# Patient Record
Sex: FEMALE | Race: WHITE | NOT HISPANIC OR LATINO
[De-identification: names, ages, dates, MRNs, and addresses within clinical notes are randomized per-mention and may not be internally consistent; named-entity substitution may affect disease eponyms.]

---

## 2018-08-20 ENCOUNTER — APPOINTMENT (OUTPATIENT)
Dept: OTOLARYNGOLOGY | Facility: CLINIC | Age: 78
End: 2018-08-20
Payer: MEDICARE

## 2018-08-20 VITALS
DIASTOLIC BLOOD PRESSURE: 80 MMHG | HEIGHT: 64 IN | SYSTOLIC BLOOD PRESSURE: 150 MMHG | WEIGHT: 136 LBS | HEART RATE: 68 BPM | BODY MASS INDEX: 23.22 KG/M2

## 2018-08-20 PROCEDURE — 99203 OFFICE O/P NEW LOW 30 MIN: CPT

## 2019-02-27 ENCOUNTER — APPOINTMENT (OUTPATIENT)
Dept: OTOLARYNGOLOGY | Facility: CLINIC | Age: 79
End: 2019-02-27
Payer: MEDICARE

## 2019-02-27 VITALS
HEART RATE: 72 BPM | DIASTOLIC BLOOD PRESSURE: 73 MMHG | WEIGHT: 136 LBS | SYSTOLIC BLOOD PRESSURE: 123 MMHG | BODY MASS INDEX: 23.22 KG/M2 | HEIGHT: 64 IN

## 2019-02-27 PROCEDURE — 99213 OFFICE O/P EST LOW 20 MIN: CPT

## 2019-02-27 NOTE — HISTORY OF PRESENT ILLNESS
[de-identified] : co intermittent ear congestion\par using fluticasone \par 2 d as thumping sound\par clear nasal drip\par antibiotic now for dental implant rt upper

## 2019-02-27 NOTE — REVIEW OF SYSTEMS
[Ear Itch] : ear itch [Ear Drainage] : ear drainage [Discolored Nasal Discharge] : discolored nasal discharge [Negative] : Heme/Lymph [Patient Intake Form Reviewed] : Patient intake form was reviewed [de-identified] : ear pressure

## 2019-06-10 ENCOUNTER — INPATIENT (INPATIENT)
Facility: HOSPITAL | Age: 79
LOS: 8 days | Discharge: HOME | End: 2019-06-19
Attending: FAMILY MEDICINE | Admitting: FAMILY MEDICINE
Payer: MEDICARE

## 2019-06-10 VITALS
OXYGEN SATURATION: 100 % | HEART RATE: 165 BPM | WEIGHT: 145.95 LBS | SYSTOLIC BLOOD PRESSURE: 118 MMHG | HEIGHT: 64 IN | RESPIRATION RATE: 18 BRPM | DIASTOLIC BLOOD PRESSURE: 83 MMHG | TEMPERATURE: 99 F

## 2019-06-10 DIAGNOSIS — E78.5 HYPERLIPIDEMIA, UNSPECIFIED: ICD-10-CM

## 2019-06-10 DIAGNOSIS — I48.92 UNSPECIFIED ATRIAL FLUTTER: ICD-10-CM

## 2019-06-10 DIAGNOSIS — R50.9 FEVER, UNSPECIFIED: ICD-10-CM

## 2019-06-10 DIAGNOSIS — R09.82 POSTNASAL DRIP: ICD-10-CM

## 2019-06-10 DIAGNOSIS — I25.10 ATHEROSCLEROTIC HEART DISEASE OF NATIVE CORONARY ARTERY WITHOUT ANGINA PECTORIS: ICD-10-CM

## 2019-06-10 DIAGNOSIS — Z91.040 LATEX ALLERGY STATUS: ICD-10-CM

## 2019-06-10 DIAGNOSIS — I47.1 SUPRAVENTRICULAR TACHYCARDIA: ICD-10-CM

## 2019-06-10 DIAGNOSIS — Z88.2 ALLERGY STATUS TO SULFONAMIDES: ICD-10-CM

## 2019-06-10 DIAGNOSIS — I10 ESSENTIAL (PRIMARY) HYPERTENSION: ICD-10-CM

## 2019-06-10 DIAGNOSIS — I48.91 UNSPECIFIED ATRIAL FIBRILLATION: ICD-10-CM

## 2019-06-10 DIAGNOSIS — R74.8 ABNORMAL LEVELS OF OTHER SERUM ENZYMES: ICD-10-CM

## 2019-06-10 DIAGNOSIS — K21.9 GASTRO-ESOPHAGEAL REFLUX DISEASE WITHOUT ESOPHAGITIS: ICD-10-CM

## 2019-06-10 DIAGNOSIS — F41.9 ANXIETY DISORDER, UNSPECIFIED: ICD-10-CM

## 2019-06-10 LAB
ALBUMIN SERPL ELPH-MCNC: 3.8 G/DL — SIGNIFICANT CHANGE UP (ref 3.3–5)
ALP SERPL-CCNC: 105 U/L — SIGNIFICANT CHANGE UP (ref 40–120)
ALT FLD-CCNC: 74 U/L — SIGNIFICANT CHANGE UP (ref 12–78)
ANION GAP SERPL CALC-SCNC: 11 MMOL/L — SIGNIFICANT CHANGE UP (ref 5–17)
APTT BLD: 28.4 SEC — SIGNIFICANT CHANGE UP (ref 27.5–36.3)
AST SERPL-CCNC: 65 U/L — HIGH (ref 15–37)
BASOPHILS # BLD AUTO: 0.04 K/UL — SIGNIFICANT CHANGE UP (ref 0–0.2)
BASOPHILS NFR BLD AUTO: 0.4 % — SIGNIFICANT CHANGE UP (ref 0–2)
BILIRUB SERPL-MCNC: 0.7 MG/DL — SIGNIFICANT CHANGE UP (ref 0.2–1.2)
BUN SERPL-MCNC: 27 MG/DL — HIGH (ref 7–23)
CALCIUM SERPL-MCNC: 8.9 MG/DL — SIGNIFICANT CHANGE UP (ref 8.5–10.1)
CHLORIDE SERPL-SCNC: 100 MMOL/L — SIGNIFICANT CHANGE UP (ref 96–108)
CO2 SERPL-SCNC: 25 MMOL/L — SIGNIFICANT CHANGE UP (ref 22–31)
CREAT SERPL-MCNC: 0.96 MG/DL — SIGNIFICANT CHANGE UP (ref 0.5–1.3)
EOSINOPHIL # BLD AUTO: 0.08 K/UL — SIGNIFICANT CHANGE UP (ref 0–0.5)
EOSINOPHIL NFR BLD AUTO: 0.9 % — SIGNIFICANT CHANGE UP (ref 0–6)
GLUCOSE SERPL-MCNC: 127 MG/DL — HIGH (ref 70–99)
HCT VFR BLD CALC: 40.9 % — SIGNIFICANT CHANGE UP (ref 34.5–45)
HGB BLD-MCNC: 14.2 G/DL — SIGNIFICANT CHANGE UP (ref 11.5–15.5)
IMM GRANULOCYTES NFR BLD AUTO: 0.2 % — SIGNIFICANT CHANGE UP (ref 0–1.5)
INR BLD: 1.07 RATIO — SIGNIFICANT CHANGE UP (ref 0.88–1.16)
LYMPHOCYTES # BLD AUTO: 2.28 K/UL — SIGNIFICANT CHANGE UP (ref 1–3.3)
LYMPHOCYTES # BLD AUTO: 24.5 % — SIGNIFICANT CHANGE UP (ref 13–44)
MAGNESIUM SERPL-MCNC: 2 MG/DL — SIGNIFICANT CHANGE UP (ref 1.6–2.6)
MCHC RBC-ENTMCNC: 33.3 PG — SIGNIFICANT CHANGE UP (ref 27–34)
MCHC RBC-ENTMCNC: 34.7 GM/DL — SIGNIFICANT CHANGE UP (ref 32–36)
MCV RBC AUTO: 96 FL — SIGNIFICANT CHANGE UP (ref 80–100)
MONOCYTES # BLD AUTO: 0.89 K/UL — SIGNIFICANT CHANGE UP (ref 0–0.9)
MONOCYTES NFR BLD AUTO: 9.6 % — SIGNIFICANT CHANGE UP (ref 2–14)
NEUTROPHILS # BLD AUTO: 5.98 K/UL — SIGNIFICANT CHANGE UP (ref 1.8–7.4)
NEUTROPHILS NFR BLD AUTO: 64.4 % — SIGNIFICANT CHANGE UP (ref 43–77)
PLATELET # BLD AUTO: 189 K/UL — SIGNIFICANT CHANGE UP (ref 150–400)
POTASSIUM SERPL-MCNC: 3.6 MMOL/L — SIGNIFICANT CHANGE UP (ref 3.5–5.3)
POTASSIUM SERPL-SCNC: 3.6 MMOL/L — SIGNIFICANT CHANGE UP (ref 3.5–5.3)
PROT SERPL-MCNC: 7.2 GM/DL — SIGNIFICANT CHANGE UP (ref 6–8.3)
PROTHROM AB SERPL-ACNC: 11.9 SEC — SIGNIFICANT CHANGE UP (ref 10–12.9)
RBC # BLD: 4.26 M/UL — SIGNIFICANT CHANGE UP (ref 3.8–5.2)
RBC # FLD: 12 % — SIGNIFICANT CHANGE UP (ref 10.3–14.5)
SODIUM SERPL-SCNC: 136 MMOL/L — SIGNIFICANT CHANGE UP (ref 135–145)
TROPONIN I SERPL-MCNC: 0.87 NG/ML — HIGH (ref 0.01–0.04)
TROPONIN I SERPL-MCNC: 0.88 NG/ML — HIGH (ref 0.01–0.04)
TSH SERPL-MCNC: 3.55 UU/ML — SIGNIFICANT CHANGE UP (ref 0.34–4.82)
WBC # BLD: 9.29 K/UL — SIGNIFICANT CHANGE UP (ref 3.8–10.5)
WBC # FLD AUTO: 9.29 K/UL — SIGNIFICANT CHANGE UP (ref 3.8–10.5)

## 2019-06-10 PROCEDURE — 93010 ELECTROCARDIOGRAM REPORT: CPT

## 2019-06-10 PROCEDURE — 71045 X-RAY EXAM CHEST 1 VIEW: CPT | Mod: 26

## 2019-06-10 PROCEDURE — 99285 EMERGENCY DEPT VISIT HI MDM: CPT

## 2019-06-10 RX ORDER — ASPIRIN/CALCIUM CARB/MAGNESIUM 324 MG
81 TABLET ORAL DAILY
Refills: 0 | Status: DISCONTINUED | OUTPATIENT
Start: 2019-06-10 | End: 2019-06-19

## 2019-06-10 RX ORDER — METOPROLOL TARTRATE 50 MG
25 TABLET ORAL
Refills: 0 | Status: DISCONTINUED | OUTPATIENT
Start: 2019-06-10 | End: 2019-06-11

## 2019-06-10 RX ORDER — ASPIRIN/CALCIUM CARB/MAGNESIUM 324 MG
324 TABLET ORAL ONCE
Refills: 0 | Status: COMPLETED | OUTPATIENT
Start: 2019-06-10 | End: 2019-06-10

## 2019-06-10 RX ORDER — SODIUM CHLORIDE 9 MG/ML
1000 INJECTION INTRAMUSCULAR; INTRAVENOUS; SUBCUTANEOUS ONCE
Refills: 0 | Status: COMPLETED | OUTPATIENT
Start: 2019-06-10 | End: 2019-06-10

## 2019-06-10 RX ORDER — PANTOPRAZOLE SODIUM 20 MG/1
40 TABLET, DELAYED RELEASE ORAL
Refills: 0 | Status: DISCONTINUED | OUTPATIENT
Start: 2019-06-10 | End: 2019-06-19

## 2019-06-10 RX ORDER — METOPROLOL TARTRATE 50 MG
2.6 TABLET ORAL EVERY 6 HOURS
Refills: 0 | Status: DISCONTINUED | OUTPATIENT
Start: 2019-06-10 | End: 2019-06-13

## 2019-06-10 RX ORDER — DILTIAZEM HCL 120 MG
10 CAPSULE, EXT RELEASE 24 HR ORAL ONCE
Refills: 0 | Status: COMPLETED | OUTPATIENT
Start: 2019-06-10 | End: 2019-06-10

## 2019-06-10 RX ORDER — ALPRAZOLAM 0.25 MG
0.12 TABLET ORAL THREE TIMES A DAY
Refills: 0 | Status: DISCONTINUED | OUTPATIENT
Start: 2019-06-10 | End: 2019-06-17

## 2019-06-10 RX ORDER — METOPROLOL TARTRATE 50 MG
2.5 TABLET ORAL ONCE
Refills: 0 | Status: COMPLETED | OUTPATIENT
Start: 2019-06-10 | End: 2019-06-10

## 2019-06-10 RX ORDER — DILTIAZEM HCL 120 MG
5 CAPSULE, EXT RELEASE 24 HR ORAL
Qty: 125 | Refills: 0 | Status: DISCONTINUED | OUTPATIENT
Start: 2019-06-10 | End: 2019-06-15

## 2019-06-10 RX ORDER — DILTIAZEM HCL 120 MG
2.5 CAPSULE, EXT RELEASE 24 HR ORAL
Qty: 125 | Refills: 0 | Status: DISCONTINUED | OUTPATIENT
Start: 2019-06-10 | End: 2019-06-10

## 2019-06-10 RX ORDER — ATORVASTATIN CALCIUM 80 MG/1
20 TABLET, FILM COATED ORAL AT BEDTIME
Refills: 0 | Status: DISCONTINUED | OUTPATIENT
Start: 2019-06-10 | End: 2019-06-19

## 2019-06-10 RX ADMIN — SODIUM CHLORIDE 1000 MILLILITER(S): 9 INJECTION INTRAMUSCULAR; INTRAVENOUS; SUBCUTANEOUS at 17:58

## 2019-06-10 RX ADMIN — Medication 324 MILLIGRAM(S): at 15:05

## 2019-06-10 RX ADMIN — Medication 5 MG/HR: at 20:33

## 2019-06-10 RX ADMIN — Medication 2.5 MG/HR: at 15:26

## 2019-06-10 RX ADMIN — Medication 2.5 MILLIGRAM(S): at 17:32

## 2019-06-10 RX ADMIN — Medication 10 MILLIGRAM(S): at 14:19

## 2019-06-10 RX ADMIN — Medication 25 MILLIGRAM(S): at 18:59

## 2019-06-10 RX ADMIN — Medication 0.12 MILLIGRAM(S): at 21:09

## 2019-06-10 RX ADMIN — ATORVASTATIN CALCIUM 20 MILLIGRAM(S): 80 TABLET, FILM COATED ORAL at 21:06

## 2019-06-10 RX ADMIN — Medication 2.5 MILLIGRAM(S): at 18:58

## 2019-06-10 NOTE — ED ADULT NURSE REASSESSMENT NOTE - NS ED NURSE REASSESS COMMENT FT1
Patient remains in Afib, HR trend improved s/p lopressor as prescribed. Cardizem gtt lowered from 10 mL/hr to 3 mL/hr per NP Juni Gutierrez. Patient became hypotensive, 1 L NS bolus initiated with improvement in BP. Patient remains asymptomatic, no s/s of distress present. Denies pain/discomfort, palpitations, SOB. Plan of care discussed. Hand-off report to LEIGH López, transport to CICU. Safety & comfort measures in place, frequent purposeful rounding on my time.

## 2019-06-10 NOTE — H&P ADULT - ATTENDING COMMENTS
Patient seen and examined with ELIZABETH Aranda. Agree with physical exam and assessment and plan.   - agree with cardizem drip and lopressor if needed (with BP parameters)  - cardio eval is pending  - EP consult ordered.  - Type 2 MI 2/2 likely demand 2/2 to rapid HR  - monitor and trend. continue aspirin, statin.

## 2019-06-10 NOTE — ED ADULT NURSE NOTE - NSIMPLEMENTINTERV_GEN_ALL_ED
Implemented All Fall Risk Interventions:  [place] to call system. Call bell, personal items and telephone within reach. Instruct patient to call for assistance. Room bathroom lighting operational. Non-slip footwear when patient is off stretcher. Physically safe environment: no spills, clutter or unnecessary equipment. Stretcher in lowest position, wheels locked, appropriate side rails in place. Provide visual cue, wrist band, yellow gown, etc. Monitor gait and stability. Monitor for mental status changes and reorient to person, place, and time. Review medications for side effects contributing to fall risk. Reinforce activity limits and safety measures with patient and family.

## 2019-06-10 NOTE — H&P ADULT - HISTORY OF PRESENT ILLNESS
79 year old Woman with hx anxiety, essential HTN, dyslipidemia, post nasal drip, GERD, ?Non-obstruct CAD, presented to ED with c/o 1 wk hx of "discomfort" in epigastric area, nausea and generalized malaise. She report PCP Dr. England's office for r/o SVT. She denies CP/palpitation/SOB/HA/dizziness/v/d/f/c; denies urinary changes/abonormality; no recent sick contact or travel.    In the ED ECG/tele Atach 160's, TNI 0.883, CBC/BMP normal, /83, RR 18, T 37.3, O2 sat 100% rm air. CXR no acute pathology. She received cardizem IVp 10 mg x1 f/b drip starting at 2.5mcg. asa 324mg x1,

## 2019-06-10 NOTE — H&P ADULT - NSHPPHYSICALEXAM_GEN_ALL_CORE
PHYSICAL EXAM:    GENERAL: well groomed, NAD    HEENT:  pupils equal and reactive, EOMI, no oropharyngeal lesions, erythema, exudates, oral thrush    NECK:   supple, no carotid bruits, no palpable lymph nodes, no thyromegaly    CV:  S1S2, tachycardia, regular,  no m/c/r    RESP:   lungs clear to auscultation bilaterally, no wheezing, rales, rhonchi, good air entry bilaterally    BREAST:  not examined    GI:  abdomen soft, non-tender, non-distended, normal BS, no bruits, no abdominal masses, no palpable masses    RECTAL:  not examined    :  no suprapubic tenderness    MSK:   normal muscle tone, no atrophy, no rigidity, no contractions    EXT: chronic vascular changes to BLE,  no clubbing, no cyanosis, no edema, no calf pain, swelling or erythema    VASCULAR:  pulses equal and symmetric in the upper and lower extremities    NEURO:  AAOX3, no focal neurological deficits, follows all commands, able to move extremities spontaneously    SKIN:  no ulcers, lesions or rashes    Vital Signs Last 24 Hrs  T(C): 36.7 (10 Ross 2019 15:05), Max: 37.3 (10 Ross 2019 14:09)  T(F): 98.1 (10 Ross 2019 15:05), Max: 99.2 (10 Ross 2019 14:09)  HR: 132 (10 Ross 2019 16:09) (90 - 165)  BP: 113/83 (10 Ross 2019 16:09) (112/95 - 131/70)  BP(mean): --  RR: 18 (10 Ross 2019 14:09) (18 - 18)  SpO2: 100% (10 Ross 2019 14:09) (100% - 100%)

## 2019-06-10 NOTE — ED PROVIDER NOTE - OBJECTIVE STATEMENT
78 y/o female with PMH of HTN, HLD presents to the ED sent in from PCP Dr. England's office for r/o SVT. Pt presented to office for malaise, chills beginning yesterday. On EKG, pt found to be have fast rate and irregular rhythm and pt was sent to ED for evaluation. Denies dysuria, N/V/D, palpitations, CP, any other acute sx in ED at this time. Pt had normal angiogram with Dr. Alexander few years ago. +cardiac FHx.

## 2019-06-10 NOTE — H&P ADULT - ASSESSMENT
SVT / Atrial tachycardia ?re-entrant tachy  poor response w/ 2.5mg titrated up to 10mg/hr still unresponsive ('s - 160's)  BP now borderline low 98 - 103  decrease cardizem to 3mg  trial of metoprolol 2.5mg IVP x1  hold all home HTN meds   TSH 3.55  ck TTE assess valves / ventricles  if poor response to BB/CCB she might need a K/Na+ channel blocker  consult EP  admit to tele    Tropononemia likely demand in the setting of Atrial Tach  she's CP free  trend CE's / serial ECG's  consult cardiology (she sees scot/toma outpt)  con't statin  start asa 81mg daily    HTN  monitor per routine    Dyslipidemia  con't home dose statin: dose per formulary  ck lipids with am labs    Anxiety  Mood stable  con't home dose xanax    DVT PPX  VTE score - 1 (age)  SQ heparin    Dispo  goals of care d/w pt--> full code  pt agreed with discussed plan of care

## 2019-06-10 NOTE — H&P ADULT - NSHPSOCIALHISTORY_GEN_ALL_CORE
Never smoke   denies ETOH/illicit or prescription drug abuse   Lives alone independent of all ADL's / has no children  walks 1 mile everyday  currently employed FT as a  at the [employer]

## 2019-06-10 NOTE — H&P ADULT - NSICDXFAMILYHX_GEN_ALL_CORE_FT
FAMILY HISTORY:  Father  Still living? No  Family history of myocardial infarction, Age at diagnosis: 41-50    Sibling  Still living? No  Family history of myocardial infarction, Age at diagnosis: 41-50

## 2019-06-10 NOTE — ED PROVIDER NOTE - PROGRESS NOTE DETAILS
Scribe ST for ED Attending Dr. Charles: 10mg Cardizem given. rate slowed to 90s-low 100s. Rhythm appears to be sinus with atrial tachycardic component vs. a-flutter. sending EKG to EP for further evaluation. pt stable. current asymptomatic. Durga MORGAN for ED Attending Dr. Charles: spoke with EP NP who sent EKG to Dr. Santana, who suspects atrial tachycardia over Afib or Aflutter 78 y/o F with PMH HTN, HLD BIBEMS from PCP Dr. England office for SVT. Pt states for several days she has had generalized weakness. Denies chest pain however states her upper abdomen just didn't feel right. Denies n/v/d or palpitations. States she was seen by cardio Dr. Mendez years ago and underwent angiogram. Nonsmoker. No h/o cancer.  On PE: pt sitting comfortably. no acute distress. HR irregular rate and rhythm, no peripheral edema  plan: cardizem, admit to tele for atrial tachy and for troponin.  -Rola Spencer PA-C

## 2019-06-10 NOTE — ED ADULT NURSE REASSESSMENT NOTE - NS ED NURSE REASSESS COMMENT FT1
Patient care received from LEIGH BARBOZA. Patient A&Ox3, resting comfortably in bed. Afib per tele monitor, HR trend 120s-150s; pt asymptomatic. Cardizem gtt infusing at 10 mL/hr. Hold lopressor for now per NP Rosi, re-evaluate in 20 minutes. Safety & comfort measures in place, no s/s of distress present. Will continue to monitor.

## 2019-06-10 NOTE — ED PROVIDER NOTE - CLINICAL SUMMARY MEDICAL DECISION MAKING FREE TEXT BOX
80 y/o F with PMH HTN, HLD presenting with atrial tachycardia 80 y/o F with PMH HTN, HLD presenting with atrial tachycardia    Pt with HR in 160-170's on arrival, narrow complex rhythm.  Given 10 mg cardizem with improvement to 's.  Currently asymptomatic, hemodynamically stable.  EKG sent to EP, which was interpreted as atrial tachycardia by Dr. Jara.  Pt without history of this.  Elevated troponin 0.88. Given ASA.  Pt admitted to [facility], Dr. Bailey.  Will need further cardiology evaluation in hospital.

## 2019-06-11 LAB
ANION GAP SERPL CALC-SCNC: 11 MMOL/L — SIGNIFICANT CHANGE UP (ref 5–17)
BASOPHILS # BLD AUTO: 0.04 K/UL — SIGNIFICANT CHANGE UP (ref 0–0.2)
BASOPHILS NFR BLD AUTO: 0.5 % — SIGNIFICANT CHANGE UP (ref 0–2)
BUN SERPL-MCNC: 17 MG/DL — SIGNIFICANT CHANGE UP (ref 7–23)
CALCIUM SERPL-MCNC: 8.6 MG/DL — SIGNIFICANT CHANGE UP (ref 8.5–10.1)
CHLORIDE SERPL-SCNC: 106 MMOL/L — SIGNIFICANT CHANGE UP (ref 96–108)
CO2 SERPL-SCNC: 22 MMOL/L — SIGNIFICANT CHANGE UP (ref 22–31)
CREAT SERPL-MCNC: 0.74 MG/DL — SIGNIFICANT CHANGE UP (ref 0.5–1.3)
EOSINOPHIL # BLD AUTO: 0.11 K/UL — SIGNIFICANT CHANGE UP (ref 0–0.5)
EOSINOPHIL NFR BLD AUTO: 1.3 % — SIGNIFICANT CHANGE UP (ref 0–6)
GLUCOSE SERPL-MCNC: 150 MG/DL — HIGH (ref 70–99)
HCT VFR BLD CALC: 38.6 % — SIGNIFICANT CHANGE UP (ref 34.5–45)
HGB BLD-MCNC: 13.7 G/DL — SIGNIFICANT CHANGE UP (ref 11.5–15.5)
IMM GRANULOCYTES NFR BLD AUTO: 0.6 % — SIGNIFICANT CHANGE UP (ref 0–1.5)
LYMPHOCYTES # BLD AUTO: 1.03 K/UL — SIGNIFICANT CHANGE UP (ref 1–3.3)
LYMPHOCYTES # BLD AUTO: 11.9 % — LOW (ref 13–44)
MCHC RBC-ENTMCNC: 33.7 PG — SIGNIFICANT CHANGE UP (ref 27–34)
MCHC RBC-ENTMCNC: 35.5 GM/DL — SIGNIFICANT CHANGE UP (ref 32–36)
MCV RBC AUTO: 94.8 FL — SIGNIFICANT CHANGE UP (ref 80–100)
MONOCYTES # BLD AUTO: 0.79 K/UL — SIGNIFICANT CHANGE UP (ref 0–0.9)
MONOCYTES NFR BLD AUTO: 9.1 % — SIGNIFICANT CHANGE UP (ref 2–14)
NEUTROPHILS # BLD AUTO: 6.62 K/UL — SIGNIFICANT CHANGE UP (ref 1.8–7.4)
NEUTROPHILS NFR BLD AUTO: 76.6 % — SIGNIFICANT CHANGE UP (ref 43–77)
PLATELET # BLD AUTO: 162 K/UL — SIGNIFICANT CHANGE UP (ref 150–400)
POTASSIUM SERPL-MCNC: 3.5 MMOL/L — SIGNIFICANT CHANGE UP (ref 3.5–5.3)
POTASSIUM SERPL-SCNC: 3.5 MMOL/L — SIGNIFICANT CHANGE UP (ref 3.5–5.3)
RBC # BLD: 4.07 M/UL — SIGNIFICANT CHANGE UP (ref 3.8–5.2)
RBC # FLD: 12.1 % — SIGNIFICANT CHANGE UP (ref 10.3–14.5)
SODIUM SERPL-SCNC: 139 MMOL/L — SIGNIFICANT CHANGE UP (ref 135–145)
WBC # BLD: 8.64 K/UL — SIGNIFICANT CHANGE UP (ref 3.8–10.5)
WBC # FLD AUTO: 8.64 K/UL — SIGNIFICANT CHANGE UP (ref 3.8–10.5)

## 2019-06-11 PROCEDURE — 93010 ELECTROCARDIOGRAM REPORT: CPT

## 2019-06-11 PROCEDURE — 93306 TTE W/DOPPLER COMPLETE: CPT | Mod: 26

## 2019-06-11 RX ORDER — METOPROLOL TARTRATE 50 MG
25 TABLET ORAL ONCE
Refills: 0 | Status: COMPLETED | OUTPATIENT
Start: 2019-06-11 | End: 2019-06-11

## 2019-06-11 RX ORDER — METOPROLOL TARTRATE 50 MG
50 TABLET ORAL
Refills: 0 | Status: DISCONTINUED | OUTPATIENT
Start: 2019-06-11 | End: 2019-06-12

## 2019-06-11 RX ORDER — ENOXAPARIN SODIUM 100 MG/ML
40 INJECTION SUBCUTANEOUS DAILY
Refills: 0 | Status: DISCONTINUED | OUTPATIENT
Start: 2019-06-11 | End: 2019-06-19

## 2019-06-11 RX ORDER — POTASSIUM CHLORIDE 20 MEQ
40 PACKET (EA) ORAL ONCE
Refills: 0 | Status: COMPLETED | OUTPATIENT
Start: 2019-06-11 | End: 2019-06-12

## 2019-06-11 RX ADMIN — Medication 25 MILLIGRAM(S): at 08:04

## 2019-06-11 RX ADMIN — Medication 0.12 MILLIGRAM(S): at 21:12

## 2019-06-11 RX ADMIN — Medication 81 MILLIGRAM(S): at 14:48

## 2019-06-11 RX ADMIN — ATORVASTATIN CALCIUM 20 MILLIGRAM(S): 80 TABLET, FILM COATED ORAL at 21:11

## 2019-06-11 RX ADMIN — PANTOPRAZOLE SODIUM 40 MILLIGRAM(S): 20 TABLET, DELAYED RELEASE ORAL at 06:50

## 2019-06-11 RX ADMIN — Medication 0.12 MILLIGRAM(S): at 05:17

## 2019-06-11 RX ADMIN — Medication 5 MG/HR: at 08:04

## 2019-06-11 RX ADMIN — Medication 50 MILLIGRAM(S): at 18:03

## 2019-06-11 RX ADMIN — Medication 25 MILLIGRAM(S): at 04:54

## 2019-06-11 NOTE — PROGRESS NOTE ADULT - SUBJECTIVE AND OBJECTIVE BOX
History of Present Illness:   79 year old Woman with hx anxiety, essential HTN, dyslipidemia, post nasal drip, GERD, ?Non-obstruct CAD, presented to ED with c/o 1 wk hx of "discomfort" in epigastric area, nausea and generalized malaise. She report PCP Dr. England's office for r/o SVT. She denies CP/palpitation/SOB/HA/dizziness/v/d/f/c; denies urinary changes/abonormality; no recent sick contact or travel.    In the ED ECG/tele Atach 160's, TNI 0.883, CBC/BMP normal, /83, RR 18, T 37.3, O2 sat 100% rm air. CXR no acute pathology. She received cardizem IVp 10 mg x1 f/b drip starting at 2.5mcg. asa 324mg x1,            REVIEW OF SYSTEMS:    CONSTITUTIONAL: No weakness, No fevers or chills  ENT: No ear ache, No sorethroat  NECK: No pain, No stiffness  RESPIRATORY: No cough, No wheezing, No hemoptysis; No dyspnea  CARDIOVASCULAR: No chest pain, No palpitations  GASTROINTESTINAL: No abd pain, No nausea, No vomiting, No hematemesis, No diarrhea or constipation. No melena, No hematochezia.  GENITOURINARY: No dysuria, No  hematuria  NEUROLOGICAL: No diplopia, No paresthesia, No motor dysfunction  MUSCULOSKELETAL: No arthralgia, No myalgia  SKIN: No rashes, or lesions   PSYCH: no anxiety, no suicidal ideation    All other review of systems is negative unless indicated above    Vital Signs Last 24 Hrs  T(C): 36.8 (11 Jun 2019 21:59), Max: 36.8 (11 Jun 2019 16:00)  T(F): 98.3 (11 Jun 2019 21:59), Max: 98.3 (11 Jun 2019 21:59)  HR: 83 (11 Jun 2019 20:00) (82 - 159)  BP: 116/41 (11 Jun 2019 20:00) (88/61 - 127/90)  BP(mean): 60 (11 Jun 2019 20:00) (53 - 102)  RR: 21 (11 Jun 2019 20:00) (16 - 31)  SpO2: --    PHYSICAL EXAM:    GENERAL: NAD, Well nourished  HEENT:  NC/AT, EOMI, PERRLA, No scleral icterus, Moist mucous membranes  NECK: Supple, No JVD  CNS:  Alert & Oriented X3, Motor Strength 5/5 B/L upper and lower extremities; DTRs 2+ intact   LUNG: Normal Breath sounds, Clear to auscultation bilaterally, No rales, No rhonchi, No wheezing  HEART: RRR; No murmurs, No rubs  ABDOMEN: +BS, ST/ND/NT  GENITOURINARY: Voiding, Bladder not distended  EXTREMITIES:  2+ Peripheral Pulses, No clubbing, No cyanosis, No tibial edema  MUSCULOSKELTAL: Joints normal ROM, No TTP, No effusion  VAGINAL: deferred  SKIN: no rashes  RECTAL: deferred, not indicated  BREAST: deferred                          13.7   8.64  )-----------( 162      ( 11 Jun 2019 06:37 )             38.6     06-11    139  |  106  |  17  ----------------------------<  150<H>  3.5   |  22  |  0.74    Ca    8.6      11 Jun 2019 06:37  Mg     2.0     06-10    TPro  7.2  /  Alb  3.8  /  TBili  0.7  /  DBili  x   /  AST  65<H>  /  ALT  74  /  AlkPhos  105  06-10    Vancomycin levels:   Cultures:     MEDICATIONS  (STANDING):  aspirin  chewable 81 milliGRAM(s) Oral daily  atorvastatin 20 milliGRAM(s) Oral at bedtime  diltiazem Infusion 5 mG/Hr (5 mL/Hr) IV Continuous <Continuous>  metoprolol tartrate 50 milliGRAM(s) Oral two times a day  pantoprazole    Tablet 40 milliGRAM(s) Oral before breakfast    MEDICATIONS  (PRN):  ALPRAZolam 0.125 milliGRAM(s) Oral three times a day PRN anxiety  metoprolol tartrate Injectable 2.6 milliGRAM(s) IV Push every 6 hours PRN FOR HEART RATE GREATER THAN 120      all labs reviewed  all imaging reviewed        1. Afib/SVT:  c/w Lopressor 50mg bid  Titrate down Cardizem drip  ASA  TTE  Cardiology eval noted: to consider DCCV vs Ablation tx    2. HTN: stable  monitor per routine      3. Anxiety  Mood stable  con't home dose xanax

## 2019-06-11 NOTE — CONSULT NOTE ADULT - ASSESSMENT
Palpitations- atrial tachycardia- currently rate controlled at rest with cardizem drip.  WIll recommend EP team consultation.  Keep pt NPO for now pending EP team consult.  Increase po metoprolol and downtitrated the cardizem drip.     HTN- continue current meds.    Hyperlipidemia- continue statin.    Other medical issues- Management per primary team.   Thank you for allowing me to participate in the care of this patient. Please feel free to contact me with any questions.

## 2019-06-11 NOTE — CONSULT NOTE ADULT - ASSESSMENT
1. Atrial tach/SVT  -patient currently on cardizem drip (at 5mg/hr), po metoprolol (50mg BID) and IV metoprolol prn  -recommend to uptitrate po metoprolol with down titration of cardizem drip as BP and HR allows  -2D echocardiogram to assess LV function and valvular disease  -patient may benefit from stress testing given atypical symptoms  -monitor electrolytes and adequate hydration  -ambulatory event monitor 1. Atrial tach/SVT  -patient currently on cardizem drip (at 5mg/hr), po metoprolol (50mg BID) and IV metoprolol prn  -recommend to continue po metoprolol with down titration of cardizem drip as BP and HR allows  -if patient unresponsive to po metoprolol, consider switching to po cardizem and uptitrate as tolerated  -2D echocardiogram to assess LV function and valvular disease  -monitor electrolytes and adequate hydration  -will discuss possible ablation with patient      Thank you for the consultation and allowing us to participate in the care of Ms. Kelley.

## 2019-06-11 NOTE — CONSULT NOTE ADULT - SUBJECTIVE AND OBJECTIVE BOX
Patient is a 79y old  Female who presents with a chief complaint of SVT.     HPI:  79 year old Woman with hx anxiety, essential HTN, dyslipidemia, post nasal drip, GERD, ?Non-obstruct CAD, presented to ED with c/o 1 wk hx of "discomfort" in epigastric area, nausea and generalized malaise. She report PCP Dr. Eckert's office for r/o SVT. She denies CP/palpitation/SOB/HA/dizziness/v/d/f/c; denies urinary changes/abnormality; no recent sick contact or travel.    In the ED ECG/tele Atach 160's, TNI 0.883, CBC/BMP normal, /83, RR 18, T 37.3, O2 sat 100% rm air. CXR no acute pathology. She received cardizem IVp 10 mg x1 f/b drip starting at 2.5mcg. asa 324mg x1.    Since presentation she was in atrial tachycardia. She was noted to have increased HR after cardizem drip was stopped overnight and was restarted on the drip.  this am she c/o anxiety.   Denies any other symptoms.       PAST MEDICAL & SURGICAL HISTORY:  Anxiety  HLD (hyperlipidemia)  HTN (hypertension)  No significant past surgical history      MEDICATIONS  (STANDING):  aspirin  chewable 81 milliGRAM(s) Oral daily  atorvastatin 20 milliGRAM(s) Oral at bedtime  diltiazem Infusion 5 mG/Hr (5 mL/Hr) IV Continuous <Continuous>  metoprolol tartrate 25 milliGRAM(s) Oral two times a day  pantoprazole    Tablet 40 milliGRAM(s) Oral before breakfast    MEDICATIONS  (PRN):  ALPRAZolam 0.125 milliGRAM(s) Oral three times a day PRN anxiety  metoprolol tartrate Injectable 2.6 milliGRAM(s) IV Push every 6 hours PRN FOR HEART RATE GREATER THAN 120      FAMILY HISTORY:  Family history of myocardial infarction (Father, Sibling)      SOCIAL HISTORY:  no recent smoking     REVIEW OF SYSTEMS:  CONSTITUTIONAL:    No fatigue, malaise, lethargy.  No fever or chills.  HEENT:  Eyes:  No visual changes.     ENT:  No epistaxis.  No sinus pain.    RESPIRATORY:  No cough.  No wheeze.  No hemoptysis.  No shortness of breath.  CARDIOVASCULAR:  No chest pains.  c/o palpitations. No shortness of breath, No orthopnea or PND.  GASTROINTESTINAL:  No abdominal pain.  No nausea or vomiting.    GENITOURINARY:    No hematuria.    MUSCULOSKELETAL:  No musculoskeletal pain.  No joint swelling.  No arthritis.  NEUROLOGICAL:  No tingling or numbness or weakness.        Vital Signs Last 24 Hrs  T(C): 36.7 (11 Jun 2019 06:39), Max: 37.3 (10 Ross 2019 14:09)  T(F): 98.1 (11 Jun 2019 06:39), Max: 99.2 (10 Ross 2019 14:09)  HR: 95 (11 Jun 2019 07:00) (82 - 165)  BP: 117/75 (11 Jun 2019 07:00) (80/45 - 131/70)  BP(mean): 84 (11 Jun 2019 07:00) (53 - 104)  RR: 19 (11 Jun 2019 07:00) (16 - 22)  SpO2: 96% (10 Ross 2019 18:06) (96% - 100%)    PHYSICAL EXAM-    Constitutional: The patient appears to be normal, well developed, well nourished and alert and oriented to time, place and person. The patient does not appear acutely ill.     Head: Head is normocephalic and atraumatic.      Neck: No jugular venous distention. No audible carotid bruits. There are strong carotid pulses bilaterally. No JVD.     Cardiovascular: irregular and tachycardic    Respiratory: Breathsounds are normal. No rales. No wheezing.    Abdomen: Soft, nontender, nondistended with positive bowel sounds.      Extremity: No tenderness. No  pitting edema     Neurologic: The patient is alert and oriented.      Skin: No rash, no obvious lesions noted.      Psychiatric: The patient appears to be emotionally stable.      INTERPRETATION OF TELEMETRY: multifocal atrial tachycardia    ECG: atrial tachycardia, L axis no st t changes    I&O's Detail    10 Ross 2019 07:01  -  11 Jun 2019 07:00  --------------------------------------------------------  IN:    diltiazem Infusion: 35 mL  Total IN: 35 mL    OUT:  Total OUT: 0 mL    Total NET: 35 mL          LABS:                        13.7   8.64  )-----------( 162      ( 11 Jun 2019 06:37 )             38.6     06-11    139  |  106  |  17  ----------------------------<  150<H>  3.5   |  22  |  0.74    Ca    8.6      11 Jun 2019 06:37  Mg     2.0     06-10    TPro  7.2  /  Alb  3.8  /  TBili  0.7  /  DBili  x   /  AST  65<H>  /  ALT  74  /  AlkPhos  105  06-10    CARDIAC MARKERS ( 10 Ross 2019 17:48 )  0.873 ng/mL / x     / x     / x     / x      CARDIAC MARKERS ( 10 Ross 2019 14:21 )  0.883 ng/mL / x     / x     / x     / x          PT/INR - ( 10 Ross 2019 14:21 )   PT: 11.9 sec;   INR: 1.07 ratio         PTT - ( 10 Ross 2019 14:21 )  PTT:28.4 sec    I&O's Summary    10 Ross 2019 07:01  -  11 Jun 2019 07:00  --------------------------------------------------------  IN: 35 mL / OUT: 0 mL / NET: 35 mL      BNP  RADIOLOGY & ADDITIONAL STUDIES:  < from: Xray Chest 1 View- PORTABLE-Urgent (06.10.19 @ 15:05) >    EXAM:  XR CHEST PORTABLE URGENT 1V                            PROCEDURE DATE:  06/10/2019          INTERPRETATION:  Portable chest radiograph dated 6/10/2019.    COMPARISON: None available.    CLINICAL INFORMATION: Rapid heart beat.    FINDINGS:    The airway is midline.  There are no airspace consolidations.  There is no pleural effusion or pneumothorax.   The cardiac silhouette is normal size.   The bones are normal.     IMPRESSION:    No acute cardiopulmonary findings.                JAZZY NOLASCO M.D., ATTENDING RADIOLOGIST  This document has been electronically signed. Ross 10 2019  3:19PM    < end of copied text >

## 2019-06-11 NOTE — CONSULT NOTE ADULT - SUBJECTIVE AND OBJECTIVE BOX
[place] [facility]                                                        Office: [address] [address]                                                       Telephone: [phone]. Fax:[phone]                                                              CARDIOLOGY CONSULTATION NOTE                                                                                             Consult requested by:  Hospitalist    Reason for Consultation: SVT/atrial tach    History obtained by: Patient and medical record     obtained: No    Chief complaint:    Patient is a 79y old  Female who presents with a chief complaint of SVT (11 Jun 2019 07:41)      HPI:  79 year old Woman with hx anxiety, essential HTN, dyslipidemia, post nasal drip, GERD, ?Non-obstruct CAD, presented to ED with c/o 1 wk hx of "discomfort" in epigastric/upper chest area, nausea and generalized malaise. She reports that for the last week, she has had this uncomfortable intermittent burning feeling in the upper chest area, no radiation. Not improved with PPI. No dyspnea on exertion or CP on exertion. She reports a throbbing feeling in the throat with a feeling of palpitations at night for the last week. Denies any current HA, dizziness, N, V, abd pain, fever, chills, diarrhea. No urinary complaints. She went to her PCP's office and had an initial EKG which she reports was normal, but on followup visit, she reports that EKG showed a fast heart rate.     In the ED, patient's ECG revealed Atach 160's.She received cardizem IVP 10 mg x1 followed by cardizem drip and was given ASA 324mg x1. Currently on cardizem and metoprolol.        REVIEW OF SYMPTOMS: Cardiovascular:  See HPI. No chest pain,  No dyspnea,  No syncope, No dizziness, No Orthopnea,  No Paroxsymal nocturnal dyspnea, + palpitations at night;  Respiratory:  No Dyspnea, No cough, Genitourinary:  No dysuria, no hematuria; Gastrointestinal:  No nausea, no vomiting. No diarrhea.  No abdominal pain. No dark color stool, no melena ; Neurological: No headache, no dizziness, no slurred speech;  Psychiatric: No agitation, no anxiety.  ALL OTHER REVIEW OF SYSTEMS ARE NEGATIVE.    ALLERGIES:  latex (Unknown)  sulfa drugs (Unknown)    CURRENT MEDICATIONS:  diltiazem Infusion 5 mG/Hr IV Continuous <Continuous>  metoprolol tartrate 50 milliGRAM(s) Oral two times a day  metoprolol tartrate Injectable 2.6 milliGRAM(s) IV Push every 6 hours PRN  pantoprazole    Tablet  aspirin  chewable  atorvastatin    PAST MEDICAL HISTORY  Anxiety  HLD (hyperlipidemia)  HTN (hypertension)      PAST SURGICAL HISTORY  Cataracts    FAMILY HISTORY:  Family history of myocardial infarction in father and sibling (40s-50s)    SOCIAL HISTORY:  Denies smoking/alcohol/drugs      Vital Signs Last 24 Hrs  T(C): 36.7 (11 Jun 2019 06:39), Max: 37.3 (10 Ross 2019 14:09)  T(F): 98.1 (11 Jun 2019 06:39), Max: 99.2 (10 Ross 2019 14:09)  HR: 102 (11 Jun 2019 10:00) (82 - 165)  BP: 121/80 (11 Jun 2019 10:00) (80/45 - 131/70)  BP(mean): 89 (11 Jun 2019 10:00) (53 - 104)  RR: 26 (11 Jun 2019 10:00) (16 - 26)  SpO2: 96% (10 Ross 2019 18:06) (96% - 100%)      PHYSICAL EXAM:  Constitutional: Comfortable . No acute distress.   HEENT: Atraumatic and normcephalic , neck is supple . no JVD. No carotid bruit. PEERL   CNS: A&Ox3. No focal deficits. EOMI. Cranial nerves II-IX are intact.   Lymph Nodes: Cervical : Not palpable.  Respiratory: Clear to auscultation bilaterally. Breathing nonlabored.  Cardiovascular: S1S2 RRR. No rubs or gallop. +1/6 HUMA heard best at apex  Gastrointestinal: Soft non-tender and non distended . +Bowel sounds.   Extremities: No cyanosis or edema.   Psychiatric: Calm. No agitation.  Skin: No skin rash/ulcers visualized to face, hands or feet.    Intake and output:   06-10 @ 07:01  -  06-11 @ 07:00  --------------------------------------------------------  IN: 35 mL / OUT: 0 mL / NET: 35 mL        LABS:                        13.7   8.64  )-----------( 162      ( 11 Jun 2019 06:37 )             38.6     06-11    139  |  106  |  17  ----------------------------<  150<H>  3.5   |  22  |  0.74    Ca    8.6      11 Jun 2019 06:37  Mg     2.0     06-10    TPro  7.2  /  Alb  3.8  /  TBili  0.7  /  DBili  x   /  AST  65<H>  /  ALT  74  /  AlkPhos  105  06-10    CARDIAC MARKERS ( 10 Ross 2019 17:48 )  0.873 ng/mL / x     / x     / x     / x      CARDIAC MARKERS ( 10 Ross 2019 14:21 )  0.883 ng/mL / x     / x     / x     / x        ;p-BNP=  PT/INR - ( 10 Ross 2019 14:21 )   PT: 11.9 sec;   INR: 1.07 ratio      PTT - ( 10 Ross 2019 14:21 )  PTT:28.4 sec      INTERPRETATION OF TELEMETRY: Atrial tach, with HR up to 150  ECG: Reviewed by me.     RADIOLOGY & ADDITIONAL STUDIES:   Chest X-ray:  No acute pulmonary pathology     ECHO FINDINGS: Pending [place] [facility]                                                        Office: [address] [address]                                                       Telephone: [phone]. Fax:[phone]                                                              CARDIOLOGY CONSULTATION NOTE                                                                                             Consult requested by:  Hospitalist    Reason for Consultation: SVT/atrial tach    History obtained by: Patient and medical record     obtained: No    Chief complaint:    Patient is a 79y old  Female who presents with a chief complaint of SVT (11 Jun 2019 07:41)      HPI:  79 year old Woman with hx anxiety, essential HTN, dyslipidemia, post nasal drip, GERD, ?Non-obstruct CAD, presented to ED with c/o 1 wk hx of "discomfort" in epigastric/upper chest area, nausea and generalized malaise. She reports that for the last week, she has had this uncomfortable intermittent burning feeling in the upper chest area, no radiation. Not improved with PPI. No dyspnea on exertion or CP on exertion. She reports a throbbing feeling in the throat with a feeling of palpitations at night for the last week. Denies any current HA, dizziness, N, V, abd pain, fever, chills, diarrhea. No urinary complaints. She went to her PCP's office and had an initial EKG which she reports was normal, but on followup visit, she reports that EKG showed a fast heart rate.     In the ED, patient's ECG revealed Atach 160's.She received cardizem IVP 10 mg x1 followed by cardizem drip and was given ASA 324mg x1. Currently on cardizem and metoprolol.        REVIEW OF SYMPTOMS: Cardiovascular:  See HPI. No chest pain,  No dyspnea,  No syncope, No dizziness, No Orthopnea,  No Paroxsymal nocturnal dyspnea, + palpitations at night;  Respiratory:  No Dyspnea, No cough, Genitourinary:  No dysuria, no hematuria; Gastrointestinal:  No nausea, no vomiting. No diarrhea.  No abdominal pain. No dark color stool, no melena ; Neurological: No headache, no dizziness, no slurred speech;  Psychiatric: No agitation, no anxiety.  ALL OTHER REVIEW OF SYSTEMS ARE NEGATIVE.    ALLERGIES:  latex (Unknown)  sulfa drugs (Unknown)    CURRENT MEDICATIONS:  diltiazem Infusion 5 mG/Hr IV Continuous <Continuous>  metoprolol tartrate 50 milliGRAM(s) Oral two times a day  metoprolol tartrate Injectable 2.6 milliGRAM(s) IV Push every 6 hours PRN  pantoprazole    Tablet  aspirin  chewable  atorvastatin    PAST MEDICAL HISTORY  Anxiety  HLD (hyperlipidemia)  HTN (hypertension)      PAST SURGICAL HISTORY  Cataracts    FAMILY HISTORY:  Family history of myocardial infarction in father and sibling (40s-50s)    SOCIAL HISTORY:  Denies smoking/alcohol/drugs      Vital Signs Last 24 Hrs  T(C): 36.7 (11 Jun 2019 06:39), Max: 37.3 (10 Ross 2019 14:09)  T(F): 98.1 (11 Jun 2019 06:39), Max: 99.2 (10 Ross 2019 14:09)  HR: 102 (11 Jun 2019 10:00) (82 - 165)  BP: 121/80 (11 Jun 2019 10:00) (80/45 - 131/70)  BP(mean): 89 (11 Jun 2019 10:00) (53 - 104)  RR: 26 (11 Jun 2019 10:00) (16 - 26)  SpO2: 96% (10 Ross 2019 18:06) (96% - 100%)      PHYSICAL EXAM:  Constitutional: Comfortable . No acute distress.   HEENT: Atraumatic and normcephalic , neck is supple . no JVD. No carotid bruit. PEERL   CNS: A&Ox3. No focal deficits. EOMI. Cranial nerves II-IX are intact.   Lymph Nodes: Cervical : Not palpable.  Respiratory: Clear to auscultation bilaterally. Breathing nonlabored.  Cardiovascular: Irregular, tachycardic. No rubs or gallop. +1/6 HUMA heard best at apex  Gastrointestinal: Soft non-tender and non distended . +Bowel sounds.   Extremities: No cyanosis or edema.   Psychiatric: Calm. No agitation.  Skin: No skin rash/ulcers visualized to face, hands or feet.    Intake and output:   06-10 @ 07:01  -  06-11 @ 07:00  --------------------------------------------------------  IN: 35 mL / OUT: 0 mL / NET: 35 mL        LABS:                        13.7   8.64  )-----------( 162      ( 11 Jun 2019 06:37 )             38.6     06-11    139  |  106  |  17  ----------------------------<  150<H>  3.5   |  22  |  0.74    Ca    8.6      11 Jun 2019 06:37  Mg     2.0     06-10    TPro  7.2  /  Alb  3.8  /  TBili  0.7  /  DBili  x   /  AST  65<H>  /  ALT  74  /  AlkPhos  105  06-10    CARDIAC MARKERS ( 10 Ross 2019 17:48 )  0.873 ng/mL / x     / x     / x     / x      CARDIAC MARKERS ( 10 Ross 2019 14:21 )  0.883 ng/mL / x     / x     / x     / x        ;p-BNP=  PT/INR - ( 10 Ross 2019 14:21 )   PT: 11.9 sec;   INR: 1.07 ratio      PTT - ( 10 Ross 2019 14:21 )  PTT:28.4 sec      INTERPRETATION OF TELEMETRY: Atrial tach, with HR up to 150  ECG: Reviewed by me.     RADIOLOGY & ADDITIONAL STUDIES:   Chest X-ray:  No acute pulmonary pathology     ECHO FINDINGS: Pending

## 2019-06-11 NOTE — PROVIDER CONTACT NOTE (OTHER) - SITUATION
spoke with EP
Spoke to service and notified of consult.
notified service; spoke to Nemo
office aware of consult

## 2019-06-12 DIAGNOSIS — I47.1 SUPRAVENTRICULAR TACHYCARDIA: ICD-10-CM

## 2019-06-12 DIAGNOSIS — R74.8 ABNORMAL LEVELS OF OTHER SERUM ENZYMES: ICD-10-CM

## 2019-06-12 DIAGNOSIS — I10 ESSENTIAL (PRIMARY) HYPERTENSION: ICD-10-CM

## 2019-06-12 LAB
APPEARANCE UR: CLEAR — SIGNIFICANT CHANGE UP
BACTERIA # UR AUTO: ABNORMAL
BILIRUB UR-MCNC: NEGATIVE — SIGNIFICANT CHANGE UP
COLOR SPEC: YELLOW — SIGNIFICANT CHANGE UP
DIFF PNL FLD: ABNORMAL
EPI CELLS # UR: SIGNIFICANT CHANGE UP
GLUCOSE UR QL: NEGATIVE MG/DL — SIGNIFICANT CHANGE UP
KETONES UR-MCNC: ABNORMAL
LEUKOCYTE ESTERASE UR-ACNC: ABNORMAL
NITRITE UR-MCNC: NEGATIVE — SIGNIFICANT CHANGE UP
PH UR: 6 — SIGNIFICANT CHANGE UP (ref 5–8)
PROT UR-MCNC: NEGATIVE MG/DL — SIGNIFICANT CHANGE UP
RBC CASTS # UR COMP ASSIST: ABNORMAL /HPF (ref 0–4)
SP GR SPEC: 1.01 — SIGNIFICANT CHANGE UP (ref 1.01–1.02)
UROBILINOGEN FLD QL: NEGATIVE MG/DL — SIGNIFICANT CHANGE UP
WBC UR QL: ABNORMAL

## 2019-06-12 PROCEDURE — 99223 1ST HOSP IP/OBS HIGH 75: CPT

## 2019-06-12 RX ORDER — DILTIAZEM HCL 120 MG
30 CAPSULE, EXT RELEASE 24 HR ORAL EVERY 6 HOURS
Refills: 0 | Status: DISCONTINUED | OUTPATIENT
Start: 2019-06-12 | End: 2019-06-13

## 2019-06-12 RX ORDER — METOPROLOL TARTRATE 50 MG
100 TABLET ORAL
Refills: 0 | Status: DISCONTINUED | OUTPATIENT
Start: 2019-06-12 | End: 2019-06-15

## 2019-06-12 RX ORDER — ACETAMINOPHEN 500 MG
650 TABLET ORAL EVERY 6 HOURS
Refills: 0 | Status: DISCONTINUED | OUTPATIENT
Start: 2019-06-12 | End: 2019-06-19

## 2019-06-12 RX ADMIN — ENOXAPARIN SODIUM 40 MILLIGRAM(S): 100 INJECTION SUBCUTANEOUS at 12:12

## 2019-06-12 RX ADMIN — Medication 50 MILLIGRAM(S): at 06:35

## 2019-06-12 RX ADMIN — Medication 0.12 MILLIGRAM(S): at 12:38

## 2019-06-12 RX ADMIN — Medication 650 MILLIGRAM(S): at 12:39

## 2019-06-12 RX ADMIN — Medication 100 MILLIGRAM(S): at 16:10

## 2019-06-12 RX ADMIN — ATORVASTATIN CALCIUM 20 MILLIGRAM(S): 80 TABLET, FILM COATED ORAL at 21:17

## 2019-06-12 RX ADMIN — Medication 30 MILLIGRAM(S): at 17:39

## 2019-06-12 RX ADMIN — Medication 5 MG/HR: at 12:12

## 2019-06-12 RX ADMIN — Medication 40 MILLIEQUIVALENT(S): at 05:47

## 2019-06-12 RX ADMIN — Medication 81 MILLIGRAM(S): at 12:12

## 2019-06-12 RX ADMIN — PANTOPRAZOLE SODIUM 40 MILLIGRAM(S): 20 TABLET, DELAYED RELEASE ORAL at 05:47

## 2019-06-12 RX ADMIN — Medication 650 MILLIGRAM(S): at 14:00

## 2019-06-12 RX ADMIN — Medication 0.12 MILLIGRAM(S): at 05:47

## 2019-06-12 RX ADMIN — Medication 30 MILLIGRAM(S): at 23:08

## 2019-06-12 NOTE — CONSULT NOTE ADULT - SUBJECTIVE AND OBJECTIVE BOX
PCP:    REQUESTING PHYSICIAN:    REASON FOR CONSULT:    CHIEF COMPLAINT:    HPI:  79 year old Woman with hx anxiety, essential HTN, dyslipidemia, post nasal drip, GERD, ?Non-obstruct CAD, Cath in 2015  presented to ED with c/o 1 wk hx of "discomfort" in epigastric area, nausea and generalized malaise. She report PCP Dr. Eckert's office for r/o SVT. She denies CP/palpitation/SOB/HA/dizziness/v/d/f/c; denies urinary changes/abonormality; no recent sick contact or travel.    In the ED ECG/tele Atach 160's, TNI 0.883, CBC/BMP normal, /83, RR 18, T 37.3, O2 sat 100% rm air. CXR no acute pathology. She received cardizem IVp 10 mg x1 f/b drip starting at 2.5mcg. asa 324mg x1, (10 Henok 2019 16:52)  Cardiology requested to evaluate rhythm which has been refractory to IV Cardizem. Pt denies chest pain but reports neck discomfort lately. Last stress test was appx 4 years ago prior to catheterization. Met with Dr. Ramirez who reviewed meds. In light of lack of response to rate reducing meds, she is leaning toward early ablation.       PAST MEDICAL & SURGICAL HISTORY:  Anxiety  HLD (hyperlipidemia)  HTN (hypertension)  No significant past surgical history      Allergies    latex (Unknown)  sulfa drugs (Unknown)    Intolerances        SOCIAL HISTORY:    FAMILY HISTORY:  Family history of myocardial infarction (Father, Sibling)      MEDICATIONS:  MEDICATIONS  (STANDING):  aspirin  chewable 81 milliGRAM(s) Oral daily  atorvastatin 20 milliGRAM(s) Oral at bedtime  diltiazem Infusion 5 mG/Hr (5 mL/Hr) IV Continuous <Continuous>  enoxaparin Injectable 40 milliGRAM(s) SubCutaneous daily  metoprolol tartrate 50 milliGRAM(s) Oral two times a day  pantoprazole    Tablet 40 milliGRAM(s) Oral before breakfast    MEDICATIONS  (PRN):  ALPRAZolam 0.125 milliGRAM(s) Oral three times a day PRN anxiety  metoprolol tartrate Injectable 2.6 milliGRAM(s) IV Push every 6 hours PRN FOR HEART RATE GREATER THAN 120      REVIEW OF SYSTEMS:    CONSTITUTIONAL: No weakness, fevers or chills  EYES/ENT: No visual changes;  No vertigo or throat pain   NECK: Neck pain  RESPIRATORY: No cough, wheezing, hemoptysis; No shortness of breath  CARDIOVASCULAR: No chest pain or palpitations  GASTROINTESTINAL: No abdominal or epigastric pain. No nausea, vomiting, or hematemesis; No diarrhea or constipation. No melena or hematochezia.  GENITOURINARY: No dysuria, frequency or hematuria  NEUROLOGICAL: No numbness or weakness  SKIN: No itching, burning, rashes, or lesions   All other review of systems is negative unless indicated above    Vital Signs Last 24 Hrs  T(C): 36.4 (12 Jun 2019 04:56), Max: 36.8 (11 Jun 2019 16:00)  T(F): 97.6 (12 Jun 2019 04:56), Max: 98.3 (11 Jun 2019 21:59)  HR: 81 (12 Jun 2019 10:00) (77 - 167)  BP: 107/62 (12 Jun 2019 09:00) (94/72 - 127/90)  BP(mean): 73 (12 Jun 2019 09:00) (59 - 102)  RR: 24 (12 Jun 2019 10:00) (16 - 31)  SpO2: --    I&O's Summary    11 Jun 2019 07:01  -  12 Jun 2019 07:00  --------------------------------------------------------  IN: 192.5 mL / OUT: 400 mL / NET: -207.5 mL        PHYSICAL EXAM:    Constitutional: NAD, awake and alert, well-developed  HEENT: PERR, EOMI,  No oral cyananosis.  Neck:  supple,  No JVD  Respiratory: Breath sounds are clear bilaterally, No wheezing, rales or rhonchi  Cardiovascular: S1 and S2, regular rate and rhythm, no Murmurs, gallops or rubs  Gastrointestinal: Bowel Sounds present, soft, nontender.   Extremities: No peripheral edema. No clubbing or cyanosis.  Vascular: 2+ peripheral pulses  Neurological: A/O x 3, no focal deficits  Musculoskeletal: no calf tenderness.  Skin: No rashes.      LABS: All Labs Reviewed:                        13.7   8.64  )-----------( 162      ( 11 Jun 2019 06:37 )             38.6                         14.2   9.29  )-----------( 189      ( 10 Henok 2019 14:21 )             40.9     11 Jun 2019 06:37    139    |  106    |  17     ----------------------------<  150    3.5     |  22     |  0.74   10 Henok 2019 14:21    136    |  100    |  27     ----------------------------<  127    3.6     |  25     |  0.96     Ca    8.6        11 Jun 2019 06:37  Ca    8.9        10 Henok 2019 14:21  Mg     2.0       10 Henok 2019 14:21    TPro  7.2    /  Alb  3.8    /  TBili  0.7    /  DBili  x      /  AST  65     /  ALT  74     /  AlkPhos  105    10 Henok 2019 14:21    PT/INR - ( 10 Henok 2019 14:21 )   PT: 11.9 sec;   INR: 1.07 ratio         PTT - ( 10 Henok 2019 14:21 )  PTT:28.4 sec  CARDIAC MARKERS ( 10 Henok 2019 17:48 )  0.873 ng/mL / x     / x     / x     / x      CARDIAC MARKERS ( 10 Henok 2019 14:21 )  0.883 ng/mL / x     / x     / x     / x          Blood Culture:     06-10 @ 14:21  TSH: 3.55      RADIOLOGY/EKG:    < from: 12 Lead ECG (06.11.19 @ 06:24) >    Diagnosis Line Sinus rhythm with marked sinus arrhythmia  Inferior infarct , age undetermined  Abnormal ECG  When compared with ECG of 10-HENOK-2019 14:32,  Premature atrial complexes are no longer Present  Inferior infarct is now Present  QT has shortened  Confirmed by NETTE SHAH, CALIN LI ([id number]) on 6/11/2019 2:09:14 PM    < end of copied text >  < from: Transthoracic Echocardiogram (06.11.19 @ 14:15) >     Impression     Summary     Significant fibrocalcific changes noted to the aortic valve leaflets with   restriction in leaflet excursion. Peak transaortic gradient is 37 mmHg   suggestive of mild aortic stenosis.   Moderate (2+) tricuspid valve regurgitation is present.   Mild pulmonary hypertension.   The left atrium is mildly dilated.   The left ventricle is normal in size, with paradoxical septal motion   Mild concentric left ventricular hypertrophy is present.   Estimated left ventricular ejection fraction is 55-60 %.   Normal appearing right ventricle structure and function.     Signature     ----------------------------------------------------------------   Electronically signed by Venugopal Palla, MD(Interpreting   physician) on 06/11/2019 07:13 PM    < end of copied text >

## 2019-06-12 NOTE — PROGRESS NOTE ADULT - SUBJECTIVE AND OBJECTIVE BOX
History of Present Illness:   79 year old Woman with hx anxiety, essential HTN, dyslipidemia, post nasal drip, GERD, ?Non-obstruct CAD, presented to ED with c/o 1 wk hx of "discomfort" in epigastric area, nausea and generalized malaise. She report PCP Dr. England's office for r/o SVT. She denies CP/palpitation/SOB/HA/dizziness/v/d/f/c; denies urinary changes/abonormality; no recent sick contact or travel.    In the ED ECG/tele Atach 160's, TNI 0.883, CBC/BMP normal, /83, RR 18, T 37.3, O2 sat 100% rm air. CXR no acute pathology. She received cardizem IVp 10 mg x1 f/b drip starting at 2.5mcg. asa 324mg x1,      6.12: no cp, no sob, no n/v/d      REVIEW OF SYSTEMS:    CONSTITUTIONAL: No weakness, No fevers or chills  ENT: No ear ache, No sorethroat  NECK: No pain, No stiffness  RESPIRATORY: No cough, No wheezing, No hemoptysis; No dyspnea  CARDIOVASCULAR: No chest pain, No palpitations  GASTROINTESTINAL: No abd pain, No nausea, No vomiting, No hematemesis, No diarrhea or constipation. No melena, No hematochezia.  GENITOURINARY: No dysuria, No  hematuria  NEUROLOGICAL: No diplopia, No paresthesia, No motor dysfunction  MUSCULOSKELETAL: No arthralgia, No myalgia  SKIN: No rashes, or lesions   PSYCH: no anxiety, no suicidal ideation    All other review of systems is negative unless indicated above    Vital Signs Last 24 Hrs  T(C): 38 (12 Jun 2019 12:15), Max: 38 (12 Jun 2019 12:15)  T(F): 100.4 (12 Jun 2019 12:15), Max: 100.4 (12 Jun 2019 12:15)  HR: 97 (12 Jun 2019 14:00) (77 - 167)  BP: 100/59 (12 Jun 2019 14:00) (94/72 - 129/59)  BP(mean): 69 (12 Jun 2019 14:00) (59 - 102)  RR: 21 (12 Jun 2019 14:00) (17 - 31)      PHYSICAL EXAM:    GENERAL: NAD, Well nourished  HEENT:  NC/AT, EOMI, PERRLA, No scleral icterus, Moist mucous membranes  NECK: Supple, No JVD  CNS:  Alert & Oriented X3, Motor Strength 5/5 B/L upper and lower extremities; DTRs 2+ intact   LUNG: Normal Breath sounds, Clear to auscultation bilaterally, No rales, No rhonchi, No wheezing  HEART: RRR; No murmurs, No rubs  ABDOMEN: +BS, ST/ND/NT  GENITOURINARY: Voiding, Bladder not distended  EXTREMITIES:  2+ Peripheral Pulses, No clubbing, No cyanosis, No tibial edema  MUSCULOSKELTAL: Joints normal ROM, No TTP, No effusion  VAGINAL: deferred  SKIN: no rashes  RECTAL: deferred, not indicated  BREAST: deferred                          13.7   8.64  )-----------( 162      ( 11 Jun 2019 06:37 )             38.6     06-11    139  |  106  |  17  ----------------------------<  150<H>  3.5   |  22  |  0.74    Ca    8.6      11 Jun 2019 06:37  Mg     2.0     06-10    TPro  7.2  /  Alb  3.8  /  TBili  0.7  /  DBili  x   /  AST  65<H>  /  ALT  74  /  AlkPhos  105  06-10    Vancomycin levels:   Cultures:     MEDICATIONS  (STANDING):  aspirin  chewable 81 milliGRAM(s) Oral daily  atorvastatin 20 milliGRAM(s) Oral at bedtime  diltiazem Infusion 5 mG/Hr (5 mL/Hr) IV Continuous <Continuous>  metoprolol tartrate 50 milliGRAM(s) Oral two times a day  pantoprazole    Tablet 40 milliGRAM(s) Oral before breakfast    MEDICATIONS  (PRN):  ALPRAZolam 0.125 milliGRAM(s) Oral three times a day PRN anxiety  metoprolol tartrate Injectable 2.6 milliGRAM(s) IV Push every 6 hours PRN FOR HEART RATE GREATER THAN 120      all labs reviewed  all imaging reviewed        1. Afib/SVT:  Increase Lopressor to 100mg po Bid  Titrate down Cardizem drip as tolerated  ASA  TTE  Cardiology eval noted: to consider DCCV vs Ablation tx    2. HTN: stable  monitor per routine      3. Anxiety  Mood stable  con't home dose xanax

## 2019-06-12 NOTE — PROGRESS NOTE ADULT - ASSESSMENT
1. Atrial tachycardia  -patient currently on cardizem drip, po metoprolol (50mg BID) and IV metoprolol prn, HR poorly controlled  -consider switching to po cardizem (30mg q6h) and uptitrate as BP tolerates while downtitrating IV drip  -2D echocardiogram shows normal LV and RV function, mild to moderate valvular disease  -patient will likely benefit from ablation, discuss further with patient and will plan accordingly  -monitor electrolytes and adequate hydration 1. Atrial tachycardia  -patient currently on cardizem drip, po metoprolol (50mg BID) and IV metoprolol prn, HR poorly controlled  -recommend switching to po cardizem (30mg q6h) and uptitrate as BP tolerates while downtitrating IV drip  -if HR still uncontrolled on po cardizem, amiodarone or sotalol are reasonable options, ablation is also reasonable option if medical management fails, will re-evaluate HR tomorrow with change in medication  -2D echocardiogram shows normal LV and RV function, mild to moderate valvular disease  -monitor electrolytes and adequate hydration

## 2019-06-12 NOTE — PROGRESS NOTE ADULT - SUBJECTIVE AND OBJECTIVE BOX
CARDIOLOGY PROGRESS NOTE   ([facility])                             Reason for follow up:                             Overnight: No new events.     Subjective:   Patient seen and examined at bedside. No complaints and no acute issues overnight. HR increases with minial activity. Patient asymptomatic. Denies CP, SOB, dizziness, palpitations, fever, chills, N/V, abd pain, urinary complaints.     PMH, PSH, Social, Fam hx: No updates.     HPI: 79 year old Woman with hx anxiety, essential HTN, dyslipidemia, post nasal drip, GERD, ?Non-obstruct CAD, presented to ED with c/o 1 wk hx of "discomfort" in epigastric/upper chest area, nausea and generalized malaise. She reports that for the last week, she has had this uncomfortable intermittent burning feeling in the upper chest area, no radiation. Not improved with PPI. No dyspnea on exertion or CP on exertion. She reports a throbbing feeling in the throat with a feeling of palpitations at night for the last week. Denies any current HA, dizziness, N, V, abd pain, fever, chills, diarrhea. No urinary complaints. She went to her PCP's office and had an initial EKG which she reports was normal, but on followup visit, she reports that EKG showed a fast heart rate.     	  Vitals:  T(C): 36.4 (06-12-19 @ 04:56), Max: 36.8 (06-11-19 @ 16:00)  HR: 81 (06-12-19 @ 10:00) (77 - 167)  BP: 107/62 (06-12-19 @ 09:00) (88/61 - 127/90)  RR: 24 (06-12-19 @ 10:00) (16 - 31)  SpO2: --  Wt(kg): --  I&O's Summary    11 Jun 2019 07:01  -  12 Jun 2019 07:00  --------------------------------------------------------  IN: 192.5 mL / OUT: 400 mL / NET: -207.5 mL      Weight (kg): 66.2 (06-10 @ 14:09)    PHYSICAL EXAM:  Appearance: Comfortable. No acute distress  HEENT:  Head and neck: Atraumatic. Normocephalic.  Normal oral mucosa  Neck: Supple, No JVD, no carotid bruit  Neurologic: A & O x 3, no focal deficits  Lymphatic: No cervical lymphadenopathy  Cardiovascular: Normal S1 S2, No rubs/gallops, 2/6 HUMA appreciated  Respiratory: Lungs clear to auscultation  Gastrointestinal:  Soft, Non-tender, + BS, no HSM  Lower Extremities: No edema, DP and PT +2  Psychiatry: Patient is calm. No agitation. Mood & affect appropriate  Skin: No rashes, No ecchymoses, No cyanosis    CURRENT MEDICATIONS:  diltiazem Infusion 5 mG/Hr IV Continuous <Continuous>  metoprolol tartrate 50 milliGRAM(s) Oral two times a day  metoprolol tartrate Injectable 2.6 milliGRAM(s) IV Push every 6 hours PRN    pantoprazole    Tablet  atorvastatin  aspirin  chewable  enoxaparin Injectable      LABS:	 	  CARDIAC MARKERS:  Troponin I: Troponin I, Serum: 0.873 ng/mL (06-10 @ 17:48)  Troponin I, Serum: 0.883 ng/mL (06-10 @ 14:21)                 13.7   8.64  )-----------( 162      ( 11 Jun 2019 06:37 )             38.6     06-11    139  |  106  |  17  ----------------------------<  150<H>  3.5   |  22  |  0.74    Ca    8.6      11 Jun 2019 06:37  Mg     2.0     06-10    TPro  7.2  /  Alb  3.8  /  TBili  0.7  /  DBili  x   /  AST  65<H>  /  ALT  74  /  AlkPhos  105  06-10    HgA1c: TSH: Thyroid Stimulating Hormone, Serum: 3.55 uU/mL      TELEMETRY: atrial tach up to 160s    ECG: Reviewed.    DIAGNOSTIC TESTING:  Echocardiogram 6/11/19:  Significant fibrocalcific changes noted to the aortic valve leaflets with restriction in leaflet excursion. Peak transaortic gradient is 37 mmHg suggestive of mild aortic stenosis. Moderate (2+) tricuspid valve regurgitation is present. Mild pulmonary hypertension. The left atrium is mildly dilated. The left ventricle is normal in size, with paradoxical septal motion Mild concentric left ventricular hypertrophy is present. Estimated left ventricular ejection fraction is 55-60 %. Normal appearing right ventricle structure and function.

## 2019-06-12 NOTE — CONSULT NOTE ADULT - PROBLEM SELECTOR RECOMMENDATION 2
Could be secondary to demand however cannot exclude CAD. Will review cath. May need ischemia evaluation

## 2019-06-13 PROCEDURE — 99233 SBSQ HOSP IP/OBS HIGH 50: CPT

## 2019-06-13 RX ORDER — DILTIAZEM HCL 120 MG
60 CAPSULE, EXT RELEASE 24 HR ORAL EVERY 8 HOURS
Refills: 0 | Status: DISCONTINUED | OUTPATIENT
Start: 2019-06-13 | End: 2019-06-15

## 2019-06-13 RX ADMIN — ENOXAPARIN SODIUM 40 MILLIGRAM(S): 100 INJECTION SUBCUTANEOUS at 13:24

## 2019-06-13 RX ADMIN — PANTOPRAZOLE SODIUM 40 MILLIGRAM(S): 20 TABLET, DELAYED RELEASE ORAL at 05:20

## 2019-06-13 RX ADMIN — Medication 650 MILLIGRAM(S): at 13:23

## 2019-06-13 RX ADMIN — Medication 650 MILLIGRAM(S): at 14:00

## 2019-06-13 RX ADMIN — Medication 60 MILLIGRAM(S): at 21:46

## 2019-06-13 RX ADMIN — Medication 100 MILLIGRAM(S): at 18:14

## 2019-06-13 RX ADMIN — Medication 60 MILLIGRAM(S): at 13:24

## 2019-06-13 RX ADMIN — Medication 100 MILLIGRAM(S): at 05:20

## 2019-06-13 RX ADMIN — Medication 30 MILLIGRAM(S): at 05:20

## 2019-06-13 RX ADMIN — Medication 5 MG/HR: at 19:58

## 2019-06-13 RX ADMIN — ATORVASTATIN CALCIUM 20 MILLIGRAM(S): 80 TABLET, FILM COATED ORAL at 21:46

## 2019-06-13 RX ADMIN — Medication 5 MG/HR: at 09:18

## 2019-06-13 RX ADMIN — Medication 81 MILLIGRAM(S): at 13:25

## 2019-06-13 NOTE — CHART NOTE - NSCHARTNOTEFT_GEN_A_CORE
Called by RN to report HR in the 150', bp . Pt is asymptomatic. Denies chest pain, SOB, wheezing or palpitations.     Vital Signs Last 24 Hrs  T(C): 36.9 (12 Jun 2019 20:34), Max: 38 (12 Jun 2019 12:15)  T(F): 98.5 (12 Jun 2019 20:34), Max: 100.4 (12 Jun 2019 12:15)  HR: 126 (13 Jun 2019 02:10) (81 - 167)  BP: 92/69 (13 Jun 2019 02:10) (92/69 - 129/94)  BP(mean): 74 (13 Jun 2019 02:10) (60 - 103)  RR: 22 (13 Jun 2019 02:10) (18 - 27)  SpO2: 99% (12 Jun 2019 18:00) (98% - 99%)    Gen: alert and oriented x 3  Cardiac: S1 S2, tachycardic  Lungs: CTA b/l    Atrial Tachycardia  Will restart on Cardizem drip and continue with tele monitors  EP and cardio f/u for possible ablation  Continue with tele monitoring Called by RN to report HR in the 150', bp . Pt is asymptomatic. Denies chest pain, SOB, wheezing or palpitations.     Vital Signs Last 24 Hrs  T(C): 36.9 (12 Jun 2019 20:34), Max: 38 (12 Jun 2019 12:15)  T(F): 98.5 (12 Jun 2019 20:34), Max: 100.4 (12 Jun 2019 12:15)  HR: 126 (13 Jun 2019 02:10) (81 - 167)  BP: 92/69 (13 Jun 2019 02:10) (92/69 - 129/94)  BP(mean): 74 (13 Jun 2019 02:10) (60 - 103)  RR: 22 (13 Jun 2019 02:10) (18 - 27)  SpO2: 99% (12 Jun 2019 18:00) (98% - 99%)    Gen: alert and oriented x 3  Cardiac: S1 S2, tachycardic  Lungs: CTA b/l    Atrial Tachycardia  Will restart on Cardizem drip   EP and cardio f/u for possible ablation  Continue with tele monitoring

## 2019-06-13 NOTE — PROGRESS NOTE ADULT - ASSESSMENT
1. Atrial tachycardia  -HR not adequately controlled  -continue with po metoprolol (100mg BID)  -increase cardizem to 60mg q8h and titrate off Cardizem drip   -patient agreeable to try medical management vs invasive procedure  -if HR still uncontrolled with increases in po metoprolol and cardizem, consider starting amiodarone (200mg TID)   -if medical therapy fails, will consider option for ablation   -2D echocardiogram shows normal LV and RV function, mild to moderate valvular disease 1. Atrial tachycardia  -HR not adequately controlled  -continue with po metoprolol (100mg BID)  -increase cardizem to 60mg q8h and titrate off Cardizem drip   -patient agreeable to try medical management vs invasive procedure  -if HR still uncontrolled with increases in po metoprolol and cardizem, will consider starting amiodarone or sotalol  -if medical therapy fails, will consider option for ablation   -2D echocardiogram shows normal LV and RV function, mild to moderate valvular disease

## 2019-06-13 NOTE — PROGRESS NOTE ADULT - PROBLEM SELECTOR PLAN 3
Elevated troponin may be related to tachyarrhythmia; ischemia evaluation when atrial flutter is better controlled.

## 2019-06-13 NOTE — PROGRESS NOTE ADULT - SUBJECTIVE AND OBJECTIVE BOX
REASON FOR VISIT: Atrial tachycardia    HPI:  79 year old woman with a history of anxiety, essential HTN, dyslipidemia, GERD, non-obstructive CAD admitted on 6/10/19 with atrial tachycardia.    6/13/19:  Comfortable; no cardiac complaints.    MEDICATIONS  (STANDING):  aspirin  chewable 81 milliGRAM(s) Oral daily  atorvastatin 20 milliGRAM(s) Oral at bedtime  diltiazem    Tablet 30 milliGRAM(s) Oral every 6 hours  diltiazem Infusion 5 mG/Hr (5 mL/Hr) IV Continuous <Continuous>  enoxaparin Injectable 40 milliGRAM(s) SubCutaneous daily  metoprolol tartrate 100 milliGRAM(s) Oral two times a day  pantoprazole    Tablet 40 milliGRAM(s) Oral before breakfast    Vital Signs Last 24 Hrs  T(C): 36.9 (13 Jun 2019 06:20), Max: 38 (12 Jun 2019 12:15)  T(F): 98.4 (13 Jun 2019 06:20), Max: 100.4 (12 Jun 2019 12:15)  HR: 89 (13 Jun 2019 09:00) (79 - 160)  BP: 112/66 (13 Jun 2019 09:00) (92/69 - 129/94)  BP(mean): 77 (13 Jun 2019 09:00) (65 - 103)  RR: 26 (13 Jun 2019 09:00) (18 - 29)  SpO2: 99% (12 Jun 2019 18:00) (99% - 99%)    PHYSICAL EXAM:  Constitutional: NAD, awake and alert  Neck:  supple,  No JVD  Respiratory: Breath sounds are clear bilaterally, nonlabored  Cardiovascular: Irregularly irregular  GI: Abdomen is soft, nontender.   Extremities: No pedal edema.     Transthoracic Echocardiogram (06.11.19 @ 14:15):   Mild aortic stenosis.   Moderate tricuspid valve regurgitation.   Mild pulmonary hypertension.   The left atrium is mildly dilated.   The left ventricle is normal in size, with paradoxical septal motion. Mild concentric left ventricular hypertrophy is present. Estimated left ventricular ejection fraction is 55-60 %.   Normal appearing right ventricle structure and function.    Xray Chest 1 View- PORTABLE-Urgent (06.10.19 @ 15:05):  No acute cardiopulmonary findings.

## 2019-06-13 NOTE — PROGRESS NOTE ADULT - SUBJECTIVE AND OBJECTIVE BOX
CARDIOLOGY PROGRESS NOTE   ([facility])                             Reason for follow up:                             Overnight: No new events.     Subjective: Patient seen and examined at bedside. Patient HR increased to 160s and was restarted on cardizem drip. Patient denies any symptoms during rapid HR. Currently feeling well, denies CP, SOB, dizziness, palpitations at rest or with exertion.    PMH, PSH, Social, Fam hx: No updates.     HPI: 79 year old Woman with hx anxiety, essential HTN, dyslipidemia, post nasal drip, GERD, ?Non-obstruct CAD, presented to ED with c/o 1 wk hx of "discomfort" in epigastric/upper chest area, nausea and generalized malaise. She reports that for the last week, she has had this uncomfortable intermittent burning feeling in the upper chest area, no radiation. Not improved with PPI. No dyspnea on exertion or CP on exertion. She reports a throbbing feeling in the throat with a feeling of palpitations at night for the last week. Denies any current HA, dizziness, N, V, abd pain, fever, chills, diarrhea. No urinary complaints. She went to her PCP's office and had an initial EKG which she reports was normal, but on followup visit, she reports that EKG showed a fast heart rate.     	  Vitals:  T(C): 36.9 (06-13-19 @ 06:20), Max: 38 (06-12-19 @ 12:15)  HR: 89 (06-13-19 @ 09:00) (79 - 160)  BP: 112/66 (06-13-19 @ 09:00) (92/69 - 129/94)  RR: 26 (06-13-19 @ 09:00) (18 - 29)  SpO2: 99% (06-12-19 @ 18:00) (99% - 99%)  Wt(kg): --  I&O's Summary    12 Jun 2019 07:01  -  13 Jun 2019 07:00  --------------------------------------------------------  IN: 105 mL / OUT: 400 mL / NET: -295 mL      Weight (kg): 66.2 (06-10 @ 14:09)    PHYSICAL EXAM:  Appearance: Comfortable. No acute distress  HEENT:  Head and neck: Atraumatic. Normocephalic.  Normal oral mucosa  Neck: Supple, No JVD, no carotid bruit  Neurologic: A & O x 3, no focal deficits  Lymphatic: No cervical lymphadenopathy  Cardiovascular: Irregular, 2/6 HUMA appreciated  Respiratory: Lungs clear to auscultation  Gastrointestinal:  Soft, Non-tender, + BS, no HSM  Lower Extremities: No edema, DP and PT +2  Psychiatry: Patient is calm. No agitation. Mood & affect appropriate  Skin: No rashes, No ecchymoses, No cyanosis    CURRENT MEDICATIONS:  diltiazem    Tablet 60 milliGRAM(s) Oral every 8 hours  diltiazem Infusion 5 mG/Hr IV Continuous <Continuous>  metoprolol tartrate 100 milliGRAM(s) Oral two times a day    pantoprazole    Tablet  atorvastatin  aspirin  chewable  enoxaparin Injectable      LABS:	 	  CARDIAC MARKERS:  Troponin I: Troponin I, Serum: 0.873 ng/mL (06-10 @ 17:48)  Troponin I, Serum: 0.883 ng/mL (06-10 @ 14:21)    HgA1c: TSH: Thyroid Stimulating Hormone, Serum: 3.55 uU/mL      TELEMETRY: atrial tach up to 160s, currently HR 90s	    ECG: Reviewed.  	    DIAGNOSTIC TESTING:  Echocardiogram 6/11/19:  Significant fibrocalcific changes noted to the aortic valve leaflets with restriction in leaflet excursion. Peak transaortic gradient is 37 mmHg suggestive of mild aortic stenosis. Moderate (2+) tricuspid valve regurgitation is present. Mild pulmonary hypertension. The left atrium is mildly dilated. The left ventricle is normal in size, with paradoxical septal motion Mild concentric left ventricular hypertrophy is present. Estimated left ventricular ejection fraction is 55-60 %. Normal appearing right ventricle structure and function.

## 2019-06-13 NOTE — PROGRESS NOTE ADULT - SUBJECTIVE AND OBJECTIVE BOX
History of Present Illness:   79 year old Woman with hx anxiety, essential HTN, dyslipidemia, post nasal drip, GERD, ?Non-obstruct CAD, presented to ED with c/o 1 wk hx of "discomfort" in epigastric area, nausea and generalized malaise. She report PCP Dr. England's office for r/o SVT. She denies CP/palpitation/SOB/HA/dizziness/v/d/f/c; denies urinary changes/abonormality; no recent sick contact or travel.    In the ED ECG/tele Atach 160's, TNI 0.883, CBC/BMP normal, /83, RR 18, T 37.3, O2 sat 100% rm air. CXR no acute pathology. She received cardizem IVp 10 mg x1 f/b drip starting at 2.5mcg. asa 324mg x1,      6.12: no cp, no sob, no n/v/d  6.13: no cp, no sob, asymptomatic, HR       REVIEW OF SYSTEMS:    CONSTITUTIONAL: No weakness, No fevers or chills  ENT: No ear ache, No sorethroat  NECK: No pain, No stiffness  RESPIRATORY: No cough, No wheezing, No hemoptysis; No dyspnea  CARDIOVASCULAR: No chest pain, No palpitations  GASTROINTESTINAL: No abd pain, No nausea, No vomiting, No hematemesis, No diarrhea or constipation. No melena, No hematochezia.  GENITOURINARY: No dysuria, No  hematuria  NEUROLOGICAL: No diplopia, No paresthesia, No motor dysfunction  MUSCULOSKELETAL: No arthralgia, No myalgia  SKIN: No rashes, or lesions   PSYCH: no anxiety, no suicidal ideation    All other review of systems is negative unless indicated above    Vital Signs Last 24 Hrs  T(C): 38 (12 Jun 2019 12:15), Max: 38 (12 Jun 2019 12:15)  T(F): 100.4 (12 Jun 2019 12:15), Max: 100.4 (12 Jun 2019 12:15)  HR: 97 (12 Jun 2019 14:00) (77 - 167)  BP: 100/59 (12 Jun 2019 14:00) (94/72 - 129/59)  BP(mean): 69 (12 Jun 2019 14:00) (59 - 102)  RR: 21 (12 Jun 2019 14:00) (17 - 31)      PHYSICAL EXAM:    GENERAL: NAD, Well nourished  HEENT:  NC/AT, EOMI, PERRLA, No scleral icterus, Moist mucous membranes  NECK: Supple, No JVD  CNS:  Alert & Oriented X3, Motor Strength 5/5 B/L upper and lower extremities; DTRs 2+ intact   LUNG: Normal Breath sounds, Clear to auscultation bilaterally, No rales, No rhonchi, No wheezing  HEART: irregular ; No murmurs, No rubs  ABDOMEN: +BS, ST/ND/NT  GENITOURINARY: Voiding, Bladder not distended  EXTREMITIES:  2+ Peripheral Pulses, No clubbing, No cyanosis, No tibial edema  MUSCULOSKELTAL: Joints normal ROM, No TTP, No effusion  VAGINAL: deferred  SKIN: no rashes  RECTAL: deferred, not indicated  BREAST: deferred                          13.7   8.64  )-----------( 162      ( 11 Jun 2019 06:37 )             38.6     06-11    139  |  106  |  17  ----------------------------<  150<H>  3.5   |  22  |  0.74    Ca    8.6      11 Jun 2019 06:37  Mg     2.0     06-10    TPro  7.2  /  Alb  3.8  /  TBili  0.7  /  DBili  x   /  AST  65<H>  /  ALT  74  /  AlkPhos  105  06-10    Vancomycin levels:   Cultures:     MEDICATIONS  (STANDING):  aspirin  chewable 81 milliGRAM(s) Oral daily  atorvastatin 20 milliGRAM(s) Oral at bedtime  diltiazem Infusion 5 mG/Hr (5 mL/Hr) IV Continuous <Continuous>  metoprolol tartrate 50 milliGRAM(s) Oral two times a day  pantoprazole    Tablet 40 milliGRAM(s) Oral before breakfast    MEDICATIONS  (PRN):  ALPRAZolam 0.125 milliGRAM(s) Oral three times a day PRN anxiety  metoprolol tartrate Injectable 2.6 milliGRAM(s) IV Push every 6 hours PRN FOR HEART RATE GREATER THAN 120      all labs reviewed  all imaging reviewed        1. Inappropriate Atrial tachycardia:  Increase Lopressor to 100mg po Bid  Increase Cardizem to 60mg po Tid  Titrate down Cardizem drip as tolerated  ASA  TTE: normal EF  Cardiology eval noted: to consider ablation if med tx fails    2. HTN: stable      3. Anxiety  Mood stable  con't home dose xanax

## 2019-06-13 NOTE — PROGRESS NOTE ADULT - PROBLEM SELECTOR PLAN 1
Ventricular rate has improved but still rapid with exertion; increase oral cardizem dose and taper infusion; continue metoprolol; EP follow-up.

## 2019-06-14 LAB
ALBUMIN SERPL ELPH-MCNC: 2.9 G/DL — LOW (ref 3.3–5)
ALP SERPL-CCNC: 94 U/L — SIGNIFICANT CHANGE UP (ref 40–120)
ALT FLD-CCNC: 43 U/L — SIGNIFICANT CHANGE UP (ref 12–78)
ANION GAP SERPL CALC-SCNC: 10 MMOL/L — SIGNIFICANT CHANGE UP (ref 5–17)
AST SERPL-CCNC: 24 U/L — SIGNIFICANT CHANGE UP (ref 15–37)
BASOPHILS # BLD AUTO: 0.03 K/UL — SIGNIFICANT CHANGE UP (ref 0–0.2)
BASOPHILS NFR BLD AUTO: 0.2 % — SIGNIFICANT CHANGE UP (ref 0–2)
BILIRUB SERPL-MCNC: 1.2 MG/DL — SIGNIFICANT CHANGE UP (ref 0.2–1.2)
BUN SERPL-MCNC: 14 MG/DL — SIGNIFICANT CHANGE UP (ref 7–23)
CALCIUM SERPL-MCNC: 8.6 MG/DL — SIGNIFICANT CHANGE UP (ref 8.5–10.1)
CHLORIDE SERPL-SCNC: 102 MMOL/L — SIGNIFICANT CHANGE UP (ref 96–108)
CO2 SERPL-SCNC: 25 MMOL/L — SIGNIFICANT CHANGE UP (ref 22–31)
CREAT SERPL-MCNC: 0.67 MG/DL — SIGNIFICANT CHANGE UP (ref 0.5–1.3)
EOSINOPHIL # BLD AUTO: 0.02 K/UL — SIGNIFICANT CHANGE UP (ref 0–0.5)
EOSINOPHIL NFR BLD AUTO: 0.2 % — SIGNIFICANT CHANGE UP (ref 0–6)
GLUCOSE SERPL-MCNC: 148 MG/DL — HIGH (ref 70–99)
HCT VFR BLD CALC: 40.7 % — SIGNIFICANT CHANGE UP (ref 34.5–45)
HGB BLD-MCNC: 14.2 G/DL — SIGNIFICANT CHANGE UP (ref 11.5–15.5)
IMM GRANULOCYTES NFR BLD AUTO: 0.4 % — SIGNIFICANT CHANGE UP (ref 0–1.5)
LACTATE SERPL-SCNC: 1 MMOL/L — SIGNIFICANT CHANGE UP (ref 0.7–2)
LYMPHOCYTES # BLD AUTO: 0.84 K/UL — LOW (ref 1–3.3)
LYMPHOCYTES # BLD AUTO: 6.9 % — LOW (ref 13–44)
MAGNESIUM SERPL-MCNC: 1.9 MG/DL — SIGNIFICANT CHANGE UP (ref 1.6–2.6)
MCHC RBC-ENTMCNC: 33.3 PG — SIGNIFICANT CHANGE UP (ref 27–34)
MCHC RBC-ENTMCNC: 34.9 GM/DL — SIGNIFICANT CHANGE UP (ref 32–36)
MCV RBC AUTO: 95.5 FL — SIGNIFICANT CHANGE UP (ref 80–100)
MONOCYTES # BLD AUTO: 1.15 K/UL — HIGH (ref 0–0.9)
MONOCYTES NFR BLD AUTO: 9.5 % — SIGNIFICANT CHANGE UP (ref 2–14)
NEUTROPHILS # BLD AUTO: 10.03 K/UL — HIGH (ref 1.8–7.4)
NEUTROPHILS NFR BLD AUTO: 82.8 % — HIGH (ref 43–77)
PLATELET # BLD AUTO: 142 K/UL — LOW (ref 150–400)
POTASSIUM SERPL-MCNC: 3.5 MMOL/L — SIGNIFICANT CHANGE UP (ref 3.5–5.3)
POTASSIUM SERPL-SCNC: 3.5 MMOL/L — SIGNIFICANT CHANGE UP (ref 3.5–5.3)
PROT SERPL-MCNC: 6.6 GM/DL — SIGNIFICANT CHANGE UP (ref 6–8.3)
RBC # BLD: 4.26 M/UL — SIGNIFICANT CHANGE UP (ref 3.8–5.2)
RBC # FLD: 12 % — SIGNIFICANT CHANGE UP (ref 10.3–14.5)
SODIUM SERPL-SCNC: 137 MMOL/L — SIGNIFICANT CHANGE UP (ref 135–145)
WBC # BLD: 12.12 K/UL — HIGH (ref 3.8–10.5)
WBC # FLD AUTO: 12.12 K/UL — HIGH (ref 3.8–10.5)

## 2019-06-14 PROCEDURE — 71045 X-RAY EXAM CHEST 1 VIEW: CPT | Mod: 26

## 2019-06-14 PROCEDURE — 99233 SBSQ HOSP IP/OBS HIGH 50: CPT

## 2019-06-14 RX ORDER — POTASSIUM CHLORIDE 20 MEQ
40 PACKET (EA) ORAL ONCE
Refills: 0 | Status: COMPLETED | OUTPATIENT
Start: 2019-06-14 | End: 2019-06-14

## 2019-06-14 RX ADMIN — Medication 100 MILLIGRAM(S): at 06:19

## 2019-06-14 RX ADMIN — ENOXAPARIN SODIUM 40 MILLIGRAM(S): 100 INJECTION SUBCUTANEOUS at 13:22

## 2019-06-14 RX ADMIN — ATORVASTATIN CALCIUM 20 MILLIGRAM(S): 80 TABLET, FILM COATED ORAL at 21:40

## 2019-06-14 RX ADMIN — Medication 650 MILLIGRAM(S): at 06:19

## 2019-06-14 RX ADMIN — Medication 60 MILLIGRAM(S): at 06:19

## 2019-06-14 RX ADMIN — Medication 100 MILLIGRAM(S): at 18:40

## 2019-06-14 RX ADMIN — Medication 5 MG/HR: at 13:26

## 2019-06-14 RX ADMIN — Medication 0.12 MILLIGRAM(S): at 21:44

## 2019-06-14 RX ADMIN — Medication 0.12 MILLIGRAM(S): at 13:26

## 2019-06-14 RX ADMIN — Medication 60 MILLIGRAM(S): at 13:22

## 2019-06-14 RX ADMIN — PANTOPRAZOLE SODIUM 40 MILLIGRAM(S): 20 TABLET, DELAYED RELEASE ORAL at 13:21

## 2019-06-14 RX ADMIN — Medication 40 MILLIEQUIVALENT(S): at 18:40

## 2019-06-14 RX ADMIN — Medication 81 MILLIGRAM(S): at 13:21

## 2019-06-14 RX ADMIN — Medication 60 MILLIGRAM(S): at 21:40

## 2019-06-14 NOTE — CHART NOTE - NSCHARTNOTEFT_GEN_A_CORE
Called by RN to evaluate pt with temp of 101. Patient seen and examined at bedside, without complaints. Denies fever, chills, cough, SOB, wheezing, chest pain, abd pain, N/V/D, dysuria.     Vital Signs Last 24 Hrs  T(C): 38.6 (14 Jun 2019 05:00), Max: 38.6 (14 Jun 2019 05:00)  T(F): 101.5 (14 Jun 2019 05:00), Max: 101.5 (14 Jun 2019 05:00)  HR: 101 (14 Jun 2019 05:00) (71 - 155)  BP: 98/46 (14 Jun 2019 04:00) (98/46 - 132/57) on Cardizem drip   BP(mean): 59 (14 Jun 2019 04:00) (59 - 90)  RR: 20 (14 Jun 2019 05:00) (20 - 29)  SpO2: 97% (13 Jun 2019 20:00) (97% - 97%)    Gen: Alert and oriented x 3  Cardiac: S1 S2 irregular   Lungs: CTA b/l, no wheezing, crackles or rales  Abd: +BS, soft NT, ND  Ext: no edema    Fever, source unknown  -STAT CBC, CMP, lactate, UA, blood and urine cx  -CXR  -Tylenol PRN  -Monitor temps

## 2019-06-14 NOTE — PROGRESS NOTE ADULT - PROBLEM SELECTOR PLAN 3
Elevated troponin may be related to tachyarrhythmia; ischemia evaluation when atrial tach is better controlled.

## 2019-06-14 NOTE — PROGRESS NOTE ADULT - PROBLEM SELECTOR PLAN 1
Ventricular rate has improved but still rapid with exertion; Improved today. Ambulate. EP to evaluate medication

## 2019-06-14 NOTE — PROGRESS NOTE ADULT - SUBJECTIVE AND OBJECTIVE BOX
PCP:    REQUESTING PHYSICIAN:    REASON FOR CONSULT:    CHIEF COMPLAINT:    HPI:  79 year old woman with a history of anxiety, essential HTN, dyslipidemia, GERD, non-obstructive CAD admitted on 6/10/19 with atrial tachycardia.    19:  Comfortable; no cardiac complaints.  19: Comfortable. No chest pain. Episodes of AT. D/W EP    PAST MEDICAL & SURGICAL HISTORY:  Anxiety  HLD (hyperlipidemia)  HTN (hypertension)  No significant past surgical history      SOCIAL HISTORY:    FAMILY HISTORY:  Family history of myocardial infarction (Father, Sibling)      ALLERGIES:  Allergies    latex (Unknown)  sulfa drugs (Unknown)    Intolerances        MEDICATIONS:    MEDICATIONS  (STANDING):  aspirin  chewable 81 milliGRAM(s) Oral daily  atorvastatin 20 milliGRAM(s) Oral at bedtime  diltiazem    Tablet 60 milliGRAM(s) Oral every 8 hours  diltiazem Infusion 5 mG/Hr (5 mL/Hr) IV Continuous <Continuous>  enoxaparin Injectable 40 milliGRAM(s) SubCutaneous daily  metoprolol tartrate 100 milliGRAM(s) Oral two times a day  pantoprazole    Tablet 40 milliGRAM(s) Oral before breakfast    MEDICATIONS  (PRN):  acetaminophen   Tablet .. 650 milliGRAM(s) Oral every 6 hours PRN Temp greater or equal to 38C (100.4F), Mild Pain (1 - 3)  ALPRAZolam 0.125 milliGRAM(s) Oral three times a day PRN anxiety        Vital Signs Last 24 Hrs  T(C): 38.6 (2019 05:00), Max: 38.6 (2019 05:00)  T(F): 101.5 (2019 05:00), Max: 101.5 (2019 05:00)  HR: 86 (2019 09:00) (68 - 114)  BP: 114/52 (2019 09:00) (97/62 - 122/50)  BP(mean): 63 (2019 09:00) (59 - 90)  RR: 25 (2019 09:00) (20 - 29)  SpO2: 97% (2019 20:00) (97% - 97%)Daily     Daily Weight in k.2 (2019 09:45)I&O's Summary    2019 07:01  -  2019 07:00  --------------------------------------------------------  IN: 135 mL / OUT: 0 mL / NET: 135 mL        PHYSICAL EXAM:    Constitutional: NAD, awake and alert, well-developed  HEENT: PERR, EOMI,  No oral cyananosis.  Neck:  supple,  No JVD  Respiratory: Breath sounds are clear bilaterally, No wheezing, rales or rhonchi  Cardiovascular: S1 and S2, regular rate and rhythm, no Murmurs, gallops or rubs  Gastrointestinal: Bowel Sounds present, soft, nontender.   Extremities: No peripheral edema. No clubbing or cyanosis.  Vascular: 2+ peripheral pulses  Neurological: A/O x 3, no focal deficits  Musculoskeletal: no calf tenderness.  Skin: No rashes.      LABS: All Labs Reviewed:                        14.2   12.12 )-----------( 142      ( 2019 06:24 )             40.7     2019 06:24    137    |  102    |  14     ----------------------------<  148    3.5     |  25     |  0.67     Ca    8.6        2019 06:24  Mg     1.9       2019 06:24    TPro  6.6    /  Alb  2.9    /  TBili  1.2    /  DBili  x      /  AST  24     /  ALT  43     /  AlkPhos  94     2019 06:24          Blood Culture:         RADIOLOGY/EKG:< from: 12 Lead ECG (19 @ 06:24) >  Diagnosis Line Sinus rhythm with marked sinus arrhythmia  Inferior infarct , age undetermined  Abnormal ECG  When compared with ECG of 10-HENOK-2019 14:32,  Premature atrial complexes are no longer Present  Inferior infarct is now Present  QT has shortened  Confirmed by NETTE SHAH, CALIN LI ([id number]) on 2019 2:09:14 PM    < end of copied text >        ECHO/CARDIAC CATHTERIZATION/STRESS TEST:  < from: Transthoracic Echocardiogram (19 @ 14:15) >   Impression     Summary     Significant fibrocalcific changes noted to the aortic valve leaflets with   restriction in leaflet excursion. Peak transaortic gradient is 37 mmHg   suggestive of mild aortic stenosis.   Moderate (2+) tricuspid valve regurgitation is present.   Mild pulmonary hypertension.   The left atrium is mildly dilated.   The left ventricle is normal in size, with paradoxical septal motion   Mild concentric left ventricular hypertrophy is present.   Estimated left ventricular ejection fraction is 55-60 %.   Normal appearing right ventricle structure and function.     Signature     ----------------------------------------------------------------   Electronically signed by Venugopal Palla, MD(Interpreting   physician) on 2019 07:13 PM    < end of copied text >

## 2019-06-14 NOTE — PROGRESS NOTE ADULT - SUBJECTIVE AND OBJECTIVE BOX
CARDIOLOGY PROGRESS NOTE   ([facility])                             Reason for follow up:                             Overnight: No new events.     Subjective 6/14/19:   Patient seen and examined at bedside. Patient HR increases with exertion, at rest fairly controlled. Patient denies any symptoms during rapid HR. Patient denies CP, SOB, dizziness, palpitations.    PMH, PSH, Social, Fam hx: No updates.     HPI: 79 year old Woman with hx anxiety, essential HTN, dyslipidemia, post nasal drip, GERD, ?Non-obstruct CAD, presented to ED with c/o 1 wk hx of "discomfort" in epigastric/upper chest area, nausea and generalized malaise. She reports that for the last week, she has had this uncomfortable intermittent burning feeling in the upper chest area, no radiation. Not improved with PPI. No dyspnea on exertion or CP on exertion. She reports a throbbing feeling in the throat with a feeling of palpitations at night for the last week. Denies any current HA, dizziness, N, V, abd pain, fever, chills, diarrhea. No urinary complaints. She went to her PCP's office and had an initial EKG which she reports was normal, but on followup visit, she reports that EKG showed a fast heart rate.       	  Vitals:  T(C): 38.6 (06-14-19 @ 05:00), Max: 38.6 (06-14-19 @ 05:00)  HR: 86 (06-14-19 @ 09:00) (68 - 114)  BP: 114/52 (06-14-19 @ 09:00) (97/62 - 122/50)  RR: 25 (06-14-19 @ 09:00) (20 - 29)  SpO2: 97% (06-13-19 @ 20:00) (97% - 97%)  Wt(kg): --  I&O's Summary    13 Jun 2019 07:01  -  14 Jun 2019 07:00  --------------------------------------------------------  IN: 135 mL / OUT: 0 mL / NET: 135 mL      Weight (kg): 66.2 (06-10 @ 14:09)    PHYSICAL EXAM:  Appearance: Comfortable. No acute distress  HEENT:  Head and neck: Atraumatic. Normocephalic.  Normal oral mucosa  Neck: Supple, No JVD, no carotid bruit  Neurologic: A & O x 3, no focal deficits  Lymphatic: No cervical lymphadenopathy  Cardiovascular: Irregular, 2/6 HUMA appreciated  Respiratory: Lungs clear to auscultation  Gastrointestinal:  Soft, Non-tender, + BS, no HSM  Lower Extremities: No edema, DP and PT +2  Psychiatry: Patient is calm. No agitation. Mood & affect appropriate  Skin: No rashes, No ecchymoses, No cyanosis    CURRENT MEDICATIONS:  diltiazem    Tablet 60 milliGRAM(s) Oral every 8 hours  diltiazem Infusion 5 mG/Hr IV Continuous <Continuous>  metoprolol tartrate 100 milliGRAM(s) Oral two times a day    pantoprazole    Tablet  atorvastatin  aspirin  chewable  enoxaparin Injectable      LABS:	 	               14.2   12.12 )-----------( 142      ( 14 Jun 2019 06:24 )             40.7     06-14    137  |  102  |  14  ----------------------------<  148<H>  3.5   |  25  |  0.67    Ca    8.6      14 Jun 2019 06:24  Mg     1.9     06-14    TPro  6.6  /  Alb  2.9<L>  /  TBili  1.2  /  DBili  x   /  AST  24  /  ALT  43  /  AlkPhos  94  06-14    HgA1c: TSH: Thyroid Stimulating Hormone, Serum: 3.55 uU/mL      TELEMETRY: atrial tach up to 140s	    ECG: Reviewed. 	    DIAGNOSTIC TESTING:  Echocardiogram 6/11/19:  Significant fibrocalcific changes noted to the aortic valve leaflets with restriction in leaflet excursion. Peak transaortic gradient is 37 mmHg suggestive of mild aortic stenosis. Moderate (2+) tricuspid valve regurgitation is present. Mild pulmonary hypertension. The left atrium is mildly dilated. The left ventricle is normal in size, with paradoxical septal motion Mild concentric left ventricular hypertrophy is present. Estimated left ventricular ejection fraction is 55-60 %. Normal appearing right ventricle structure and function. CARDIOLOGY PROGRESS NOTE   ([facility])                             Reason for follow up:                             Overnight: No new events.     Subjective 6/14/19:   Patient seen and examined at bedside. Patient HR increases with exertion, but fairly controlled at rest. Patient denies any symptoms during rapid HR. Patient denies CP, SOB, dizziness, palpitations.    PMH, PSH, Social, Fam hx: No updates.     HPI: 79 year old Woman with hx anxiety, essential HTN, dyslipidemia, post nasal drip, GERD, ?Non-obstruct CAD, presented to ED with c/o 1 wk hx of "discomfort" in epigastric/upper chest area, nausea and generalized malaise. She reports that for the last week, she has had this uncomfortable intermittent burning feeling in the upper chest area, no radiation. Not improved with PPI. No dyspnea on exertion or CP on exertion. She reports a throbbing feeling in the throat with a feeling of palpitations at night for the last week. Denies any current HA, dizziness, N, V, abd pain, fever, chills, diarrhea. No urinary complaints. She went to her PCP's office and had an initial EKG which she reports was normal, but on followup visit, she reports that EKG showed a fast heart rate.       	  Vitals:  T(C): 38.6 (06-14-19 @ 05:00), Max: 38.6 (06-14-19 @ 05:00)  HR: 86 (06-14-19 @ 09:00) (68 - 114)  BP: 114/52 (06-14-19 @ 09:00) (97/62 - 122/50)  RR: 25 (06-14-19 @ 09:00) (20 - 29)  SpO2: 97% (06-13-19 @ 20:00) (97% - 97%)  Wt(kg): --  I&O's Summary    13 Jun 2019 07:01  -  14 Jun 2019 07:00  --------------------------------------------------------  IN: 135 mL / OUT: 0 mL / NET: 135 mL      Weight (kg): 66.2 (06-10 @ 14:09)    PHYSICAL EXAM:  Appearance: Comfortable. No acute distress  HEENT:  Head and neck: Atraumatic. Normocephalic.  Normal oral mucosa  Neck: Supple, No JVD, no carotid bruit  Neurologic: A & O x 3, no focal deficits  Lymphatic: No cervical lymphadenopathy  Cardiovascular: Irregular, 2/6 HUMA appreciated  Respiratory: Lungs clear to auscultation  Gastrointestinal:  Soft, Non-tender, + BS, no HSM  Lower Extremities: No edema, DP and PT +2  Psychiatry: Patient is calm. No agitation. Mood & affect appropriate  Skin: No rashes, No ecchymoses, No cyanosis    CURRENT MEDICATIONS:  diltiazem    Tablet 60 milliGRAM(s) Oral every 8 hours  diltiazem Infusion 5 mG/Hr IV Continuous <Continuous>  metoprolol tartrate 100 milliGRAM(s) Oral two times a day    pantoprazole    Tablet  atorvastatin  aspirin  chewable  enoxaparin Injectable      LABS:	 	               14.2   12.12 )-----------( 142      ( 14 Jun 2019 06:24 )             40.7     06-14    137  |  102  |  14  ----------------------------<  148<H>  3.5   |  25  |  0.67    Ca    8.6      14 Jun 2019 06:24  Mg     1.9     06-14    TPro  6.6  /  Alb  2.9<L>  /  TBili  1.2  /  DBili  x   /  AST  24  /  ALT  43  /  AlkPhos  94  06-14    HgA1c: TSH: Thyroid Stimulating Hormone, Serum: 3.55 uU/mL      TELEMETRY: atrial tach up to 140s, with periods of slower HR	    ECG: Reviewed. 	    DIAGNOSTIC TESTING:  Echocardiogram 6/11/19:  Significant fibrocalcific changes noted to the aortic valve leaflets with restriction in leaflet excursion. Peak transaortic gradient is 37 mmHg suggestive of mild aortic stenosis. Moderate (2+) tricuspid valve regurgitation is present. Mild pulmonary hypertension. The left atrium is mildly dilated. The left ventricle is normal in size, with paradoxical septal motion Mild concentric left ventricular hypertrophy is present. Estimated left ventricular ejection fraction is 55-60 %. Normal appearing right ventricle structure and function.

## 2019-06-14 NOTE — PROGRESS NOTE ADULT - SUBJECTIVE AND OBJECTIVE BOX
History of Present Illness:   79 year old Woman with hx anxiety, essential HTN, dyslipidemia, post nasal drip, GERD, ?Non-obstruct CAD, presented to ED with c/o 1 wk hx of "discomfort" in epigastric area, nausea and generalized malaise. She report PCP Dr. England's office for r/o SVT. She denies CP/palpitation/SOB/HA/dizziness/v/d/f/c; denies urinary changes/abonormality; no recent sick contact or travel.    In the ED ECG/tele Atach 160's, TNI 0.883, CBC/BMP normal, /83, RR 18, T 37.3, O2 sat 100% rm air. CXR no acute pathology. She received cardizem IVp 10 mg x1 f/b drip starting at 2.5mcg. asa 324mg x1,      6.12: no cp, no sob, no n/v/d  6.13: no cp, no sob, asymptomatic, HR   6.14: no distress, HR       REVIEW OF SYSTEMS:    CONSTITUTIONAL: No weakness, No fevers or chills  ENT: No ear ache, No sorethroat  NECK: No pain, No stiffness  RESPIRATORY: No cough, No wheezing, No hemoptysis; No dyspnea  CARDIOVASCULAR: No chest pain, No palpitations  GASTROINTESTINAL: No abd pain, No nausea, No vomiting, No hematemesis, No diarrhea or constipation. No melena, No hematochezia.  GENITOURINARY: No dysuria, No  hematuria  NEUROLOGICAL: No diplopia, No paresthesia, No motor dysfunction  MUSCULOSKELETAL: No arthralgia, No myalgia  SKIN: No rashes, or lesions   PSYCH: no anxiety, no suicidal ideation    All other review of systems is negative unless indicated above    Vital Signs Last 24 Hrs  T(C): 36.6 (14 Jun 2019 11:55), Max: 38.6 (14 Jun 2019 05:00)  T(F): 97.8 (14 Jun 2019 11:55), Max: 101.5 (14 Jun 2019 05:00)  HR: 81 (14 Jun 2019 13:00) (68 - 104)  BP: 114/46 (14 Jun 2019 13:00) (97/62 - 127/59)  BP(mean): 54 (14 Jun 2019 13:00) (54 - 81)  RR: 21 (14 Jun 2019 13:00) (20 - 29)  SpO2: 97% (13 Jun 2019 20:00) (97% - 97%)      PHYSICAL EXAM:    GENERAL: NAD, Well nourished  HEENT:  NC/AT, EOMI, PERRLA, No scleral icterus, Moist mucous membranes  NECK: Supple, No JVD  CNS:  Alert & Oriented X3, Motor Strength 5/5 B/L upper and lower extremities; DTRs 2+ intact   LUNG: Normal Breath sounds, Clear to auscultation bilaterally, No rales, No rhonchi, No wheezing  HEART: irregular ; No murmurs, No rubs  ABDOMEN: +BS, ST/ND/NT  GENITOURINARY: Voiding, Bladder not distended  EXTREMITIES:  2+ Peripheral Pulses, No clubbing, No cyanosis, No tibial edema  MUSCULOSKELTAL: Joints normal ROM, No TTP, No effusion  VAGINAL: deferred  SKIN: no rashes  RECTAL: deferred, not indicated  BREAST: deferred               Labs:                        14.2   12.12 )-----------( 142      ( 14 Jun 2019 06:24 )             40.7     06-14    137  |  102  |  14  ----------------------------<  148<H>  3.5   |  25  |  0.67    Ca    8.6      14 Jun 2019 06:24  Mg     1.9     06-14    TPro  6.6  /  Alb  2.9<L>  /  TBili  1.2  /  DBili  x   /  AST  24  /  ALT  43  /  AlkPhos  94  06-14         MEDICATIONS  (STANDING):  aspirin  chewable 81 milliGRAM(s) Oral daily  atorvastatin 20 milliGRAM(s) Oral at bedtime  diltiazem    Tablet 60 milliGRAM(s) Oral every 8 hours  diltiazem Infusion 5 mG/Hr (5 mL/Hr) IV Continuous <Continuous>  enoxaparin Injectable 40 milliGRAM(s) SubCutaneous daily  metoprolol tartrate 100 milliGRAM(s) Oral two times a day  pantoprazole    Tablet 40 milliGRAM(s) Oral before breakfast    MEDICATIONS  (PRN):  acetaminophen   Tablet .. 650 milliGRAM(s) Oral every 6 hours PRN Temp greater or equal to 38C (100.4F), Mild Pain (1 - 3)  ALPRAZolam 0.125 milliGRAM(s) Oral three times a day PRN anxiety      all labs reviewed  all imaging reviewed        1. Inappropriate Atrial tachycardia:  cw  Lopressor to 100mg po Bid  cw  Cardizem to 60mg po Tid  Titrate down Cardizem drip as tolerated  ASA  TTE: normal EF  Cardiology eval noted: to consider ablation if med tx fails    2. HTN: stable      3. Anxiety  Mood stable  con't home dose xanax

## 2019-06-14 NOTE — PROGRESS NOTE ADULT - ASSESSMENT
1. Atrial tachycardia  -HR not adequately controlled on po metoprolol and cardizem    -patient agreeable to try medical management vs invasive procedure  -will change to amiodarone or sotalol after D/W attending  -patient noted to have normal liver enzymes, normal TSH, normal CXR with no known lung, thyroid or liver disease  -if medical therapy fails, will consider option for ablation 1. Atrial tachycardia  -patient reports being asymptomatic  -has paroxysms of atrial tach, but then has slower HR   -continue with po metoprolol and cardizem, titrate as needed    -patient agreeable to continue with medical management vs invasive procedure  -if medical therapy fails, will consider option for ablation possibly as outpatient  -case discussed with attending

## 2019-06-15 LAB
CULTURE RESULTS: SIGNIFICANT CHANGE UP
SPECIMEN SOURCE: SIGNIFICANT CHANGE UP

## 2019-06-15 PROCEDURE — 99231 SBSQ HOSP IP/OBS SF/LOW 25: CPT

## 2019-06-15 PROCEDURE — 99233 SBSQ HOSP IP/OBS HIGH 50: CPT

## 2019-06-15 RX ORDER — METOPROLOL TARTRATE 50 MG
100 TABLET ORAL
Refills: 0 | Status: DISCONTINUED | OUTPATIENT
Start: 2019-06-15 | End: 2019-06-15

## 2019-06-15 RX ORDER — AMIODARONE HYDROCHLORIDE 400 MG/1
200 TABLET ORAL
Refills: 0 | Status: DISCONTINUED | OUTPATIENT
Start: 2019-06-15 | End: 2019-06-16

## 2019-06-15 RX ORDER — METOPROLOL TARTRATE 50 MG
50 TABLET ORAL EVERY 12 HOURS
Refills: 0 | Status: DISCONTINUED | OUTPATIENT
Start: 2019-06-15 | End: 2019-06-19

## 2019-06-15 RX ORDER — DILTIAZEM HCL 120 MG
90 CAPSULE, EXT RELEASE 24 HR ORAL EVERY 6 HOURS
Refills: 0 | Status: DISCONTINUED | OUTPATIENT
Start: 2019-06-15 | End: 2019-06-19

## 2019-06-15 RX ORDER — METOPROLOL TARTRATE 50 MG
2.5 TABLET ORAL ONCE
Refills: 0 | Status: COMPLETED | OUTPATIENT
Start: 2019-06-15 | End: 2019-06-15

## 2019-06-15 RX ORDER — DILTIAZEM HCL 120 MG
60 CAPSULE, EXT RELEASE 24 HR ORAL EVERY 6 HOURS
Refills: 0 | Status: DISCONTINUED | OUTPATIENT
Start: 2019-06-15 | End: 2019-06-15

## 2019-06-15 RX ADMIN — Medication 81 MILLIGRAM(S): at 12:00

## 2019-06-15 RX ADMIN — ATORVASTATIN CALCIUM 20 MILLIGRAM(S): 80 TABLET, FILM COATED ORAL at 21:30

## 2019-06-15 RX ADMIN — Medication 2.5 MILLIGRAM(S): at 22:43

## 2019-06-15 RX ADMIN — Medication 100 MILLIGRAM(S): at 06:01

## 2019-06-15 RX ADMIN — Medication 90 MILLIGRAM(S): at 21:30

## 2019-06-15 RX ADMIN — ENOXAPARIN SODIUM 40 MILLIGRAM(S): 100 INJECTION SUBCUTANEOUS at 11:59

## 2019-06-15 RX ADMIN — Medication 0.12 MILLIGRAM(S): at 21:30

## 2019-06-15 RX ADMIN — AMIODARONE HYDROCHLORIDE 200 MILLIGRAM(S): 400 TABLET ORAL at 15:36

## 2019-06-15 RX ADMIN — PANTOPRAZOLE SODIUM 40 MILLIGRAM(S): 20 TABLET, DELAYED RELEASE ORAL at 06:01

## 2019-06-15 RX ADMIN — Medication 60 MILLIGRAM(S): at 06:01

## 2019-06-15 RX ADMIN — Medication 0.12 MILLIGRAM(S): at 12:00

## 2019-06-15 RX ADMIN — Medication 50 MILLIGRAM(S): at 17:54

## 2019-06-15 RX ADMIN — Medication 60 MILLIGRAM(S): at 13:43

## 2019-06-15 NOTE — PROGRESS NOTE ADULT - ASSESSMENT
She has an Atrial tachycardia and not responded well to BB and CCB  Options discussed: amiodarone vs. Catheter ablation  She opted for meds and will reevaluate after amiodarone loading.

## 2019-06-15 NOTE — PROGRESS NOTE ADULT - SUBJECTIVE AND OBJECTIVE BOX
HPI:  79 year old woman with a history of anxiety, essential HTN, dyslipidemia, GERD, non-obstructive CAD admitted on 6/10/19 with atrial tachycardia.    6/13/19:  Comfortable; no cardiac complaints.  6/14/19: Comfortable. No chest pain. Episodes of AT. D/W EP  6/15/19: Still with fast rates in atach.  No chest pain.      PAST MEDICAL & SURGICAL HISTORY:  Anxiety  HLD (hyperlipidemia)  HTN (hypertension)  No significant past surgical history    FAMILY HISTORY:  Family history of myocardial infarction (Father, Sibling)      ALLERGIES:  latex (Unknown)  sulfa drugs (Unknown)      MEDICATIONS  (STANDING):  aspirin  chewable 81 milliGRAM(s) Oral daily  atorvastatin 20 milliGRAM(s) Oral at bedtime  diltiazem    Tablet 60 milliGRAM(s) Oral every 6 hours  diltiazem Infusion 5 mG/Hr (5 mL/Hr) IV Continuous <Continuous>  enoxaparin Injectable 40 milliGRAM(s) SubCutaneous daily  metoprolol succinate  milliGRAM(s) Oral two times a day  pantoprazole    Tablet 40 milliGRAM(s) Oral before breakfast    MEDICATIONS  (PRN):  acetaminophen   Tablet .. 650 milliGRAM(s) Oral every 6 hours PRN Temp greater or equal to 38C (100.4F), Mild Pain (1 - 3)  ALPRAZolam 0.125 milliGRAM(s) Oral three times a day PRN anxiety      Vital Signs Last 24 Hrs  T(C): 36.7 (14 Jun 2019 17:09), Max: 36.7 (14 Jun 2019 17:09)  T(F): 98.1 (14 Jun 2019 17:09), Max: 98.1 (14 Jun 2019 17:09)  HR: 86 (15 Henok 2019 12:00) (63 - 151)  BP: 119/89 (15 Henok 2019 12:00) (104/56 - 126/67)  BP(mean): 97 (15 Henok 2019 12:00) (57 - 97)  RR: 26 (15 Henok 2019 12:00) (17 - 26)  SpO2: --    I&O's Detail    PHYSICAL EXAM:  Constitutional: NAD, awake and alert, well-developed  HEENT: PERR, EOMI,  No oral cyananosis.  Neck:  supple,  No JVD  Respiratory: Breath sounds are clear bilaterally, No wheezing, rales or rhonchi  Cardiovascular: S1 and S2, regular rate and rhythm, no Murmurs, gallops or rubs  Gastrointestinal: Bowel Sounds present, soft, nontender.   Extremities: No peripheral edema. No clubbing or cyanosis.  Vascular: 2+ peripheral pulses  Neurological: A/O x 3, no focal deficits  Musculoskeletal: no calf tenderness.  Skin: No rashes.      LABS: All Labs Reviewed:                                   14.2   12.12 )-----------( 142      ( 14 Jun 2019 06:24 )             40.7     06-14    137  |  102  |  14  ----------------------------<  148<H>  3.5   |  25  |  0.67    Ca    8.6      14 Jun 2019 06:24  Mg     1.9     06-14    TPro  6.6  /  Alb  2.9<L>  /  TBili  1.2  /  DBili  x   /  AST  24  /  ALT  43  /  AlkPhos  94  06-14        LIVER FUNCTIONS - ( 14 Jun 2019 06:24 )  Alb: 2.9 g/dL / Pro: 6.6 gm/dL / ALK PHOS: 94 U/L / ALT: 43 U/L / AST: 24 U/L / GGT: x           Culture - Blood (06.14.19 @ 06:24)    Specimen Source: .Blood None    Culture Results:   No growth to date.    RADIOLOGY/EKG:< from: 12 Lead ECG (06.11.19 @ 06:24) >  Diagnosis Line Sinus rhythm with marked sinus arrhythmia  Inferior infarct , age undetermined  Abnormal ECG  When compared with ECG of 10-HENOK-2019 14:32,  Premature atrial complexes are no longer Present  Inferior infarct is now Present  QT has shortened  Confirmed by NETTE SHAH, CALIN LI ([id number]) on 6/11/2019 2:09:14 PM    < end of copied text >    ECHO/CARDIAC CATHTERIZATION/STRESS TEST:  < from: Transthoracic Echocardiogram (06.11.19 @ 14:15) >   Impression     Summary     Significant fibrocalcific changes noted to the aortic valve leaflets with   restriction in leaflet excursion. Peak transaortic gradient is 37 mmHg   suggestive of mild aortic stenosis.   Moderate (2+) tricuspid valve regurgitation is present.   Mild pulmonary hypertension.   The left atrium is mildly dilated.   The left ventricle is normal in size, with paradoxical septal motion   Mild concentric left ventricular hypertrophy is present.   Estimated left ventricular ejection fraction is 55-60 %.   Normal appearing right ventricle structure and function.    < end of copied text >

## 2019-06-15 NOTE — PROGRESS NOTE ADULT - PROBLEM SELECTOR PLAN 1
Still with fast HR at rest (). On IV cardizem still along with oral and metoprolol. Will adjust dosing. Still with fast HR at rest (). On IV cardizem still along with oral and metoprolol. Will adjust dosing and start amiodarone. (discussed with Dr. Jiang and Dr. Cisse).

## 2019-06-15 NOTE — PROGRESS NOTE ADULT - SUBJECTIVE AND OBJECTIVE BOX
History of Present Illness:   79 year old Woman with hx anxiety, essential HTN, dyslipidemia, post nasal drip, GERD, ?Non-obstruct CAD, presented to ED with c/o 1 wk hx of "discomfort" in epigastric area, nausea and generalized malaise. She report PCP Dr. England's office for r/o SVT. She denies CP/palpitation/SOB/HA/dizziness/v/d/f/c; denies urinary changes/abonormality; no recent sick contact or travel.    In the ED ECG/tele Atach 160's, TNI 0.883, CBC/BMP normal, /83, RR 18, T 37.3, O2 sat 100% rm air. CXR no acute pathology. She received cardizem IVp 10 mg x1 f/b drip starting at 2.5mcg. asa 324mg x1,      6.12: no cp, no sob, no n/v/d  6.13: no cp, no sob, asymptomatic, HR   6.14: no distress, HR   6.15: , no distress      REVIEW OF SYSTEMS:    CONSTITUTIONAL: No weakness, No fevers or chills  ENT: No ear ache, No sorethroat  NECK: No pain, No stiffness  RESPIRATORY: No cough, No wheezing, No hemoptysis; No dyspnea  CARDIOVASCULAR: No chest pain, No palpitations  GASTROINTESTINAL: No abd pain, No nausea, No vomiting, No hematemesis, No diarrhea or constipation. No melena, No hematochezia.  GENITOURINARY: No dysuria, No  hematuria  NEUROLOGICAL: No diplopia, No paresthesia, No motor dysfunction  MUSCULOSKELETAL: No arthralgia, No myalgia  SKIN: No rashes, or lesions   PSYCH: no anxiety, no suicidal ideation    All other review of systems is negative unless indicated above    Vital Signs Last 24 Hrs  T(C): 36.6 (14 Jun 2019 11:55), Max: 38.6 (14 Jun 2019 05:00)  T(F): 97.8 (14 Jun 2019 11:55), Max: 101.5 (14 Jun 2019 05:00)  HR: 81 (14 Jun 2019 13:00) (68 - 104)  BP: 114/46 (14 Jun 2019 13:00) (97/62 - 127/59)  BP(mean): 54 (14 Jun 2019 13:00) (54 - 81)  RR: 21 (14 Jun 2019 13:00) (20 - 29)  SpO2: 97% (13 Jun 2019 20:00) (97% - 97%)      PHYSICAL EXAM:    GENERAL: NAD, Well nourished  HEENT:  NC/AT, EOMI, PERRLA, No scleral icterus, Moist mucous membranes  NECK: Supple, No JVD  CNS:  Alert & Oriented X3, Motor Strength 5/5 B/L upper and lower extremities; DTRs 2+ intact   LUNG: Normal Breath sounds, Clear to auscultation bilaterally, No rales, No rhonchi, No wheezing  HEART: irregular ; No murmurs, No rubs  ABDOMEN: +BS, ST/ND/NT  GENITOURINARY: Voiding, Bladder not distended  EXTREMITIES:  2+ Peripheral Pulses, No clubbing, No cyanosis, No tibial edema  MUSCULOSKELTAL: Joints normal ROM, No TTP, No effusion  VAGINAL: deferred  SKIN: no rashes  RECTAL: deferred, not indicated  BREAST: deferred               Labs:                        14.2   12.12 )-----------( 142      ( 14 Jun 2019 06:24 )             40.7     06-14    137  |  102  |  14  ----------------------------<  148<H>  3.5   |  25  |  0.67    Ca    8.6      14 Jun 2019 06:24  Mg     1.9     06-14    TPro  6.6  /  Alb  2.9<L>  /  TBili  1.2  /  DBili  x   /  AST  24  /  ALT  43  /  AlkPhos  94  06-14         MEDICATIONS  (STANDING):  aspirin  chewable 81 milliGRAM(s) Oral daily  atorvastatin 20 milliGRAM(s) Oral at bedtime  diltiazem    Tablet 60 milliGRAM(s) Oral every 8 hours  diltiazem Infusion 5 mG/Hr (5 mL/Hr) IV Continuous <Continuous>  enoxaparin Injectable 40 milliGRAM(s) SubCutaneous daily  metoprolol tartrate 100 milliGRAM(s) Oral two times a day  pantoprazole    Tablet 40 milliGRAM(s) Oral before breakfast    MEDICATIONS  (PRN):  acetaminophen   Tablet .. 650 milliGRAM(s) Oral every 6 hours PRN Temp greater or equal to 38C (100.4F), Mild Pain (1 - 3)  ALPRAZolam 0.125 milliGRAM(s) Oral three times a day PRN anxiety      all labs reviewed  all imaging reviewed        1. Inappropriate Atrial tachycardia: unable to wean off Cardizem drip  change bblocker to Toprol XL 100mg Bid  increase  Cardizem to 60mg po qid  Titrate down Cardizem drip as tolerated  ASA  TTE: normal EF  Cardiology eval noted: to consider ablation if med tx fails    2. HTN: stable      3. Anxiety  Mood stable  con't home dose xanax

## 2019-06-15 NOTE — PROGRESS NOTE ADULT - SUBJECTIVE AND OBJECTIVE BOX
She is feeling well  Telemetry shows persistent AF with rapid rated       EKG:  TELE:    MEDICATIONS  (STANDING):  amiodarone    Tablet 200 milliGRAM(s) Oral two times a day  aspirin  chewable 81 milliGRAM(s) Oral daily  atorvastatin 20 milliGRAM(s) Oral at bedtime  diltiazem    Tablet 90 milliGRAM(s) Oral every 6 hours  enoxaparin Injectable 40 milliGRAM(s) SubCutaneous daily  metoprolol succinate ER 50 milliGRAM(s) Oral every 12 hours  pantoprazole    Tablet 40 milliGRAM(s) Oral before breakfast    MEDICATIONS  (PRN):  acetaminophen   Tablet .. 650 milliGRAM(s) Oral every 6 hours PRN Temp greater or equal to 38C (100.4F), Mild Pain (1 - 3)  ALPRAZolam 0.125 milliGRAM(s) Oral three times a day PRN anxiety      Allergies    latex (Unknown)  sulfa drugs (Unknown)    Intolerances      PAST MEDICAL & SURGICAL HISTORY:  Anxiety  HLD (hyperlipidemia)  HTN (hypertension)  No significant past surgical history      Vital Signs Last 24 Hrs  T(C): 36.7 (15 Ross 2019 23:19), Max: 36.7 (15 Ross 2019 16:40)  T(F): 98 (15 Ross 2019 23:19), Max: 98 (15 Ross 2019 16:40)  HR: 156 (15 Orss 2019 22:00) (63 - 156)  BP: 113/64 (15 Ross 2019 22:00) (89/75 - 129/83)  BP(mean): 73 (15 Ross 2019 22:00) (57 - 99)  RR: 24 (15 Ross 2019 22:00) (17 - 30)  SpO2: --    Physical Exam:  Constitutional: NAD, AAOx3  Cardiovascular: +S1S2 RRR  Pulmonary: CTA b/l, unlabored  Abd: soft NTND +BS  Groins: C/D/I bilaterally; no bleeding, hematoma, edema  Extremities: no pedal edema, +distal pulses b/l  Neuro: non focal, KHAN x4    LABS:                        14.2   12.12 )-----------( 142      ( 14 Jun 2019 06:24 )             40.7     06-14    137  |  102  |  14  ----------------------------<  148<H>  3.5   |  25  |  0.67    Ca    8.6      14 Jun 2019 06:24  Mg     1.9     06-14    TPro  6.6  /  Alb  2.9<L>  /  TBili  1.2  /  DBili  x   /  AST  24  /  ALT  43  /  AlkPhos  94  06-14              Plan:     Access site care and activity limitations reviewed w/ pt.   Outpt f/up in 4-6 weeks.

## 2019-06-16 PROCEDURE — 99233 SBSQ HOSP IP/OBS HIGH 50: CPT

## 2019-06-16 RX ORDER — AMIODARONE HYDROCHLORIDE 400 MG/1
200 TABLET ORAL ONCE
Refills: 0 | Status: COMPLETED | OUTPATIENT
Start: 2019-06-16 | End: 2019-06-16

## 2019-06-16 RX ORDER — AMIODARONE HYDROCHLORIDE 400 MG/1
200 TABLET ORAL THREE TIMES A DAY
Refills: 0 | Status: DISCONTINUED | OUTPATIENT
Start: 2019-06-16 | End: 2019-06-18

## 2019-06-16 RX ADMIN — Medication 0.12 MILLIGRAM(S): at 09:49

## 2019-06-16 RX ADMIN — Medication 50 MILLIGRAM(S): at 18:13

## 2019-06-16 RX ADMIN — Medication 90 MILLIGRAM(S): at 11:30

## 2019-06-16 RX ADMIN — ENOXAPARIN SODIUM 40 MILLIGRAM(S): 100 INJECTION SUBCUTANEOUS at 11:31

## 2019-06-16 RX ADMIN — ATORVASTATIN CALCIUM 20 MILLIGRAM(S): 80 TABLET, FILM COATED ORAL at 21:40

## 2019-06-16 RX ADMIN — Medication 0.12 MILLIGRAM(S): at 21:45

## 2019-06-16 RX ADMIN — Medication 50 MILLIGRAM(S): at 05:19

## 2019-06-16 RX ADMIN — AMIODARONE HYDROCHLORIDE 200 MILLIGRAM(S): 400 TABLET ORAL at 05:19

## 2019-06-16 RX ADMIN — PANTOPRAZOLE SODIUM 40 MILLIGRAM(S): 20 TABLET, DELAYED RELEASE ORAL at 05:29

## 2019-06-16 RX ADMIN — AMIODARONE HYDROCHLORIDE 200 MILLIGRAM(S): 400 TABLET ORAL at 21:40

## 2019-06-16 RX ADMIN — Medication 90 MILLIGRAM(S): at 05:19

## 2019-06-16 RX ADMIN — Medication 81 MILLIGRAM(S): at 11:30

## 2019-06-16 RX ADMIN — AMIODARONE HYDROCHLORIDE 200 MILLIGRAM(S): 400 TABLET ORAL at 16:04

## 2019-06-16 RX ADMIN — Medication 90 MILLIGRAM(S): at 18:14

## 2019-06-16 NOTE — PROGRESS NOTE ADULT - SUBJECTIVE AND OBJECTIVE BOX
HPI:  79 year old woman with a history of anxiety, essential HTN, dyslipidemia, GERD, non-obstructive CAD admitted on 6/10/19 with atrial tachycardia.    6/13/19:  Comfortable; no cardiac complaints.  6/14/19: Comfortable. No chest pain. Episodes of AT. D/W EP  6/15/19: Still with fast rates in atach.  No chest pain.    6/16/19: Feels ok.  No cp, palp, or SOB.    PAST MEDICAL & SURGICAL HISTORY:  Anxiety  HLD (hyperlipidemia)  HTN (hypertension)  No significant past surgical history    FAMILY HISTORY:  Family history of myocardial infarction (Father, Sibling)      ALLERGIES:  latex (Unknown)  sulfa drugs (Unknown)    MEDICATIONS  (STANDING):  amiodarone    Tablet 200 milliGRAM(s) Oral two times a day  aspirin  chewable 81 milliGRAM(s) Oral daily  atorvastatin 20 milliGRAM(s) Oral at bedtime  diltiazem    Tablet 90 milliGRAM(s) Oral every 6 hours  enoxaparin Injectable 40 milliGRAM(s) SubCutaneous daily  metoprolol succinate ER 50 milliGRAM(s) Oral every 12 hours  pantoprazole    Tablet 40 milliGRAM(s) Oral before breakfast    MEDICATIONS  (PRN):  acetaminophen   Tablet .. 650 milliGRAM(s) Oral every 6 hours PRN Temp greater or equal to 38C (100.4F), Mild Pain (1 - 3)  ALPRAZolam 0.125 milliGRAM(s) Oral three times a day PRN anxiety      Vital Signs Last 24 Hrs  T(C): 36.2 (16 Jun 2019 06:26), Max: 36.7 (15 Henok 2019 16:40)  T(F): 97.2 (16 Jun 2019 06:26), Max: 98 (15 Henok 2019 16:40)  HR: 100 (16 Jun 2019 09:45) (94 - 162)  BP: 116/91 (16 Jun 2019 09:45) (89/75 - 129/83)  BP(mean): 98 (16 Jun 2019 09:45) (62 - 98)  RR: 17 (16 Jun 2019 09:45) (17 - 29)  SpO2: 97% (16 Jun 2019 09:45) (97% - 97%)    I&O's Detail      Daily     Daily     PHYSICAL EXAM:  Constitutional: NAD, awake and alert, well-developed  HEENT: PERR, EOMI,  No oral cyananosis.  Neck:  supple,  No JVD  Respiratory: Breath sounds are clear bilaterally, No wheezing, rales or rhonchi  Cardiovascular: S1 and S2, regular rate and rhythm, no Murmurs, gallops or rubs  Gastrointestinal: Bowel Sounds present, soft, nontender.   Extremities: No peripheral edema. No clubbing or cyanosis.  Vascular: 2+ peripheral pulses  Neurological: A/O x 3, no focal deficits  Musculoskeletal: no calf tenderness.  Skin: No rashes.      LABS: All Labs Reviewed:    Culture - Blood (06.14.19 @ 06:24)    Specimen Source: .Blood None    Culture Results:   No growth to date.    RADIOLOGY/EKG:< from: 12 Lead ECG (06.11.19 @ 06:24) >  Diagnosis Line Sinus rhythm with marked sinus arrhythmia  Inferior infarct , age undetermined  Abnormal ECG  When compared with ECG of 10-HENOK-2019 14:32,  Premature atrial complexes are no longer Present  Inferior infarct is now Present  QT has shortened  Confirmed by NETTE SHAH, CALIN LI ([id number]) on 6/11/2019 2:09:14 PM    < end of copied text >    ECHO/CARDIAC CATHTERIZATION/STRESS TEST:  < from: Transthoracic Echocardiogram (06.11.19 @ 14:15) >   Impression     Summary     Significant fibrocalcific changes noted to the aortic valve leaflets with   restriction in leaflet excursion. Peak transaortic gradient is 37 mmHg   suggestive of mild aortic stenosis.   Moderate (2+) tricuspid valve regurgitation is present.   Mild pulmonary hypertension.   The left atrium is mildly dilated.   The left ventricle is normal in size, with paradoxical septal motion   Mild concentric left ventricular hypertrophy is present.   Estimated left ventricular ejection fraction is 55-60 %.   Normal appearing right ventricle structure and function.    < end of copied text >

## 2019-06-16 NOTE — PROGRESS NOTE ADULT - SUBJECTIVE AND OBJECTIVE BOX
History of Present Illness:   79 year old Woman with hx anxiety, essential HTN, dyslipidemia, post nasal drip, GERD, ?Non-obstruct CAD, presented to ED with c/o 1 wk hx of "discomfort" in epigastric area, nausea and generalized malaise. She report PCP Dr. England's office for r/o SVT. She denies CP/palpitation/SOB/HA/dizziness/v/d/f/c; denies urinary changes/abonormality; no recent sick contact or travel.    In the ED ECG/tele Atach 160's, TNI 0.883, CBC/BMP normal, /83, RR 18, T 37.3, O2 sat 100% rm air. CXR no acute pathology. She received cardizem IVp 10 mg x1 f/b drip starting at 2.5mcg. asa 324mg x1,      6.12: no cp, no sob, no n/v/d  6.13: no cp, no sob, asymptomatic, HR   6.14: no distress, HR   6.15: , no distress  6.16: HR 100s at rest, goes up to 150 with minimal activity       REVIEW OF SYSTEMS:    CONSTITUTIONAL: No weakness, No fevers or chills  ENT: No ear ache, No sorethroat  NECK: No pain, No stiffness  RESPIRATORY: No cough, No wheezing, No hemoptysis; No dyspnea  CARDIOVASCULAR: No chest pain, No palpitations  GASTROINTESTINAL: No abd pain, No nausea, No vomiting, No hematemesis, No diarrhea or constipation. No melena, No hematochezia.  GENITOURINARY: No dysuria, No  hematuria  NEUROLOGICAL: No diplopia, No paresthesia, No motor dysfunction  MUSCULOSKELETAL: No arthralgia, No myalgia  SKIN: No rashes, or lesions   PSYCH: no anxiety, no suicidal ideation    All other review of systems is negative unless indicated above    Vital Signs Last 24 Hrs  T(C): 36.2 (16 Jun 2019 06:26), Max: 36.7 (15 Ross 2019 16:40)  T(F): 97.2 (16 Jun 2019 06:26), Max: 98 (15 Ross 2019 16:40)  HR: 100 (16 Jun 2019 09:45) (94 - 162)  BP: 116/91 (16 Jun 2019 09:45) (89/75 - 129/83)  BP(mean): 98 (16 Jun 2019 09:45) (62 - 98)  RR: 17 (16 Jun 2019 09:45) (17 - 30)  SpO2: 97% (16 Jun 2019 09:45) (97% - 97%)      PHYSICAL EXAM:    GENERAL: NAD, Well nourished  HEENT:  NC/AT, EOMI, PERRLA, No scleral icterus, Moist mucous membranes  NECK: Supple, No JVD  CNS:  Alert & Oriented X3, Motor Strength 5/5 B/L upper and lower extremities; DTRs 2+ intact   LUNG: Normal Breath sounds, Clear to auscultation bilaterally, No rales, No rhonchi, No wheezing  HEART: irregular ; No murmurs, No rubs  ABDOMEN: +BS, ST/ND/NT  GENITOURINARY: Voiding, Bladder not distended  EXTREMITIES:  2+ Peripheral Pulses, No clubbing, No cyanosis, No tibial edema  MUSCULOSKELTAL: Joints normal ROM, No TTP, No effusion  VAGINAL: deferred  SKIN: no rashes  RECTAL: deferred, not indicated  BREAST: deferred               Labs:                        14.2   12.12 )-----------( 142      ( 14 Jun 2019 06:24 )             40.7     06-14    137  |  102  |  14  ----------------------------<  148<H>  3.5   |  25  |  0.67    Ca    8.6      14 Jun 2019 06:24  Mg     1.9     06-14    TPro  6.6  /  Alb  2.9<L>  /  TBili  1.2  /  DBili  x   /  AST  24  /  ALT  43  /  AlkPhos  94  06-14         MEDICATIONS  (STANDING):  amiodarone    Tablet 200 milliGRAM(s) Oral two times a day  aspirin  chewable 81 milliGRAM(s) Oral daily  atorvastatin 20 milliGRAM(s) Oral at bedtime  diltiazem    Tablet 90 milliGRAM(s) Oral every 6 hours  enoxaparin Injectable 40 milliGRAM(s) SubCutaneous daily  metoprolol succinate ER 50 milliGRAM(s) Oral every 12 hours  pantoprazole    Tablet 40 milliGRAM(s) Oral before breakfast      all labs reviewed  all imaging reviewed        1. Inappropriate Atrial tachycardia:   off Cardizem drip   Amiodarone started  change bblocker to Toprol XL 50mg Bid  increase  Cardizem to 90mg po qid  ASA  TTE: normal EF  Cardiology eval noted: to consider ablation if med tx fails    2. HTN: stable      3. Anxiety  Mood stable  con't home dose xanax

## 2019-06-17 PROBLEM — I10 ESSENTIAL (PRIMARY) HYPERTENSION: Chronic | Status: ACTIVE | Noted: 2019-06-10

## 2019-06-17 PROBLEM — E78.5 HYPERLIPIDEMIA, UNSPECIFIED: Chronic | Status: ACTIVE | Noted: 2019-06-10

## 2019-06-17 PROBLEM — F41.9 ANXIETY DISORDER, UNSPECIFIED: Chronic | Status: ACTIVE | Noted: 2019-06-10

## 2019-06-17 LAB
ANION GAP SERPL CALC-SCNC: 5 MMOL/L — SIGNIFICANT CHANGE UP (ref 5–17)
BUN SERPL-MCNC: 17 MG/DL — SIGNIFICANT CHANGE UP (ref 7–23)
CALCIUM SERPL-MCNC: 8.7 MG/DL — SIGNIFICANT CHANGE UP (ref 8.5–10.1)
CHLORIDE SERPL-SCNC: 102 MMOL/L — SIGNIFICANT CHANGE UP (ref 96–108)
CO2 SERPL-SCNC: 29 MMOL/L — SIGNIFICANT CHANGE UP (ref 22–31)
CREAT SERPL-MCNC: 0.85 MG/DL — SIGNIFICANT CHANGE UP (ref 0.5–1.3)
GLUCOSE SERPL-MCNC: 153 MG/DL — HIGH (ref 70–99)
MAGNESIUM SERPL-MCNC: 2 MG/DL — SIGNIFICANT CHANGE UP (ref 1.6–2.6)
POTASSIUM SERPL-MCNC: 3.8 MMOL/L — SIGNIFICANT CHANGE UP (ref 3.5–5.3)
POTASSIUM SERPL-SCNC: 3.8 MMOL/L — SIGNIFICANT CHANGE UP (ref 3.5–5.3)
SODIUM SERPL-SCNC: 136 MMOL/L — SIGNIFICANT CHANGE UP (ref 135–145)

## 2019-06-17 PROCEDURE — 99233 SBSQ HOSP IP/OBS HIGH 50: CPT

## 2019-06-17 RX ADMIN — AMIODARONE HYDROCHLORIDE 200 MILLIGRAM(S): 400 TABLET ORAL at 20:47

## 2019-06-17 RX ADMIN — Medication 90 MILLIGRAM(S): at 23:05

## 2019-06-17 RX ADMIN — Medication 90 MILLIGRAM(S): at 04:32

## 2019-06-17 RX ADMIN — ENOXAPARIN SODIUM 40 MILLIGRAM(S): 100 INJECTION SUBCUTANEOUS at 11:37

## 2019-06-17 RX ADMIN — Medication 0.12 MILLIGRAM(S): at 20:47

## 2019-06-17 RX ADMIN — AMIODARONE HYDROCHLORIDE 200 MILLIGRAM(S): 400 TABLET ORAL at 13:23

## 2019-06-17 RX ADMIN — Medication 90 MILLIGRAM(S): at 11:37

## 2019-06-17 RX ADMIN — Medication 90 MILLIGRAM(S): at 17:44

## 2019-06-17 RX ADMIN — Medication 50 MILLIGRAM(S): at 06:11

## 2019-06-17 RX ADMIN — Medication 81 MILLIGRAM(S): at 11:36

## 2019-06-17 RX ADMIN — ATORVASTATIN CALCIUM 20 MILLIGRAM(S): 80 TABLET, FILM COATED ORAL at 20:46

## 2019-06-17 RX ADMIN — Medication 50 MILLIGRAM(S): at 17:43

## 2019-06-17 RX ADMIN — PANTOPRAZOLE SODIUM 40 MILLIGRAM(S): 20 TABLET, DELAYED RELEASE ORAL at 06:11

## 2019-06-17 RX ADMIN — AMIODARONE HYDROCHLORIDE 200 MILLIGRAM(S): 400 TABLET ORAL at 06:11

## 2019-06-17 NOTE — PROGRESS NOTE ADULT - SUBJECTIVE AND OBJECTIVE BOX
HPI:  79 year old Woman with hx anxiety, essential HTN, dyslipidemia, post nasal drip, GERD, ?Non-obstruct CAD, presented to ED with c/o 1 wk hx of "discomfort" in epigastric/upper chest area, nausea and generalized malaise. She reports that for the last week, she has had this uncomfortable intermittent burning feeling in the upper chest area, no radiation. Not improved with PPI. No dyspnea on exertion or CP on exertion. She reports a throbbing feeling in the throat with a feeling of palpitations at night for the last week. Denies any current HA, dizziness, N, V, abd pain, fever, chills, diarrhea. No urinary complaints. She went to her PCP's office and had an initial EKG which she reports was normal, but on followup visit, she reports that EKG showed a fast heart rate.       Subjective:    EKG:  TELE:  last 24 hrsatrial tach 100-150bpms    MEDICATIONS  (STANDING):  amiodarone    Tablet 200 milliGRAM(s) Oral three times a day  aspirin  chewable 81 milliGRAM(s) Oral daily  atorvastatin 20 milliGRAM(s) Oral at bedtime  diltiazem    Tablet 90 milliGRAM(s) Oral every 6 hours  enoxaparin Injectable 40 milliGRAM(s) SubCutaneous daily  metoprolol succinate ER 50 milliGRAM(s) Oral every 12 hours  pantoprazole    Tablet 40 milliGRAM(s) Oral before breakfast    MEDICATIONS  (PRN):  acetaminophen   Tablet .. 650 milliGRAM(s) Oral every 6 hours PRN Temp greater or equal to 38C (100.4F), Mild Pain (1 - 3)  ALPRAZolam 0.125 milliGRAM(s) Oral three times a day PRN anxiety      Allergies    latex (Unknown)  sulfa drugs (Unknown)    Intolerances        Vital Signs Last 24 Hrs  T(C): 36.6 (17 Jun 2019 11:48), Max: 36.8 (17 Jun 2019 04:30)  T(F): 97.8 (17 Jun 2019 11:48), Max: 98.3 (17 Jun 2019 04:30)  HR: 151 (17 Jun 2019 11:48) (71 - 151)  BP: 117/75 (17 Jun 2019 11:48) (106/73 - 131/60)  BP(mean): --  RR: 17 (17 Jun 2019 11:48) (17 - 18)  SpO2: 97% (17 Jun 2019 11:48) (94% - 97%)      PHYSICAL EXAMINATION:  GENERAL: In no apparent distress, well nourished, and hydrated.  HEAD:  Atraumatic, Normocephalic  EYES: EOMI, PERRLA, conjunctiva and sclera clear  ENMT: No tonsillar erythema, exudates, or enlargement; Moist mucous membranes, Good dentition, No lesions  NECK: Supple and normal thyroid.  No JVD or carotid bruit.  Carotid pulse is 2+ bilaterally.  HEART: Regular rate and rhythm; No murmurs, rubs, or gallops.  PULMONARY: Clear to auscultation and perfusion.  No rales, wheezing, or rhonchi bilaterally.  ABDOMEN: Soft, Nontender, Nondistended; Bowel sounds present  EXTREMITIES:  2+ Peripheral Pulses, No clubbing, cyanosis, or edema  LYMPH: No lymphadenopathy noted  NEUROLOGICAL: Grossly nonfocal      Echocardiogram 6/11/19:  Significant fibrocalcific changes noted to the aortic valve leaflets with restriction in leaflet excursion. Peak transaortic gradient is 37 mmHg suggestive of mild aortic stenosis. Moderate (2+) tricuspid valve regurgitation is present. Mild pulmonary hypertension. The left atrium is mildly dilated. The left ventricle is normal in size, with paradoxical septal motion Mild concentric left ventricular hypertrophy is present. Estimated left ventricular ejection fraction is 55-60 %. Normal appearing right ventricle structure and function. HPI:  79 year old Woman with hx anxiety, essential HTN, dyslipidemia, post nasal drip, GERD, ?Non-obstruct CAD, presented to ED with c/o 1 wk hx of "discomfort" in epigastric/upper chest area, nausea and generalized malaise. She reports that for the last week, she has had this uncomfortable intermittent burning feeling in the upper chest area, no radiation. Not improved with PPI. No dyspnea on exertion or CP on exertion. She reports a throbbing feeling in the throat with a feeling of palpitations at night for the last week. Denies any current HA, dizziness, N, V, abd pain, fever, chills, diarrhea. No urinary complaints. She went to her PCP's office and had an initial EKG which she reports was normal, but on followup visit, she reports that EKG showed a fast heart rate.       Subjective: feels well, denies symptoms of dizziness or palpitations, ambulating in the hallway      TELE:  last 24 hrs: atrial tach 100-150bpms    MEDICATIONS  (STANDING):  amiodarone    Tablet 200 milliGRAM(s) Oral three times a day  aspirin  chewable 81 milliGRAM(s) Oral daily  atorvastatin 20 milliGRAM(s) Oral at bedtime  diltiazem    Tablet 90 milliGRAM(s) Oral every 6 hours  enoxaparin Injectable 40 milliGRAM(s) SubCutaneous daily  metoprolol succinate ER 50 milliGRAM(s) Oral every 12 hours  pantoprazole    Tablet 40 milliGRAM(s) Oral before breakfast    MEDICATIONS  (PRN):  acetaminophen   Tablet .. 650 milliGRAM(s) Oral every 6 hours PRN Temp greater or equal to 38C (100.4F), Mild Pain (1 - 3)  ALPRAZolam 0.125 milliGRAM(s) Oral three times a day PRN anxiety      Allergies    latex (Unknown)  sulfa drugs (Unknown)    Intolerances        Vital Signs Last 24 Hrs  T(C): 36.6 (17 Jun 2019 11:48), Max: 36.8 (17 Jun 2019 04:30)  T(F): 97.8 (17 Jun 2019 11:48), Max: 98.3 (17 Jun 2019 04:30)  HR: 151 (17 Jun 2019 11:48) (71 - 151)  BP: 117/75 (17 Jun 2019 11:48) (106/73 - 131/60)  BP(mean): --  RR: 17 (17 Jun 2019 11:48) (17 - 18)  SpO2: 97% (17 Jun 2019 11:48) (94% - 97%)      PHYSICAL EXAMINATION:  GENERAL: In no apparent distress, well nourished, and hydrated.  HEAD:  Atraumatic, Normocephalic  EYES: EOMI, PERRLA, conjunctiva and sclera clear  ENMT: No tonsillar erythema, exudates, or enlargement; Moist mucous membranes, Good dentition, No lesions  NECK: Supple and normal thyroid.  No JVD or carotid bruit.  Carotid pulse is 2+ bilaterally.  HEART: tachycardic, No murmurs, rubs, or gallops.  PULMONARY: Clear to auscultation and perfusion.  No rales, wheezing, or rhonchi bilaterally.  ABDOMEN: Soft, Nontender, Nondistended; Bowel sounds present  EXTREMITIES:  2+ Peripheral Pulses, No clubbing, cyanosis, or edema  LYMPH: No lymphadenopathy noted  NEUROLOGICAL: Grossly nonfocal      Echocardiogram 6/11/19:  Significant fibrocalcific changes noted to the aortic valve leaflets with restriction in leaflet excursion. Peak transaortic gradient is 37 mmHg suggestive of mild aortic stenosis. Moderate (2+) tricuspid valve regurgitation is present. Mild pulmonary hypertension. The left atrium is mildly dilated. The left ventricle is normal in size, with paradoxical septal motion Mild concentric left ventricular hypertrophy is present. Estimated left ventricular ejection fraction is 55-60 %. Normal appearing right ventricle structure and function.

## 2019-06-17 NOTE — PROGRESS NOTE ADULT - ASSESSMENT
78 yo female with h/o anxiety and non-obstructive CAD, presented with atrial tachycardia with rapid ventricular response, refractory to bb and ccb.  now started on amiodarone PO load. 78 yo female with h/o anxiety and non-obstructive CAD, presented with atrial tachycardia with rapid ventricular response, refractory to bb and ccb.   now started on amiodarone PO load.    cont to monitor tele. 80 yo female with h/o anxiety and non-obstructive CAD, presented with atrial tachycardia with rapid ventricular response, refractory to bb and ccb.   now started on amiodarone PO load, would change to 400mg tid for total of 5 days, followed by 200mg daily.      cont to monitor tele.

## 2019-06-17 NOTE — PROGRESS NOTE ADULT - SUBJECTIVE AND OBJECTIVE BOX
History of Present Illness:   79 year old Woman with hx anxiety, essential HTN, dyslipidemia, post nasal drip, GERD, ?Non-obstruct CAD, presented to ED with c/o 1 wk hx of "discomfort" in epigastric area, nausea and generalized malaise. She report PCP Dr. England's office for r/o SVT. She denies CP/palpitation/SOB/HA/dizziness/v/d/f/c; denies urinary changes/abonormality; no recent sick contact or travel.    In the ED ECG/tele Atach 160's, TNI 0.883, CBC/BMP normal, /83, RR 18, T 37.3, O2 sat 100% rm air. CXR no acute pathology. She received cardizem IVp 10 mg x1 f/b drip starting at 2.5mcg. asa 324mg x1,      6.12: no cp, no sob, no n/v/d  6.13: no cp, no sob, asymptomatic, HR   6.14: no distress, HR   6.15: , no distress  6.16: HR 100s at rest, goes up to 150 with minimal activity   6.17:  at rest, up to 150 with activity, no cp, no sob, +anxiety      REVIEW OF SYSTEMS:    CONSTITUTIONAL: No weakness, No fevers or chills  ENT: No ear ache, No sorethroat  NECK: No pain, No stiffness  RESPIRATORY: No cough, No wheezing, No hemoptysis; No dyspnea  CARDIOVASCULAR: No chest pain, No palpitations  GASTROINTESTINAL: No abd pain, No nausea, No vomiting, No hematemesis, No diarrhea or constipation. No melena, No hematochezia.  GENITOURINARY: No dysuria, No  hematuria  NEUROLOGICAL: No diplopia, No paresthesia, No motor dysfunction  MUSCULOSKELETAL: No arthralgia, No myalgia  SKIN: No rashes, or lesions   PSYCH: no anxiety, no suicidal ideation    All other review of systems is negative unless indicated above    Vital Signs Last 24 Hrs  T(C): 36.6 (17 Jun 2019 11:48), Max: 36.8 (17 Jun 2019 04:30)  T(F): 97.8 (17 Jun 2019 11:48), Max: 98.3 (17 Jun 2019 04:30)  HR: 151 (17 Jun 2019 11:48) (71 - 151)  BP: 117/75 (17 Jun 2019 11:48) (106/73 - 131/60)  RR: 17 (17 Jun 2019 11:48) (17 - 18)  SpO2: 97% (17 Jun 2019 11:48) (94% - 97%)    PHYSICAL EXAM:    GENERAL: NAD, Well nourished  HEENT:  NC/AT, EOMI, PERRLA, No scleral icterus, Moist mucous membranes  NECK: Supple, No JVD  CNS:  Alert & Oriented X3, Motor Strength 5/5 B/L upper and lower extremities; DTRs 2+ intact   LUNG: Normal Breath sounds, Clear to auscultation bilaterally, No rales, No rhonchi, No wheezing  HEART: irregular ; No murmurs, No rubs  ABDOMEN: +BS, ST/ND/NT  GENITOURINARY: Voiding, Bladder not distended  EXTREMITIES:  2+ Peripheral Pulses, No clubbing, No cyanosis, No tibial edema  MUSCULOSKELTAL: Joints normal ROM, No TTP, No effusion  VAGINAL: deferred  SKIN: no rashes  RECTAL: deferred, not indicated  BREAST: deferred                        MEDICATIONS  (STANDING):  amiodarone    Tablet 200 milliGRAM(s) Oral two times a day  aspirin  chewable 81 milliGRAM(s) Oral daily  atorvastatin 20 milliGRAM(s) Oral at bedtime  diltiazem    Tablet 90 milliGRAM(s) Oral every 6 hours  enoxaparin Injectable 40 milliGRAM(s) SubCutaneous daily  metoprolol succinate ER 50 milliGRAM(s) Oral every 12 hours  pantoprazole    Tablet 40 milliGRAM(s) Oral before breakfast      all labs reviewed  all imaging reviewed        1. Inappropriate Atrial tachycardia:   off Cardizem drip   Amiodarone loading started day#3  changed bblocker to Toprol XL 50mg Bid  increased  Cardizem to 90mg po qid  ASA  TTE: normal EF  Cardiology/EP eval noted: to consider ablation if med tx fails    2. HTN: stable      3. Anxiety  Mood stable  con't home dose xanax

## 2019-06-17 NOTE — PROGRESS NOTE ADULT - SUBJECTIVE AND OBJECTIVE BOX
HPI:  79 year old woman with a history of anxiety, essential HTN, dyslipidemia, GERD, non-obstructive CAD admitted on 6/10/19 with atrial tachycardia.    6/13/19:  Comfortable; no cardiac complaints.  6/14/19: Comfortable. No chest pain. Episodes of AT. D/W EP  6/15/19: Still with fast rates in atach.  No chest pain.    6/16/19: Feels ok.  No cp, palp, or SOB.  6/17/19:  No changes.  HR still not controlled. Amio increased.    PAST MEDICAL & SURGICAL HISTORY:  Anxiety  HLD (hyperlipidemia)  HTN (hypertension)  No significant past surgical history    FAMILY HISTORY:  Family history of myocardial infarction (Father, Sibling)      ALLERGIES:  latex (Unknown)  sulfa drugs (Unknown)    MEDICATIONS  (STANDING):  amiodarone    Tablet 200 milliGRAM(s) Oral three times a day  aspirin  chewable 81 milliGRAM(s) Oral daily  atorvastatin 20 milliGRAM(s) Oral at bedtime  diltiazem    Tablet 90 milliGRAM(s) Oral every 6 hours  enoxaparin Injectable 40 milliGRAM(s) SubCutaneous daily  metoprolol succinate ER 50 milliGRAM(s) Oral every 12 hours  pantoprazole    Tablet 40 milliGRAM(s) Oral before breakfast    MEDICATIONS  (PRN):  acetaminophen   Tablet .. 650 milliGRAM(s) Oral every 6 hours PRN Temp greater or equal to 38C (100.4F), Mild Pain (1 - 3)  ALPRAZolam 0.125 milliGRAM(s) Oral three times a day PRN anxiety      Vital Signs Last 24 Hrs  T(C): 36.6 (17 Jun 2019 11:48), Max: 36.8 (17 Jun 2019 04:30)  T(F): 97.8 (17 Jun 2019 11:48), Max: 98.3 (17 Jun 2019 04:30)  HR: 134 (17 Jun 2019 17:47) (71 - 151)  BP: 122/60 (17 Jun 2019 17:47) (106/73 - 131/60)  BP(mean): --  RR: 17 (17 Jun 2019 11:48) (17 - 18)  SpO2: 97% (17 Jun 2019 11:48) (94% - 97%)    I&O's Detail      Daily     Daily      PHYSICAL EXAM:  Constitutional: NAD, awake and alert, well-developed  HEENT: PERR, EOMI,  No oral cyananosis.  Neck:  supple,  No JVD  Respiratory: Breath sounds are clear bilaterally, No wheezing, rales or rhonchi  Cardiovascular: S1 and S2, regular rate and rhythm, no Murmurs, gallops or rubs  Gastrointestinal: Bowel Sounds present, soft, nontender.   Extremities: No peripheral edema. No clubbing or cyanosis.  Vascular: 2+ peripheral pulses  Neurological: A/O x 3, no focal deficits  Musculoskeletal: no calf tenderness.  Skin: No rashes.      LABS: All Labs Reviewed:    06-17    136  |  102  |  17  ----------------------------<  153<H>  3.8   |  29  |  0.85    Ca    8.7      17 Jun 2019 03:50  Mg     2.0     06-17    Culture - Blood (06.14.19 @ 06:24)    Specimen Source: .Blood None    Culture Results:   No growth to date.    RADIOLOGY/EKG:< from: 12 Lead ECG (06.11.19 @ 06:24) >  Diagnosis Line Sinus rhythm with marked sinus arrhythmia  Inferior infarct , age undetermined  Abnormal ECG  When compared with ECG of 10-HENOK-2019 14:32,  Premature atrial complexes are no longer Present  Inferior infarct is now Present  QT has shortened  Confirmed by NETTE SHAH, CALIN LI ([id number]) on 6/11/2019 2:09:14 PM    < end of copied text >    ECHO/CARDIAC CATHTERIZATION/STRESS TEST:  < from: Transthoracic Echocardiogram (06.11.19 @ 14:15) >   Impression     Summary     Significant fibrocalcific changes noted to the aortic valve leaflets with   restriction in leaflet excursion. Peak transaortic gradient is 37 mmHg   suggestive of mild aortic stenosis.   Moderate (2+) tricuspid valve regurgitation is present.   Mild pulmonary hypertension.   The left atrium is mildly dilated.   The left ventricle is normal in size, with paradoxical septal motion   Mild concentric left ventricular hypertrophy is present.   Estimated left ventricular ejection fraction is 55-60 %.   Normal appearing right ventricle structure and function.    < end of copied text >

## 2019-06-18 PROCEDURE — 99233 SBSQ HOSP IP/OBS HIGH 50: CPT

## 2019-06-18 RX ORDER — AMIODARONE HYDROCHLORIDE 400 MG/1
200 TABLET ORAL DAILY
Refills: 0 | Status: CANCELLED | OUTPATIENT
Start: 2019-06-22 | End: 2019-06-19

## 2019-06-18 RX ORDER — AMIODARONE HYDROCHLORIDE 400 MG/1
TABLET ORAL
Refills: 0 | Status: DISCONTINUED | OUTPATIENT
Start: 2019-06-18 | End: 2019-06-19

## 2019-06-18 RX ORDER — AMIODARONE HYDROCHLORIDE 400 MG/1
400 TABLET ORAL THREE TIMES A DAY
Refills: 0 | Status: DISCONTINUED | OUTPATIENT
Start: 2019-06-18 | End: 2019-06-19

## 2019-06-18 RX ORDER — AMIODARONE HYDROCHLORIDE 400 MG/1
400 TABLET ORAL ONCE
Refills: 0 | Status: COMPLETED | OUTPATIENT
Start: 2019-06-18 | End: 2019-06-18

## 2019-06-18 RX ADMIN — Medication 81 MILLIGRAM(S): at 11:36

## 2019-06-18 RX ADMIN — Medication 50 MILLIGRAM(S): at 17:36

## 2019-06-18 RX ADMIN — AMIODARONE HYDROCHLORIDE 400 MILLIGRAM(S): 400 TABLET ORAL at 22:21

## 2019-06-18 RX ADMIN — Medication 90 MILLIGRAM(S): at 11:36

## 2019-06-18 RX ADMIN — ENOXAPARIN SODIUM 40 MILLIGRAM(S): 100 INJECTION SUBCUTANEOUS at 11:36

## 2019-06-18 RX ADMIN — ATORVASTATIN CALCIUM 20 MILLIGRAM(S): 80 TABLET, FILM COATED ORAL at 22:21

## 2019-06-18 RX ADMIN — AMIODARONE HYDROCHLORIDE 200 MILLIGRAM(S): 400 TABLET ORAL at 06:58

## 2019-06-18 RX ADMIN — Medication 90 MILLIGRAM(S): at 17:36

## 2019-06-18 RX ADMIN — PANTOPRAZOLE SODIUM 40 MILLIGRAM(S): 20 TABLET, DELAYED RELEASE ORAL at 06:56

## 2019-06-18 RX ADMIN — Medication 50 MILLIGRAM(S): at 06:56

## 2019-06-18 RX ADMIN — Medication 90 MILLIGRAM(S): at 06:56

## 2019-06-18 RX ADMIN — AMIODARONE HYDROCHLORIDE 400 MILLIGRAM(S): 400 TABLET ORAL at 14:34

## 2019-06-18 NOTE — PROGRESS NOTE ADULT - ASSESSMENT
78 yo female with h/o anxiety and non-obstructive CAD, presented with atrial tachycardia with rapid ventricular response, refractory to bb and ccb.   now started on amiodarone PO load, dose increased 400mg tid. Heart rates improved and she is asymptomatic.    cont tele for now until heart rate better controlled.  she will follow up with dr Ramirez to further discuss ablation.

## 2019-06-18 NOTE — PROGRESS NOTE ADULT - SUBJECTIVE AND OBJECTIVE BOX
HPI:  79 year old Woman with hx anxiety, essential HTN, dyslipidemia, post nasal drip, GERD, ?Non-obstruct CAD, presented to ED with c/o 1 wk hx of "discomfort" in epigastric/upper chest area, nausea and generalized malaise. She reports that for the last week, she has had this uncomfortable intermittent burning feeling in the upper chest area, no radiation. Not improved with PPI. No dyspnea on exertion or CP on exertion. She reports a throbbing feeling in the throat with a feeling of palpitations at night for the last week. Denies any current HA, dizziness, N, V, abd pain, fever, chills, diarrhea. No urinary complaints. She went to her PCP's office and had an initial EKG which she reports was normal, but on followup visit, she reports that EKG showed a fast heart rate.       Subjective: feels well, denies any symptoms of chest pain, palpitations or sob    TELE: sinus with brief episodes of atrial tach, rates average 90-120s over last 24hrs    MEDICATIONS  (STANDING):  amiodarone    Tablet   Oral   amiodarone    Tablet 400 milliGRAM(s) Oral three times a day  aspirin  chewable 81 milliGRAM(s) Oral daily  atorvastatin 20 milliGRAM(s) Oral at bedtime  diltiazem    Tablet 90 milliGRAM(s) Oral every 6 hours  enoxaparin Injectable 40 milliGRAM(s) SubCutaneous daily  metoprolol succinate ER 50 milliGRAM(s) Oral every 12 hours  pantoprazole    Tablet 40 milliGRAM(s) Oral before breakfast    MEDICATIONS  (PRN):  acetaminophen   Tablet .. 650 milliGRAM(s) Oral every 6 hours PRN Temp greater or equal to 38C (100.4F), Mild Pain (1 - 3)      Allergies    latex (Unknown)  sulfa drugs (Unknown)      Vital Signs Last 24 Hrs  T(C): 36.3 (18 Jun 2019 11:31), Max: 36.9 (17 Jun 2019 19:57)  T(F): 97.3 (18 Jun 2019 11:31), Max: 98.4 (17 Jun 2019 19:57)  HR: 130 (18 Jun 2019 11:31) (95 - 155)  BP: 145/64 (18 Jun 2019 11:31) (99/58 - 145/64)  BP(mean): --  RR: 18 (18 Jun 2019 11:31) (18 - 18)  SpO2: 98% (18 Jun 2019 11:31) (98% - 98%)    PHYSICAL EXAMINATION:  GENERAL: In no apparent distress, well nourished, and hydrated.  HEAD:  Atraumatic, Normocephalic  EYES: EOMI, PERRLA, conjunctiva and sclera clear  ENMT: No tonsillar erythema, exudates, or enlargement; Moist mucous membranes, Good dentition, No lesions  NECK: Supple and normal thyroid.  No JVD or carotid bruit.  Carotid pulse is 2+ bilaterally.  HEART: Regular rate and rhythm; No murmurs, rubs, or gallops.  PULMONARY: Clear to auscultation and perfusion.  No rales, wheezing, or rhonchi bilaterally.  ABDOMEN: Soft, Nontender, Nondistended; Bowel sounds present  EXTREMITIES:  2+ Peripheral Pulses, No clubbing, cyanosis, or edema  LYMPH: No lymphadenopathy noted  NEUROLOGICAL: Grossly nonfocal    LABS:    06-17    136  |  102  |  17  ----------------------------<  153<H>  3.8   |  29  |  0.85    Ca    8.7      17 Jun 2019 03:50  Mg     2.0     06-17

## 2019-06-18 NOTE — PROGRESS NOTE ADULT - ASSESSMENT
1. Inappropriate Atrial tachycardia:   off Cardizem drip   Amiodarone loading started day#4- dose increased to 400mg TID  changed bblocker to Toprol XL 50mg Bid  Cardizem 90mg po qid  ASA  TTE: normal EF  Cardiology/EP eval noted: to consider ablation if med tx fails    2. HTN: stable    3. Anxiety  Mood stable  con't home dose xanax

## 2019-06-18 NOTE — PROGRESS NOTE ADULT - SUBJECTIVE AND OBJECTIVE BOX
INTERVAL HPI/OVERNIGHT EVENTS:  79 year old Woman with hx anxiety, essential HTN, dyslipidemia, post nasal drip, GERD, ?Non-obstruct CAD, presented to ED with c/o 1 wk hx of "discomfort" in epigastric area, nausea and generalized malaise. She report PCP Dr. England's office for r/o SVT. She denies CP/palpitation/SOB/HA/dizziness/v/d/f/c; denies urinary changes/abonormality; no recent sick contact or travel.    In the ED ECG/tele Atach 160's, TNI 0.883, CBC/BMP normal, /83, RR 18, T 37.3, O2 sat 100% rm air. CXR no acute pathology. She received cardizem IVp 10 mg x1 f/b drip starting at 2.5mcg. asa 324mg x1,      6.12: no cp, no sob, no n/v/d  6.13: no cp, no sob, asymptomatic, HR   6.14: no distress, HR   6.15: , no distress  6.16: HR 100s at rest, goes up to 150 with minimal activity   6.17:  at rest, up to 150 with activity, no cp, no sob, +anxiety  6/18/19- Patient seen and examined. Resting comfortably in chair. States she feels well, denies any CP, SOB, palpitations. HR better controlled, but still going up to 130s with exertion.    MEDICATIONS  (STANDING):  amiodarone    Tablet   Oral   amiodarone    Tablet 400 milliGRAM(s) Oral three times a day  aspirin  chewable 81 milliGRAM(s) Oral daily  atorvastatin 20 milliGRAM(s) Oral at bedtime  diltiazem    Tablet 90 milliGRAM(s) Oral every 6 hours  enoxaparin Injectable 40 milliGRAM(s) SubCutaneous daily  metoprolol succinate ER 50 milliGRAM(s) Oral every 12 hours  pantoprazole    Tablet 40 milliGRAM(s) Oral before breakfast    MEDICATIONS  (PRN):  acetaminophen   Tablet .. 650 milliGRAM(s) Oral every 6 hours PRN Temp greater or equal to 38C (100.4F), Mild Pain (1 - 3)      Allergies    latex (Unknown)  sulfa drugs (Unknown)    Intolerances      ROS:  CONSTITUTIONAL: No weakness, fevers or chills  EYES/ENT: No visual changes;  No vertigo or throat pain   NECK: No pain or stiffness  RESPIRATORY: No cough, wheezing, hemoptysis; No shortness of breath  CARDIOVASCULAR: No chest pain or palpitations  GASTROINTESTINAL: No abdominal or epigastric pain. No nausea, vomiting, or hematemesis.  GENITOURINARY: No dysuria, frequency or hematuria  NEUROLOGICAL: No numbness or weakness  SKIN: No itching, burning, rashes, or lesions   All other review of systems is negative unless indicated above.    Vital Signs Last 24 Hrs  T(C): 36.3 (18 Jun 2019 11:31), Max: 36.9 (17 Jun 2019 19:57)  T(F): 97.3 (18 Jun 2019 11:31), Max: 98.4 (17 Jun 2019 19:57)  HR: 121 (18 Jun 2019 17:35) (95 - 155)  BP: 129/75 (18 Jun 2019 17:35) (99/58 - 145/64)  BP(mean): --  RR: 18 (18 Jun 2019 11:31) (18 - 18)  SpO2: 98% (18 Jun 2019 11:31) (98% - 98%)    Physical Exam:  General: WN/WD NAD  Neurology: A&Ox3, nonfocal, KHAN x 4  Respiratory: CTA B/L  CV: +tachycardia, S1S2  Abdominal: Soft, NT, ND +BS  Extremities: No edema, + peripheral pulses      LABS:    06-17    136  |  102  |  17  ----------------------------<  153<H>  3.8   |  29  |  0.85    Ca    8.7      17 Jun 2019 03:50  Mg     2.0     06-17            RADIOLOGY & ADDITIONAL TESTS:

## 2019-06-18 NOTE — PROGRESS NOTE ADULT - PROBLEM SELECTOR PLAN 3
Elevated troponin may be related to tachyarrhythmia; ischemia evaluation when atrial tach is better controlled as outpt.

## 2019-06-18 NOTE — PROGRESS NOTE ADULT - SUBJECTIVE AND OBJECTIVE BOX
HPI:  79 year old woman with a history of anxiety, essential HTN, dyslipidemia, GERD, non-obstructive CAD admitted on 6/10/19 with atrial tachycardia.    6/13/19:  Comfortable; no cardiac complaints.  6/14/19: Comfortable. No chest pain. Episodes of AT. D/W EP  6/15/19: Still with fast rates in atach.  No chest pain.    6/16/19: Feels ok.  No cp, palp, or SOB.  6/17/19:  No changes.  HR still not controlled. Amio increased.  6/18/19 HR control better and feels well.     PAST MEDICAL & SURGICAL HISTORY:  Anxiety  HLD (hyperlipidemia)  HTN (hypertension)  No significant past surgical history    FAMILY HISTORY:  Family history of myocardial infarction (Father, Sibling)      ALLERGIES:  latex (Unknown)  sulfa drugs (Unknown)      MEDICATIONS  (STANDING):  amiodarone    Tablet   Oral   amiodarone    Tablet 400 milliGRAM(s) Oral three times a day  aspirin  chewable 81 milliGRAM(s) Oral daily  atorvastatin 20 milliGRAM(s) Oral at bedtime  diltiazem    Tablet 90 milliGRAM(s) Oral every 6 hours  enoxaparin Injectable 40 milliGRAM(s) SubCutaneous daily  metoprolol succinate ER 50 milliGRAM(s) Oral every 12 hours  pantoprazole    Tablet 40 milliGRAM(s) Oral before breakfast    MEDICATIONS  (PRN):  acetaminophen   Tablet .. 650 milliGRAM(s) Oral every 6 hours PRN Temp greater or equal to 38C (100.4F), Mild Pain (1 - 3)      Vital Signs Last 24 Hrs  T(C): 36.6 (18 Jun 2019 19:05), Max: 36.9 (17 Jun 2019 19:57)  T(F): 97.9 (18 Jun 2019 19:05), Max: 98.4 (17 Jun 2019 19:57)  HR: 92 (18 Jun 2019 19:05) (92 - 155)  BP: 119/60 (18 Jun 2019 19:05) (99/58 - 145/64)  BP(mean): --  RR: 17 (18 Jun 2019 19:05) (17 - 18)  SpO2: 97% (18 Jun 2019 19:05) (97% - 98%)    I&O's Detail      Daily     Daily     PHYSICAL EXAM:  Constitutional: NAD, awake and alert, well-developed  HEENT: PERR, EOMI,  No oral cyananosis.  Neck:  supple,  No JVD  Respiratory: Breath sounds are clear bilaterally, No wheezing, rales or rhonchi  Cardiovascular: S1 and S2, regular rate and rhythm, no Murmurs, gallops or rubs  Gastrointestinal: Bowel Sounds present, soft, nontender.   Extremities: No peripheral edema. No clubbing or cyanosis.  Vascular: 2+ peripheral pulses  Neurological: A/O x 3, no focal deficits  Musculoskeletal: no calf tenderness.  Skin: No rashes.      LABS: All Labs Reviewed:  06-17    136  |  102  |  17  ----------------------------<  153<H>  3.8   |  29  |  0.85    Ca    8.7      17 Jun 2019 03:50  Mg     2.0     06-17  Culture - Blood (06.14.19 @ 06:24)    Specimen Source: .Blood None    Culture Results:   No growth to date.    RADIOLOGY/EKG:< from: 12 Lead ECG (06.11.19 @ 06:24) >  Diagnosis Line Sinus rhythm with marked sinus arrhythmia  Inferior infarct , age undetermined  Abnormal ECG  When compared with ECG of 10-HENOK-2019 14:32,  Premature atrial complexes are no longer Present  Inferior infarct is now Present  QT has shortened  Confirmed by NETTE SHAH, CALIN LI ([id number]) on 6/11/2019 2:09:14 PM    < end of copied text >    ECHO/CARDIAC CATHTERIZATION/STRESS TEST:  < from: Transthoracic Echocardiogram (06.11.19 @ 14:15) >   Impression     Summary     Significant fibrocalcific changes noted to the aortic valve leaflets with   restriction in leaflet excursion. Peak transaortic gradient is 37 mmHg   suggestive of mild aortic stenosis.   Moderate (2+) tricuspid valve regurgitation is present.   Mild pulmonary hypertension.   The left atrium is mildly dilated.   The left ventricle is normal in size, with paradoxical septal motion   Mild concentric left ventricular hypertrophy is present.   Estimated left ventricular ejection fraction is 55-60 %.   Normal appearing right ventricle structure and function.    < end of copied text >    TEL: Atach and afib 's

## 2019-06-18 NOTE — PROGRESS NOTE ADULT - PROBLEM SELECTOR PROBLEM 1
Atrial tachycardia

## 2019-06-19 ENCOUNTER — TRANSCRIPTION ENCOUNTER (OUTPATIENT)
Age: 79
End: 2019-06-19

## 2019-06-19 VITALS — SYSTOLIC BLOOD PRESSURE: 147 MMHG | DIASTOLIC BLOOD PRESSURE: 69 MMHG | HEART RATE: 79 BPM

## 2019-06-19 LAB
CULTURE RESULTS: SIGNIFICANT CHANGE UP
CULTURE RESULTS: SIGNIFICANT CHANGE UP
SPECIMEN SOURCE: SIGNIFICANT CHANGE UP
SPECIMEN SOURCE: SIGNIFICANT CHANGE UP

## 2019-06-19 PROCEDURE — 93010 ELECTROCARDIOGRAM REPORT: CPT

## 2019-06-19 RX ORDER — ASPIRIN/CALCIUM CARB/MAGNESIUM 324 MG
1 TABLET ORAL
Qty: 30 | Refills: 0
Start: 2019-06-19 | End: 2019-07-18

## 2019-06-19 RX ORDER — AMIODARONE HYDROCHLORIDE 400 MG/1
2 TABLET ORAL
Qty: 8 | Refills: 0
Start: 2019-06-19 | End: 2019-06-19

## 2019-06-19 RX ORDER — ALPRAZOLAM 0.25 MG
1 TABLET ORAL
Qty: 9 | Refills: 0
Start: 2019-06-19 | End: 2019-06-21

## 2019-06-19 RX ORDER — ATENOLOL 25 MG/1
1 TABLET ORAL
Qty: 0 | Refills: 0 | DISCHARGE

## 2019-06-19 RX ORDER — METOPROLOL TARTRATE 50 MG
1 TABLET ORAL
Qty: 60 | Refills: 0
Start: 2019-06-19 | End: 2019-07-18

## 2019-06-19 RX ORDER — DILTIAZEM HCL 120 MG
90 CAPSULE, EXT RELEASE 24 HR ORAL EVERY 6 HOURS
Refills: 0 | Status: DISCONTINUED | OUTPATIENT
Start: 2019-06-19 | End: 2019-06-19

## 2019-06-19 RX ORDER — APIXABAN 2.5 MG/1
1 TABLET, FILM COATED ORAL
Qty: 60 | Refills: 0
Start: 2019-06-19 | End: 2019-07-18

## 2019-06-19 RX ORDER — DILTIAZEM HCL 120 MG
240 CAPSULE, EXT RELEASE 24 HR ORAL DAILY
Refills: 0 | Status: DISCONTINUED | OUTPATIENT
Start: 2019-06-20 | End: 2019-06-19

## 2019-06-19 RX ORDER — ISOSORBIDE MONONITRATE 60 MG/1
1 TABLET, EXTENDED RELEASE ORAL
Qty: 0 | Refills: 0 | DISCHARGE

## 2019-06-19 RX ORDER — APIXABAN 2.5 MG/1
5 TABLET, FILM COATED ORAL
Refills: 0 | Status: DISCONTINUED | OUTPATIENT
Start: 2019-06-19 | End: 2019-06-19

## 2019-06-19 RX ORDER — ALPRAZOLAM 0.25 MG
1 TABLET ORAL
Qty: 0 | Refills: 0 | DISCHARGE

## 2019-06-19 RX ADMIN — Medication 90 MILLIGRAM(S): at 00:06

## 2019-06-19 RX ADMIN — APIXABAN 5 MILLIGRAM(S): 2.5 TABLET, FILM COATED ORAL at 17:17

## 2019-06-19 RX ADMIN — PANTOPRAZOLE SODIUM 40 MILLIGRAM(S): 20 TABLET, DELAYED RELEASE ORAL at 06:06

## 2019-06-19 RX ADMIN — AMIODARONE HYDROCHLORIDE 400 MILLIGRAM(S): 400 TABLET ORAL at 06:06

## 2019-06-19 RX ADMIN — Medication 90 MILLIGRAM(S): at 06:08

## 2019-06-19 RX ADMIN — ENOXAPARIN SODIUM 40 MILLIGRAM(S): 100 INJECTION SUBCUTANEOUS at 11:29

## 2019-06-19 RX ADMIN — Medication 90 MILLIGRAM(S): at 17:18

## 2019-06-19 RX ADMIN — Medication 50 MILLIGRAM(S): at 06:06

## 2019-06-19 RX ADMIN — Medication 50 MILLIGRAM(S): at 17:18

## 2019-06-19 RX ADMIN — Medication 81 MILLIGRAM(S): at 11:29

## 2019-06-19 RX ADMIN — AMIODARONE HYDROCHLORIDE 400 MILLIGRAM(S): 400 TABLET ORAL at 16:45

## 2019-06-19 NOTE — PROGRESS NOTE ADULT - ASSESSMENT
80 yo female with h/o anxiety and non-obstructive CAD, presented with atrial tachycardia with rapid ventricular response, refractory to BB and CBB, now being loaded with amiodarone.       1. Atrial tachycardia  -patient remains asymptomatic  -continue with amiodarone 400mg TID for 5g load, then 200mg daily  -no known history of chronic lung disease, thyroid disease or liver disease  -continue with Toprol 50mg BID  -change to long acting cardizem 240mg daily  -patient would like to continue medical management, outpatient EP followup with Dr Ramirez to further discuss ablation      Thank you for allowing us to participate in the care of Ms. Kelley. Please call with any questions. 78 yo female with h/o anxiety and non-obstructive CAD, presented with atrial tachycardia with rapid ventricular response, refractory to BB and CBB, now being loaded with amiodarone.       1. Atrial tachycardia  -patient remains asymptomatic  -continue with amiodarone 400mg TID for 5g load, then 200mg daily  -no known history of chronic lung disease, thyroid disease or liver disease  -monitor EKG/QTC interval while on amio load, monitor electrolytes  -continue with Toprol 50mg BID  -change to long acting cardizem 240mg daily  -patient would like to continue medical management, outpatient EP followup with Dr Ramirez to further discuss ablation      Thank you for allowing us to participate in the care of Ms. Kelley. Please call with any questions.

## 2019-06-19 NOTE — PROGRESS NOTE ADULT - SUBJECTIVE AND OBJECTIVE BOX
CARDIOLOGY PROGRESS NOTE   ([facility])                             Reason for follow up:                             Overnight: No new events.       Subjective 6/19/19: Patient seen and examined at bedside. No acute complaints. Patient denies CP, SOB, palpitations, dizziness, N/V, abd pain.     PMH, PSH, Social, Fam hx, ALL: No updates.     HPI: 79 year old Woman with hx anxiety, essential HTN, dyslipidemia, post nasal drip, GERD, ?Non-obstruct CAD, presented to ED with c/o 1 wk hx of "discomfort" in epigastric/upper chest area, nausea and generalized malaise. She reports that for the last week, she has had this uncomfortable intermittent burning feeling in the upper chest area, no radiation. Not improved with PPI. No dyspnea on exertion or CP on exertion. She reports a throbbing feeling in the throat with a feeling of palpitations at night for the last week. Denies any current HA, dizziness, N, V, abd pain, fever, chills, diarrhea. No urinary complaints. She went to her PCP's office and had an initial EKG which she reports was normal, but on followup visit, she reports that EKG showed a fast heart rate.  	  Vitals:  T(C): 36.9 (06-19-19 @ 05:23), Max: 36.9 (06-19-19 @ 05:23)  HR: 77 (06-19-19 @ 05:23) (77 - 130)  BP: 122/62 (06-19-19 @ 05:23) (119/60 - 145/64)  RR: 18 (06-19-19 @ 05:23) (17 - 18)  SpO2: 95% (06-19-19 @ 05:23) (95% - 98%)  Wt(kg): --  I&O's Summary        PHYSICAL EXAM:  Appearance: Comfortable. No acute distress  HEENT:  Head and neck: Atraumatic. Normocephalic.  Normal oral mucosa  Neck: Supple, No JVD, no carotid bruit  Neurologic: A & O x 3, no focal deficits  Lymphatic: No cervical lymphadenopathy  Cardiovascular: Irregular, 2/6 HUMA appreciated  Respiratory: Lungs clear to auscultation  Gastrointestinal:  Soft, Non-tender, + BS, no HSM  Lower Extremities: No edema, DP and PT +2  Psychiatry: Patient is calm. No agitation. Mood & affect appropriate  Skin: No rashes, No ecchymoses, No cyanosis    CURRENT MEDICATIONS:  amiodarone    Tablet   Oral   amiodarone    Tablet 400 milliGRAM(s) Oral three times a day  diltiazem    Tablet 90 milliGRAM(s) Oral every 6 hours  metoprolol succinate ER 50 milliGRAM(s) Oral every 12 hours  pantoprazole    Tablet  atorvastatin  aspirin  chewable  enoxaparin Injectable      LABS: from 6/17 reviewed.	 	       TELEMETRY: SR 80s, APCs, PVCs   ECG: Reviewed. 	    DIAGNOSTIC TESTING:  Echocardiogram 6/11/19:  Significant fibrocalcific changes noted to the aortic valve leaflets with restriction in leaflet excursion. Peak transaortic gradient is 37 mmHg suggestive of mild aortic stenosis. Moderate (2+) tricuspid valve regurgitation is present. Mild pulmonary hypertension. The left atrium is mildly dilated. The left ventricle is normal in size, with paradoxical septal motion Mild concentric left ventricular hypertrophy is present. Estimated left ventricular ejection fraction is 55-60 %. Normal appearing right ventricle structure and function.

## 2019-06-19 NOTE — DISCHARGE NOTE PROVIDER - NSDCCPCAREPLAN_GEN_ALL_CORE_FT
PRINCIPAL DISCHARGE DIAGNOSIS  Diagnosis: Atrial tachycardia  Assessment and Plan of Treatment: -Continue amiodarone 400mg three times a day until 6/20/19  -Start amiodarone 200mg daily on 6/21/19  -Continue metoprolol and cardizem as prescribed  -Start eliquis 5mg twice a day  -Follow up with Cardiology within 1 week of discharge      SECONDARY DISCHARGE DIAGNOSES  Diagnosis: Essential hypertension  Assessment and Plan of Treatment: -Continue medications as prescribed  -Would hold off on restarting enalipril and HCTZ as BP was stable in hospital on new medications  -Follow up with Cardio regarding restarting enalipril and HCTZ

## 2019-06-19 NOTE — DISCHARGE NOTE NURSING/CASE MANAGEMENT/SOCIAL WORK - NSDCDPATPORTLINK_GEN_ALL_CORE
You can access the [facility] Patient Portal, offered by [facility], by registering with the following website: [email or web address]

## 2019-06-19 NOTE — DISCHARGE NOTE PROVIDER - CARE PROVIDER_API CALL
Miguel Angel Ramirez (MD)  Cardiac Electrophysiology; Cardiology; Internal Medicine  [address] [place] [id number]  Phone: [phone]  Fax: [phone]  Follow Up Time: 1 week    Francisco Deleon)  Cardiovascular Disease  [address] [place] [address]  Phone: [phone]  Fax: [phone]  Follow Up Time: 1 week    Jake Eckert)  Internal Medicine  [address] [place] [address]  Phone: [phone]  Fax: [phone]  Follow Up Time: 2 weeks

## 2019-06-19 NOTE — DISCHARGE NOTE PROVIDER - PROVIDER TOKENS
PROVIDER:[TOKEN:[[id number]],FOLLOWUP:[1 week]],PROVIDER:[TOKEN:[[id number]],FOLLOWUP:[1 week]],PROVIDER:[TOKEN:[[id number]],FOLLOWUP:[2 weeks]]

## 2019-06-19 NOTE — DISCHARGE NOTE PROVIDER - HOSPITAL COURSE
79 year old Woman with hx anxiety, essential HTN, dyslipidemia, post nasal drip, GERD, ?Non-obstruct CAD, presented to ED with c/o 1 wk hx of "discomfort" in epigastric area, nausea and generalized malaise. She report PCP Dr. England's office for r/o SVT. She denies CP/palpitation/SOB/HA/dizziness/v/d/f/c; denies urinary changes/abonormality; no recent sick contact or travel.        In the ED ECG/tele Atach 160's, TNI 0.883, CBC/BMP normal, /83, RR 18, T 37.3, O2 sat 100% rm air. CXR no acute pathology. She received cardizem IVp 10 mg x1 f/b drip starting at 2.5mcg. asa 324mg x1,            6.12: no cp, no sob, no n/v/d    6.13: no cp, no sob, asymptomatic, HR     6.14: no distress, HR     6.15: , no distress    6.16: HR 100s at rest, goes up to 150 with minimal activity     6.17:  at rest, up to 150 with activity, no cp, no sob, +anxiety    6/18/19- Patient seen and examined. Resting comfortably in chair. States she feels well, denies any CP, SOB, palpitations. HR better controlled, but still going up to 130s with exertion.    6/19/19- Patient seen and examined at bedside. Resting comfortably in chair. HR much better controlled- NSR 70s. Denies any CP, SOB, palpitations. Eager to go home.        Physical Exam:    General: WN/WD NAD    Neurology: A&Ox3, nonfocal, KHAN x 4    Respiratory: CTA B/L    CV: RRR, S1S2    Abdominal: Soft, NT, ND +BS    Extremities: No edema, + peripheral pulses        1. Inappropriate Atrial tachycardia:     off Cardizem drip     Amiodarone loading started, dose increased to 400mg TID    changed bblocker to Toprol XL 50mg Bid    Cardizem 90mg po qid- switch to cardizem 240mg CD daily in AM    ASA    TTE: normal EF    Cardiology/EP eval noted    Start AC as per Cardio- eliquis 5mg BID, risks and benefits of AC discussed, patient understands        2. HTN: stable        3. Anxiety    Mood stable    con't home dose xanax        Case discussed with Cardio    Total time spent on discharge including coordination of care: 42 minutes

## 2019-06-19 NOTE — DISCHARGE NOTE PROVIDER - CARE PROVIDERS DIRECT ADDRESSES
[email or web address]_Unknown

## 2019-06-19 NOTE — PROGRESS NOTE ADULT - PROVIDER SPECIALTY LIST ADULT
Cardiology
Electrophysiology
Hospitalist
Cardiology

## 2019-06-20 RX ORDER — DILTIAZEM HCL 120 MG
1 CAPSULE, EXT RELEASE 24 HR ORAL
Qty: 30 | Refills: 0
Start: 2019-06-20 | End: 2019-07-19

## 2019-06-21 RX ORDER — AMIODARONE HYDROCHLORIDE 400 MG/1
1 TABLET ORAL
Qty: 30 | Refills: 0
Start: 2019-06-21 | End: 2019-07-20

## 2019-06-26 ENCOUNTER — INBOUND DOCUMENT (OUTPATIENT)
Age: 79
End: 2019-06-26

## 2019-06-27 NOTE — CHART NOTE - NSCHARTNOTEFT_GEN_A_CORE
Clarification of diagnosis:  Patient with elevated troponin, likely due to atrial tachyarrythmia. Unlikely Type 2 MI.

## 2019-07-19 ENCOUNTER — APPOINTMENT (OUTPATIENT)
Dept: ELECTROPHYSIOLOGY | Facility: CLINIC | Age: 79
End: 2019-07-19

## 2019-08-09 ENCOUNTER — NON-APPOINTMENT (OUTPATIENT)
Age: 79
End: 2019-08-09

## 2019-08-09 ENCOUNTER — APPOINTMENT (OUTPATIENT)
Dept: CARDIOLOGY | Facility: CLINIC | Age: 79
End: 2019-08-09
Payer: MEDICARE

## 2019-08-09 VITALS
HEIGHT: 64 IN | DIASTOLIC BLOOD PRESSURE: 83 MMHG | WEIGHT: 135 LBS | SYSTOLIC BLOOD PRESSURE: 130 MMHG | OXYGEN SATURATION: 97 % | HEART RATE: 73 BPM | RESPIRATION RATE: 14 BRPM | BODY MASS INDEX: 23.05 KG/M2

## 2019-08-09 PROCEDURE — 99215 OFFICE O/P EST HI 40 MIN: CPT | Mod: 25

## 2019-08-09 PROCEDURE — 93000 ELECTROCARDIOGRAM COMPLETE: CPT

## 2019-08-09 RX ORDER — ATENOLOL 25 MG/1
25 TABLET ORAL
Refills: 0 | Status: DISCONTINUED | COMMUNITY
End: 2019-08-09

## 2019-08-09 RX ORDER — ISOSORBIDE DINITRATE 30 MG/1
30 TABLET ORAL
Refills: 0 | Status: DISCONTINUED | COMMUNITY
End: 2019-08-09

## 2019-08-09 RX ORDER — HYDROCHLOROTHIAZIDE 12.5 MG/1
12.5 TABLET ORAL
Refills: 0 | Status: DISCONTINUED | COMMUNITY
End: 2019-08-09

## 2019-08-09 RX ORDER — FLUTICASONE PROPIONATE 50 UG/1
50 SPRAY, METERED NASAL DAILY
Qty: 1 | Refills: 5 | Status: DISCONTINUED | COMMUNITY
Start: 2018-08-20 | End: 2019-08-09

## 2019-08-09 RX ORDER — FLUTICASONE PROPIONATE 50 UG/1
50 SPRAY, METERED NASAL DAILY
Qty: 1 | Refills: 5 | Status: DISCONTINUED | COMMUNITY
Start: 2019-02-27 | End: 2019-08-09

## 2019-08-09 NOTE — HISTORY OF PRESENT ILLNESS
[FreeTextEntry1] : 80 yo female seen in follow up after hospitalization at  for atrial fibrillation. Pt denies exertional symptoms. She is in NSR now. She has seen her PMD. She denies palpitations. Discussion regarding side effects of amiodarone

## 2019-08-09 NOTE — REASON FOR VISIT
[Follow-Up - Clinic] : a clinic follow-up of [Abnormal ECG] : an abnormal ECG [Atrial Fibrillation] : atrial fibrillation [Hyperlipidemia] : hyperlipidemia [Hypertension] : hypertension

## 2019-08-09 NOTE — DISCUSSION/SUMMARY
[None] : none [Paroxysmal Atrial Fibrillation] : paroxysmal atrial fibrillation [Essential Hypertension] : essential hypertension [Patient] : the patient [FreeTextEntry1] : Pt will have an ETT/Pharmaceutical because she is unable to walk on a treadmill.

## 2019-08-09 NOTE — PHYSICAL EXAM
[General Appearance - Well Developed] : well developed [Normal Appearance] : normal appearance [Well Groomed] : well groomed [General Appearance - Well Nourished] : well nourished [No Deformities] : no deformities [General Appearance - In No Acute Distress] : no acute distress [Normal Conjunctiva] : the conjunctiva exhibited no abnormalities [Eyelids - No Xanthelasma] : the eyelids demonstrated no xanthelasmas [Normal Oral Mucosa] : normal oral mucosa [No Oral Pallor] : no oral pallor [No Oral Cyanosis] : no oral cyanosis [Normal Jugular Venous A Waves Present] : normal jugular venous A waves present [Normal Jugular Venous V Waves Present] : normal jugular venous V waves present [No Jugular Venous Tucker A Waves] : no jugular venous tucker A waves [Respiration, Rhythm And Depth] : normal respiratory rhythm and effort [Exaggerated Use Of Accessory Muscles For Inspiration] : no accessory muscle use [Auscultation Breath Sounds / Voice Sounds] : lungs were clear to auscultation bilaterally [Heart Rate And Rhythm] : heart rate and rhythm were normal [Heart Sounds] : normal S1 and S2 [Murmurs] : no murmurs present [Abdomen Soft] : soft [Abdomen Tenderness] : non-tender [Abdomen Mass (___ Cm)] : no abdominal mass palpated [Abnormal Walk] : normal gait [Gait - Sufficient For Exercise Testing] : the gait was sufficient for exercise testing [Nail Clubbing] : no clubbing of the fingernails [Cyanosis, Localized] : no localized cyanosis [Petechial Hemorrhages (___cm)] : no petechial hemorrhages [Skin Color & Pigmentation] : normal skin color and pigmentation [] : no rash [No Venous Stasis] : no venous stasis [Skin Lesions] : no skin lesions [No Skin Ulcers] : no skin ulcer [No Xanthoma] : no  xanthoma was observed [Oriented To Time, Place, And Person] : oriented to person, place, and time [Affect] : the affect was normal [Mood] : the mood was normal [No Anxiety] : not feeling anxious

## 2019-08-27 ENCOUNTER — APPOINTMENT (OUTPATIENT)
Dept: CARDIOLOGY | Facility: CLINIC | Age: 79
End: 2019-08-27
Payer: MEDICARE

## 2019-08-27 PROCEDURE — A9500: CPT

## 2019-08-27 PROCEDURE — 93015 CV STRESS TEST SUPVJ I&R: CPT

## 2019-08-27 PROCEDURE — 78452 HT MUSCLE IMAGE SPECT MULT: CPT

## 2020-02-06 ENCOUNTER — APPOINTMENT (OUTPATIENT)
Dept: CARDIOLOGY | Facility: CLINIC | Age: 80
End: 2020-02-06
Payer: MEDICARE

## 2020-02-06 VITALS
HEART RATE: 72 BPM | BODY MASS INDEX: 23.73 KG/M2 | DIASTOLIC BLOOD PRESSURE: 78 MMHG | HEIGHT: 64 IN | RESPIRATION RATE: 14 BRPM | OXYGEN SATURATION: 97 % | WEIGHT: 139 LBS | SYSTOLIC BLOOD PRESSURE: 126 MMHG

## 2020-02-06 PROCEDURE — 93000 ELECTROCARDIOGRAM COMPLETE: CPT

## 2020-02-06 PROCEDURE — 99214 OFFICE O/P EST MOD 30 MIN: CPT

## 2020-02-06 RX ORDER — METOPROLOL TARTRATE 25 MG/1
25 TABLET, FILM COATED ORAL TWICE DAILY
Refills: 0 | Status: DISCONTINUED | COMMUNITY
End: 2020-02-06

## 2020-02-07 NOTE — DISCUSSION/SUMMARY
[None] : none [Paroxysmal Atrial Fibrillation] : paroxysmal atrial fibrillation [Patient] : the patient [Essential Hypertension] : essential hypertension [FreeTextEntry1] : Previous pharmaceutical nuclear stress test was reviewed with the patient.

## 2020-02-07 NOTE — PHYSICAL EXAM
[Well Groomed] : well groomed [General Appearance - Well Developed] : well developed [Normal Appearance] : normal appearance [General Appearance - Well Nourished] : well nourished [No Deformities] : no deformities [Normal Conjunctiva] : the conjunctiva exhibited no abnormalities [General Appearance - In No Acute Distress] : no acute distress [Eyelids - No Xanthelasma] : the eyelids demonstrated no xanthelasmas [Normal Oral Mucosa] : normal oral mucosa [No Oral Cyanosis] : no oral cyanosis [No Oral Pallor] : no oral pallor [Normal Jugular Venous A Waves Present] : normal jugular venous A waves present [Normal Jugular Venous V Waves Present] : normal jugular venous V waves present [No Jugular Venous Tucker A Waves] : no jugular venous tucker A waves [Exaggerated Use Of Accessory Muscles For Inspiration] : no accessory muscle use [Respiration, Rhythm And Depth] : normal respiratory rhythm and effort [Auscultation Breath Sounds / Voice Sounds] : lungs were clear to auscultation bilaterally [Heart Rate And Rhythm] : heart rate and rhythm were normal [Heart Sounds] : normal S1 and S2 [Murmurs] : no murmurs present [Abdomen Tenderness] : non-tender [Abdomen Soft] : soft [Abdomen Mass (___ Cm)] : no abdominal mass palpated [Abnormal Walk] : normal gait [Gait - Sufficient For Exercise Testing] : the gait was sufficient for exercise testing [Nail Clubbing] : no clubbing of the fingernails [Petechial Hemorrhages (___cm)] : no petechial hemorrhages [Cyanosis, Localized] : no localized cyanosis [] : no rash [Skin Color & Pigmentation] : normal skin color and pigmentation [No Venous Stasis] : no venous stasis [Skin Lesions] : no skin lesions [No Xanthoma] : no  xanthoma was observed [No Skin Ulcers] : no skin ulcer [Oriented To Time, Place, And Person] : oriented to person, place, and time [Affect] : the affect was normal [Mood] : the mood was normal [No Anxiety] : not feeling anxious

## 2020-02-07 NOTE — HISTORY OF PRESENT ILLNESS
[FreeTextEntry1] : 81 yo female seen in follow up after hospitalization at  for atrial fibrillation. Pt denies exertional symptoms. She is in NSR Today. She denies chest pain or shortness of breath and is living an active life. Medications are reviewed. Previous nuclear stress test was reviewed with the patient.

## 2020-06-11 ENCOUNTER — NON-APPOINTMENT (OUTPATIENT)
Age: 80
End: 2020-06-11

## 2020-06-11 ENCOUNTER — APPOINTMENT (OUTPATIENT)
Dept: CARDIOLOGY | Facility: CLINIC | Age: 80
End: 2020-06-11
Payer: MEDICARE

## 2020-06-11 VITALS
DIASTOLIC BLOOD PRESSURE: 80 MMHG | SYSTOLIC BLOOD PRESSURE: 120 MMHG | RESPIRATION RATE: 14 BRPM | BODY MASS INDEX: 23.22 KG/M2 | WEIGHT: 136 LBS | HEART RATE: 63 BPM | HEIGHT: 64 IN | OXYGEN SATURATION: 97 %

## 2020-06-11 PROCEDURE — 99214 OFFICE O/P EST MOD 30 MIN: CPT | Mod: 25

## 2020-06-11 PROCEDURE — 93000 ELECTROCARDIOGRAM COMPLETE: CPT

## 2020-06-12 NOTE — PHYSICAL EXAM
[Normal Appearance] : normal appearance [General Appearance - Well Developed] : well developed [Well Groomed] : well groomed [General Appearance - Well Nourished] : well nourished [No Deformities] : no deformities [Normal Conjunctiva] : the conjunctiva exhibited no abnormalities [General Appearance - In No Acute Distress] : no acute distress [] : no respiratory distress [Respiration, Rhythm And Depth] : normal respiratory rhythm and effort [Auscultation Breath Sounds / Voice Sounds] : lungs were clear to auscultation bilaterally [Exaggerated Use Of Accessory Muscles For Inspiration] : no accessory muscle use [Heart Sounds] : normal S1 and S2 [Heart Rate And Rhythm] : heart rate and rhythm were normal [Abnormal Walk] : normal gait [Abdomen Soft] : soft [Skin Color & Pigmentation] : normal skin color and pigmentation [Oriented To Time, Place, And Person] : oriented to person, place, and time [FreeTextEntry1] : No LE Edema

## 2020-06-12 NOTE — HISTORY OF PRESENT ILLNESS
[FreeTextEntry1] : 81 Y/O female PMH: AF, HLD, HTN,  CAD S/P CC 2015 tipple vessel disease ( RCA LAD LCX ) NL LV FX Anxiety here today in routine cardiac F/U.  Remains very active with no complaints

## 2020-06-12 NOTE — DISCUSSION/SUMMARY
[FreeTextEntry1] : AF: Continue BB/AC/Amiodarone she is under care of PCP to monitor LFT's and thyroid function/PFT's will obtain records from PCP\par \par CAD: CAD S/P CC 2015 tipple vessel disease ( RCA LAD LCX ) NL LV FX medically managed. No active cardiac complaints continue statin LDL goal 70.  Reports she had labs with PCP one month ago.  Declines stress testing \par \par HTN: Controlled\par \par HLD: Continue statin \par \par F/U testing ordered plan was discussed with patient\par \par F/U 3 months

## 2020-09-17 ENCOUNTER — APPOINTMENT (OUTPATIENT)
Dept: CARDIOLOGY | Facility: CLINIC | Age: 80
End: 2020-09-17
Payer: MEDICARE

## 2020-09-17 ENCOUNTER — NON-APPOINTMENT (OUTPATIENT)
Age: 80
End: 2020-09-17

## 2020-09-17 VITALS
BODY MASS INDEX: 22.53 KG/M2 | HEART RATE: 69 BPM | DIASTOLIC BLOOD PRESSURE: 82 MMHG | OXYGEN SATURATION: 97 % | SYSTOLIC BLOOD PRESSURE: 162 MMHG | WEIGHT: 132 LBS | HEIGHT: 64 IN

## 2020-09-17 PROCEDURE — 99214 OFFICE O/P EST MOD 30 MIN: CPT

## 2020-09-17 PROCEDURE — 93000 ELECTROCARDIOGRAM COMPLETE: CPT

## 2020-09-17 RX ORDER — LEVOTHYROXINE SODIUM 0.03 MG/1
25 TABLET ORAL DAILY
Qty: 30 | Refills: 0 | Status: DISCONTINUED | COMMUNITY
Start: 2020-06-11 | End: 2020-09-17

## 2020-09-17 NOTE — HISTORY OF PRESENT ILLNESS
[FreeTextEntry1] : 81 Y/O female PMH: AF, HLD, HTN,  CAD S/P CC 2015 tipple vessel disease ( RCA LAD LCX ) NL LV FX Anxiety here today in routine cardiac F/U.  Remains very active with no complaints  Thyroid dose was increased to 50 mcg daily. Pt feels much improved.

## 2020-09-17 NOTE — DISCUSSION/SUMMARY
[FreeTextEntry1] : AF: Continue BB/AC/Amiodarone she is under care of PCP to monitor LFT's and thyroid function/PFT's pt has increased dose to 50 mcg daily\par \par CAD: CAD S/P CC 2015 tipple vessel disease ( RCA LAD LCX ) NL LV FX medically managed. No active cardiac complaints continue statin LDL goal 70.  \par \par HTN: Pt will increase enalapril to 5 mg daily\par \par HLD: Continue statin \par Echo will be obtained. \par \par \par \par F/U 6 weeks

## 2021-01-19 ENCOUNTER — APPOINTMENT (OUTPATIENT)
Dept: CARDIOLOGY | Facility: CLINIC | Age: 81
End: 2021-01-19
Payer: MEDICARE

## 2021-01-19 PROCEDURE — 93306 TTE W/DOPPLER COMPLETE: CPT

## 2021-01-21 ENCOUNTER — APPOINTMENT (OUTPATIENT)
Dept: CARDIOLOGY | Facility: CLINIC | Age: 81
End: 2021-01-21
Payer: MEDICARE

## 2021-01-21 ENCOUNTER — NON-APPOINTMENT (OUTPATIENT)
Age: 81
End: 2021-01-21

## 2021-01-21 VITALS
TEMPERATURE: 97.8 F | WEIGHT: 131 LBS | SYSTOLIC BLOOD PRESSURE: 160 MMHG | DIASTOLIC BLOOD PRESSURE: 82 MMHG | OXYGEN SATURATION: 98 % | HEIGHT: 64 IN | HEART RATE: 73 BPM | BODY MASS INDEX: 22.36 KG/M2

## 2021-01-21 PROCEDURE — 99214 OFFICE O/P EST MOD 30 MIN: CPT

## 2021-01-21 PROCEDURE — 93000 ELECTROCARDIOGRAM COMPLETE: CPT

## 2021-01-21 RX ORDER — ENALAPRIL MALEATE 5 MG/1
5 TABLET ORAL
Refills: 0 | Status: DISCONTINUED | COMMUNITY
End: 2021-01-21

## 2021-01-21 NOTE — PHYSICAL EXAM
[General Appearance - Well Developed] : well developed [Normal Appearance] : normal appearance [Well Groomed] : well groomed [General Appearance - Well Nourished] : well nourished [No Deformities] : no deformities [General Appearance - In No Acute Distress] : no acute distress [Normal Conjunctiva] : the conjunctiva exhibited no abnormalities [] : no respiratory distress [Exaggerated Use Of Accessory Muscles For Inspiration] : no accessory muscle use [Respiration, Rhythm And Depth] : normal respiratory rhythm and effort [Auscultation Breath Sounds / Voice Sounds] : lungs were clear to auscultation bilaterally [Heart Rate And Rhythm] : heart rate and rhythm were normal [Abdomen Soft] : soft [Heart Sounds] : normal S1 and S2 [Abnormal Walk] : normal gait [FreeTextEntry1] : No LE Edema  [Skin Color & Pigmentation] : normal skin color and pigmentation [Oriented To Time, Place, And Person] : oriented to person, place, and time

## 2021-01-21 NOTE — DISCUSSION/SUMMARY
[FreeTextEntry1] : Pt will decrease amiodarone to 100 mg daily. She will increase lisinopril 5/12.5 mg daily. She will reduce Eliquis to 2.5 mg bid. She will follow up in 1 month.

## 2021-01-21 NOTE — HISTORY OF PRESENT ILLNESS
[FreeTextEntry1] : 81 Y/O female PMH: AF, HLD, HTN,  CAD S/P CC 2015 tipple vessel disease ( RCA LAD LCX ) NL LV FX Anxiety here today in routine cardiac F/U.  Remains very active with no complaints  Thyroid dose was increased to 50 mcg daily. Pt feels much improved. She complains of bruising and wants to lower dose of anticoagulant.

## 2021-03-26 ENCOUNTER — APPOINTMENT (OUTPATIENT)
Dept: CARDIOLOGY | Facility: CLINIC | Age: 81
End: 2021-03-26

## 2021-04-29 ENCOUNTER — APPOINTMENT (OUTPATIENT)
Dept: CARDIOLOGY | Facility: CLINIC | Age: 81
End: 2021-04-29
Payer: MEDICARE

## 2021-04-29 ENCOUNTER — NON-APPOINTMENT (OUTPATIENT)
Age: 81
End: 2021-04-29

## 2021-04-29 VITALS
BODY MASS INDEX: 22.2 KG/M2 | HEART RATE: 70 BPM | SYSTOLIC BLOOD PRESSURE: 140 MMHG | OXYGEN SATURATION: 97 % | WEIGHT: 130 LBS | DIASTOLIC BLOOD PRESSURE: 78 MMHG | TEMPERATURE: 98 F | HEIGHT: 64 IN

## 2021-04-29 PROCEDURE — 93000 ELECTROCARDIOGRAM COMPLETE: CPT

## 2021-04-29 PROCEDURE — 99214 OFFICE O/P EST MOD 30 MIN: CPT | Mod: 25

## 2021-04-29 RX ORDER — ENALAPRIL MALEATE AND HYDROCHLOROTHIAZIDE 5; 12.5 MG/1; MG/1
5-12.5 TABLET ORAL
Qty: 90 | Refills: 3 | Status: DISCONTINUED | COMMUNITY
Start: 2021-01-21 | End: 2021-04-29

## 2021-04-29 NOTE — DISCUSSION/SUMMARY
[FreeTextEntry1] : Pt will continue Eliquis and Amiodarone. Pt will discontinue enalapril and begin telmisartan 40 mg daily. Repeat Echo will be obtained in 6 mos. to evaluate for progression of AS.

## 2021-04-29 NOTE — HISTORY OF PRESENT ILLNESS
[FreeTextEntry1] : 80 Y/O female PMH: AF, HLD, HTN,  CAD S/P CC 2015 tipple vessel disease ( RCA LAD LCX ) NL LV FX Anxiety here today in routine cardiac F/U. Pt complains of lower back pain and has seen her PMD. Pt denies exertional symptoms. Pt has had 2 Covid vaccines.

## 2021-04-29 NOTE — REASON FOR VISIT
[Symptom and Test Evaluation] : symptom and test evaluation [Arrhythmia/ECG Abnorrmalities] : arrhythmia/ECG abnormalities [Structural Heart and Valve Disease] : structural heart and valve disease [Follow-Up - Clinic] : a clinic follow-up of [Coronary Artery Disease] : coronary artery disease [Hyperlipidemia] : hyperlipidemia [Hypertension] : hypertension [Medication Management] : Medication management

## 2021-05-16 ENCOUNTER — INPATIENT (INPATIENT)
Facility: HOSPITAL | Age: 81
LOS: 2 days | Discharge: NURSING, REHAB OR LONG-TERM CARE | End: 2021-05-19
Admitting: SURGERY
Payer: MEDICARE

## 2021-05-16 ENCOUNTER — OUTPATIENT (OUTPATIENT)
Dept: OUTPATIENT SERVICES | Facility: HOSPITAL | Age: 81
LOS: 1 days | End: 2021-05-16

## 2021-05-16 PROCEDURE — 99285 EMERGENCY DEPT VISIT HI MDM: CPT | Mod: CS

## 2021-05-16 PROCEDURE — 73562 X-RAY EXAM OF KNEE 3: CPT | Mod: 26,RT

## 2021-05-16 PROCEDURE — 73502 X-RAY EXAM HIP UNI 2-3 VIEWS: CPT | Mod: 26,RT

## 2021-05-16 PROCEDURE — 73552 X-RAY EXAM OF FEMUR 2/>: CPT | Mod: 26,RT

## 2021-05-16 PROCEDURE — 70450 CT HEAD/BRAIN W/O DYE: CPT | Mod: 26

## 2021-05-16 PROCEDURE — 93010 ELECTROCARDIOGRAM REPORT: CPT

## 2021-05-16 PROCEDURE — 72125 CT NECK SPINE W/O DYE: CPT | Mod: 26

## 2021-05-16 PROCEDURE — 71045 X-RAY EXAM CHEST 1 VIEW: CPT | Mod: 26

## 2021-05-17 ENCOUNTER — OUTPATIENT (OUTPATIENT)
Dept: OUTPATIENT SERVICES | Facility: HOSPITAL | Age: 81
LOS: 1 days | End: 2021-05-17

## 2021-05-17 PROCEDURE — 73502 X-RAY EXAM HIP UNI 2-3 VIEWS: CPT | Mod: 26,RT

## 2021-05-17 PROCEDURE — 93010 ELECTROCARDIOGRAM REPORT: CPT | Mod: 76

## 2021-05-17 PROCEDURE — 76000 FLUOROSCOPY <1 HR PHYS/QHP: CPT | Mod: 26

## 2021-05-17 PROCEDURE — 73700 CT LOWER EXTREMITY W/O DYE: CPT | Mod: 26,RT

## 2021-05-17 PROCEDURE — 99223 1ST HOSP IP/OBS HIGH 75: CPT

## 2021-05-18 ENCOUNTER — OUTPATIENT (OUTPATIENT)
Dept: OUTPATIENT SERVICES | Facility: HOSPITAL | Age: 81
LOS: 1 days | End: 2021-05-18

## 2021-05-18 PROCEDURE — 99232 SBSQ HOSP IP/OBS MODERATE 35: CPT

## 2021-05-19 ENCOUNTER — INPATIENT (INPATIENT)
Facility: HOSPITAL | Age: 81
LOS: 22 days | Discharge: HOME | End: 2021-06-11
Payer: MEDICARE

## 2021-05-19 ENCOUNTER — OUTPATIENT (OUTPATIENT)
Dept: OUTPATIENT SERVICES | Facility: HOSPITAL | Age: 81
LOS: 1 days | End: 2021-05-19

## 2021-05-21 PROCEDURE — 93971 EXTREMITY STUDY: CPT | Mod: 26,RT

## 2021-05-24 PROCEDURE — 93010 ELECTROCARDIOGRAM REPORT: CPT

## 2021-05-29 PROCEDURE — 93971 EXTREMITY STUDY: CPT | Mod: 26,RT

## 2021-05-31 ENCOUNTER — OUTPATIENT (OUTPATIENT)
Dept: OUTPATIENT SERVICES | Facility: HOSPITAL | Age: 81
LOS: 1 days | End: 2021-05-31

## 2021-05-31 PROCEDURE — 73552 X-RAY EXAM OF FEMUR 2/>: CPT | Mod: 26,RT

## 2021-06-02 ENCOUNTER — APPOINTMENT (OUTPATIENT)
Dept: INTERNAL MEDICINE | Facility: CLINIC | Age: 81
End: 2021-06-02

## 2021-06-02 ENCOUNTER — OUTPATIENT (OUTPATIENT)
Dept: OUTPATIENT SERVICES | Facility: HOSPITAL | Age: 81
LOS: 1 days | End: 2021-06-02

## 2021-06-10 PROCEDURE — 93970 EXTREMITY STUDY: CPT | Mod: 26

## 2021-08-19 ENCOUNTER — NON-APPOINTMENT (OUTPATIENT)
Age: 81
End: 2021-08-19

## 2021-08-19 ENCOUNTER — APPOINTMENT (OUTPATIENT)
Dept: CARDIOLOGY | Facility: CLINIC | Age: 81
End: 2021-08-19
Payer: MEDICARE

## 2021-08-19 VITALS
OXYGEN SATURATION: 99 % | DIASTOLIC BLOOD PRESSURE: 88 MMHG | HEART RATE: 69 BPM | HEIGHT: 64 IN | BODY MASS INDEX: 22.53 KG/M2 | SYSTOLIC BLOOD PRESSURE: 162 MMHG | WEIGHT: 132 LBS

## 2021-08-19 PROCEDURE — 99214 OFFICE O/P EST MOD 30 MIN: CPT

## 2021-08-19 PROCEDURE — 93000 ELECTROCARDIOGRAM COMPLETE: CPT

## 2021-08-19 PROCEDURE — 93306 TTE W/DOPPLER COMPLETE: CPT

## 2021-08-19 NOTE — DISCUSSION/SUMMARY
[FreeTextEntry1] : Pt will continue Eliquis and Amiodarone.  telmisartan 20 mg daily. Repeat Echo will be obtained in 6 mos. to evaluate for progression of AS.

## 2021-08-19 NOTE — HISTORY OF PRESENT ILLNESS
[FreeTextEntry1] : 80 Y/O female PMH: AF, HLD, HTN,  CAD S/P CC 2015 triple vessel disease ( RCA LAD LCX ) NL LV FX Anxiety here today in routine cardiac F/U. Telmisartan was suspended after stay in Rehab after hospitalization at [facility].\par She was admitted after a mechanical fall at home. Pt had fx femur and Gamma nail inserted. \par Pt denies chest pain or shortness of breath. Blood pressure today was elevated. Hospital records were reviewed.

## 2021-09-01 ENCOUNTER — NON-APPOINTMENT (OUTPATIENT)
Age: 81
End: 2021-09-01

## 2021-09-16 ENCOUNTER — APPOINTMENT (OUTPATIENT)
Dept: INTERNAL MEDICINE | Facility: CLINIC | Age: 81
End: 2021-09-16
Payer: MEDICARE

## 2021-09-16 VITALS — DIASTOLIC BLOOD PRESSURE: 70 MMHG | SYSTOLIC BLOOD PRESSURE: 120 MMHG

## 2021-09-16 VITALS
WEIGHT: 136 LBS | HEIGHT: 64 IN | HEART RATE: 67 BPM | BODY MASS INDEX: 23.22 KG/M2 | OXYGEN SATURATION: 99 % | SYSTOLIC BLOOD PRESSURE: 182 MMHG | DIASTOLIC BLOOD PRESSURE: 96 MMHG

## 2021-09-16 DIAGNOSIS — Z63.4 DISAPPEARANCE AND DEATH OF FAMILY MEMBER: ICD-10-CM

## 2021-09-16 DIAGNOSIS — Z82.49 FAMILY HISTORY OF ISCHEMIC HEART DISEASE AND OTHER DISEASES OF THE CIRCULATORY SYSTEM: ICD-10-CM

## 2021-09-16 DIAGNOSIS — J30.89 OTHER ALLERGIC RHINITIS: ICD-10-CM

## 2021-09-16 DIAGNOSIS — Z82.3 FAMILY HISTORY OF STROKE: ICD-10-CM

## 2021-09-16 DIAGNOSIS — Z00.00 ENCOUNTER FOR GENERAL ADULT MEDICAL EXAMINATION W/OUT ABNORMAL FINDINGS: ICD-10-CM

## 2021-09-16 PROCEDURE — 90662 IIV NO PRSV INCREASED AG IM: CPT

## 2021-09-16 PROCEDURE — 82271 OCCULT BLOOD OTHER SOURCES: CPT | Mod: QW

## 2021-09-16 PROCEDURE — G0008: CPT

## 2021-09-16 PROCEDURE — G0438: CPT

## 2021-09-16 RX ORDER — AMIODARONE HYDROCHLORIDE 100 MG/1
100 TABLET ORAL DAILY
Qty: 90 | Refills: 3 | Status: DISCONTINUED | COMMUNITY
End: 2021-09-16

## 2021-09-16 SDOH — SOCIAL STABILITY - SOCIAL INSECURITY: DISSAPEARANCE AND DEATH OF FAMILY MEMBER: Z63.4

## 2021-09-16 NOTE — HEALTH RISK ASSESSMENT
[No] : In the past 12 months have you used drugs other than those required for medical reasons? No [0] : 2) Feeling down, depressed, or hopeless: Not at all (0) [Any fall with injury in past year] : Patient reported fall with injury in the past year [None] : None [Alone] : lives alone [Employed] : employed [] :  [Fully functional (bathing, dressing, toileting, transferring, walking, feeding)] : Fully functional (bathing, dressing, toileting, transferring, walking, feeding) [Fully functional (using the telephone, shopping, preparing meals, housekeeping, doing laundry, using] : Fully functional and needs no help or supervision to perform IADLs (using the telephone, shopping, preparing meals, housekeeping, doing laundry, using transportation, managing medications and managing finances) [Seat Belt] :  uses seat belt [Sunscreen] : uses sunscreen [With Patient/Caregiver] : , with patient/caregiver [] : No [de-identified] : walking [de-identified] : low salt  and low sugar [de-identified] : femur fracture   and needs  bone desnity is going to PT [[id number]] : 0 [EyeExamDate] : 2020 [ColonoscopyComments] : Never  [AdvancecareDate] : 9/16/21

## 2021-09-16 NOTE — PHYSICAL EXAM
[Normal Sphincter Tone] : normal sphincter tone [No Mass] : no mass [No Acute Distress] : no acute distress [Well Nourished] : well nourished [Well Developed] : well developed [Well-Appearing] : well-appearing [Normal Sclera/Conjunctiva] : normal sclera/conjunctiva [PERRL] : pupils equal round and reactive to light [EOMI] : extraocular movements intact [Normal Outer Ear/Nose] : the outer ears and nose were normal in appearance [Normal Oropharynx] : the oropharynx was normal [No JVD] : no jugular venous distention [No Lymphadenopathy] : no lymphadenopathy [Supple] : supple [Thyroid Normal, No Nodules] : the thyroid was normal and there were no nodules present [No Respiratory Distress] : no respiratory distress  [No Accessory Muscle Use] : no accessory muscle use [Clear to Auscultation] : lungs were clear to auscultation bilaterally [Normal Rate] : normal rate  [Regular Rhythm] : with a regular rhythm [Normal S1, S2] : normal S1 and S2 [No Murmur] : no murmur heard [No Carotid Bruits] : no carotid bruits [No Abdominal Bruit] : a ~M bruit was not heard ~T in the abdomen [No Varicosities] : no varicosities [Pedal Pulses Present] : the pedal pulses are present [No Edema] : there was no peripheral edema [No Palpable Aorta] : no palpable aorta [No Extremity Clubbing/Cyanosis] : no extremity clubbing/cyanosis [Soft] : abdomen soft [Non Tender] : non-tender [Non-distended] : non-distended [No Masses] : no abdominal mass palpated [No HSM] : no HSM [Normal Bowel Sounds] : normal bowel sounds [Normal Posterior Cervical Nodes] : no posterior cervical lymphadenopathy [Normal Anterior Cervical Nodes] : no anterior cervical lymphadenopathy [No CVA Tenderness] : no CVA  tenderness [No Spinal Tenderness] : no spinal tenderness [No Joint Swelling] : no joint swelling [Grossly Normal Strength/Tone] : grossly normal strength/tone [No Rash] : no rash [Coordination Grossly Intact] : coordination grossly intact [No Focal Deficits] : no focal deficits [Normal Gait] : normal gait [Deep Tendon Reflexes (DTR)] : deep tendon reflexes were 2+ and symmetric [Normal Affect] : the affect was normal [Normal Insight/Judgement] : insight and judgment were intact [Stool Occult Blood] : stool negative for occult blood [de-identified] : rrr and saima [de-identified] : right breast nodule

## 2021-09-23 ENCOUNTER — APPOINTMENT (OUTPATIENT)
Dept: CARDIOLOGY | Facility: CLINIC | Age: 81
End: 2021-09-23
Payer: MEDICARE

## 2021-09-23 ENCOUNTER — NON-APPOINTMENT (OUTPATIENT)
Age: 81
End: 2021-09-23

## 2021-09-23 VITALS
HEIGHT: 64 IN | OXYGEN SATURATION: 99 % | WEIGHT: 132 LBS | DIASTOLIC BLOOD PRESSURE: 80 MMHG | BODY MASS INDEX: 22.53 KG/M2 | HEART RATE: 74 BPM | SYSTOLIC BLOOD PRESSURE: 160 MMHG

## 2021-09-23 PROCEDURE — 99214 OFFICE O/P EST MOD 30 MIN: CPT

## 2021-09-23 PROCEDURE — 93000 ELECTROCARDIOGRAM COMPLETE: CPT

## 2021-09-23 NOTE — REASON FOR VISIT
[Follow-Up - Clinic] : a clinic follow-up of [Medication Management] : Medication management [Arrhythmia/ECG Abnorrmalities] : arrhythmia/ECG abnormalities [Hyperlipidemia] : hyperlipidemia [Hypertension] : hypertension [Coronary Artery Disease] : coronary artery disease

## 2021-09-24 ENCOUNTER — APPOINTMENT (OUTPATIENT)
Dept: ULTRASOUND IMAGING | Facility: CLINIC | Age: 81
End: 2021-09-24

## 2021-09-24 ENCOUNTER — APPOINTMENT (OUTPATIENT)
Dept: MAMMOGRAPHY | Facility: CLINIC | Age: 81
End: 2021-09-24

## 2021-09-24 ENCOUNTER — APPOINTMENT (OUTPATIENT)
Dept: RADIOLOGY | Facility: CLINIC | Age: 81
End: 2021-09-24

## 2021-09-27 NOTE — PHYSICAL EXAM
[General Appearance - Well Developed] : well developed [Normal Appearance] : normal appearance [Well Groomed] : well groomed [General Appearance - Well Nourished] : well nourished [No Deformities] : no deformities [General Appearance - In No Acute Distress] : no acute distress [Normal Conjunctiva] : the conjunctiva exhibited no abnormalities [] : no respiratory distress [Respiration, Rhythm And Depth] : normal respiratory rhythm and effort [Exaggerated Use Of Accessory Muscles For Inspiration] : no accessory muscle use [Auscultation Breath Sounds / Voice Sounds] : lungs were clear to auscultation bilaterally [Heart Rate And Rhythm] : heart rate and rhythm were normal [Heart Sounds] : normal S1 and S2 [Abdomen Soft] : soft [Abnormal Walk] : normal gait [Skin Color & Pigmentation] : normal skin color and pigmentation [Oriented To Time, Place, And Person] : oriented to person, place, and time [Normal S1, S2] : normal S1, S2 [Soft] : abdomen soft [Non Tender] : non-tender [Normal Gait] : normal gait [No Edema] : no edema [No Rash] : no rash [Normal] : moves all extremities, no focal deficits, normal speech [Alert and Oriented] : alert and oriented [FreeTextEntry1] : No LE Edema

## 2021-09-27 NOTE — HISTORY OF PRESENT ILLNESS
[FreeTextEntry1] : 82 Y/O female PMH: AF, HLD, HTN,  CAD S/P CC 2015 tipple vessel disease ( RCA LAD LCX ) NL LV FX Anxiety here today for blood pressure check after seeing her PCP\par Right arm 162/80 Left arm 160/80 after 20 min\par R arm 140/74, Left arm 142/78\par \par Echo 8/2021 Moderate LVH, mild MR/AR, Moderate-severe AS no significant change in comparison to prior Echo\par Non ischemic Nuclear stress test 2019\par labs with PCP

## 2021-10-20 ENCOUNTER — APPOINTMENT (OUTPATIENT)
Dept: INTERNAL MEDICINE | Facility: CLINIC | Age: 81
End: 2021-10-20
Payer: MEDICARE

## 2021-10-20 VITALS — DIASTOLIC BLOOD PRESSURE: 80 MMHG | SYSTOLIC BLOOD PRESSURE: 126 MMHG

## 2021-10-20 VITALS
OXYGEN SATURATION: 98 % | HEIGHT: 64 IN | HEART RATE: 70 BPM | DIASTOLIC BLOOD PRESSURE: 82 MMHG | BODY MASS INDEX: 22.88 KG/M2 | WEIGHT: 134 LBS | SYSTOLIC BLOOD PRESSURE: 172 MMHG

## 2021-10-20 DIAGNOSIS — F41.9 ANXIETY DISORDER, UNSPECIFIED: ICD-10-CM

## 2021-10-20 PROCEDURE — 99214 OFFICE O/P EST MOD 30 MIN: CPT

## 2021-10-20 NOTE — HISTORY OF PRESENT ILLNESS
[FreeTextEntry1] : Pt here  for follow up on blood pressure   was recently increased  back to her baseline

## 2021-11-16 ENCOUNTER — APPOINTMENT (OUTPATIENT)
Dept: CARDIOLOGY | Facility: CLINIC | Age: 81
End: 2021-11-16
Payer: MEDICARE

## 2021-11-16 PROCEDURE — 93978 VASCULAR STUDY: CPT

## 2021-11-16 PROCEDURE — 93880 EXTRACRANIAL BILAT STUDY: CPT

## 2021-11-24 ENCOUNTER — NON-APPOINTMENT (OUTPATIENT)
Age: 81
End: 2021-11-24

## 2021-11-24 LAB
ALBUMIN SERPL ELPH-MCNC: 4.3 G/DL
ALBUMIN SERPL ELPH-MCNC: 4.3 G/DL
ALP BLD-CCNC: 127 U/L
ALT SERPL-CCNC: 14 U/L
ANION GAP SERPL CALC-SCNC: 14 MMOL/L
AST SERPL-CCNC: 18 U/L
BASOPHILS # BLD AUTO: 0.06 K/UL
BASOPHILS NFR BLD AUTO: 0.8 %
BILIRUB DIRECT SERPL-MCNC: 0.2 MG/DL
BILIRUB INDIRECT SERPL-MCNC: 0.5 MG/DL
BILIRUB SERPL-MCNC: 0.7 MG/DL
BUN SERPL-MCNC: 21 MG/DL
CALCIUM SERPL-MCNC: 9.6 MG/DL
CHLORIDE SERPL-SCNC: 99 MMOL/L
CHOLEST SERPL-MCNC: 148 MG/DL
CO2 SERPL-SCNC: 23 MMOL/L
CREAT SERPL-MCNC: 0.91 MG/DL
EOSINOPHIL # BLD AUTO: 0.26 K/UL
EOSINOPHIL NFR BLD AUTO: 3.3 %
GLUCOSE SERPL-MCNC: 116 MG/DL
HCT VFR BLD CALC: 42.2 %
HDLC SERPL-MCNC: 65 MG/DL
HGB BLD-MCNC: 13.8 G/DL
IMM GRANULOCYTES NFR BLD AUTO: 0.4 %
LDLC SERPL CALC-MCNC: 68 MG/DL
LYMPHOCYTES # BLD AUTO: 1.4 K/UL
LYMPHOCYTES NFR BLD AUTO: 18 %
MAN DIFF?: NORMAL
MCHC RBC-ENTMCNC: 32.7 GM/DL
MCHC RBC-ENTMCNC: 33.7 PG
MCV RBC AUTO: 103.2 FL
MONOCYTES # BLD AUTO: 0.72 K/UL
MONOCYTES NFR BLD AUTO: 9.3 %
NEUTROPHILS # BLD AUTO: 5.3 K/UL
NEUTROPHILS NFR BLD AUTO: 68.2 %
NONHDLC SERPL-MCNC: 84 MG/DL
PHOSPHATE SERPL-MCNC: 3.7 MG/DL
PLATELET # BLD AUTO: 214 K/UL
POTASSIUM SERPL-SCNC: 5 MMOL/L
PROT SERPL-MCNC: 7 G/DL
RBC # BLD: 4.09 M/UL
RBC # FLD: 12.8 %
SODIUM SERPL-SCNC: 137 MMOL/L
TRIGL SERPL-MCNC: 77 MG/DL
WBC # FLD AUTO: 7.77 K/UL

## 2021-12-10 ENCOUNTER — APPOINTMENT (OUTPATIENT)
Dept: INTERNAL MEDICINE | Facility: CLINIC | Age: 81
End: 2021-12-10
Payer: MEDICARE

## 2021-12-10 PROCEDURE — 0013A: CPT

## 2021-12-16 ENCOUNTER — NON-APPOINTMENT (OUTPATIENT)
Age: 81
End: 2021-12-16

## 2021-12-16 ENCOUNTER — APPOINTMENT (OUTPATIENT)
Dept: CARDIOLOGY | Facility: CLINIC | Age: 81
End: 2021-12-16
Payer: MEDICARE

## 2021-12-16 VITALS
OXYGEN SATURATION: 100 % | HEIGHT: 64 IN | WEIGHT: 132.28 LBS | DIASTOLIC BLOOD PRESSURE: 80 MMHG | HEART RATE: 69 BPM | BODY MASS INDEX: 22.58 KG/M2 | SYSTOLIC BLOOD PRESSURE: 111 MMHG

## 2021-12-16 PROCEDURE — 93000 ELECTROCARDIOGRAM COMPLETE: CPT

## 2021-12-16 PROCEDURE — 99214 OFFICE O/P EST MOD 30 MIN: CPT

## 2022-01-02 NOTE — ED PROVIDER NOTE - CHPI ED SYMPTOMS NEG
no chest pain/no nausea/no vomiting
PAST SURGICAL HISTORY:  H/O arthroscopy of right knee     History of excision of pilonidal cyst     Spinal cord stimulator status

## 2022-01-04 NOTE — ED PROVIDER NOTE - TEMPLATE
Contact and spoke with patient. Patient schedule appointment with Dr. Jefferson. Patient verbalized understanding.    Cardiac

## 2022-03-09 ENCOUNTER — APPOINTMENT (OUTPATIENT)
Dept: INTERNAL MEDICINE | Facility: CLINIC | Age: 82
End: 2022-03-09
Payer: MEDICARE

## 2022-03-09 VITALS
BODY MASS INDEX: 24.07 KG/M2 | WEIGHT: 141 LBS | HEIGHT: 64 IN | HEART RATE: 72 BPM | TEMPERATURE: 97.8 F | SYSTOLIC BLOOD PRESSURE: 176 MMHG | OXYGEN SATURATION: 96 % | DIASTOLIC BLOOD PRESSURE: 86 MMHG

## 2022-03-09 VITALS — DIASTOLIC BLOOD PRESSURE: 70 MMHG | SYSTOLIC BLOOD PRESSURE: 156 MMHG

## 2022-03-09 PROCEDURE — 99214 OFFICE O/P EST MOD 30 MIN: CPT

## 2022-03-13 LAB
ALBUMIN SERPL ELPH-MCNC: 4.5 G/DL
ALBUMIN SERPL ELPH-MCNC: 4.5 G/DL
ALP BLD-CCNC: 115 U/L
ALT SERPL-CCNC: 15 U/L
ANION GAP SERPL CALC-SCNC: 13 MMOL/L
AST SERPL-CCNC: 17 U/L
BILIRUB DIRECT SERPL-MCNC: 0.2 MG/DL
BILIRUB INDIRECT SERPL-MCNC: 0.4 MG/DL
BILIRUB SERPL-MCNC: 0.6 MG/DL
BUN SERPL-MCNC: 23 MG/DL
CALCIUM SERPL-MCNC: 9.5 MG/DL
CHLORIDE SERPL-SCNC: 98 MMOL/L
CHOLEST SERPL-MCNC: 164 MG/DL
CO2 SERPL-SCNC: 26 MMOL/L
CREAT SERPL-MCNC: 0.82 MG/DL
EGFR: 72 ML/MIN/1.73M2
ESTIMATED AVERAGE GLUCOSE: 126 MG/DL
GLUCOSE SERPL-MCNC: 119 MG/DL
HBA1C MFR BLD HPLC: 6 %
HDLC SERPL-MCNC: 68 MG/DL
LDLC SERPL CALC-MCNC: 82 MG/DL
NONHDLC SERPL-MCNC: 95 MG/DL
PHOSPHATE SERPL-MCNC: 3.8 MG/DL
POTASSIUM SERPL-SCNC: 4.1 MMOL/L
PROT SERPL-MCNC: 7.2 G/DL
SODIUM SERPL-SCNC: 137 MMOL/L
TRIGL SERPL-MCNC: 68 MG/DL

## 2022-03-14 ENCOUNTER — NON-APPOINTMENT (OUTPATIENT)
Age: 82
End: 2022-03-14

## 2022-03-17 ENCOUNTER — APPOINTMENT (OUTPATIENT)
Dept: CARDIOLOGY | Facility: CLINIC | Age: 82
End: 2022-03-17
Payer: MEDICARE

## 2022-03-17 ENCOUNTER — NON-APPOINTMENT (OUTPATIENT)
Age: 82
End: 2022-03-17

## 2022-03-17 VITALS
DIASTOLIC BLOOD PRESSURE: 88 MMHG | HEIGHT: 64 IN | HEART RATE: 72 BPM | WEIGHT: 137 LBS | SYSTOLIC BLOOD PRESSURE: 140 MMHG | BODY MASS INDEX: 23.39 KG/M2 | OXYGEN SATURATION: 100 %

## 2022-03-17 PROCEDURE — 93000 ELECTROCARDIOGRAM COMPLETE: CPT

## 2022-03-17 PROCEDURE — 99214 OFFICE O/P EST MOD 30 MIN: CPT

## 2022-03-17 NOTE — DISCUSSION/SUMMARY
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 No active cardiac complaints continue statin LDL goal 70. \par \par HTN: Controlled\par \par HLD: Continue statin \par \par Repeat MRA to evaluate KALYAN. Previous studies show conflicting results. \par \par F/U testing ordered plan was discussed with patient. ECG done to evaluate arrhythmia. \par \par

## 2022-03-17 NOTE — HISTORY OF PRESENT ILLNESS
[FreeTextEntry1] : 81 Y/O female PMH: AF, HLD, HTN,  CAD S/P CC 2015 tipple vessel disease ( RCA LAD LCX ) NL LV FX Anxiety here today for blood pressure check. Past carotid MRAs reveal 80% followed by 30-40% . No chest pain or shortness of breath. \par \par \par Echo 8/2021 Moderate LVH, mild MR/AR, Moderate-severe AS no significant change in comparison to prior Echo\par Non ischemic Nuclear stress test 2019\par labs with PCP

## 2022-03-17 NOTE — PHYSICAL EXAM
[Normal S1, S2] : normal S1, S2 [Soft] : abdomen soft [Non Tender] : non-tender [Normal Gait] : normal gait [No Edema] : no edema [No Rash] : no rash [Normal] : moves all extremities, no focal deficits, normal speech [Alert and Oriented] : alert and oriented [General Appearance - Well Developed] : well developed [Normal Appearance] : normal appearance [Well Groomed] : well groomed [General Appearance - Well Nourished] : well nourished [No Deformities] : no deformities [General Appearance - In No Acute Distress] : no acute distress [Normal Conjunctiva] : the conjunctiva exhibited no abnormalities [] : no respiratory distress [Respiration, Rhythm And Depth] : normal respiratory rhythm and effort [Exaggerated Use Of Accessory Muscles For Inspiration] : no accessory muscle use [Auscultation Breath Sounds / Voice Sounds] : lungs were clear to auscultation bilaterally [Heart Rate And Rhythm] : heart rate and rhythm were normal [Heart Sounds] : normal S1 and S2 [Abdomen Soft] : soft [Abnormal Walk] : normal gait [Skin Color & Pigmentation] : normal skin color and pigmentation [Oriented To Time, Place, And Person] : oriented to person, place, and time [FreeTextEntry1] : No LE Edema

## 2022-03-17 NOTE — REASON FOR VISIT
[Symptom and Test Evaluation] : symptom and test evaluation [Arrhythmia/ECG Abnorrmalities] : arrhythmia/ECG abnormalities [Follow-Up - Clinic] : a clinic follow-up of [Coronary Artery Disease] : coronary artery disease [Hyperlipidemia] : hyperlipidemia [Hypertension] : hypertension [Medication Management] : Medication management

## 2022-04-01 ENCOUNTER — APPOINTMENT (OUTPATIENT)
Dept: MRI IMAGING | Facility: CLINIC | Age: 82
End: 2022-04-01
Payer: MEDICARE

## 2022-04-01 PROCEDURE — 70548 MR ANGIOGRAPHY NECK W/DYE: CPT | Mod: ME

## 2022-04-01 PROCEDURE — A9585: CPT

## 2022-04-01 PROCEDURE — G1004: CPT

## 2022-04-12 ENCOUNTER — RX RENEWAL (OUTPATIENT)
Age: 82
End: 2022-04-12

## 2022-05-23 ENCOUNTER — RX RENEWAL (OUTPATIENT)
Age: 82
End: 2022-05-23

## 2022-05-23 ENCOUNTER — NON-APPOINTMENT (OUTPATIENT)
Age: 82
End: 2022-05-23

## 2022-06-08 LAB
ALBUMIN SERPL ELPH-MCNC: 4.5 G/DL
ALBUMIN SERPL ELPH-MCNC: 4.5 G/DL
ALP BLD-CCNC: 101 U/L
ALT SERPL-CCNC: 15 U/L
ANION GAP SERPL CALC-SCNC: 12 MMOL/L
AST SERPL-CCNC: 19 U/L
BILIRUB DIRECT SERPL-MCNC: 0.2 MG/DL
BILIRUB INDIRECT SERPL-MCNC: 0.5 MG/DL
BILIRUB SERPL-MCNC: 0.7 MG/DL
BUN SERPL-MCNC: 25 MG/DL
CALCIUM SERPL-MCNC: 9.8 MG/DL
CHLORIDE SERPL-SCNC: 96 MMOL/L
CHOLEST SERPL-MCNC: 158 MG/DL
CO2 SERPL-SCNC: 28 MMOL/L
CREAT SERPL-MCNC: 0.95 MG/DL
EGFR: 60 ML/MIN/1.73M2
GLUCOSE SERPL-MCNC: 122 MG/DL
HDLC SERPL-MCNC: 64 MG/DL
LDLC SERPL CALC-MCNC: 80 MG/DL
NONHDLC SERPL-MCNC: 94 MG/DL
PHOSPHATE SERPL-MCNC: 3.9 MG/DL
POTASSIUM SERPL-SCNC: 4.7 MMOL/L
PROT SERPL-MCNC: 7.3 G/DL
SODIUM SERPL-SCNC: 136 MMOL/L
TRIGL SERPL-MCNC: 69 MG/DL

## 2022-06-09 LAB
ESTIMATED AVERAGE GLUCOSE: 126 MG/DL
HBA1C MFR BLD HPLC: 6 %

## 2022-06-14 ENCOUNTER — APPOINTMENT (OUTPATIENT)
Dept: INTERNAL MEDICINE | Facility: CLINIC | Age: 82
End: 2022-06-14
Payer: MEDICARE

## 2022-06-14 VITALS
HEIGHT: 64 IN | SYSTOLIC BLOOD PRESSURE: 178 MMHG | HEART RATE: 75 BPM | BODY MASS INDEX: 23.05 KG/M2 | OXYGEN SATURATION: 97 % | DIASTOLIC BLOOD PRESSURE: 80 MMHG | TEMPERATURE: 98 F | WEIGHT: 135 LBS

## 2022-06-14 VITALS — DIASTOLIC BLOOD PRESSURE: 70 MMHG | SYSTOLIC BLOOD PRESSURE: 142 MMHG

## 2022-06-14 VITALS — SYSTOLIC BLOOD PRESSURE: 140 MMHG | DIASTOLIC BLOOD PRESSURE: 60 MMHG

## 2022-06-14 PROCEDURE — 99214 OFFICE O/P EST MOD 30 MIN: CPT

## 2022-07-06 ENCOUNTER — OUTPATIENT (OUTPATIENT)
Dept: OUTPATIENT SERVICES | Facility: HOSPITAL | Age: 82
LOS: 1 days | End: 2022-07-06

## 2022-07-06 DIAGNOSIS — R10.31 RIGHT LOWER QUADRANT PAIN: ICD-10-CM

## 2022-07-06 DIAGNOSIS — I10 ESSENTIAL (PRIMARY) HYPERTENSION: ICD-10-CM

## 2022-07-06 DIAGNOSIS — D64.9 ANEMIA, UNSPECIFIED: ICD-10-CM

## 2022-07-07 ENCOUNTER — APPOINTMENT (OUTPATIENT)
Dept: CARDIOLOGY | Facility: CLINIC | Age: 82
End: 2022-07-07

## 2022-07-07 ENCOUNTER — NON-APPOINTMENT (OUTPATIENT)
Age: 82
End: 2022-07-07

## 2022-07-07 ENCOUNTER — APPOINTMENT (OUTPATIENT)
Dept: ULTRASOUND IMAGING | Facility: CLINIC | Age: 82
End: 2022-07-07

## 2022-07-07 VITALS
SYSTOLIC BLOOD PRESSURE: 182 MMHG | BODY MASS INDEX: 23.15 KG/M2 | WEIGHT: 135.58 LBS | OXYGEN SATURATION: 100 % | HEIGHT: 64 IN | HEART RATE: 71 BPM | DIASTOLIC BLOOD PRESSURE: 80 MMHG

## 2022-07-07 PROCEDURE — 93000 ELECTROCARDIOGRAM COMPLETE: CPT

## 2022-07-07 PROCEDURE — 99214 OFFICE O/P EST MOD 30 MIN: CPT

## 2022-07-07 PROCEDURE — 76770 US EXAM ABDO BACK WALL COMP: CPT

## 2022-07-07 NOTE — HISTORY OF PRESENT ILLNESS
[FreeTextEntry1] : 81 Y/O female PMH: AF, HLD, HTN,  CAD S/P CC 2015 tipple vessel disease ( RCA LAD LCX ) NL LV FX Anxiety here today for blood pressure check. Recent carotid MRI reviewed.\par \par \par Echo 8/2021 Moderate LVH, mild MR/AR, Moderate-severe AS no significant change in comparison to prior Echo\par Non ischemic Nuclear stress test 2019\par l

## 2022-07-07 NOTE — PHYSICAL EXAM
[Normal S1, S2] : normal S1, S2 [Soft] : abdomen soft [Non Tender] : non-tender [Normal Gait] : normal gait [No Edema] : no edema [No Rash] : no rash [Normal] : moves all extremities, no focal deficits, normal speech [Alert and Oriented] : alert and oriented [General Appearance - Well Developed] : well developed [Normal Appearance] : normal appearance [Well Groomed] : well groomed [General Appearance - Well Nourished] : well nourished [No Deformities] : no deformities [General Appearance - In No Acute Distress] : no acute distress [Normal Conjunctiva] : the conjunctiva exhibited no abnormalities [] : no respiratory distress [Respiration, Rhythm And Depth] : normal respiratory rhythm and effort [Exaggerated Use Of Accessory Muscles For Inspiration] : no accessory muscle use [Auscultation Breath Sounds / Voice Sounds] : lungs were clear to auscultation bilaterally [Heart Rate And Rhythm] : heart rate and rhythm were normal [Heart Sounds] : normal S1 and S2 [Abdomen Soft] : soft [Abnormal Walk] : normal gait [FreeTextEntry1] : No LE Edema  [Skin Color & Pigmentation] : normal skin color and pigmentation [Oriented To Time, Place, And Person] : oriented to person, place, and time

## 2022-07-07 NOTE — DISCUSSION/SUMMARY
[FreeTextEntry1] : AF: Continue BB/AC/Amiodarone she is under care of PCP to monitor LFT's and thyroid function/PFT's will obtain records from PCP\par \par AS: Repeat Echo\par \par CAD: CAD S/P CC 2015 tipple vessel disease ( RCA LAD LCX ) NL LV FX medically managed. No active cardiac complaints continue statin LDL goal 70. \par \par HTN: Controlled\par \par HLD: Continue statin \par \par Repeat MRA  reviewed.\par \par  ECG done to evaluate arrhythmia. \par \par  [EKG obtained to assist in diagnosis and management of assessed problem(s)] : EKG obtained to assist in diagnosis and management of assessed problem(s)

## 2022-07-17 ENCOUNTER — RX RENEWAL (OUTPATIENT)
Age: 82
End: 2022-07-17

## 2022-07-18 ENCOUNTER — APPOINTMENT (OUTPATIENT)
Dept: INTERNAL MEDICINE | Facility: CLINIC | Age: 82
End: 2022-07-18

## 2022-07-21 ENCOUNTER — APPOINTMENT (OUTPATIENT)
Dept: INTERNAL MEDICINE | Facility: CLINIC | Age: 82
End: 2022-07-21

## 2022-07-21 VITALS — DIASTOLIC BLOOD PRESSURE: 60 MMHG | SYSTOLIC BLOOD PRESSURE: 138 MMHG

## 2022-07-21 VITALS
BODY MASS INDEX: 23.05 KG/M2 | HEIGHT: 64 IN | WEIGHT: 135 LBS | SYSTOLIC BLOOD PRESSURE: 144 MMHG | HEART RATE: 72 BPM | DIASTOLIC BLOOD PRESSURE: 72 MMHG | OXYGEN SATURATION: 97 % | TEMPERATURE: 97.9 F

## 2022-07-21 DIAGNOSIS — M54.50 LOW BACK PAIN, UNSPECIFIED: ICD-10-CM

## 2022-07-21 PROCEDURE — 99214 OFFICE O/P EST MOD 30 MIN: CPT | Mod: 25

## 2022-07-21 PROCEDURE — 36415 COLL VENOUS BLD VENIPUNCTURE: CPT

## 2022-07-21 NOTE — HISTORY OF PRESENT ILLNESS
[FreeTextEntry8] : Pt  here for c/o  diffuse body  aches. Had some  burning  on urination that has resolved   with  hydration.   She thinks that when she skips her crestor . Pt  has tightness in her lower back, had sonogram of kiney and bladder

## 2022-07-25 LAB
ALBUMIN SERPL ELPH-MCNC: 4.5 G/DL
ALP BLD-CCNC: 103 U/L
ALT SERPL-CCNC: 11 U/L
ANION GAP SERPL CALC-SCNC: 13 MMOL/L
APPEARANCE: CLEAR
AST SERPL-CCNC: 14 U/L
BACTERIA UR CULT: NORMAL
BACTERIA: NEGATIVE
BASOPHILS # BLD AUTO: 0.06 K/UL
BASOPHILS NFR BLD AUTO: 0.7 %
BILIRUB SERPL-MCNC: 0.4 MG/DL
BILIRUBIN URINE: NEGATIVE
BLOOD URINE: NORMAL
BUN SERPL-MCNC: 21 MG/DL
CALCIUM SERPL-MCNC: 9.6 MG/DL
CHLORIDE SERPL-SCNC: 99 MMOL/L
CO2 SERPL-SCNC: 26 MMOL/L
COLOR: NORMAL
CREAT SERPL-MCNC: 0.96 MG/DL
EGFR: 59 ML/MIN/1.73M2
EOSINOPHIL # BLD AUTO: 0.47 K/UL
EOSINOPHIL NFR BLD AUTO: 5.2 %
GLUCOSE QUALITATIVE U: NEGATIVE
GLUCOSE SERPL-MCNC: 124 MG/DL
HCT VFR BLD CALC: 40.4 %
HGB BLD-MCNC: 13.6 G/DL
HYALINE CASTS: 0 /LPF
IMM GRANULOCYTES NFR BLD AUTO: 0.3 %
KETONES URINE: NEGATIVE
LEUKOCYTE ESTERASE URINE: ABNORMAL
LYMPHOCYTES # BLD AUTO: 1.38 K/UL
LYMPHOCYTES NFR BLD AUTO: 15.4 %
MAN DIFF?: NORMAL
MCHC RBC-ENTMCNC: 33.7 GM/DL
MCHC RBC-ENTMCNC: 34.3 PG
MCV RBC AUTO: 101.8 FL
MICROSCOPIC-UA: NORMAL
MONOCYTES # BLD AUTO: 0.9 K/UL
MONOCYTES NFR BLD AUTO: 10 %
NEUTROPHILS # BLD AUTO: 6.14 K/UL
NEUTROPHILS NFR BLD AUTO: 68.4 %
NITRITE URINE: NEGATIVE
PH URINE: 6
PLATELET # BLD AUTO: 211 K/UL
POTASSIUM SERPL-SCNC: 4.5 MMOL/L
PROT SERPL-MCNC: 7 G/DL
PROTEIN URINE: NEGATIVE
RBC # BLD: 3.97 M/UL
RBC # FLD: 12.2 %
RED BLOOD CELLS URINE: 1 /HPF
SODIUM SERPL-SCNC: 138 MMOL/L
SPECIFIC GRAVITY URINE: 1.01
SQUAMOUS EPITHELIAL CELLS: 1 /HPF
UROBILINOGEN URINE: NORMAL
WBC # FLD AUTO: 8.98 K/UL
WHITE BLOOD CELLS URINE: 12 /HPF

## 2022-07-26 ENCOUNTER — NON-APPOINTMENT (OUTPATIENT)
Age: 82
End: 2022-07-26

## 2022-07-27 LAB
CK BB SERPL ELPH-CCNC: 0 %
CK MB CFR SERPL ELPH: 0 %
CK MM SERPL ELPH-CCNC: 100 %
CREATINE KINASE,TOTAL,SERUM: 62 U/L
MACRO TYPE 1: 0 %
MACRO TYPE 2: 0 %

## 2022-09-15 ENCOUNTER — APPOINTMENT (OUTPATIENT)
Dept: CARDIOLOGY | Facility: CLINIC | Age: 82
End: 2022-09-15

## 2022-09-15 VITALS
HEART RATE: 82 BPM | HEIGHT: 64 IN | DIASTOLIC BLOOD PRESSURE: 76 MMHG | SYSTOLIC BLOOD PRESSURE: 162 MMHG | WEIGHT: 135 LBS | OXYGEN SATURATION: 99 % | BODY MASS INDEX: 23.05 KG/M2

## 2022-09-15 PROCEDURE — 99214 OFFICE O/P EST MOD 30 MIN: CPT

## 2022-09-15 RX ORDER — ASPIRIN 81 MG/1
81 TABLET ORAL DAILY
Refills: 0 | Status: ACTIVE | COMMUNITY
Start: 2022-09-15

## 2022-09-15 NOTE — DISCUSSION/SUMMARY
[FreeTextEntry1] : AF: Continue BB/AC/Amiodarone she is under care of PCP every 3 months per patient to monitor LFT's and thyroid function advised Pulmonary F/U \par \par AS: Asymptomatic no CP/SOB/Dizziness Repeat Echo ordered \par \par CAD: No angina exercises daily W/O limitations/symptoms  S/P CC 2015 tipple vessel disease ( RCA LAD LCX ) Non ischemic Nuclear stress test 2019 \par \par HTN: Controlled\par \par Carotid disease: Prior MRA reviewed  KALYAN 60-70% she is asymptomatic maintained on AS/Statin and has seen Vascular in the past Dr Andre advised routine follow up which she will call to schedule visit;  I have advised F/U with PCP for thyroid nodule dedicated thyroid US \par \par HLD: Continue statin \par \par Repeat MRA  reviewed.\par \par OV 4 months \par \par Plan DW patient \par \par

## 2022-09-22 ENCOUNTER — LABORATORY RESULT (OUTPATIENT)
Age: 82
End: 2022-09-22

## 2022-09-22 LAB
ALBUMIN SERPL ELPH-MCNC: 4.4 G/DL
ALBUMIN SERPL ELPH-MCNC: 4.4 G/DL
ALP BLD-CCNC: 106 U/L
ALT SERPL-CCNC: 12 U/L
ANION GAP SERPL CALC-SCNC: 13 MMOL/L
AST SERPL-CCNC: 15 U/L
BILIRUB DIRECT SERPL-MCNC: 0.2 MG/DL
BILIRUB INDIRECT SERPL-MCNC: 0.4 MG/DL
BILIRUB SERPL-MCNC: 0.6 MG/DL
BUN SERPL-MCNC: 23 MG/DL
CALCIUM SERPL-MCNC: 9.5 MG/DL
CHLORIDE SERPL-SCNC: 99 MMOL/L
CHOLEST SERPL-MCNC: 156 MG/DL
CO2 SERPL-SCNC: 27 MMOL/L
CREAT SERPL-MCNC: 0.83 MG/DL
EGFR: 71 ML/MIN/1.73M2
ESTIMATED AVERAGE GLUCOSE: 126 MG/DL
GLUCOSE SERPL-MCNC: 132 MG/DL
HBA1C MFR BLD HPLC: 6 %
HDLC SERPL-MCNC: 66 MG/DL
LDLC SERPL CALC-MCNC: 76 MG/DL
NONHDLC SERPL-MCNC: 91 MG/DL
PHOSPHATE SERPL-MCNC: 3.6 MG/DL
POTASSIUM SERPL-SCNC: 4.7 MMOL/L
PROT SERPL-MCNC: 7.2 G/DL
SODIUM SERPL-SCNC: 139 MMOL/L
TRIGL SERPL-MCNC: 75 MG/DL

## 2022-09-27 ENCOUNTER — NON-APPOINTMENT (OUTPATIENT)
Age: 82
End: 2022-09-27

## 2022-10-11 ENCOUNTER — RX RENEWAL (OUTPATIENT)
Age: 82
End: 2022-10-11

## 2022-10-16 ENCOUNTER — RX RENEWAL (OUTPATIENT)
Age: 82
End: 2022-10-16

## 2022-10-18 ENCOUNTER — APPOINTMENT (OUTPATIENT)
Dept: INTERNAL MEDICINE | Facility: CLINIC | Age: 82
End: 2022-10-18

## 2022-10-18 VITALS
HEIGHT: 64 IN | BODY MASS INDEX: 21.51 KG/M2 | SYSTOLIC BLOOD PRESSURE: 130 MMHG | TEMPERATURE: 98.4 F | WEIGHT: 126 LBS | HEART RATE: 78 BPM | DIASTOLIC BLOOD PRESSURE: 74 MMHG | OXYGEN SATURATION: 94 %

## 2022-10-18 PROCEDURE — 36415 COLL VENOUS BLD VENIPUNCTURE: CPT

## 2022-10-18 PROCEDURE — 99214 OFFICE O/P EST MOD 30 MIN: CPT | Mod: 25

## 2022-10-18 NOTE — PHYSICAL EXAM
[No Acute Distress] : no acute distress [Well Nourished] : well nourished [Well Developed] : well developed [Supple] : supple [No Respiratory Distress] : no respiratory distress  [No Accessory Muscle Use] : no accessory muscle use [Clear to Auscultation] : lungs were clear to auscultation bilaterally [Normal Rate] : normal rate  [Regular Rhythm] : with a regular rhythm [Normal S1, S2] : normal S1 and S2 [Pedal Pulses Present] : the pedal pulses are present [No Extremity Clubbing/Cyanosis] : no extremity clubbing/cyanosis [Coordination Grossly Intact] : coordination grossly intact [No Focal Deficits] : no focal deficits [Normal Gait] : normal gait [Normal Affect] : the affect was normal

## 2022-10-18 NOTE — HISTORY OF PRESENT ILLNESS
[FreeTextEntry1] : f/up  [de-identified] : Pt is a 82 yr. old female with a h/ of aortic stenosis, hypothyroid, HTN here for  followup/ blood work for  hypothyroidism. \par Her last TSH - 2.9 on 8/31/21. She has been maintained on  Levothyroxine 50 mcg po daily . \par She had recent f/up 9/15/22 with Dr. Deleon , cardiology for h/ of AFIb, HTN. LAst echo on 8/21/22 showed moderate LVH, moderate - severe AS. A repeat echo was ordered , pt has yet to complete. \par Pt received the COVID 5th booster on 9/19/22, flu shot also that day. \par Pt denies fever, chills, SOB, chest  pain , palpitations. \par \par

## 2022-10-18 NOTE — ASSESSMENT
[FreeTextEntry1] : 1. hypothyroid\par Free T4,  TSH drawn today \par 2. prediabetes \par Hbg A1c obtained \par f/up with BW results \par OV 3 months Dr. Mcneill \par

## 2022-10-19 ENCOUNTER — NON-APPOINTMENT (OUTPATIENT)
Age: 82
End: 2022-10-19

## 2022-10-19 LAB
ESTIMATED AVERAGE GLUCOSE: 131 MG/DL
HBA1C MFR BLD HPLC: 6.2 %
T4 FREE SERPL-MCNC: 1.7 NG/DL
TSH SERPL-ACNC: 2.79 UIU/ML

## 2022-10-23 ENCOUNTER — RX RENEWAL (OUTPATIENT)
Age: 82
End: 2022-10-23

## 2022-11-18 ENCOUNTER — APPOINTMENT (OUTPATIENT)
Dept: ULTRASOUND IMAGING | Facility: CLINIC | Age: 82
End: 2022-11-18

## 2022-11-18 PROCEDURE — 93925 LOWER EXTREMITY STUDY: CPT

## 2022-12-08 ENCOUNTER — NON-APPOINTMENT (OUTPATIENT)
Age: 82
End: 2022-12-08

## 2022-12-16 ENCOUNTER — APPOINTMENT (OUTPATIENT)
Dept: CARDIOLOGY | Facility: CLINIC | Age: 82
End: 2022-12-16

## 2022-12-16 ENCOUNTER — APPOINTMENT (OUTPATIENT)
Dept: VASCULAR SURGERY | Facility: CLINIC | Age: 82
End: 2022-12-16

## 2022-12-16 VITALS
HEIGHT: 64 IN | WEIGHT: 135 LBS | DIASTOLIC BLOOD PRESSURE: 71 MMHG | HEART RATE: 76 BPM | SYSTOLIC BLOOD PRESSURE: 156 MMHG | BODY MASS INDEX: 23.05 KG/M2

## 2022-12-16 VITALS
HEIGHT: 64 IN | DIASTOLIC BLOOD PRESSURE: 80 MMHG | OXYGEN SATURATION: 100 % | WEIGHT: 137.13 LBS | BODY MASS INDEX: 23.41 KG/M2 | SYSTOLIC BLOOD PRESSURE: 150 MMHG | HEART RATE: 71 BPM

## 2022-12-16 PROCEDURE — 99214 OFFICE O/P EST MOD 30 MIN: CPT

## 2022-12-16 PROCEDURE — 99204 OFFICE O/P NEW MOD 45 MIN: CPT

## 2022-12-16 PROCEDURE — 93000 ELECTROCARDIOGRAM COMPLETE: CPT

## 2022-12-16 NOTE — HISTORY OF PRESENT ILLNESS
[FreeTextEntry1] : 81 Y/O female PMH: AF, HLD, HTN,  CAD S/P CC 2015 tipple vessel disease ( RCA LAD LCX ) NL LV FX Anxiety here today in routine cardiac follow up. Repeat lower extremity evaluation reveals diffuse disease with L iliac involvement. Pt reports left ankle discomfort with ambulation. Dx of AS on Echo.\par \par Claims she is feeling well offers no complaints  \par \par \par Echo 8/2021 Moderate LVH, mild MR/AR, Moderate-severe AS no significant change in comparison to prior Echo\par Non ischemic Nuclear stress test 2019\par

## 2022-12-16 NOTE — DISCUSSION/SUMMARY
[FreeTextEntry1] : AF: Continue BB/AC/Amiodarone she is under care of PCP every 3 months per patient to monitor LFT's and thyroid function advised Pulmonary F/U \par \par AS: Asymptomatic no CP/SOB/Dizziness Repeat Echo ordered for next week.  \par PAD: Vascular evaluation today\par CAD: No angina exercises daily W/O limitations/symptoms  S/P CC 2015 tipple vessel disease ( RCA LAD LCX ) Non ischemic Nuclear stress test 2019 \par \par HTN: Controlled\par \par Carotid disease: Prior MRA reviewed  KALYAN 60-70% she is asymptomatic maintained on AS/Statin and has seen Vascular in the past Dr Andre advised routine follow up which she will call to schedule visit;  I have advised F/U with PCP for thyroid nodule dedicated thyroid US \par \par HLD: Continue statin \par \par Repeat MRA  reviewed.\par \par OV 4 months \par \par Plan DW patient \par \par  [EKG obtained to assist in diagnosis and management of assessed problem(s)] : EKG obtained to assist in diagnosis and management of assessed problem(s)

## 2022-12-19 ENCOUNTER — APPOINTMENT (OUTPATIENT)
Dept: VASCULAR SURGERY | Facility: CLINIC | Age: 82
End: 2022-12-19

## 2022-12-19 PROCEDURE — 93923 UPR/LXTR ART STDY 3+ LVLS: CPT

## 2022-12-21 ENCOUNTER — APPOINTMENT (OUTPATIENT)
Dept: CARDIOLOGY | Facility: CLINIC | Age: 82
End: 2022-12-21

## 2022-12-21 PROCEDURE — 93306 TTE W/DOPPLER COMPLETE: CPT

## 2022-12-27 LAB
ANION GAP SERPL CALC-SCNC: 11 MMOL/L
BUN SERPL-MCNC: 22 MG/DL
CALCIUM SERPL-MCNC: 9.3 MG/DL
CHLORIDE SERPL-SCNC: 98 MMOL/L
CO2 SERPL-SCNC: 28 MMOL/L
CREAT SERPL-MCNC: 0.86 MG/DL
EGFR: 68 ML/MIN/1.73M2
GLUCOSE SERPL-MCNC: 112 MG/DL
POTASSIUM SERPL-SCNC: 4.4 MMOL/L
SODIUM SERPL-SCNC: 137 MMOL/L

## 2022-12-27 NOTE — PHYSICAL EXAM
[Normal Thyroid] : the thyroid was normal [Normal Breath Sounds] : Normal breath sounds [Normal Heart Sounds] : normal heart sounds [Normal Rate and Rhythm] : normal rate and rhythm [Alert] : alert [Oriented to Person] : oriented to person [Oriented to Place] : oriented to place [Oriented to Time] : oriented to time [Murmur] : murmur was appreciated  [JVD] : no jugular venous distention  [Abdomen Masses] : No abdominal masses [Abdomen Tenderness] : ~T ~M No abdominal tenderness [Abdominal Bruit] : no abdominal bruit  [de-identified] : well nourished [de-identified] : HEENT, PERRLA, EOMi, sclerae anicteric, conjunctivae pink and moist [de-identified] : Bilateral carotid bruits or radiated heart sounds [de-identified] : III/VI systolic murmur [FreeTextEntry1] : 2/4 right femoral pulse and 1-1+ proximal left femoral pulse, no palpable distal pulse in either lower extremity; left foot with ruborous changes and small (less than 1 cm), noninfected appearing superficial ulcer on the medial aspect; no edema bilaterally; no phlebitic segments, Homans' sign, or calf tenderness

## 2022-12-27 NOTE — HISTORY OF PRESENT ILLNESS
[FreeTextEntry1] : 82-year-old white female with past medical history significant for hypertension treated for approximately 15 years coronary artery disease with cardiac catheterization 2015 demonstrating triple-vessel disease (RCA, LAD, circumflex artery), anxiety, carotid artery disease, hypercholesterolemia, hypothyroidism, atrial fibrillation, abnormal breast exam, presents for evaluation of left foot pain occurring for the last 2 months.  In addition she has developed a left first toe small ulcer.  Approximately 1 month ago, her left foot developed ruborous changes.  The patient denies ischemic nocturnal foot pain requiring placing her foot in a dependent position in order to obtain relief.  She states she is able to walk without too much difficulty and was unable to quantify her distance before having to stop secondary to leg pain.  In November 2021 she underwent an aortic and iliac duplex study demonstrating no evidence of aneurysmal changes.  More recently, on November 18, 2022 she underwent a lower extremity arterial duplex study at the [facility] [place] that demonstrated biphasic arterial waveforms in the right common femoral and profundofemoral arteries.  Monophasic waveforms are present throughout the entire left lower extremity progressively worsening distally in this leg.  The impression was there is a probable flow-limiting lesion in the left iliac circulation.

## 2022-12-27 NOTE — ASSESSMENT
[FreeTextEntry1] : 82-year-old without history of smoking and lower extremity peripheral arterial disease with ruborous changes of the left foot, subtle tissue loss, and arterial duplex imaging suggesting impaired inflow to this limb.  I reviewed my findings with the patient as well as the findings of her arterial duplex study and suggested obtaining lower extremity noninvasive physiologic arterial studies to determine her current level of perfusion.  In addition, she will undergo a basic metabolic profile to determine if her kidney function is acceptable; and, then if it is, a CTA of her abdomen pelvis and runoff.\par \par All questions were answered.

## 2022-12-29 ENCOUNTER — APPOINTMENT (OUTPATIENT)
Dept: VASCULAR SURGERY | Facility: CLINIC | Age: 82
End: 2022-12-29
Payer: MEDICARE

## 2022-12-29 VITALS
WEIGHT: 135 LBS | BODY MASS INDEX: 23.05 KG/M2 | SYSTOLIC BLOOD PRESSURE: 134 MMHG | DIASTOLIC BLOOD PRESSURE: 73 MMHG | HEART RATE: 71 BPM | HEIGHT: 64 IN

## 2022-12-29 PROCEDURE — 99212 OFFICE O/P EST SF 10 MIN: CPT

## 2022-12-29 NOTE — HISTORY OF PRESENT ILLNESS
[FreeTextEntry1] : Patient presents for routine reevaluation and discussion of the results of noninvasive lower extremity physiologic arterial studies performed in my office on December 19, 2022.  These demonstrated a right MONTY of 0.57 and left MONTY of 0.55.  Her left thigh MONTY was 0.59 and her right was 1.02.\par \par Clinically, the patient notes occasional nocturnal ischemic type pain in the left foot occurring perhaps 1 time per week.\par \par She had undergone blood work on December 21 and her creatinine was 0.86 mg/dL.

## 2022-12-29 NOTE — ASSESSMENT
[FreeTextEntry1] : 82-year-old with significant peripheral arterial disease and intermittent rest pain of the left foot occurring perhaps 1 time per week in addition to a small ulcer of the left first toe.\par \par I reviewed the results of the noninvasive studies with the patient as well as her basic metabolic profile and again suggested undergoing a CTA of her abdomen, pelvis, and runoff.\par \par All questions were answered.\par \par She will follow-up with me once the results of the CTA are available.

## 2023-01-04 ENCOUNTER — APPOINTMENT (OUTPATIENT)
Dept: VASCULAR SURGERY | Facility: CLINIC | Age: 83
End: 2023-01-04

## 2023-01-06 ENCOUNTER — APPOINTMENT (OUTPATIENT)
Dept: CT IMAGING | Facility: CLINIC | Age: 83
End: 2023-01-06
Payer: MEDICARE

## 2023-01-06 PROCEDURE — 75635 CT ANGIO ABDOMINAL ARTERIES: CPT | Mod: MH

## 2023-01-10 ENCOUNTER — APPOINTMENT (OUTPATIENT)
Dept: CARDIOLOGY | Facility: CLINIC | Age: 83
End: 2023-01-10

## 2023-01-13 ENCOUNTER — APPOINTMENT (OUTPATIENT)
Dept: CARDIOLOGY | Facility: CLINIC | Age: 83
End: 2023-01-13

## 2023-01-18 ENCOUNTER — APPOINTMENT (OUTPATIENT)
Dept: VASCULAR SURGERY | Facility: CLINIC | Age: 83
End: 2023-01-18
Payer: MEDICARE

## 2023-01-18 VITALS
WEIGHT: 135 LBS | HEIGHT: 64 IN | BODY MASS INDEX: 23.05 KG/M2 | HEART RATE: 76 BPM | SYSTOLIC BLOOD PRESSURE: 169 MMHG | DIASTOLIC BLOOD PRESSURE: 77 MMHG

## 2023-01-18 PROCEDURE — 99212 OFFICE O/P EST SF 10 MIN: CPT

## 2023-01-30 ENCOUNTER — OFFICE (OUTPATIENT)
Dept: URBAN - METROPOLITAN AREA CLINIC 102 | Facility: CLINIC | Age: 83
Setting detail: OPHTHALMOLOGY
End: 2023-01-30
Payer: MEDICARE

## 2023-01-30 DIAGNOSIS — H43.391: ICD-10-CM

## 2023-01-30 DIAGNOSIS — H16.223: ICD-10-CM

## 2023-01-30 DIAGNOSIS — H40.033: ICD-10-CM

## 2023-01-30 DIAGNOSIS — Z96.1: ICD-10-CM

## 2023-01-30 DIAGNOSIS — H43.812: ICD-10-CM

## 2023-01-30 DIAGNOSIS — H40.013: ICD-10-CM

## 2023-01-30 PROCEDURE — 92004 COMPRE OPH EXAM NEW PT 1/>: CPT | Performed by: OPHTHALMOLOGY

## 2023-01-30 PROCEDURE — 92133 CPTRZD OPH DX IMG PST SGM ON: CPT | Performed by: OPHTHALMOLOGY

## 2023-01-30 ASSESSMENT — KERATOMETRY
OS_K1POWER_DIOPTERS: 42.00
OD_K2POWER_DIOPTERS: 42.50
OD_K1POWER_DIOPTERS: 42.00
OS_AXISANGLE_DEGREES: 030
OD_AXISANGLE_DEGREES: 165
OS_K2POWER_DIOPTERS: 43.50

## 2023-01-30 ASSESSMENT — CONFRONTATIONAL VISUAL FIELD TEST (CVF)
OD_FINDINGS: FULL
OS_FINDINGS: FULL

## 2023-01-30 ASSESSMENT — SUPERFICIAL PUNCTATE KERATITIS (SPK)
OD_SPK: T
OS_SPK: T

## 2023-01-30 ASSESSMENT — VISUAL ACUITY
OS_BCVA: 20/20-2
OD_BCVA: 20/30+2

## 2023-01-30 ASSESSMENT — REFRACTION_AUTOREFRACTION
OS_CYLINDER: -1.50
OS_SPHERE: +1.75
OD_AXIS: 106
OD_CYLINDER: -1.00
OS_AXIS: 113
OD_SPHERE: +0.75

## 2023-01-30 ASSESSMENT — TONOMETRY
OS_IOP_MMHG: 21
OD_IOP_MMHG: 17
OD_IOP_MMHG: 21

## 2023-01-30 ASSESSMENT — SPHEQUIV_DERIVED
OS_SPHEQUIV: 1
OD_SPHEQUIV: 0.25

## 2023-01-30 ASSESSMENT — AXIALLENGTH_DERIVED
OS_AL: 23.4783
OD_AL: 23.9593

## 2023-01-31 ENCOUNTER — APPOINTMENT (OUTPATIENT)
Dept: VASCULAR SURGERY | Facility: CLINIC | Age: 83
End: 2023-01-31

## 2023-01-31 NOTE — HISTORY OF PRESENT ILLNESS
[FreeTextEntry1] : Patient presents for routine reevaluation and discussion of the results of a CTA of her abdomen pelvis and runoff performed on January 6, 2023.  This study demonstrated diffuse disease of her infrainguinal vessels with bilateral SFA and popliteal occlusions and reconstitution of her infrageniculate vessels.  There was not noted iliac disease that appeared significant in either the common or external iliac arteries bilaterally.  There was noted a 50% distal left common femoral artery stenosis.\par \par She continues to note ischemic nocturnal pain in the left foot occurring 2-3 times per week.

## 2023-01-31 NOTE — ASSESSMENT
[FreeTextEntry1] : 82-year-old female with ischemic pain in the left foot and CTA findings suggestive of severe infrageniculate disease bilaterally.  I noted the results of the CTA with the patient and then we discussed her noninvasive lower extremity physiologic arterial studies that confirmed the CTA findings.\par \par I discussed potential options including conservative management with a regular program of exercise walking but she noted it was too painful and difficult for her to attempt this.\par \par We then discussed conventional angiography and potentially intervention based on the intraprocedural findings.\par \par Patient was interested in proceeding with angiography that will be scheduled at the earliest convenient date.

## 2023-02-01 LAB
ABO + RH PNL BLD: NORMAL
ANION GAP SERPL CALC-SCNC: 14 MMOL/L
APPEARANCE: CLEAR
APTT BLD: 29.7 SEC
BASOPHILS # BLD AUTO: 0.05 K/UL
BASOPHILS NFR BLD AUTO: 0.5 %
BILIRUBIN URINE: NEGATIVE
BLOOD URINE: NEGATIVE
BUN SERPL-MCNC: 22 MG/DL
CALCIUM SERPL-MCNC: 9.8 MG/DL
CHLORIDE SERPL-SCNC: 98 MMOL/L
CO2 SERPL-SCNC: 26 MMOL/L
COLOR: NORMAL
CREAT SERPL-MCNC: 0.91 MG/DL
EGFR: 63 ML/MIN/1.73M2
EOSINOPHIL # BLD AUTO: 0.26 K/UL
EOSINOPHIL NFR BLD AUTO: 2.5 %
GLUCOSE QUALITATIVE U: NEGATIVE
GLUCOSE SERPL-MCNC: 135 MG/DL
HCT VFR BLD CALC: 41.3 %
HGB BLD-MCNC: 14.1 G/DL
IMM GRANULOCYTES NFR BLD AUTO: 0.4 %
INR PPP: 1.1 RATIO
KETONES URINE: NEGATIVE
LEUKOCYTE ESTERASE URINE: NEGATIVE
LYMPHOCYTES # BLD AUTO: 1.6 K/UL
LYMPHOCYTES NFR BLD AUTO: 15.6 %
MAN DIFF?: NORMAL
MCHC RBC-ENTMCNC: 34.1 GM/DL
MCHC RBC-ENTMCNC: 34.7 PG
MCV RBC AUTO: 101.7 FL
MONOCYTES # BLD AUTO: 0.86 K/UL
MONOCYTES NFR BLD AUTO: 8.4 %
NEUTROPHILS # BLD AUTO: 7.45 K/UL
NEUTROPHILS NFR BLD AUTO: 72.6 %
NITRITE URINE: NEGATIVE
PH URINE: 6.5
PLATELET # BLD AUTO: 209 K/UL
POTASSIUM SERPL-SCNC: 4.3 MMOL/L
PROTEIN URINE: NEGATIVE
PT BLD: 12.9 SEC
RBC # BLD: 4.06 M/UL
RBC # FLD: 12.3 %
SARS-COV-2 N GENE NPH QL NAA+PROBE: DETECTED
SODIUM SERPL-SCNC: 138 MMOL/L
SPECIFIC GRAVITY URINE: 1.01
UROBILINOGEN URINE: NORMAL
WBC # FLD AUTO: 10.26 K/UL

## 2023-02-14 LAB — SARS-COV-2 N GENE NPH QL NAA+PROBE: NOT DETECTED

## 2023-02-15 VITALS — HEIGHT: 64 IN | WEIGHT: 134.92 LBS

## 2023-02-15 NOTE — ASU PATIENT PROFILE, ADULT - FALL HARM RISK - HARM RISK INTERVENTIONS

## 2023-02-15 NOTE — ASU PATIENT PROFILE, ADULT - NSICDXPASTSURGICALHX_GEN_ALL_CORE_FT
[facility] [address], [place] [address]  [phone] fax  Patient Name: Milan Shukla  Date:2022  : 1957  [x]  Patient  Verified      Hand Therapy/ Occupational Therapy Discharge Instructions      Continue with home exercise program for 2-3 times a day for 4 weeks then decrease to 1 times a day as needed/patient discretion. Continue to apply heat as needed per day. Follow up with MD: 2022      Recommendations: __x__ Return to activity with home program            ____ Return to activity with the following modifcations                       ____ Post Rehab Program                                  ____Return to/Join local gym    Additional comments:    Continue joint protection strategies and activity modifications  Continue to wear compression garments as needed daily      Please call with any questions or concerns. Thank you for your participation.          Nina Samuel OT 2022  10:35 AM PAST SURGICAL HISTORY:  No significant past surgical history

## 2023-02-16 ENCOUNTER — APPOINTMENT (OUTPATIENT)
Dept: VASCULAR SURGERY | Facility: HOSPITAL | Age: 83
End: 2023-02-16

## 2023-02-16 ENCOUNTER — OUTPATIENT (OUTPATIENT)
Dept: OUTPATIENT SERVICES | Facility: HOSPITAL | Age: 83
LOS: 1 days | Discharge: HOME | End: 2023-02-16
Payer: MEDICARE

## 2023-02-16 ENCOUNTER — TRANSCRIPTION ENCOUNTER (OUTPATIENT)
Age: 83
End: 2023-02-16

## 2023-02-16 VITALS
DIASTOLIC BLOOD PRESSURE: 75 MMHG | OXYGEN SATURATION: 98 % | RESPIRATION RATE: 16 BRPM | HEART RATE: 73 BPM | SYSTOLIC BLOOD PRESSURE: 155 MMHG | TEMPERATURE: 98 F

## 2023-02-16 DIAGNOSIS — Z79.899 OTHER LONG TERM (CURRENT) DRUG THERAPY: ICD-10-CM

## 2023-02-16 DIAGNOSIS — I70.212 ATHEROSCLEROSIS OF NATIVE ARTERIES OF EXTREMITIES WITH INTERMITTENT CLAUDICATION, LEFT LEG: ICD-10-CM

## 2023-02-16 DIAGNOSIS — F41.9 ANXIETY DISORDER, UNSPECIFIED: ICD-10-CM

## 2023-02-16 DIAGNOSIS — Z79.01 LONG TERM (CURRENT) USE OF ANTICOAGULANTS: ICD-10-CM

## 2023-02-16 DIAGNOSIS — I25.10 ATHEROSCLEROTIC HEART DISEASE OF NATIVE CORONARY ARTERY WITHOUT ANGINA PECTORIS: ICD-10-CM

## 2023-02-16 DIAGNOSIS — Z79.82 LONG TERM (CURRENT) USE OF ASPIRIN: ICD-10-CM

## 2023-02-16 DIAGNOSIS — I73.9 PERIPHERAL VASCULAR DISEASE, UNSPECIFIED: ICD-10-CM

## 2023-02-16 DIAGNOSIS — I10 ESSENTIAL (PRIMARY) HYPERTENSION: ICD-10-CM

## 2023-02-16 DIAGNOSIS — I48.91 UNSPECIFIED ATRIAL FIBRILLATION: ICD-10-CM

## 2023-02-16 DIAGNOSIS — Z88.2 ALLERGY STATUS TO SULFONAMIDES: ICD-10-CM

## 2023-02-16 DIAGNOSIS — Z91.040 LATEX ALLERGY STATUS: ICD-10-CM

## 2023-02-16 DIAGNOSIS — E03.9 HYPOTHYROIDISM, UNSPECIFIED: ICD-10-CM

## 2023-02-16 PROCEDURE — C1894: CPT

## 2023-02-16 PROCEDURE — 75630 X-RAY AORTA LEG ARTERIES: CPT | Mod: 26

## 2023-02-16 PROCEDURE — 36245 INS CATH ABD/L-EXT ART 1ST: CPT

## 2023-02-16 PROCEDURE — C1760: CPT

## 2023-02-16 PROCEDURE — C1887: CPT

## 2023-02-16 PROCEDURE — 36246 INS CATH ABD/L-EXT ART 2ND: CPT

## 2023-02-16 PROCEDURE — C1769: CPT

## 2023-02-16 PROCEDURE — 75716 ARTERY X-RAYS ARMS/LEGS: CPT

## 2023-02-16 RX ORDER — FLUTICASONE PROPIONATE 50 MCG
1 SPRAY, SUSPENSION NASAL
Qty: 0 | Refills: 0 | DISCHARGE

## 2023-02-16 RX ORDER — SODIUM CHLORIDE 9 MG/ML
1000 INJECTION INTRAMUSCULAR; INTRAVENOUS; SUBCUTANEOUS
Refills: 0 | Status: DISCONTINUED | OUTPATIENT
Start: 2023-02-16 | End: 2023-02-16

## 2023-02-16 RX ORDER — ACETAMINOPHEN 500 MG
650 TABLET ORAL ONCE
Refills: 0 | Status: COMPLETED | OUTPATIENT
Start: 2023-02-16 | End: 2023-02-16

## 2023-02-16 RX ORDER — ESOMEPRAZOLE MAGNESIUM 40 MG/1
1 CAPSULE, DELAYED RELEASE ORAL
Qty: 0 | Refills: 0 | DISCHARGE

## 2023-02-16 RX ADMIN — Medication 650 MILLIGRAM(S): at 11:57

## 2023-02-16 NOTE — BRIEF OPERATIVE NOTE - NSICDXBRIEFPROCEDURE_GEN_ALL_CORE_FT
PROCEDURES:  Abdominal aortic angiogram 16-Feb-2023 08:42:40 abdominopelvic, L lower extremity angiogram Kobe Andre

## 2023-02-16 NOTE — ASU PREOP CHECKLIST - TEMPERATURE IN CELSIUS (DEGREES C)
36.7 S/P Cataract extraction OD 07/14/21. Resolved. Discontinue Pred Acetate OU. Advised to RTC sooner if increased redness, pain, or visual changes.

## 2023-02-16 NOTE — ASU DISCHARGE PLAN (ADULT/PEDIATRIC) - NS MD DC FALL RISK RISK
For information on Fall & Injury Prevention, visit: [email or web address] OR  [email or web address] OR  https://www.cdc.gov/steadi/patient.html

## 2023-02-16 NOTE — ASU DISCHARGE PLAN (ADULT/PEDIATRIC) - NURSING INSTRUCTIONS
For any problems or concerns,contact your doctor. [facility] patients should call the [facility]. If you cannot reach the doctor or clinic, call [facility] Emergency Department at [phone] or go to your local Emergency Department.  A responsible adult should be with you for the rest of the day and night for your safety and to help you if you needed. Resume your medications as listed on the attached Medication Record. Begin with liquids and light food ( tea, toast, Jello, soups). Advance to what you normally eat. Liquids should taken in adequate amounts today.     CALL the DOCTOR:    -Fever greater than  101F  - Signs  of infection such as : increase pain,swelling,redness,or a bad  smell coming from the wound.  -Excessive amount of bleeding.  - Any pain that appears to be getting worse.  - Vomiting  -  If you have  not urinated 8 hours after surgery or have any difficulty urinating.     A responsible adult should be with you for the rest of the day and night for your safety and to help you if you needed.    Review attached FACT SHEET if applicable.

## 2023-02-16 NOTE — BRIEF OPERATIVE NOTE - OPERATION/FINDINGS
Bill For Surgical Tray: no occluded L SFA, popliteal, T-P trunk, reconstitution of peroneal and PT arteries distally

## 2023-02-16 NOTE — ASU DISCHARGE PLAN (ADULT/PEDIATRIC) - CARE PROVIDER_API CALL
Kobe Andre)  Vascular Surgery  [address] [place] [address]  Phone: [phone]  Fax: [phone]  Follow Up Time:

## 2023-02-17 ENCOUNTER — APPOINTMENT (OUTPATIENT)
Dept: CARDIOLOGY | Facility: CLINIC | Age: 83
End: 2023-02-17

## 2023-02-17 PROCEDURE — 37609 LIGATION/BX TEMPORAL ARTERY: CPT

## 2023-02-23 ENCOUNTER — APPOINTMENT (OUTPATIENT)
Dept: CARDIOLOGY | Facility: CLINIC | Age: 83
End: 2023-02-23
Payer: MEDICARE

## 2023-02-23 ENCOUNTER — NON-APPOINTMENT (OUTPATIENT)
Age: 83
End: 2023-02-23

## 2023-02-23 VITALS
SYSTOLIC BLOOD PRESSURE: 160 MMHG | OXYGEN SATURATION: 99 % | HEIGHT: 64 IN | HEART RATE: 67 BPM | DIASTOLIC BLOOD PRESSURE: 66 MMHG | BODY MASS INDEX: 23.6 KG/M2 | WEIGHT: 138.23 LBS

## 2023-02-23 PROCEDURE — 93000 ELECTROCARDIOGRAM COMPLETE: CPT

## 2023-02-23 PROCEDURE — 99214 OFFICE O/P EST MOD 30 MIN: CPT

## 2023-02-23 NOTE — REASON FOR VISIT
[Symptom and Test Evaluation] : symptom and test evaluation [Arrhythmia/ECG Abnorrmalities] : arrhythmia/ECG abnormalities [Carotid, Aortic and Peripheral Vascular Disease] : carotid, aortic and peripheral vascular disease [Follow-Up - Clinic] : a clinic follow-up of [Coronary Artery Disease] : coronary artery disease [Hyperlipidemia] : hyperlipidemia [Hypertension] : hypertension [Medication Management] : Medication management

## 2023-02-23 NOTE — HISTORY OF PRESENT ILLNESS
[FreeTextEntry1] : 84 Y/O female PMH: AF, HLD, HTN,  CAD S/P CC 2015 tipple vessel disease ( RCA LAD LCX ) NL LV FX Anxiety here today in routine cardiac follow up.Vascular evaluation reviewed. AS severe on Echo. \par \par Claims she is feeling well offers no complaints of shortness of breath, dizziness or chest pain\par \par \par Echo 12/22  Severe AS.\par

## 2023-02-23 NOTE — DISCUSSION/SUMMARY
[FreeTextEntry1] : AF: Continue BB/AC/Amiodarone she is under care of PCP every 3 months per patient to monitor LFT's and thyroid function advised Pulmonary F/U \par \par AS: Asymptomatic no CP/SOB/Dizziness \par PAD: Vascular evaluation reviewed\par CAD: No angina exercises daily W/O limitations/symptoms  S/P CC 2015 tipple vessel disease ( RCA LAD LCX ) Non ischemic Nuclear stress test 2019 \par \par HTN: Controlled\par \par Carotid disease: Prior MRA reviewed  KALYAN 60-70% she is asymptomatic maintained on AS/Statin and has seen Vascular in the past Dr Andre advised routine follow up  \par \par HLD: Continue statin \par \par Repeat MRA  reviewed.\par \par OV 4 months \par \par Plan DW patient \par \par  [EKG obtained to assist in diagnosis and management of assessed problem(s)] : EKG obtained to assist in diagnosis and management of assessed problem(s)

## 2023-02-27 NOTE — BRIEF OPERATIVE NOTE - TYPE OF ANESTHESIA
PA for Vyvanse was denied because her insurance requires documentation of a consultation with psychiatry, supporting documentation of a 3 month trial of CBT, and failure of at least a 3 month trial of topiramate, and failure of at least a 6 week trial of citalopram, sertraline, or escitalopram.    Do you have any recommendations or alternatives?     Verenice Deleon
Requested Prescriptions     Pending Prescriptions Disp Refills    lisdexamfetamine (VYVANSE) 30 MG capsule 30 capsule 0     Sig: Take 1 capsule by mouth daily for 30 days. Max Daily Amount: 30 mg    lisdexamfetamine (VYVANSE) 30 MG capsule 30 capsule 0     Sig: Take 1 capsule by mouth daily for 30 days. Max Daily Amount: 30 mg    lisdexamfetamine (VYVANSE) 30 MG capsule 30 capsule 0     Sig: Take 1 capsule by mouth daily for 30 days. Max Daily Amount: 30 mg       Dx changed.     Timothy Darling, [facility]
MAC

## 2023-03-03 ENCOUNTER — APPOINTMENT (OUTPATIENT)
Dept: VASCULAR SURGERY | Facility: CLINIC | Age: 83
End: 2023-03-03
Payer: MEDICARE

## 2023-03-03 VITALS — HEART RATE: 85 BPM | SYSTOLIC BLOOD PRESSURE: 136 MMHG | DIASTOLIC BLOOD PRESSURE: 77 MMHG

## 2023-03-03 PROCEDURE — 99212 OFFICE O/P EST SF 10 MIN: CPT

## 2023-03-03 NOTE — ASSESSMENT
[FreeTextEntry1] : 83-year-old with significant peripheral vascular disease requiring an inflow procedure (right common femoral endarterectomy into the profunda femoral artery) but with significant aortic stenosis.  I noted to the patient that she should follow-up with the contact that Dr. Deleon provided and we will see her after this consult.\par \par I anticipate she will require a TAVR to improve her operative outcome from a cardiac standpoint.\par \par All questions were answered.

## 2023-03-03 NOTE — HISTORY OF PRESENT ILLNESS
[FreeTextEntry1] : Patient presents for routine reevaluation after undergoing an abdominal pelvic and left lower extremity angiogram on February 16, 2023.  This study demonstrated a high-grade almost occluded left common femoral into profunda femoral artery with an occluded left SFA with reconstitution essentially of the peroneal artery.\par \par Since the procedure she does not complain of worsening of her left intermittent nocturnal foot discomfort.\par \par She has seen Dr. Francisco Deleon who noted she has critical aortic stenosis.  He has referred her to an interventional cardiologist at [facility] for evaluation.

## 2023-03-27 ENCOUNTER — RX RENEWAL (OUTPATIENT)
Age: 83
End: 2023-03-27

## 2023-03-28 ENCOUNTER — APPOINTMENT (OUTPATIENT)
Dept: INTERNAL MEDICINE | Facility: CLINIC | Age: 83
End: 2023-03-28
Payer: MEDICARE

## 2023-03-28 VITALS
WEIGHT: 136 LBS | SYSTOLIC BLOOD PRESSURE: 116 MMHG | HEART RATE: 75 BPM | HEIGHT: 64 IN | OXYGEN SATURATION: 97 % | DIASTOLIC BLOOD PRESSURE: 64 MMHG | BODY MASS INDEX: 23.22 KG/M2 | TEMPERATURE: 97.6 F

## 2023-03-28 VITALS — DIASTOLIC BLOOD PRESSURE: 70 MMHG | SYSTOLIC BLOOD PRESSURE: 120 MMHG

## 2023-03-28 DIAGNOSIS — R73.03 PREDIABETES.: ICD-10-CM

## 2023-03-28 PROCEDURE — 99214 OFFICE O/P EST MOD 30 MIN: CPT

## 2023-03-28 RX ORDER — CYCLOBENZAPRINE HYDROCHLORIDE 5 MG/1
5 TABLET, FILM COATED ORAL
Qty: 30 | Refills: 0 | Status: DISCONTINUED | COMMUNITY
Start: 2022-07-21 | End: 2023-03-28

## 2023-03-28 NOTE — HISTORY OF PRESENT ILLNESS
[FreeTextEntry1] : Pt is here for follow up of multiple medical problems including\par afib,   HTN, CAD , PAD\par

## 2023-03-28 NOTE — PHYSICAL EXAM
[Normal] : no acute distress, well nourished, well developed and well-appearing [Normal Rate] : normal rate  [Regular Rhythm] : with a regular rhythm [No Edema] : there was no peripheral edema [de-identified] : crackles [de-identified] : HUMA

## 2023-04-03 ENCOUNTER — APPOINTMENT (OUTPATIENT)
Dept: RADIOLOGY | Facility: CLINIC | Age: 83
End: 2023-04-03
Payer: MEDICARE

## 2023-04-03 PROCEDURE — 71046 X-RAY EXAM CHEST 2 VIEWS: CPT

## 2023-04-04 ENCOUNTER — NON-APPOINTMENT (OUTPATIENT)
Age: 83
End: 2023-04-04

## 2023-04-07 ENCOUNTER — APPOINTMENT (OUTPATIENT)
Dept: CARDIOLOGY | Facility: CLINIC | Age: 83
End: 2023-04-07

## 2023-04-11 ENCOUNTER — APPOINTMENT (OUTPATIENT)
Dept: CARDIOTHORACIC SURGERY | Facility: CLINIC | Age: 83
End: 2023-04-11
Payer: MEDICARE

## 2023-04-11 ENCOUNTER — LABORATORY RESULT (OUTPATIENT)
Age: 83
End: 2023-04-11

## 2023-04-11 ENCOUNTER — APPOINTMENT (OUTPATIENT)
Dept: CARDIOTHORACIC SURGERY | Facility: CLINIC | Age: 83
End: 2023-04-11

## 2023-04-11 ENCOUNTER — OUTPATIENT (OUTPATIENT)
Dept: OUTPATIENT SERVICES | Facility: HOSPITAL | Age: 83
LOS: 1 days | End: 2023-04-11
Payer: MEDICARE

## 2023-04-11 ENCOUNTER — NON-APPOINTMENT (OUTPATIENT)
Age: 83
End: 2023-04-11

## 2023-04-11 VITALS
BODY MASS INDEX: 23.05 KG/M2 | OXYGEN SATURATION: 96 % | RESPIRATION RATE: 16 BRPM | HEART RATE: 78 BPM | WEIGHT: 135 LBS | SYSTOLIC BLOOD PRESSURE: 116 MMHG | HEIGHT: 64 IN | DIASTOLIC BLOOD PRESSURE: 66 MMHG

## 2023-04-11 VITALS
BODY MASS INDEX: 23.05 KG/M2 | DIASTOLIC BLOOD PRESSURE: 66 MMHG | RESPIRATION RATE: 14 BRPM | WEIGHT: 135 LBS | OXYGEN SATURATION: 96 % | SYSTOLIC BLOOD PRESSURE: 116 MMHG | TEMPERATURE: 97.6 F | HEIGHT: 64 IN | HEART RATE: 78 BPM

## 2023-04-11 DIAGNOSIS — Z79.01 LONG TERM (CURRENT) USE OF ANTICOAGULANTS: ICD-10-CM

## 2023-04-11 DIAGNOSIS — N64.59 OTHER SIGNS AND SYMPTOMS IN BREAST: ICD-10-CM

## 2023-04-11 DIAGNOSIS — I73.9 PERIPHERAL VASCULAR DISEASE, UNSPECIFIED: ICD-10-CM

## 2023-04-11 DIAGNOSIS — H68.022 CHRONIC EUSTACHIAN SALPINGITIS, LEFT EAR: ICD-10-CM

## 2023-04-11 DIAGNOSIS — I35.0 NONRHEUMATIC AORTIC (VALVE) STENOSIS: ICD-10-CM

## 2023-04-11 DIAGNOSIS — R53.83 OTHER FATIGUE: ICD-10-CM

## 2023-04-11 DIAGNOSIS — Z78.9 OTHER SPECIFIED HEALTH STATUS: ICD-10-CM

## 2023-04-11 PROCEDURE — 93356 MYOCRD STRAIN IMG SPCKL TRCK: CPT

## 2023-04-11 PROCEDURE — 93306 TTE W/DOPPLER COMPLETE: CPT | Mod: 26

## 2023-04-11 PROCEDURE — 93000 ELECTROCARDIOGRAM COMPLETE: CPT

## 2023-04-11 PROCEDURE — 99204 OFFICE O/P NEW MOD 45 MIN: CPT

## 2023-04-11 PROCEDURE — 93306 TTE W/DOPPLER COMPLETE: CPT

## 2023-04-11 PROCEDURE — 99205 OFFICE O/P NEW HI 60 MIN: CPT

## 2023-04-11 NOTE — PHYSICAL EXAM
[General Appearance - Alert] : alert [Jugular Venous Distention Increased] : there was no jugular-venous distention [] : no respiratory distress [Respiration, Rhythm And Depth] : normal respiratory rhythm and effort [III] : a grade 3 [II] : a grade 2 [Examination Of The Chest] : the chest was normal in appearance [Bowel Sounds] : normal bowel sounds [No CVA Tenderness] : no ~M costovertebral angle tenderness [Oriented To Time, Place, And Person] : oriented to person, place, and time [Right Carotid Bruit] : no bruit heard over the right carotid [Left Carotid Bruit] : no bruit heard over the left carotid [FreeTextEntry1] : LLE wound great toe and 4th toe, dressed

## 2023-04-11 NOTE — DATA REVIEWED
[FreeTextEntry1] : Echo: 12/21/22\par LVEF 60-65%, EFRAIN 0.6 sqcm, mGr 36 mmHg, pGr 61 mmHg, AoV 3.9 m/s

## 2023-04-11 NOTE — REVIEW OF SYSTEMS
[Feeling Tired] : feeling tired [Joint Stiffness] : joint stiffness [Eye Pain] : no eye pain [Earache] : no earache [Chest Pain] : no chest pain [SOB on Exertion] : no shortness of breath during exertion [Constipation] : no constipation [Dysuria] : no dysuria [Dizziness] : no dizziness [Anxiety] : no anxiety [Proptosis] : no proptosis

## 2023-04-11 NOTE — END OF VISIT
[FreeTextEntry3] : I, Dr. Ladarius Weaver, personally performed the evaluation and management (E/M) services for this new patient.  That E/M includes conducting the initial examination, assessing all conditions, and establishing the plan of care.  Today, Yolis Enriquez NP was here to observe my evaluation and management services for this patient to be followed going forward.\par

## 2023-04-11 NOTE — CONSULT LETTER
[Dear  ___] : Dear ~YESSICA, [Courtesy Letter:] : I had the pleasure of seeing your patient, [unfilled], in my office today. [Please see my note below.] : Please see my note below. [Consult Closing:] : Thank you very much for allowing me to participate in the care of this patient.  If you have any questions, please do not hesitate to contact me. [Sincerely,] : Sincerely, [FreeTextEntry2] : Francisco Deleon MD \par [address] [place] [address]\par [place] [id number] [FreeTextEntry3] : Ladarius Weaver MD, FACS\par Attending Surgeon \par Cardiovascular & Thoracic Surgery\par  \par [employer]\par

## 2023-04-11 NOTE — ASSESSMENT
[FreeTextEntry1] : Ms. Garcia is an 83 year old female referred by Dr. Deleon for evaluation of aortic stenosis. Her past medical history includes pAFib on Eliquis, HTN, HLD, and CAD (Triple Vessel Disease on cath in 2015). She reports feeling well today but does report lower extremity pain due to her claudication. \par \par She was seen by Dr. Andre and had a peripheral angiogram that demonstrated a high grade left common femoral into profunda femoral artery with an occluded left SFA. Right common femoral endarterectomy is pending.  \par \par I have reviewed the patient's medical records, diagnostic images during the time of this office consultation and have made the following recommendation. Review of the imaging shows severe aortic valve stenosis.  \par \par I had the pleasure of evaluating your patient,Ms. 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 NANO GARCIA has severe AS with mG 39 mmHg,  AoV 3.8 m/sec and an EFRAIN of 0.74 cm²). She is a intermediate risk for surgical aortic valve replacement.  The risks and benefits of both SAVR and ISAAC have been explained and the patient would like to proceed with further workup and consideration for ISAAC by the structural heart team.  \par \par I explained the risks of vascular complication, 10-15% chance of requiring a pacemaker post procedure and possible paravalvular leakage. \par \par She will require CT scan and catheterization before finalizing plans for the procedure. The patient was also made aware of the possibility of using conscious sedation or general anesthesia during the procedure.  Once completed, all imaging will be reviewed by the Heart Team and an optimal treatment strategy will be recommended.\par \par Plan:\par 1) Lab work scheduled for today CBC, CMP and Pro BNP\par 2) Structural CT scan without coronaries \par 3) Cardiac Catherization to evaluate coronary anatomy \par \par \par \par  No complaints

## 2023-04-24 ENCOUNTER — APPOINTMENT (OUTPATIENT)
Dept: CARDIOLOGY | Facility: CLINIC | Age: 83
End: 2023-04-24

## 2023-04-26 ENCOUNTER — OUTPATIENT (OUTPATIENT)
Dept: OUTPATIENT SERVICES | Facility: HOSPITAL | Age: 83
LOS: 1 days | End: 2023-04-26
Payer: MEDICARE

## 2023-04-26 ENCOUNTER — APPOINTMENT (OUTPATIENT)
Dept: CARDIOLOGY | Facility: CLINIC | Age: 83
End: 2023-04-26

## 2023-04-26 ENCOUNTER — RESULT REVIEW (OUTPATIENT)
Age: 83
End: 2023-04-26

## 2023-04-26 DIAGNOSIS — Z00.00 ENCOUNTER FOR GENERAL ADULT MEDICAL EXAMINATION WITHOUT ABNORMAL FINDINGS: ICD-10-CM

## 2023-04-26 PROCEDURE — 75572 CT HRT W/3D IMAGE: CPT | Mod: 26,ME

## 2023-04-26 PROCEDURE — G1004: CPT

## 2023-04-26 PROCEDURE — 75572 CT HRT W/3D IMAGE: CPT | Mod: ME

## 2023-04-28 ENCOUNTER — NON-APPOINTMENT (OUTPATIENT)
Age: 83
End: 2023-04-28

## 2023-04-30 ENCOUNTER — RX RENEWAL (OUTPATIENT)
Age: 83
End: 2023-04-30

## 2023-05-01 ENCOUNTER — TRANSCRIPTION ENCOUNTER (OUTPATIENT)
Age: 83
End: 2023-05-01

## 2023-05-01 ENCOUNTER — OUTPATIENT (OUTPATIENT)
Dept: OUTPATIENT SERVICES | Facility: HOSPITAL | Age: 83
LOS: 1 days | End: 2023-05-01
Payer: MEDICARE

## 2023-05-01 VITALS
DIASTOLIC BLOOD PRESSURE: 66 MMHG | OXYGEN SATURATION: 99 % | HEIGHT: 55 IN | TEMPERATURE: 98 F | WEIGHT: 134.92 LBS | RESPIRATION RATE: 18 BRPM | SYSTOLIC BLOOD PRESSURE: 148 MMHG | HEART RATE: 78 BPM

## 2023-05-01 VITALS
RESPIRATION RATE: 18 BRPM | DIASTOLIC BLOOD PRESSURE: 70 MMHG | SYSTOLIC BLOOD PRESSURE: 128 MMHG | HEART RATE: 72 BPM | OXYGEN SATURATION: 95 %

## 2023-05-01 DIAGNOSIS — I35.0 NONRHEUMATIC AORTIC (VALVE) STENOSIS: ICD-10-CM

## 2023-05-01 LAB
ANION GAP SERPL CALC-SCNC: 10 MMOL/L — SIGNIFICANT CHANGE UP (ref 5–17)
BASOPHILS # BLD AUTO: 0.05 K/UL — SIGNIFICANT CHANGE UP (ref 0–0.2)
BASOPHILS NFR BLD AUTO: 0.6 % — SIGNIFICANT CHANGE UP (ref 0–2)
BUN SERPL-MCNC: 23 MG/DL — SIGNIFICANT CHANGE UP (ref 7–23)
CALCIUM SERPL-MCNC: 9.1 MG/DL — SIGNIFICANT CHANGE UP (ref 8.4–10.5)
CHLORIDE SERPL-SCNC: 101 MMOL/L — SIGNIFICANT CHANGE UP (ref 96–108)
CO2 SERPL-SCNC: 27 MMOL/L — SIGNIFICANT CHANGE UP (ref 22–31)
CREAT SERPL-MCNC: 0.96 MG/DL — SIGNIFICANT CHANGE UP (ref 0.5–1.3)
EGFR: 59 ML/MIN/1.73M2 — LOW
EOSINOPHIL # BLD AUTO: 0.25 K/UL — SIGNIFICANT CHANGE UP (ref 0–0.5)
EOSINOPHIL NFR BLD AUTO: 3.1 % — SIGNIFICANT CHANGE UP (ref 0–6)
GLUCOSE SERPL-MCNC: 104 MG/DL — HIGH (ref 70–99)
HCT VFR BLD CALC: 39.5 % — SIGNIFICANT CHANGE UP (ref 34.5–45)
HGB BLD-MCNC: 13.4 G/DL — SIGNIFICANT CHANGE UP (ref 11.5–15.5)
IMM GRANULOCYTES NFR BLD AUTO: 0.5 % — SIGNIFICANT CHANGE UP (ref 0–0.9)
LYMPHOCYTES # BLD AUTO: 1.43 K/UL — SIGNIFICANT CHANGE UP (ref 1–3.3)
LYMPHOCYTES # BLD AUTO: 17.6 % — SIGNIFICANT CHANGE UP (ref 13–44)
MCHC RBC-ENTMCNC: 33.9 GM/DL — SIGNIFICANT CHANGE UP (ref 32–36)
MCHC RBC-ENTMCNC: 33.9 PG — SIGNIFICANT CHANGE UP (ref 27–34)
MCV RBC AUTO: 100 FL — SIGNIFICANT CHANGE UP (ref 80–100)
MONOCYTES # BLD AUTO: 0.69 K/UL — SIGNIFICANT CHANGE UP (ref 0–0.9)
MONOCYTES NFR BLD AUTO: 8.5 % — SIGNIFICANT CHANGE UP (ref 2–14)
NEUTROPHILS # BLD AUTO: 5.67 K/UL — SIGNIFICANT CHANGE UP (ref 1.8–7.4)
NEUTROPHILS NFR BLD AUTO: 69.7 % — SIGNIFICANT CHANGE UP (ref 43–77)
NRBC # BLD: 0 /100 WBCS — SIGNIFICANT CHANGE UP (ref 0–0)
PLATELET # BLD AUTO: 198 K/UL — SIGNIFICANT CHANGE UP (ref 150–400)
POTASSIUM SERPL-MCNC: 3.9 MMOL/L — SIGNIFICANT CHANGE UP (ref 3.5–5.3)
POTASSIUM SERPL-SCNC: 3.9 MMOL/L — SIGNIFICANT CHANGE UP (ref 3.5–5.3)
RBC # BLD: 3.95 M/UL — SIGNIFICANT CHANGE UP (ref 3.8–5.2)
RBC # FLD: 12.2 % — SIGNIFICANT CHANGE UP (ref 10.3–14.5)
SODIUM SERPL-SCNC: 138 MMOL/L — SIGNIFICANT CHANGE UP (ref 135–145)
WBC # BLD: 8.13 K/UL — SIGNIFICANT CHANGE UP (ref 3.8–10.5)
WBC # FLD AUTO: 8.13 K/UL — SIGNIFICANT CHANGE UP (ref 3.8–10.5)

## 2023-05-01 PROCEDURE — 99152 MOD SED SAME PHYS/QHP 5/>YRS: CPT

## 2023-05-01 PROCEDURE — C1769: CPT

## 2023-05-01 PROCEDURE — 93454 CORONARY ARTERY ANGIO S&I: CPT

## 2023-05-01 PROCEDURE — 93010 ELECTROCARDIOGRAM REPORT: CPT

## 2023-05-01 PROCEDURE — 93454 CORONARY ARTERY ANGIO S&I: CPT | Mod: 26

## 2023-05-01 PROCEDURE — C1887: CPT

## 2023-05-01 PROCEDURE — 80048 BASIC METABOLIC PNL TOTAL CA: CPT

## 2023-05-01 PROCEDURE — 85025 COMPLETE CBC W/AUTO DIFF WBC: CPT

## 2023-05-01 PROCEDURE — 36415 COLL VENOUS BLD VENIPUNCTURE: CPT

## 2023-05-01 PROCEDURE — 93005 ELECTROCARDIOGRAM TRACING: CPT

## 2023-05-01 PROCEDURE — C1894: CPT

## 2023-05-01 RX ORDER — TELMISARTAN 20 MG/1
1 TABLET ORAL
Qty: 0 | Refills: 0 | DISCHARGE

## 2023-05-01 RX ORDER — LEVOTHYROXINE SODIUM 125 MCG
1 TABLET ORAL
Qty: 0 | Refills: 0 | DISCHARGE

## 2023-05-01 RX ORDER — ROSUVASTATIN CALCIUM 5 MG/1
1 TABLET ORAL
Qty: 0 | Refills: 0 | DISCHARGE

## 2023-05-01 NOTE — ASU DISCHARGE PLAN (ADULT/PEDIATRIC) - CARE PROVIDER_API CALL
Ladarius Weaver)  Thoracic and Cardiac Surgery  [address] [place] [address]  Phone: [phone]  Fax: [phone]  Established Patient  Follow Up Time: Routine    Nghia Serna)  Interventional Cardiology  [address] [place] [address]  Phone: [phone]  Fax: [phone]  Established Patient  Follow Up Time: Routine

## 2023-05-01 NOTE — ASU DISCHARGE PLAN (ADULT/PEDIATRIC) - PROVIDER TOKENS
PROVIDER:[TOKEN:[[id number]],FOLLOWUP:[Routine],ESTABLISHEDPATIENT:[T]],PROVIDER:[TOKEN:[[id number]],FOLLOWUP:[Routine],ESTABLISHEDPATIENT:[T]]

## 2023-05-01 NOTE — H&P CARDIOLOGY - HISTORY OF PRESENT ILLNESS
Cardiologist: Dr. Francisco Deleon  Structuralist: Dr. Nghia Serna  CT surgeon: Dr. Ladarius Weaver  Vascular surgeon: Dr. Kobe Andre  Pharmacy: [facility] ([address] [place] [address])    82 y/o Female, FHx CAD w/ PMHx of HTN, HLD, CAD w/ triple vessel disease (RCA, LAD, LCx - medically managed), Afib, known severe Aortic stenosis (EFRAIN 0.69sqcm), right internal carotid stenosis, PAD, Hypothyroidism, Anxiety presented to outpatient cardiologist for routine cardiac follow up. Patient referred to structuralist, Dr. Serna, and CT surgeon, Dr. Weaver for evaluation for ISAAC. Patient denies headaches, changes in vision, chest pain, palpitations, SOB, abdominal pain, N/V/D, leg pain/edema or any other complaints. In light of pt's risk factors, and known severe AS; pt referred to [facility] for recommended cardiac catheterization as pre-operative testing for ISAAC. No implantable devices.     Review of studies:   TTE 12/21/22: LVEF 60-65%, EFRAIN 0.6 cm², mGr 36 mmHg, pGr 61 mmHg, AoV 3.9 m/s   TTE 4/11/23: LVEF 57%, indeterminate diastolic fxn, moderately dilated LA, severe AS (AoV 3.7m/s, pGr 54.8mmHg, mGr 34mmHg w/ LVOT/aV VTI ratio 0.20), EFRAIN 0.69sqcm), mild AI, trace MR/TR  CT heart 4/26/23: calcified trileaflet AV (Ca score 1152 Agatston units), calcified plaques involving partially imaged L SFA resulting in complete occlusion (progressed more proximally w/in vessel), calcified plaques w/in partially imaged R SFA resulting in areas of severe luminal narrowing     Defer from IVF hydration protocol d/t severe AS

## 2023-05-01 NOTE — H&P CARDIOLOGY - NSICDXPASTMEDICALHX_GEN_ALL_CORE_FT
PAST MEDICAL HISTORY:  Anxiety     CAD (coronary artery disease)     Chronic atrial fibrillation     HLD (hyperlipidemia)     HTN (hypertension)     Hypothyroidism     PAD (peripheral artery disease)     Stenosis of right internal carotid artery

## 2023-05-01 NOTE — ASU DISCHARGE PLAN (ADULT/PEDIATRIC) - NS MD DC FALL RISK RISK
For information on Fall & Injury Prevention, visit: [email or web address] OR  [email or web address] OR  https://www.cdc.gov/steadi/patient.html

## 2023-05-03 ENCOUNTER — APPOINTMENT (OUTPATIENT)
Dept: VASCULAR SURGERY | Facility: CLINIC | Age: 83
End: 2023-05-03

## 2023-05-09 PROBLEM — R53.83 DECREASED STAMINA: Status: ACTIVE | Noted: 2023-04-11

## 2023-05-09 NOTE — CARDIOLOGY SUMMARY
[de-identified] : NSR 70\par LVH [de-identified] : 12/21/22\par LVEF 60-65%, EFRAIN 0.6 sqcm, mGr 36 mmHg, pGr 61 mmHg, AoV 3.9 m/s

## 2023-05-09 NOTE — DISCUSSION/SUMMARY
[Severe Aortic Stenosis] : severe aortic stenosis [Deteriorating] : deteriorating [Multidetector Cardiac CT] : multidetector cardiac CT [Coronary Angiography] : coronary angiography [None] : There are no changes in medication management [Aortic Valve Replacement] : aortic valve replacement [Patient] : the patient [FreeTextEntry1] : Ms Kelley has severe AS, with difficult to assess symptoms, given her functional limitations due to a LLE ulcer. She will need LLE revascularization and will likely need her AS treated prior, as per Dr Andre's assessment. There has been a functional decline which is likely multifactorial in etiology given her comorbidities, as noted. She will almost certainly require an alternative access approach due to her severe PAD, which I discussed with her in detail. She will be scheduled for a cardiac CT and coronary angiography; she has known severe CAD from a prior angiogram in 2015 that was deemed appropriate for medical therapy (I suppose given the chronic and distal nature of the CAD--that said, we need to exclude proximal progression). I discussed the potential risks and benefits of ISAAC with her in detail and she wishes to proceed with the plan as outlined above. We will notify Dr Deleon of our impressions and recommendations. Once her CT is completed, we will review the results with our vascular surgery team with respect to our access options. [EKG obtained to assist in diagnosis and management of assessed problem(s)] : EKG obtained to assist in diagnosis and management of assessed problem(s)

## 2023-05-09 NOTE — HISTORY OF PRESENT ILLNESS
[FreeTextEntry1] : Ms. Kelley is an 83 year old female referred by Dr. Deleon for evaluation of aortic stenosis. Her past medical history includes pAFib on Eliquis, HTN, HLD, and CAD. She was found to have severe triple vessel disease on angiography at [facility] in 2015 with Dr Alexander, for which medical therapy was recommended ( of the RCA and LCX, severe distal LAD disease, and normal LV function). She reports that Dr. Deleon has been following her AS for some time and now that it has progressed on her TTE he recommended that she be evaluated by our Structural Team. \par \par She is , lives alone, and has no children. 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 She has no fatigue, dyspnea, angina, PND, orthopnea, dizziness, or syncope. She was experiencing claudication of her LLE and was evaluated by Dr. Andre; a peripheral angiogram demonstrated significant PAD for which revascularization is being considered. The pain is limiting her activity and she is not golfing or walking as frequently as she had been. She has wounds on her left great and 4th toe that are managed by podiatry. When she was found to have severe AS, the plan was to evaluate (and likely treat) her AS prior to proceeding with vascular surgery.\par \par A repeat echocardiogram today was reviewed with Dr Curiel and demonstrates severe aortic stenosis with an EFRAIN of 0.74 sqcm, mGr 39 mmHg, AoV 3.8 m/s, and an SVi of 40. She has known STEFFEN with an MRA of the neck in April 2022 reporting a 60-70% stenosis of the proximal KALYAN. \par \par She states that Dr Deleon has been following her AS for some time with an annual TTE and that her referral today was prompted by progression on her recent outpatient study. Of note, she is also being planned for a peripheral vascular intervention (she states "he said he can go through the groin") for a non-healing ulcer on the dorsal aspect of 2 left toes. She remains active, goes to work at the [employer] every day, plants flowers, gardens, and her only limitations are due to her left foot issues. She does her activities at home without limitations. She has some afternoon fatigue. She used to walk a few miles "every morning" but has stopped due to the inability to put her sneakers on due to the LLE ulcers. She reports no dyspnea, angina, or syncope.

## 2023-05-09 NOTE — REVIEW OF SYSTEMS
[Fever] : no fever [Chills] : no chills [Feeling Fatigued] : not feeling fatigued [Dyspnea on exertion] : not dyspnea during exertion [Chest Discomfort] : no chest discomfort [Lower Ext Edema] : no extremity edema [Palpitations] : no palpitations [Orthopnea] : no orthopnea [PND] : no PND [Abdominal Pain] : no abdominal pain [Change in Appetite] : no change in appetite [Dizziness] : no dizziness [de-identified] : see HPI

## 2023-05-19 PROBLEM — I73.9 PERIPHERAL VASCULAR DISEASE, UNSPECIFIED: Chronic | Status: ACTIVE | Noted: 2023-05-01

## 2023-05-19 PROBLEM — I25.10 ATHEROSCLEROTIC HEART DISEASE OF NATIVE CORONARY ARTERY WITHOUT ANGINA PECTORIS: Chronic | Status: ACTIVE | Noted: 2023-05-01

## 2023-05-19 PROBLEM — E03.9 HYPOTHYROIDISM, UNSPECIFIED: Chronic | Status: ACTIVE | Noted: 2023-05-01

## 2023-06-12 ENCOUNTER — APPOINTMENT (OUTPATIENT)
Dept: CT IMAGING | Facility: CLINIC | Age: 83
End: 2023-06-12
Payer: MEDICARE

## 2023-06-12 PROCEDURE — 70496 CT ANGIOGRAPHY HEAD: CPT | Mod: MH

## 2023-06-12 PROCEDURE — 70498 CT ANGIOGRAPHY NECK: CPT | Mod: MH

## 2023-06-29 ENCOUNTER — APPOINTMENT (OUTPATIENT)
Dept: CARDIOLOGY | Facility: CLINIC | Age: 83
End: 2023-06-29
Payer: MEDICARE

## 2023-06-29 ENCOUNTER — OFFICE (OUTPATIENT)
Dept: URBAN - METROPOLITAN AREA CLINIC 102 | Facility: CLINIC | Age: 83
Setting detail: OPHTHALMOLOGY
End: 2023-06-29

## 2023-06-29 VITALS
DIASTOLIC BLOOD PRESSURE: 60 MMHG | WEIGHT: 134 LBS | SYSTOLIC BLOOD PRESSURE: 122 MMHG | HEART RATE: 73 BPM | OXYGEN SATURATION: 96 % | BODY MASS INDEX: 23 KG/M2
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
## 2023-06-29 PROCEDURE — NO SHOW FE NO SHOW FEE: Performed by: OPHTHALMOLOGY

## 2023-06-29 PROCEDURE — 93000 ELECTROCARDIOGRAM COMPLETE: CPT

## 2023-06-29 PROCEDURE — 99214 OFFICE O/P EST MOD 30 MIN: CPT

## 2023-06-29 NOTE — DISCUSSION/SUMMARY
[FreeTextEntry1] : AF: Continue BB/AC/Amiodarone she is under care of PCP every 3 months per patient to monitor LFT's and thyroid function advised Pulmonary F/U \par \par AS: Asymptomatic no CP/SOB/Dizziness \par PAD: Vascular evaluation reviewed, pt will follow up with vascular\par CAD: No angina exercises daily W/O limitations/symptoms  S/P CC 2015 tipple vessel disease ( RCA LAD LCX ) Non ischemic Nuclear stress test 2019 \par \par HTN: Controlled\par \par Carotid disease: CT reviewed on latest CT \par HLD: Continue statin \par \par Repeat MRA  reviewed.\par Pt will follow up with Dr. Allen\par OV 4 months \par \par Plan DW patient \par \par  [EKG obtained to assist in diagnosis and management of assessed problem(s)] : EKG obtained to assist in diagnosis and management of assessed problem(s)

## 2023-06-29 NOTE — REASON FOR VISIT
[Symptom and Test Evaluation] : symptom and test evaluation [Arrhythmia/ECG Abnorrmalities] : arrhythmia/ECG abnormalities [Structural Heart and Valve Disease] : structural heart and valve disease [Carotid, Aortic and Peripheral Vascular Disease] : carotid, aortic and peripheral vascular disease [Follow-Up - Clinic] : a clinic follow-up of [Coronary Artery Disease] : coronary artery disease [Hyperlipidemia] : hyperlipidemia [Hypertension] : hypertension [Medication Management] : Medication management

## 2023-06-29 NOTE — HISTORY OF PRESENT ILLNESS
[FreeTextEntry1] : 82 Y/O female PMH: AF, HLD, HTN,  CAD S/P CC 2015 tipple vessel disease ( RCA LAD LCX ) NL LV FX Anxiety here today in routine cardiac follow up.Vascular evaluation reviewed. AS severe on Echo. \par Pt has been evaluated at [facility] with cardiac cath and other imaging for possible TAVR. Pt reports continued symptoms of her lower extremity.\par \par \par

## 2023-07-24 ENCOUNTER — RX RENEWAL (OUTPATIENT)
Age: 83
End: 2023-07-24

## 2023-07-31 ENCOUNTER — RX RENEWAL (OUTPATIENT)
Age: 83
End: 2023-07-31

## 2023-07-31 ENCOUNTER — APPOINTMENT (OUTPATIENT)
Dept: VASCULAR SURGERY | Facility: CLINIC | Age: 83
End: 2023-07-31

## 2023-08-01 DIAGNOSIS — R79.89 OTHER SPECIFIED ABNORMAL FINDINGS OF BLOOD CHEMISTRY: ICD-10-CM

## 2023-08-01 LAB
ALBUMIN SERPL ELPH-MCNC: 4.5 G/DL
ALP BLD-CCNC: 96 U/L
ALT SERPL-CCNC: 12 U/L
ANION GAP SERPL CALC-SCNC: 14 MMOL/L
AST SERPL-CCNC: 15 U/L
BILIRUB SERPL-MCNC: 0.6 MG/DL
BUN SERPL-MCNC: 23 MG/DL
CALCIUM SERPL-MCNC: 9.5 MG/DL
CHLORIDE SERPL-SCNC: 98 MMOL/L
CHOLEST SERPL-MCNC: 135 MG/DL
CO2 SERPL-SCNC: 27 MMOL/L
CREAT SERPL-MCNC: 0.97 MG/DL
EGFR: 58 ML/MIN/1.73M2
ESTIMATED AVERAGE GLUCOSE: 137 MG/DL
GLUCOSE SERPL-MCNC: 121 MG/DL
HBA1C MFR BLD HPLC: 6.4 %
HDLC SERPL-MCNC: 63 MG/DL
LDLC SERPL CALC-MCNC: 59 MG/DL
NONHDLC SERPL-MCNC: 72 MG/DL
NT-PROBNP SERPL-MCNC: 1026 PG/ML
POTASSIUM SERPL-SCNC: 4.3 MMOL/L
PROT SERPL-MCNC: 7 G/DL
SODIUM SERPL-SCNC: 139 MMOL/L
T4 SERPL-MCNC: 9.8 UG/DL
TRIGL SERPL-MCNC: 61 MG/DL
TSH SERPL-ACNC: 3.4 UIU/ML

## 2023-08-03 ENCOUNTER — APPOINTMENT (OUTPATIENT)
Dept: INTERNAL MEDICINE | Facility: CLINIC | Age: 83
End: 2023-08-03
Payer: MEDICARE

## 2023-08-03 ENCOUNTER — INPATIENT (INPATIENT)
Facility: HOSPITAL | Age: 83
LOS: 1 days | Discharge: HOME HEALTH | DRG: 300 | End: 2023-08-05
Attending: HOSPITALIST | Admitting: HOSPITALIST
Payer: MEDICARE

## 2023-08-03 VITALS
WEIGHT: 134.92 LBS | SYSTOLIC BLOOD PRESSURE: 161 MMHG | DIASTOLIC BLOOD PRESSURE: 66 MMHG | OXYGEN SATURATION: 99 % | TEMPERATURE: 99 F | RESPIRATION RATE: 18 BRPM | HEIGHT: 63 IN | HEART RATE: 75 BPM

## 2023-08-03 VITALS — OXYGEN SATURATION: 96 % | TEMPERATURE: 98.3 F | HEART RATE: 74 BPM

## 2023-08-03 VITALS — SYSTOLIC BLOOD PRESSURE: 128 MMHG | DIASTOLIC BLOOD PRESSURE: 70 MMHG

## 2023-08-03 DIAGNOSIS — I96 GANGRENE, NOT ELSEWHERE CLASSIFIED: ICD-10-CM

## 2023-08-03 DIAGNOSIS — I35.0 NONRHEUMATIC AORTIC (VALVE) STENOSIS: ICD-10-CM

## 2023-08-03 DIAGNOSIS — I48.20 CHRONIC ATRIAL FIBRILLATION, UNSPECIFIED: ICD-10-CM

## 2023-08-03 DIAGNOSIS — I10 ESSENTIAL (PRIMARY) HYPERTENSION: ICD-10-CM

## 2023-08-03 DIAGNOSIS — I25.10 ATHEROSCLEROTIC HEART DISEASE OF NATIVE CORONARY ARTERY WITHOUT ANGINA PECTORIS: ICD-10-CM

## 2023-08-03 DIAGNOSIS — E03.9 HYPOTHYROIDISM, UNSPECIFIED: ICD-10-CM

## 2023-08-03 DIAGNOSIS — Z29.9 ENCOUNTER FOR PROPHYLACTIC MEASURES, UNSPECIFIED: ICD-10-CM

## 2023-08-03 LAB
ALBUMIN SERPL ELPH-MCNC: 4.3 G/DL — SIGNIFICANT CHANGE UP (ref 3.3–5)
ALP SERPL-CCNC: 107 U/L — SIGNIFICANT CHANGE UP (ref 40–120)
ALT FLD-CCNC: 14 U/L — SIGNIFICANT CHANGE UP (ref 10–45)
ANION GAP SERPL CALC-SCNC: 11 MMOL/L — SIGNIFICANT CHANGE UP (ref 5–17)
APPEARANCE UR: CLEAR — SIGNIFICANT CHANGE UP
AST SERPL-CCNC: 15 U/L — SIGNIFICANT CHANGE UP (ref 10–40)
BACTERIA # UR AUTO: NEGATIVE — SIGNIFICANT CHANGE UP
BASE EXCESS BLDV CALC-SCNC: 2.5 MMOL/L — SIGNIFICANT CHANGE UP (ref -2–3)
BASOPHILS # BLD AUTO: 0.05 K/UL — SIGNIFICANT CHANGE UP (ref 0–0.2)
BASOPHILS NFR BLD AUTO: 0.6 % — SIGNIFICANT CHANGE UP (ref 0–2)
BILIRUB SERPL-MCNC: 0.4 MG/DL — SIGNIFICANT CHANGE UP (ref 0.2–1.2)
BILIRUB UR-MCNC: NEGATIVE — SIGNIFICANT CHANGE UP
BUN SERPL-MCNC: 26 MG/DL — HIGH (ref 7–23)
CA-I SERPL-SCNC: 1.14 MMOL/L — LOW (ref 1.15–1.33)
CALCIUM SERPL-MCNC: 9.4 MG/DL — SIGNIFICANT CHANGE UP (ref 8.4–10.5)
CHLORIDE BLDV-SCNC: 98 MMOL/L — SIGNIFICANT CHANGE UP (ref 96–108)
CHLORIDE SERPL-SCNC: 101 MMOL/L — SIGNIFICANT CHANGE UP (ref 96–108)
CO2 BLDV-SCNC: 32 MMOL/L — HIGH (ref 22–26)
CO2 SERPL-SCNC: 26 MMOL/L — SIGNIFICANT CHANGE UP (ref 22–31)
COLOR SPEC: SIGNIFICANT CHANGE UP
CREAT SERPL-MCNC: 0.85 MG/DL — SIGNIFICANT CHANGE UP (ref 0.5–1.3)
DIFF PNL FLD: NEGATIVE — SIGNIFICANT CHANGE UP
EGFR: 68 ML/MIN/1.73M2 — SIGNIFICANT CHANGE UP
EOSINOPHIL # BLD AUTO: 0.4 K/UL — SIGNIFICANT CHANGE UP (ref 0–0.5)
EOSINOPHIL NFR BLD AUTO: 4.9 % — SIGNIFICANT CHANGE UP (ref 0–6)
EPI CELLS # UR: 1 /HPF — SIGNIFICANT CHANGE UP
GAS PNL BLDV: 132 MMOL/L — LOW (ref 136–145)
GAS PNL BLDV: SIGNIFICANT CHANGE UP
GLUCOSE BLDV-MCNC: 151 MG/DL — HIGH (ref 70–99)
GLUCOSE SERPL-MCNC: 160 MG/DL — HIGH (ref 70–99)
GLUCOSE UR QL: NEGATIVE — SIGNIFICANT CHANGE UP
HCO3 BLDV-SCNC: 30 MMOL/L — HIGH (ref 22–29)
HCT VFR BLD CALC: 37.9 % — SIGNIFICANT CHANGE UP (ref 34.5–45)
HCT VFR BLDA CALC: 55 % — HIGH (ref 34.5–46.5)
HGB BLD CALC-MCNC: 18.2 G/DL — HIGH (ref 11.7–16.1)
HGB BLD-MCNC: 13 G/DL — SIGNIFICANT CHANGE UP (ref 11.5–15.5)
IMM GRANULOCYTES NFR BLD AUTO: 0.5 % — SIGNIFICANT CHANGE UP (ref 0–0.9)
KETONES UR-MCNC: NEGATIVE — SIGNIFICANT CHANGE UP
LACTATE BLDV-MCNC: 1.5 MMOL/L — SIGNIFICANT CHANGE UP (ref 0.5–2)
LEUKOCYTE ESTERASE UR-ACNC: ABNORMAL
LYMPHOCYTES # BLD AUTO: 1.12 K/UL — SIGNIFICANT CHANGE UP (ref 1–3.3)
LYMPHOCYTES # BLD AUTO: 13.6 % — SIGNIFICANT CHANGE UP (ref 13–44)
MCHC RBC-ENTMCNC: 34.2 PG — HIGH (ref 27–34)
MCHC RBC-ENTMCNC: 34.3 GM/DL — SIGNIFICANT CHANGE UP (ref 32–36)
MCV RBC AUTO: 99.7 FL — SIGNIFICANT CHANGE UP (ref 80–100)
MONOCYTES # BLD AUTO: 0.79 K/UL — SIGNIFICANT CHANGE UP (ref 0–0.9)
MONOCYTES NFR BLD AUTO: 9.6 % — SIGNIFICANT CHANGE UP (ref 2–14)
NEUTROPHILS # BLD AUTO: 5.82 K/UL — SIGNIFICANT CHANGE UP (ref 1.8–7.4)
NEUTROPHILS NFR BLD AUTO: 70.8 % — SIGNIFICANT CHANGE UP (ref 43–77)
NITRITE UR-MCNC: NEGATIVE — SIGNIFICANT CHANGE UP
NRBC # BLD: 0 /100 WBCS — SIGNIFICANT CHANGE UP (ref 0–0)
PCO2 BLDV: 56 MMHG — HIGH (ref 39–42)
PH BLDV: 7.34 — SIGNIFICANT CHANGE UP (ref 7.32–7.43)
PH UR: 6.5 — SIGNIFICANT CHANGE UP (ref 5–8)
PLATELET # BLD AUTO: 185 K/UL — SIGNIFICANT CHANGE UP (ref 150–400)
PO2 BLDV: 29 MMHG — SIGNIFICANT CHANGE UP (ref 25–45)
POTASSIUM BLDV-SCNC: 4.1 MMOL/L — SIGNIFICANT CHANGE UP (ref 3.5–5.1)
POTASSIUM SERPL-MCNC: 4 MMOL/L — SIGNIFICANT CHANGE UP (ref 3.5–5.3)
POTASSIUM SERPL-SCNC: 4 MMOL/L — SIGNIFICANT CHANGE UP (ref 3.5–5.3)
PROT SERPL-MCNC: 7.4 G/DL — SIGNIFICANT CHANGE UP (ref 6–8.3)
PROT UR-MCNC: NEGATIVE — SIGNIFICANT CHANGE UP
RBC # BLD: 3.8 M/UL — SIGNIFICANT CHANGE UP (ref 3.8–5.2)
RBC # FLD: 12.3 % — SIGNIFICANT CHANGE UP (ref 10.3–14.5)
RBC CASTS # UR COMP ASSIST: 2 /HPF — SIGNIFICANT CHANGE UP (ref 0–4)
SAO2 % BLDV: 43.1 % — LOW (ref 67–88)
SODIUM SERPL-SCNC: 138 MMOL/L — SIGNIFICANT CHANGE UP (ref 135–145)
SP GR SPEC: 1.01 — LOW (ref 1.01–1.02)
TROPONIN T, HIGH SENSITIVITY RESULT: 15 NG/L — SIGNIFICANT CHANGE UP (ref 0–51)
UROBILINOGEN FLD QL: NEGATIVE — SIGNIFICANT CHANGE UP
WBC # BLD: 8.22 K/UL — SIGNIFICANT CHANGE UP (ref 3.8–10.5)
WBC # FLD AUTO: 8.22 K/UL — SIGNIFICANT CHANGE UP (ref 3.8–10.5)
WBC UR QL: 5 /HPF — SIGNIFICANT CHANGE UP (ref 0–5)

## 2023-08-03 PROCEDURE — 71045 X-RAY EXAM CHEST 1 VIEW: CPT | Mod: 26

## 2023-08-03 PROCEDURE — 99223 1ST HOSP IP/OBS HIGH 75: CPT

## 2023-08-03 PROCEDURE — 99215 OFFICE O/P EST HI 40 MIN: CPT

## 2023-08-03 PROCEDURE — 73630 X-RAY EXAM OF FOOT: CPT | Mod: 26,LT

## 2023-08-03 PROCEDURE — 99285 EMERGENCY DEPT VISIT HI MDM: CPT | Mod: GC

## 2023-08-03 RX ORDER — ASPIRIN/CALCIUM CARB/MAGNESIUM 324 MG
81 TABLET ORAL DAILY
Refills: 0 | Status: DISCONTINUED | OUTPATIENT
Start: 2023-08-03 | End: 2023-08-05

## 2023-08-03 RX ORDER — METOPROLOL TARTRATE 50 MG
50 TABLET ORAL DAILY
Refills: 0 | Status: DISCONTINUED | OUTPATIENT
Start: 2023-08-03 | End: 2023-08-05

## 2023-08-03 RX ORDER — APIXABAN 2.5 MG/1
2.5 TABLET, FILM COATED ORAL EVERY 12 HOURS
Refills: 0 | Status: DISCONTINUED | OUTPATIENT
Start: 2023-08-04 | End: 2023-08-05

## 2023-08-03 RX ORDER — LOSARTAN POTASSIUM 100 MG/1
50 TABLET, FILM COATED ORAL DAILY
Refills: 0 | Status: DISCONTINUED | OUTPATIENT
Start: 2023-08-03 | End: 2023-08-05

## 2023-08-03 RX ORDER — ATORVASTATIN CALCIUM 80 MG/1
20 TABLET, FILM COATED ORAL AT BEDTIME
Refills: 0 | Status: DISCONTINUED | OUTPATIENT
Start: 2023-08-03 | End: 2023-08-05

## 2023-08-03 RX ORDER — AMIODARONE HYDROCHLORIDE 400 MG/1
100 TABLET ORAL DAILY
Refills: 0 | Status: DISCONTINUED | OUTPATIENT
Start: 2023-08-03 | End: 2023-08-05

## 2023-08-03 RX ORDER — LEVOTHYROXINE SODIUM 125 MCG
50 TABLET ORAL DAILY
Refills: 0 | Status: DISCONTINUED | OUTPATIENT
Start: 2023-08-03 | End: 2023-08-05

## 2023-08-03 NOTE — ED ADULT NURSE NOTE - OBJECTIVE STATEMENT
84 yo female presents to the ED AAOx4 sent from PCP for "extra fluid in blood". PMH AFIB on Eliques HTN, CAD, PAD. Pt breathing unlabored spontanous, symmetrical. Pt abd is soft, nontender, nondistended Pt sent from outpatient cards to be admitted. Pt Denies headache, dizziness, vision changes, chest pain, shortness of breath, abdominal pain, nausea, vomiting, diarrhea, fevers, chills, dysuria, hematuria, recent illness travel or fall.

## 2023-08-03 NOTE — ED PROVIDER NOTE - ATTENDING CONTRIBUTION TO CARE
Private Physician Osito Sanders Ilene pcp/antonio  83y f pmh AS planning tavr, AF on eliquis, HTN, CAD, Caludication, Allergic rhinitis , Hypothyroid,PAD, PT comes to ED cards for eval, Pt has noted worsening wounds on left foot. Has pain le w walking. No cp.sob.abd pain. nvd. cough. fever and chills PE WDWN female awake alert normocephalic atraumatic chest clear anterior & posterior cv 3/6 saima aortica area. Abd soft +bs neuro gcs 15 speech fluent power 5/5 all ex. Left foot dry gangrene multiple toes worst great toe.   Ladarius Gillespie MD, Facep

## 2023-08-03 NOTE — H&P ADULT - NSHPSOCIALHISTORY_GEN_ALL_CORE
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

## 2023-08-03 NOTE — ED ADULT TRIAGE NOTE - CHIEF COMPLAINT QUOTE
sent by cardiologist. "they told me to come here to have somethings done, they said I have fluid in my blood on my recent blood test. I have some circulation issue in my right leg that needs a stent, I was cleared for a TAVR, waiting for my doctors to tell me when its going to happen." Foot wounds noted to b/l feet.

## 2023-08-03 NOTE — ED ADULT NURSE NOTE - NSFALLUNIVINTERV_ED_ALL_ED
Bed/Stretcher in lowest position, wheels locked, appropriate side rails in place/Call bell, personal items and telephone in reach/Instruct patient to call for assistance before getting out of bed/chair/stretcher/Non-slip footwear applied when patient is off stretcher/[place]od[place] to call system/Physically safe environment - no spills, clutter or unnecessary equipment/Purposeful proactive rounding/Room/bathroom lighting operational, light cord in reach

## 2023-08-03 NOTE — H&P ADULT - TIME BILLING
- Ordering, reviewing, and interpreting labs, testing, and imaging.  - Independently obtaining a review of systems and performing a physical exam  - Reviewing consultant documentation/recommendations  - Counselling and educating patient regarding interpretation of aforementioned items and plan of care.

## 2023-08-03 NOTE — H&P ADULT - PROBLEM SELECTOR PLAN 4
Multivessel disease, currently being managed medically, No hx of PCI  - Cath from 5/1/23 showing 40% stenosis of LM, 40% stenosis of prox.LAD, 50% stenosis of dist LAD, and 100% stenosis of RCA.   - Continue statin, on rosuvastatin at home. Continue atorvastatin here.  - Continue ASA 81

## 2023-08-03 NOTE — H&P ADULT - PROBLEM SELECTOR PLAN 2
Dry gangrene of the distal toes of L foot, likely due to peripheral artery disease. Pulses palpable bilaterally. Wounds appear to be healing and not acutely infected. No signs of systemic infection at this time.  - Vascular consulted for recs  - Ordered MONTY/PVR  - Podiatry consulted for wound care recommendations  - Does not appear acutely infected; monitor off antibiotics  - Xray without radiological evidence of osteomyelitis  - Consider ID consult if signs of local or systemic infection develops.

## 2023-08-03 NOTE — HISTORY OF PRESENT ILLNESS
[FreeTextEntry1] : Pt is here for follow up of multiple medical problems including   what appearrs to be dry  gangrene on left toes.that  she thinks has devloped over the past one month. She describes sig pain i =n the foot .  She    has not been able to get to her vascular follow up she denies sob.

## 2023-08-03 NOTE — H&P ADULT - PROBLEM SELECTOR PLAN 3
Had been chronically managed with amiodarone and Eliquis  - Continue amiodarone 100mg qd  - Continue Eliquis 2.5mg BID  - Continue metoprolol succinate 50mg qd  - Monitor on telemetry  - EKG personally reviewed: Normal sinus rhythm, no ST or T wave abnormalities. qtc 470

## 2023-08-03 NOTE — ED PROVIDER NOTE - OBJECTIVE STATEMENT
83Y F PMH aortic stenosis, A-fib on Eliquis, HTN, CAD, PAD with left foot wound, sent in by outpatient cardiologist for admission. 83Y F PMH aortic stenosis, A-fib on Eliquis, HTN, CAD, PAD with left foot wound, sent in by outpatient cardiologist for admission. Patient has history of aortic stenosis, currently in the process of being cleared for TAVR procedure.  Also has history of significant PAD to bilateral lower extremities needs to have stenting/bypass procedure performed, however her previous vascular surgeon at [facility] has been unable to perform this procedure thus far. Patient was sent in today by her cardiologist to be admitted for care coordination including cardiology clearance as well as vascular surgery consult for lower extremity wounds.  Patient has no active complaints at this time.  She notes that the toes on her left foot have had open wounds that have been progressively worsening over the last week or so, however the area is nonpainful due to decreased sensation in the foot.  She denies recent fevers/chills, chest pain, difficulty breathing, abdominal pain, leg pain or swelling.

## 2023-08-03 NOTE — ED CLERICAL - NS ED CLERK NOTE PRE-ARRIVAL INFORMATION; ADDITIONAL PRE-ARRIVAL INFORMATION
CC/Reason For referral: Pt. with significant PAD, has a nonhealing ulcer to E. Pt. was supposed to have procedure for ulcer done at [place], but was never done. Hx of severe aortic stenosis, was seen in CTS office in April. Work-up was to be done after surgery for foot ulcer. Pt. has gangrene to L foot, needs ID, vascular surgery, and structural heart team.   Preferred Consultant(if applicable):  Who admits for you (if needed):  Do you have documents you would like to fax over?  Would you still like to speak to an ED attending? If needed.

## 2023-08-03 NOTE — H&P ADULT - HISTORY OF PRESENT ILLNESS
82 y/o Female w/ PMHx of HTN, HLD, CAD w/ triple vessel disease (RCA, LAD, LCx - medically managed), Afib, known severe Aortic stenosis (EFRAIN 0.69sqcm), right internal carotid stenosis, PAD, Hypothyroidism, Anxiety presenting to ED on outpatient provider's request for further TAVR workup involving cardiology, vascular, and ID evaluation. Patient currently without any complaints or symptoms. Patient has overall good functional status and able to do all her ADLs. She lives alone, walks everyday in the morning without symptoms of chest pain, dyspnea, pain, lightheadedness, or weakness. Patient does have wounds in the L foot which causes discomfort but currently minimal amounts of pain. Pt endorses aching in her legs when she walks for prolonged periods. Pt says she saw her outpatient providers who recommended her come to the hospital for further workup. She is currently being evaluated for a TAVR. She denies fevers, No recent falls.

## 2023-08-03 NOTE — CONSULT NOTE ADULT - ASSESSMENT
83F with afib on eliquis, HTN, CAD, PAD, hypothyroidism, R. carotid artery stenosis, with chronic left foot wound concerning for dry gangrene, without osteomyelitis. Presented. for TAVR planning.    Recommendations:  - No acute vascular surgery intervention at this time  - Recommend medicine admission  - MONTY/PVR  - Podiatry consult for dry gangrene  - Local wound care    Discussed with vascular surgery fellow.     Vascular Surgery  [id number] 83F with afib on eliquis, HTN, CAD, PAD, hypothyroidism, R. carotid artery stenosis, with chronic left foot wound concerning for dry gangrene, without osteomyelitis. Presented for TAVR planning.    Recommendations:  - No acute vascular surgery intervention at this time  - Recommend medicine admission  - MONTY/PVR for potential angio  - Podiatry consult for dry gangrene  - Will require medicine and cardiology risk stratification and optimization  - Local wound care    Discussed with vascular surgery fellow.     Vascular Surgery  [id number]

## 2023-08-03 NOTE — H&P ADULT - PROBLEM SELECTOR PLAN 1
Known hx of severe AS, currently asymptomatic, good functional status. Pt currently undergoing TAVR evaluation.  - TTE from 4/2023 showing  low flow, low gradient aortic stenosis with preserved EF. The peak transaortic velocity is 3.70 m/s, peak transaortic gradient is 54.8 mmHg and mean transaortic gradient is 34.0 mmHg with an LVOT/aortic valve VTI ratio of 0.20. The aortic valve area is estimated at 0.69 cm² by the continuity equation. There is mild aortic regurgitation.  - Cardiology called by ED   - Admit to telemetry

## 2023-08-03 NOTE — H&P ADULT - NSHPLABSRESULTS_GEN_ALL_CORE
08-03    138  |  101  |  26<H>  ----------------------------<  160<H>  4.0   |  26  |  0.85    Ca    9.4      03 Aug 2023 19:50    TPro  7.4  /  Alb  4.3  /  TBili  0.4  /  DBili  x   /  AST  15  /  ALT  14  /  AlkPhos  107  08-03                        Urinalysis Basic - ( 03 Aug 2023 19:50 )    Color: x / Appearance: x / SG: x / pH: x  Gluc: 160 mg/dL / Ketone: x  / Bili: x / Urobili: x   Blood: x / Protein: x / Nitrite: x   Leuk Esterase: x / RBC: x / WBC x   Sq Epi: x / Non Sq Epi: x / Bacteria: x                              13.0   8.22  )-----------( 185      ( 03 Aug 2023 19:50 )             37.9     CAPILLARY BLOOD GLUCOSE    IMAGING    < from: Xray Foot AP + Lateral + Oblique, Left (08.03.23 @ 19:50) >    FINDINGS:  No acute fracture or dislocation. No radiographic evidence of   osteomyelitis. No advanced arthritic changes. Plantar and dorsal   calcaneal enthesophytes.  Soft tissue irregularity at the tip of the second through fifth digits   and the plantar and medial aspect of the first digit.    IMPRESSION:  Soft tissue wounds of all 5 digits without radiographic evidence of   osteomyelitis.    < end of copied text >    < from: Xray Chest 1 View AP/PA (08.03.23 @ 19:49) >    FINDINGS:  The heart size is normal.  No focal consolidation.  There is no pneumothorax or pleural effusion.    IMPRESSION:  No focal consolidation.    < end of copied text >

## 2023-08-03 NOTE — H&P ADULT - ASSESSMENT
83 F with hx of  HTN, HLD, CAD w/ triple vessel disease (RCA, LAD, LCx - medically managed), Afib, known severe Aortic stenosis (EFRAIN 0.69sqcm), right internal carotid stenosis, PAD, Hypothyroidism, Anxiety, presenting for TAVR workup upon request of outpatient provider. Patient admitted for evaluation by cardiology, vascular, podiatry.

## 2023-08-03 NOTE — H&P ADULT - PROBLEM SELECTOR PLAN 7
DVT ppx: on eliquis  Diet: DASH  Dispo: pending workup; do not anticipate PT or home needs   Code: Full code

## 2023-08-03 NOTE — ED PROVIDER NOTE - NSICDXNOPASTSURGICALHX_GEN_ALL_ED
[facility] [address] [facility]  [address] [facility], [address] [facility], [address],[place] [address]  [phone] fax      Plan of Care/ Statement of Necessity for Physical Therapy Services    Patient name: Marissa Jackson Start of Care: 10/20/2021   Referral source: Wily Zepeda MD : 1944    Medical Diagnosis: Cervicalgia [M54.2]  Spondylosis without myelopathy or radiculopathy, cervical region [M47.812]  Other muscle spasm [M62.838]  Other cervical disc degeneration, unspecified cervical region [M50.30]  Payor: MEDICARE RAILROAD / Plan: Prachi Narayanan 12 / Product Type: Medicare /  Onset Date: > 3 years ago with recent worsening    Treatment Diagnosis: Neck pain    Prior Hospitalization: see medical history Provider#: [id number]   Medications: Verified on Patient summary List    Comorbidities: Patient reports osteoporosis, arthritis, and high BP   Prior Level of Function: Patient is I with ADLs and ambulates without use of an AD. Patient mainly stays busy with housework and gardening. The Plan of Care and following information is based on the information from the initial evaluation. Assessment/ key information: Patient is a very pleasant 69 y.o. female presenting to PT with complaints of neck pain and stiffness that began insidiously over the past 3+ years. Patient's symptoms prevent her from performing ADLs that require cervical rotation and extension, namely driving, without limitation. Patient presents with decreased cervical AROM, decreased postural awareness, and TTP about her upper traps upon physical examination. Patient will benefit from skilled PT to address these impairments.    Evaluation Complexity History HIGH Complexity :3+ comorbidities / personal factors will impact the outcome/ POC ; Examination HIGH Complexity : 4+ Standardized tests and measures addressing body structure, function, activity limitation and / or participation in recreation  ;Presentation LOW Complexity : Stable, uncomplicated  ;Clinical Decision Making LOW Complexity : FOTO score of   Overall Complexity Rating: LOW   Problem List: pain affecting function, decrease ROM, decrease strength, decrease ADL/ functional abilitiies, decrease activity tolerance and decrease flexibility/ joint mobility   Treatment Plan may include any combination of the following: Therapeutic exercise, Therapeutic activities, Neuromuscular re-education, Physical agent/modality, Manual therapy, Patient education, Self Care training, Functional mobility training and Home safety training  Patient / Family readiness to learn indicated by: asking questions, trying to perform skills and interest  Persons(s) to be included in education: patient (P)  Barriers to Learning/Limitations: None  Patient Goal (s): Improve symptoms and neck mobility  Patient Self Reported Health Status: excellent  Rehabilitation Potential: excellent    Short Term Goals: To be accomplished in 2 treatments:  1. Patient will be I with HEP in order to enhance carryover from PT sessions   Evaluation: HEP administered   Long Term Goals: To be accomplished in 10 treatments:  1. Patient will improve bilateral cervical rotation AROM to 50 deg in order to ease ADLs including driving   Evaluation: right rotation to 25 deg, left rotation to 22 deg  2. Patient will report a 50% improvement in headache symptoms and frequency in order to enhance quality of life   Evaluation: patient reporting that headaches occur 2x/month and last roughly 2 hours  3. Patient will report an overall 75% improvement in symptoms to enhance quality of life and ADL participation   Evaluation: pain rated 4/10 at rest and 6/10 at worst    Frequency / Duration: Patient to be seen 2 times per week for 10 treatments.     Patient/ Caregiver education and instruction: Diagnosis, prognosis, self care, activity modification, exercises and other HEP   [x]  Plan of care has been reviewed with PTA    Certification Period: 10/21/21 - 11/20/21  Sarah Beth Garcia, PT 10/20/2021 3:01 PM    ________________________________________________________________________    I certify that the above Therapy Services are being furnished while the patient is under my care. I agree with the treatment plan and certify that this therapy is necessary.     Physician's Signature:____________Date:_________TIME:________     Rashmi Henry MD  ** Signature, Date and Time must be completed for valid certification **    Please sign and return to In [address] [place] [address], [address] [place] [address], [address] [place] [address],[facility] [address] [phone] fax <-- Click to add NO significant Past Surgical History

## 2023-08-03 NOTE — ED PROVIDER NOTE - PRINCIPAL DIAGNOSIS
[facility] Patient Status:  Inpatient    1985 MRN [id number]   Location [address] [facility] Attending Rosalia Mcduffie MD   Hosp Day # 0 PCP No primary care provider on jonathan daily., Disp: , Rfl: , 3/20/2021 at 2100      Allergies:   Fluconazole             HIVES, ANAPHYLAXIS  Stevia Glycerite Ex*    ANAPHYLAXIS    OBJECTIVE:    Abdomen: estimated fetal weight: 8.5 lbs  FHTs initially after admission. 130s, accels.  140s  lates Gangrene of toe of left foot

## 2023-08-03 NOTE — H&P ADULT - NSHPPHYSICALEXAM_GEN_ALL_CORE
Vital Signs Last 24 Hrs  T(C): 36.7 (03 Aug 2023 22:51), Max: 37 (03 Aug 2023 16:37)  T(F): 98.1 (03 Aug 2023 22:51), Max: 98.6 (03 Aug 2023 16:37)  HR: 78 (03 Aug 2023 22:51) (75 - 82)  BP: 175/76 (03 Aug 2023 22:51) (161/66 - 175/76)  BP(mean): --  RR: 20 (03 Aug 2023 22:51) (16 - 20)  SpO2: 95% (03 Aug 2023 22:51) (95% - 99%)    Parameters below as of 03 Aug 2023 22:51  Patient On (Oxygen Delivery Method): room air        CONSTITUTIONAL: Well-groomed, in no apparent distress  EYES: No conjunctival or scleral injection, non-icteric; PERRLA and symmetric  ENMT: No external nasal lesions; nasal mucosa not inflamed; normal dentition; no pharyngeal injection or exudates, oral mucosa with moist membranes  RESPIRATORY: Breathing comfortably; no dullness to percussion; lungs CTA without wheeze/rhonchi/rales  CARDIOVASCULAR: Systolic ejection murmur, +S1S2, RRR; no carotid bruits; pedal pulses full and symmetric; no lower extremity edema  GASTROINTESTINAL: No palpable masses or tenderness, +BS throughout, no rebound/guarding; no hepatosplenomegaly; no hernia palpated  LYMPHATIC: No cervical LAD or tenderness  MUSCULOSKELETAL: No paraspinal tenderness; No misalignment of extremities, spine, or neck, no joint swelling or tenderness  SKIN: Dry gangrenous wounds on tips of toes of L foot with crusting, desquamation. No bleeding or discharge. Mild erythema.   NEUROLOGIC: CN II-XII intact; sensation intact in LEs b/l to light touch, normal strength and tone.  PSYCHIATRIC: A+O x 3; mood and affect appropriate; appropriate insight and judgment

## 2023-08-03 NOTE — ED PROVIDER NOTE - CLINICAL SUMMARY MEDICAL DECISION MAKING FREE TEXT BOX
83Y F, significant cardiac history including aortic stenosis, A-fib on Eliquis, hypertension, CAD, PAD, sent to ED by her primary cardiologist Dr. Serna for admission and care coordination with other services including vascular surgery, cards, ID. Patient well appearing on evaluation, has no acute complaints at this time. triage EKG, VS nonactionable. Noted dry gangrene to left foot. Plan screening bloodwork & pre-ops, CXR, left foot XR, and cards and vasc surgery evaluation, admission to hospitalist.

## 2023-08-03 NOTE — ED PROVIDER NOTE - PROGRESS NOTE DETAILS
Marika Kwong MD PGY-2: screening labs, CXR, EKG wnl. vascular surgery, cardiology fellow on call aware of patient, will see in ED. plan TBA hospitalist for cards eval in AM.

## 2023-08-03 NOTE — ED PROVIDER NOTE - PHYSICAL EXAMINATION
GENERAL: Awake, alert, NAD  HEAD: NC/AT, neck supple, moist mucous membranes  EYES: PERRL, EOM grossly intact, sclera anicteric  LUNGS: normal WOB on RA, CTAB, no wheezes or crackles   CARDIAC: RRR, blowing holosystolic murmur noted  ABDOMEN: Soft, non tender, non distended, no rebound, no guarding  BACK: No midline spinal tenderness, no CVA tenderness  EXT: No edema, no calf tenderness, apparent dry gangrene to all toes on left foot; not erythematous or streaking, no tenderness to palpation - foot is warm but unable to detect DP pulse with doppler, PT pulse is present by doppler. Right foot/leg wnl with palpable DP pulse  NEURO: A&Ox3. Moving all extremities. No focal deficits.  SKIN: Warm and dry. No rash.  PSYCH: Normal affect.

## 2023-08-03 NOTE — PHYSICAL EXAM
[Normal] : no acute distress, well nourished, well developed and well-appearing [de-identified] : crackles bilaterally  1/2 way up [de-identified] : HUMA [de-identified] : L leg /foot some eryhema and gangrene of 3 toes

## 2023-08-04 LAB
ANION GAP SERPL CALC-SCNC: 14 MMOL/L — SIGNIFICANT CHANGE UP (ref 5–17)
BUN SERPL-MCNC: 19 MG/DL — SIGNIFICANT CHANGE UP (ref 7–23)
CALCIUM SERPL-MCNC: 9.2 MG/DL — SIGNIFICANT CHANGE UP (ref 8.4–10.5)
CHLORIDE SERPL-SCNC: 103 MMOL/L — SIGNIFICANT CHANGE UP (ref 96–108)
CO2 SERPL-SCNC: 24 MMOL/L — SIGNIFICANT CHANGE UP (ref 22–31)
CREAT SERPL-MCNC: 0.81 MG/DL — SIGNIFICANT CHANGE UP (ref 0.5–1.3)
CRP SERPL-MCNC: <3 MG/L — SIGNIFICANT CHANGE UP (ref 0–4)
EGFR: 72 ML/MIN/1.73M2 — SIGNIFICANT CHANGE UP
ERYTHROCYTE [SEDIMENTATION RATE] IN BLOOD: 17 MM/HR — SIGNIFICANT CHANGE UP (ref 0–20)
GLUCOSE SERPL-MCNC: 106 MG/DL — HIGH (ref 70–99)
HCT VFR BLD CALC: 39.4 % — SIGNIFICANT CHANGE UP (ref 34.5–45)
HGB BLD-MCNC: 13.3 G/DL — SIGNIFICANT CHANGE UP (ref 11.5–15.5)
MAGNESIUM SERPL-MCNC: 2.1 MG/DL — SIGNIFICANT CHANGE UP (ref 1.6–2.6)
MCHC RBC-ENTMCNC: 33.8 GM/DL — SIGNIFICANT CHANGE UP (ref 32–36)
MCHC RBC-ENTMCNC: 33.8 PG — SIGNIFICANT CHANGE UP (ref 27–34)
MCV RBC AUTO: 100.3 FL — HIGH (ref 80–100)
NRBC # BLD: 0 /100 WBCS — SIGNIFICANT CHANGE UP (ref 0–0)
PHOSPHATE SERPL-MCNC: 3 MG/DL — SIGNIFICANT CHANGE UP (ref 2.5–4.5)
PLATELET # BLD AUTO: 198 K/UL — SIGNIFICANT CHANGE UP (ref 150–400)
POTASSIUM SERPL-MCNC: 3.8 MMOL/L — SIGNIFICANT CHANGE UP (ref 3.5–5.3)
POTASSIUM SERPL-SCNC: 3.8 MMOL/L — SIGNIFICANT CHANGE UP (ref 3.5–5.3)
RBC # BLD: 3.93 M/UL — SIGNIFICANT CHANGE UP (ref 3.8–5.2)
RBC # FLD: 12.2 % — SIGNIFICANT CHANGE UP (ref 10.3–14.5)
SODIUM SERPL-SCNC: 141 MMOL/L — SIGNIFICANT CHANGE UP (ref 135–145)
WBC # BLD: 8.52 K/UL — SIGNIFICANT CHANGE UP (ref 3.8–10.5)
WBC # FLD AUTO: 8.52 K/UL — SIGNIFICANT CHANGE UP (ref 3.8–10.5)

## 2023-08-04 PROCEDURE — 99233 SBSQ HOSP IP/OBS HIGH 50: CPT

## 2023-08-04 PROCEDURE — 93923 UPR/LXTR ART STDY 3+ LVLS: CPT | Mod: 26

## 2023-08-04 RX ORDER — CHLORHEXIDINE GLUCONATE 213 G/1000ML
1 SOLUTION TOPICAL
Refills: 0 | Status: DISCONTINUED | OUTPATIENT
Start: 2023-08-04 | End: 2023-08-05

## 2023-08-04 RX ADMIN — APIXABAN 2.5 MILLIGRAM(S): 2.5 TABLET, FILM COATED ORAL at 06:38

## 2023-08-04 RX ADMIN — Medication 50 MILLIGRAM(S): at 06:39

## 2023-08-04 RX ADMIN — LOSARTAN POTASSIUM 50 MILLIGRAM(S): 100 TABLET, FILM COATED ORAL at 06:39

## 2023-08-04 RX ADMIN — CHLORHEXIDINE GLUCONATE 1 APPLICATION(S): 213 SOLUTION TOPICAL at 17:04

## 2023-08-04 RX ADMIN — Medication 81 MILLIGRAM(S): at 12:40

## 2023-08-04 RX ADMIN — Medication 50 MICROGRAM(S): at 05:40

## 2023-08-04 RX ADMIN — AMIODARONE HYDROCHLORIDE 100 MILLIGRAM(S): 400 TABLET ORAL at 06:38

## 2023-08-04 RX ADMIN — APIXABAN 2.5 MILLIGRAM(S): 2.5 TABLET, FILM COATED ORAL at 18:14

## 2023-08-04 NOTE — CONSULT NOTE ADULT - ASSESSMENT
83F presents with left foot wounds  - Patient seen and evaluated  - Afebrile, WBC 8.22  - Left foot erythema from distal digits 1-5 to MTPJ's, lateral 5th MTPJ wound to subQ, dorsal hallux wound to subQ, dorsal midfoot and hindfoot isolated erythema, early ischemic changes with hyperkeratosis of digits 1-5 , submetatarsal 2 hyperkeratosis, mild malodor, no drainage, no pus, no tracking, no tunneling. Right foot distal hallux erythema, medial 1st MTPJ erythema, early deep tissue injury of posterior heel, no acute signs of infection.  - Left foot Xray: no OM, soft tissue irregularity at tip of 2nd-5th digits and plantar and medial 1st digit  - Recommend vascular consult  - Pod plan local wound care pending vascular recommendations  - Discussed with attending 83F presents with left foot wounds  - Patient seen and evaluated  - Afebrile, WBC 8.22  - Left foot erythema from distal digits 1-5 to MTPJ's, lateral 5th MTPJ wound to subQ, dorsal hallux wound to subQ, dorsal midfoot and hindfoot isolated erythema, early ischemic changes with hyperkeratosis of digits 1-5 , submetatarsal 2 hyperkeratosis, mild malodor, no drainage, no pus, no tracking, no tunneling. Right foot distal hallux erythema, medial 1st MTPJ erythema, early deep tissue injury of posterior heel, no acute signs of infection.  - Left foot Xray: no OM, soft tissue irregularity at tip of 2nd-5th digits and plantar and medial 1st digit  - Recommend vascular consult  - Recommend admit to medicine  - Pod plan local wound care pending vascular recommendations  - Discussed with attending 83F presents with left foot wounds  - Patient seen and evaluated  - Afebrile, WBC 8.22  - Left foot erythema from distal digits 1-5 to MTPJ's, lateral 5th MTPJ wound to subQ, dorsal hallux wound to subQ, dorsal midfoot and hindfoot isolated erythema, early ischemic changes with hyperkeratosis of digits 1-5 , submetatarsal 2 hyperkeratosis, mild malodor, no drainage, no pus, no tracking, no tunneling. Right foot distal hallux erythema, medial 1st MTPJ erythema, early deep tissue injury of posterior heel, no acute signs of infection.  - Left foot Xray: no OM, soft tissue irregularity at tip of 2nd-5th digits and plantar and medial 1st digit  - Recommend vascular consult  - Recommend admit to medicine  - Ordered Z-flow boots  - Strict decubitus precaution while in bed with Z-flows to offload posterior heels  - Pod plan local wound care pending vascular recommendations  - Discussed with attending 83F presents with left foot wounds  - Patient seen and evaluated  - Afebrile, WBC 8.22, ESR/CRP pending  - Left foot erythema from distal digits 1-5 to MTPJ's, lateral 5th MTPJ wound to subQ, dorsal hallux wound to subQ, dorsal midfoot and hindfoot isolated erythema, early ischemic changes with hyperkeratosis of digits 1-5 , submetatarsal 2 hyperkeratosis, mild malodor, no drainage, no pus, no tracking, no tunneling. Right foot distal hallux erythema, medial 1st MTPJ erythema, early deep tissue injury of posterior heel, no acute signs of infection.  - Left foot Xray: no OM, soft tissue irregularity at tip of 2nd-5th digits and plantar and medial 1st digit  - Recommend vascular consult  - Recommend admit to medicine  - Ordered ESR & CRP  - Ordered Z-flow boots  - Strict decubitus precaution while in bed with Z-flows to offload posterior heels  - Pod plan local wound care pending vascular recommendations  - Discussed with attending

## 2023-08-04 NOTE — CONSULT NOTE ADULT - SUBJECTIVE AND OBJECTIVE BOX
General Surgery Consult  Consulting surgical team: vascular  Consulting attending: Chaitanya    HPI:  Ms. Kelley is an 83-year-old woman with a hx of AS, A-fib on Eliquis, HTN, CAD, PAD, hypothyroidism, Rt. carotid artery stenosis, sent in by her cardiologist for preoperative planning for a TAVR. She has developed a LLE wound worsening over the last year. She reports pain in the left leg while walking, but denies foot pain. She had been following with Dr. Smith at [place], however is now seeking a new vascular surgeon after experiencing difficulties with potential operative planning. Denies nausea, vomiting, diarrhea, fever.    In ED, VSS. Afebrile w/o leukocytosis.    PAST MEDICAL HISTORY:  HTN (hypertension)    HLD (hyperlipidemia)    Anxiety    CAD (coronary artery disease)    Chronic atrial fibrillation    PAD (peripheral artery disease)    Hypothyroidism    Stenosis of right internal carotid artery        PAST SURGICAL HISTORY:  No significant past surgical history        MEDICATIONS:      ALLERGIES:  sulfa drugs (Unknown)  latex (Unknown)      VITALS & I/Os:  Vital Signs Last 24 Hrs  T(C): 36.9 (03 Aug 2023 19:15), Max: 37 (03 Aug 2023 16:37)  T(F): 98.4 (03 Aug 2023 19:15), Max: 98.6 (03 Aug 2023 16:37)  HR: 82 (03 Aug 2023 19:15) (75 - 82)  BP: 166/72 (03 Aug 2023 19:15) (161/66 - 166/72)  BP(mean): --  RR: 16 (03 Aug 2023 19:15) (16 - 18)  SpO2: 99% (03 Aug 2023 19:15) (99% - 99%)    Parameters below as of 03 Aug 2023 19:15  Patient On (Oxygen Delivery Method): room air        I&O's Summary      PHYSICAL EXAM:  General: No acute distress  Respiratory: Nonlabored  Cardiovascular: RRR  Abdominal: Soft, nondistended, nontender. No rebound or guarding. No organomegaly, no palpable mass.  Extremities: LLE: warm, non-dopplerable DP/PT. Significant dry gangrene of the 1st-4th phalanges, with minimal dry gangrene on the lateral aspect of the fifth. Arterial ulcer at the base of the first phalanx. No weeping, drainage, or bleeding.                    RLE: dopplerable PT/DP pulses. No wounds.     LABS:                        13.0   8.22  )-----------( 185      ( 03 Aug 2023 19:50 )             37.9     08-03    138  |  101  |  26<H>  ----------------------------<  160<H>  4.0   |  26  |  0.85    Ca    9.4      03 Aug 2023 19:50    TPro  7.4  /  Alb  4.3  /  TBili  0.4  /  DBili  x   /  AST  15  /  ALT  14  /  AlkPhos  107  08-03    Lactate:  08-03 @ 19:15  1.5              Urinalysis Basic - ( 03 Aug 2023 19:50 )    Color: x / Appearance: x / SG: x / pH: x  Gluc: 160 mg/dL / Ketone: x  / Bili: x / Urobili: x   Blood: x / Protein: x / Nitrite: x   Leuk Esterase: x / RBC: x / WBC x   Sq Epi: x / Non Sq Epi: x / Bacteria: x        IMAGING:  
Patient is a 83y old  Female who presents with a chief complaint of TAVR workup (03 Aug 2023 23:14)      HPI:  84 y/o Female w/ PMHx of HTN, HLD, CAD w/ triple vessel disease (RCA, LAD, LCx - medically managed), Afib, known severe Aortic stenosis (EFRAIN 0.69sqcm), right internal carotid stenosis, PAD, Hypothyroidism, Anxiety presenting to ED on outpatient provider's request for further TAVR workup involving cardiology, vascular, and ID evaluation. Patient currently without any complaints or symptoms. Patient has overall good functional status and able to do all her ADLs. She lives alone, walks everyday in the morning without symptoms of chest pain, dyspnea, pain, lightheadedness, or weakness. Patient does have wounds in the L foot which causes discomfort but currently minimal amounts of pain. Pt endorses aching in her legs when she walks for prolonged periods. Pt says she saw her outpatient providers who recommended her come to the hospital for further workup. She is currently being evaluated for a TAVR. She denies fevers, No recent falls.  (03 Aug 2023 23:14)      PAST MEDICAL & SURGICAL HISTORY:  HTN (hypertension)      HLD (hyperlipidemia)      Anxiety      CAD (coronary artery disease)      Chronic atrial fibrillation      PAD (peripheral artery disease)      Hypothyroidism      Stenosis of right internal carotid artery      No significant past surgical history          MEDICATIONS  (STANDING):  aMIOdarone    Tablet 100 milliGRAM(s) Oral daily  apixaban 2.5 milliGRAM(s) Oral every 12 hours  aspirin enteric coated 81 milliGRAM(s) Oral daily  atorvastatin 20 milliGRAM(s) Oral at bedtime  levothyroxine 50 MICROGram(s) Oral daily  losartan 50 milliGRAM(s) Oral daily  metoprolol succinate ER 50 milliGRAM(s) Oral daily    MEDICATIONS  (PRN):      Allergies    sulfa drugs (Unknown)  latex (Unknown)    Intolerances        VITALS:    Vital Signs Last 24 Hrs  T(C): 37 (04 Aug 2023 01:44), Max: 37 (03 Aug 2023 16:37)  T(F): 98.6 (04 Aug 2023 01:44), Max: 98.6 (03 Aug 2023 16:37)  HR: 79 (04 Aug 2023 01:44) (75 - 82)  BP: 160/70 (04 Aug 2023 01:44) (160/70 - 175/76)  BP(mean): --  RR: 18 (04 Aug 2023 01:44) (16 - 20)  SpO2: 96% (04 Aug 2023 01:44) (95% - 99%)    Parameters below as of 04 Aug 2023 01:44  Patient On (Oxygen Delivery Method): room air        LABS:                          13.0   8.22  )-----------( 185      ( 03 Aug 2023 19:50 )             37.9       08-03    138  |  101  |  26<H>  ----------------------------<  160<H>  4.0   |  26  |  0.85    Ca    9.4      03 Aug 2023 19:50    TPro  7.4  /  Alb  4.3  /  TBili  0.4  /  DBili  x   /  AST  15  /  ALT  14  /  AlkPhos  107  08-03      CAPILLARY BLOOD GLUCOSE              LOWER EXTREMITY PHYSICAL EXAM:    Vascular: DP/PT 0/4, B/L, CFT <3 seconds B/L, Temperature gradient warm to cool, B/L.   Neuro: Epicritic sensation intact to the level of digits, B/L.  Musculoskeletal/Ortho: Bilateral flexure contractures of digits 2-5  Skin:  Left foot erythema from distal digits 1-5 to MTPJ's, lateral 5th MTPJ wound to subQ, dorsal hallux wound to subQ, dorsal midfoot and hindfoot isolated erythema, early ischemic changes with hyperkeratosis of digits 1-5 , submetatarsal 2 hyperkeratosis, mild malodor, no drainage, no pus, no tracking, no tunneling. Right foot distal hallux erythema, medial 1st MTPJ erythema, early deep tissue injury of posterior heel, no acute signs of infection.    RADIOLOGY & ADDITIONAL STUDIES:    < from: Xray Foot AP + Lateral + Oblique, Left (08.03.23 @ 19:50) >  INTERPRETATION:  CLINICAL INDICATION: Gangrene of the toes    TECHNIQUE: 3 views of the left foot    COMPARISON: No similar prior comparisons available.    FINDINGS:  No acute fracture or dislocation. No radiographic evidence of   osteomyelitis. No advanced arthritic changes. Plantar and dorsal   calcaneal enthesophytes.  Soft tissue irregularity at the tip of the second through fifth digits   and the plantar and medial aspect of the first digit.    IMPRESSION:  Soft tissue wounds of all 5 digits without radiographic evidence of   osteomyelitis.  
Labs

## 2023-08-04 NOTE — PROGRESS NOTE ADULT - PROBLEM SELECTOR PLAN 2
Dry gangrene of the distal toes of L foot, likely due to peripheral artery disease. Pulses palpable bilaterally. Wounds appear to be healing and not acutely infected. No signs of systemic infection at this time.  - Vascular sx consulted ; recc'd MONTY/PVR  - Podiatry following  - Does not appear acutely infected; monitor off antibiotics; c/w local wound care  - Xray without radiological evidence of osteomyelitis  - will obtain ID consult if signs of local or systemic infection develop

## 2023-08-05 ENCOUNTER — TRANSCRIPTION ENCOUNTER (OUTPATIENT)
Age: 83
End: 2023-08-05

## 2023-08-05 VITALS
HEART RATE: 72 BPM | RESPIRATION RATE: 18 BRPM | TEMPERATURE: 98 F | SYSTOLIC BLOOD PRESSURE: 130 MMHG | DIASTOLIC BLOOD PRESSURE: 65 MMHG | OXYGEN SATURATION: 96 %

## 2023-08-05 LAB
CRP SERPL-MCNC: <3 MG/L — SIGNIFICANT CHANGE UP (ref 0–4)
MRSA PCR RESULT.: SIGNIFICANT CHANGE UP
S AUREUS DNA NOSE QL NAA+PROBE: SIGNIFICANT CHANGE UP

## 2023-08-05 PROCEDURE — 85025 COMPLETE CBC W/AUTO DIFF WBC: CPT

## 2023-08-05 PROCEDURE — 87641 MR-STAPH DNA AMP PROBE: CPT

## 2023-08-05 PROCEDURE — 82435 ASSAY OF BLOOD CHLORIDE: CPT

## 2023-08-05 PROCEDURE — 86900 BLOOD TYPING SEROLOGIC ABO: CPT

## 2023-08-05 PROCEDURE — 71045 X-RAY EXAM CHEST 1 VIEW: CPT

## 2023-08-05 PROCEDURE — 85018 HEMOGLOBIN: CPT

## 2023-08-05 PROCEDURE — 36415 COLL VENOUS BLD VENIPUNCTURE: CPT

## 2023-08-05 PROCEDURE — 86901 BLOOD TYPING SEROLOGIC RH(D): CPT

## 2023-08-05 PROCEDURE — 84484 ASSAY OF TROPONIN QUANT: CPT

## 2023-08-05 PROCEDURE — 84132 ASSAY OF SERUM POTASSIUM: CPT

## 2023-08-05 PROCEDURE — 73630 X-RAY EXAM OF FOOT: CPT

## 2023-08-05 PROCEDURE — 81001 URINALYSIS AUTO W/SCOPE: CPT

## 2023-08-05 PROCEDURE — 85014 HEMATOCRIT: CPT

## 2023-08-05 PROCEDURE — 80053 COMPREHEN METABOLIC PANEL: CPT

## 2023-08-05 PROCEDURE — 86140 C-REACTIVE PROTEIN: CPT

## 2023-08-05 PROCEDURE — 82330 ASSAY OF CALCIUM: CPT

## 2023-08-05 PROCEDURE — 82803 BLOOD GASES ANY COMBINATION: CPT

## 2023-08-05 PROCEDURE — 82947 ASSAY GLUCOSE BLOOD QUANT: CPT

## 2023-08-05 PROCEDURE — 83605 ASSAY OF LACTIC ACID: CPT

## 2023-08-05 PROCEDURE — 83735 ASSAY OF MAGNESIUM: CPT

## 2023-08-05 PROCEDURE — 93005 ELECTROCARDIOGRAM TRACING: CPT

## 2023-08-05 PROCEDURE — 99239 HOSP IP/OBS DSCHRG MGMT >30: CPT

## 2023-08-05 PROCEDURE — 93923 UPR/LXTR ART STDY 3+ LVLS: CPT

## 2023-08-05 PROCEDURE — 86850 RBC ANTIBODY SCREEN: CPT

## 2023-08-05 PROCEDURE — 99285 EMERGENCY DEPT VISIT HI MDM: CPT | Mod: 25

## 2023-08-05 PROCEDURE — 80048 BASIC METABOLIC PNL TOTAL CA: CPT

## 2023-08-05 PROCEDURE — 84100 ASSAY OF PHOSPHORUS: CPT

## 2023-08-05 PROCEDURE — 85652 RBC SED RATE AUTOMATED: CPT

## 2023-08-05 PROCEDURE — 84295 ASSAY OF SERUM SODIUM: CPT

## 2023-08-05 PROCEDURE — 85027 COMPLETE CBC AUTOMATED: CPT

## 2023-08-05 PROCEDURE — 87640 STAPH A DNA AMP PROBE: CPT

## 2023-08-05 RX ADMIN — Medication 50 MILLIGRAM(S): at 05:26

## 2023-08-05 RX ADMIN — AMIODARONE HYDROCHLORIDE 100 MILLIGRAM(S): 400 TABLET ORAL at 05:27

## 2023-08-05 RX ADMIN — Medication 81 MILLIGRAM(S): at 11:58

## 2023-08-05 RX ADMIN — APIXABAN 2.5 MILLIGRAM(S): 2.5 TABLET, FILM COATED ORAL at 05:26

## 2023-08-05 RX ADMIN — APIXABAN 2.5 MILLIGRAM(S): 2.5 TABLET, FILM COATED ORAL at 17:19

## 2023-08-05 RX ADMIN — CHLORHEXIDINE GLUCONATE 1 APPLICATION(S): 213 SOLUTION TOPICAL at 07:28

## 2023-08-05 RX ADMIN — Medication 50 MICROGRAM(S): at 05:26

## 2023-08-05 RX ADMIN — LOSARTAN POTASSIUM 50 MILLIGRAM(S): 100 TABLET, FILM COATED ORAL at 05:26

## 2023-08-05 NOTE — PROGRESS NOTE ADULT - PROBLEM SELECTOR PLAN 1
Known hx of severe AS, currently asymptomatic, good functional status. Pt currently undergoing TAVR evaluation.  - TTE from 4/2023 showing  low flow, low gradient aortic stenosis with preserved EF. The peak transaortic velocity is 3.70 m/s, peak transaortic gradient is 54.8 mmHg and mean transaortic gradient is 34.0 mmHg with an LVOT/aortic valve VTI ratio of 0.20. The aortic valve area is estimated at 0.69 cm² by the continuity equation. There is mild aortic regurgitation.  - Cardiology consulted ; f/up reccs  -c/w telemetry
Known hx of severe AS, currently asymptomatic, good functional status. Pt currently undergoing TAVR evaluation.  - TTE from 4/2023 showing  low flow, low gradient aortic stenosis with preserved EF.   - follows with CT Sx Dr Serna  -c/w telemetry

## 2023-08-05 NOTE — DISCHARGE NOTE PROVIDER - NSDCFUSCHEDAPPT_GEN_ALL_CORE_FT
Francisco Deleon  [facility] Physician [facility]  CARDIOLOGY [address]  Scheduled Appointment: 10/26/2023

## 2023-08-05 NOTE — PROGRESS NOTE ADULT - TIME BILLING
case complexity and discharge planning. Pt is clinically stable for discharge. She is to follow up with Vascular Sx, Podiatry, CT surgery and Cardiology within 1-2 weeks.

## 2023-08-05 NOTE — DISCHARGE NOTE PROVIDER - PROVIDER TOKENS
PROVIDER:[TOKEN:[[id number]],SCHEDULEDAPPT:[08/09/2023]],PROVIDER:[TOKEN:[[id number]],FOLLOWUP:[1 week]]

## 2023-08-05 NOTE — DISCHARGE NOTE PROVIDER - NSDCMRMEDTOKEN_GEN_ALL_CORE_FT
amiodarone 200 mg oral tablet: 0.5 tab(s) orally once a day  aspirin 81 mg oral delayed release tablet: 1 tab(s) orally once a day (at bedtime)  Eliquis 2.5 mg oral tablet: 1 tab(s) orally 2 times a day resume on 5/2/23  levothyroxine 50 mcg (0.05 mg) oral tablet: 1 tab(s) orally  metoprolol succinate 50 mg oral tablet, extended release: 1 tab(s) orally once a day  rosuvastatin 5 mg oral tablet: 1 tab(s) orally once a day (at bedtime)  telmisartan 40 mg oral tablet: 1 tab(s) orally once a day   amiodarone 200 mg oral tablet: 0.5 tab(s) orally once a day  aspirin 81 mg oral delayed release tablet: 1 tab(s) orally once a day (at bedtime)  Eliquis 2.5 mg oral tablet: 1 tab(s) orally 2 times a day  levothyroxine 50 mcg (0.05 mg) oral tablet: 1 tab(s) orally  metoprolol succinate 50 mg oral tablet, extended release: 1 tab(s) orally once a day  rosuvastatin 5 mg oral tablet: 1 tab(s) orally once a day (at bedtime)  telmisartan 40 mg oral tablet: 1 tab(s) orally once a day

## 2023-08-05 NOTE — PROGRESS NOTE ADULT - ASSESSMENT
83 F with hx of  HTN, HLD, CAD w/ triple vessel disease (RCA, LAD, LCx - medically managed), Afib, known severe Aortic stenosis (EFRAIN 0.69sqcm), right internal carotid stenosis, PAD, Hypothyroidism, Anxiety, presenting for TAVR workup upon request of outpatient provider. Patient admitted for evaluation by cardiology, vascular, podiatry.
83 F with hx of  HTN, HLD, CAD w/ triple vessel disease (RCA, LAD, LCx - medically managed), Afib, known severe Aortic stenosis (EFRAIN 0.69sqcm), right internal carotid stenosis, PAD, Hypothyroidism, Anxiety, presenting for TAVR workup upon request of outpatient provider. Patient admitted for evaluation by cardiology, vascular, podiatry.

## 2023-08-05 NOTE — DISCHARGE NOTE NURSING/CASE MANAGEMENT/SOCIAL WORK - PATIENT PORTAL LINK FT
You can access the FollowMyHealth Patient Portal offered by [facility] by registering at the following website: [email or web address]. By joining [employer]’s FollowMyHealth portal, you will also be able to view your health information using other applications (apps) compatible with our system.

## 2023-08-05 NOTE — PROGRESS NOTE ADULT - SUBJECTIVE AND OBJECTIVE BOX
Patient is a 83y old  Female who presents with a chief complaint of TAVR workup (04 Aug 2023 11:11)      SUBJECTIVE / OVERNIGHT EVENTS:  Pt seen and examined. No acute events overnight. She denies CP, SOB, fever/chills.    MEDICATIONS  (STANDING):  aMIOdarone    Tablet 100 milliGRAM(s) Oral daily  apixaban 2.5 milliGRAM(s) Oral every 12 hours  aspirin enteric coated 81 milliGRAM(s) Oral daily  atorvastatin 20 milliGRAM(s) Oral at bedtime  levothyroxine 50 MICROGram(s) Oral daily  losartan 50 milliGRAM(s) Oral daily  metoprolol succinate ER 50 milliGRAM(s) Oral daily    MEDICATIONS  (PRN):      Vital Signs Last 24 Hrs  T(C): 36.8 (04 Aug 2023 12:40), Max: 37 (03 Aug 2023 16:37)  T(F): 98.2 (04 Aug 2023 12:40), Max: 98.6 (03 Aug 2023 16:37)  HR: 77 (04 Aug 2023 12:40) (69 - 85)  BP: 139/64 (04 Aug 2023 12:40) (131/64 - 175/76)  BP(mean): --  RR: 18 (04 Aug 2023 12:40) (16 - 20)  SpO2: 96% (04 Aug 2023 12:40) (95% - 99%)    Parameters below as of 04 Aug 2023 12:40  Patient On (Oxygen Delivery Method): room air      CAPILLARY BLOOD GLUCOSE        I&O's Summary      PHYSICAL EXAM:  GENERAL: NAD, well-groomed  HEAD:  Atraumatic, Normocephalic  EYES:  conjunctiva and sclera clear  NECK: Supple, No JVD  CHEST/LUNG: Clear to auscultation bilaterally; No wheeze  HEART: Regular rate and rhythm; No murmurs, rubs, or gallops  ABDOMEN: Soft, Nontender, Nondistended; Bowel sounds present  EXTREMITIES:  left foot dry gangrene  PSYCH: AAOx3  NEUROLOGY: non-focal  SKIN: No rashes or lesions    LABS:                        13.3   8.52  )-----------( 198      ( 04 Aug 2023 06:55 )             39.4     08-04    141  |  103  |  19  ----------------------------<  106<H>  3.8   |  24  |  0.81    Ca    9.2      04 Aug 2023 06:52  Phos  3.0     08-04  Mg     2.1     08-04    TPro  7.4  /  Alb  4.3  /  TBili  0.4  /  DBili  x   /  AST  15  /  ALT  14  /  AlkPhos  107  08-03          Urinalysis Basic - ( 04 Aug 2023 06:52 )    Color: x / Appearance: x / SG: x / pH: x  Gluc: 106 mg/dL / Ketone: x  / Bili: x / Urobili: x   Blood: x / Protein: x / Nitrite: x   Leuk Esterase: x / RBC: x / WBC x   Sq Epi: x / Non Sq Epi: x / Bacteria: x        Consultant(s) Notes Reviewed:  Vascular sx, Podiatry    
Patient is a 83y old  Female who presents with a chief complaint of TAVR workup (05 Aug 2023 11:21)      SUBJECTIVE / OVERNIGHT EVENTS:  Pt seen and examined. No acute events overnight. She denies cp, sob, left foot pain, fever/chills.    MEDICATIONS  (STANDING):  aMIOdarone    Tablet 100 milliGRAM(s) Oral daily  apixaban 2.5 milliGRAM(s) Oral every 12 hours  aspirin enteric coated 81 milliGRAM(s) Oral daily  atorvastatin 20 milliGRAM(s) Oral at bedtime  chlorhexidine 2% Cloths 1 Application(s) Topical <User Schedule>  levothyroxine 50 MICROGram(s) Oral daily  losartan 50 milliGRAM(s) Oral daily  metoprolol succinate ER 50 milliGRAM(s) Oral daily    MEDICATIONS  (PRN):      Vital Signs Last 24 Hrs  T(C): 36.6 (05 Aug 2023 08:29), Max: 37.1 (05 Aug 2023 04:17)  T(F): 97.8 (05 Aug 2023 08:29), Max: 98.7 (05 Aug 2023 04:17)  HR: 71 (05 Aug 2023 08:29) (71 - 84)  BP: 123/57 (05 Aug 2023 08:29) (123/57 - 157/73)  BP(mean): --  RR: 18 (05 Aug 2023 08:29) (18 - 19)  SpO2: 98% (05 Aug 2023 08:29) (95% - 98%)    Parameters below as of 05 Aug 2023 08:29  Patient On (Oxygen Delivery Method): room air      CAPILLARY BLOOD GLUCOSE        I&O's Summary      PHYSICAL EXAM:  GENERAL: NAD, well-groomed  HEAD:  Atraumatic, Normocephalic  EYES:  conjunctiva and sclera clear  NECK: Supple, No JVD  CHEST/LUNG: Clear to auscultation bilaterally; No wheeze  HEART: Regular rate and rhythm; No murmurs, rubs, or gallops  ABDOMEN: Soft, Nontender, Nondistended; Bowel sounds present  EXTREMITIES:  left foot dry gangrene  PSYCH: AAOx3  NEUROLOGY: non-focal  SKIN: No rashes or lesions    LABS:                        13.3   8.52  )-----------( 198      ( 04 Aug 2023 06:55 )             39.4     08-04    141  |  103  |  19  ----------------------------<  106<H>  3.8   |  24  |  0.81    Ca    9.2      04 Aug 2023 06:52  Phos  3.0     08-04  Mg     2.1     08-04    TPro  7.4  /  Alb  4.3  /  TBili  0.4  /  DBili  x   /  AST  15  /  ALT  14  /  AlkPhos  107  08-03          Urinalysis Basic - ( 04 Aug 2023 06:52 )    Color: x / Appearance: x / SG: x / pH: x  Gluc: 106 mg/dL / Ketone: x  / Bili: x / Urobili: x   Blood: x / Protein: x / Nitrite: x   Leuk Esterase: x / RBC: x / WBC x   Sq Epi: x / Non Sq Epi: x / Bacteria: x        RADIOLOGY & ADDITIONAL TESTS:    Imaging Personally Reviewed:  MONTY/PVR 8/4  Moderate arterial flow limitation in the right lower   extremity localizing to the femoropopliteal distribution.    Limited study of the left lower extremity. PVR waveforms suggest   flow-limiting disease in the iliofemoral distribution.    Small vessel disease in the left foot.

## 2023-08-05 NOTE — PROGRESS NOTE ADULT - PROBLEM SELECTOR PLAN 3
being chronically managed with amiodarone and Eliquis  - Continue amiodarone 100mg qd  - Continue Eliquis 2.5mg BID  - Continue metoprolol succinate 50mg qd  - Monitor on telemetry
being chronically managed with amiodarone and Eliquis  - Continue amiodarone 100mg qd  - Continue Eliquis 2.5mg BID  - Continue metoprolol succinate 50mg qd  - Monitor on telemetry

## 2023-08-05 NOTE — DISCHARGE NOTE NURSING/CASE MANAGEMENT/SOCIAL WORK - NSDCPEFALRISK_GEN_ALL_CORE
For information on Fall & Injury Prevention, visit: [email or web address] OR  [email or web address] OR  https://www.cdc.gov/steadi/patient.html

## 2023-08-05 NOTE — DISCHARGE NOTE NURSING/CASE MANAGEMENT/SOCIAL WORK - NSDCFUADDAPPT_GEN_ALL_CORE_FT
APPTS ARE READY TO BE MADE: [x] YES    Best Family or Patient Contact (if needed):    Additional Information about above appointments (if needed):    1:   2:   3:     Other comments or requests:   Podiatry Discharge Instructions:  Follow up: Please follow up with Dr. Fang within 1 week of discharge from the hospital, please call [phone] for appointment and discuss that you recently were seen in the hospital.  Wound Care: Please apply betadine to bilateral foot wounds followed by 4x4 gauze and leida daily.   Weight bearing: Please weight bear as tolerated in a surgical shoe.  Antibiotics: Please continue as instructed.

## 2023-08-05 NOTE — DISCHARGE NOTE PROVIDER - NSDCCPCAREPLAN_GEN_ALL_CORE_FT
PRINCIPAL DISCHARGE DIAGNOSIS  Diagnosis: Gangrene of toe of left foot  Assessment and Plan of Treatment: you have no signs of foot infection  you do not need antibiotics  YOu need local care :Please apply betadine to bilateral foot wounds followed by 4x4 gauze and leida daily.   Weight bearing: Please weight bear as tolerated in a surgical shoe.  Follow up with podiatry in 1 week  Follow up with vascular surgery for angiogram on 8/10/23      SECONDARY DISCHARGE DIAGNOSES  Diagnosis: CAD, multiple vessel  Assessment and Plan of Treatment: conitnue aspirin and lipitor    Diagnosis: Severe aortic stenosis  Assessment and Plan of Treatment: Follow up with Dr Serna     PRINCIPAL DISCHARGE DIAGNOSIS  Diagnosis: Gangrene of toe of left foot  Assessment and Plan of Treatment: you have no signs of foot infection  you do not need antibiotics  YOu need local care :Please apply betadine to bilateral foot wounds followed by 4x4 gauze and leida daily.   Weight bearing: Please weight bear as tolerated in a surgical shoe.  Follow up with podiatry in 1 week  Follow up with vascular surgery for angiogram on 8/10/23;         SECONDARY DISCHARGE DIAGNOSES  Diagnosis: CAD, multiple vessel  Assessment and Plan of Treatment: conitnue aspirin and lipitor    Diagnosis: Severe aortic stenosis  Assessment and Plan of Treatment: Follow up with Dr Serna

## 2023-08-05 NOTE — PROGRESS NOTE ADULT - PROBLEM SELECTOR PLAN 5
well controlled  - Continue losartan (pt on telmisartan at home)
well controlled  - Continue losartan (pt on telmisartan at home)

## 2023-08-05 NOTE — PROGRESS NOTE ADULT - PROBLEM SELECTOR PLAN 2
Dry gangrene of the distal toes of L foot, likely due to peripheral artery disease. Pulses palpable bilaterally. Wounds appear to be healing and not acutely infected. No signs of systemic infection at this time.  - Vascular sx consulted ; recc'd MONTY/PVR which shows flow-limiting disease in the iliofemoral distribution of LLE  - for angiogram on Thursday 8/10 ; pt however does not want ot wait for that ; prefers to follow up outpatient  - will need Cardiology clearance for angiogram  - Podiatry following ; wound does not appear acutely infected; monitor off antibiotics; c/w local wound care  - Xray without radiological evidence of osteomyelitis  - pt to c/w outpatient Podiatry follow up

## 2023-08-05 NOTE — DISCHARGE NOTE PROVIDER - NSDCFUADDAPPT_GEN_ALL_CORE_FT
Podiatry Discharge Instructions:  Follow up: Please follow up with Dr. Fang within 1 week of discharge from the hospital, please call [phone] for appointment and discuss that you recently were seen in the hospital.  Wound Care: Please apply betadine to bilateral foot wounds followed by 4x4 gauze and leida daily.   Weight bearing: Please weight bear as tolerated in a surgical shoe.  Antibiotics: Please continue as instructed. APPTS ARE READY TO BE MADE: [x] YES    Best Family or Patient Contact (if needed):    Additional Information about above appointments (if needed):    1:   2:   3:     Other comments or requests:   Podiatry Discharge Instructions:  Follow up: Please follow up with Dr. Fang within 1 week of discharge from the hospital, please call [phone] for appointment and discuss that you recently were seen in the hospital.  Wound Care: Please apply betadine to bilateral foot wounds followed by 4x4 gauze and leida daily.   Weight bearing: Please weight bear as tolerated in a surgical shoe.  Antibiotics: Please continue as instructed.

## 2023-08-05 NOTE — DISCHARGE NOTE PROVIDER - CARE PROVIDER_API CALL
Laura Tavares  Vascular Surgery  [address], [address]  [place] [address]  Phone: [phone]  Fax: [phone]  Scheduled Appointment: 08/09/2023    Manolo Fang  Podiatric Medicine and Surgery  [address], [address]  [place] [address]  Phone: [phone]  Fax: [phone]  Follow Up Time: 1 week

## 2023-08-05 NOTE — PROGRESS NOTE ADULT - PROBLEM SELECTOR PLAN 4
Multivessel disease, currently being managed medically, No hx of PCI  - Cath from 5/1/23 showing 40% stenosis of LM, 40% stenosis of prox.LAD, 50% stenosis of dist LAD, and 100% stenosis of RCA.   - Continue statin, on rosuvastatin at home. Continue atorvastatin here.  - Continue ASA 81
Multivessel disease, currently being managed medically, No hx of PCI  - Cath from 5/1/23 showing 40% stenosis of LM, 40% stenosis of prox.LAD, 50% stenosis of dist LAD, and 100% stenosis of RCA.   - Continue statin, on rosuvastatin at home. Continue atorvastatin here.  - Continue ASA 81

## 2023-08-05 NOTE — DISCHARGE NOTE PROVIDER - HOSPITAL COURSE
HPI:  82 y/o Female w/ PMHx of HTN, HLD, CAD w/ triple vessel disease (RCA, LAD, LCx - medically managed), Afib, known severe Aortic stenosis (EFRAIN 0.69sqcm), right internal carotid stenosis, PAD, Hypothyroidism, Anxiety presenting to ED on outpatient provider's request for further TAVR workup involving cardiology, vascular, and ID evaluation. Patient currently without any complaints or symptoms. Patient has overall good functional status and able to do all her ADLs. She lives alone, walks everyday in the morning without symptoms of chest pain, dyspnea, pain, lightheadedness, or weakness. Patient does have wounds in the L foot which causes discomfort but currently minimal amounts of pain. Pt endorses aching in her legs when she walks for prolonged periods. Pt says she saw her outpatient providers who recommended her come to the hospital for further workup. She is currently being evaluated for a TAVR. She denies fevers, No recent falls.  (03 Aug 2023 23:14)    Hospital Course:      Important Medication Changes and Reason:    Active or Pending Issues Requiring Follow-up:    Advanced Directives:   [ ] Full code  [ ] DNR  [ ] Hospice    Discharge Diagnoses:         HPI:  82 y/o Female w/ PMHx of HTN, HLD, CAD w/ triple vessel disease (RCA, LAD, LCx - medically managed), Afib, known severe Aortic stenosis (EFRAIN 0.69sqcm), right internal carotid stenosis, PAD, Hypothyroidism, Anxiety presenting to ED on outpatient provider's request for further TAVR workup involving cardiology, vascular, and ID evaluation. Patient currently without any complaints or symptoms. Patient has overall good functional status and able to do all her ADLs. She lives alone, walks everyday in the morning without symptoms of chest pain, dyspnea, pain, lightheadedness, or weakness. Patient does have wounds in the L foot which causes discomfort but currently minimal amounts of pain. Pt endorses aching in her legs when she walks for prolonged periods. Pt says she saw her outpatient providers who recommended her come to the hospital for further workup. She is currently being evaluated for a TAVR. She denies fevers, No recent falls.  (03 Aug 2023 23:14)    Hospital Course:  Afebrile, WBC 8.22, ESR/CRP pending  - Left foot erythema from distal digits 1-5 to MTPJ's, lateral 5th MTPJ wound to subQ, dorsal hallux wound to subQ, dorsal midfoot and hindfoot isolated erythema, early ischemic changes with hyperkeratosis of digits 1-5 , submetatarsal 2 hyperkeratosis, mild malodor, no drainage, no pus, no tracking, no tunneling. Right foot distal hallux erythema, medial 1st MTPJ erythema, early deep tissue injury of posterior heel, no acute signs of infection.  - Left foot Xray: no OM, soft tissue irregularity at tip of 2nd-5th digits and plantar and medial 1st digit  - Recommend vascular consult      Important Medication Changes and Reason:    Active or Pending Issues Requiring Follow-up:    Advanced Directives:   [ ] Full code  [ ] DNR  [ ] Hospice    Discharge Diagnoses:         HPI:  84 y/o Female w/ PMHx of HTN, HLD, CAD w/ triple vessel disease (RCA, LAD, LCx - medically managed), Afib, known severe Aortic stenosis (EFRAIN 0.69sqcm), right internal carotid stenosis, PAD, Hypothyroidism, Anxiety presenting to ED on outpatient provider's request for further TAVR workup involving cardiology, vascular, and ID evaluation. Patient currently without any complaints or symptoms. Patient has overall good functional status and able to do all her ADLs. She lives alone, walks everyday in the morning without symptoms of chest pain, dyspnea, pain, lightheadedness, or weakness. Patient does have wounds in the L foot which causes discomfort but currently minimal amounts of pain. Pt endorses aching in her legs when she walks for prolonged periods. Pt says she saw her outpatient providers who recommended her come to the hospital for further workup. She is currently being evaluated for a TAVR. She denies fevers, No recent falls.  (03 Aug 2023 23:14)    Hospital Course:  Afebrile, WBC 8.22. ESR, CRP wnl.  Left foot Xray: no OM, soft tissue irregularity at tip of 2nd-5th digits and plantar and medial 1st digit. NO active infection. Dry gangrene. Vascular surgery consulted.   s/p suri/pvr which was abnormal showing  Moderate arterial flow limitation in the right lower extremity localizing to the femoropopliteal distribution. Limited study of the left lower extremity. PVR waveforms suggest   flow-limiting disease in the iliofemoral distribution. Small vessel disease in the left foot. Recommending angiogram and possible stenting. Surgery planned for 8/10. Cardiology clearance obtained.              HPI:  82 y/o Female w/ PMHx of HTN, HLD, CAD w/ triple vessel disease (RCA, LAD, LCx - medically managed), Afib, known severe Aortic stenosis (EFRAIN 0.69sqcm), right internal carotid stenosis, PAD, Hypothyroidism, Anxiety presenting to ED on outpatient provider's request for further TAVR workup involving cardiology, vascular, and ID evaluation. Patient currently without any complaints or symptoms. Patient has overall good functional status and able to do all her ADLs. She lives alone, walks everyday in the morning without symptoms of chest pain, dyspnea, pain, lightheadedness, or weakness. Patient does have wounds in the L foot which causes discomfort but currently minimal amounts of pain. Pt endorses aching in her legs when she walks for prolonged periods. Pt says she saw her outpatient providers who recommended her come to the hospital for further workup. She is currently being evaluated for a TAVR. She denies fevers, No recent falls.  (03 Aug 2023 23:14)    Hospital Course:  Afebrile, WBC 8.22. ESR, CRP wnl.  Left foot Xray: no OM, soft tissue irregularity at tip of 2nd-5th digits and plantar and medial 1st digit. NO active infection. Dry gangrene. Vascular surgery consulted.   s/p suri/pvr which was abnormal showing  Moderate arterial flow limitation in the right lower extremity localizing to the femoropopliteal distribution. Limited study of the left lower extremity. PVR waveforms suggest   flow-limiting disease in the iliofemoral distribution. Small vessel disease in the left foot. Recommending angiogram and possible stenting. Surgery planned for 8/10.   Medically cleared for discharge Home with local wound care and vascular follow up.

## 2023-08-05 NOTE — PROGRESS NOTE ADULT - NSPROGADDITIONALINFOA_GEN_ALL_CORE
d/w ACP Diana    time spent reviewing prior charts, meds, discussing plan with patient= 50 minutes
d/w SALAZAR Odonnell

## 2023-08-15 ENCOUNTER — RX RENEWAL (OUTPATIENT)
Age: 83
End: 2023-08-15

## 2023-08-15 RX ORDER — AMIODARONE HYDROCHLORIDE 200 MG/1
200 TABLET ORAL
Qty: 45 | Refills: 2 | Status: ACTIVE | COMMUNITY
Start: 2022-02-21 | End: 1900-01-01

## 2023-08-15 RX ORDER — APIXABAN 2.5 MG/1
2.5 TABLET, FILM COATED ORAL
Qty: 180 | Refills: 2 | Status: ACTIVE | COMMUNITY
Start: 2023-08-15 | End: 1900-01-01

## 2023-08-18 ENCOUNTER — APPOINTMENT (OUTPATIENT)
Dept: VASCULAR SURGERY | Facility: HOSPITAL | Age: 83
End: 2023-08-18

## 2023-08-24 ENCOUNTER — INPATIENT (INPATIENT)
Facility: HOSPITAL | Age: 83
LOS: 0 days | Discharge: ANOTHER HOSPITAL | End: 2023-08-24
Attending: SURGERY | Admitting: SURGERY
Payer: MEDICARE

## 2023-08-24 ENCOUNTER — INPATIENT (INPATIENT)
Facility: HOSPITAL | Age: 83
LOS: 14 days | Discharge: HOME HEALTH | DRG: 270 | End: 2023-09-08
Attending: SURGERY | Admitting: SURGERY
Payer: MEDICARE

## 2023-08-24 ENCOUNTER — APPOINTMENT (OUTPATIENT)
Dept: VASCULAR SURGERY | Facility: HOSPITAL | Age: 83
End: 2023-08-24

## 2023-08-24 VITALS
OXYGEN SATURATION: 95 % | SYSTOLIC BLOOD PRESSURE: 123 MMHG | TEMPERATURE: 98 F | HEART RATE: 86 BPM | DIASTOLIC BLOOD PRESSURE: 65 MMHG | RESPIRATION RATE: 18 BRPM

## 2023-08-24 VITALS
SYSTOLIC BLOOD PRESSURE: 153 MMHG | TEMPERATURE: 98 F | HEART RATE: 84 BPM | DIASTOLIC BLOOD PRESSURE: 64 MMHG | RESPIRATION RATE: 18 BRPM | OXYGEN SATURATION: 100 %

## 2023-08-24 VITALS — HEIGHT: 64 IN | WEIGHT: 134.92 LBS

## 2023-08-24 DIAGNOSIS — Z87.81 PERSONAL HISTORY OF (HEALED) TRAUMATIC FRACTURE: Chronic | ICD-10-CM

## 2023-08-24 DIAGNOSIS — I73.9 PERIPHERAL VASCULAR DISEASE, UNSPECIFIED: ICD-10-CM

## 2023-08-24 LAB
ALBUMIN SERPL ELPH-MCNC: 4.1 G/DL — SIGNIFICANT CHANGE UP (ref 3.3–5)
ALP SERPL-CCNC: 101 U/L — SIGNIFICANT CHANGE UP (ref 40–120)
ALT FLD-CCNC: 12 U/L — SIGNIFICANT CHANGE UP (ref 4–33)
ANION GAP SERPL CALC-SCNC: 13 MMOL/L — SIGNIFICANT CHANGE UP (ref 7–14)
AST SERPL-CCNC: 17 U/L — SIGNIFICANT CHANGE UP (ref 4–32)
BILIRUB SERPL-MCNC: 0.7 MG/DL — SIGNIFICANT CHANGE UP (ref 0.2–1.2)
BUN SERPL-MCNC: 22 MG/DL — SIGNIFICANT CHANGE UP (ref 7–23)
CALCIUM SERPL-MCNC: 9.2 MG/DL — SIGNIFICANT CHANGE UP (ref 8.4–10.5)
CHLORIDE SERPL-SCNC: 95 MMOL/L — LOW (ref 98–107)
CO2 SERPL-SCNC: 26 MMOL/L — SIGNIFICANT CHANGE UP (ref 22–31)
CREAT SERPL-MCNC: 0.86 MG/DL — SIGNIFICANT CHANGE UP (ref 0.5–1.3)
EGFR: 67 ML/MIN/1.73M2 — SIGNIFICANT CHANGE UP
GLUCOSE SERPL-MCNC: 155 MG/DL — HIGH (ref 70–99)
GRAM STN FLD: SIGNIFICANT CHANGE UP
HCT VFR BLD CALC: 37.8 % — SIGNIFICANT CHANGE UP (ref 34.5–45)
HGB BLD-MCNC: 12.7 G/DL — SIGNIFICANT CHANGE UP (ref 11.5–15.5)
MAGNESIUM SERPL-MCNC: 2 MG/DL — SIGNIFICANT CHANGE UP (ref 1.6–2.6)
MCHC RBC-ENTMCNC: 33.6 GM/DL — SIGNIFICANT CHANGE UP (ref 32–36)
MCHC RBC-ENTMCNC: 33.8 PG — SIGNIFICANT CHANGE UP (ref 27–34)
MCV RBC AUTO: 100.5 FL — HIGH (ref 80–100)
NRBC # BLD: 0 /100 WBCS — SIGNIFICANT CHANGE UP (ref 0–0)
NRBC # FLD: 0 K/UL — SIGNIFICANT CHANGE UP (ref 0–0)
PHOSPHATE SERPL-MCNC: 3.3 MG/DL — SIGNIFICANT CHANGE UP (ref 2.5–4.5)
PLATELET # BLD AUTO: 191 K/UL — SIGNIFICANT CHANGE UP (ref 150–400)
POTASSIUM SERPL-MCNC: 4.1 MMOL/L — SIGNIFICANT CHANGE UP (ref 3.5–5.3)
POTASSIUM SERPL-SCNC: 4.1 MMOL/L — SIGNIFICANT CHANGE UP (ref 3.5–5.3)
PROT SERPL-MCNC: 7.4 G/DL — SIGNIFICANT CHANGE UP (ref 6–8.3)
RBC # BLD: 3.76 M/UL — LOW (ref 3.8–5.2)
RBC # FLD: 12.2 % — SIGNIFICANT CHANGE UP (ref 10.3–14.5)
SODIUM SERPL-SCNC: 134 MMOL/L — LOW (ref 135–145)
SPECIMEN SOURCE: SIGNIFICANT CHANGE UP
WBC # BLD: 11.28 K/UL — HIGH (ref 3.8–10.5)
WBC # FLD AUTO: 11.28 K/UL — HIGH (ref 3.8–10.5)

## 2023-08-24 PROCEDURE — 93010 ELECTROCARDIOGRAM REPORT: CPT

## 2023-08-24 RX ORDER — AMIODARONE HYDROCHLORIDE 400 MG/1
100 TABLET ORAL DAILY
Refills: 0 | Status: DISCONTINUED | OUTPATIENT
Start: 2023-08-25 | End: 2023-08-24

## 2023-08-24 RX ORDER — METOPROLOL TARTRATE 50 MG
50 TABLET ORAL DAILY
Refills: 0 | Status: DISCONTINUED | OUTPATIENT
Start: 2023-08-25 | End: 2023-08-24

## 2023-08-24 RX ORDER — MINERAL OIL
1 OIL (ML) MISCELLANEOUS ONCE
Refills: 0 | Status: COMPLETED | OUTPATIENT
Start: 2023-08-24 | End: 2023-08-24

## 2023-08-24 RX ORDER — LEVOTHYROXINE SODIUM 125 MCG
1 TABLET ORAL
Refills: 0 | DISCHARGE

## 2023-08-24 RX ORDER — ATORVASTATIN CALCIUM 80 MG/1
20 TABLET, FILM COATED ORAL AT BEDTIME
Refills: 0 | Status: DISCONTINUED | OUTPATIENT
Start: 2023-08-24 | End: 2023-08-24

## 2023-08-24 RX ORDER — ASPIRIN/CALCIUM CARB/MAGNESIUM 324 MG
1 TABLET ORAL
Refills: 0 | DISCHARGE

## 2023-08-24 RX ORDER — LOSARTAN POTASSIUM 100 MG/1
50 TABLET, FILM COATED ORAL DAILY
Refills: 0 | Status: DISCONTINUED | OUTPATIENT
Start: 2023-08-25 | End: 2023-08-24

## 2023-08-24 RX ORDER — LEVOTHYROXINE SODIUM 125 MCG
50 TABLET ORAL DAILY
Refills: 0 | Status: DISCONTINUED | OUTPATIENT
Start: 2023-08-24 | End: 2023-08-24

## 2023-08-24 RX ORDER — ASPIRIN/CALCIUM CARB/MAGNESIUM 324 MG
81 TABLET ORAL DAILY
Refills: 0 | Status: DISCONTINUED | OUTPATIENT
Start: 2023-08-24 | End: 2023-08-24

## 2023-08-24 RX ORDER — AMIODARONE HYDROCHLORIDE 400 MG/1
0.5 TABLET ORAL
Refills: 0 | DISCHARGE

## 2023-08-24 RX ORDER — APIXABAN 2.5 MG/1
1 TABLET, FILM COATED ORAL
Qty: 0 | Refills: 0 | DISCHARGE

## 2023-08-24 RX ORDER — SODIUM CHLORIDE 9 MG/ML
3 INJECTION INTRAMUSCULAR; INTRAVENOUS; SUBCUTANEOUS EVERY 8 HOURS
Refills: 0 | Status: DISCONTINUED | OUTPATIENT
Start: 2023-08-24 | End: 2023-08-24

## 2023-08-24 RX ADMIN — Medication 81 MILLIGRAM(S): at 19:22

## 2023-08-24 RX ADMIN — SODIUM CHLORIDE 3 MILLILITER(S): 9 INJECTION INTRAMUSCULAR; INTRAVENOUS; SUBCUTANEOUS at 12:32

## 2023-08-24 RX ADMIN — Medication 1 APPLICATION(S): at 12:27

## 2023-08-24 NOTE — H&P CARDIOLOGY - HISTORY OF PRESENT ILLNESS
82 y/o female with a PMHx of medically managed CAD (prox LM 40%, prox LAD 40%, distal LAD 50%, Cx mild, distal % ), paroxysmal AF on Eliquis (last dose 8/22/2023 PM), severe AS (EFRAIN 0.69 sqcm) pending TAVR (transcatheter aortic valve replacement), right internal carotid stenosis, PAD with chronic left first toe wound, HTN, HLD, hypothyroidism, and anxiety presents for elective left lower extremity angiogram. Pt has had a wound in her left first toe for the past several months. Pt admits to intermittent "burning" pain in her toe and her left leg from her calf to her foot. Pt elicits that her pain is made worse with activity and improved with rest. Pt takes Tylenol as needed for the pain which provides mild to moderate relief. Pt was recently hospitalized at [facility]. X-ray of the left foot was negative for acute osteomyelitis. MONTY/PVR revealed moderate arterial flow limitation in the right lower extremity localizing to the femoropopliteal distribution with PVR waveforms suggesting flow-limiting disease in the iliofemoral distribution and small vessel disease in the left foot. Pt denies fever, chills, recent travel, headache, dizziness, visual deficits, chest pain, shortness of breath, orthopnea, palpitations, abdominal pain, N/V/D/C, hematochezia, melena, dysuria, hematuria, LOC, syncope. Pt now presents for left lower extremity angiogram.    Vascular: Dr. Laura Tavares  CTS: Dr. Nghia Serna

## 2023-08-24 NOTE — H&P CARDIOLOGY - BIRTH SEX
Female [Initial Consultation] : an initial consultation for [Spouse] : spouse [FreeTextEntry2] : CLL

## 2023-08-24 NOTE — CHART NOTE - NSCHARTNOTEFT_GEN_A_CORE
Pt s/p diagnostic LLE angiogram. Await transfer to [facility] for bypass. Eliquis on hold. As per Dr. Tavares, Heparin gtt can be resumed by primary team (vascular) on 8/25. Sign out given to vascular service.

## 2023-08-24 NOTE — CHART NOTE - NSCHARTNOTEFT_GEN_A_CORE
Post Operative Note  Patient: NANO GARCIA 83y (1940) Female   MRN: [id number]  Location: [address] 05  Visit: 08-24-23 Inpatient  Date: 08-24-23 @ 15:49    Procedure: S/P ***    Subjective: Patient seen and examined post operatively. Reports pain as controlled. Denies nausea, vomiting, fever, chills, chest pain, SOB, cough.      Objective:  Vitals: T(F): --  HR: --  BP: --  RR: --  SpO2: --  Vent Settings:     In:   IV Fluids: sodium chloride 0.9% lock flush 3 milliLiter(s) IV Push every 8 hours      Out:   EBL:   Voided Urine:   Alas Catheter: yes no   Drains:   RICHELLE:  ,   Chest Tube:    NG Tube:       Physical Examination:  General: NAD, resting comfortably in bed  HEENT: Normocephalic atraumatic  Respiratory: Nonlabored respirations, normal CW expansion.  Cardio: S1S2, regular rate and rhythm.  Abdomen: softly distended, appropriately tender, surgical incisions are c/d/i. NGT/OGT*  Vascular: extremities are warm and well perfused.     Imaging:  No post-op imaging studies    Assessment:  83yFemale patient S/P *** for ***    Plan:  - IV Abx:   - Pain control PRN  - Diet:  - IVF  - Alas, DTV  - Activity:  - DVT ppx: SCD's &     Date/Time: 08-24-23 @ 15:49 Post Operative Note  Patient: NANO GARCIA 83y (1940) Female   MRN: [id number]  Location: [address]  Visit: 08-24-23 Inpatient  Date: 08-24-23 @ 15:49    Procedure: S/P LLE angiogram on 8/24.     Subjective: Patient seen and examined post operatively. Reports pain as controlled. Denies nausea, vomiting, fever, chills, chest pain, SOB, cough. She denies pain or swelling at her right groin.       Objective:  Vitals: T(F): --  HR: --  BP: --  RR: --  SpO2: --  Vent Settings:     In:   IV Fluids: sodium chloride 0.9% lock flush 3 milliLiter(s) IV Push every 8 hours      Physical Examination:  General: NAD, resting comfortably in bed  HEENT: Normocephalic atraumatic  Respiratory: Nonlabored respirations, normal CW expansion.  Cardio: pulse present.  Abdomen: soft, nontender  Vascular: extremities are warm and well perfused, strong doppler signal in L PT, left foot dressing c/d/i    Imaging:  No post-op imaging studies    Assessment:  83yFemale patient S/P LLE angiogram on 8/24. The patient was found to have a nonhealing wound with maggots on the left foot. She will be admitted for further workup/management of wound. Might go to [facility] due to need for TAVR. Patient will be started on vac/zosyn. ID, wound care, and podiatry consulted--appreciate recs.     Plan:  - IV Abx: vanc zosyn  - Pain control PRN  - Diet: regular  - DVT ppx: SCD's    Date/Time: 08-24-23 @ 15:49

## 2023-08-24 NOTE — CONSULT NOTE ADULT - ASSESSMENT
83F presents with left foot hallux wound to subQ  - Patient seen and evaluated  - Afebrile, WBC 11.28, ESR/CRP pending  - Left foot hallux wound to subQ with medial adhered eschar, erythema from distal digits 1-5 to MTPJ's, lateral 5th MTPJ wound to subQ, medial 1st MTPJ wound to subQ, dorsal midfoot and hindfoot erythema, no drainage, no pus, no tracking, no tunneling. Right foot no open wounds or lesions no acute signs of infection.  - Left foot Xray pending  - Left foot wound cultured  - Ordered ESR and CRP  - Pod plan: local wound care vs partial 1st ray resection pending Left foot wound culture/ vasc recs.  - Discussed with attending 83F presents with left foot hallux wound to subQ  - Patient seen and evaluated  - Afebrile, WBC 11.28, ESR/CRP pending  - Left foot hallux wound to subQ with medial adhered eschar, erythema from distal digits 1-5 to MTPJ's, lateral 5th MTPJ wound to subQ, medial 1st MTPJ wound to subQ, dorsal midfoot and hindfoot erythema, no drainage, no pus, no tracking, no tunneling. Right foot no open wounds or lesions no acute signs of infection.  - Left foot Xray pending  - Left foot wound cultured  - Ordered ESR and CRP  - Pod plan: local wound care vs partial 1st ray resection pending left foot xray.  - Discussed with attending

## 2023-08-24 NOTE — CONSULT NOTE ADULT - SUBJECTIVE AND OBJECTIVE BOX
Patient is a 83y old  Female who presents with a chief complaint of     HPI:  84 y/o female with a PMHx of medically managed CAD (prox LM 40%, prox LAD 40%, distal LAD 50%, Cx mild, distal % ), paroxysmal AF on Eliquis (last dose 8/22/2023 PM), severe AS (EFRAIN 0.69 sqcm) pending TAVR (transcatheter aortic valve replacement), right internal carotid stenosis, PAD with chronic left first toe wound, HTN, HLD, hypothyroidism, and anxiety presents for elective left lower extremity angiogram. Pt has had a wound in her left first toe for the past several months. Pt admits to intermittent "burning" pain in her toe and her left leg from her calf to her foot. Pt elicits that her pain is made worse with activity and improved with rest. Pt takes Tylenol as needed for the pain which provides mild to moderate relief. Pt was recently hospitalized at [facility]. X-ray of the left foot was negative for acute osteomyelitis. MONTY/PVR revealed moderate arterial flow limitation in the right lower extremity localizing to the femoropopliteal distribution with PVR waveforms suggesting flow-limiting disease in the iliofemoral distribution and small vessel disease in the left foot. Pt denies fever, chills, recent travel, headache, dizziness, visual deficits, chest pain, shortness of breath, orthopnea, palpitations, abdominal pain, N/V/D/C, hematochezia, melena, dysuria, hematuria, LOC, syncope. Pt now presents for left lower extremity angiogram.    Vascular: Dr. Laura Tavares  CTS: Dr. Nghia Serna (24 Aug 2023 09:13)      PAST MEDICAL & SURGICAL HISTORY:  HTN (hypertension)      HLD (hyperlipidemia)      Anxiety      CAD (coronary artery disease)      PAD (peripheral artery disease)      Hypothyroidism      AF (atrial fibrillation)      Carotid artery stenosis      AS (aortic stenosis)      Status post closed fracture of right femur          MEDICATIONS  (STANDING):  aspirin enteric coated 81 milliGRAM(s) Oral daily  atorvastatin 20 milliGRAM(s) Oral at bedtime  levothyroxine 50 MICROGram(s) Oral daily  sodium chloride 0.9% lock flush 3 milliLiter(s) IV Push every 8 hours    MEDICATIONS  (PRN):      Allergies    sulfa drugs (Unknown)  latex (Unknown)    Intolerances        VITALS:    Vital Signs Last 24 Hrs  T(C): 36.8 (24 Aug 2023 17:54), Max: 36.8 (24 Aug 2023 17:54)  T(F): 98.3 (24 Aug 2023 17:54), Max: 98.3 (24 Aug 2023 17:54)  HR: 84 (24 Aug 2023 17:54) (84 - 84)  BP: 153/64 (24 Aug 2023 17:54) (153/64 - 153/64)  BP(mean): --  RR: 18 (24 Aug 2023 17:54) (18 - 18)  SpO2: 100% (24 Aug 2023 17:54) (100% - 100%)        LABS:                          12.7   11.28 )-----------( 191      ( 24 Aug 2023 09:14 )             37.8       08-24    134<L>  |  95<L>  |  22  ----------------------------<  155<H>  4.1   |  26  |  0.86    Ca    9.2      24 Aug 2023 09:14  Phos  3.3     08-24  Mg     2.00     08-24    TPro  7.4  /  Alb  4.1  /  TBili  0.7  /  DBili  x   /  AST  17  /  ALT  12  /  AlkPhos  101  08-24      CAPILLARY BLOOD GLUCOSE              LOWER EXTREMITY PHYSICAL EXAM:    Vascular: DP/PT 0/4, B/L, CFT <3 seconds B/L, Temperature gradient warm to cool, B/L.   Neuro: Epicritic sensation intact to the level of digits, B/L.  Musculoskeletal/Ortho: unremarkable  Skin: Left foot hallux wound to subQ with medial adhered eschar, erythema from distal digits 1-5 to MTPJ's, lateral 5th MTPJ wound to subQ, medial 1st MTPJ wound to subQ, dorsal midfoot and hindfoot erythema, no drainage, no pus, no tracking, no tunneling. Right foot no open wounds or lesions no acute signs of infection.    RADIOLOGY & ADDITIONAL STUDIES:

## 2023-08-24 NOTE — H&P CARDIOLOGY - NSICDXPASTMEDICALHX_GEN_ALL_CORE_FT
PAST MEDICAL HISTORY:  AF (atrial fibrillation)     Anxiety     AS (aortic stenosis)     CAD (coronary artery disease)     Carotid artery stenosis     HLD (hyperlipidemia)     HTN (hypertension)     Hypothyroidism     PAD (peripheral artery disease)

## 2023-08-24 NOTE — H&P CARDIOLOGY - VASCULAR DETAILS
Left 1st toe wound consistent with gangrene Left 1st toe wound consistent with gangrene, maggots noted

## 2023-08-25 PROBLEM — I65.29 OCCLUSION AND STENOSIS OF UNSPECIFIED CAROTID ARTERY: Chronic | Status: ACTIVE | Noted: 2023-08-24

## 2023-08-25 PROBLEM — I35.0 NONRHEUMATIC AORTIC (VALVE) STENOSIS: Chronic | Status: ACTIVE | Noted: 2023-08-24

## 2023-08-25 LAB
ALBUMIN SERPL ELPH-MCNC: 4 G/DL — SIGNIFICANT CHANGE UP (ref 3.3–5)
ALP SERPL-CCNC: 89 U/L — SIGNIFICANT CHANGE UP (ref 40–120)
ALT FLD-CCNC: 12 U/L — SIGNIFICANT CHANGE UP (ref 10–45)
ANION GAP SERPL CALC-SCNC: 13 MMOL/L — SIGNIFICANT CHANGE UP (ref 5–17)
APTT BLD: 29.5 SEC — SIGNIFICANT CHANGE UP (ref 24.5–35.6)
AST SERPL-CCNC: 15 U/L — SIGNIFICANT CHANGE UP (ref 10–40)
BASOPHILS # BLD AUTO: 0.05 K/UL — SIGNIFICANT CHANGE UP (ref 0–0.2)
BASOPHILS NFR BLD AUTO: 0.5 % — SIGNIFICANT CHANGE UP (ref 0–2)
BILIRUB SERPL-MCNC: 0.9 MG/DL — SIGNIFICANT CHANGE UP (ref 0.2–1.2)
BUN SERPL-MCNC: 16 MG/DL — SIGNIFICANT CHANGE UP (ref 7–23)
CALCIUM SERPL-MCNC: 9.1 MG/DL — SIGNIFICANT CHANGE UP (ref 8.4–10.5)
CHLORIDE SERPL-SCNC: 96 MMOL/L — SIGNIFICANT CHANGE UP (ref 96–108)
CO2 SERPL-SCNC: 24 MMOL/L — SIGNIFICANT CHANGE UP (ref 22–31)
CREAT SERPL-MCNC: 0.77 MG/DL — SIGNIFICANT CHANGE UP (ref 0.5–1.3)
CRP SERPL-MCNC: 32 MG/L — HIGH (ref 0–4)
EGFR: 77 ML/MIN/1.73M2 — SIGNIFICANT CHANGE UP
EOSINOPHIL # BLD AUTO: 0.23 K/UL — SIGNIFICANT CHANGE UP (ref 0–0.5)
EOSINOPHIL NFR BLD AUTO: 2.1 % — SIGNIFICANT CHANGE UP (ref 0–6)
ERYTHROCYTE [SEDIMENTATION RATE] IN BLOOD: 40 MM/HR — HIGH (ref 0–20)
GLUCOSE SERPL-MCNC: 139 MG/DL — HIGH (ref 70–99)
HCT VFR BLD CALC: 38 % — SIGNIFICANT CHANGE UP (ref 34.5–45)
HGB BLD-MCNC: 12.9 G/DL — SIGNIFICANT CHANGE UP (ref 11.5–15.5)
IMM GRANULOCYTES NFR BLD AUTO: 0.6 % — SIGNIFICANT CHANGE UP (ref 0–0.9)
INR BLD: 1.12 RATIO — SIGNIFICANT CHANGE UP (ref 0.85–1.18)
LYMPHOCYTES # BLD AUTO: 0.85 K/UL — LOW (ref 1–3.3)
LYMPHOCYTES # BLD AUTO: 7.9 % — LOW (ref 13–44)
MAGNESIUM SERPL-MCNC: 2 MG/DL — SIGNIFICANT CHANGE UP (ref 1.6–2.6)
MCHC RBC-ENTMCNC: 33.9 GM/DL — SIGNIFICANT CHANGE UP (ref 32–36)
MCHC RBC-ENTMCNC: 33.9 PG — SIGNIFICANT CHANGE UP (ref 27–34)
MCV RBC AUTO: 99.7 FL — SIGNIFICANT CHANGE UP (ref 80–100)
MONOCYTES # BLD AUTO: 0.98 K/UL — HIGH (ref 0–0.9)
MONOCYTES NFR BLD AUTO: 9.1 % — SIGNIFICANT CHANGE UP (ref 2–14)
NEUTROPHILS # BLD AUTO: 8.58 K/UL — HIGH (ref 1.8–7.4)
NEUTROPHILS NFR BLD AUTO: 79.8 % — HIGH (ref 43–77)
NRBC # BLD: 0 /100 WBCS — SIGNIFICANT CHANGE UP (ref 0–0)
PHOSPHATE SERPL-MCNC: 3.2 MG/DL — SIGNIFICANT CHANGE UP (ref 2.5–4.5)
PLATELET # BLD AUTO: 191 K/UL — SIGNIFICANT CHANGE UP (ref 150–400)
POTASSIUM SERPL-MCNC: 3.6 MMOL/L — SIGNIFICANT CHANGE UP (ref 3.5–5.3)
POTASSIUM SERPL-SCNC: 3.6 MMOL/L — SIGNIFICANT CHANGE UP (ref 3.5–5.3)
PROT SERPL-MCNC: 7.1 G/DL — SIGNIFICANT CHANGE UP (ref 6–8.3)
PROTHROM AB SERPL-ACNC: 11.7 SEC — SIGNIFICANT CHANGE UP (ref 9.5–13)
RBC # BLD: 3.81 M/UL — SIGNIFICANT CHANGE UP (ref 3.8–5.2)
RBC # FLD: 12.2 % — SIGNIFICANT CHANGE UP (ref 10.3–14.5)
SODIUM SERPL-SCNC: 133 MMOL/L — LOW (ref 135–145)
WBC # BLD: 10.75 K/UL — HIGH (ref 3.8–10.5)
WBC # FLD AUTO: 10.75 K/UL — HIGH (ref 3.8–10.5)

## 2023-08-25 PROCEDURE — 73630 X-RAY EXAM OF FOOT: CPT | Mod: 26,LT

## 2023-08-25 PROCEDURE — 93970 EXTREMITY STUDY: CPT | Mod: 26

## 2023-08-25 PROCEDURE — 99231 SBSQ HOSP IP/OBS SF/LOW 25: CPT

## 2023-08-25 PROCEDURE — 99223 1ST HOSP IP/OBS HIGH 75: CPT | Mod: GC

## 2023-08-25 PROCEDURE — 99222 1ST HOSP IP/OBS MODERATE 55: CPT

## 2023-08-25 RX ORDER — LEVOTHYROXINE SODIUM 125 MCG
50 TABLET ORAL DAILY
Refills: 0 | Status: DISCONTINUED | OUTPATIENT
Start: 2023-08-25 | End: 2023-08-31

## 2023-08-25 RX ORDER — METOPROLOL TARTRATE 50 MG
50 TABLET ORAL DAILY
Refills: 0 | Status: DISCONTINUED | OUTPATIENT
Start: 2023-08-25 | End: 2023-08-31

## 2023-08-25 RX ORDER — PIPERACILLIN AND TAZOBACTAM 4; .5 G/20ML; G/20ML
3.38 INJECTION, POWDER, LYOPHILIZED, FOR SOLUTION INTRAVENOUS EVERY 12 HOURS
Refills: 0 | Status: DISCONTINUED | OUTPATIENT
Start: 2023-08-25 | End: 2023-08-25

## 2023-08-25 RX ORDER — PIPERACILLIN AND TAZOBACTAM 4; .5 G/20ML; G/20ML
3.38 INJECTION, POWDER, LYOPHILIZED, FOR SOLUTION INTRAVENOUS ONCE
Refills: 0 | Status: COMPLETED | OUTPATIENT
Start: 2023-08-25 | End: 2023-08-25

## 2023-08-25 RX ORDER — ATORVASTATIN CALCIUM 80 MG/1
20 TABLET, FILM COATED ORAL AT BEDTIME
Refills: 0 | Status: DISCONTINUED | OUTPATIENT
Start: 2023-08-25 | End: 2023-08-26

## 2023-08-25 RX ORDER — LOSARTAN POTASSIUM 100 MG/1
50 TABLET, FILM COATED ORAL DAILY
Refills: 0 | Status: DISCONTINUED | OUTPATIENT
Start: 2023-08-25 | End: 2023-08-31

## 2023-08-25 RX ORDER — PIPERACILLIN AND TAZOBACTAM 4; .5 G/20ML; G/20ML
3.38 INJECTION, POWDER, LYOPHILIZED, FOR SOLUTION INTRAVENOUS EVERY 8 HOURS
Refills: 0 | Status: DISCONTINUED | OUTPATIENT
Start: 2023-08-25 | End: 2023-08-25

## 2023-08-25 RX ORDER — ACETAMINOPHEN 500 MG
650 TABLET ORAL EVERY 6 HOURS
Refills: 0 | Status: DISCONTINUED | OUTPATIENT
Start: 2023-08-25 | End: 2023-08-31

## 2023-08-25 RX ORDER — VANCOMYCIN HCL 1 G
1000 VIAL (EA) INTRAVENOUS EVERY 12 HOURS
Refills: 0 | Status: DISCONTINUED | OUTPATIENT
Start: 2023-08-25 | End: 2023-08-25

## 2023-08-25 RX ORDER — ASPIRIN/CALCIUM CARB/MAGNESIUM 324 MG
81 TABLET ORAL DAILY
Refills: 0 | Status: DISCONTINUED | OUTPATIENT
Start: 2023-08-25 | End: 2023-08-31

## 2023-08-25 RX ORDER — AMIODARONE HYDROCHLORIDE 400 MG/1
100 TABLET ORAL DAILY
Refills: 0 | Status: DISCONTINUED | OUTPATIENT
Start: 2023-08-25 | End: 2023-08-31

## 2023-08-25 RX ORDER — CADEXOMER IODINE 0.9 %
1 PADS, MEDICATED (EA) TOPICAL DAILY
Refills: 0 | Status: DISCONTINUED | OUTPATIENT
Start: 2023-08-25 | End: 2023-08-31

## 2023-08-25 RX ORDER — PIPERACILLIN AND TAZOBACTAM 4; .5 G/20ML; G/20ML
3.38 INJECTION, POWDER, LYOPHILIZED, FOR SOLUTION INTRAVENOUS ONCE
Refills: 0 | Status: DISCONTINUED | OUTPATIENT
Start: 2023-08-25 | End: 2023-08-25

## 2023-08-25 RX ORDER — ENOXAPARIN SODIUM 100 MG/ML
60 INJECTION SUBCUTANEOUS EVERY 12 HOURS
Refills: 0 | Status: DISCONTINUED | OUTPATIENT
Start: 2023-08-25 | End: 2023-08-30

## 2023-08-25 RX ADMIN — ENOXAPARIN SODIUM 60 MILLIGRAM(S): 100 INJECTION SUBCUTANEOUS at 22:06

## 2023-08-25 RX ADMIN — Medication 81 MILLIGRAM(S): at 12:18

## 2023-08-25 RX ADMIN — Medication 50 MICROGRAM(S): at 05:21

## 2023-08-25 RX ADMIN — Medication 50 MILLIGRAM(S): at 05:21

## 2023-08-25 RX ADMIN — LOSARTAN POTASSIUM 50 MILLIGRAM(S): 100 TABLET, FILM COATED ORAL at 05:21

## 2023-08-25 RX ADMIN — AMIODARONE HYDROCHLORIDE 100 MILLIGRAM(S): 400 TABLET ORAL at 06:28

## 2023-08-25 RX ADMIN — Medication 250 MILLIGRAM(S): at 17:09

## 2023-08-25 RX ADMIN — ENOXAPARIN SODIUM 60 MILLIGRAM(S): 100 INJECTION SUBCUTANEOUS at 10:37

## 2023-08-25 RX ADMIN — PIPERACILLIN AND TAZOBACTAM 200 GRAM(S): 4; .5 INJECTION, POWDER, LYOPHILIZED, FOR SOLUTION INTRAVENOUS at 05:20

## 2023-08-25 RX ADMIN — Medication 1 APPLICATION(S): at 17:09

## 2023-08-25 RX ADMIN — PIPERACILLIN AND TAZOBACTAM 25 GRAM(S): 4; .5 INJECTION, POWDER, LYOPHILIZED, FOR SOLUTION INTRAVENOUS at 13:27

## 2023-08-25 RX ADMIN — Medication 250 MILLIGRAM(S): at 06:29

## 2023-08-25 NOTE — H&P ADULT - HISTORY OF PRESENT ILLNESS
84 y/o female with a PMHx of medically managed CAD (prox LM 40%, prox LAD 40%, distal LAD 50%, Cx mild, distal % ), paroxysmal AF on Eliquis (last dose 8/22/2023 PM), severe AS (EFRAIN 0.69 sqcm) pending TAVR (transcatheter aortic valve replacement), right internal carotid stenosis, PAD with chronic left first toe wound, HTN, HLD, hypothyroidism, and anxiety presents for elective left lower extremity angiogram. Pt has had a wound in her left first toe for the past several months. Pt admits to intermittent "burning" pain in her toe and her left leg from her calf to her foot. Pt elicits that her pain is made worse with activity and improved with rest. Pt takes Tylenol as needed for the pain which provides mild to moderate relief. Pt was recently hospitalized at [facility]. X-ray of the left foot was negative for acute osteomyelitis. MONTY/PVR revealed moderate arterial flow limitation in the right lower extremity localizing to the femoropopliteal distribution with PVR waveforms suggesting flow-limiting disease in the iliofemoral distribution and small vessel disease in the left foot. Pt denies fever, chills, recent travel, headache, dizziness, visual deficits, chest pain, shortness of breath, orthopnea, palpitations, abdominal pain, N/V/D/C, hematochezia, melena, dysuria, hematuria, LOC, syncope. Pt now presented for left lower extremity angiogram, transfer from [facility] for TAVR work up.     Vascular: Dr. Laura Tavares  CTS: Dr. Nghia Serna

## 2023-08-25 NOTE — CONSULT NOTE ADULT - ATTENDING COMMENTS
83 year old woman with low gradient, low flow severe aortic stenosis, non-obstructive coronary artery disease in left system and good collaterals to RCA  on recent coronary angiogram. She has chronic wound of left first toe and requires peripheral arterial bypass surgery. She has no acute myocardial ischemia, no congestive heart failure and rhythm is stable, sinus with history of paroxysmal atrial fibrillation maintained on apixaban. She does have severe low gradient aortic stenosis with preserved left ventricular systolic function, but only minimal mitral regurgitation. Thus she is medically and cardiovascularly optimized with no options to further improve cardiac status until foot wound can heal. Thus ready to proceed with surgery and anesthesia.    To contact call Cardiology Fellow or Attending as listed on [email or web address] password: [email or web address].

## 2023-08-25 NOTE — CONSULT NOTE ADULT - SUBJECTIVE AND OBJECTIVE BOX
Progress Note Adult-Podiatry Resident [Charted Location: [facility] 9MON 929 D1] [Authored: 25-Aug-2023 11:29]- for Visit: [id number], In Progress, Entered, Signed w/additional Signatures Pending, Available to Patient    Progress Note:   · Provider Specialty	Podiatry      · Subjective and Objective:   Patient is a 83y old  Female who presents with a chief complaint of      INTERVAL HPI/OVERNIGHT EVENTS:  Patient seen and evaluated at bedside.  Pt is resting comfortable in NAD. Denies N/V/F/C.    Allergies    sulfa drugs (Unknown)  latex (Unknown)    Intolerances        Vital Signs Last 24 Hrs  T(C): 36.9 (25 Aug 2023 09:35), Max: 36.9 (25 Aug 2023 09:35)  T(F): 98.5 (25 Aug 2023 09:35), Max: 98.5 (25 Aug 2023 09:35)  HR: 100 (25 Aug 2023 09:35) (79 - 100)  BP: 132/65 (25 Aug 2023 09:35) (123/65 - 153/64)  BP(mean): --  RR: 18 (25 Aug 2023 09:35) (18 - 18)  SpO2: 94% (25 Aug 2023 09:35) (94% - 100%)    Parameters below as of 25 Aug 2023 09:35  Patient On (Oxygen Delivery Method): room air        LABS:                        12.9   10.75 )-----------( 191      ( 25 Aug 2023 07:09 )             38.0     08-25    133<L>  |  96  |  16  ----------------------------<  139<H>  3.6   |  24  |  0.77    Ca    9.1      25 Aug 2023 07:08  Phos  3.2     08-25  Mg     2.0     08-25    TPro  7.1  /  Alb  4.0  /  TBili  0.9  /  DBili  x   /  AST  15  /  ALT  12  /  AlkPhos  89  08-25    PT/INR - ( 25 Aug 2023 07:09 )   PT: 11.7 sec;   INR: 1.12 ratio         PTT - ( 25 Aug 2023 07:09 )  PTT:29.5 sec  Urinalysis Basic - ( 25 Aug 2023 07:08 )    Color: x / Appearance: x / SG: x / pH: x  Gluc: 139 mg/dL / Ketone: x  / Bili: x / Urobili: x   Blood: x / Protein: x / Nitrite: x   Leuk Esterase: x / RBC: x / WBC x   Sq Epi: x / Non Sq Epi: x / Bacteria: x      CAPILLARY BLOOD GLUCOSE          Lower Extremity Physical Exam:  Vascular: DP/PT 0/4, B/L, CFT <3 seconds B/L, Temperature gradient warm to cool, B/L.   Neuro: Epicritic sensation intact to the level of digits, B/L.  Musculoskeletal/Ortho: unremarkable  Skin: Left foot hallux wound to subQ with medial adhered eschar, erythema from distal digits 1-5 to MTPJ's, lateral 5th MTPJ wound to subQ, medial 1st MTPJ wound to subQ, dorsal midfoot and hindfoot erythema, no drainage, no pus, no tracking, no tunneling. Right foot no open wounds or lesions no acute signs of infection.      RADIOLOGY & ADDITIONAL TESTS:  < from: Xray Foot AP + Lateral + Oblique, Left (08.25.23 @ 08:59) >    ACC: [id number] EXAM:  XR FOOT COMP MIN 3 VIEWS LT   ORDERED BY:  RENITA MERCADO     PROCEDURE DATE:  08/25/2023          INTERPRETATION:  CLINICAL INDICATION: peripheral arterial disease; left   hallux wound    EXAM:  Frontal oblique lateral left foot from 8/25/2023 at 0859. Compared to   prior study from 8/3/2023.    IMPRESSION:  Progressive deepening soft tissue defect over medial left hallux distal   phalanx. Smaller soft tissue ulceration over lateral 5th MTP region. No   subjacent tracking gas collections or gross radiographic evidence for   osteomyelitis however if concern persists MRI suggested to further assess.    No fractures or dislocations.    Tarsometatarsal alignment maintained without evidence for a Lisfranc   injury.    Slight hallux valgus deformity with small bunion. Preserved remaining   joint spaces and no joint margin erosions.    Plantar and posterior calcaneal enthesophytes.    Generalized osteopenia    Scant scattered vascular calcifications.    --- End of Report ---            CHERRY CARDOSO MD; Attending Radiologist  This document has been electronically signed. Aug 25 2023 10:41AM    < end of copied text >         NANO GARCIA  [id number] 83yF   --------------------------------------  Patient is a 83y old  Female who presents with a chief complaint of LE Angiogram and Bypass (25 Aug 2023 11:51)      HPI:  82 y/o female with a PMHx of medically managed CAD (prox LM 40%, prox LAD 40%, distal LAD 50%, Cx mild, distal % ), paroxysmal AF on Eliquis (last dose 8/22/2023 PM), severe AS (EFRAIN 0.69 sqcm) pending TAVR (transcatheter aortic valve replacement), right internal carotid stenosis, PAD with chronic left first toe wound, HTN, HLD, hypothyroidism, and anxiety presents for elective left lower extremity angiogram. Pt has had a wound in her left first toe for the past several months. Pt admits to intermittent "burning" pain in her toe and her left leg from her calf to her foot. Pt elicits that her pain is made worse with activity and improved with rest. Pt takes Tylenol as needed for the pain which provides mild to moderate relief. Pt was recently hospitalized at [facility]. X-ray of the left foot was negative for acute osteomyelitis. MONTY/PVR revealed moderate arterial flow limitation in the right lower extremity localizing to the femoropopliteal distribution with PVR waveforms suggesting flow-limiting disease in the iliofemoral distribution and small vessel disease in the left foot. Pt denies fever, chills, recent travel, headache, dizziness, visual deficits, chest pain, shortness of breath, orthopnea, palpitations, abdominal pain, N/V/D/C, hematochezia, melena, dysuria, hematuria, LOC, syncope. Pt now presented for left lower extremity angiogram, transfer from [facility] for TAVR work up.     Vascular: Dr. Laura Tavares  CTS: Dr. Nghia Serna (25 Aug 2023 07:40)      PAST MEDICAL & SURGICAL HISTORY:  HTN (hypertension)      HLD (hyperlipidemia)      Anxiety      CAD (coronary artery disease)      PAD (peripheral artery disease)      Hypothyroidism      AF (atrial fibrillation)      Carotid artery stenosis      AS (aortic stenosis)      Status post closed fracture of right femur          MEDICATIONS  (STANDING):  aMIOdarone    Tablet 100 milliGRAM(s) Oral daily  aspirin  chewable 81 milliGRAM(s) Oral daily  atorvastatin 20 milliGRAM(s) Oral at bedtime  cadexomer iodine 0.9% Gel 1 Application(s) Topical daily  enoxaparin Injectable 60 milliGRAM(s) SubCutaneous every 12 hours  levothyroxine 50 MICROGram(s) Oral daily  losartan 50 milliGRAM(s) Oral daily  metoprolol succinate ER 50 milliGRAM(s) Oral daily    MEDICATIONS  (PRN):      Allergies    sulfa drugs (Unknown)  latex (Unknown)    Intolerances        --------------------------------------  VITALS:    Vital Signs Last 24 Hrs  T(C): 36.6 (25 Aug 2023 17:19), Max: 36.9 (25 Aug 2023 09:35)  T(F): 97.8 (25 Aug 2023 17:19), Max: 98.5 (25 Aug 2023 09:35)  HR: 87 (25 Aug 2023 17:19) (79 - 100)  BP: 169/73 (25 Aug 2023 17:19) (123/65 - 169/73)  BP(mean): --  RR: 18 (25 Aug 2023 17:19) (18 - 18)  SpO2: 96% (25 Aug 2023 17:19) (94% - 100%)    Parameters below as of 25 Aug 2023 17:19  Patient On (Oxygen Delivery Method): room air        --------------------------------------  LABS:                          12.9   10.75 )-----------( 191      ( 25 Aug 2023 07:09 )             38.0       08-25    133<L>  |  96  |  16  ----------------------------<  139<H>  3.6   |  24  |  0.77    Ca    9.1      25 Aug 2023 07:08  Phos  3.2     08-25  Mg     2.0     08-25    TPro  7.1  /  Alb  4.0  /  TBili  0.9  /  DBili  x   /  AST  15  /  ALT  12  /  AlkPhos  89  08-25      CAPILLARY BLOOD GLUCOSE          PT/INR - ( 25 Aug 2023 07:09 )   PT: 11.7 sec;   INR: 1.12 ratio         PTT - ( 25 Aug 2023 07:09 )  PTT:29.5 sec      Lower Extremity Physical Exam:  Vascular: DP/PT 0/4, B/L, CFT <3 seconds B/L, Temperature gradient warm to cool, B/L.   Neuro: Epicritic sensation intact to the level of digits, B/L.  Musculoskeletal/Ortho: unremarkable  Skin: Left foot hallux wound to subQ with medial adhered eschar, erythema from distal digits 1-5 to MTPJ's, lateral 5th MTPJ wound to subQ, medial 1st MTPJ wound to subQ, dorsal midfoot and hindfoot erythema, no drainage, no pus, no tracking, no tunneling. Right foot no open wounds or lesions no acute signs of infection.      RADIOLOGY & ADDITIONAL TESTS:  < from: Xray Foot AP + Lateral + Oblique, Left (08.25.23 @ 08:59) >    ACC: [id number] EXAM:  XR FOOT COMP MIN 3 VIEWS LT   ORDERED BY:  RENITA MERCADO     PROCEDURE DATE:  08/25/2023          INTERPRETATION:  CLINICAL INDICATION: peripheral arterial disease; left   hallux wound    EXAM:  Frontal oblique lateral left foot from 8/25/2023 at 0859. Compared to   prior study from 8/3/2023.    IMPRESSION:  Progressive deepening soft tissue defect over medial left hallux distal   phalanx. Smaller soft tissue ulceration over lateral 5th MTP region. No   subjacent tracking gas collections or gross radiographic evidence for   osteomyelitis however if concern persists MRI suggested to further assess.    No fractures or dislocations.    Tarsometatarsal alignment maintained without evidence for a Lisfranc   injury.    Slight hallux valgus deformity with small bunion. Preserved remaining   joint spaces and no joint margin erosions.    Plantar and posterior calcaneal enthesophytes.    Generalized osteopenia    Scant scattered vascular calcifications.    --- End of Report ---            CHERRY CARDOSO MD; Attending Radiologist  This document has been electronically signed. Aug 25 2023 10:41AM    < end of copied text >

## 2023-08-25 NOTE — H&P ADULT - ASSESSMENT
84 y/o female with a PMHx of medically managed CAD (prox LM 40%, prox LAD 40%, distal LAD 50%, Cx mild, distal % ), paroxysmal AF on Eliquis (last dose 8/22/2023 PM), severe AS (EFRAIN 0.69 sqcm) pending TAVR (transcatheter aortic valve replacement), right internal carotid stenosis, PAD with chronic left first toe wound, HTN, HLD, hypothyroidism, and anxiety presents for elective left lower extremity angiogram.     - cardiology evaluation for clearance  - podiatry evaluation  - LE vein mapping ordered  - T lovenox for PAF  - OR planning for Thursday with Dr. Tavares    Vascular 3996 84 y/o female with a PMHx of medically managed CAD (prox LM 40%, prox LAD 40%, distal LAD 50%, Cx mild, distal % ), paroxysmal AF on Eliquis (last dose 8/22/2023 PM), severe AS (EFRAIN 0.69 sqcm) pending TAVR (transcatheter aortic valve replacement), right internal carotid stenosis, PAD with chronic left first toe wound, HTN, HLD, hypothyroidism, and anxiety   - cardiology evaluation for clearance  - podiatry evaluation  - LE vein mapping ordered  - T lovenox for PAF  - OR planning for Thursday with Dr. Tavares    Vascular 1020

## 2023-08-25 NOTE — PROGRESS NOTE ADULT - SUBJECTIVE AND OBJECTIVE BOX
Patient is a 83y old  Female who presents with a chief complaint of      INTERVAL HPI/OVERNIGHT EVENTS:  Patient seen and evaluated at bedside.  Pt is resting comfortable in NAD. Denies N/V/F/C.    Allergies    sulfa drugs (Unknown)  latex (Unknown)    Intolerances        Vital Signs Last 24 Hrs  T(C): 36.9 (25 Aug 2023 09:35), Max: 36.9 (25 Aug 2023 09:35)  T(F): 98.5 (25 Aug 2023 09:35), Max: 98.5 (25 Aug 2023 09:35)  HR: 100 (25 Aug 2023 09:35) (79 - 100)  BP: 132/65 (25 Aug 2023 09:35) (123/65 - 153/64)  BP(mean): --  RR: 18 (25 Aug 2023 09:35) (18 - 18)  SpO2: 94% (25 Aug 2023 09:35) (94% - 100%)    Parameters below as of 25 Aug 2023 09:35  Patient On (Oxygen Delivery Method): room air        LABS:                        12.9   10.75 )-----------( 191      ( 25 Aug 2023 07:09 )             38.0     08-25    133<L>  |  96  |  16  ----------------------------<  139<H>  3.6   |  24  |  0.77    Ca    9.1      25 Aug 2023 07:08  Phos  3.2     08-25  Mg     2.0     08-25    TPro  7.1  /  Alb  4.0  /  TBili  0.9  /  DBili  x   /  AST  15  /  ALT  12  /  AlkPhos  89  08-25    PT/INR - ( 25 Aug 2023 07:09 )   PT: 11.7 sec;   INR: 1.12 ratio         PTT - ( 25 Aug 2023 07:09 )  PTT:29.5 sec  Urinalysis Basic - ( 25 Aug 2023 07:08 )    Color: x / Appearance: x / SG: x / pH: x  Gluc: 139 mg/dL / Ketone: x  / Bili: x / Urobili: x   Blood: x / Protein: x / Nitrite: x   Leuk Esterase: x / RBC: x / WBC x   Sq Epi: x / Non Sq Epi: x / Bacteria: x      CAPILLARY BLOOD GLUCOSE          Lower Extremity Physical Exam:  Vascular: DP/PT 0/4, B/L, CFT <3 seconds B/L, Temperature gradient warm to cool, B/L.   Neuro: Epicritic sensation intact to the level of digits, B/L.  Musculoskeletal/Ortho: unremarkable  Skin: Left foot hallux wound to subQ with medial adhered eschar, erythema from distal digits 1-5 to MTPJ's, lateral 5th MTPJ wound to subQ, medial 1st MTPJ wound to subQ, dorsal midfoot and hindfoot erythema, no drainage, no pus, no tracking, no tunneling. Right foot no open wounds or lesions no acute signs of infection.      RADIOLOGY & ADDITIONAL TESTS:  < from: Xray Foot AP + Lateral + Oblique, Left (08.25.23 @ 08:59) >    ACC: [id number] EXAM:  XR FOOT COMP MIN 3 VIEWS LT   ORDERED BY:  RENITA MERCADO     PROCEDURE DATE:  08/25/2023          INTERPRETATION:  CLINICAL INDICATION: peripheral arterial disease; left   hallux wound    EXAM:  Frontal oblique lateral left foot from 8/25/2023 at 0859. Compared to   prior study from 8/3/2023.    IMPRESSION:  Progressive deepening soft tissue defect over medial left hallux distal   phalanx. Smaller soft tissue ulceration over lateral 5th MTP region. No   subjacent tracking gas collections or gross radiographic evidence for   osteomyelitis however if concern persists MRI suggested to further assess.    No fractures or dislocations.    Tarsometatarsal alignment maintained without evidence for a Lisfranc   injury.    Slight hallux valgus deformity with small bunion. Preserved remaining   joint spaces and no joint margin erosions.    Plantar and posterior calcaneal enthesophytes.    Generalized osteopenia    Scant scattered vascular calcifications.    --- End of Report ---            CHERRY CARDOSO MD; Attending Radiologist  This document has been electronically signed. Aug 25 2023 10:41AM    < end of copied text >

## 2023-08-25 NOTE — CONSULT NOTE ADULT - ASSESSMENT
82 y/o female with a PMHx of medically managed CAD, paroxysmal AF on Eliquis (last dose 8/22/2023 PM), LFLG severe AS pending TAVR, right internal carotid stenosis, PAD with chronic left first toe wound, HTN, HLD, hypothyroidism, and anxiety presents for elective left lower extremity angiogram w/ Dr. Tavares. Cardiology consulted for assistance in pre-operative optimization.     #Pre-operative Optimization    [facility] PGY2  CARDIOLOGY CONSULT SERVICE 84 y/o female with a PMHx of medically managed CAD, paroxysmal AF on Eliquis (last dose 8/22/2023 PM), LFLG severe AS pending TAVR, right internal carotid stenosis, PAD with chronic left first toe wound, HTN, HLD, hypothyroidism, and anxiety presents for management of left foot hallux wound with LE bypass surgery planned 8/31 w/ Dr. Tavares. Cardiology consulted for assistance in pre-operative optimization.     #Pre-operative Optimization  - Known hx of CAD, LFLG severe AS pending TAVR, Afib on Eliquis, Amiodarone, Metoprolol, HTN on Losartan.   - Pt asymptomatic w/o chest pain, SOB, palpitations, orthopnea, LE edema that may suggest ADHF, ACS, or new symptomatic AS,   - RCRI 1, 6% 30-day risk of MACE. Costa Score w/ 0.2% Risk of myocardial infarction or cardiac arrest, intraoperatively or up to 30 days post-op    Recommendations  -   - c/w holding Eliquis  - Amiodarone, Metoprolol, Losartan    [facility] PGY2  CARDIOLOGY CONSULT SERVICE

## 2023-08-25 NOTE — CONSULT NOTE ADULT - SUBJECTIVE AND OBJECTIVE BOX
ID INTIAL CONSULT NOTE     Patient is a 83y old  Female who presents with a chief complaint of     HPI:  82 y/o female with a PMHx of medically managed CAD (prox LM 40%, prox LAD 40%, distal LAD 50%, Cx mild, distal % ), paroxysmal AF on AC, severe AS (EFRAIN 0.69 sqcm) pending TAVR (transcatheter aortic valve replacement), right internal carotid stenosis, PAD with chronic left first toe wound, HTN, HLD, hypothyroidism, and anxiety presents for elective left lower extremity angiogram.    Pt has had a wound in her left first toe for the past several months. Pt admits to intermittent "burning" pain in her toe and her left leg from her calf to her foot. Pt elicits that her pain is made worse with activity and improved with rest. Pt takes Tylenol as needed for the pain which provides mild to moderate relief. Pt was recently hospitalized at [facility]. X-ray of the left foot was negative for acute osteomyelitis. MONTY/PVR revealed moderate arterial flow limitation in the right lower extremity localizing to the femoropopliteal distribution with PVR waveforms suggesting flow-limiting disease in the iliofemoral distribution and small vessel disease in the left foot. Pt denies fever, chills, recent travel, headache, dizziness, visual deficits, chest pain, shortness of breath, orthopnea, palpitations, abdominal pain, N/V/D/C, hematochezia, melena, dysuria, hematuria, LOC, syncope. Pt now presented for left lower extremity angiogram, transfer from [facility] for TAVR work up.     Pt initially admitted to [facility] on 8/24/23, had LLE angio, now transferred to [facility] on 8/25 for bypass. Pt was started on Vancomycin/Zosyn, noted to afebrile with stable vitals, WBC only 10.75. XR with no gas, no obvious findings of OM.     ID consulted for further recommendations      prior hospital charts reviewed [X]  primary team notes reviewed [ X ]  other consultant notes reviewed [ X]    PAST MEDICAL & SURGICAL HISTORY:  HTN (hypertension)      HLD (hyperlipidemia)      Anxiety      CAD (coronary artery disease)      PAD (peripheral artery disease)      Hypothyroidism      AF (atrial fibrillation)      Carotid artery stenosis      AS (aortic stenosis)      Status post closed fracture of right femur          Allergies  sulfa drugs (Unknown)  latex (Unknown)        ANTIMICROBIALS:  piperacillin/tazobactam IVPB. 3.375 once  piperacillin/tazobactam IVPB.- 3.375 once  piperacillin/tazobactam IVPB.- 3.375 once  piperacillin/tazobactam IVPB.. 3.375 every 8 hours  vancomycin  IVPB 1000 every 12 hours      Current Abx:     Past Abx:       OTHER MEDS: MEDICATIONS  (STANDING):  aMIOdarone    Tablet 100 daily  aspirin  chewable 81 daily  atorvastatin 20 at bedtime  enoxaparin Injectable 60 every 12 hours  levothyroxine 50 daily  losartan 50 daily  metoprolol succinate ER 50 daily      SOCIAL HISTORY: [ ] etoh [ ] tobacco [ ] former smoker [ ] IVDU    FAMILY HISTORY:  Family history of myocardial infarction (Father, Sibling)        REVIEW OF SYSTEMS:    CONSTITUTIONAL: No weakness, fevers or chills  EYES/ENT: No visual changes;  No vertigo or throat pain   NECK: No pain or stiffness  RESPIRATORY: No cough, wheezing, hemoptysis; No shortness of breath  CARDIOVASCULAR: No chest pain or palpitations  GASTROINTESTINAL: No abdominal or epigastric pain. No nausea, vomiting, or hematemesis; No diarrhea or constipation. No melena or hematochezia.  GENITOURINARY: No dysuria, frequency or hematuria  NEUROLOGICAL: No numbness or weakness  SKIN: No itching, burning, rashes, or lesions   All other review of systems is negative unless indicated above.    Vital Signs Last 24 Hrs  T(F): 98.5 (08-25-23 @ 09:35), Max: 98.5 (08-25-23 @ 09:35)    Vital Signs Last 24 Hrs  HR: 100 (08-25-23 @ 09:35) (79 - 100)  BP: 132/65 (08-25-23 @ 09:35) (123/65 - 153/64)  RR: 18 (08-25-23 @ 09:35)  SpO2: 94% (08-25-23 @ 09:35) (94% - 100%)  Wt(kg): --    PHYSICAL EXAM:  GENERAL: NAD, well-developed  HEAD:  Atraumatic, Normocephalic  EYES: EOMI, PERRLA, conjunctiva and sclera clear  NECK: Supple, No JVD  CHEST/LUNG: Clear to auscultation bilaterally; No wheeze  HEART: Regular rate and rhythm; No murmurs, rubs, or gallops  ABDOMEN: Soft, Nontender, Nondistended; Bowel sounds present  EXTREMITIES:  2+ Peripheral Pulses, No clubbing, cyanosis, or edema  PSYCH: AAOx3  NEUROLOGY: non-focal  SKIN: No rashes or lesions      Labs:                          12.9   10.75 )-----------( 191      ( 25 Aug 2023 07:09 )             38.0       08-25    133<L>  |  96  |  16  ----------------------------<  139<H>  3.6   |  24  |  0.77    Ca    9.1      25 Aug 2023 07:08  Phos  3.2     08-25  Mg     2.0     08-25    TPro  7.1  /  Alb  4.0  /  TBili  0.9  /  DBili  x   /  AST  15  /  ALT  12  /  AlkPhos  89  08-25      Urinalysis Basic - ( 25 Aug 2023 07:08 )    Color: x / Appearance: x / SG: x / pH: x  Gluc: 139 mg/dL / Ketone: x  / Bili: x / Urobili: x   Blood: x / Protein: x / Nitrite: x   Leuk Esterase: x / RBC: x / WBC x   Sq Epi: x / Non Sq Epi: x / Bacteria: x          MICROBIOLOGY:  .Abscess left foot wound culture  08-24-23 --  --    No polymorphonuclear leukocytes seen per low power field  Rare Gram positive cocci in pairs seen per oil power field  Rare Gram Negative Rods seen per oil power field        RADIOLOGY:  < from: Xray Foot AP + Lateral + Oblique, Left (08.25.23 @ 08:59) >  Progressive deepening soft tissue defect over medial left hallux distal   phalanx. Smaller soft tissue ulceration over lateral 5th MTP region. No   subjacent tracking gas collections or gross radiographic evidence for   osteomyelitis however if concern persists MRI suggested to further assess.    No fractures or dislocations.    Tarsometatarsal alignment maintained without evidence for a Lisfranc   injury.    Slight hallux valgus deformity with small bunion. Preserved remaining   joint spaces and no joint margin erosions.    Plantar and posterior calcaneal enthesophytes.    Generalized osteopenia    Scant scattered vascular calcifications.       ID INITIAL CONSULT NOTE     Patient is a 83y old  Female who presents with a chief complaint of     HPI:  84 y/o female with a PMHx of medically managed CAD (prox LM 40%, prox LAD 40%, distal LAD 50%, Cx mild, distal % ), paroxysmal AF on AC, severe AS (EFRAIN 0.69 sqcm) pending TAVR (transcatheter aortic valve replacement), right internal carotid stenosis, PAD with chronic left first toe wound, HTN, HLD, hypothyroidism, and anxiety presents for elective left lower extremity angiogram.    Pt has had a wound in her left first toe for the past several months. Pt admits to intermittent "burning" pain in her toe and her left leg from her calf to her foot. Pt elicits that her pain is made worse with activity and improved with rest. Pt takes Tylenol as needed for the pain which provides mild to moderate relief. Pt was recently hospitalized at [facility]. X-ray of the left foot was negative for acute osteomyelitis. MONTY/PVR revealed moderate arterial flow limitation in the right lower extremity localizing to the femoropopliteal distribution with PVR waveforms suggesting flow-limiting disease in the iliofemoral distribution and small vessel disease in the left foot. Pt denies fever, chills, recent travel, headache, dizziness, visual deficits, chest pain, shortness of breath, orthopnea, palpitations, abdominal pain, N/V/D/C, hematochezia, melena, dysuria, hematuria, LOC, syncope. Pt now presented for left lower extremity angiogram, transfer from [facility] for TAVR work up.     Pt initially admitted to [facility] on 8/24/23, had LLE angio, now transferred to [facility] on 8/25 for bypass. Pt was started on Vancomycin/Zosyn, noted to afebrile with stable vitals, WBC only 10.75. XR with no gas, no obvious findings of OM.     ID consulted for further recommendations      prior hospital charts reviewed [X]  primary team notes reviewed [ X ]  other consultant notes reviewed [ X]    PAST MEDICAL & SURGICAL HISTORY:  HTN (hypertension)      HLD (hyperlipidemia)      Anxiety      CAD (coronary artery disease)      PAD (peripheral artery disease)      Hypothyroidism      AF (atrial fibrillation)      Carotid artery stenosis      AS (aortic stenosis)      Status post closed fracture of right femur          Allergies  sulfa drugs (Unknown)  latex (Unknown)        ANTIMICROBIALS:  piperacillin/tazobactam IVPB. 3.375 once  piperacillin/tazobactam IVPB.- 3.375 once  piperacillin/tazobactam IVPB.- 3.375 once  piperacillin/tazobactam IVPB.. 3.375 every 8 hours  vancomycin  IVPB 1000 every 12 hours    OTHER MEDS: MEDICATIONS  (STANDING):  aMIOdarone    Tablet 100 daily  aspirin  chewable 81 daily  atorvastatin 20 at bedtime  enoxaparin Injectable 60 every 12 hours  levothyroxine 50 daily  losartan 50 daily  metoprolol succinate ER 50 daily      SOCIAL HISTORY: [ ] etoh [ ] tobacco [ ] former smoker [ ] IVDU  Denies any smoking, alcohol     FAMILY HISTORY:  Family history of myocardial infarction (Father, Sibling)        REVIEW OF SYSTEMS:    CONSTITUTIONAL: No weakness, fevers or chills  EYES/ENT: No visual changes;  No vertigo or throat pain   NECK: No pain or stiffness  RESPIRATORY: No cough, wheezing, hemoptysis; No shortness of breath  CARDIOVASCULAR: No chest pain or palpitations  GASTROINTESTINAL: No abdominal or epigastric pain. No nausea, vomiting, or hematemesis; No diarrhea or constipation. No melena or hematochezia.  GENITOURINARY: No dysuria, frequency or hematuria  NEUROLOGICAL: No numbness or weakness  MSK: Some L foot burning   SKIN: No itching, burning, rashes, or lesions   All other review of systems is negative unless indicated above.    Vital Signs Last 24 Hrs  T(F): 98.5 (08-25-23 @ 09:35), Max: 98.5 (08-25-23 @ 09:35)    Vital Signs Last 24 Hrs  HR: 100 (08-25-23 @ 09:35) (79 - 100)  BP: 132/65 (08-25-23 @ 09:35) (123/65 - 153/64)  RR: 18 (08-25-23 @ 09:35)  SpO2: 94% (08-25-23 @ 09:35) (94% - 100%)  Wt(kg): --    PHYSICAL EXAM:  GENERAL: NAD, well-developed  HEAD:  Atraumatic, Normocephalic  EYES: EOMI, PERRLA, conjunctiva and sclera clear  NECK: Supple, No JVD  CHEST/LUNG: Clear to auscultation bilaterally; No wheeze  HEART: Regular rate and rhythm; No murmurs, rubs, or gallops  ABDOMEN: Soft, Nontender, Nondistended; Bowel sounds present  EXTREMITIES:  2+ Peripheral Pulses, No clubbing, cyanosis, or edema  PSYCH: AAOx3  NEUROLOGY: non-focal  SKIN: No rashes or lesions      Labs:                          12.9   10.75 )-----------( 191      ( 25 Aug 2023 07:09 )             38.0       08-25    133<L>  |  96  |  16  ----------------------------<  139<H>  3.6   |  24  |  0.77    Ca    9.1      25 Aug 2023 07:08  Phos  3.2     08-25  Mg     2.0     08-25    TPro  7.1  /  Alb  4.0  /  TBili  0.9  /  DBili  x   /  AST  15  /  ALT  12  /  AlkPhos  89  08-25      Urinalysis Basic - ( 25 Aug 2023 07:08 )    Color: x / Appearance: x / SG: x / pH: x  Gluc: 139 mg/dL / Ketone: x  / Bili: x / Urobili: x   Blood: x / Protein: x / Nitrite: x   Leuk Esterase: x / RBC: x / WBC x   Sq Epi: x / Non Sq Epi: x / Bacteria: x          MICROBIOLOGY:  .Abscess left foot wound culture  08-24-23 --  --    No polymorphonuclear leukocytes seen per low power field  Rare Gram positive cocci in pairs seen per oil power field  Rare Gram Negative Rods seen per oil power field        RADIOLOGY:  < from: Xray Foot AP + Lateral + Oblique, Left (08.25.23 @ 08:59) >  Progressive deepening soft tissue defect over medial left hallux distal   phalanx. Smaller soft tissue ulceration over lateral 5th MTP region. No   subjacent tracking gas collections or gross radiographic evidence for   osteomyelitis however if concern persists MRI suggested to further assess.    No fractures or dislocations.    Tarsometatarsal alignment maintained without evidence for a Lisfranc   injury.    Slight hallux valgus deformity with small bunion. Preserved remaining   joint spaces and no joint margin erosions.    Plantar and posterior calcaneal enthesophytes.    Generalized osteopenia    Scant scattered vascular calcifications.       ID INITIAL CONSULT NOTE     Patient is a 83y old  Female who presents with a chief complaint of     HPI:  84 y/o female with a PMHx of medically managed CAD (prox LM 40%, prox LAD 40%, distal LAD 50%, Cx mild, distal % ), paroxysmal AF on AC, severe AS (EFRAIN 0.69 sqcm) pending TAVR (transcatheter aortic valve replacement), right internal carotid stenosis, PAD with chronic left first toe wound, HTN, HLD, hypothyroidism, and anxiety presents for elective left lower extremity angiogram.    Pt has had a wound in her left first toe for the past several months. Pt admits to intermittent "burning" pain in her toe and her left leg from her calf to her foot. Pt elicits that her pain is made worse with activity and improved with rest. Pt takes Tylenol as needed for the pain which provides mild to moderate relief. Pt was recently hospitalized at [facility]. X-ray of the left foot was negative for acute osteomyelitis. MONTY/PVR revealed moderate arterial flow limitation in the right lower extremity localizing to the femoropopliteal distribution with PVR waveforms suggesting flow-limiting disease in the iliofemoral distribution and small vessel disease in the left foot. Pt denies fever, chills, recent travel, headache, dizziness, visual deficits, chest pain, shortness of breath, orthopnea, palpitations, abdominal pain, N/V/D/C, hematochezia, melena, dysuria, hematuria, LOC, syncope. Pt now presented for left lower extremity angiogram, transfer from [facility] for TAVR work up.     Pt initially admitted to [facility] on 8/24/23, had LLE angio, now transferred to [facility] on 8/25 for bypass. Pt was started on Vancomycin/Zosyn, noted to afebrile with stable vitals, WBC only 10.75. XR with no gas, no obvious findings of OM.     ID consulted for further recommendations      prior hospital charts reviewed [X]  primary team notes reviewed [ X ]  other consultant notes reviewed [ X]    PAST MEDICAL & SURGICAL HISTORY:  HTN (hypertension)      HLD (hyperlipidemia)      Anxiety      CAD (coronary artery disease)      PAD (peripheral artery disease)      Hypothyroidism      AF (atrial fibrillation)      Carotid artery stenosis      AS (aortic stenosis)      Status post closed fracture of right femur          Allergies  sulfa drugs (Unknown)  latex (Unknown)        ANTIMICROBIALS:  piperacillin/tazobactam IVPB. 3.375 once  piperacillin/tazobactam IVPB.- 3.375 once  piperacillin/tazobactam IVPB.- 3.375 once  piperacillin/tazobactam IVPB.. 3.375 every 8 hours  vancomycin  IVPB 1000 every 12 hours    OTHER MEDS: MEDICATIONS  (STANDING):  aMIOdarone    Tablet 100 daily  aspirin  chewable 81 daily  atorvastatin 20 at bedtime  enoxaparin Injectable 60 every 12 hours  levothyroxine 50 daily  losartan 50 daily  metoprolol succinate ER 50 daily      SOCIAL HISTORY: [ ] etoh [ ] tobacco [ ] former smoker [ ] IVDU  Denies any smoking, alcohol     FAMILY HISTORY:  Family history of myocardial infarction (Father, Sibling)        REVIEW OF SYSTEMS:    CONSTITUTIONAL: No weakness, fevers or chills  EYES/ENT: No visual changes;  No vertigo or throat pain   NECK: No pain or stiffness  RESPIRATORY: No cough, wheezing, hemoptysis; No shortness of breath  CARDIOVASCULAR: No chest pain or palpitations  GASTROINTESTINAL: No abdominal or epigastric pain. No nausea, vomiting, or hematemesis; No diarrhea or constipation. No melena or hematochezia.  GENITOURINARY: No dysuria, frequency or hematuria  NEUROLOGICAL: No numbness or weakness  MSK: Some L foot burning   SKIN: No itching, burning, rashes, or lesions   All other review of systems is negative unless indicated above.    Vital Signs Last 24 Hrs  T(F): 98.5 (08-25-23 @ 09:35), Max: 98.5 (08-25-23 @ 09:35)    Vital Signs Last 24 Hrs  HR: 100 (08-25-23 @ 09:35) (79 - 100)  BP: 132/65 (08-25-23 @ 09:35) (123/65 - 153/64)  RR: 18 (08-25-23 @ 09:35)  SpO2: 94% (08-25-23 @ 09:35) (94% - 100%)  Wt(kg): --    PHYSICAL EXAM:  GENERAL: NAD, well-developed  HEAD:  Atraumatic, Normocephalic  EYES: EOMI, PERRLA, conjunctiva and sclera clear  NECK: Supple, No JVD  CHEST/LUNG: Clear to auscultation bilaterally; No wheeze  HEART: Regular rate and rhythm; No murmurs, rubs, or gallops  ABDOMEN: Soft, Nontender, Nondistended; Bowel sounds present  EXTREMITIES: Left foot 1st toe necrotic appearing, dried blood, no purulence, no erythema  PSYCH: AAOx3  NEUROLOGY: non-focal  SKIN: No rashes or lesions      Labs:                          12.9   10.75 )-----------( 191      ( 25 Aug 2023 07:09 )             38.0       08-25    133<L>  |  96  |  16  ----------------------------<  139<H>  3.6   |  24  |  0.77    Ca    9.1      25 Aug 2023 07:08  Phos  3.2     08-25  Mg     2.0     08-25    TPro  7.1  /  Alb  4.0  /  TBili  0.9  /  DBili  x   /  AST  15  /  ALT  12  /  AlkPhos  89  08-25      Urinalysis Basic - ( 25 Aug 2023 07:08 )    Color: x / Appearance: x / SG: x / pH: x  Gluc: 139 mg/dL / Ketone: x  / Bili: x / Urobili: x   Blood: x / Protein: x / Nitrite: x   Leuk Esterase: x / RBC: x / WBC x   Sq Epi: x / Non Sq Epi: x / Bacteria: x          MICROBIOLOGY:  .Abscess left foot wound culture  08-24-23 --  --    No polymorphonuclear leukocytes seen per low power field  Rare Gram positive cocci in pairs seen per oil power field  Rare Gram Negative Rods seen per oil power field        RADIOLOGY:  < from: Xray Foot AP + Lateral + Oblique, Left (08.25.23 @ 08:59) >  Progressive deepening soft tissue defect over medial left hallux distal   phalanx. Smaller soft tissue ulceration over lateral 5th MTP region. No   subjacent tracking gas collections or gross radiographic evidence for   osteomyelitis however if concern persists MRI suggested to further assess.    No fractures or dislocations.    Tarsometatarsal alignment maintained without evidence for a Lisfranc   injury.    Slight hallux valgus deformity with small bunion. Preserved remaining   joint spaces and no joint margin erosions.    Plantar and posterior calcaneal enthesophytes.    Generalized osteopenia    Scant scattered vascular calcifications.

## 2023-08-25 NOTE — PATIENT PROFILE ADULT - FALL HARM RISK - HARM RISK INTERVENTIONS
Assistance with ambulation/Assistance OOB with selected safe patient handling equipment/Communicate Risk of Fall with Harm to all staff/Discuss with provider need for PT consult/Monitor gait and stability/Reinforce activity limits and safety measures with patient and family/Tailored Fall Risk Interventions/Visual Cue: Yellow wristband and red socks/Bed in lowest position, wheels locked, appropriate side rails in place/Call bell, personal items and telephone in reach/Instruct patient to call for assistance before getting out of bed or chair/Non-slip footwear when patient is out of bed/[place] to call system/Physically safe environment - no spills, clutter or unnecessary equipment/Purposeful Proactive Rounding/Room/bathroom lighting operational, light cord in reach

## 2023-08-25 NOTE — CONSULT NOTE ADULT - ASSESSMENT
This is a 82 y/o female with a PMHx of medically managed CAD (prox LM 40%, prox LAD 40%, distal LAD 50%, Cx mild, distal % ), paroxysmal AF on AC, severe AS (EFRAIN 0.69 sqcm) pending TAVR (transcatheter aortic valve replacement), right internal carotid stenosis, PAD with chronic left first toe wound, HTN, HLD, hypothyroidism, and anxiety initially admitted to [facility] on 8/24 for LLE angiogram, now transferred to [facility] for bypass.     #Left foot wound  #PAD  #CAD  #Leukocytosis    Pt is afebrile, w/o significant leukocytosis     NOTE IN INCOMPLETE  This is a 84 y/o female with a PMHx of medically managed CAD (prox LM 40%, prox LAD 40%, distal LAD 50%, Cx mild, distal % ), paroxysmal AF on AC, severe AS (EFRAIN 0.69 sqcm) pending TAVR (transcatheter aortic valve replacement), right internal carotid stenosis, PAD with chronic left first toe wound, HTN, HLD, hypothyroidism, and anxiety initially admitted to [facility] on 8/24 for LLE angiogram, now transferred to [facility] for bypass.     #Left foot wound  #PAD  #CAD  #Leukocytosis    Pt is afebrile, w/o significant leukocytosis, toe is necrotic appearing, however no purulence or sign of soft tissue swelling/erythema. Does not appear infected.    Plan:   1. Would d/c antibiotics and monitor off     Thank you for this consult. Inpatient ID consult team will sign off.    Further changes in lab values, imaging studies, or clinical status will not be known to ID inpatient consultants unless specifically communicated by primary team.    Javan Bowling MD  Attending Physician  Division of Infectious Diseases  Department of Medicine    Please contact through MS Teams message.  Office: [phone] (after 5 PM or weekend)

## 2023-08-25 NOTE — PROGRESS NOTE ADULT - ASSESSMENT
83F presents with left foot hallux wound to subQ  - Patient seen and evaluated  - Afebrile, WBC 10, ESR 17, CRP 3.2  - Left foot hallux wound to subQ with medial adhered eschar, erythema from distal digits 1-5 to MTPJ's, lateral 5th MTPJ wound to subQ, medial 1st MTPJ wound to subQ, dorsal midfoot and hindfoot erythema, no drainage, no pus, no tracking, no tunneling. Right foot no open wounds or lesions no acute signs of infection.  - Left foot Xray shows no gas, no OM  - Vasc planning bypass on Thursday, 8/31, appreciated   - Pod plan: local wound care pending vasc recs  - Discussed with attending

## 2023-08-25 NOTE — H&P ADULT - ATTENDING COMMENTS
Pt with severe PVD with L foot gangrene  angio showed CFA, CFA, and pop occlusion  based on angio there is no endovascular revascularization options  pt needs external iliac to peroneal bypass for limb salvage  will get preop optimization   vein mapping

## 2023-08-26 LAB
-  AMIKACIN: SIGNIFICANT CHANGE UP
-  AMIKACIN: SIGNIFICANT CHANGE UP
-  AMOXICILLIN/CLAVULANIC ACID: SIGNIFICANT CHANGE UP
-  AMOXICILLIN/CLAVULANIC ACID: SIGNIFICANT CHANGE UP
-  AMPICILLIN/SULBACTAM: SIGNIFICANT CHANGE UP
-  AMPICILLIN/SULBACTAM: SIGNIFICANT CHANGE UP
-  AMPICILLIN: SIGNIFICANT CHANGE UP
-  AMPICILLIN: SIGNIFICANT CHANGE UP
-  AZTREONAM: SIGNIFICANT CHANGE UP
-  AZTREONAM: SIGNIFICANT CHANGE UP
-  CEFAZOLIN: SIGNIFICANT CHANGE UP
-  CEFAZOLIN: SIGNIFICANT CHANGE UP
-  CEFEPIME: SIGNIFICANT CHANGE UP
-  CEFEPIME: SIGNIFICANT CHANGE UP
-  CEFOXITIN: SIGNIFICANT CHANGE UP
-  CEFOXITIN: SIGNIFICANT CHANGE UP
-  CEFTRIAXONE: SIGNIFICANT CHANGE UP
-  CEFTRIAXONE: SIGNIFICANT CHANGE UP
-  CIPROFLOXACIN: SIGNIFICANT CHANGE UP
-  CIPROFLOXACIN: SIGNIFICANT CHANGE UP
-  ERTAPENEM: SIGNIFICANT CHANGE UP
-  ERTAPENEM: SIGNIFICANT CHANGE UP
-  GENTAMICIN: SIGNIFICANT CHANGE UP
-  GENTAMICIN: SIGNIFICANT CHANGE UP
-  IMIPENEM: SIGNIFICANT CHANGE UP
-  LEVOFLOXACIN: SIGNIFICANT CHANGE UP
-  LEVOFLOXACIN: SIGNIFICANT CHANGE UP
-  MEROPENEM: SIGNIFICANT CHANGE UP
-  MEROPENEM: SIGNIFICANT CHANGE UP
-  PIPERACILLIN/TAZOBACTAM: SIGNIFICANT CHANGE UP
-  PIPERACILLIN/TAZOBACTAM: SIGNIFICANT CHANGE UP
-  TOBRAMYCIN: SIGNIFICANT CHANGE UP
-  TOBRAMYCIN: SIGNIFICANT CHANGE UP
-  TRIMETHOPRIM/SULFAMETHOXAZOLE: SIGNIFICANT CHANGE UP
-  TRIMETHOPRIM/SULFAMETHOXAZOLE: SIGNIFICANT CHANGE UP
ANION GAP SERPL CALC-SCNC: 11 MMOL/L — SIGNIFICANT CHANGE UP (ref 5–17)
BUN SERPL-MCNC: 12 MG/DL — SIGNIFICANT CHANGE UP (ref 7–23)
CALCIUM SERPL-MCNC: 9 MG/DL — SIGNIFICANT CHANGE UP (ref 8.4–10.5)
CHLORIDE SERPL-SCNC: 100 MMOL/L — SIGNIFICANT CHANGE UP (ref 96–108)
CO2 SERPL-SCNC: 24 MMOL/L — SIGNIFICANT CHANGE UP (ref 22–31)
CREAT SERPL-MCNC: 0.7 MG/DL — SIGNIFICANT CHANGE UP (ref 0.5–1.3)
CULTURE RESULTS: SIGNIFICANT CHANGE UP
EGFR: 86 ML/MIN/1.73M2 — SIGNIFICANT CHANGE UP
GLUCOSE SERPL-MCNC: 125 MG/DL — HIGH (ref 70–99)
HCT VFR BLD CALC: 36.4 % — SIGNIFICANT CHANGE UP (ref 34.5–45)
HGB BLD-MCNC: 12.5 G/DL — SIGNIFICANT CHANGE UP (ref 11.5–15.5)
MAGNESIUM SERPL-MCNC: 2.1 MG/DL — SIGNIFICANT CHANGE UP (ref 1.6–2.6)
MCHC RBC-ENTMCNC: 34.3 GM/DL — SIGNIFICANT CHANGE UP (ref 32–36)
MCHC RBC-ENTMCNC: 34.4 PG — HIGH (ref 27–34)
MCV RBC AUTO: 100.3 FL — HIGH (ref 80–100)
METHOD TYPE: SIGNIFICANT CHANGE UP
METHOD TYPE: SIGNIFICANT CHANGE UP
NRBC # BLD: 0 /100 WBCS — SIGNIFICANT CHANGE UP (ref 0–0)
ORGANISM # SPEC MICROSCOPIC CNT: SIGNIFICANT CHANGE UP
PHOSPHATE SERPL-MCNC: 3.1 MG/DL — SIGNIFICANT CHANGE UP (ref 2.5–4.5)
PLATELET # BLD AUTO: 182 K/UL — SIGNIFICANT CHANGE UP (ref 150–400)
POTASSIUM SERPL-MCNC: 3.9 MMOL/L — SIGNIFICANT CHANGE UP (ref 3.5–5.3)
POTASSIUM SERPL-SCNC: 3.9 MMOL/L — SIGNIFICANT CHANGE UP (ref 3.5–5.3)
RBC # BLD: 3.63 M/UL — LOW (ref 3.8–5.2)
RBC # FLD: 12.2 % — SIGNIFICANT CHANGE UP (ref 10.3–14.5)
SODIUM SERPL-SCNC: 135 MMOL/L — SIGNIFICANT CHANGE UP (ref 135–145)
SPECIMEN SOURCE: SIGNIFICANT CHANGE UP
WBC # BLD: 8.82 K/UL — SIGNIFICANT CHANGE UP (ref 3.8–10.5)
WBC # FLD AUTO: 8.82 K/UL — SIGNIFICANT CHANGE UP (ref 3.8–10.5)

## 2023-08-26 RX ORDER — ROSUVASTATIN CALCIUM 5 MG/1
2.5 TABLET ORAL AT BEDTIME
Refills: 0 | Status: DISCONTINUED | OUTPATIENT
Start: 2023-08-26 | End: 2023-08-26

## 2023-08-26 RX ADMIN — ENOXAPARIN SODIUM 60 MILLIGRAM(S): 100 INJECTION SUBCUTANEOUS at 21:15

## 2023-08-26 RX ADMIN — Medication 50 MILLIGRAM(S): at 05:32

## 2023-08-26 RX ADMIN — AMIODARONE HYDROCHLORIDE 100 MILLIGRAM(S): 400 TABLET ORAL at 05:33

## 2023-08-26 RX ADMIN — Medication 81 MILLIGRAM(S): at 11:48

## 2023-08-26 RX ADMIN — LOSARTAN POTASSIUM 50 MILLIGRAM(S): 100 TABLET, FILM COATED ORAL at 05:32

## 2023-08-26 RX ADMIN — Medication 1 APPLICATION(S): at 17:07

## 2023-08-26 RX ADMIN — Medication 50 MICROGRAM(S): at 05:32

## 2023-08-26 RX ADMIN — ENOXAPARIN SODIUM 60 MILLIGRAM(S): 100 INJECTION SUBCUTANEOUS at 11:48

## 2023-08-26 NOTE — PROGRESS NOTE ADULT - SUBJECTIVE AND OBJECTIVE BOX
Vascular SURGERY DAILY PROGRESS NOTE    SUBJECTIVE: Patient seen and evaluated on AM rounds. Pt is resting comfortably in bed with no complaints. Denies fevers/chills, chest pain, dyspnea, abdominal pain, nausea, vomiting, and diarrhea    Overnight Events:  No acute events overnight  -----------------------------------------------------------------------------------------------------------------------------------------------------------------------------------------------------------  OBJECTIVE:  Vital Signs Last 24 Hrs  T(C): 37.1 (26 Aug 2023 05:29), Max: 37.2 (25 Aug 2023 22:50)  T(F): 98.8 (26 Aug 2023 05:29), Max: 99 (25 Aug 2023 22:50)  HR: 79 (26 Aug 2023 05:29) (79 - 100)  BP: 148/66 (26 Aug 2023 05:29) (132/65 - 169/73)  BP(mean): --  RR: 18 (26 Aug 2023 05:29) (18 - 18)  SpO2: 96% (26 Aug 2023 05:29) (94% - 96%)    Parameters below as of 26 Aug 2023 05:29  Patient On (Oxygen Delivery Method): room air      I&O's Detail    25 Aug 2023 07:01  -  26 Aug 2023 07:00  --------------------------------------------------------  IN:    IV PiggyBack: 350 mL    Oral Fluid: 840 mL  Total IN: 1190 mL    OUT:  Total OUT: 0 mL    Total NET: 1190 mL        Daily     Daily   MEDICATIONS  (STANDING):  aMIOdarone    Tablet 100 milliGRAM(s) Oral daily  aspirin  chewable 81 milliGRAM(s) Oral daily  atorvastatin 20 milliGRAM(s) Oral at bedtime  cadexomer iodine 0.9% Gel 1 Application(s) Topical daily  enoxaparin Injectable 60 milliGRAM(s) SubCutaneous every 12 hours  levothyroxine 50 MICROGram(s) Oral daily  losartan 50 milliGRAM(s) Oral daily  metoprolol succinate ER 50 milliGRAM(s) Oral daily    MEDICATIONS  (PRN):  acetaminophen     Tablet .. 650 milliGRAM(s) Oral every 6 hours PRN Moderate Pain (4 - 6)      LABS:                        12.5   8.82  )-----------( 182      ( 26 Aug 2023 07:29 )             36.4     08-26    135  |  100  |  12  ----------------------------<  125<H>  3.9   |  24  |  0.70    Ca    9.0      26 Aug 2023 07:26  Phos  3.1     08-26  Mg     2.1     08-26    TPro  7.1  /  Alb  4.0  /  TBili  0.9  /  DBili  x   /  AST  15  /  ALT  12  /  AlkPhos  89  08-25    PT/INR - ( 25 Aug 2023 07:09 )   PT: 11.7 sec;   INR: 1.12 ratio         PTT - ( 25 Aug 2023 07:09 )  PTT:29.5 sec  Urinalysis Basic - ( 26 Aug 2023 07:26 )    Color: x / Appearance: x / SG: x / pH: x  Gluc: 125 mg/dL / Ketone: x  / Bili: x / Urobili: x   Blood: x / Protein: x / Nitrite: x   Leuk Esterase: x / RBC: x / WBC x   Sq Epi: x / Non Sq Epi: x / Bacteria: x      PHYSICAL EXAM:  Physical Exam:  General: WN/WD NAD  Neurology: nonfocal, follows commands  Respiratory: nonlabored breathing on RA  CV: HDS  Extremities: Left foot hallux wound with associated digital erythema. no pus or drainage.

## 2023-08-26 NOTE — PROGRESS NOTE ADULT - ASSESSMENT
84 y/o female with a PMHx of medically managed CAD (prox LM 40%, prox LAD 40%, distal LAD 50%, Cx mild, distal % ), paroxysmal AF on Eliquis (last dose 8/22/2023 PM), severe AS (EFRAIN 0.69 sqcm) pending TAVR (transcatheter aortic valve replacement), right internal carotid stenosis, PAD with chronic left first toe wound, HTN, HLD, hypothyroidism, and anxiety     Plan:  - Appreciate cardiology recommendations - low risk of adverse cardiac event   - Appreciate podiatry recommendations - local wound care   - ID recommendations appreciated - DC antibiotics, monitor off   - LE vein mapping ordered  - T lovenox for PAF  - OR planning for Thursday with Dr. Tavares

## 2023-08-27 LAB
ANION GAP SERPL CALC-SCNC: 12 MMOL/L — SIGNIFICANT CHANGE UP (ref 5–17)
BUN SERPL-MCNC: 16 MG/DL — SIGNIFICANT CHANGE UP (ref 7–23)
CALCIUM SERPL-MCNC: 8.8 MG/DL — SIGNIFICANT CHANGE UP (ref 8.4–10.5)
CHLORIDE SERPL-SCNC: 99 MMOL/L — SIGNIFICANT CHANGE UP (ref 96–108)
CO2 SERPL-SCNC: 25 MMOL/L — SIGNIFICANT CHANGE UP (ref 22–31)
CREAT SERPL-MCNC: 0.73 MG/DL — SIGNIFICANT CHANGE UP (ref 0.5–1.3)
EGFR: 82 ML/MIN/1.73M2 — SIGNIFICANT CHANGE UP
GLUCOSE SERPL-MCNC: 122 MG/DL — HIGH (ref 70–99)
HCT VFR BLD CALC: 36 % — SIGNIFICANT CHANGE UP (ref 34.5–45)
HGB BLD-MCNC: 12.3 G/DL — SIGNIFICANT CHANGE UP (ref 11.5–15.5)
MAGNESIUM SERPL-MCNC: 2.1 MG/DL — SIGNIFICANT CHANGE UP (ref 1.6–2.6)
MCHC RBC-ENTMCNC: 34.2 GM/DL — SIGNIFICANT CHANGE UP (ref 32–36)
MCHC RBC-ENTMCNC: 34.5 PG — HIGH (ref 27–34)
MCV RBC AUTO: 100.8 FL — HIGH (ref 80–100)
NRBC # BLD: 0 /100 WBCS — SIGNIFICANT CHANGE UP (ref 0–0)
PHOSPHATE SERPL-MCNC: 2.8 MG/DL — SIGNIFICANT CHANGE UP (ref 2.5–4.5)
PLATELET # BLD AUTO: 189 K/UL — SIGNIFICANT CHANGE UP (ref 150–400)
POTASSIUM SERPL-MCNC: 4.1 MMOL/L — SIGNIFICANT CHANGE UP (ref 3.5–5.3)
POTASSIUM SERPL-SCNC: 4.1 MMOL/L — SIGNIFICANT CHANGE UP (ref 3.5–5.3)
RBC # BLD: 3.57 M/UL — LOW (ref 3.8–5.2)
RBC # FLD: 12 % — SIGNIFICANT CHANGE UP (ref 10.3–14.5)
SODIUM SERPL-SCNC: 136 MMOL/L — SIGNIFICANT CHANGE UP (ref 135–145)
WBC # BLD: 10.42 K/UL — SIGNIFICANT CHANGE UP (ref 3.8–10.5)
WBC # FLD AUTO: 10.42 K/UL — SIGNIFICANT CHANGE UP (ref 3.8–10.5)

## 2023-08-27 RX ORDER — ROSUVASTATIN CALCIUM 5 MG/1
5 TABLET ORAL AT BEDTIME
Refills: 0 | Status: DISCONTINUED | OUTPATIENT
Start: 2023-08-27 | End: 2023-08-31

## 2023-08-27 RX ADMIN — AMIODARONE HYDROCHLORIDE 100 MILLIGRAM(S): 400 TABLET ORAL at 07:59

## 2023-08-27 RX ADMIN — Medication 1 APPLICATION(S): at 17:16

## 2023-08-27 RX ADMIN — ENOXAPARIN SODIUM 60 MILLIGRAM(S): 100 INJECTION SUBCUTANEOUS at 11:45

## 2023-08-27 RX ADMIN — Medication 650 MILLIGRAM(S): at 08:01

## 2023-08-27 RX ADMIN — Medication 50 MILLIGRAM(S): at 07:59

## 2023-08-27 RX ADMIN — Medication 50 MICROGRAM(S): at 05:22

## 2023-08-27 RX ADMIN — Medication 81 MILLIGRAM(S): at 11:45

## 2023-08-27 RX ADMIN — ENOXAPARIN SODIUM 60 MILLIGRAM(S): 100 INJECTION SUBCUTANEOUS at 22:22

## 2023-08-27 RX ADMIN — Medication 650 MILLIGRAM(S): at 08:59

## 2023-08-27 RX ADMIN — LOSARTAN POTASSIUM 50 MILLIGRAM(S): 100 TABLET, FILM COATED ORAL at 07:59

## 2023-08-27 RX ADMIN — ROSUVASTATIN CALCIUM 5 MILLIGRAM(S): 5 TABLET ORAL at 23:49

## 2023-08-27 NOTE — PROGRESS NOTE ADULT - ASSESSMENT
82 y/o female with a PMHx of medically managed CAD (prox LM 40%, prox LAD 40%, distal LAD 50%, Cx mild, distal % ), paroxysmal AF on Eliquis (last dose 8/22/2023 PM), severe AS (EFRAIN 0.69 sqcm) pending TAVR (transcatheter aortic valve replacement), right internal carotid stenosis, PAD with chronic left first toe wound, HTN, HLD, hypothyroidism, and anxiety     Plan:  - Sever PVD with L foot gangrene, Plan for external iliac to peroneal bypass Thursday with Dr Tavares   - Appreciate cardiology recommendations - low risk of adverse cardiac event   - Appreciate podiatry recommendations - local wound care   - ID recommendations appreciated - DC antibiotics, monitor off   - LE vein mapping resulted  - T lovenox for PAF

## 2023-08-27 NOTE — PROGRESS NOTE ADULT - SUBJECTIVE AND OBJECTIVE BOX
Vascular SURGERY DAILY PROGRESS NOTE    SUBJECTIVE: Patient seen and evaluated on AM rounds. Pt is resting comfortably in bed with no complaints. Passing gas and having bowel movements. Tolerating diet and Ambulating well.  Pain is adequately controlled on current regimen.  Denies fevers/chills, chest pain, dyspnea, abdominal pain, nausea, vomiting, and diarrhea    Overnight Events:  No acute events overnight  -----------------------------------------------------------------------------------------------------------------------------------------------------------------------------------------------------------  OBJECTIVE:  Vital Signs Last 24 Hrs  T(C): 36.7 (27 Aug 2023 08:36), Max: 37.1 (26 Aug 2023 13:38)  T(F): 98 (27 Aug 2023 08:36), Max: 98.7 (26 Aug 2023 13:38)  HR: 88 (27 Aug 2023 08:36) (78 - 93)  BP: 112/67 (27 Aug 2023 08:36) (102/68 - 160/75)  BP(mean): --  RR: 18 (27 Aug 2023 08:36) (18 - 18)  SpO2: 95% (27 Aug 2023 08:36) (95% - 99%)    Parameters below as of 27 Aug 2023 08:36  Patient On (Oxygen Delivery Method): room air      I&O's Detail    26 Aug 2023 07:01  -  27 Aug 2023 07:00  --------------------------------------------------------  IN:    Oral Fluid: 920 mL  Total IN: 920 mL    OUT:  Total OUT: 0 mL    Total NET: 920 mL        Daily     Daily   MEDICATIONS  (STANDING):  aMIOdarone    Tablet 100 milliGRAM(s) Oral daily  aspirin  chewable 81 milliGRAM(s) Oral daily  cadexomer iodine 0.9% Gel 1 Application(s) Topical daily  enoxaparin Injectable 60 milliGRAM(s) SubCutaneous every 12 hours  levothyroxine 50 MICROGram(s) Oral daily  losartan 50 milliGRAM(s) Oral daily  metoprolol succinate ER 50 milliGRAM(s) Oral daily  Rosuvastatin (Crestor) 2.5mg 1 Tablet(s) 1 Tablet(s) Oral at bedtime    MEDICATIONS  (PRN):  acetaminophen     Tablet .. 650 milliGRAM(s) Oral every 6 hours PRN Moderate Pain (4 - 6)      LABS:                        12.3   10.42 )-----------( 189      ( 27 Aug 2023 07:47 )             36.0     08-27    136  |  99  |  16  ----------------------------<  122<H>  4.1   |  25  |  0.73    Ca    8.8      27 Aug 2023 07:47  Phos  2.8     08-27  Mg     2.1     08-27        Urinalysis Basic - ( 27 Aug 2023 07:47 )    Color: x / Appearance: x / SG: x / pH: x  Gluc: 122 mg/dL / Ketone: x  / Bili: x / Urobili: x   Blood: x / Protein: x / Nitrite: x   Leuk Esterase: x / RBC: x / WBC x   Sq Epi: x / Non Sq Epi: x / Bacteria: x      PHYSICAL EXAM:  Physical Exam:  General: WN/WD NAD  Neurology: nonfocal, follows commands  Respiratory: nonlabored breathing on RA  CV: HDS  Extremities: Left foot hallux wound with associated digital erythema. no pus or drainage.

## 2023-08-28 LAB
ANION GAP SERPL CALC-SCNC: 10 MMOL/L — SIGNIFICANT CHANGE UP (ref 5–17)
BUN SERPL-MCNC: 20 MG/DL — SIGNIFICANT CHANGE UP (ref 7–23)
CALCIUM SERPL-MCNC: 8.7 MG/DL — SIGNIFICANT CHANGE UP (ref 8.4–10.5)
CHLORIDE SERPL-SCNC: 98 MMOL/L — SIGNIFICANT CHANGE UP (ref 96–108)
CO2 SERPL-SCNC: 25 MMOL/L — SIGNIFICANT CHANGE UP (ref 22–31)
CREAT SERPL-MCNC: 0.96 MG/DL — SIGNIFICANT CHANGE UP (ref 0.5–1.3)
EGFR: 59 ML/MIN/1.73M2 — LOW
GLUCOSE SERPL-MCNC: 140 MG/DL — HIGH (ref 70–99)
HCT VFR BLD CALC: 33.1 % — LOW (ref 34.5–45)
HGB BLD-MCNC: 10.9 G/DL — LOW (ref 11.5–15.5)
MAGNESIUM SERPL-MCNC: 2 MG/DL — SIGNIFICANT CHANGE UP (ref 1.6–2.6)
MCHC RBC-ENTMCNC: 32.9 GM/DL — SIGNIFICANT CHANGE UP (ref 32–36)
MCHC RBC-ENTMCNC: 33.7 PG — SIGNIFICANT CHANGE UP (ref 27–34)
MCV RBC AUTO: 102.5 FL — HIGH (ref 80–100)
NRBC # BLD: 0 /100 WBCS — SIGNIFICANT CHANGE UP (ref 0–0)
PHOSPHATE SERPL-MCNC: 2.9 MG/DL — SIGNIFICANT CHANGE UP (ref 2.5–4.5)
PLATELET # BLD AUTO: 197 K/UL — SIGNIFICANT CHANGE UP (ref 150–400)
POTASSIUM SERPL-MCNC: 4.2 MMOL/L — SIGNIFICANT CHANGE UP (ref 3.5–5.3)
POTASSIUM SERPL-SCNC: 4.2 MMOL/L — SIGNIFICANT CHANGE UP (ref 3.5–5.3)
RBC # BLD: 3.23 M/UL — LOW (ref 3.8–5.2)
RBC # FLD: 12.2 % — SIGNIFICANT CHANGE UP (ref 10.3–14.5)
SODIUM SERPL-SCNC: 133 MMOL/L — LOW (ref 135–145)
WBC # BLD: 12.26 K/UL — HIGH (ref 3.8–10.5)
WBC # FLD AUTO: 12.26 K/UL — HIGH (ref 3.8–10.5)

## 2023-08-28 PROCEDURE — 99231 SBSQ HOSP IP/OBS SF/LOW 25: CPT

## 2023-08-28 RX ORDER — SODIUM CHLORIDE 9 MG/ML
500 INJECTION INTRAMUSCULAR; INTRAVENOUS; SUBCUTANEOUS ONCE
Refills: 0 | Status: COMPLETED | OUTPATIENT
Start: 2023-08-28 | End: 2023-08-28

## 2023-08-28 RX ORDER — ONDANSETRON 8 MG/1
4 TABLET, FILM COATED ORAL ONCE
Refills: 0 | Status: COMPLETED | OUTPATIENT
Start: 2023-08-28 | End: 2023-08-28

## 2023-08-28 RX ADMIN — Medication 50 MICROGRAM(S): at 05:13

## 2023-08-28 RX ADMIN — ROSUVASTATIN CALCIUM 5 MILLIGRAM(S): 5 TABLET ORAL at 22:56

## 2023-08-28 RX ADMIN — Medication 650 MILLIGRAM(S): at 19:26

## 2023-08-28 RX ADMIN — Medication 81 MILLIGRAM(S): at 11:54

## 2023-08-28 RX ADMIN — ENOXAPARIN SODIUM 60 MILLIGRAM(S): 100 INJECTION SUBCUTANEOUS at 18:08

## 2023-08-28 RX ADMIN — Medication 1 APPLICATION(S): at 18:26

## 2023-08-28 RX ADMIN — ONDANSETRON 4 MILLIGRAM(S): 8 TABLET, FILM COATED ORAL at 10:41

## 2023-08-28 RX ADMIN — Medication 650 MILLIGRAM(S): at 18:26

## 2023-08-28 RX ADMIN — AMIODARONE HYDROCHLORIDE 100 MILLIGRAM(S): 400 TABLET ORAL at 10:49

## 2023-08-28 RX ADMIN — SODIUM CHLORIDE 500 MILLILITER(S): 9 INJECTION INTRAMUSCULAR; INTRAVENOUS; SUBCUTANEOUS at 10:46

## 2023-08-28 NOTE — PROGRESS NOTE ADULT - SUBJECTIVE AND OBJECTIVE BOX
VASCULAR SURGERY DAILY PROGRESS NOTE:       SUBJECTIVE: Patient seen and evaluated on AM rounds. Pt is resting comfortably in bed with no complaints. Passing gas and having bowel movements. Tolerating diet and Ambulating well.  Pain is adequately controlled on current regimen.  Denies fevers/chills, chest pain, dyspnea, abdominal pain, nausea, vomiting, and diarrhea    Overnight Events:  No acute events overnight     OBJECTIVE:  Vital Signs Last 24 Hrs  T(C): 36.6 (28 Aug 2023 06:00), Max: 36.8 (27 Aug 2023 16:49)  T(F): 97.9 (28 Aug 2023 06:00), Max: 98.3 (27 Aug 2023 21:01)  HR: 79 (28 Aug 2023 06:00) (76 - 96)  BP: 113/72 (28 Aug 2023 06:00) (112/67 - 138/69)  BP(mean): --  RR: 18 (28 Aug 2023 06:00) (18 - 18)  SpO2: 94% (28 Aug 2023 06:00) (94% - 96%)    Parameters below as of 28 Aug 2023 06:00  Patient On (Oxygen Delivery Method): room air      I&O's Detail    27 Aug 2023 07:01  -  28 Aug 2023 07:00  --------------------------------------------------------  IN:    Oral Fluid: 660 mL  Total IN: 660 mL    OUT:  Total OUT: 0 mL    Total NET: 660 mL        Daily     Daily   MEDICATIONS  (STANDING):  aMIOdarone    Tablet 100 milliGRAM(s) Oral daily  aspirin  chewable 81 milliGRAM(s) Oral daily  cadexomer iodine 0.9% Gel 1 Application(s) Topical daily  enoxaparin Injectable 60 milliGRAM(s) SubCutaneous every 12 hours  levothyroxine 50 MICROGram(s) Oral daily  losartan 50 milliGRAM(s) Oral daily  metoprolol succinate ER 50 milliGRAM(s) Oral daily  rosuvastatin 5 milliGRAM(s) Oral at bedtime    MEDICATIONS  (PRN):  acetaminophen     Tablet .. 650 milliGRAM(s) Oral every 6 hours PRN Moderate Pain (4 - 6)      Labs:                        12.3   10.42 )-----------( 189      ( 27 Aug 2023 07:47 )             36.0     08-27    136  |  99  |  16  ----------------------------<  122<H>  4.1   |  25  |  0.73    Ca    8.8      27 Aug 2023 07:47  Phos  2.8     08-27  Mg     2.1     08-27        Urinalysis Basic - ( 27 Aug 2023 07:47 )    Color: x / Appearance: x / SG: x / pH: x  Gluc: 122 mg/dL / Ketone: x  / Bili: x / Urobili: x   Blood: x / Protein: x / Nitrite: x   Leuk Esterase: x / RBC: x / WBC x   Sq Epi: x / Non Sq Epi: x / Bacteria: x                Physical Exam:  General: WN/WD NAD  Neurology: nonfocal, follows commands  Respiratory: nonlabored breathing on RA  CV: HDS  Extremities: Left foot hallux wound with associated digital erythema. no pus or drainage.

## 2023-08-28 NOTE — PROGRESS NOTE ADULT - ASSESSMENT
82 y/o female with a PMHx of medically managed CAD (prox LM 40%, prox LAD 40%, distal LAD 50%, Cx mild, distal % ), paroxysmal AF on Eliquis (last dose 8/22/2023 PM), severe AS (EFRAIN 0.69 sqcm) pending TAVR (transcatheter aortic valve replacement), right internal carotid stenosis, PAD with chronic left first toe wound, HTN, HLD, hypothyroidism, and anxiety     Plan:  - Severe PVD with L foot gangrene, Plan for external iliac to peroneal bypass Thursday with Dr Tavares   - Appreciate cardiology recommendations - low risk of adverse cardiac event   - Appreciate podiatry recommendations - local wound care   - ID recommendations appreciated - DC antibiotics, monitor off   - LE vein mapping resulted  - T lovenox for PAF

## 2023-08-29 LAB
ANION GAP SERPL CALC-SCNC: 10 MMOL/L — SIGNIFICANT CHANGE UP (ref 5–17)
BUN SERPL-MCNC: 22 MG/DL — SIGNIFICANT CHANGE UP (ref 7–23)
CALCIUM SERPL-MCNC: 8.7 MG/DL — SIGNIFICANT CHANGE UP (ref 8.4–10.5)
CHLORIDE SERPL-SCNC: 97 MMOL/L — SIGNIFICANT CHANGE UP (ref 96–108)
CO2 SERPL-SCNC: 25 MMOL/L — SIGNIFICANT CHANGE UP (ref 22–31)
CREAT SERPL-MCNC: 0.93 MG/DL — SIGNIFICANT CHANGE UP (ref 0.5–1.3)
EGFR: 61 ML/MIN/1.73M2 — SIGNIFICANT CHANGE UP
GLUCOSE SERPL-MCNC: 143 MG/DL — HIGH (ref 70–99)
HCT VFR BLD CALC: 28.6 % — LOW (ref 34.5–45)
HGB BLD-MCNC: 9.7 G/DL — LOW (ref 11.5–15.5)
MAGNESIUM SERPL-MCNC: 2.1 MG/DL — SIGNIFICANT CHANGE UP (ref 1.6–2.6)
MCHC RBC-ENTMCNC: 33.9 GM/DL — SIGNIFICANT CHANGE UP (ref 32–36)
MCHC RBC-ENTMCNC: 33.9 PG — SIGNIFICANT CHANGE UP (ref 27–34)
MCV RBC AUTO: 100 FL — SIGNIFICANT CHANGE UP (ref 80–100)
NRBC # BLD: 0 /100 WBCS — SIGNIFICANT CHANGE UP (ref 0–0)
PHOSPHATE SERPL-MCNC: 2.8 MG/DL — SIGNIFICANT CHANGE UP (ref 2.5–4.5)
PLATELET # BLD AUTO: 195 K/UL — SIGNIFICANT CHANGE UP (ref 150–400)
POTASSIUM SERPL-MCNC: 4.3 MMOL/L — SIGNIFICANT CHANGE UP (ref 3.5–5.3)
POTASSIUM SERPL-SCNC: 4.3 MMOL/L — SIGNIFICANT CHANGE UP (ref 3.5–5.3)
RBC # BLD: 2.86 M/UL — LOW (ref 3.8–5.2)
RBC # FLD: 12.2 % — SIGNIFICANT CHANGE UP (ref 10.3–14.5)
SODIUM SERPL-SCNC: 132 MMOL/L — LOW (ref 135–145)
WBC # BLD: 12.16 K/UL — HIGH (ref 3.8–10.5)
WBC # FLD AUTO: 12.16 K/UL — HIGH (ref 3.8–10.5)

## 2023-08-29 PROCEDURE — 99231 SBSQ HOSP IP/OBS SF/LOW 25: CPT

## 2023-08-29 RX ADMIN — ROSUVASTATIN CALCIUM 5 MILLIGRAM(S): 5 TABLET ORAL at 21:08

## 2023-08-29 RX ADMIN — Medication 50 MICROGRAM(S): at 06:27

## 2023-08-29 RX ADMIN — AMIODARONE HYDROCHLORIDE 100 MILLIGRAM(S): 400 TABLET ORAL at 09:42

## 2023-08-29 RX ADMIN — Medication 650 MILLIGRAM(S): at 22:00

## 2023-08-29 RX ADMIN — Medication 81 MILLIGRAM(S): at 11:47

## 2023-08-29 RX ADMIN — Medication 650 MILLIGRAM(S): at 21:08

## 2023-08-29 RX ADMIN — LOSARTAN POTASSIUM 50 MILLIGRAM(S): 100 TABLET, FILM COATED ORAL at 09:42

## 2023-08-29 RX ADMIN — ENOXAPARIN SODIUM 60 MILLIGRAM(S): 100 INJECTION SUBCUTANEOUS at 18:22

## 2023-08-29 RX ADMIN — Medication 50 MILLIGRAM(S): at 09:42

## 2023-08-29 RX ADMIN — ENOXAPARIN SODIUM 60 MILLIGRAM(S): 100 INJECTION SUBCUTANEOUS at 06:28

## 2023-08-29 NOTE — PROGRESS NOTE ADULT - ASSESSMENT
83F presents with left foot hallux wound to ischemic nail bed, digit 2-4 distal wound to subQ  - Patient seen and evaluated  - Afebrile, WBC 12, ESR 40, CRP 3.2  - Left foot hallux wound to subQ with medial adhered eschar, erythema from distal digits 1-5 to MTPJ's, lateral 5th MTPJ wound to subQ, medial 1st MTPJ wound to subQ, dorsal midfoot and hindfoot erythema, no drainage, no pus, no tracking, no tunneling. Right foot no open wounds or lesions no acute signs of infection.  - Left foot Xray shows no gas, no OM  - Vasc planning bypass on Thursday, 8/31, appreciated   - Pod plan: local wound care pending vasc recs  - Discussed with attending

## 2023-08-29 NOTE — PROGRESS NOTE ADULT - SUBJECTIVE AND OBJECTIVE BOX
Patient is a 83y old  Female who presents with a chief complaint of LE Angiogram and Bypass (25 Aug 2023 11:51)       INTERVAL HPI/OVERNIGHT EVENTS:  Patient seen and evaluated at bedside.  Pt is resting comfortable in NAD. Denies N/V/F/C.    Allergies    sulfa drugs (Unknown)  latex (Unknown)    Intolerances        Vital Signs Last 24 Hrs  T(C): 36.9 (29 Aug 2023 06:14), Max: 37.3 (28 Aug 2023 18:23)  T(F): 98.4 (29 Aug 2023 06:14), Max: 99.2 (28 Aug 2023 18:23)  HR: 89 (29 Aug 2023 06:14) (73 - 100)  BP: 126/65 (29 Aug 2023 06:14) (94/56 - 142/79)  BP(mean): --  RR: 18 (29 Aug 2023 06:14) (18 - 18)  SpO2: 94% (29 Aug 2023 06:14) (94% - 98%)    Parameters below as of 29 Aug 2023 06:14  Patient On (Oxygen Delivery Method): room air        LABS:                        9.7    12.16 )-----------( 195      ( 29 Aug 2023 07:23 )             28.6     08-29    132<L>  |  97  |  22  ----------------------------<  143<H>  4.3   |  25  |  0.93    Ca    8.7      29 Aug 2023 07:23  Phos  2.8     08-29  Mg     2.1     08-29        Urinalysis Basic - ( 29 Aug 2023 07:23 )    Color: x / Appearance: x / SG: x / pH: x  Gluc: 143 mg/dL / Ketone: x  / Bili: x / Urobili: x   Blood: x / Protein: x / Nitrite: x   Leuk Esterase: x / RBC: x / WBC x   Sq Epi: x / Non Sq Epi: x / Bacteria: x      CAPILLARY BLOOD GLUCOSE          Lower Extremity Physical Exam:  Vascular: DP/PT 0/4, B/L, CFT <3 seconds B/L, Temperature gradient warm to cool, B/L.   Neuro: Epicritic sensation intact to the level of digits, B/L.  Musculoskeletal/Ortho: unremarkable  Skin: Left foot hallux wound to subQ with medial adhered eschar, erythema from distal digits 1-5 to MTPJ's, lateral 5th MTPJ wound to subQ, medial 1st MTPJ wound to subQ, dorsal midfoot and hindfoot erythema, no drainage, no pus, no tracking, no tunneling. Right foot no open wounds or lesions no acute signs of infection.      RADIOLOGY & ADDITIONAL TESTS:  < from: Xray Foot AP + Lateral + Oblique, Left (08.25.23 @ 08:59) >    ACC: [id number] EXAM:  XR FOOT COMP MIN 3 VIEWS LT   ORDERED BY:  RENITA MERCADO     PROCEDURE DATE:  08/25/2023          INTERPRETATION:  CLINICAL INDICATION: peripheral arterial disease; left   hallux wound    EXAM:  Frontal oblique lateral left foot from 8/25/2023 at 0859. Compared to   prior study from 8/3/2023.    IMPRESSION:  Progressive deepening soft tissue defect over medial left hallux distal   phalanx. Smaller soft tissue ulceration over lateral 5th MTP region. No   subjacent tracking gas collections or gross radiographic evidence for   osteomyelitis however if concern persists MRI suggested to further assess.    No fractures or dislocations.    Tarsometatarsal alignment maintained without evidence for a Lisfranc   injury.    Slight hallux valgus deformity with small bunion. Preserved remaining   joint spaces and no joint margin erosions.    Plantar and posterior calcaneal enthesophytes.    Generalized osteopenia    Scant scattered vascular calcifications.    --- End of Report ---            CHERRY CARDOSO MD; Attending Radiologist  This document has been electronically signed. Aug 25 2023 10:41AM    < end of copied text >

## 2023-08-29 NOTE — PROGRESS NOTE ADULT - SUBJECTIVE AND OBJECTIVE BOX
Vascular SURGERY DAILY PROGRESS NOTE    SUBJECTIVE: Patient seen and evaluated on AM rounds. Pt is resting comfortably in bed with no complaints. Passing gas and having bowel movements. Tolerating diet and Ambulating well.  Pain is adequately controlled on current regimen.  Denies fevers/chills, chest pain, dyspnea, abdominal pain, nausea, vomiting, and diarrhea    Overnight Events:  No acute events overnight  -----------------------------------------------------------------------------------------------------------------------------------------------------------------------------------------------------------  OBJECTIVE:  Vital Signs Last 24 Hrs  T(C): 36.9 (29 Aug 2023 06:14), Max: 37.3 (28 Aug 2023 18:23)  T(F): 98.4 (29 Aug 2023 06:14), Max: 99.2 (28 Aug 2023 18:23)  HR: 89 (29 Aug 2023 06:14) (73 - 100)  BP: 126/65 (29 Aug 2023 06:14) (94/56 - 142/79)  BP(mean): --  RR: 18 (29 Aug 2023 06:14) (18 - 18)  SpO2: 94% (29 Aug 2023 06:14) (94% - 98%)    Parameters below as of 29 Aug 2023 06:14  Patient On (Oxygen Delivery Method): room air      I&O's Detail    28 Aug 2023 07:01  -  29 Aug 2023 07:00  --------------------------------------------------------  IN:    Oral Fluid: 580 mL  Total IN: 580 mL    OUT:  Total OUT: 0 mL    Total NET: 580 mL        Daily     Daily   MEDICATIONS  (STANDING):  aMIOdarone    Tablet 100 milliGRAM(s) Oral daily  aspirin  chewable 81 milliGRAM(s) Oral daily  cadexomer iodine 0.9% Gel 1 Application(s) Topical daily  enoxaparin Injectable 60 milliGRAM(s) SubCutaneous every 12 hours  levothyroxine 50 MICROGram(s) Oral daily  losartan 50 milliGRAM(s) Oral daily  metoprolol succinate ER 50 milliGRAM(s) Oral daily  rosuvastatin 5 milliGRAM(s) Oral at bedtime    MEDICATIONS  (PRN):  acetaminophen     Tablet .. 650 milliGRAM(s) Oral every 6 hours PRN Moderate Pain (4 - 6)      LABS:                        10.9   12.26 )-----------( 197      ( 28 Aug 2023 08:06 )             33.1     08-28    133<L>  |  98  |  20  ----------------------------<  140<H>  4.2   |  25  |  0.96    Ca    8.7      28 Aug 2023 08:07  Phos  2.9     08-28  Mg     2.0     08-28        Urinalysis Basic - ( 28 Aug 2023 08:07 )    Color: x / Appearance: x / SG: x / pH: x  Gluc: 140 mg/dL / Ketone: x  / Bili: x / Urobili: x   Blood: x / Protein: x / Nitrite: x   Leuk Esterase: x / RBC: x / WBC x   Sq Epi: x / Non Sq Epi: x / Bacteria: x                PHYSICAL EXAM:  Physical Exam:  General: WN/WD NAD  Neurology: nonfocal, follows commands  Respiratory: nonlabored breathing on RA  CV: HDS  Extremities: Left foot hallux wound with associated digital erythema. no pus or drainage.

## 2023-08-30 ENCOUNTER — TRANSCRIPTION ENCOUNTER (OUTPATIENT)
Age: 83
End: 2023-08-30

## 2023-08-30 LAB
ANION GAP SERPL CALC-SCNC: 11 MMOL/L — SIGNIFICANT CHANGE UP (ref 5–17)
BLD GP AB SCN SERPL QL: NEGATIVE — SIGNIFICANT CHANGE UP
BUN SERPL-MCNC: 19 MG/DL — SIGNIFICANT CHANGE UP (ref 7–23)
CALCIUM SERPL-MCNC: 8.5 MG/DL — SIGNIFICANT CHANGE UP (ref 8.4–10.5)
CHLORIDE SERPL-SCNC: 97 MMOL/L — SIGNIFICANT CHANGE UP (ref 96–108)
CO2 SERPL-SCNC: 24 MMOL/L — SIGNIFICANT CHANGE UP (ref 22–31)
CREAT SERPL-MCNC: 0.75 MG/DL — SIGNIFICANT CHANGE UP (ref 0.5–1.3)
EGFR: 79 ML/MIN/1.73M2 — SIGNIFICANT CHANGE UP
GLUCOSE SERPL-MCNC: 135 MG/DL — HIGH (ref 70–99)
HCT VFR BLD CALC: 24.4 % — LOW (ref 34.5–45)
HCT VFR BLD CALC: 27.9 % — LOW (ref 34.5–45)
HGB BLD-MCNC: 8.5 G/DL — LOW (ref 11.5–15.5)
HGB BLD-MCNC: 9.8 G/DL — LOW (ref 11.5–15.5)
MAGNESIUM SERPL-MCNC: 1.9 MG/DL — SIGNIFICANT CHANGE UP (ref 1.6–2.6)
MCHC RBC-ENTMCNC: 34.3 PG — HIGH (ref 27–34)
MCHC RBC-ENTMCNC: 34.8 GM/DL — SIGNIFICANT CHANGE UP (ref 32–36)
MCHC RBC-ENTMCNC: 35 PG — HIGH (ref 27–34)
MCHC RBC-ENTMCNC: 35.1 GM/DL — SIGNIFICANT CHANGE UP (ref 32–36)
MCV RBC AUTO: 100.4 FL — HIGH (ref 80–100)
MCV RBC AUTO: 97.6 FL — SIGNIFICANT CHANGE UP (ref 80–100)
NRBC # BLD: 0 /100 WBCS — SIGNIFICANT CHANGE UP (ref 0–0)
NRBC # BLD: 0 /100 WBCS — SIGNIFICANT CHANGE UP (ref 0–0)
PHOSPHATE SERPL-MCNC: 2.7 MG/DL — SIGNIFICANT CHANGE UP (ref 2.5–4.5)
PLATELET # BLD AUTO: 185 K/UL — SIGNIFICANT CHANGE UP (ref 150–400)
PLATELET # BLD AUTO: 207 K/UL — SIGNIFICANT CHANGE UP (ref 150–400)
POTASSIUM SERPL-MCNC: 4 MMOL/L — SIGNIFICANT CHANGE UP (ref 3.5–5.3)
POTASSIUM SERPL-SCNC: 4 MMOL/L — SIGNIFICANT CHANGE UP (ref 3.5–5.3)
RBC # BLD: 2.43 M/UL — LOW (ref 3.8–5.2)
RBC # BLD: 2.86 M/UL — LOW (ref 3.8–5.2)
RBC # FLD: 12.1 % — SIGNIFICANT CHANGE UP (ref 10.3–14.5)
RBC # FLD: 12.5 % — SIGNIFICANT CHANGE UP (ref 10.3–14.5)
RH IG SCN BLD-IMP: POSITIVE — SIGNIFICANT CHANGE UP
SODIUM SERPL-SCNC: 132 MMOL/L — LOW (ref 135–145)
WBC # BLD: 10.92 K/UL — HIGH (ref 3.8–10.5)
WBC # BLD: 12.13 K/UL — HIGH (ref 3.8–10.5)
WBC # FLD AUTO: 10.92 K/UL — HIGH (ref 3.8–10.5)
WBC # FLD AUTO: 12.13 K/UL — HIGH (ref 3.8–10.5)

## 2023-08-30 PROCEDURE — 99231 SBSQ HOSP IP/OBS SF/LOW 25: CPT | Mod: 57

## 2023-08-30 RX ORDER — CHLORHEXIDINE GLUCONATE 213 G/1000ML
1 SOLUTION TOPICAL DAILY
Refills: 0 | Status: DISCONTINUED | OUTPATIENT
Start: 2023-08-30 | End: 2023-08-31

## 2023-08-30 RX ADMIN — AMIODARONE HYDROCHLORIDE 100 MILLIGRAM(S): 400 TABLET ORAL at 09:20

## 2023-08-30 RX ADMIN — LOSARTAN POTASSIUM 50 MILLIGRAM(S): 100 TABLET, FILM COATED ORAL at 09:20

## 2023-08-30 RX ADMIN — ENOXAPARIN SODIUM 60 MILLIGRAM(S): 100 INJECTION SUBCUTANEOUS at 05:21

## 2023-08-30 RX ADMIN — ROSUVASTATIN CALCIUM 5 MILLIGRAM(S): 5 TABLET ORAL at 22:13

## 2023-08-30 RX ADMIN — CHLORHEXIDINE GLUCONATE 1 APPLICATION(S): 213 SOLUTION TOPICAL at 17:29

## 2023-08-30 RX ADMIN — Medication 1 APPLICATION(S): at 17:29

## 2023-08-30 RX ADMIN — Medication 50 MILLIGRAM(S): at 09:21

## 2023-08-30 RX ADMIN — Medication 81 MILLIGRAM(S): at 12:24

## 2023-08-30 RX ADMIN — Medication 650 MILLIGRAM(S): at 05:20

## 2023-08-30 RX ADMIN — Medication 50 MICROGRAM(S): at 05:21

## 2023-08-30 NOTE — PROGRESS NOTE ADULT - ASSESSMENT
84 y/o female with a PMHx of medically managed CAD (prox LM 40%, prox LAD 40%, distal LAD 50%, Cx mild, distal % ), paroxysmal AF on Eliquis (last dose 8/22/2023 PM), severe AS (EFRAIN 0.69 sqcm) pending TAVR (transcatheter aortic valve replacement), right internal carotid stenosis, PAD with chronic left first toe wound, HTN, HLD, hypothyroidism, and anxiety.  Planned for OR tomorrow for bypass    Plan:  - NPO after midnight  - Severe PVD with L foot gangrene, Plan for external iliac to peroneal bypass Thursday with Dr Tavares   - Appreciate cardiology recommendations - low risk of adverse cardiac event   - Appreciate podiatry recommendations - local wound care   - ID recommendations appreciated - DC antibiotics, monitor off   - LE vein mapping resulted  - T lovenox for PAF - on hold for OR tomorrow    Vascular Surgery   [id number]

## 2023-08-30 NOTE — PROGRESS NOTE ADULT - SUBJECTIVE AND OBJECTIVE BOX
VASCULAR SURGERY DAILY PROGRESS NOTE:         SUBJECTIVE/ROS: Patient seen and examined at bedside.  No events overnight. No complaints.   Denies nausea, vomiting, chest pain, shortness of breath         MEDICATIONS  (STANDING):  aMIOdarone    Tablet 100 milliGRAM(s) Oral daily  aspirin  chewable 81 milliGRAM(s) Oral daily  cadexomer iodine 0.9% Gel 1 Application(s) Topical daily  levothyroxine 50 MICROGram(s) Oral daily  losartan 50 milliGRAM(s) Oral daily  metoprolol succinate ER 50 milliGRAM(s) Oral daily  rosuvastatin 5 milliGRAM(s) Oral at bedtime    MEDICATIONS  (PRN):  acetaminophen     Tablet .. 650 milliGRAM(s) Oral every 6 hours PRN Moderate Pain (4 - 6)      OBJECTIVE:    Vital Signs Last 24 Hrs  T(C): 37.1 (30 Aug 2023 06:31), Max: 37.3 (29 Aug 2023 16:48)  T(F): 98.7 (30 Aug 2023 06:31), Max: 99.1 (29 Aug 2023 16:48)  HR: 88 (30 Aug 2023 06:31) (79 - 89)  BP: 115/68 (30 Aug 2023 06:31) (106/63 - 120/64)  BP(mean): --  RR: 18 (30 Aug 2023 06:31) (18 - 18)  SpO2: 93% (30 Aug 2023 06:31) (93% - 96%)    Parameters below as of 30 Aug 2023 06:31  Patient On (Oxygen Delivery Method): room air            I&O's Detail    29 Aug 2023 07:01  -  30 Aug 2023 07:00  --------------------------------------------------------  IN:    Oral Fluid: 460 mL  Total IN: 460 mL    OUT:  Total OUT: 0 mL    Total NET: 460 mL          PHYSICAL EXAM:  General: laying in bed, NAD  Neurology: nonfocal, follows commands  Respiratory: nonlabored breathing   Extremities: Left foot hallux wound with associated digital erythema. no pus or drainage.        LABS:                        8.5    10.92 )-----------( 207      ( 30 Aug 2023 07:32 )             24.4     08-29    132<L>  |  97  |  22  ----------------------------<  143<H>  4.3   |  25  |  0.93    Ca    8.7      29 Aug 2023 07:23  Phos  2.8     08-29  Mg     2.1     08-29        Urinalysis Basic - ( 29 Aug 2023 07:23 )    Color: x / Appearance: x / SG: x / pH: x  Gluc: 143 mg/dL / Ketone: x  / Bili: x / Urobili: x   Blood: x / Protein: x / Nitrite: x   Leuk Esterase: x / RBC: x / WBC x   Sq Epi: x / Non Sq Epi: x / Bacteria: x

## 2023-08-30 NOTE — PRE PROCEDURE NOTE - PRE PROCEDURE EVALUATION
SURGERY PRE-OP NOTE    Preop Diagnosis:  Left foot gangrene  Planned Procedure: Left iliac artery to tibial bypass, possible angiogram  Surgeon:  Dr. Laura Tavares      Pertinent Labs:                            8.5    10.92 )-----------( 207      ( 30 Aug 2023 07:32 )             24.4       08-30    132<L>  |  97  |  19  ----------------------------<  135<H>  4.0   |  24  |  0.75    Ca    8.5      30 Aug 2023 07:32  Phos  2.7     08-30  Mg     1.9     08-30  -----------------------------------------------------------------------------------------------------------------------------------  Type and Screen: ordered for today (to receive 1 PRBC today)    -----------------------------------------------------------------------------------------------------------------------------------      Assessment: 84 y/o female with a PMHx of medically managed CAD (prox LM 40%, prox LAD 40%, distal LAD 50%, Cx mild, distal % ), paroxysmal AF on Eliquis (last dose 8/22/2023 PM), severe AS (EFRAIN 0.69 sqcm) pending TAVR (transcatheter aortic valve replacement), right internal carotid stenosis, PAD with chronic left first toe wound, HTN, HLD, hypothyroidism, and anxiety.  Planned for bypass tomorrow      Plan:  - NPO after midnight   - to receive 1 unit PRBC today; f/u post transfusion cbc  - 2 units PRBC on hold for OR  - consent signed in chart  - Preop for  Left iliac artery to tibial bypass, possible angiogram  with Dr. Tavares  - pre op labs 4:00 am of 8/31: cbc, bmp, mg, phos, PTT/INR,   - Clearances:  "she is medically and cardiovascularly optimized with no options to further improve cardiac status until foot wound can heal. Thus ready to proceed with surgery and anesthesia"    Vascular Surgery   [id number]               SURGERY PRE-OP NOTE    Preop Diagnosis:  Left foot gangrene  Planned Procedure: Left iliac artery to tibial bypass, possible angiogram  Surgeon:  Dr. Laura Tavares      Pertinent Labs:                            8.5    10.92 )-----------( 207      ( 30 Aug 2023 07:32 )             24.4       08-30    132<L>  |  97  |  19  ----------------------------<  135<H>  4.0   |  24  |  0.75    Ca    8.5      30 Aug 2023 07:32  Phos  2.7     08-30  Mg     1.9     08-30  -----------------------------------------------------------------------------------------------------------------------------------  Type and Screen: ordered for today (to receive 1 PRBC today)    -----------------------------------------------------------------------------------------------------------------------------------      Assessment: 84 y/o female with a PMHx of medically managed CAD (prox LM 40%, prox LAD 40%, distal LAD 50%, Cx mild, distal % ), paroxysmal AF on Eliquis (last dose 8/22/2023 PM), severe AS (EFRAIN 0.69 sqcm) pending TAVR (transcatheter aortic valve replacement), right internal carotid stenosis, PAD with chronic left first toe wound, HTN, HLD, hypothyroidism, and anxiety.  Planned for bypass tomorrow      Plan:  - NPO after midnight   - to receive 1 unit PRBC today; f/u post transfusion cbc  - 2 units PRBC on hold for OR  - therapeutic lovenox on hold   - consent signed in chart  - Preop for  Left iliac artery to tibial bypass, possible angiogram  with Dr. Tavares  - pre op labs 4:00 am of 8/31: cbc, bmp, mg, phos, PTT/INR,   - Clearances documented on 8/25:  "she is medically and cardiovascularly optimized with no options to further improve cardiac status until foot wound can heal. Thus ready to proceed with surgery and anesthesia"    Vascular Surgery   [id number]

## 2023-08-31 PROBLEM — I48.91 UNSPECIFIED ATRIAL FIBRILLATION: Chronic | Status: ACTIVE | Noted: 2023-08-24

## 2023-08-31 LAB
ALBUMIN SERPL ELPH-MCNC: 2.5 G/DL — LOW (ref 3.3–5)
ALP SERPL-CCNC: 44 U/L — SIGNIFICANT CHANGE UP (ref 40–120)
ALT FLD-CCNC: 12 U/L — SIGNIFICANT CHANGE UP (ref 10–45)
ANION GAP SERPL CALC-SCNC: 13 MMOL/L — SIGNIFICANT CHANGE UP (ref 5–17)
ANION GAP SERPL CALC-SCNC: 8 MMOL/L — SIGNIFICANT CHANGE UP (ref 5–17)
APTT BLD: 23.5 SEC — LOW (ref 24.5–35.6)
APTT BLD: 27.2 SEC — SIGNIFICANT CHANGE UP (ref 24.5–35.6)
AST SERPL-CCNC: 19 U/L — SIGNIFICANT CHANGE UP (ref 10–40)
BILIRUB SERPL-MCNC: 1.1 MG/DL — SIGNIFICANT CHANGE UP (ref 0.2–1.2)
BUN SERPL-MCNC: 19 MG/DL — SIGNIFICANT CHANGE UP (ref 7–23)
BUN SERPL-MCNC: 20 MG/DL — SIGNIFICANT CHANGE UP (ref 7–23)
CALCIUM SERPL-MCNC: 7.3 MG/DL — LOW (ref 8.4–10.5)
CALCIUM SERPL-MCNC: 8.5 MG/DL — SIGNIFICANT CHANGE UP (ref 8.4–10.5)
CHLORIDE SERPL-SCNC: 104 MMOL/L — SIGNIFICANT CHANGE UP (ref 96–108)
CHLORIDE SERPL-SCNC: 96 MMOL/L — SIGNIFICANT CHANGE UP (ref 96–108)
CO2 SERPL-SCNC: 18 MMOL/L — LOW (ref 22–31)
CO2 SERPL-SCNC: 26 MMOL/L — SIGNIFICANT CHANGE UP (ref 22–31)
CREAT SERPL-MCNC: 0.68 MG/DL — SIGNIFICANT CHANGE UP (ref 0.5–1.3)
CREAT SERPL-MCNC: 0.75 MG/DL — SIGNIFICANT CHANGE UP (ref 0.5–1.3)
EGFR: 79 ML/MIN/1.73M2 — SIGNIFICANT CHANGE UP
EGFR: 87 ML/MIN/1.73M2 — SIGNIFICANT CHANGE UP
GAS PNL BLDA: SIGNIFICANT CHANGE UP
GAS PNL BLDA: SIGNIFICANT CHANGE UP
GLUCOSE SERPL-MCNC: 128 MG/DL — HIGH (ref 70–99)
GLUCOSE SERPL-MCNC: 219 MG/DL — HIGH (ref 70–99)
HCT VFR BLD CALC: 28.3 % — LOW (ref 34.5–45)
HCT VFR BLD CALC: 29.5 % — LOW (ref 34.5–45)
HGB BLD-MCNC: 10.4 G/DL — LOW (ref 11.5–15.5)
HGB BLD-MCNC: 9.8 G/DL — LOW (ref 11.5–15.5)
INR BLD: 1.18 RATIO — SIGNIFICANT CHANGE UP (ref 0.85–1.18)
INR BLD: 1.28 RATIO — HIGH (ref 0.85–1.18)
MAGNESIUM SERPL-MCNC: 1.8 MG/DL — SIGNIFICANT CHANGE UP (ref 1.6–2.6)
MAGNESIUM SERPL-MCNC: 1.9 MG/DL — SIGNIFICANT CHANGE UP (ref 1.6–2.6)
MCHC RBC-ENTMCNC: 30.6 PG — SIGNIFICANT CHANGE UP (ref 27–34)
MCHC RBC-ENTMCNC: 33.6 PG — SIGNIFICANT CHANGE UP (ref 27–34)
MCHC RBC-ENTMCNC: 34.6 GM/DL — SIGNIFICANT CHANGE UP (ref 32–36)
MCHC RBC-ENTMCNC: 35.3 GM/DL — SIGNIFICANT CHANGE UP (ref 32–36)
MCV RBC AUTO: 86.8 FL — SIGNIFICANT CHANGE UP (ref 80–100)
MCV RBC AUTO: 96.9 FL — SIGNIFICANT CHANGE UP (ref 80–100)
NRBC # BLD: 0 /100 WBCS — SIGNIFICANT CHANGE UP (ref 0–0)
NRBC # BLD: 0 /100 WBCS — SIGNIFICANT CHANGE UP (ref 0–0)
PHOSPHATE SERPL-MCNC: 2.5 MG/DL — SIGNIFICANT CHANGE UP (ref 2.5–4.5)
PHOSPHATE SERPL-MCNC: 4 MG/DL — SIGNIFICANT CHANGE UP (ref 2.5–4.5)
PLATELET # BLD AUTO: 146 K/UL — LOW (ref 150–400)
PLATELET # BLD AUTO: 196 K/UL — SIGNIFICANT CHANGE UP (ref 150–400)
POTASSIUM SERPL-MCNC: 3.9 MMOL/L — SIGNIFICANT CHANGE UP (ref 3.5–5.3)
POTASSIUM SERPL-MCNC: 4.4 MMOL/L — SIGNIFICANT CHANGE UP (ref 3.5–5.3)
POTASSIUM SERPL-SCNC: 3.9 MMOL/L — SIGNIFICANT CHANGE UP (ref 3.5–5.3)
POTASSIUM SERPL-SCNC: 4.4 MMOL/L — SIGNIFICANT CHANGE UP (ref 3.5–5.3)
PROT SERPL-MCNC: 4.4 G/DL — LOW (ref 6–8.3)
PROTHROM AB SERPL-ACNC: 12.3 SEC — SIGNIFICANT CHANGE UP (ref 9.5–13)
PROTHROM AB SERPL-ACNC: 14 SEC — HIGH (ref 9.5–13)
RBC # BLD: 2.92 M/UL — LOW (ref 3.8–5.2)
RBC # BLD: 3.4 M/UL — LOW (ref 3.8–5.2)
RBC # FLD: 12.5 % — SIGNIFICANT CHANGE UP (ref 10.3–14.5)
RBC # FLD: 14.6 % — HIGH (ref 10.3–14.5)
SODIUM SERPL-SCNC: 130 MMOL/L — LOW (ref 135–145)
SODIUM SERPL-SCNC: 135 MMOL/L — SIGNIFICANT CHANGE UP (ref 135–145)
WBC # BLD: 12.85 K/UL — HIGH (ref 3.8–10.5)
WBC # BLD: 13.23 K/UL — HIGH (ref 3.8–10.5)
WBC # FLD AUTO: 12.85 K/UL — HIGH (ref 3.8–10.5)
WBC # FLD AUTO: 13.23 K/UL — HIGH (ref 3.8–10.5)

## 2023-08-31 PROCEDURE — 35665 BPG ILIOFEMORAL: CPT | Mod: LT

## 2023-08-31 PROCEDURE — 35566 ART BYP FEM-ANT-POST TIB/PRL: CPT | Mod: LT,59

## 2023-08-31 PROCEDURE — 71045 X-RAY EXAM CHEST 1 VIEW: CPT | Mod: 26

## 2023-08-31 PROCEDURE — 99291 CRITICAL CARE FIRST HOUR: CPT

## 2023-08-31 DEVICE — SURGIFLO MATRIX WITH THROMBIN KIT: Type: IMPLANTABLE DEVICE | Site: LEFT | Status: FUNCTIONAL

## 2023-08-31 DEVICE — CLIP APPLIER COVIDIEN SURGICLIP III 9" SM: Type: IMPLANTABLE DEVICE | Site: LEFT | Status: FUNCTIONAL

## 2023-08-31 DEVICE — SURGICEL NU-KNIT 6 X 9": Type: IMPLANTABLE DEVICE | Site: LEFT | Status: FUNCTIONAL

## 2023-08-31 DEVICE — GRAFT VASC STR 8MMX30CM HEMASHIELD: Type: IMPLANTABLE DEVICE | Site: LEFT | Status: FUNCTIONAL

## 2023-08-31 DEVICE — CLIP APPLIER COVIDIEN SURGICLIP II 9.75" MEDIUM: Type: IMPLANTABLE DEVICE | Site: LEFT | Status: FUNCTIONAL

## 2023-08-31 DEVICE — SURGICEL FIBRILLAR 2 X 4": Type: IMPLANTABLE DEVICE | Site: LEFT | Status: FUNCTIONAL

## 2023-08-31 DEVICE — SURGIFOAM PAD 8CM X 12.5CM X 10MM (100): Type: IMPLANTABLE DEVICE | Site: LEFT | Status: FUNCTIONAL

## 2023-08-31 DEVICE — KIT A-LINE 1LUM 20G X 12CM SAFE KIT: Type: IMPLANTABLE DEVICE | Site: LEFT | Status: FUNCTIONAL

## 2023-08-31 DEVICE — CLIP APPLIER COVIDIEN SURGICLIP 13" LARGE: Type: IMPLANTABLE DEVICE | Site: LEFT | Status: FUNCTIONAL

## 2023-08-31 DEVICE — SURGICEL 2 X 14": Type: IMPLANTABLE DEVICE | Site: LEFT | Status: FUNCTIONAL

## 2023-08-31 RX ORDER — CHLORHEXIDINE GLUCONATE 213 G/1000ML
15 SOLUTION TOPICAL EVERY 12 HOURS
Refills: 0 | Status: DISCONTINUED | OUTPATIENT
Start: 2023-08-31 | End: 2023-08-31

## 2023-08-31 RX ORDER — SODIUM CHLORIDE 9 MG/ML
1000 INJECTION, SOLUTION INTRAVENOUS
Refills: 0 | Status: DISCONTINUED | OUTPATIENT
Start: 2023-08-31 | End: 2023-09-01

## 2023-08-31 RX ORDER — VANCOMYCIN HCL 1 G
1000 VIAL (EA) INTRAVENOUS EVERY 12 HOURS
Refills: 0 | Status: DISCONTINUED | OUTPATIENT
Start: 2023-08-31 | End: 2023-09-03

## 2023-08-31 RX ORDER — PROPOFOL 10 MG/ML
20 INJECTION, EMULSION INTRAVENOUS
Qty: 1000 | Refills: 0 | Status: DISCONTINUED | OUTPATIENT
Start: 2023-08-31 | End: 2023-08-31

## 2023-08-31 RX ORDER — PIPERACILLIN AND TAZOBACTAM 4; .5 G/20ML; G/20ML
3.38 INJECTION, POWDER, LYOPHILIZED, FOR SOLUTION INTRAVENOUS EVERY 8 HOURS
Refills: 0 | Status: DISCONTINUED | OUTPATIENT
Start: 2023-09-01 | End: 2023-09-03

## 2023-08-31 RX ORDER — PIPERACILLIN AND TAZOBACTAM 4; .5 G/20ML; G/20ML
3.38 INJECTION, POWDER, LYOPHILIZED, FOR SOLUTION INTRAVENOUS ONCE
Refills: 0 | Status: COMPLETED | OUTPATIENT
Start: 2023-08-31 | End: 2023-08-31

## 2023-08-31 RX ORDER — SODIUM CHLORIDE 9 MG/ML
1000 INJECTION, SOLUTION INTRAVENOUS ONCE
Refills: 0 | Status: COMPLETED | OUTPATIENT
Start: 2023-08-31 | End: 2023-08-31

## 2023-08-31 RX ORDER — HYDROMORPHONE HYDROCHLORIDE 2 MG/ML
0.5 INJECTION INTRAMUSCULAR; INTRAVENOUS; SUBCUTANEOUS
Refills: 0 | Status: DISCONTINUED | OUTPATIENT
Start: 2023-08-31 | End: 2023-09-01

## 2023-08-31 RX ORDER — CHLORHEXIDINE GLUCONATE 213 G/1000ML
1 SOLUTION TOPICAL DAILY
Refills: 0 | Status: DISCONTINUED | OUTPATIENT
Start: 2023-08-31 | End: 2023-09-02

## 2023-08-31 RX ORDER — CALCIUM GLUCONATE 100 MG/ML
2 VIAL (ML) INTRAVENOUS ONCE
Refills: 0 | Status: COMPLETED | OUTPATIENT
Start: 2023-08-31 | End: 2023-08-31

## 2023-08-31 RX ORDER — MAGNESIUM SULFATE 500 MG/ML
2 VIAL (ML) INJECTION ONCE
Refills: 0 | Status: COMPLETED | OUTPATIENT
Start: 2023-08-31 | End: 2023-08-31

## 2023-08-31 RX ORDER — PANTOPRAZOLE SODIUM 20 MG/1
40 TABLET, DELAYED RELEASE ORAL DAILY
Refills: 0 | Status: DISCONTINUED | OUTPATIENT
Start: 2023-08-31 | End: 2023-09-01

## 2023-08-31 RX ORDER — PHENYLEPHRINE HYDROCHLORIDE 10 MG/ML
0.4 INJECTION INTRAVENOUS
Qty: 40 | Refills: 0 | Status: DISCONTINUED | OUTPATIENT
Start: 2023-08-31 | End: 2023-09-01

## 2023-08-31 RX ORDER — HYDROMORPHONE HYDROCHLORIDE 2 MG/ML
0.5 INJECTION INTRAMUSCULAR; INTRAVENOUS; SUBCUTANEOUS ONCE
Refills: 0 | Status: DISCONTINUED | OUTPATIENT
Start: 2023-08-31 | End: 2023-08-31

## 2023-08-31 RX ADMIN — Medication 200 GRAM(S): at 18:50

## 2023-08-31 RX ADMIN — HYDROMORPHONE HYDROCHLORIDE 0.5 MILLIGRAM(S): 2 INJECTION INTRAMUSCULAR; INTRAVENOUS; SUBCUTANEOUS at 18:20

## 2023-08-31 RX ADMIN — Medication 25 GRAM(S): at 20:03

## 2023-08-31 RX ADMIN — PIPERACILLIN AND TAZOBACTAM 200 GRAM(S): 4; .5 INJECTION, POWDER, LYOPHILIZED, FOR SOLUTION INTRAVENOUS at 18:42

## 2023-08-31 RX ADMIN — PHENYLEPHRINE HYDROCHLORIDE 8.99 MICROGRAM(S)/KG/MIN: 10 INJECTION INTRAVENOUS at 19:30

## 2023-08-31 RX ADMIN — HYDROMORPHONE HYDROCHLORIDE 0.5 MILLIGRAM(S): 2 INJECTION INTRAMUSCULAR; INTRAVENOUS; SUBCUTANEOUS at 18:05

## 2023-08-31 RX ADMIN — Medication 250 MILLIGRAM(S): at 20:03

## 2023-08-31 RX ADMIN — PROPOFOL 7.19 MICROGRAM(S)/KG/MIN: 10 INJECTION, EMULSION INTRAVENOUS at 18:43

## 2023-08-31 RX ADMIN — CHLORHEXIDINE GLUCONATE 15 MILLILITER(S): 213 SOLUTION TOPICAL at 18:17

## 2023-08-31 RX ADMIN — SODIUM CHLORIDE 100 MILLILITER(S): 9 INJECTION, SOLUTION INTRAVENOUS at 19:30

## 2023-08-31 RX ADMIN — SODIUM CHLORIDE 4000 MILLILITER(S): 9 INJECTION, SOLUTION INTRAVENOUS at 18:42

## 2023-08-31 RX ADMIN — PHENYLEPHRINE HYDROCHLORIDE 8.99 MICROGRAM(S)/KG/MIN: 10 INJECTION INTRAVENOUS at 18:42

## 2023-08-31 RX ADMIN — PROPOFOL 7.19 MICROGRAM(S)/KG/MIN: 10 INJECTION, EMULSION INTRAVENOUS at 19:30

## 2023-08-31 RX ADMIN — PIPERACILLIN AND TAZOBACTAM 25 GRAM(S): 4; .5 INJECTION, POWDER, LYOPHILIZED, FOR SOLUTION INTRAVENOUS at 21:25

## 2023-08-31 NOTE — PROGRESS NOTE ADULT - ASSESSMENT
84 y/o female with a PMHx of medically managed CAD (prox LM 40%, prox LAD 40%, distal LAD 50%, Cx mild, distal % ), paroxysmal AF on Eliquis (last dose 8/22/2023 PM), severe AS (EFRAIN 0.69 sqcm) pending TAVR (transcatheter aortic valve replacement), right internal carotid stenosis, PAD with chronic left first toe wound, HTN, HLD, hypothyroidism, and anxiety.  Planned for OR tomorrow for bypass.    Plan:  - NPO  - Severe PVD with L foot gangrene, Plan for external iliac to peroneal bypass Today with Dr Tavares   - Appreciate cardiology recommendations - low risk of adverse cardiac event   - Appreciate podiatry recommendations - local wound care   - ID recommendations appreciated - DC antibiotics, monitor off   - LE vein mapping resulted  - T lovenox for PAF - on hold for OR today    Vascular Surgery   [id number]

## 2023-08-31 NOTE — CONSULT NOTE ADULT - ASSESSMENT
ASSESSMENT:  82 yo F w/ PMHx of CAD (prox LM 40%, prox LAD 40%, distal LAD 50%, Cx mild, distal % ), pAF on Eliquis, severe AS pending TAVR, R internal carotid stenosis, PAD w/ chronic L 1st toe wound, HTN, HLD, hypothyroidism, and anxiety presents as a transfer from [facility] (8/25/23) for LLE angiogram and TAVR work-up after presenting for months of worsening LLE burning pain and concern for L foot gangrenew / maggots. CT angiograph showed L CFA and popliteal artery occlusions warranting external iliac-peroneal bypass for limb salvage. Vascular surgery performed L external iliac-profunda bypass w/ jump saphenous graft to peroneal artery (8/31, Dr. Laura Tavares) remarkable for 2L EBL, hypokalemia requiring repletion, phenylephrine, heparin infusion requiring protamine sulfate, and blood products (6U pRBC, 1U plt, 3 FFP). Admitted to SICU for pressor requirement in     PLAN:   Neurologic:    - sedated on propofol   - dilaudid prn    Respiratory:    - intubated on PRVC (RR 12/ Vt 450/ FiO2 50 / PEEP 5)    Cardiovascular:    - phenylephrine 0.3 > 1 (hypotensive when transferred to SICU)   - last echo (4/2023): EF of 57%    Gastrointestinal/Nutrition:    - NPO, no NG/OG    Renal/Genitourinary:    - UOP in OR: 400 mL   - trend BUN/Cr, monitor UOP   - maintenance fluid    Hematologic:    - 6U pRBC, 1U plt, 3 FFP in OR   - heparin reversed w/ protamine sulfated   - hold all AC until vascular re-assess surgical site 9/1   - trend H/H    Infectious Disease:    - continue vanc/zosyn   - s/p ancef in OR    Endocrine:   - euglycemic  - monitor glucose    Lines/Tubes:   - ellis   - L radial A line   - PIVs    Disposition: SICU

## 2023-08-31 NOTE — AIRWAY REMOVAL NOTE  ADULT & PEDS - ARTIFICAL AIRWAY REMOVAL COMMENTS
Written order for extubation verified. The patient was identified by full name and birth date compared to the identification band. Present during the procedure was LEIGH Flores

## 2023-08-31 NOTE — CONSULT NOTE ADULT - CRITICAL CARE ATTENDING COMMENT
82 yo f, extensive history of CAD, PAD, AS. S/p L external iliac to profunda bypass. EBL 2L, 6U PRBC, 1U plt, 3U FFP, on phenylephrine gtt.  - Admit to SICU.  - Wean propofol to RASS 0 to -1. Continue parenteral Dilaudid PRN for postoperative pain.  - Phenylephrine gtt at 1, monitor lactate. Continue volume/product resuscitation, likely hemorrhagic shock.  - On mechanical assisted ventilation, PRVC 12/450/5/50, wean to SBT once awake.  - Continue vancomycin/Zosyn.  - Monitor CBC for evidence of bleeding. AC on hold.

## 2023-08-31 NOTE — PROGRESS NOTE ADULT - SUBJECTIVE AND OBJECTIVE BOX
VASCULAR SURGERY DAILY PROGRESS NOTE:     SUBJECTIVE/ROS: Patient seen and examined. no complaints. resting comfortably.     MEDICATIONS  (STANDING):  aMIOdarone    Tablet 100 milliGRAM(s) Oral daily  aspirin  chewable 81 milliGRAM(s) Oral daily  cadexomer iodine 0.9% Gel 1 Application(s) Topical daily  chlorhexidine 2% Cloths 1 Application(s) Topical daily  levothyroxine 50 MICROGram(s) Oral daily  losartan 50 milliGRAM(s) Oral daily  metoprolol succinate ER 50 milliGRAM(s) Oral daily  rosuvastatin 5 milliGRAM(s) Oral at bedtime    MEDICATIONS  (PRN):  acetaminophen     Tablet .. 650 milliGRAM(s) Oral every 6 hours PRN Moderate Pain (4 - 6)      OBJECTIVE:    Vital Signs Last 24 Hrs  T(C): 37.1 (31 Aug 2023 06:46), Max: 37.6 (30 Aug 2023 21:32)  T(F): 98.8 (31 Aug 2023 06:46), Max: 99.6 (30 Aug 2023 21:32)  HR: 89 (31 Aug 2023 06:46) (81 - 93)  BP: 136/59 (31 Aug 2023 06:46) (101/57 - 136/59)  BP(mean): --  RR: 18 (31 Aug 2023 06:46) (18 - 18)  SpO2: 97% (31 Aug 2023 06:46) (94% - 98%)    Parameters below as of 31 Aug 2023 06:37  Patient On (Oxygen Delivery Method): room air            I&O's Detail    30 Aug 2023 07:01  -  31 Aug 2023 07:00  --------------------------------------------------------  IN:    Oral Fluid: 720 mL  Total IN: 720 mL    OUT:  Total OUT: 0 mL    Total NET: 720 mL          Daily Height in cm: 162.56 (31 Aug 2023 06:46)    Daily     LABS:                        9.8    12.85 )-----------( 196      ( 31 Aug 2023 04:49 )             28.3     08-31    130<L>  |  96  |  20  ----------------------------<  128<H>  3.9   |  26  |  0.75    Ca    8.5      31 Aug 2023 04:49  Phos  2.5     08-31  Mg     1.9     08-31      PT/INR - ( 31 Aug 2023 04:49 )   PT: 12.3 sec;   INR: 1.18 ratio         PTT - ( 31 Aug 2023 04:49 )  PTT:27.2 sec  Urinalysis Basic - ( 31 Aug 2023 04:49 )    Color: x / Appearance: x / SG: x / pH: x  Gluc: 128 mg/dL / Ketone: x  / Bili: x / Urobili: x   Blood: x / Protein: x / Nitrite: x   Leuk Esterase: x / RBC: x / WBC x   Sq Epi: x / Non Sq Epi: x / Bacteria: x                  PHYSICAL EXAM:  General: laying in bed, NAD  Neurology: nonfocal, follows commands  Respiratory: nonlabored breathing   Extremities: Left foot hallux wound with associated digital erythema. no pus or drainage.

## 2023-09-01 ENCOUNTER — TRANSCRIPTION ENCOUNTER (OUTPATIENT)
Age: 83
End: 2023-09-01

## 2023-09-01 LAB
ALBUMIN SERPL ELPH-MCNC: 2.4 G/DL — LOW (ref 3.3–5)
ALBUMIN SERPL ELPH-MCNC: 2.4 G/DL — LOW (ref 3.3–5)
ALP SERPL-CCNC: 45 U/L — SIGNIFICANT CHANGE UP (ref 40–120)
ALP SERPL-CCNC: 58 U/L — SIGNIFICANT CHANGE UP (ref 40–120)
ALT FLD-CCNC: 13 U/L — SIGNIFICANT CHANGE UP (ref 10–45)
ALT FLD-CCNC: 14 U/L — SIGNIFICANT CHANGE UP (ref 10–45)
ANION GAP SERPL CALC-SCNC: 11 MMOL/L — SIGNIFICANT CHANGE UP (ref 5–17)
ANION GAP SERPL CALC-SCNC: 11 MMOL/L — SIGNIFICANT CHANGE UP (ref 5–17)
APTT BLD: 18.1 SEC — LOW (ref 24.5–35.6)
APTT BLD: 24.3 SEC — LOW (ref 24.5–35.6)
AST SERPL-CCNC: 20 U/L — SIGNIFICANT CHANGE UP (ref 10–40)
AST SERPL-CCNC: 30 U/L — SIGNIFICANT CHANGE UP (ref 10–40)
BILIRUB SERPL-MCNC: 0.6 MG/DL — SIGNIFICANT CHANGE UP (ref 0.2–1.2)
BILIRUB SERPL-MCNC: 1.1 MG/DL — SIGNIFICANT CHANGE UP (ref 0.2–1.2)
BUN SERPL-MCNC: 18 MG/DL — SIGNIFICANT CHANGE UP (ref 7–23)
BUN SERPL-MCNC: 19 MG/DL — SIGNIFICANT CHANGE UP (ref 7–23)
CALCIUM SERPL-MCNC: 7.7 MG/DL — LOW (ref 8.4–10.5)
CALCIUM SERPL-MCNC: 7.8 MG/DL — LOW (ref 8.4–10.5)
CHLORIDE SERPL-SCNC: 101 MMOL/L — SIGNIFICANT CHANGE UP (ref 96–108)
CHLORIDE SERPL-SCNC: 98 MMOL/L — SIGNIFICANT CHANGE UP (ref 96–108)
CO2 SERPL-SCNC: 20 MMOL/L — LOW (ref 22–31)
CO2 SERPL-SCNC: 21 MMOL/L — LOW (ref 22–31)
CREAT SERPL-MCNC: 0.7 MG/DL — SIGNIFICANT CHANGE UP (ref 0.5–1.3)
CREAT SERPL-MCNC: 0.76 MG/DL — SIGNIFICANT CHANGE UP (ref 0.5–1.3)
EGFR: 78 ML/MIN/1.73M2 — SIGNIFICANT CHANGE UP
EGFR: 86 ML/MIN/1.73M2 — SIGNIFICANT CHANGE UP
GAS PNL BLDA: SIGNIFICANT CHANGE UP
GLUCOSE SERPL-MCNC: 188 MG/DL — HIGH (ref 70–99)
GLUCOSE SERPL-MCNC: 283 MG/DL — HIGH (ref 70–99)
HCT VFR BLD CALC: 22.9 % — LOW (ref 34.5–45)
HCT VFR BLD CALC: 28.5 % — LOW (ref 34.5–45)
HGB BLD-MCNC: 7.9 G/DL — LOW (ref 11.5–15.5)
HGB BLD-MCNC: 9.6 G/DL — LOW (ref 11.5–15.5)
INR BLD: 1.05 RATIO — SIGNIFICANT CHANGE UP (ref 0.85–1.18)
INR BLD: 1.11 RATIO — SIGNIFICANT CHANGE UP (ref 0.85–1.18)
MAGNESIUM SERPL-MCNC: 1.9 MG/DL — SIGNIFICANT CHANGE UP (ref 1.6–2.6)
MAGNESIUM SERPL-MCNC: 2 MG/DL — SIGNIFICANT CHANGE UP (ref 1.6–2.6)
MCHC RBC-ENTMCNC: 30 PG — SIGNIFICANT CHANGE UP (ref 27–34)
MCHC RBC-ENTMCNC: 30.5 PG — SIGNIFICANT CHANGE UP (ref 27–34)
MCHC RBC-ENTMCNC: 33.7 GM/DL — SIGNIFICANT CHANGE UP (ref 32–36)
MCHC RBC-ENTMCNC: 34.5 GM/DL — SIGNIFICANT CHANGE UP (ref 32–36)
MCV RBC AUTO: 88.4 FL — SIGNIFICANT CHANGE UP (ref 80–100)
MCV RBC AUTO: 89.1 FL — SIGNIFICANT CHANGE UP (ref 80–100)
NRBC # BLD: 0 /100 WBCS — SIGNIFICANT CHANGE UP (ref 0–0)
NRBC # BLD: 0 /100 WBCS — SIGNIFICANT CHANGE UP (ref 0–0)
PHOSPHATE SERPL-MCNC: 2.2 MG/DL — LOW (ref 2.5–4.5)
PHOSPHATE SERPL-MCNC: 3.6 MG/DL — SIGNIFICANT CHANGE UP (ref 2.5–4.5)
PLATELET # BLD AUTO: 136 K/UL — LOW (ref 150–400)
PLATELET # BLD AUTO: 147 K/UL — LOW (ref 150–400)
POTASSIUM SERPL-MCNC: 4 MMOL/L — SIGNIFICANT CHANGE UP (ref 3.5–5.3)
POTASSIUM SERPL-MCNC: 4.2 MMOL/L — SIGNIFICANT CHANGE UP (ref 3.5–5.3)
POTASSIUM SERPL-SCNC: 4 MMOL/L — SIGNIFICANT CHANGE UP (ref 3.5–5.3)
POTASSIUM SERPL-SCNC: 4.2 MMOL/L — SIGNIFICANT CHANGE UP (ref 3.5–5.3)
PROT SERPL-MCNC: 4.5 G/DL — LOW (ref 6–8.3)
PROT SERPL-MCNC: 4.6 G/DL — LOW (ref 6–8.3)
PROTHROM AB SERPL-ACNC: 11.5 SEC — SIGNIFICANT CHANGE UP (ref 9.5–13)
PROTHROM AB SERPL-ACNC: 12.2 SEC — SIGNIFICANT CHANGE UP (ref 9.5–13)
RBC # BLD: 2.59 M/UL — LOW (ref 3.8–5.2)
RBC # BLD: 3.2 M/UL — LOW (ref 3.8–5.2)
RBC # FLD: 15 % — HIGH (ref 10.3–14.5)
RBC # FLD: 15.5 % — HIGH (ref 10.3–14.5)
SODIUM SERPL-SCNC: 130 MMOL/L — LOW (ref 135–145)
SODIUM SERPL-SCNC: 132 MMOL/L — LOW (ref 135–145)
WBC # BLD: 15.74 K/UL — HIGH (ref 3.8–10.5)
WBC # BLD: 16.09 K/UL — HIGH (ref 3.8–10.5)
WBC # FLD AUTO: 15.74 K/UL — HIGH (ref 3.8–10.5)
WBC # FLD AUTO: 16.09 K/UL — HIGH (ref 3.8–10.5)

## 2023-09-01 RX ORDER — ASPIRIN/CALCIUM CARB/MAGNESIUM 324 MG
81 TABLET ORAL DAILY
Refills: 0 | Status: DISCONTINUED | OUTPATIENT
Start: 2023-09-01 | End: 2023-09-08

## 2023-09-01 RX ORDER — SENNA PLUS 8.6 MG/1
2 TABLET ORAL AT BEDTIME
Refills: 0 | Status: DISCONTINUED | OUTPATIENT
Start: 2023-09-01 | End: 2023-09-08

## 2023-09-01 RX ORDER — ACETAMINOPHEN 500 MG
975 TABLET ORAL EVERY 6 HOURS
Refills: 0 | Status: DISCONTINUED | OUTPATIENT
Start: 2023-09-01 | End: 2023-09-08

## 2023-09-01 RX ORDER — AMIODARONE HYDROCHLORIDE 400 MG/1
100 TABLET ORAL EVERY 24 HOURS
Refills: 0 | Status: DISCONTINUED | OUTPATIENT
Start: 2023-09-01 | End: 2023-09-08

## 2023-09-01 RX ORDER — INSULIN LISPRO 100/ML
VIAL (ML) SUBCUTANEOUS AT BEDTIME
Refills: 0 | Status: DISCONTINUED | OUTPATIENT
Start: 2023-09-01 | End: 2023-09-08

## 2023-09-01 RX ORDER — ATORVASTATIN CALCIUM 80 MG/1
20 TABLET, FILM COATED ORAL AT BEDTIME
Refills: 0 | Status: DISCONTINUED | OUTPATIENT
Start: 2023-09-01 | End: 2023-09-05

## 2023-09-01 RX ORDER — CALCIUM GLUCONATE 100 MG/ML
1 VIAL (ML) INTRAVENOUS ONCE
Refills: 0 | Status: COMPLETED | OUTPATIENT
Start: 2023-09-01 | End: 2023-09-01

## 2023-09-01 RX ORDER — OXYCODONE HYDROCHLORIDE 5 MG/1
2.5 TABLET ORAL EVERY 4 HOURS
Refills: 0 | Status: DISCONTINUED | OUTPATIENT
Start: 2023-09-01 | End: 2023-09-06

## 2023-09-01 RX ORDER — METOPROLOL TARTRATE 50 MG
12.5 TABLET ORAL EVERY 12 HOURS
Refills: 0 | Status: DISCONTINUED | OUTPATIENT
Start: 2023-09-01 | End: 2023-09-02

## 2023-09-01 RX ORDER — LEVOTHYROXINE SODIUM 125 MCG
50 TABLET ORAL DAILY
Refills: 0 | Status: DISCONTINUED | OUTPATIENT
Start: 2023-09-01 | End: 2023-09-08

## 2023-09-01 RX ORDER — SODIUM CHLORIDE 9 MG/ML
500 INJECTION, SOLUTION INTRAVENOUS ONCE
Refills: 0 | Status: COMPLETED | OUTPATIENT
Start: 2023-09-01 | End: 2023-09-01

## 2023-09-01 RX ORDER — INSULIN LISPRO 100/ML
VIAL (ML) SUBCUTANEOUS
Refills: 0 | Status: DISCONTINUED | OUTPATIENT
Start: 2023-09-01 | End: 2023-09-04

## 2023-09-01 RX ORDER — OXYCODONE HYDROCHLORIDE 5 MG/1
5 TABLET ORAL EVERY 4 HOURS
Refills: 0 | Status: DISCONTINUED | OUTPATIENT
Start: 2023-09-01 | End: 2023-09-06

## 2023-09-01 RX ORDER — POLYETHYLENE GLYCOL 3350 17 G/17G
17 POWDER, FOR SOLUTION ORAL DAILY
Refills: 0 | Status: DISCONTINUED | OUTPATIENT
Start: 2023-09-01 | End: 2023-09-08

## 2023-09-01 RX ADMIN — SODIUM CHLORIDE 500 MILLILITER(S): 9 INJECTION, SOLUTION INTRAVENOUS at 12:07

## 2023-09-01 RX ADMIN — PHENYLEPHRINE HYDROCHLORIDE 8.99 MICROGRAM(S)/KG/MIN: 10 INJECTION INTRAVENOUS at 19:47

## 2023-09-01 RX ADMIN — PIPERACILLIN AND TAZOBACTAM 25 GRAM(S): 4; .5 INJECTION, POWDER, LYOPHILIZED, FOR SOLUTION INTRAVENOUS at 13:26

## 2023-09-01 RX ADMIN — Medication 2: at 21:51

## 2023-09-01 RX ADMIN — Medication 255 MILLIMOLE(S): at 20:17

## 2023-09-01 RX ADMIN — Medication 100 GRAM(S): at 23:43

## 2023-09-01 RX ADMIN — Medication 4: at 17:22

## 2023-09-01 RX ADMIN — ATORVASTATIN CALCIUM 20 MILLIGRAM(S): 80 TABLET, FILM COATED ORAL at 21:46

## 2023-09-01 RX ADMIN — Medication 4: at 08:15

## 2023-09-01 RX ADMIN — Medication 250 MILLIGRAM(S): at 20:07

## 2023-09-01 RX ADMIN — Medication 12.5 MILLIGRAM(S): at 17:28

## 2023-09-01 RX ADMIN — SENNA PLUS 2 TABLET(S): 8.6 TABLET ORAL at 21:46

## 2023-09-01 RX ADMIN — Medication 250 MILLIGRAM(S): at 08:17

## 2023-09-01 RX ADMIN — Medication 81 MILLIGRAM(S): at 12:02

## 2023-09-01 RX ADMIN — CHLORHEXIDINE GLUCONATE 1 APPLICATION(S): 213 SOLUTION TOPICAL at 12:07

## 2023-09-01 RX ADMIN — POLYETHYLENE GLYCOL 3350 17 GRAM(S): 17 POWDER, FOR SOLUTION ORAL at 12:02

## 2023-09-01 RX ADMIN — PHENYLEPHRINE HYDROCHLORIDE 8.99 MICROGRAM(S)/KG/MIN: 10 INJECTION INTRAVENOUS at 07:49

## 2023-09-01 RX ADMIN — Medication 2: at 12:03

## 2023-09-01 RX ADMIN — PIPERACILLIN AND TAZOBACTAM 25 GRAM(S): 4; .5 INJECTION, POWDER, LYOPHILIZED, FOR SOLUTION INTRAVENOUS at 05:02

## 2023-09-01 RX ADMIN — AMIODARONE HYDROCHLORIDE 100 MILLIGRAM(S): 400 TABLET ORAL at 06:36

## 2023-09-01 RX ADMIN — PIPERACILLIN AND TAZOBACTAM 25 GRAM(S): 4; .5 INJECTION, POWDER, LYOPHILIZED, FOR SOLUTION INTRAVENOUS at 21:46

## 2023-09-01 RX ADMIN — SODIUM CHLORIDE 100 MILLILITER(S): 9 INJECTION, SOLUTION INTRAVENOUS at 07:50

## 2023-09-01 NOTE — PROGRESS NOTE ADULT - ASSESSMENT
83F presents with left foot hallux wound to ischemic nail bed, digit 2-4 distal wound to subQ  - Patient seen and evaluated  - Afebrile, WBC 15.74  - Left foot hallux wound to subQ with medial adhered eschar, erythema from distal digits 1-5 to MTPJ's, lateral 5th MTPJ wound to subQ, medial 1st MTPJ wound to subQ, dorsal midfoot and hindfoot erythema, no drainage, no pus, no tracking, no tunneling. Right foot no open wounds or lesions no acute signs of infection.  - Left foot Xray shows no gas, no OM  - 8/31 s/p L iliofemoral bypass with vasc  - No acute pod intervention planned: just local wound care  - Stability for discharge is pending [facility] final recs  - Follow up information can be found in discharge note provider under follow up  - Seen with attending   83F presents with left foot hallux wound to ischemic nail bed, digit 2-4 distal wound to subQ  - Patient seen and evaluated  - Afebrile, WBC 15.74  - Left foot hallux wound to subQ with medial adhered eschar, erythema from distal digits 1-5 to MTPJ's, lateral 5th MTPJ wound to subQ, medial 1st MTPJ wound to subQ, dorsal midfoot and hindfoot erythema, no drainage, no pus, no tracking, no tunneling. Right foot no open wounds or lesions no acute signs of infection.  - Left foot Xray shows no gas, no OM  - 8/31 s/p L iliofemoral bypass with vasc, appreciated   - Pod plan left foot partial hallux resection   - Please document medical and cardiac clearance for podiatry surgery   - Seen with attending

## 2023-09-01 NOTE — DIETITIAN INITIAL EVALUATION ADULT - OTHER INFO
GI/Intake:  -No intake noted in chart   -Last BM documented ; bowel regimen ordered (Miralax, Senna)     Endo:   -No hx of DM   -Hyperglycemic in-house; sliding scale insulin ordered for coverage     Renal:   -Hyponatremic   -LR ordered for maintenance     Resp:   -Extubated      CV:   -Noted on Phenylephrine for pressor support post-op Iliofemoral bypass with graft ()     Weight Hx:   -Current dosin pounds   -Per [facility] HIE: 134 pounds (23)

## 2023-09-01 NOTE — PROGRESS NOTE ADULT - SUBJECTIVE AND OBJECTIVE BOX
24 HOUR EVENTS:  - extubated  - started opn regular diet  - coming down on vasopressors    SUBJECTIVE/ROS:  [ ] A ten-point review of systems was otherwise negative except as noted.  [ ] Due to altered mental status/intubation, subjective information were not able to be obtained from the patient. History was obtained, to the extent possible, from review of the chart and collateral sources of information.      NEURO  Exam: awake, alert, oriented  Meds: HYDROmorphone  Injectable 0.5 milliGRAM(s) IV Push every 3 hours PRN pain  [x] Adequacy of sedation and pain control has been assessed and adjusted      RESPIRATORY  RR: 23 (08-31-23 @ 23:15) (12 - 23)  SpO2: 99% (08-31-23 @ 23:15) (94% - 100%)  Exam: unlabored, clear to auscultation bilaterally  Mechanical Ventilation: Mode: CPAP with PS, RR (patient): 19, FiO2: 40, PEEP: 5, MAP: 8, PIP: 10  ABG - ( 01 Sep 2023 00:20 )  pH: 7.36  /  pCO2: 40    /  pO2: 97    / HCO3: 23    / Base Excess: -2.6  /  SaO2: 99.0        CARDIOVASCULAR  HR: 102 (08-31-23 @ 23:15) (78 - 102)  BP: 136/59 (08-31-23 @ 06:46) (133/74 - 136/59)  BP(mean): --  ABP: 114/46 (08-31-23 @ 23:15) (102/50 - 136/48)  ABP(mean): 70 (08-31-23 @ 23:15) (60 - 90)      Exam: regular rate and rhythm  Cardiac Rhythm: sinus  Perfusion     [x]Adequate   [ ]Inadequate  Mentation   [x]Normal       [ ]Reduced  Extremities  [x]Warm         [ ]Cool  Volume Status [ ]Hypervolemic [x]Euvolemic [ ]Hypovolemic  Meds: phenylephrine    Infusion 0.4 MICROgram(s)/kG/Min IV Continuous <Continuous>      GI/NUTRITION  Exam: soft, nontender, nondistended  Diet: NPO  Meds: pantoprazole  Injectable 40 milliGRAM(s) IV Push daily      GENITOURINARY  I&O's Detail    08-30 @ 07:01  -  08-31 @ 07:00  --------------------------------------------------------  IN:    Oral Fluid: 720 mL  Total IN: 720 mL    OUT:  Total OUT: 0 mL    Total NET: 720 mL      08-31 @ 07:01  -  09-01 @ 00:52  --------------------------------------------------------  IN:    IV PiggyBack: 400 mL    IV PiggyBack: 75 mL    Lactated Ringers: 500 mL    Phenylephrine: 24.5 mL    Propofol: 8.6 mL  Total IN: 1008.1 mL    OUT:    Indwelling Catheter - Urethral (mL): 225 mL  Total OUT: 225 mL    Total NET: 783.1 mL        Weight (kg): 59.9 (08-31 @ 06:46)  08-31    135  |  104  |  19  ----------------------------<  219<H>  4.4   |  18<L>  |  0.68    Ca    7.3<L>      31 Aug 2023 18:21  Phos  4.0     08-31  Mg     1.8     08-31    TPro  4.4<L>  /  Alb  2.5<L>  /  TBili  1.1  /  DBili  x   /  AST  19  /  ALT  12  /  AlkPhos  44  08-31    [ ] Alas catheter, indication: N/A  Meds: lactated ringers. 1000 milliLiter(s) IV Continuous <Continuous>        HEMATOLOGIC  Meds:   [x] VTE Prophylaxis                        9.6    15.74 )-----------( 147      ( 01 Sep 2023 00:28 )             28.5     PT/INR - ( 31 Aug 2023 18:21 )   PT: 14.0 sec;   INR: 1.28 ratio         PTT - ( 31 Aug 2023 18:21 )  PTT:23.5 sec  Transfusion     [ ] PRBC   [ ] Platelets   [ ] FFP   [ ] Cryoprecipitate      INFECTIOUS DISEASES  WBC Count: 15.74 K/uL (09-01 @ 00:28)  WBC Count: 13.23 K/uL (08-31 @ 18:21)  WBC Count: 12.85 K/uL (08-31 @ 04:49)    RECENT CULTURES:  Meds: piperacillin/tazobactam IVPB.. 3.375 Gram(s) IV Intermittent every 8 hours  vancomycin  IVPB 1000 milliGRAM(s) IV Intermittent every 12 hours      ENDOCRINE  CAPILLARY BLOOD GLUCOSE      OTHER MEDICATIONS:  chlorhexidine 2% Cloths 1 Application(s) Topical daily      CODE STATUS: full code

## 2023-09-01 NOTE — PHYSICAL THERAPY INITIAL EVALUATION ADULT - PERTINENT HX OF CURRENT PROBLEM, REHAB EVAL
84 yo F w/ PMHx of CAD (prox LM 40%, prox LAD 40%, distal LAD 50%, Cx mild, distal % ), pAF on Eliquis, severe AS pending TAVR, R internal carotid stenosis, PAD w/ chronic L 1st toe wound, HTN, HLD, hypothyroidism, and anxiety presents as a transfer from [facility] (8/25/23) for LLE angiogram and TAVR work-up after presenting for months of worsening LLE burning pain and concern for L foot gangrenew / maggots. CT angiograph showed L CFA and popliteal artery occlusions warranting external iliac-peroneal bypass for limb salvage. Vascular surgery performed L external iliac-profunda bypass w/ jump saphenous graft to peroneal artery (8/31, Dr. Laura Tavares) remarkable for 2L EBL, hypokalemia requiring repletion, phenylephrine, heparin infusion requiring protamine sulfate, and blood products (6U pRBC, 1U plt, 3 FFP). Admitted to SICU for pressor requirement. 8/25 VA duplex BLE:  No duplex evidence of DVT in either lower extremity.Superficial thrombosis of the lesser saphenous vein in both calves. 8/25 L foot Xray: Progressive deepening soft tissue defect over medial left hallux distal phalanx. Smaller soft tissue ulceration over lateral 5th MTP region. No subjacent tracking gas collections or gross radiographic evidence for osteomyelitis however if concern persists MRI suggested to further assess.No fractures or dislocations.Tarsometatarsal alignment maintained without evidence for a Lisfranc injury.Slight hallux valgus deformity with small bunion. Preserved remaining joint spaces and no joint margin erosions.Plantar and posterior calcaneal enthesophytes.Generalized osteopeniaScant scattered vascular calcifications.

## 2023-09-01 NOTE — DISCHARGE NOTE PROVIDER - NSDCCPCAREPLAN_GEN_ALL_CORE_FT
PRINCIPAL DISCHARGE DIAGNOSIS  Diagnosis: Peripheral artery disease  Assessment and Plan of Treatment: You had an ileofemoral bypass with jump saphenous graft to peroneal artery on 8/31/23   Please follow up with Dr. Tavares in 1-2 weeks after discharge from the hospital     PRINCIPAL DISCHARGE DIAGNOSIS  Diagnosis: Peripheral artery disease  Assessment and Plan of Treatment: You had an ileofemoral bypass with jump saphenous graft to peroneal artery on 8/31/23.  Please follow up with Dr. Tavares in 1 week after discharge from the hospital.

## 2023-09-01 NOTE — OCCUPATIONAL THERAPY INITIAL EVALUATION ADULT - RANGE OF MOTION EXAMINATION, LOWER EXTREMITY
LLE not formally assessed 2* ace wrap however AAROM WFL through functional movement/Right LE Active ROM was WFL   (within functional limits)

## 2023-09-01 NOTE — DIETITIAN INITIAL EVALUATION ADULT - PERTINENT LABORATORY DATA
09-01    132<L>  |  101  |  18  ----------------------------<  283<H>  4.2   |  20<L>  |  0.70    Ca    7.7<L>      01 Sep 2023 00:28  Phos  3.6     09-01  Mg     2.0     09-01    TPro  4.5<L>  /  Alb  2.4<L>  /  TBili  1.1  /  DBili  x   /  AST  20  /  ALT  14  /  AlkPhos  45  09-01  POCT Blood Glucose.: 198 mg/dL (09-01-23 @ 12:02)

## 2023-09-01 NOTE — PROGRESS NOTE ADULT - SUBJECTIVE AND OBJECTIVE BOX
Vascular surgery    SUBJECTIVE: Pt extubated yesterday. NAEO. Levo drip decreased to 0.4 overnight. Pain well controlled. Tolerating diet.    Vital Signs Last 24 Hrs  T(C): 36.6 (01 Sep 2023 03:00), Max: 37.1 (31 Aug 2023 17:49)  T(F): 97.8 (01 Sep 2023 03:00), Max: 98.8 (31 Aug 2023 17:49)  HR: 94 (01 Sep 2023 07:00) (78 - 103)  BP: --  BP(mean): --  RR: 26 (01 Sep 2023 07:00) (12 - 34)  SpO2: 100% (01 Sep 2023 07:00) (91% - 100%)    Parameters below as of 01 Sep 2023 07:00  Patient On (Oxygen Delivery Method): room air        I&O's Detail    31 Aug 2023 07:01  -  01 Sep 2023 07:00  --------------------------------------------------------  IN:    IV PiggyBack: 100 mL    IV PiggyBack: 475 mL    Lactated Ringers: 1300 mL    Oral Fluid: 200 mL    Phenylephrine: 69.2 mL    Propofol: 8.6 mL  Total IN: 2152.8 mL    OUT:    Bulb (mL): 25 mL    Indwelling Catheter - Urethral (mL): 605 mL  Total OUT: 630 mL    Total NET: 1522.8 mL          Physical Exam:  Gen: NAD, A&Ox3  Pulm: No respiratory distress, no subcostal retractions  Drains: RICHELLE x 1, minimal sanguinous output  Groin: soft, no evidence of hematoma, dressings c/d  Extremities:  LLE: wound present on big toe, DP 2+ and PT 1+ signals on doppler, motor and sensory intact at foot, leg wrapped in ace from foot and groin; dressing c/d    LABS:                        9.6    15.74 )-----------( 147      ( 01 Sep 2023 00:28 )             28.5     09-01    132<L>  |  101  |  18  ----------------------------<  283<H>  4.2   |  20<L>  |  0.70    Ca    7.7<L>      01 Sep 2023 00:28  Phos  3.6     09-01  Mg     2.0     09-01    TPro  4.5<L>  /  Alb  2.4<L>  /  TBili  1.1  /  DBili  x   /  AST  20  /  ALT  14  /  AlkPhos  45  09-01    PT/INR - ( 01 Sep 2023 00:28 )   PT: 12.2 sec;   INR: 1.11 ratio         PTT - ( 01 Sep 2023 00:28 )  PTT:24.3 sec  Urinalysis Basic - ( 01 Sep 2023 00:28 )    Color: x / Appearance: x / SG: x / pH: x  Gluc: 283 mg/dL / Ketone: x  / Bili: x / Urobili: x   Blood: x / Protein: x / Nitrite: x   Leuk Esterase: x / RBC: x / WBC x   Sq Epi: x / Non Sq Epi: x / Bacteria: x        RADIOLOGY & ADDITIONAL STUDIES:

## 2023-09-01 NOTE — DISCHARGE NOTE PROVIDER - NSDCFUADDAPPT_GEN_ALL_CORE_FT
Podiatry Discharge Instructions:  Follow up: Please follow up with Dr. Fang within 1 week of discharge from the hospital, please call [phone] for appointment and discuss that you recently were seen in the hospital.  Wound Care: Please apply iodosorb to left foot wound followed by 4x4 gauze and leida daily  Weight bearing: Please weight bear as tolerated in a surgical shoe.  Antibiotics: Please continue as instructed. Podiatry Discharge Instructions:  Follow up: Please follow up with Dr. Mario Guzmán within 1 week of discharge from the hospital, please call [phone] for appointment and discuss that you recently were seen in the hospital.  Wound Care: Please apply iodosorb to left toe wounds on digits 2, 3, 4, 5, dry sterile gauze to great toe surgical site, followed by leida and ace bandage  Weight bearing: Please weight bear as tolerated in a surgical shoe.  Antibiotics: Please continue as instructed. Podiatry Discharge Instructions:  Follow up: Please follow up with Dr. Mario Guzmán within 1 week of discharge from the hospital, please call [phone] for appointment and discuss that you recently were seen in the hospital.  Wound Care: Please apply iodosorb to left toe wounds on digits 2, 3, 4, 5, dry sterile gauze to great toe surgical site, followed by leida and ace bandage  Weight bearing: Please weight bear as tolerated in a surgical shoe.  Antibiotics: Please continue as instructed.    Please follow up with your primary care provider in 1-2 weeks after discharge. Podiatry Discharge Instructions:  Follow up: Please follow up with Dr. Mario Guzmán within 1 week of discharge from the hospital, please call [phone] for appointment and discuss that you recently were seen in the hospital.  Wound Care: Please apply iodosorb to left toe wounds on digits 2, 3, 4, 5, dry sterile gauze to great toe surgical site, followed by leida and ace bandage  Weight bearing: Please weight bear as tolerated in a surgical shoe.  Antibiotics: Please continue as instructed.    TAVR WORKUP- Please follow up with Structural Heart Team for further TAVR workup next week if you do not receive a call for an appointment. Office number [phone].     Please follow up with your primary care provider in 1-2 weeks after discharge.

## 2023-09-01 NOTE — OCCUPATIONAL THERAPY INITIAL EVALUATION ADULT - ADDITIONAL COMMENTS
pt reports lives alone in private home, 0 steps to enter, tub. Prior to admission Ind with ADLs/ambulation, no AD/DME. Owns RW, shower chair, GBs. +drives.

## 2023-09-01 NOTE — PROGRESS NOTE ADULT - ASSESSMENT
83y Female s/p L iliofemoral bypass graft with vein on 8/31/23 complicated by EBL 2L blood loss s/p 6U pRBC, 1U plt, 3U FFP and requiring pressors, admitted to SICU for hemodynamic monitoring. Now with low pressor requirements.    - Fuid resuscitation and wean off pressors  - Pt okay to start ASA  - PT consult  - F/u podiatry plans  - VT prophylaxis w/ SCD R leg  - trict I&O's  - Cont RICHELLE drain  - Analgesia and antiemetics as needed  - Regular Diet  - Dispo: transfer to floor once off pressors    Vascular surgery  [id number]

## 2023-09-01 NOTE — PHYSICAL THERAPY INITIAL EVALUATION ADULT - ADDITIONAL COMMENTS
Pt lives alone in home with no stairs to negotiate; prior to admission pt was independent with all functional mobility and did not use an AD. Pt works at a [employer].

## 2023-09-01 NOTE — DISCHARGE NOTE PROVIDER - PROVIDER TOKENS
PROVIDER:[TOKEN:[[id number]],FOLLOWUP:[1 week]],PROVIDER:[TOKEN:[[id number]],FOLLOWUP:[1 week]]

## 2023-09-01 NOTE — PROGRESS NOTE ADULT - ASSESSMENT
ASSESSMENT:  82 yo F w/ PMHx of CAD (prox LM 40%, prox LAD 40%, distal LAD 50%, Cx mild, distal % ), pAF on Eliquis, severe AS pending TAVR, R internal carotid stenosis, PAD w/ chronic L 1st toe wound, HTN, HLD, hypothyroidism, and anxiety presents as a transfer from [facility] (8/25/23) for LLE angiogram and TAVR work-up after presenting for months of worsening LLE burning pain and concern for L foot gangrenew / maggots. CT angiograph showed L CFA and popliteal artery occlusions warranting external iliac-peroneal bypass for limb salvage. Vascular surgery performed L external iliac-profunda bypass w/ jump saphenous graft to peroneal artery (8/31, Dr. Laura Tavares) remarkable for 2L EBL, hypokalemia requiring repletion, phenylephrine, heparin infusion requiring protamine sulfate, and blood products (6U pRBC, 1U plt, 3 FFP). Admitted to SICU for pressor requirement in     PLAN:   Neurologic:    - dilaudid prn for pain    Respiratory:    - extubated 8/31   - room air   - OOB to chair, incentive spirometry to prevent atelectasis    Cardiovascular:    - phenylephrine, titrate down as tolerated to MAP >65   - last echo (4/2023): EF of 57%   - lactate cleared    Gastrointestinal/Nutrition:    - Regular diet   - bowel regimen with senna and miralax    Renal/Genitourinary:    - trend BUN/Cr, monitor UOP   - LR @100, IVL when eats  - ellis in place    Hematologic:    - 6U pRBC, 1U plt, 3 FFP in OR   - heparin reversed w/ protamine sulfated   - hold all AC until vascular re-assess surgical site 9/1   - trend H/H    Infectious Disease:    - continue vanc/zosyn   - s/p ancef in OR    Endocrine:   - euglycemic  - monitor glucose    Lines/Tubes:   - ellis   - L radial A line   - PIVs    Disposition: SICU

## 2023-09-01 NOTE — DIETITIAN INITIAL EVALUATION ADULT - ADD RECOMMEND
1) Continue regular diet  -Monitor need for Consistent carbohydrate diet   2) Monitor PO intake, diet tolerance, weight trends, labs, GI function, and skin integrity    Kelin Golden MS, RDN, CDN (Teams/Pager #[phone])

## 2023-09-01 NOTE — DISCHARGE NOTE PROVIDER - NSDCMRMEDTOKEN_GEN_ALL_CORE_FT
amiodarone 200 mg oral tablet: 0.5 tab(s) orally once a day  aspirin 81 mg oral delayed release tablet: 1 tab(s) orally once a day (at bedtime)  Eliquis 2.5 mg oral tablet: 1 tab(s) orally 2 times a day  levothyroxine 50 mcg (0.05 mg) oral tablet: 1 tab(s) orally once a day  metoprolol succinate 50 mg oral tablet, extended release: 1 tab(s) orally once a day  rosuvastatin 5 mg oral tablet: 1 tab(s) orally once a day (at bedtime)  telmisartan 40 mg oral tablet: 1 tab(s) orally once a day   acetaminophen 325 mg oral tablet: 3 tab(s) orally every 6 hours As needed Mild Pain (1 - 3)  amiodarone 200 mg oral tablet: 0.5 tab(s) orally once a day  aspirin 81 mg oral delayed release tablet: 1 tab(s) orally once a day (at bedtime)  Eliquis 2.5 mg oral tablet: 1 tab(s) orally 2 times a day  levothyroxine 50 mcg (0.05 mg) oral tablet: 1 tab(s) orally once a day  metoprolol succinate 50 mg oral tablet, extended release: 1 tab(s) orally once a day  rosuvastatin 5 mg oral tablet: 1 tab(s) orally once a day (at bedtime)  telmisartan 40 mg oral tablet: 1 tab(s) orally once a day   acetaminophen 325 mg oral tablet: 3 tab(s) orally every 6 hours As needed Mild Pain (1 - 3)  amiodarone 200 mg oral tablet: 0.5 tab(s) orally once a day  amoxicillin-clavulanate 875 mg-125 mg oral tablet: 1 tab(s) orally every 12 hours Last day of antibiotics will be on 9/13.  aspirin 81 mg oral delayed release tablet: 1 tab(s) orally once a day (at bedtime)  Eliquis 2.5 mg oral tablet: 1 tab(s) orally 2 times a day  levothyroxine 50 mcg (0.05 mg) oral tablet: 1 tab(s) orally once a day  oxycodone-acetaminophen 5 mg-325 mg oral tablet: 1 tab(s) orally every 6 hours as needed for  severe pain MDD: 4  rosuvastatin 5 mg oral tablet: 1 tab(s) orally once a day (at bedtime)  telmisartan 40 mg oral tablet: 1 tab(s) orally once a day

## 2023-09-01 NOTE — DISCHARGE NOTE PROVIDER - NSDCFUSCHEDAPPT_GEN_ALL_CORE_FT
Svitlana Finley  [facility]  VASCULAR [address]  Scheduled Appointment: 09/06/2023    Francisco Deleon  [facility]  CARDIOLOGY [address]  Scheduled Appointment: 10/26/2023     Francisco Deleon  [facility]  CARDIOLOGY [address]  Scheduled Appointment: 10/26/2023

## 2023-09-01 NOTE — PROGRESS NOTE ADULT - SUBJECTIVE AND OBJECTIVE BOX
Podiatry pager #: [phone] ([place])/ [phone] ([facility])    Patient is a 83y old  Female who presents with a chief complaint of LE Angiogram and Bypass (25 Aug 2023 11:51)       INTERVAL HPI/OVERNIGHT EVENTS:  Patient seen and evaluated at bedside.  Pt is resting comfortable in NAD. Denies N/V/F/C.     Allergies    sulfa drugs (Unknown)  latex (Unknown)    Intolerances        Vital Signs Last 24 Hrs  T(C): 36.8 (01 Sep 2023 07:00), Max: 37.1 (31 Aug 2023 17:49)  T(F): 98.3 (01 Sep 2023 07:00), Max: 98.8 (31 Aug 2023 17:49)  HR: 86 (01 Sep 2023 07:45) (78 - 103)  BP: --  BP(mean): --  RR: 20 (01 Sep 2023 07:45) (12 - 37)  SpO2: 100% (01 Sep 2023 07:45) (91% - 100%)    Parameters below as of 01 Sep 2023 07:00  Patient On (Oxygen Delivery Method): room air        LABS:                        9.6    15.74 )-----------( 147      ( 01 Sep 2023 00:28 )             28.5     09-01    132<L>  |  101  |  18  ----------------------------<  283<H>  4.2   |  20<L>  |  0.70    Ca    7.7<L>      01 Sep 2023 00:28  Phos  3.6     09-01  Mg     2.0     09-01    TPro  4.5<L>  /  Alb  2.4<L>  /  TBili  1.1  /  DBili  x   /  AST  20  /  ALT  14  /  AlkPhos  45  09-01    PT/INR - ( 01 Sep 2023 00:28 )   PT: 12.2 sec;   INR: 1.11 ratio         PTT - ( 01 Sep 2023 00:28 )  PTT:24.3 sec  Urinalysis Basic - ( 01 Sep 2023 00:28 )    Color: x / Appearance: x / SG: x / pH: x  Gluc: 283 mg/dL / Ketone: x  / Bili: x / Urobili: x   Blood: x / Protein: x / Nitrite: x   Leuk Esterase: x / RBC: x / WBC x   Sq Epi: x / Non Sq Epi: x / Bacteria: x      CAPILLARY BLOOD GLUCOSE      POCT Blood Glucose.: 227 mg/dL (01 Sep 2023 07:56)      Lower Extremity Physical Exam:    Vascular: DP/PT 0/4, B/L, CFT <3 seconds B/L, Temperature gradient warm to cool, B/L.   Neuro: Epicritic sensation intact to the level of digits, B/L.  Musculoskeletal/Ortho: unremarkable  Skin: Left foot hallux wound to subQ with medial adhered eschar, erythema from distal digits 1-5 to MTPJ's, lateral 5th MTPJ wound to subQ, medial 1st MTPJ wound to subQ, dorsal midfoot and hindfoot erythema, no drainage, no pus, no tracking, no tunneling. Right foot no open wounds or lesions no acute signs of infection.  RADIOLOGY & ADDITIONAL TESTS:   Podiatry pager #: [phone] ([place])/ [phone] ([facility])    Patient is a 83y old  Female who presents with a chief complaint of LE Angiogram and Bypass (25 Aug 2023 11:51)       INTERVAL HPI/OVERNIGHT EVENTS:  Patient seen and evaluated at bedside.  Pt is resting comfortable in NAD. Denies N/V/F/C.     Allergies    sulfa drugs (Unknown)  latex (Unknown)    Intolerances        Vital Signs Last 24 Hrs  T(C): 36.8 (01 Sep 2023 07:00), Max: 37.1 (31 Aug 2023 17:49)  T(F): 98.3 (01 Sep 2023 07:00), Max: 98.8 (31 Aug 2023 17:49)  HR: 86 (01 Sep 2023 07:45) (78 - 103)  BP: --  BP(mean): --  RR: 20 (01 Sep 2023 07:45) (12 - 37)  SpO2: 100% (01 Sep 2023 07:45) (91% - 100%)    Parameters below as of 01 Sep 2023 07:00  Patient On (Oxygen Delivery Method): room air        LABS:                        9.6    15.74 )-----------( 147      ( 01 Sep 2023 00:28 )             28.5     09-01    132<L>  |  101  |  18  ----------------------------<  283<H>  4.2   |  20<L>  |  0.70    Ca    7.7<L>      01 Sep 2023 00:28  Phos  3.6     09-01  Mg     2.0     09-01    TPro  4.5<L>  /  Alb  2.4<L>  /  TBili  1.1  /  DBili  x   /  AST  20  /  ALT  14  /  AlkPhos  45  09-01    PT/INR - ( 01 Sep 2023 00:28 )   PT: 12.2 sec;   INR: 1.11 ratio         PTT - ( 01 Sep 2023 00:28 )  PTT:24.3 sec  Urinalysis Basic - ( 01 Sep 2023 00:28 )    Color: x / Appearance: x / SG: x / pH: x  Gluc: 283 mg/dL / Ketone: x  / Bili: x / Urobili: x   Blood: x / Protein: x / Nitrite: x   Leuk Esterase: x / RBC: x / WBC x   Sq Epi: x / Non Sq Epi: x / Bacteria: x      CAPILLARY BLOOD GLUCOSE      POCT Blood Glucose.: 227 mg/dL (01 Sep 2023 07:56)      Lower Extremity Physical Exam:    Vascular: DP/PT 0/4, B/L, CFT <3 seconds B/L, Temperature gradient warm to cool, B/L.   Neuro: Epicritic sensation intact to the level of digits, B/L.  Musculoskeletal/Ortho: unremarkable  Skin: Left foot hallux wound to subQ with medial adhered eschar, erythema from distal digits 1-5 to MTPJ's, lateral 5th MTPJ wound to subQ, medial 1st MTPJ wound to subQ, dorsal midfoot and hindfoot erythema, no drainage, no pus, no tracking, no tunneling. Right foot no open wounds or lesions no acute signs of infection      RADIOLOGY & ADDITIONAL TESTS:

## 2023-09-01 NOTE — DISCHARGE NOTE PROVIDER - CARE PROVIDER_API CALL
Laura Tavares  Vascular Surgery  [address], [address]  [place] [address]  Phone: [phone]  Fax: [phone]  Follow Up Time: 1 week    Mario Guzmán  Podiatric Medicine and Surgery  [address]  [place] [address]  Phone: [phone]  Fax: [phone]  Follow Up Time: 1 week

## 2023-09-01 NOTE — DIETITIAN INITIAL EVALUATION ADULT - REASON FOR ADMISSION
"82 yo F w/ PMHx of CAD (prox LM 40%, prox LAD 40%, distal LAD 50%, Cx mild, distal % ), pAF on Eliquis, severe AS pending TAVR, R internal carotid stenosis, PAD w/ chronic L 1st toe wound, HTN, HLD, hypothyroidism, and anxiety presents as a transfer from [facility] (8/25/23) for LLE angiogram and TAVR work-up after presenting for months of worsening LLE burning pain and concern for L foot gangrenew / maggots. CT angiograph showed L CFA and popliteal artery occlusions warranting external iliac-peroneal bypass for limb salvage. Vascular surgery performed L external iliac-profunda bypass w/ jump saphenous graft to peroneal artery (8/31, Dr. Laura Tavares) remarkable for 2L EBL, hypokalemia requiring repletion, phenylephrine, heparin infusion requiring protamine sulfate, and blood products (6U pRBC, 1U plt, 3 FFP). Admitted to SICU for pressor requirement."

## 2023-09-01 NOTE — DISCHARGE NOTE PROVIDER - HOSPITAL COURSE
82 y/o female with a PMHx of medically managed CAD (prox LM 40%, prox LAD 40%, distal LAD 50%, Cx mild, distal % ), paroxysmal AF on Eliquis (last dose 8/22/2023 PM), severe AS (EFRAIN 0.69 sqcm) pending TAVR (transcatheter aortic valve replacement), right internal carotid stenosis, PAD with chronic left first toe wound, HTN, HLD, hypothyroidism, and anxiety. Pt has had a wound in her left first toe for the past several months. Pt admits to intermittent "burning" pain in her toe and her left leg from her calf to her foot. Pt elicits that her pain is made worse with activity and improved with rest. Pt takes Tylenol as needed for the pain which provides mild to moderate relief. Pt was recently hospitalized at [facility]. X-ray of the left foot was negative for acute osteomyelitis. MONTY/PVR (8/04/23) revealed moderate arterial flow limitation in the right lower extremity localizing to the femoropopliteal distribution with PVR waveforms suggesting flow-limiting disease in the iliofemoral distribution and small vessel disease in the left foot. Pt denies fever, chills, recent travel, headache, dizziness, visual deficits, chest pain, shortness of breath, orthopnea, palpitations, abdominal pain, N/V/D/C, hematochezia, melena, dysuria, hematuria, LOC, syncope. Pt now presents for left lower extremity angiogram, transfer from [facility] for TAVR work up.     Pt was admitted under Vascular Surgery for further evaluation and management. Pt was taken to the OR on 8/31/23, and is s/p ileofemoral bypass graft. The patient tolerated the procedure well (see operative report for full details). Pt was transferred to the PACU in stable condition. In the PACU, the patient's pain was controlled and vitals stable. On POC, the patient was doing well. The patient was transferred to the surgical floor in stable condition. ERP protocol was followed. On POD #1, pt was stable and doing well. Pt's Alas was discontinued on , and passed TOV. Once IV pain control dosing complete, pt was transitioned to oral Tylenol and Motrin with Oxycodone for breakthrough pain.  83F presents with left foot hallux wound to ischemic nail bed, digit 2-4 distal wound to subQ  - Patient seen and evaluated  - Afebrile, WBC 10.75, ESR 40, CRP 3.2  - Left foot hallux wound to subQ with medial adhered eschar, erythema from distal digits 1-5 to MTPJ's, lateral 5th MTPJ wound to subQ, medial 1st MTPJ wound to subQ, dorsal midfoot and hindfoot erythema, no drainage, no pus, no tracking, no tunneling. Right foot no open wounds or lesions no acute signs of infection.      Physical therapy evaluated the patient and recommended     On the day of discharge, the patient's vitals are stable, pain is controlled, voiding urine, passing gas/stool, tolerating a PO diet, and ambulating well. Pt will f/u with Dr. Tavares and Dr. Guzmán in 1-2 weeks. Pt will f/u with PCP in 1-2 weeks. 82 y/o female with a PMHx of medically managed CAD (prox LM 40%, prox LAD 40%, distal LAD 50%, Cx mild, distal % ), paroxysmal AF on Eliquis (last dose 8/22/2023 PM), severe AS (EFRAIN 0.69 sqcm) pending TAVR (transcatheter aortic valve replacement), right internal carotid stenosis, PAD with chronic left first toe wound, HTN, HLD, hypothyroidism, and anxiety. Pt has had a wound in her left first toe for the past several months. Pt admits to intermittent "burning" pain in her toe and her left leg from her calf to her foot. Pt elicits that her pain is made worse with activity and improved with rest. Pt takes Tylenol as needed for the pain which provides mild to moderate relief. Pt was recently hospitalized at [facility]. X-ray of the left foot was negative for acute osteomyelitis. MONTY/PVR (8/04/23) revealed moderate arterial flow limitation in the right lower extremity localizing to the femoropopliteal distribution with PVR waveforms suggesting flow-limiting disease in the iliofemoral distribution and small vessel disease in the left foot. Pt denies fever, chills, recent travel, headache, dizziness, visual deficits, chest pain, shortness of breath, orthopnea, palpitations, abdominal pain, N/V/D/C, hematochezia, melena, dysuria, hematuria, LOC, syncope. Pt now presents for left lower extremity angiogram, transfer from [facility] for TAVR work up.     Pt was admitted under Vascular Surgery for further evaluation and management. Pt was taken to the OR on 8/31/23, and is s/p ileofemoral bypass graft. The patient tolerated the procedure well (see operative report for full details). Pt was transferred to the PACU in stable condition. In the PACU, the patient's pain was controlled and vitals stable. On POC, the patient was doing well. The patient was transferred to the surgical floor in stable condition. On POD #1, pt was stable and doing well. Pt's Alas was discontinued on 9/02, and passed TOV. Once IV pain control dosing complete, pt was transitioned to oral Tylenol and Motrin with Oxycodone for breakthrough pain.  83F presents with left foot hallux wound to ischemic nail bed, digit 2-4 distal wound to subQ  - Patient seen and evaluated  - Afebrile, WBC 10.75, ESR 40, CRP 3.2  - Left foot hallux wound to subQ with medial adhered eschar, erythema from distal digits 1-5 to MTPJ's, lateral 5th MTPJ wound to subQ, medial 1st MTPJ wound to subQ, dorsal midfoot and hindfoot erythema, no drainage, no pus, no tracking, no tunneling. Right foot no open wounds or lesions no acute signs of infection.      Physical therapy evaluated the patient and recommended     On the day of discharge, the patient's vitals are stable, pain is controlled, voiding urine, passing gas/stool, tolerating a PO diet, and ambulating well. Pt will f/u with Dr. Tavares and Dr. Guzmán in 1-2 weeks. Pt will f/u with PCP in 1-2 weeks. 82 y/o female with a PMHx of medically managed CAD (prox LM 40%, prox LAD 40%, distal LAD 50%, Cx mild, distal % ), paroxysmal AF on Eliquis (last dose 8/22/2023 PM), severe AS (EFRAIN 0.69 sqcm) pending TAVR (transcatheter aortic valve replacement), right internal carotid stenosis, PAD with chronic left first toe wound, HTN, HLD, hypothyroidism, and anxiety. Pt has had a wound in her left first toe for the past several months. Pt admits to intermittent "burning" pain in her toe and her left leg from her calf to her foot. Pt elicits that her pain is made worse with activity and improved with rest. Pt takes Tylenol as needed for the pain which provides mild to moderate relief. Pt was recently hospitalized at [facility]. X-ray of the left foot was negative for acute osteomyelitis. MONTY/PVR (8/04/23) revealed moderate arterial flow limitation in the right lower extremity localizing to the femoropopliteal distribution with PVR waveforms suggesting flow-limiting disease in the iliofemoral distribution and small vessel disease in the left foot. Pt denies fever, chills, recent travel, headache, dizziness, visual deficits, chest pain, shortness of breath, orthopnea, palpitations, abdominal pain, N/V/D/C, hematochezia, melena, dysuria, hematuria, LOC, syncope. Pt now presents for left lower extremity angiogram, transfer from [facility] for TAVR work up.     Pt was admitted under Vascular Surgery for further evaluation and management. Left lower extremity angiogram (8/24/23) showed occluded CFA, SFA, and popliteal artery. Vein mapping was completed on 8/25. Pt was taken to the OR on 8/31/23, and is s/p ileofemoral bypass graft. The patient tolerated the procedure well (see operative report for full details). Pt was transferred to the PACU in stable condition. In the PACU, the patient's pain was controlled and vitals stable. On POC, the patient was doing well. The patient was transferred to the surgical floor in stable condition. On POD #1, pt was stable and doing well. Pt's Alas was discontinued on 9/02, and passed TOV. Once IV pain control dosing complete, pt was transitioned to oral Tylenol and Motrin with Oxycodone for breakthrough pain.    Cardiology was consulted for cardiac co-management/cardiac clearance.     Podiatry was consulted for left food wounds. Pt was taken to the OR on 9/06/23, and is s/p ileofemoral bypass graft. The patient tolerated the procedure well (see operative report for full details). Pt was transferred to the PACU in stable condition. In the PACU, the patient's pain was controlled and vitals stable. On POC, the patient was doing well. The patient was transferred to the surgical floor in stable condition. On POD #1, pt was stable and doing well. Pt's Alas was discontinued on 9/02, and passed TOV. Once IV pain control dosing complete, pt was transitioned to oral Tylenol and Motrin with Oxycodone for breakthrough pain.    Infectious Diseases was consulted for antibiotics recommendations. In setting of patient being afebrile without significant leukocytosis and no sign of soft tissue swelling/erythema/purulence on toes, patient was monitored off antibiotics     Physical therapy evaluated the patient and recommended     On the day of discharge, the patient's vitals are stable, pain is controlled, voiding urine, passing gas/stool, tolerating a PO diet, and ambulating well. Pt will f/u with Dr. Taavres (vascular surgery) and Dr. Guzmán (podiatry) in 1-2 weeks. Pt will f/u with PCP in 1-2 weeks. 84 y/o female with a PMHx of medically managed CAD (prox LM 40%, prox LAD 40%, distal LAD 50%, Cx mild, distal % ), paroxysmal AF on Eliquis (last dose 8/22/2023 PM), severe AS (EFRAIN 0.69 sqcm) pending TAVR (transcatheter aortic valve replacement), right internal carotid stenosis, PAD with chronic left first toe wound, HTN, HLD, hypothyroidism, and anxiety. Pt has had a wound in her left first toe for the past several months. Pt admits to intermittent "burning" pain in her toe and her left leg from her calf to her foot. Pt elicits that her pain is made worse with activity and improved with rest. Pt takes Tylenol as needed for the pain which provides mild to moderate relief. Pt was recently hospitalized at [facility]. X-ray of the left foot was negative for acute osteomyelitis. MONTY/PVR (8/04/23) revealed moderate arterial flow limitation in the right lower extremity localizing to the femoropopliteal distribution with PVR waveforms suggesting flow-limiting disease in the iliofemoral distribution and small vessel disease in the left foot. Pt denies fever, chills, recent travel, headache, dizziness, visual deficits, chest pain, shortness of breath, orthopnea, palpitations, abdominal pain, N/V/D/C, hematochezia, melena, dysuria, hematuria, LOC, syncope. Pt now presents for left lower extremity angiogram, transfer from [facility] for TAVR work up.     Pt was admitted under Vascular Surgery for further evaluation and management. Left lower extremity angiogram (8/24/23) showed occluded CFA, SFA, and popliteal artery. Cardiology was consulted for cardiac clearance. Vein mapping was completed on 8/25. Pt was taken to the OR on 8/31/23, and is s/p ileofemoral bypass with jump saphenous graft to peroneal artery. The procedure was remarkable for 2L EBL, hypokalemia requiring repletion, phenylephrine initiation, heparin infusion requiring protamine sulfate, and blood products (6U pRBC, 1U plt, 3 FFP). SICU was consulted, and accepted the patient. Pt was transferred to the SICU in stable condition. The patient was extubated later that night on 8/31. On POC, the patient was recovering appropriately. On POD #1, pt was recovering appropriately. Pt's Alas was discontinued on 9/02, and passed TOV. Patient was weaned off pressors on 9/02. The patient was transferred to the surgical floor in stable condition on 9/02    Podiatry was consulted for left food wounds. Internal medicine was consulted for medical clearance. Cardiology was re-consulted for cardiac clearance. Pt was taken to the OR on 9/06/23, and is s/p partial amputation of first ray of left foot. The patient tolerated the procedure well (see operative report for full details). Pt was transferred to the PACU in stable condition. In the PACU, the patient's pain was controlled and vitals stable. The patient was transferred to the surgical floor in stable condition. On POD #1, pt was stable and doing well. Eliquis was resumed on 9/0, which the patient tolerated. RICHELLE drain was removed prior to discharge on 9/0    Infectious Diseases was consulted for antibiotics recommendations. In setting of patient being afebrile without significant leukocytosis and no sign of soft tissue swelling/erythema/purulence on toes, patient was monitored off antibiotics     Physical therapy evaluated the patient and recommended     On the day of discharge, the patient's vitals are stable, pain is controlled, voiding urine, passing gas/stool, tolerating a PO diet, and ambulating well. Pt will f/u with Dr. Tavares (vascular surgery) and Dr. Guzmán (podiatry) in 1-2 weeks. Pt will f/u with PCP in 1-2 weeks. 84 y/o female with a PMHx of medically managed CAD (prox LM 40%, prox LAD 40%, distal LAD 50%, Cx mild, distal % ), paroxysmal AF on Eliquis (last dose 8/22/2023 PM), severe AS (EFRAIN 0.69 sqcm) pending TAVR (transcatheter aortic valve replacement), right internal carotid stenosis, PAD with chronic left first toe wound, HTN, HLD, hypothyroidism, and anxiety. Pt has had a wound in her left first toe for the past several months. Pt admits to intermittent "burning" pain in her toe and her left leg from her calf to her foot. Pt elicits that her pain is made worse with activity and improved with rest. Pt takes Tylenol as needed for the pain which provides mild to moderate relief. Pt was recently hospitalized at [facility]. X-ray of the left foot was negative for acute osteomyelitis. MONTY/PVR (8/04/23) revealed moderate arterial flow limitation in the right lower extremity localizing to the femoropopliteal distribution with PVR waveforms suggesting flow-limiting disease in the iliofemoral distribution and small vessel disease in the left foot. Pt denies fever, chills, recent travel, headache, dizziness, visual deficits, chest pain, shortness of breath, orthopnea, palpitations, abdominal pain, N/V/D/C, hematochezia, melena, dysuria, hematuria, LOC, syncope. Pt now presents for left lower extremity angiogram, transfer from [facility] for TAVR work up.     Pt was admitted under Vascular Surgery for further evaluation and management. Left lower extremity angiogram (8/24/23) showed occluded CFA, SFA, and popliteal artery. Cardiology was consulted for cardiac clearance. Vein mapping was completed on 8/25. Pt was taken to the OR on 8/31/23, and is s/p ileofemoral bypass with jump saphenous graft to peroneal artery. The procedure was remarkable for 2L EBL, hypokalemia requiring repletion, phenylephrine initiation, heparin infusion requiring protamine sulfate, and blood products (6U pRBC, 1U plt, 3 FFP). SICU was consulted, and accepted the patient; pt was transferred directly to the SICU. The patient was extubated later that night on 8/31. On POC, the patient was recovering appropriately. On POD #1, pt was recovering appropriately. Pt's Alas was discontinued on 9/02, and passed TOV. Patient was weaned off pressors on 9/02. The patient was transferred to the surgical floor in stable condition on 9/02. RICHELLE drain was removed prior to discharge on 9/0    Podiatry was consulted for left food wounds. Internal medicine was consulted for medical clearance. Cardiology was re-consulted for cardiac clearance. Pt was taken to the OR on 9/06/23, and is s/p partial amputation of first ray of left foot. The patient tolerated the procedure well (see operative report for full details). Pt was transferred to the PACU in stable condition. In the PACU, the patient's pain was controlled and vitals stable. The patient was transferred to the surgical floor in stable condition. On POD #1, pt was stable and doing well. Eliquis was resumed on 9/0, which the patient tolerated.     Infectious Diseases was consulted for antibiotics recommendations. In setting of patient being afebrile without significant leukocytosis and no sign of soft tissue swelling/erythema/purulence on toes, patient was monitored off antibiotics     Physical therapy evaluated the patient and recommended     On the day of discharge, the patient's vitals are stable, pain is controlled, voiding urine, passing gas/stool, tolerating a PO diet, and ambulating well. Pt will f/u with Dr. Tavares (vascular surgery) and Dr. Guzmán (podiatry) in 1-2 weeks. Pt will f/u with PCP in 1-2 weeks. 82 y/o female with a PMHx of medically managed CAD (prox LM 40%, prox LAD 40%, distal LAD 50%, Cx mild, distal % ), paroxysmal AF on Eliquis (last dose 8/22/2023 PM), severe AS (EFRAIN 0.69 sqcm) pending TAVR (transcatheter aortic valve replacement), right internal carotid stenosis, PAD with chronic left first toe wound, HTN, HLD, hypothyroidism, and anxiety. Pt has had a wound in her left first toe for the past several months. Pt admits to intermittent "burning" pain in her toe and her left leg from her calf to her foot. Pt elicits that her pain is made worse with activity and improved with rest. Pt takes Tylenol as needed for the pain which provides mild to moderate relief. Pt was recently hospitalized at [facility]. X-ray of the left foot was negative for acute osteomyelitis. MONTY/PVR (8/04/23) revealed moderate arterial flow limitation in the right lower extremity localizing to the femoropopliteal distribution with PVR waveforms suggesting flow-limiting disease in the iliofemoral distribution and small vessel disease in the left foot. Pt denies fever, chills, recent travel, headache, dizziness, visual deficits, chest pain, shortness of breath, orthopnea, palpitations, abdominal pain, N/V/D/C, hematochezia, melena, dysuria, hematuria, LOC, syncope. Pt now presents for left lower extremity angiogram, transfer from [facility] for TAVR work up.     Pt was admitted under Vascular Surgery for further evaluation and management. Left lower extremity angiogram (8/24/23) showed occluded CFA, SFA, and popliteal artery. Cardiology was consulted for cardiac clearance. Vein mapping was completed on 8/25. Pt was taken to the OR on 8/31/23, and is s/p ileofemoral bypass with jump saphenous graft to peroneal artery. The procedure was remarkable for 2L EBL, hypokalemia requiring repletion, phenylephrine initiation, heparin infusion requiring protamine sulfate, and blood products (6U pRBC, 1U plt, 3 FFP). SICU was consulted, and accepted the patient; pt was transferred directly to the SICU. The patient was extubated later that night on 8/31. On POC, the patient was recovering appropriately. On POD #1, pt was recovering appropriately. Pt's Alas was discontinued on 9/02, and passed TOV. Patient was weaned off pressors on 9/02. The patient was transferred to the surgical floor in stable condition on 9/02. RICHELLE drain was removed prior to discharge on 9/0    Podiatry was consulted for left food wounds. Internal medicine was consulted for medical clearance. Cardiology was re-consulted for cardiac clearance. Pt was taken to the OR on 9/06/23, and is s/p partial amputation of first ray of left foot. The patient tolerated the procedure well (see operative report for full details). Pt was transferred to the PACU in stable condition. In the PACU, the patient's pain was controlled and vitals stable. The patient was transferred to the surgical floor in stable condition. On POD #1, pt was stable and doing well. Eliquis was resumed on 9/7, which the patient tolerated.     Infectious Diseases was consulted for antibiotics recommendations. In setting of patient being afebrile without significant leukocytosis and no sign of soft tissue swelling/erythema/purulence on toes, patient was monitored off antibiotics     Physical therapy evaluated the patient and recommended home PT with rolling walker, which patient already has.    On the day of discharge, the patient's vitals are stable, pain is controlled, voiding urine, passing gas/stool, tolerating a PO diet, and ambulating well. Pt will f/u with Dr. Tavares (vascular surgery) and Dr. Guzmán (podiatry) in 1-2 weeks. Pt will f/u with PCP in 1-2 weeks. 84 y/o female with a PMHx of medically managed CAD (prox LM 40%, prox LAD 40%, distal LAD 50%, Cx mild, distal % ), paroxysmal AF on Eliquis (last dose 8/22/2023 PM), severe AS (EFRAIN 0.69 sqcm) pending TAVR (transcatheter aortic valve replacement), right internal carotid stenosis, PAD with chronic left first toe wound, HTN, HLD, hypothyroidism, and anxiety. Pt has had a wound in her left first toe for the past several months. Pt admits to intermittent "burning" pain in her toe and her left leg from her calf to her foot. Pt elicits that her pain is made worse with activity and improved with rest. Pt takes Tylenol as needed for the pain which provides mild to moderate relief. Pt was recently hospitalized at [facility]. X-ray of the left foot was negative for acute osteomyelitis. MONTY/PVR (8/04/23) revealed moderate arterial flow limitation in the right lower extremity localizing to the femoropopliteal distribution with PVR waveforms suggesting flow-limiting disease in the iliofemoral distribution and small vessel disease in the left foot. Pt denies fever, chills, recent travel, headache, dizziness, visual deficits, chest pain, shortness of breath, orthopnea, palpitations, abdominal pain, N/V/D/C, hematochezia, melena, dysuria, hematuria, LOC, syncope. Pt now presents for left lower extremity angiogram, transfer from [facility] for TAVR work up.     Pt was admitted under Vascular Surgery for further evaluation and management. Left lower extremity angiogram (8/24/23) showed occluded CFA, SFA, and popliteal artery. Cardiology was consulted for cardiac clearance. Vein mapping was completed on 8/25. Pt was taken to the OR on 8/31/23, and is s/p ileofemoral bypass with jump saphenous graft to peroneal artery. The procedure was remarkable for 2L EBL, hypokalemia requiring repletion, phenylephrine initiation, heparin infusion requiring protamine sulfate, and blood products (6U pRBC, 1U plt, 3 FFP). SICU was consulted, and accepted the patient; pt was transferred directly to the SICU. The patient was extubated later that night on 8/31. On POC, the patient was recovering appropriately. On POD #1, pt was recovering appropriately. Pt's Alas was discontinued on 9/02, and passed TOV. Patient was weaned off pressors on 9/02. The patient was transferred to the surgical floor in stable condition on 9/02. RICHELLE drain was removed prior to discharge on 9/08.    Podiatry was consulted for left food wounds. Internal medicine was consulted for medical clearance. Cardiology was re-consulted for cardiac clearance. Pt was taken to the OR on 9/06/23, and is s/p partial amputation of first ray of left foot. The patient tolerated the procedure well (see operative report for full details). Pt was transferred to the PACU in stable condition. In the PACU, the patient's pain was controlled and vitals stable. The patient was transferred to the surgical floor in stable condition. On POD #1, pt was stable and doing well. Eliquis was resumed on 9/7, which the patient tolerated.     Infectious Diseases was consulted for antibiotics recommendations. In setting of patient being afebrile without significant leukocytosis and no sign of soft tissue swelling/erythema/purulence on toes, patient was monitored off antibiotics.     Physical therapy evaluated the patient and recommended home PT with rolling walker, which patient already has.    On the day of discharge, the patient's vitals are stable, pain is controlled, voiding urine, passing gas/stool, tolerating a PO diet, and ambulating well. Pt will f/u with Dr. Tavares (vascular surgery) and Dr. Guzmán (podiatry) in 1-2 weeks. Pt will f/u with PCP in 1-2 weeks. 82 y/o female with a PMHx of medically managed CAD (prox LM 40%, prox LAD 40%, distal LAD 50%, Cx mild, distal % ), paroxysmal AF on Eliquis (last dose 8/22/2023 PM), severe AS (EFRAIN 0.69 sqcm) pending TAVR (transcatheter aortic valve replacement), right internal carotid stenosis, PAD with chronic left first toe wound, HTN, HLD, hypothyroidism, and anxiety. Pt has had a wound in her left first toe for the past several months. Pt admits to intermittent "burning" pain in her toe and her left leg from her calf to her foot. Pt elicits that her pain is made worse with activity and improved with rest. Pt takes Tylenol as needed for the pain which provides mild to moderate relief. Pt was recently hospitalized at [facility]. X-ray of the left foot was negative for acute osteomyelitis. MONTY/PVR (8/04/23) revealed moderate arterial flow limitation in the right lower extremity localizing to the femoropopliteal distribution with PVR waveforms suggesting flow-limiting disease in the iliofemoral distribution and small vessel disease in the left foot. Pt denies fever, chills, recent travel, headache, dizziness, visual deficits, chest pain, shortness of breath, orthopnea, palpitations, abdominal pain, N/V/D/C, hematochezia, melena, dysuria, hematuria, LOC, syncope. Pt now presents for left lower extremity angiogram, transfer from [facility] for TAVR work up.    Pt was admitted under Vascular Surgery for further evaluation and management. Left lower extremity angiogram (8/24/23) showed occluded CFA, SFA, and popliteal artery. Cardiology was consulted for cardiac clearance. Vein mapping was completed on 8/25. Pt was taken to the OR on 8/31/23, and is s/p ileofemoral bypass with jump saphenous graft to peroneal artery. The procedure was remarkable for 2L EBL, hypokalemia requiring repletion, phenylephrine initiation, heparin infusion requiring protamine sulfate, and blood products (6U pRBC, 1U plt, 3 FFP). SICU was consulted, and accepted the patient; pt was transferred directly to the SICU. The patient was extubated later that night on 8/31. On POC, the patient was recovering appropriately. On POD #1, pt was recovering appropriately. Pt's Alas was discontinued on 9/02, and passed TOV. Patient was weaned off pressors on 9/02. The patient was transferred to the surgical floor in stable condition on 9/02. RICHELLE drain was removed prior to discharge on 9/08.    Podiatry was consulted for left food wounds. Internal medicine was consulted for medical clearance. Cardiology was re-consulted for cardiac clearance. Pt was taken to the OR on 9/06/23, and is s/p partial amputation of first ray of left foot. The patient tolerated the procedure well (see operative report for full details). Pt was transferred to the PACU in stable condition. In the PACU, the patient's pain was controlled and vitals stable. The patient was transferred to the surgical floor in stable condition. On POD #1, pt was stable and doing well. Eliquis was resumed on 9/7, which the patient tolerated. Discussed with structural heart team, patient to follow up with team outpatient after discharge next week for further TAVR workup.    Infectious Diseases was consulted for antibiotics recommendations. In setting of patient being afebrile without significant leukocytosis and no sign of soft tissue swelling/erythema/purulence on toes, patient was monitored off antibiotics.     Physical therapy evaluated the patient and recommended home PT with rolling walker, which patient already has.    On the day of discharge, the patient's vitals are stable, pain is controlled, voiding urine, passing gas/stool, tolerating a PO diet, and ambulating well. Pt will f/u with Dr. Tavares (vascular surgery) and Dr. Guzmán (podiatry) in 1-2 weeks. Patient will follow up with structural heart team next week for further TAVR work up. Pt will f/u with PCP in 1-2 weeks.

## 2023-09-01 NOTE — PHYSICAL THERAPY INITIAL EVALUATION ADULT - GENERAL OBSERVATIONS, REHAB EVAL
pt received semi-supine in bed in NAD +ICU monitoring, IV, a-line, ace wrap and RICHELLE drain to LLE, ellis, pending OR with podiatry for L foot, kept NWB at this time

## 2023-09-01 NOTE — DISCHARGE NOTE PROVIDER - NSDCCPTREATMENT_GEN_ALL_CORE_FT
PRINCIPAL PROCEDURE  Procedure: Iliofemoral bypass graft with vein  Findings and Treatment: WOUND CARE: You may shower. Remove outer dressing prior to shower. Let soap and water run over incision; do NOT scrub incision. Pat leg dry after, and replace with gauze with paper tape. Staples will be removed at follow up office visit.   BATHING: You may shower and/or sponge bathe 24 hours after surgery. Do no submerge the incision underwater for the next 2 weeks.  ACTIVITY: No heavy lifting anything more than 10-15lbs or straining. Otherwise, you may return to your usual level of physical activity. If you are taking narcotic pain medication (such as Percocet), do NOT drive a car, operate machinery or make important decisions.  DIET: Maintain Regular Diet until your next appointment.   PAIN: A prescription for oxycodone has been sent to the pharmacy. You should only take these for severe pain. For mild or moderate pain, you may take 975mg of tylenol every 6 hours. Do not exceed more than 4G per day.   NOTIFY YOUR SURGEON IF: You have any bleeding that does not stop, any pus draining from your wound, any fever (over 100.4 F) or chills, persistent nausea/vomiting with inability to tolerate food or liquids, persistent diarrhea, or if severe abdominal pain is not controlled on your discharge pain medications.  FOLLOW-UP:  1. Please call to make a follow-up appointment within one to two weeks of discharge with Dr. Tavares  2. Please follow up with your primary care physician in one week regarding your hospitalization.      SECONDARY PROCEDURE  Procedure: Partial amputation of first ray of left foot by open approach  Findings and Treatment: WOUND CARE: Please apply iodosorb to left toe wounds on digits 2, 3, 4, 5, dry sterile gauze to great toe surgical site, followed by leida and ace bandage  WEIGHT BEARING: Please weight bear as tolerated in a surgical shoe.  ANTIBIOTICS: Please take Augmentin for 7 days (9/06-9/13).  Take your medicine with a glass of water or food as told by your doctor.  Take the medicine as told. Finish them even if you start to feel better.  Do not give your medicine to other people.  Do not use your medicine in the future for a different infection.  Ask your doctor about which side effects to watch for.  Try not to miss any doses. If you miss a dose, take it as soon as possible  NOTIFY YOUR SURGEON IF: You have any bleeding that does not stop, any pus draining from your wound, any fever (over 100.4 F) or chills, persistent nausea/vomiting with inability to tolerate food or liquids, persistent diarrhea, or if severe abdominal pain is not controlled on your discharge pain medications.  FOLLOW-UP:  1. Please call [phone] to make a follow-up appointment within one of discharge with Dr. Mario Guzmán  2. Please follow up with your primary care physician in one week regarding your hospitalization.     PRINCIPAL PROCEDURE  Procedure: Iliofemoral bypass graft with vein  Findings and Treatment: WOUND CARE: You may shower. Remove outer dressing prior to shower. Let soap and water run over incision; do NOT scrub incision. Pat leg dry after, and replace with gauze with paper tape. Staples will be removed at follow up office visit.   BATHING: You may shower and/or sponge bathe 24 hours after surgery. Do not submerge the incision underwater for the next 2 weeks.  ACTIVITY: No heavy lifting anything more than 10-15lbs or straining. Otherwise, you may return to your usual level of physical activity. If you are taking narcotic pain medication (such as percocet), do NOT drive a car, operate machinery or make important decisions.  DIET: Maintain Regular Diet until your next appointment.   PAIN: A prescription for oxycodone has been sent to the pharmacy. You should only take these for severe pain. For mild or moderate pain, you may take 975mg of tylenol every 6 hours. Do not exceed more than 4G per day.   NOTIFY YOUR SURGEON IF: You have any bleeding that does not stop, any pus draining from your wound, any fever (over 100.4 F) or chills, persistent nausea/vomiting with inability to tolerate food or liquids, persistent diarrhea, or if severe abdominal pain is not controlled on your discharge pain medications.  FOLLOW-UP:  1. Please call to make a follow-up appointment in one week of discharge with Dr. Tavares.  2. Please follow up with your primary care physician in one week regarding your hospitalization.      SECONDARY PROCEDURE  Procedure: Partial amputation of first ray of left foot by open approach  Findings and Treatment: WOUND CARE: Please apply iodosorb to left toe wounds on digits 2, 3, 4, 5, dry sterile gauze to great toe surgical site, followed by leida and ace bandage.  WEIGHT BEARING: Please weight bear as tolerated in a surgical shoe.  ANTIBIOTICS: Please take Augmentin for 7 days (9/06-9/13).  Take your medicine with a glass of water or food as told by your doctor.  Take the medicine as told. Finish them even if you start to feel better.  Do not give your medicine to other people.  Do not use your medicine in the future for a different infection.  Ask your doctor about which side effects to watch for.  Try not to miss any doses. If you miss a dose, take it as soon as possible  NOTIFY YOUR SURGEON IF: You have any bleeding that does not stop, any pus draining from your wound, any fever (over 100.4 F) or chills, persistent nausea/vomiting with inability to tolerate food or liquids, persistent diarrhea, or if severe abdominal pain is not controlled on your discharge pain medications.  FOLLOW-UP:  1. Please call [phone] to make a follow-up appointment within one of discharge with Dr. Mairo Guzmán.  2. Please follow up with your primary care physician in one week regarding your hospitalization.

## 2023-09-01 NOTE — OCCUPATIONAL THERAPY INITIAL EVALUATION ADULT - PREDICTED DURATION OF THERAPY (DAYS/WKS), OT EVAL
Per OB floor pt was to be evaluated for diarrhea.  Pt is medically clear down here.  Pt will be taken to the OB floor for further evaluation.   
Pt taken to OB with IV in place.   
4 weeks

## 2023-09-01 NOTE — DIETITIAN INITIAL EVALUATION ADULT - PERTINENT MEDS FT
MEDICATIONS  (STANDING):  aMIOdarone    Tablet 100 milliGRAM(s) Oral every 24 hours  aspirin enteric coated 81 milliGRAM(s) Oral daily  atorvastatin 20 milliGRAM(s) Oral at bedtime  chlorhexidine 2% Cloths 1 Application(s) Topical daily  insulin lispro (ADMELOG) corrective regimen sliding scale   SubCutaneous three times a day before meals  insulin lispro (ADMELOG) corrective regimen sliding scale   SubCutaneous at bedtime  levothyroxine 50 MICROGram(s) Oral daily  metoprolol tartrate 12.5 milliGRAM(s) Oral every 12 hours  phenylephrine    Infusion 0.4 MICROgram(s)/kG/Min (8.99 mL/Hr) IV Continuous <Continuous>  piperacillin/tazobactam IVPB.. 3.375 Gram(s) IV Intermittent every 8 hours  polyethylene glycol 3350 17 Gram(s) Oral daily  senna 2 Tablet(s) Oral at bedtime  vancomycin  IVPB 1000 milliGRAM(s) IV Intermittent every 12 hours    MEDICATIONS  (PRN):  acetaminophen     Tablet .. 975 milliGRAM(s) Oral every 6 hours PRN Mild Pain (1 - 3)  oxyCODONE    IR 2.5 milliGRAM(s) Oral every 4 hours PRN Moderate Pain (4 - 6)  oxyCODONE    IR 5 milliGRAM(s) Oral every 4 hours PRN Severe Pain (7 - 10)

## 2023-09-01 NOTE — OCCUPATIONAL THERAPY INITIAL EVALUATION ADULT - PERTINENT HX OF CURRENT PROBLEM, REHAB EVAL
82 yo F w/ PMHx of CAD (prox LM 40%, prox LAD 40%, distal LAD 50%, Cx mild, distal % ), pAF on Eliquis, severe AS pending TAVR, R internal carotid stenosis, PAD w/ chronic L 1st toe wound, HTN, HLD, hypothyroidism, and anxiety presents as a transfer from [facility] (8/25/23) for LLE angiogram and TAVR work-up after presenting for months of worsening LLE burning pain and concern for L foot gangrenew / maggots. CT angiograph showed L CFA and popliteal artery occlusions warranting external iliac-peroneal bypass for limb salvage. Vascular surgery performed L external iliac-profunda bypass w/ jump saphenous graft to peroneal artery (8/31, Dr. Laura Tavares) remarkable for 2L EBL, hypokalemia requiring repletion, phenylephrine, heparin infusion requiring protamine sulfate, and blood products (6U pRBC, 1U plt, 3 FFP). Admitted to SICU for pressor requirement in     8/25 [facility] duplex BLE:  No duplex evidence of DVT in either lower extremity.Superficial thrombosis of the lesser saphenous vein in both calves. 84 yo F w/ PMHx of CAD (prox LM 40%, prox LAD 40%, distal LAD 50%, Cx mild, distal % ), pAF on Eliquis, severe AS pending TAVR, R internal carotid stenosis, PAD w/ chronic L 1st toe wound, HTN, HLD, hypothyroidism, and anxiety presents as a transfer from [facility] (8/25/23) for LLE angiogram and TAVR work-up after presenting for months of worsening LLE burning pain and concern for L foot gangrenew / maggots. CT angiograph showed L CFA and popliteal artery occlusions warranting external iliac-peroneal bypass for limb salvage. Vascular surgery performed L external iliac-profunda bypass w/ jump saphenous graft to peroneal artery (8/31, Dr. Laura Tavares) remarkable for 2L EBL, hypokalemia requiring repletion, phenylephrine, heparin infusion requiring protamine sulfate, and blood products (6U pRBC, 1U plt, 3 FFP). Admitted to SICU for pressor requirement in     8/25 [facility] duplex BLE:  No duplex evidence of DVT in either lower extremity.Superficial thrombosis of the lesser saphenous vein in both calves. 8/25 L foot Xray: Progressive deepening soft tissue defect over medial left hallux distal phalanx. Smaller soft tissue ulceration over lateral 5th MTP region. No subjacent tracking gas collections or gross radiographic evidence for osteomyelitis however if concern persists MRI suggested to further assess.No fractures or dislocations.Tarsometatarsal alignment maintained without evidence for a Lisfranc injury.Slight hallux valgus deformity with small bunion. Preserved remaining joint spaces and no joint margin erosions.Plantar and posterior calcaneal enthesophytes.Generalized osteopeniaScant scattered vascular calcifications.

## 2023-09-02 LAB
ALBUMIN SERPL ELPH-MCNC: 2.3 G/DL — LOW (ref 3.3–5)
ALP SERPL-CCNC: 65 U/L — SIGNIFICANT CHANGE UP (ref 40–120)
ALT FLD-CCNC: 14 U/L — SIGNIFICANT CHANGE UP (ref 10–45)
ANION GAP SERPL CALC-SCNC: 11 MMOL/L — SIGNIFICANT CHANGE UP (ref 5–17)
APTT BLD: 24.4 SEC — LOW (ref 24.5–35.6)
AST SERPL-CCNC: 30 U/L — SIGNIFICANT CHANGE UP (ref 10–40)
BILIRUB SERPL-MCNC: 0.7 MG/DL — SIGNIFICANT CHANGE UP (ref 0.2–1.2)
BUN SERPL-MCNC: 20 MG/DL — SIGNIFICANT CHANGE UP (ref 7–23)
CALCIUM SERPL-MCNC: 7.5 MG/DL — LOW (ref 8.4–10.5)
CHLORIDE SERPL-SCNC: 97 MMOL/L — SIGNIFICANT CHANGE UP (ref 96–108)
CO2 SERPL-SCNC: 22 MMOL/L — SIGNIFICANT CHANGE UP (ref 22–31)
CREAT SERPL-MCNC: 0.91 MG/DL — SIGNIFICANT CHANGE UP (ref 0.5–1.3)
EGFR: 63 ML/MIN/1.73M2 — SIGNIFICANT CHANGE UP
GLUCOSE SERPL-MCNC: 228 MG/DL — HIGH (ref 70–99)
HCT VFR BLD CALC: 27.3 % — LOW (ref 34.5–45)
HGB BLD-MCNC: 9.3 G/DL — LOW (ref 11.5–15.5)
INR BLD: 1.04 RATIO — SIGNIFICANT CHANGE UP (ref 0.85–1.18)
MAGNESIUM SERPL-MCNC: 1.8 MG/DL — SIGNIFICANT CHANGE UP (ref 1.6–2.6)
MCHC RBC-ENTMCNC: 30.7 PG — SIGNIFICANT CHANGE UP (ref 27–34)
MCHC RBC-ENTMCNC: 34.1 GM/DL — SIGNIFICANT CHANGE UP (ref 32–36)
MCV RBC AUTO: 90.1 FL — SIGNIFICANT CHANGE UP (ref 80–100)
NRBC # BLD: 0 /100 WBCS — SIGNIFICANT CHANGE UP (ref 0–0)
PHOSPHATE SERPL-MCNC: 4.3 MG/DL — SIGNIFICANT CHANGE UP (ref 2.5–4.5)
PLATELET # BLD AUTO: 152 K/UL — SIGNIFICANT CHANGE UP (ref 150–400)
POTASSIUM SERPL-MCNC: 4 MMOL/L — SIGNIFICANT CHANGE UP (ref 3.5–5.3)
POTASSIUM SERPL-SCNC: 4 MMOL/L — SIGNIFICANT CHANGE UP (ref 3.5–5.3)
PROT SERPL-MCNC: 4.7 G/DL — LOW (ref 6–8.3)
PROTHROM AB SERPL-ACNC: 11.4 SEC — SIGNIFICANT CHANGE UP (ref 9.5–13)
RBC # BLD: 3.03 M/UL — LOW (ref 3.8–5.2)
RBC # FLD: 15.3 % — HIGH (ref 10.3–14.5)
SODIUM SERPL-SCNC: 130 MMOL/L — LOW (ref 135–145)
VANCOMYCIN TROUGH SERPL-MCNC: 8.3 UG/ML — LOW (ref 10–20)
WBC # BLD: 14.94 K/UL — HIGH (ref 3.8–10.5)
WBC # FLD AUTO: 14.94 K/UL — HIGH (ref 3.8–10.5)

## 2023-09-02 RX ORDER — INSULIN LISPRO 100/ML
4 VIAL (ML) SUBCUTANEOUS
Refills: 0 | Status: DISCONTINUED | OUTPATIENT
Start: 2023-09-02 | End: 2023-09-04

## 2023-09-02 RX ORDER — METOPROLOL TARTRATE 50 MG
25 TABLET ORAL EVERY 12 HOURS
Refills: 0 | Status: DISCONTINUED | OUTPATIENT
Start: 2023-09-02 | End: 2023-09-08

## 2023-09-02 RX ORDER — APIXABAN 2.5 MG/1
2.5 TABLET, FILM COATED ORAL EVERY 12 HOURS
Refills: 0 | Status: DISCONTINUED | OUTPATIENT
Start: 2023-09-02 | End: 2023-09-02

## 2023-09-02 RX ORDER — ENOXAPARIN SODIUM 100 MG/ML
40 INJECTION SUBCUTANEOUS EVERY 24 HOURS
Refills: 0 | Status: DISCONTINUED | OUTPATIENT
Start: 2023-09-02 | End: 2023-09-03

## 2023-09-02 RX ORDER — MAGNESIUM SULFATE 500 MG/ML
2 VIAL (ML) INJECTION ONCE
Refills: 0 | Status: COMPLETED | OUTPATIENT
Start: 2023-09-02 | End: 2023-09-02

## 2023-09-02 RX ADMIN — Medication 2: at 08:00

## 2023-09-02 RX ADMIN — Medication 975 MILLIGRAM(S): at 14:40

## 2023-09-02 RX ADMIN — Medication 50 MICROGRAM(S): at 05:06

## 2023-09-02 RX ADMIN — Medication 12.5 MILLIGRAM(S): at 05:06

## 2023-09-02 RX ADMIN — ENOXAPARIN SODIUM 40 MILLIGRAM(S): 100 INJECTION SUBCUTANEOUS at 16:41

## 2023-09-02 RX ADMIN — CHLORHEXIDINE GLUCONATE 1 APPLICATION(S): 213 SOLUTION TOPICAL at 11:59

## 2023-09-02 RX ADMIN — Medication 25 GRAM(S): at 02:07

## 2023-09-02 RX ADMIN — Medication 250 MILLIGRAM(S): at 09:37

## 2023-09-02 RX ADMIN — Medication 4 UNIT(S): at 11:59

## 2023-09-02 RX ADMIN — Medication 4 UNIT(S): at 08:00

## 2023-09-02 RX ADMIN — Medication 2: at 11:58

## 2023-09-02 RX ADMIN — Medication 25 MILLIGRAM(S): at 17:10

## 2023-09-02 RX ADMIN — PIPERACILLIN AND TAZOBACTAM 25 GRAM(S): 4; .5 INJECTION, POWDER, LYOPHILIZED, FOR SOLUTION INTRAVENOUS at 22:52

## 2023-09-02 RX ADMIN — PIPERACILLIN AND TAZOBACTAM 25 GRAM(S): 4; .5 INJECTION, POWDER, LYOPHILIZED, FOR SOLUTION INTRAVENOUS at 05:06

## 2023-09-02 RX ADMIN — AMIODARONE HYDROCHLORIDE 100 MILLIGRAM(S): 400 TABLET ORAL at 05:38

## 2023-09-02 RX ADMIN — Medication 81 MILLIGRAM(S): at 11:58

## 2023-09-02 RX ADMIN — Medication 250 MILLIGRAM(S): at 22:52

## 2023-09-02 RX ADMIN — PIPERACILLIN AND TAZOBACTAM 25 GRAM(S): 4; .5 INJECTION, POWDER, LYOPHILIZED, FOR SOLUTION INTRAVENOUS at 13:57

## 2023-09-02 RX ADMIN — Medication 975 MILLIGRAM(S): at 23:07

## 2023-09-02 RX ADMIN — Medication 975 MILLIGRAM(S): at 13:59

## 2023-09-02 NOTE — PROGRESS NOTE ADULT - SUBJECTIVE AND OBJECTIVE BOX
Vascular surgery    SUBJECTIVE: Pt started on metoprolol 12.5BID and weaned off nataly ggt. Started ASA yesterday. S/p 1u PRBC w adequate response. A-line dc'ed.  Pt resting comfortably in bed this AM. Pain well controlled.    Vital Signs Last 24 Hrs  T(C): 36.7 (02 Sep 2023 03:00), Max: 37.3 (01 Sep 2023 15:00)  T(F): 98.1 (02 Sep 2023 03:00), Max: 99.2 (01 Sep 2023 15:00)  HR: 90 (02 Sep 2023 06:00) (78 - 110)  BP: 123/57 (02 Sep 2023 06:00) (95/52 - 141/63)  BP(mean): 82 (02 Sep 2023 06:00) (68 - 90)  RR: 18 (02 Sep 2023 06:00) (13 - 49)  SpO2: 97% (02 Sep 2023 06:00) (79% - 100%)    Parameters below as of 02 Sep 2023 03:00  Patient On (Oxygen Delivery Method): room air        I&O's Detail    01 Sep 2023 07:01  -  02 Sep 2023 07:00  --------------------------------------------------------  IN:    IV PiggyBack: 600 mL    IV PiggyBack: 700 mL    Lactated Ringers: 200 mL    Oral Fluid: 50 mL    Phenylephrine: 73.8 mL    PRBCs (Packed Red Blood Cells): 1 mL  Total IN: 1624.8 mL    OUT:    Bulb (mL): 35 mL    Indwelling Catheter - Urethral (mL): 1175 mL  Total OUT: 1210 mL    Total NET: 414.8 mL      Physical Exam:  Gen: NAD, A&Ox3  Pulm: No respiratory distress, no subcostal retractions  Drains: RICHELLE x 1, minimal sanguinous output  Groin: soft, no evidence of hematoma, dressings c/d  Extremities:  LLE: wound present on big toe, DP 2+ and PT 1+ signals on doppler, motor and sensory intact at foot, leg wrapped in ace from foot and groin; dressing c/d    LABS:                        9.3    14.94 )-----------( 152      ( 01 Sep 2023 23:28 )             27.3     09-01    130<L>  |  97  |  20  ----------------------------<  228<H>  4.0   |  22  |  0.91    Ca    7.5<L>      01 Sep 2023 23:28  Phos  4.3     09-01  Mg     1.8     09-01    TPro  4.7<L>  /  Alb  2.3<L>  /  TBili  0.7  /  DBili  x   /  AST  30  /  ALT  14  /  AlkPhos  65  09-01    PT/INR - ( 01 Sep 2023 23:28 )   PT: 11.4 sec;   INR: 1.04 ratio         PTT - ( 01 Sep 2023 23:28 )  PTT:24.4 sec  Urinalysis Basic - ( 01 Sep 2023 23:28 )    Color: x / Appearance: x / SG: x / pH: x  Gluc: 228 mg/dL / Ketone: x  / Bili: x / Urobili: x   Blood: x / Protein: x / Nitrite: x   Leuk Esterase: x / RBC: x / WBC x   Sq Epi: x / Non Sq Epi: x / Bacteria: x        RADIOLOGY & ADDITIONAL STUDIES:

## 2023-09-02 NOTE — PROGRESS NOTE ADULT - ASSESSMENT
ASSESSMENT:  82 yo F w/ PMHx of CAD (prox LM 40%, prox LAD 40%, distal LAD 50%, Cx mild, distal % ), pAF on Eliquis, severe AS pending TAVR, R internal carotid stenosis, PAD w/ chronic L 1st toe wound, HTN, HLD, hypothyroidism, and anxiety presents as a transfer from [facility] (8/25/23) for LLE angiogram and TAVR work-up after presenting for months of worsening LLE burning pain and concern for L foot gangrenew / maggots. CT angiograph showed L CFA and popliteal artery occlusions warranting external iliac-peroneal bypass for limb salvage. Vascular surgery performed L external iliac-profunda bypass w/ jump saphenous graft to peroneal artery (8/31, Dr. Laura Tavares) remarkable for 2L EBL, hypokalemia requiring repletion, phenylephrine, heparin infusion requiring protamine sulfate, and blood products (6U pRBC, 1U plt, 3 FFP). Admitted to SICU for pressor requirement in     PLAN:   Neurologic:   - Oxy, Tylenol PRN for pain    Respiratory:    - extubated 8/31   - room air   - OOB to chair, incentive spirometry to prevent atelectasis    Cardiovascular:   - Started metoprolol 12.5 BID for AS  - Continue home Amiodarone 100mg daily   - Ulices gtt titrated off  - POCUS shows IVC with respiratory variability, bolused 500 LR  - Started on ASA 81qd  - Last echo (4/2023): EF of 57%  - Lactate cleared    Gastrointestinal/Nutrition:    - Regular diet   - Bowel regimen with senna and miralax    Renal/Genitourinary:   - Trend BUN/Cr, monitor UOP  - IVL  - ellis in place    Hematologic:    - 6U pRBC, 1U plt, 3 FFP in OR  - Transfused 1 unit PRBC 9/1   - Heparin reversed w/ protamine sulfated   - Hold all AC until vascular re-assess surgical site 9/1   - Trend H/H    Infectious Disease:    - Continue vanc/zosyn   - s/p ancef in OR    Endocrine:   - Hyperglycemic, continue iSS  - Add Admelog 4 units premeal   - Synthroid for hypothyroid   - Lipitor for HLD     Lines/Tubes:   - Ellis   - PIVs    Disposition: SICU

## 2023-09-02 NOTE — PROGRESS NOTE ADULT - SUBJECTIVE AND OBJECTIVE BOX
24 HOUR EVENTS:  - Started metoprolol 12.5 BID for AS  - Ulices gtt titrated off  - POCUS shows IVC with respiratory variability, bolused 500 LR  - Started on ASA 81qd  - Bowel regimen with senna, Miralax   - Transfused 1 unit PRBC  - LE checks q4  - Arterial line removed       SUBJECTIVE/ROS:  [ X ] A ten-point review of systems was otherwise negative except as noted.  [ ] Due to altered mental status/intubation, subjective information were not able to be obtained from the patient. History was obtained, to the extent possible, from review of the chart and collateral sources of information.      NEURO  Exam: AOx3. NAD. Follows commands. Moves all extremities. Strength and sensation intact.   Meds: acetaminophen     Tablet .. 975 milliGRAM(s) Oral every 6 hours PRN Mild Pain (1 - 3)  oxyCODONE    IR 2.5 milliGRAM(s) Oral every 4 hours PRN Moderate Pain (4 - 6)  oxyCODONE    IR 5 milliGRAM(s) Oral every 4 hours PRN Severe Pain (7 - 10)    [x] Adequacy of sedation and pain control has been assessed and adjusted      RESPIRATORY  RR: 17 (09-02-23 @ 00:00) (13 - 49)  SpO2: 98% (09-02-23 @ 00:00) (79% - 100%)  Wt(kg): --  Exam: CTA b/l. No murmurs, rubs, gallops appreciated.   Mechanical Ventilation:   ABG - ( 01 Sep 2023 22:43 )  pH: 7.34  /  pCO2: 46    /  pO2: 23    / HCO3: 25    / Base Excess: -1.1  /  SaO2: 38.0    Lactate: x      [ ] Extubation Readiness Assessed  Meds:       CARDIOVASCULAR  HR: 91 (09-02-23 @ 00:00) (78 - 110)  BP: 117/56 (09-02-23 @ 00:00) (95/52 - 134/63)  BP(mean): 80 (09-02-23 @ 00:00) (68 - 90)  ABP: 197/190 (09-01-23 @ 23:00) (68/66 - 197/190)  ABP(mean): 193 (09-01-23 @ 23:00) (58 - 193)  Wt(kg): --  CVP(cm H2O): --  Exam: S1S2. No murmurs, rubs, gallops appreciated.  Cardiac Rhythm: NSR 84  Meds: aMIOdarone    Tablet 100 milliGRAM(s) Oral every 24 hours  metoprolol tartrate 12.5 milliGRAM(s) Oral every 12 hours        GI/NUTRITION  Exam: Soft, non-distended, non-tender.   Diet: Regular  Meds: polyethylene glycol 3350 17 Gram(s) Oral daily  senna 2 Tablet(s) Oral at bedtime      GENITOURINARY  I&O's Detail    08-31 @ 07:01 - 09-01 @ 07:00  --------------------------------------------------------  IN:    IV PiggyBack: 100 mL    IV PiggyBack: 475 mL    Lactated Ringers: 1300 mL    Oral Fluid: 200 mL    Phenylephrine: 69.2 mL    Propofol: 8.6 mL  Total IN: 2152.8 mL    OUT:    Bulb (mL): 25 mL    Indwelling Catheter - Urethral (mL): 605 mL  Total OUT: 630 mL    Total NET: 1522.8 mL      09-01 @ 07:01 - 09-02 @ 00:51  --------------------------------------------------------  IN:    IV PiggyBack: 575 mL    IV PiggyBack: 600 mL    Lactated Ringers: 200 mL    Oral Fluid: 50 mL    Phenylephrine: 73.8 mL    PRBCs (Packed Red Blood Cells): 1 mL  Total IN: 1499.8 mL    OUT:    Bulb (mL): 20 mL    Indwelling Catheter - Urethral (mL): 830 mL  Total OUT: 850 mL    Total NET: 649.8 mL          09-01    130<L>  |  97  |  20  ----------------------------<  228<H>  4.0   |  22  |  0.91    Ca    7.5<L>      01 Sep 2023 23:28  Phos  4.3     09-01  Mg     1.8     09-01    TPro  4.7<L>  /  Alb  2.3<L>  /  TBili  0.7  /  DBili  x   /  AST  30  /  ALT  14  /  AlkPhos  65  09-01    [ ] Alas catheter, indication: N/A  Meds:       HEMATOLOGIC  Meds: aspirin enteric coated 81 milliGRAM(s) Oral daily    [x] VTE Prophylaxis                        9.3    14.94 )-----------( 152      ( 01 Sep 2023 23:28 )             27.3     PT/INR - ( 01 Sep 2023 23:28 )   PT: 11.4 sec;   INR: 1.04 ratio         PTT - ( 01 Sep 2023 23:28 )  PTT:24.4 sec  Transfusion     [ ] PRBC   [ ] Platelets   [ ] FFP   [ ] Cryoprecipitate      INFECTIOUS DISEASES  T(C): 36.8 (09-01-23 @ 23:00), Max: 37.3 (09-01-23 @ 15:00)  Wt(kg): --  WBC Count: 14.94 K/uL (09-01 @ 23:28)  WBC Count: 16.09 K/uL (09-01 @ 17:46)    Recent Cultures:    Meds: piperacillin/tazobactam IVPB.. 3.375 Gram(s) IV Intermittent every 8 hours  vancomycin  IVPB 1000 milliGRAM(s) IV Intermittent every 12 hours        ENDOCRINE  Capillary Blood Glucose    Meds: atorvastatin 20 milliGRAM(s) Oral at bedtime  insulin lispro (ADMELOG) corrective regimen sliding scale   SubCutaneous at bedtime  insulin lispro (ADMELOG) corrective regimen sliding scale   SubCutaneous three times a day before meals  levothyroxine 50 MICROGram(s) Oral daily        ACCESS DEVICES:  [ X ] Peripheral IV  [ ] Central Venous Line	[ ] R	[ ] L	[ ] IJ	[ ] Fem	[ ] SC	Placed:   [ ] Arterial Line		[ ] R	[ ] L	[ ] Fem	[ ] Rad	[ ] Ax	Placed:   [ ] PICC:					[ ] Mediport  [ X ] Urinary Catheter, Date Placed: 8/31  [ ] Necessity of urinary, arterial, and venous catheters discussed    OTHER MEDICATIONS:  chlorhexidine 2% Cloths 1 Application(s) Topical daily      CODE STATUS: Full     IMAGING:  none

## 2023-09-03 DIAGNOSIS — I48.20 CHRONIC ATRIAL FIBRILLATION, UNSPECIFIED: ICD-10-CM

## 2023-09-03 DIAGNOSIS — Z01.818 ENCOUNTER FOR OTHER PREPROCEDURAL EXAMINATION: ICD-10-CM

## 2023-09-03 DIAGNOSIS — I25.10 ATHEROSCLEROTIC HEART DISEASE OF NATIVE CORONARY ARTERY WITHOUT ANGINA PECTORIS: ICD-10-CM

## 2023-09-03 LAB
ANION GAP SERPL CALC-SCNC: 10 MMOL/L — SIGNIFICANT CHANGE UP (ref 5–17)
APTT BLD: 25.9 SEC — SIGNIFICANT CHANGE UP (ref 24.5–35.6)
BUN SERPL-MCNC: 21 MG/DL — SIGNIFICANT CHANGE UP (ref 7–23)
CALCIUM SERPL-MCNC: 7.8 MG/DL — LOW (ref 8.4–10.5)
CHLORIDE SERPL-SCNC: 98 MMOL/L — SIGNIFICANT CHANGE UP (ref 96–108)
CO2 SERPL-SCNC: 23 MMOL/L — SIGNIFICANT CHANGE UP (ref 22–31)
CREAT SERPL-MCNC: 0.8 MG/DL — SIGNIFICANT CHANGE UP (ref 0.5–1.3)
EGFR: 74 ML/MIN/1.73M2 — SIGNIFICANT CHANGE UP
GLUCOSE SERPL-MCNC: 139 MG/DL — HIGH (ref 70–99)
HCT VFR BLD CALC: 26.3 % — LOW (ref 34.5–45)
HCT VFR BLD CALC: 26.7 % — LOW (ref 34.5–45)
HGB BLD-MCNC: 8.9 G/DL — LOW (ref 11.5–15.5)
HGB BLD-MCNC: 9 G/DL — LOW (ref 11.5–15.5)
MAGNESIUM SERPL-MCNC: 2.2 MG/DL — SIGNIFICANT CHANGE UP (ref 1.6–2.6)
MCHC RBC-ENTMCNC: 31.1 PG — SIGNIFICANT CHANGE UP (ref 27–34)
MCHC RBC-ENTMCNC: 31.3 PG — SIGNIFICANT CHANGE UP (ref 27–34)
MCHC RBC-ENTMCNC: 33.3 GM/DL — SIGNIFICANT CHANGE UP (ref 32–36)
MCHC RBC-ENTMCNC: 34.2 GM/DL — SIGNIFICANT CHANGE UP (ref 32–36)
MCV RBC AUTO: 91.3 FL — SIGNIFICANT CHANGE UP (ref 80–100)
MCV RBC AUTO: 93.4 FL — SIGNIFICANT CHANGE UP (ref 80–100)
NRBC # BLD: 0 /100 WBCS — SIGNIFICANT CHANGE UP (ref 0–0)
NRBC # BLD: 0 /100 WBCS — SIGNIFICANT CHANGE UP (ref 0–0)
PHOSPHATE SERPL-MCNC: 2.1 MG/DL — LOW (ref 2.5–4.5)
PLATELET # BLD AUTO: 182 K/UL — SIGNIFICANT CHANGE UP (ref 150–400)
PLATELET # BLD AUTO: 224 K/UL — SIGNIFICANT CHANGE UP (ref 150–400)
POTASSIUM SERPL-MCNC: 3.8 MMOL/L — SIGNIFICANT CHANGE UP (ref 3.5–5.3)
POTASSIUM SERPL-SCNC: 3.8 MMOL/L — SIGNIFICANT CHANGE UP (ref 3.5–5.3)
RBC # BLD: 2.86 M/UL — LOW (ref 3.8–5.2)
RBC # BLD: 2.88 M/UL — LOW (ref 3.8–5.2)
RBC # FLD: 15.5 % — HIGH (ref 10.3–14.5)
RBC # FLD: 15.7 % — HIGH (ref 10.3–14.5)
SODIUM SERPL-SCNC: 131 MMOL/L — LOW (ref 135–145)
WBC # BLD: 13.65 K/UL — HIGH (ref 3.8–10.5)
WBC # BLD: 14.81 K/UL — HIGH (ref 3.8–10.5)
WBC # FLD AUTO: 13.65 K/UL — HIGH (ref 3.8–10.5)
WBC # FLD AUTO: 14.81 K/UL — HIGH (ref 3.8–10.5)

## 2023-09-03 PROCEDURE — 99223 1ST HOSP IP/OBS HIGH 75: CPT

## 2023-09-03 RX ORDER — HEPARIN SODIUM 5000 [USP'U]/ML
1100 INJECTION INTRAVENOUS; SUBCUTANEOUS
Qty: 25000 | Refills: 0 | Status: DISCONTINUED | OUTPATIENT
Start: 2023-09-03 | End: 2023-09-04

## 2023-09-03 RX ORDER — SODIUM,POTASSIUM PHOSPHATES 278-250MG
1 POWDER IN PACKET (EA) ORAL ONCE
Refills: 0 | Status: COMPLETED | OUTPATIENT
Start: 2023-09-03 | End: 2023-09-03

## 2023-09-03 RX ORDER — POTASSIUM PHOSPHATE, MONOBASIC POTASSIUM PHOSPHATE, DIBASIC 236; 224 MG/ML; MG/ML
30 INJECTION, SOLUTION INTRAVENOUS ONCE
Refills: 0 | Status: COMPLETED | OUTPATIENT
Start: 2023-09-03 | End: 2023-09-03

## 2023-09-03 RX ORDER — PANTOPRAZOLE SODIUM 20 MG/1
40 TABLET, DELAYED RELEASE ORAL ONCE
Refills: 0 | Status: COMPLETED | OUTPATIENT
Start: 2023-09-03 | End: 2023-09-03

## 2023-09-03 RX ORDER — HEPARIN SODIUM 5000 [USP'U]/ML
1100 INJECTION INTRAVENOUS; SUBCUTANEOUS
Qty: 25000 | Refills: 0 | Status: DISCONTINUED | OUTPATIENT
Start: 2023-09-03 | End: 2023-09-03

## 2023-09-03 RX ADMIN — Medication 2: at 14:06

## 2023-09-03 RX ADMIN — Medication 4 UNIT(S): at 18:22

## 2023-09-03 RX ADMIN — Medication 50 MICROGRAM(S): at 06:05

## 2023-09-03 RX ADMIN — Medication 4 UNIT(S): at 14:05

## 2023-09-03 RX ADMIN — HEPARIN SODIUM 13 UNIT(S)/HR: 5000 INJECTION INTRAVENOUS; SUBCUTANEOUS at 21:09

## 2023-09-03 RX ADMIN — Medication 25 MILLIGRAM(S): at 06:06

## 2023-09-03 RX ADMIN — PANTOPRAZOLE SODIUM 40 MILLIGRAM(S): 20 TABLET, DELAYED RELEASE ORAL at 22:40

## 2023-09-03 RX ADMIN — Medication 2: at 09:50

## 2023-09-03 RX ADMIN — SENNA PLUS 2 TABLET(S): 8.6 TABLET ORAL at 21:49

## 2023-09-03 RX ADMIN — AMIODARONE HYDROCHLORIDE 100 MILLIGRAM(S): 400 TABLET ORAL at 06:05

## 2023-09-03 RX ADMIN — Medication 4 UNIT(S): at 09:50

## 2023-09-03 RX ADMIN — PIPERACILLIN AND TAZOBACTAM 25 GRAM(S): 4; .5 INJECTION, POWDER, LYOPHILIZED, FOR SOLUTION INTRAVENOUS at 06:04

## 2023-09-03 RX ADMIN — Medication 1 PACKET(S): at 17:27

## 2023-09-03 RX ADMIN — Medication 81 MILLIGRAM(S): at 13:42

## 2023-09-03 RX ADMIN — HEPARIN SODIUM 11 UNIT(S)/HR: 5000 INJECTION INTRAVENOUS; SUBCUTANEOUS at 13:42

## 2023-09-03 NOTE — PROGRESS NOTE ADULT - ASSESSMENT
83y Female s/p L iliofemoral bypass graft with vein on 8/31/23 complicated by EBL 2L blood loss s/p 6U pRBC, 1U plt, 3U FFP and requiring pressors, admitted to SICU for hemodynamic monitoring. Now off nataly ggt. S/p 1U pRBC on 9/1/23. Transferred to floor. Recovering appropriately.     - Podiatry plan: L foot partial hallux resection next week  - VT prophylaxis w/ SCD R leg  - Monitor I&O's  - Cont RICHELLE drain  - Analgesia and antiemetics as needed  - Recommend holding AC; plan for procedure with podiatry next week. DVT proph with SQH  - Regular Diet      Vascular surgery  [id number]

## 2023-09-03 NOTE — CONSULT NOTE ADULT - ASSESSMENT
84 y/o F w PMHx of medically managed CAD (prox LM 40%, prox LAD 40%, distal LAD 50%, Cx mild, distal % ), paroxysmal AF on Eliquis (last dose 8/22/2023 PM), severe AS (EFRAIN 0.69 sqcm) pending TAVR (transcatheter aortic valve replacement), right internal carotid stenosis, PAD with chronic left first toe wound, HTN, HLD, hypothyroidism, and anxiety presents for elective left lower extremity angiogram. Pt now presenting for left lower extremity angiogram, transfer from [facility] for TAVR work up. S/p LLE bypass. During course of stay pt required pressors for BP support and intubation. Pt was also on abx and got ancef in OR. Pt to have left foot partial hallux resection.

## 2023-09-03 NOTE — CONSULT NOTE ADULT - PROBLEM SELECTOR RECOMMENDATION 3
Rate control with amio  Restart anticoagulation per cardiology recommendations, Eliquis stopped for vascular intervention     Thank you for the courtesy of this consult.

## 2023-09-03 NOTE — PROGRESS NOTE ADULT - SUBJECTIVE AND OBJECTIVE BOX
VASCULAR SURGERY DAILY PROGRESS NOTE:     SUBJECTIVE/ROS:     Overnight: no acute events   Patient seen and evaluated on AM rounds. Patient resting comfortably in bed.        OBJECTIVE:  Vital Signs Last 24 Hrs  T(C): 36.7 (03 Sep 2023 05:09), Max: 37.1 (02 Sep 2023 12:00)  T(F): 98 (03 Sep 2023 05:09), Max: 98.8 (02 Sep 2023 12:00)  HR: 80 (03 Sep 2023 05:09) (75 - 92)  BP: 120/66 (03 Sep 2023 05:09) (101/62 - 120/66)  BP(mean): 82 (02 Sep 2023 16:00) (71 - 83)  RR: 18 (03 Sep 2023 05:09) (15 - 30)  SpO2: 99% (03 Sep 2023 05:09) (95% - 99%)    Parameters below as of 03 Sep 2023 05:09  Patient On (Oxygen Delivery Method): room air      I&O's Detail    02 Sep 2023 07:01  -  03 Sep 2023 07:00  --------------------------------------------------------  IN:    IV PiggyBack: 275 mL    Oral Fluid: 600 mL  Total IN: 875 mL    OUT:    Bulb (mL): 90 mL    Indwelling Catheter - Urethral (mL): 180 mL    Voided (mL): 1251 mL  Total OUT: 1521 mL    Total NET: -646 mL        Daily     Daily   MEDICATIONS  (STANDING):  aMIOdarone    Tablet 100 milliGRAM(s) Oral every 24 hours  aspirin enteric coated 81 milliGRAM(s) Oral daily  atorvastatin 20 milliGRAM(s) Oral at bedtime  enoxaparin Injectable 40 milliGRAM(s) SubCutaneous every 24 hours  insulin lispro (ADMELOG) corrective regimen sliding scale   SubCutaneous at bedtime  insulin lispro (ADMELOG) corrective regimen sliding scale   SubCutaneous three times a day before meals  insulin lispro Injectable (ADMELOG) 4 Unit(s) SubCutaneous three times a day before meals  levothyroxine 50 MICROGram(s) Oral daily  metoprolol tartrate 25 milliGRAM(s) Oral every 12 hours  polyethylene glycol 3350 17 Gram(s) Oral daily  senna 2 Tablet(s) Oral at bedtime    MEDICATIONS  (PRN):  acetaminophen     Tablet .. 975 milliGRAM(s) Oral every 6 hours PRN Mild Pain (1 - 3)  oxyCODONE    IR 5 milliGRAM(s) Oral every 4 hours PRN Severe Pain (7 - 10)  oxyCODONE    IR 2.5 milliGRAM(s) Oral every 4 hours PRN Moderate Pain (4 - 6)      Labs:                        9.0    13.65 )-----------( 182      ( 03 Sep 2023 06:46 )             26.3     09-03    131<L>  |  98  |  21  ----------------------------<  139<H>  3.8   |  23  |  0.80    Ca    7.8<L>      03 Sep 2023 06:46  Phos  2.1     09-03  Mg     2.2     09-03    TPro  4.7<L>  /  Alb  2.3<L>  /  TBili  0.7  /  DBili  x   /  AST  30  /  ALT  14  /  AlkPhos  65  09-01    PT/INR - ( 01 Sep 2023 23:28 )   PT: 11.4 sec;   INR: 1.04 ratio         PTT - ( 01 Sep 2023 23:28 )  PTT:24.4 sec  Urinalysis Basic - ( 03 Sep 2023 06:46 )    Color: x / Appearance: x / SG: x / pH: x  Gluc: 139 mg/dL / Ketone: x  / Bili: x / Urobili: x   Blood: x / Protein: x / Nitrite: x   Leuk Esterase: x / RBC: x / WBC x   Sq Epi: x / Non Sq Epi: x / Bacteria: x              Physical Exam:  Gen: NAD, A&Ox3  Pulm: even nonlabored breathing   Drains: RICHELLE x 1, minimal sanguinous output  Groin: soft, no evidence of hematoma, dressings c/d  Extremities:  LLE: wound present on big toe, DP 2+ and PT 1+ signals on doppler, motor and sensory intact at foot, leg wrapped in ace from foot and groin; dressing c/d/i

## 2023-09-03 NOTE — CONSULT NOTE ADULT - TIME BILLING
- Ordering, reviewing, and interpreting labs, testing  - Independently obtaining a review of systems and performing a physical exam  - Reviewing consultant documentation/recommendations in addition to discussing plan of care with consultants.

## 2023-09-03 NOTE — CONSULT NOTE ADULT - TIME-BASED BILLING (NON-CRITICAL CARE)
PostPartum Note    Gypsy Bloch  [id number]  1987  36 y.o.    S:  Ms. Gypsy Bloch is a 36 y.o.  PPD #1 s/p  @ 39w3d. Doing well. She had a baby girl. Her lochia is like a period. She describes her pain as mild and is well controlled with PO medications. She is breast v feeding and this is going well. She is ambulating and voiding. Tolerating PO intake. O:   Visit Vitals  /75   Pulse 79   Temp 98.6 °F (37 °C)   Resp 16   Ht 5' 6\" (1.676 m)   Wt 106.6 kg (235 lb)   LMP 2021   SpO2 96%   Breastfeeding Unknown   BMI 37.93 kg/m²       Gen - No acute distress  Respirations - nonlabored   Abdomen - Fundus firm below the umbilicus   Ext - Warm, well perfused, nontender     Lab Results   Component Value Date/Time    WBC 11.5 (H) 2022 06:32 PM    HGB 11.4 (L) 2022 06:32 PM    HCT 33.3 (L) 2022 06:32 PM    PLATELET 384  06:32 PM    MCV 92.0 2022 06:32 PM         A/P:  PPD #1 s/p  @ 39w3d doing well. Stable epilepsy on keppra  Anxiety stable   1. Routine PP instructions/ care discussed  2. Blood type - Rh pos  3. Rubella immune  4. Discharge tomorrow  6.   F/U 4-6 weeks for PP check and likely 1-2w mood check     Raul Abad MD  [facility] Time-based billing (NON-critical care)

## 2023-09-03 NOTE — CONSULT NOTE ADULT - SUBJECTIVE AND OBJECTIVE BOX
Chief Complaint: Presenting to hospital for LLE angiogram and TAVR workup.      HPI:  84 y/o female with a PMHx of medically managed CAD (prox LM 40%, prox LAD 40%, distal LAD 50%, Cx mild, distal % ), paroxysmal AF on Eliquis (last dose 8/22/2023 PM), severe AS (EFRAIN 0.69 sqcm) pending TAVR (transcatheter aortic valve replacement), right internal carotid stenosis, PAD with chronic left first toe wound, HTN, HLD, hypothyroidism, and anxiety presents for elective left lower extremity angiogram. Pt has had a wound in her left first toe for the past several months. Pt admits to intermittent "burning" pain in her toe and her left leg from her calf to her foot. Pt elicits that her pain is made worse with activity and improved with rest. Pt takes Tylenol as needed for the pain which provides mild to moderate relief. Pt was recently hospitalized at [facility]. X-ray of the left foot was negative for acute osteomyelitis. MONTY/PVR revealed moderate arterial flow limitation in the right lower extremity localizing to the femoropopliteal distribution with PVR waveforms suggesting flow-limiting disease in the iliofemoral distribution and small vessel disease in the left foot. Pt denies fever, chills, recent travel, headache, dizziness, visual deficits, chest pain, shortness of breath, orthopnea, palpitations, abdominal pain, N/V/D/C, hematochezia, melena, dysuria, hematuria, LOC, syncope. Pt now presented for left lower extremity angiogram, transfer from [facility] for TAVR work up. During course of stay pt required pressors for BP support and intubation. Pt was also on abx and got ancef in OR. Pt to have left foot partial hallux resection.    Vascular: Dr. Laura Tavares  CTS: Dr. Nghia Serna (25 Aug 2023 07:40)      PAST MEDICAL & SURGICAL HISTORY:  HTN (hypertension)  HLD (hyperlipidemia)  Anxiety  CAD (coronary artery disease)  PAD (peripheral artery disease)  Hypothyroidism  AF (atrial fibrillation)  Carotid artery stenosis  AS (aortic stenosis)  Status post closed fracture of right femur    Review of Systems:   CONSTITUTIONAL: No fever.  EYES: No eye pain or discharge.  ENMT:  No sinus or throat pain  NECK: No pain or stiffness  RESPIRATORY: No cough, wheezing, chills o No shortness of breath  CARDIOVASCULAR: No chest pain, palpitations, dizziness, or leg swelling  GASTROINTESTINAL: No abdominal or epigastric pain. No nausea, vomiting, or hematemesis; No diarrhea or constipation.   GENITOURINARY: No dysuria or incontinence  NEUROLOGICAL: No headaches, memory loss, loss of strength, numbness, or tremors  SKIN: No rashes.  ENDOCRINE: No heat or cold intolerance  MUSCULOSKELETAL: No joint pain or swelling  ALLERY AND IMMUNOLOGIC: No hives or eczema    Allergies    sulfa drugs (Unknown)  latex (Unknown)  Intolerances        Social History:   FAMILY HISTORY:  Family history of myocardial infarction (Father, Sibling)      Home Medications:  amiodarone 200 mg oral tablet: 0.5 tab(s) orally once a day (24 Aug 2023 09:11)  aspirin 81 mg oral delayed release tablet: 1 tab(s) orally once a day (at bedtime) (24 Aug 2023 09:11)  Eliquis 2.5 mg oral tablet: 1 tab(s) orally 2 times a day (24 Aug 2023 09:11)  levothyroxine 50 mcg (0.05 mg) oral tablet: 1 tab(s) orally once a day (24 Aug 2023 13:39)  metoprolol succinate 50 mg oral tablet, extended release: 1 tab(s) orally once a day (24 Aug 2023 09:11)  rosuvastatin 5 mg oral tablet: 1 tab(s) orally once a day (at bedtime) (24 Aug 2023 09:11)  telmisartan 40 mg oral tablet: 1 tab(s) orally once a day (24 Aug 2023 09:11)      MEDICATIONS  (STANDING):  aMIOdarone    Tablet 100 milliGRAM(s) Oral every 24 hours  aspirin enteric coated 81 milliGRAM(s) Oral daily  atorvastatin 20 milliGRAM(s) Oral at bedtime  heparin  Infusion 1100 Unit(s)/Hr (11 mL/Hr) IV Continuous <Continuous>  insulin lispro (ADMELOG) corrective regimen sliding scale   SubCutaneous at bedtime  insulin lispro (ADMELOG) corrective regimen sliding scale   SubCutaneous three times a day before meals  insulin lispro Injectable (ADMELOG) 4 Unit(s) SubCutaneous three times a day before meals  levothyroxine 50 MICROGram(s) Oral daily  metoprolol tartrate 25 milliGRAM(s) Oral every 12 hours  polyethylene glycol 3350 17 Gram(s) Oral daily  senna 2 Tablet(s) Oral at bedtime    MEDICATIONS  (PRN):  acetaminophen     Tablet .. 975 milliGRAM(s) Oral every 6 hours PRN Mild Pain (1 - 3)  oxyCODONE    IR 2.5 milliGRAM(s) Oral every 4 hours PRN Moderate Pain (4 - 6)  oxyCODONE    IR 5 milliGRAM(s) Oral every 4 hours PRN Severe Pain (7 - 10)      CAPILLARY BLOOD GLUCOSE      POCT Blood Glucose.: 153 mg/dL (03 Sep 2023 09:32)  POCT Blood Glucose.: 164 mg/dL (02 Sep 2023 21:52)  POCT Blood Glucose.: 105 mg/dL (02 Sep 2023 16:39)    I&O's Summary    02 Sep 2023 07:01  -  03 Sep 2023 07:00  --------------------------------------------------------  IN: 1205 mL / OUT: 2321 mL / NET: -1116 mL    03 Sep 2023 07:01  -  03 Sep 2023 12:49  --------------------------------------------------------  IN: 150 mL / OUT: 20 mL / NET: 130 mL        PHYSICAL EXAM:  Vital Signs Last 24 Hrs  T(C): 36.6 (03 Sep 2023 09:02), Max: 36.9 (02 Sep 2023 21:08)  T(F): 97.9 (03 Sep 2023 09:02), Max: 98.4 (02 Sep 2023 21:08)  HR: 82 (03 Sep 2023 09:02) (75 - 87)  BP: 118/72 (03 Sep 2023 09:02) (101/62 - 120/66)  BP(mean): 82 (02 Sep 2023 16:00) (82 - 82)  RR: 18 (03 Sep 2023 09:02) (15 - 18)  SpO2: 95% (03 Sep 2023 09:02) (95% - 99%)    Parameters below as of 03 Sep 2023 09:02  Patient On (Oxygen Delivery Method): room air      GENERAL: NAD, well-developed  HEAD:  Atraumatic, Normocephalic  EYES: EOMI, PERRLA, conjunctiva and sclera clear  NECK: Supple, No JVD  CHEST/LUNG: Clear to auscultation bilaterally; No wheeze  HEART: Regular rate and rhythm; No murmurs, rubs, or gallops  ABDOMEN: Soft, Nontender, Nondistended; Bowel sounds present  EXTREMITIES:  2+ Peripheral Pulses, No clubbing, cyanosis, or edema  PSYCH: AAOx3  NEUROLOGY: non-focal  SKIN: No rashes or lesions    LABS:                        9.0    13.65 )-----------( 182      ( 03 Sep 2023 06:46 )             26.3     09-03    131<L>  |  98  |  21  ----------------------------<  139<H>  3.8   |  23  |  0.80    Ca    7.8<L>      03 Sep 2023 06:46  Phos  2.1     09-03  Mg     2.2     09-03    TPro  4.7<L>  /  Alb  2.3<L>  /  TBili  0.7  /  DBili  x   /  AST  30  /  ALT  14  /  AlkPhos  65  09-01    PT/INR - ( 01 Sep 2023 23:28 )   PT: 11.4 sec;   INR: 1.04 ratio         PTT - ( 01 Sep 2023 23:28 )  PTT:24.4 sec      Urinalysis Basic - ( 03 Sep 2023 06:46 )

## 2023-09-03 NOTE — PROGRESS NOTE ADULT - SUBJECTIVE AND OBJECTIVE BOX
Patient is a 83y old  Female who presents with a chief complaint of "82 yo F w/ PMHx of CAD (prox LM 40%, prox LAD 40%, distal LAD 50%, Cx mild, distal % ), pAF on Eliquis, severe AS pending TAVR, R internal carotid stenosis, PAD w/ chronic L 1st toe wound, HTN, HLD, hypothyroidism, and anxiety presents as a transfer from [facility] (8/25/23) for LLE angiogram and TAVR work-up after presenting for months of worsening LLE burning pain and concern for L foot gangrenew / maggots. CT angiograph showed L CFA and popliteal artery occlusions warranting external iliac-peroneal bypass for limb salvage. Vascular surgery performed L external iliac-profunda bypass w/ jump saphenous graft to peroneal artery (8/31, Dr. Laura Tavares) remarkable for 2L EBL, hypokalemia requiring repletion, phenylephrine, heparin infusion requiring protamine sulfate, and blood products (6U pRBC, 1U plt, 3 FFP). Admitted to SICU for pressor requirement."     (01 Sep 2023 14:22)       INTERVAL HPI/OVERNIGHT EVENTS:  Patient seen and evaluated at bedside.  Pt is resting comfortable in NAD. Denies N/V/F/C.    Allergies    sulfa drugs (Unknown)  latex (Unknown)    Intolerances        Vital Signs Last 24 Hrs  T(C): 36.6 (03 Sep 2023 09:02), Max: 37.1 (02 Sep 2023 12:00)  T(F): 97.9 (03 Sep 2023 09:02), Max: 98.8 (02 Sep 2023 12:00)  HR: 82 (03 Sep 2023 09:02) (75 - 92)  BP: 118/72 (03 Sep 2023 09:02) (101/62 - 120/66)  BP(mean): 82 (02 Sep 2023 16:00) (71 - 82)  RR: 18 (03 Sep 2023 09:02) (15 - 30)  SpO2: 95% (03 Sep 2023 09:02) (95% - 99%)    Parameters below as of 03 Sep 2023 09:02  Patient On (Oxygen Delivery Method): room air        LABS:                        9.0    13.65 )-----------( 182      ( 03 Sep 2023 06:46 )             26.3     09-03    131<L>  |  98  |  21  ----------------------------<  139<H>  3.8   |  23  |  0.80    Ca    7.8<L>      03 Sep 2023 06:46  Phos  2.1     09-03  Mg     2.2     09-03    TPro  4.7<L>  /  Alb  2.3<L>  /  TBili  0.7  /  DBili  x   /  AST  30  /  ALT  14  /  AlkPhos  65  09-01    PT/INR - ( 01 Sep 2023 23:28 )   PT: 11.4 sec;   INR: 1.04 ratio         PTT - ( 01 Sep 2023 23:28 )  PTT:24.4 sec  Urinalysis Basic - ( 03 Sep 2023 06:46 )    Color: x / Appearance: x / SG: x / pH: x  Gluc: 139 mg/dL / Ketone: x  / Bili: x / Urobili: x   Blood: x / Protein: x / Nitrite: x   Leuk Esterase: x / RBC: x / WBC x   Sq Epi: x / Non Sq Epi: x / Bacteria: x      CAPILLARY BLOOD GLUCOSE      POCT Blood Glucose.: 153 mg/dL (03 Sep 2023 09:32)  POCT Blood Glucose.: 164 mg/dL (02 Sep 2023 21:52)  POCT Blood Glucose.: 105 mg/dL (02 Sep 2023 16:39)  POCT Blood Glucose.: 162 mg/dL (02 Sep 2023 11:57)      Lower Extremity Physical Exam:  Vascular: DP/PT 0/4, B/L, CFT <3 seconds B/L, Temperature gradient warm to cool, B/L.   Neuro: Epicritic sensation intact to the level of digits, B/L.  Musculoskeletal/Ortho: unremarkable  Skin: Left foot dorsal hallux wound to subq with medial adhered eschar, erythema resolving, lateral 5th MTPJ wound to subQ, medial 1st MTPJ wound to subq, no drainage, no pus, no tracking, no tunneling. Right foot no open wounds, no acute signs of infection.    RADIOLOGY & ADDITIONAL TESTS:

## 2023-09-03 NOTE — PROGRESS NOTE ADULT - ASSESSMENT
83F presents with left foot wounds.  - Patient seen and evaluated.  - Afebrile, WBC 13.65.  - Left foot dorsal hallux wound to subq with medial adhered eschar, erythema resolving, lateral 5th MTPJ wound to subQ, medial 1st MTPJ wound to subq, no drainage, no pus, no tracking, no tunneling.  - Left Foot X-Rays: No gas, no OM.  - 8/31 s/p left ileofemoral bypass, appreciated.  - Pod Plan: Left foot partial hallux resection this week.  - Please document medical and cardiac clearance for podiatry surgery.  - Discussed with attending.

## 2023-09-03 NOTE — CONSULT NOTE ADULT - PROBLEM SELECTOR RECOMMENDATION 9
Leukocytosis possibly reactive, trending down. ID following pt. Would continue to monitor off abx at this time.   Hgb 9, transfuse for <7 or relative drop   Maintain -180 while hospitalized   Replace Phosphorus, Potassium Phosphate ordered 30 millimol x1   Corrected Ca normal   Hyponatremia appears to be chronic- would continue to trend and monitor for acute drop  Pt is medically optimized for intermediate risk surgery   RCRI is 1 but given extensive cardiac history and severe valvular disease, pt needs cardiac clearance Leukocytosis possibly reactive, trending down. ID following pt. Would continue to monitor off abx at this time.   Hgb 9, transfuse for <7 or relative drop, slowly trending down since admission    Maintain -180 while hospitalized   Replace Phosphorus, Potassium Phosphate ordered 30 millimol x1   Corrected Ca normal   Hyponatremia appears to be chronic- would continue to trend and monitor for acute drop  Pt is medically optimized for intermediate risk surgery   RCRI is 1 but given extensive cardiac history and severe valvular disease, pt needs cardiac clearance Leukocytosis possibly reactive, trending down. ID following pt. Would continue to monitor off abx at this time.   Hgb 9, transfuse for <7 or relative drop, slowly trending down since admission    Maintain -180 while hospitalized   Replace Phosphorus, Potassium Phosphate ordered 30 millimol x1   Corrected Ca normal   Hyponatremia appears to be chronic- would continue to trend and monitor for acute drop  Pt is medically optimized for intermediate risk surgery. Would monitor for respiratory changes and keep normotensive given pressor requirement post vascular procedure   RCRI is 1 but given extensive cardiac history and severe valvular disease, pt needs cardiac clearance Leukocytosis possibly reactive, trending down. Afebrile. ID following pt. Would continue to monitor off abx at this time.   Hgb 9, transfuse for <7 or relative drop, slowly trending down since admission    Maintain -180 while hospitalized   Replace Phosphorus, Potassium Phosphate ordered 30 millimol x1   Corrected Ca normal   Hyponatremia appears to be chronic- would continue to trend and monitor for acute drop  Pt is medically optimized for intermediate risk surgery. Would monitor for respiratory changes and keep normotensive given pressor requirement post vascular procedure   RCRI is 1 but given extensive cardiac history and severe valvular disease, pt needs cardiac clearance

## 2023-09-04 LAB
A1C WITH ESTIMATED AVERAGE GLUCOSE RESULT: 5.6 % — SIGNIFICANT CHANGE UP (ref 4–5.6)
ANION GAP SERPL CALC-SCNC: 9 MMOL/L — SIGNIFICANT CHANGE UP (ref 5–17)
APTT BLD: 124.3 SEC — CRITICAL HIGH (ref 24.5–35.6)
APTT BLD: 26.7 SEC — SIGNIFICANT CHANGE UP (ref 24.5–35.6)
APTT BLD: 30.6 SEC — SIGNIFICANT CHANGE UP (ref 24.5–35.6)
BUN SERPL-MCNC: 23 MG/DL — SIGNIFICANT CHANGE UP (ref 7–23)
CALCIUM SERPL-MCNC: 8 MG/DL — LOW (ref 8.4–10.5)
CHLORIDE SERPL-SCNC: 99 MMOL/L — SIGNIFICANT CHANGE UP (ref 96–108)
CO2 SERPL-SCNC: 24 MMOL/L — SIGNIFICANT CHANGE UP (ref 22–31)
CREAT SERPL-MCNC: 0.82 MG/DL — SIGNIFICANT CHANGE UP (ref 0.5–1.3)
EGFR: 71 ML/MIN/1.73M2 — SIGNIFICANT CHANGE UP
ESTIMATED AVERAGE GLUCOSE: 114 MG/DL — SIGNIFICANT CHANGE UP (ref 68–114)
GLUCOSE SERPL-MCNC: 129 MG/DL — HIGH (ref 70–99)
HCT VFR BLD CALC: 25.4 % — LOW (ref 34.5–45)
HCT VFR BLD CALC: 27.1 % — LOW (ref 34.5–45)
HGB BLD-MCNC: 8.5 G/DL — LOW (ref 11.5–15.5)
HGB BLD-MCNC: 8.8 G/DL — LOW (ref 11.5–15.5)
MAGNESIUM SERPL-MCNC: 2 MG/DL — SIGNIFICANT CHANGE UP (ref 1.6–2.6)
MCHC RBC-ENTMCNC: 30.8 PG — SIGNIFICANT CHANGE UP (ref 27–34)
MCHC RBC-ENTMCNC: 30.8 PG — SIGNIFICANT CHANGE UP (ref 27–34)
MCHC RBC-ENTMCNC: 32.5 GM/DL — SIGNIFICANT CHANGE UP (ref 32–36)
MCHC RBC-ENTMCNC: 33.5 GM/DL — SIGNIFICANT CHANGE UP (ref 32–36)
MCV RBC AUTO: 92 FL — SIGNIFICANT CHANGE UP (ref 80–100)
MCV RBC AUTO: 94.8 FL — SIGNIFICANT CHANGE UP (ref 80–100)
NRBC # BLD: 0 /100 WBCS — SIGNIFICANT CHANGE UP (ref 0–0)
NRBC # BLD: 0 /100 WBCS — SIGNIFICANT CHANGE UP (ref 0–0)
PHOSPHATE SERPL-MCNC: 2.7 MG/DL — SIGNIFICANT CHANGE UP (ref 2.5–4.5)
PLATELET # BLD AUTO: 217 K/UL — SIGNIFICANT CHANGE UP (ref 150–400)
PLATELET # BLD AUTO: 257 K/UL — SIGNIFICANT CHANGE UP (ref 150–400)
POTASSIUM SERPL-MCNC: 3.9 MMOL/L — SIGNIFICANT CHANGE UP (ref 3.5–5.3)
POTASSIUM SERPL-SCNC: 3.9 MMOL/L — SIGNIFICANT CHANGE UP (ref 3.5–5.3)
RBC # BLD: 2.76 M/UL — LOW (ref 3.8–5.2)
RBC # BLD: 2.86 M/UL — LOW (ref 3.8–5.2)
RBC # FLD: 15.9 % — HIGH (ref 10.3–14.5)
RBC # FLD: 15.9 % — HIGH (ref 10.3–14.5)
SODIUM SERPL-SCNC: 132 MMOL/L — LOW (ref 135–145)
WBC # BLD: 11.98 K/UL — HIGH (ref 3.8–10.5)
WBC # BLD: 12.85 K/UL — HIGH (ref 3.8–10.5)
WBC # FLD AUTO: 11.98 K/UL — HIGH (ref 3.8–10.5)
WBC # FLD AUTO: 12.85 K/UL — HIGH (ref 3.8–10.5)

## 2023-09-04 RX ORDER — HEPARIN SODIUM 5000 [USP'U]/ML
1700 INJECTION INTRAVENOUS; SUBCUTANEOUS
Qty: 25000 | Refills: 0 | Status: DISCONTINUED | OUTPATIENT
Start: 2023-09-04 | End: 2023-09-05

## 2023-09-04 RX ADMIN — SENNA PLUS 2 TABLET(S): 8.6 TABLET ORAL at 21:59

## 2023-09-04 RX ADMIN — HEPARIN SODIUM 16 UNIT(S)/HR: 5000 INJECTION INTRAVENOUS; SUBCUTANEOUS at 19:33

## 2023-09-04 RX ADMIN — Medication 81 MILLIGRAM(S): at 13:24

## 2023-09-04 RX ADMIN — ATORVASTATIN CALCIUM 20 MILLIGRAM(S): 80 TABLET, FILM COATED ORAL at 21:59

## 2023-09-04 RX ADMIN — HEPARIN SODIUM 16 UNIT(S)/HR: 5000 INJECTION INTRAVENOUS; SUBCUTANEOUS at 18:46

## 2023-09-04 RX ADMIN — Medication 975 MILLIGRAM(S): at 21:59

## 2023-09-04 RX ADMIN — Medication 4 UNIT(S): at 09:01

## 2023-09-04 RX ADMIN — AMIODARONE HYDROCHLORIDE 100 MILLIGRAM(S): 400 TABLET ORAL at 06:37

## 2023-09-04 RX ADMIN — Medication 25 MILLIGRAM(S): at 06:37

## 2023-09-04 RX ADMIN — HEPARIN SODIUM 17 UNIT(S)/HR: 5000 INJECTION INTRAVENOUS; SUBCUTANEOUS at 11:14

## 2023-09-04 RX ADMIN — Medication 975 MILLIGRAM(S): at 22:30

## 2023-09-04 RX ADMIN — Medication 50 MICROGRAM(S): at 06:38

## 2023-09-04 RX ADMIN — HEPARIN SODIUM 15 UNIT(S)/HR: 5000 INJECTION INTRAVENOUS; SUBCUTANEOUS at 03:39

## 2023-09-04 NOTE — PROGRESS NOTE ADULT - SUBJECTIVE AND OBJECTIVE BOX
VASCULAR SURGERY DAILY PROGRESS NOTE:     SUBJECTIVE/ROS:     Overnight: no acute events   Patient seen and evaluated on AM rounds. States pain is controlled.        OBJECTIVE:  Vital Signs Last 24 Hrs  T(C): 36.9 (04 Sep 2023 05:12), Max: 37 (03 Sep 2023 21:23)  T(F): 98.4 (04 Sep 2023 05:12), Max: 98.6 (03 Sep 2023 21:23)  HR: 93 (04 Sep 2023 06:30) (82 - 98)  BP: 118/72 (04 Sep 2023 06:30) (102/52 - 118/72)  BP(mean): --  RR: 18 (04 Sep 2023 05:12) (18 - 18)  SpO2: 97% (04 Sep 2023 05:12) (95% - 98%)    Parameters below as of 04 Sep 2023 05:12  Patient On (Oxygen Delivery Method): room air      I&O's Detail    03 Sep 2023 07:01  -  04 Sep 2023 07:00  --------------------------------------------------------  IN:    Oral Fluid: 270 mL  Total IN: 270 mL    OUT:    Bulb (mL): 190 mL    Voided (mL): 950 mL  Total OUT: 1140 mL    Total NET: -870 mL        Daily     Daily   MEDICATIONS  (STANDING):  aMIOdarone    Tablet 100 milliGRAM(s) Oral every 24 hours  aspirin enteric coated 81 milliGRAM(s) Oral daily  atorvastatin 20 milliGRAM(s) Oral at bedtime  heparin  Infusion 1100 Unit(s)/Hr (15 mL/Hr) IV Continuous <Continuous>  insulin lispro (ADMELOG) corrective regimen sliding scale   SubCutaneous three times a day before meals  insulin lispro (ADMELOG) corrective regimen sliding scale   SubCutaneous at bedtime  insulin lispro Injectable (ADMELOG) 4 Unit(s) SubCutaneous three times a day before meals  levothyroxine 50 MICROGram(s) Oral daily  metoprolol tartrate 25 milliGRAM(s) Oral every 12 hours  polyethylene glycol 3350 17 Gram(s) Oral daily  senna 2 Tablet(s) Oral at bedtime    MEDICATIONS  (PRN):  acetaminophen     Tablet .. 975 milliGRAM(s) Oral every 6 hours PRN Mild Pain (1 - 3)  oxyCODONE    IR 5 milliGRAM(s) Oral every 4 hours PRN Severe Pain (7 - 10)  oxyCODONE    IR 2.5 milliGRAM(s) Oral every 4 hours PRN Moderate Pain (4 - 6)      Labs:                        8.5    11.98 )-----------( 217      ( 04 Sep 2023 03:07 )             25.4     09-04    132<L>  |  99  |  23  ----------------------------<  129<H>  3.9   |  24  |  0.82    Ca    8.0<L>      04 Sep 2023 03:07  Phos  2.7     09-04  Mg     2.0     09-04      PTT - ( 04 Sep 2023 03:07 )  PTT:26.7 sec  Urinalysis Basic - ( 04 Sep 2023 03:07 )    Color: x / Appearance: x / SG: x / pH: x  Gluc: 129 mg/dL / Ketone: x  / Bili: x / Urobili: x   Blood: x / Protein: x / Nitrite: x   Leuk Esterase: x / RBC: x / WBC x   Sq Epi: x / Non Sq Epi: x / Bacteria: x            Physical Exam:  Gen: NAD, A&Ox3  Pulm: even nonlabored breathing   Drains: RICHELLE x 1, minimal serosanguinous output  Groin: soft, no evidence of hematoma, dressings c/d/i  Extremities:  LLE: wound present on big toe, DP 2+ and PT 2+ signals on doppler, motor and sensory intact at foot, aquacel dressing c/d/i, ace wrap replaced on leg

## 2023-09-04 NOTE — PROGRESS NOTE ADULT - ASSESSMENT
83y Female s/p L iliofemoral bypass graft with vein on 8/31/23 complicated by EBL 2L blood loss s/p 6U pRBC, 1U plt, 3U FFP and requiring pressors, admitted to SICU for hemodynamic monitoring. Now off nataly ggt. S/p 1U pRBC on 9/1/23. Transferred to floor. Recovering appropriately.     - Podiatry plan: L foot partial hallux resection this week, medicine cleared, cardiology to see   - Heparin gtt  - VT prophylaxis w/ SCD R leg  - Monitor I&O's  - Cont RICHELLE drain, monitor output   - Pain control as needed  - Regular Diet  - PT, OOB  83y Female s/p L iliofemoral bypass graft with vein on 8/31/23 complicated by EBL 2L blood loss s/p 6U pRBC, 1U plt, 3U FFP and requiring pressors, admitted to SICU for hemodynamic monitoring. Now off nataly ggt. S/p 1U pRBC on 9/1/23. Transferred to floor. Recovering appropriately.     - Podiatry plan: L foot partial hallux resection this week   - Heparin gtt  - VT prophylaxis w/ SCD R leg  - Monitor I&O's  - Cont RICHELLE drain, monitor output   - Pain control as needed  - Regular Diet  - PT, OOB

## 2023-09-05 ENCOUNTER — TRANSCRIPTION ENCOUNTER (OUTPATIENT)
Age: 83
End: 2023-09-05

## 2023-09-05 LAB
ANION GAP SERPL CALC-SCNC: 10 MMOL/L — SIGNIFICANT CHANGE UP (ref 5–17)
APTT BLD: 155 SEC — CRITICAL HIGH (ref 24.5–35.6)
APTT BLD: 160.9 SEC — CRITICAL HIGH (ref 24.5–35.6)
APTT BLD: 61.5 SEC — HIGH (ref 24.5–35.6)
BUN SERPL-MCNC: 20 MG/DL — SIGNIFICANT CHANGE UP (ref 7–23)
CALCIUM SERPL-MCNC: 7.8 MG/DL — LOW (ref 8.4–10.5)
CHLORIDE SERPL-SCNC: 99 MMOL/L — SIGNIFICANT CHANGE UP (ref 96–108)
CO2 SERPL-SCNC: 25 MMOL/L — SIGNIFICANT CHANGE UP (ref 22–31)
CREAT SERPL-MCNC: 0.82 MG/DL — SIGNIFICANT CHANGE UP (ref 0.5–1.3)
EGFR: 71 ML/MIN/1.73M2 — SIGNIFICANT CHANGE UP
GLUCOSE SERPL-MCNC: 127 MG/DL — HIGH (ref 70–99)
HCT VFR BLD CALC: 24.6 % — LOW (ref 34.5–45)
HGB BLD-MCNC: 8.2 G/DL — LOW (ref 11.5–15.5)
MAGNESIUM SERPL-MCNC: 2.1 MG/DL — SIGNIFICANT CHANGE UP (ref 1.6–2.6)
MCHC RBC-ENTMCNC: 31.1 PG — SIGNIFICANT CHANGE UP (ref 27–34)
MCHC RBC-ENTMCNC: 33.3 GM/DL — SIGNIFICANT CHANGE UP (ref 32–36)
MCV RBC AUTO: 93.2 FL — SIGNIFICANT CHANGE UP (ref 80–100)
NRBC # BLD: 0 /100 WBCS — SIGNIFICANT CHANGE UP (ref 0–0)
PHOSPHATE SERPL-MCNC: 2.5 MG/DL — SIGNIFICANT CHANGE UP (ref 2.5–4.5)
PLATELET # BLD AUTO: 265 K/UL — SIGNIFICANT CHANGE UP (ref 150–400)
POTASSIUM SERPL-MCNC: 3.8 MMOL/L — SIGNIFICANT CHANGE UP (ref 3.5–5.3)
POTASSIUM SERPL-SCNC: 3.8 MMOL/L — SIGNIFICANT CHANGE UP (ref 3.5–5.3)
RBC # BLD: 2.64 M/UL — LOW (ref 3.8–5.2)
RBC # FLD: 15.9 % — HIGH (ref 10.3–14.5)
SODIUM SERPL-SCNC: 134 MMOL/L — LOW (ref 135–145)
WBC # BLD: 11.35 K/UL — HIGH (ref 3.8–10.5)
WBC # FLD AUTO: 11.35 K/UL — HIGH (ref 3.8–10.5)

## 2023-09-05 PROCEDURE — 99221 1ST HOSP IP/OBS SF/LOW 40: CPT

## 2023-09-05 PROCEDURE — 99233 SBSQ HOSP IP/OBS HIGH 50: CPT | Mod: GC

## 2023-09-05 PROCEDURE — 93010 ELECTROCARDIOGRAM REPORT: CPT

## 2023-09-05 RX ORDER — HEPARIN SODIUM 5000 [USP'U]/ML
1200 INJECTION INTRAVENOUS; SUBCUTANEOUS
Qty: 25000 | Refills: 0 | Status: DISCONTINUED | OUTPATIENT
Start: 2023-09-05 | End: 2023-09-05

## 2023-09-05 RX ORDER — SODIUM,POTASSIUM PHOSPHATES 278-250MG
1 POWDER IN PACKET (EA) ORAL ONCE
Refills: 0 | Status: COMPLETED | OUTPATIENT
Start: 2023-09-05 | End: 2023-09-05

## 2023-09-05 RX ORDER — HEPARIN SODIUM 5000 [USP'U]/ML
1100 INJECTION INTRAVENOUS; SUBCUTANEOUS
Qty: 25000 | Refills: 0 | Status: DISCONTINUED | OUTPATIENT
Start: 2023-09-05 | End: 2023-09-06

## 2023-09-05 RX ORDER — ROSUVASTATIN CALCIUM 5 MG/1
5 TABLET ORAL AT BEDTIME
Refills: 0 | Status: DISCONTINUED | OUTPATIENT
Start: 2023-09-05 | End: 2023-09-08

## 2023-09-05 RX ORDER — POTASSIUM CHLORIDE 20 MEQ
20 PACKET (EA) ORAL ONCE
Refills: 0 | Status: COMPLETED | OUTPATIENT
Start: 2023-09-05 | End: 2023-09-05

## 2023-09-05 RX ADMIN — Medication 975 MILLIGRAM(S): at 22:14

## 2023-09-05 RX ADMIN — HEPARIN SODIUM 14 UNIT(S)/HR: 5000 INJECTION INTRAVENOUS; SUBCUTANEOUS at 02:13

## 2023-09-05 RX ADMIN — HEPARIN SODIUM 11 UNIT(S)/HR: 5000 INJECTION INTRAVENOUS; SUBCUTANEOUS at 12:36

## 2023-09-05 RX ADMIN — ROSUVASTATIN CALCIUM 5 MILLIGRAM(S): 5 TABLET ORAL at 23:29

## 2023-09-05 RX ADMIN — AMIODARONE HYDROCHLORIDE 100 MILLIGRAM(S): 400 TABLET ORAL at 10:10

## 2023-09-05 RX ADMIN — HEPARIN SODIUM 11 UNIT(S)/HR: 5000 INJECTION INTRAVENOUS; SUBCUTANEOUS at 19:54

## 2023-09-05 RX ADMIN — Medication 20 MILLIEQUIVALENT(S): at 11:30

## 2023-09-05 RX ADMIN — Medication 50 MICROGRAM(S): at 05:17

## 2023-09-05 RX ADMIN — Medication 975 MILLIGRAM(S): at 23:14

## 2023-09-05 RX ADMIN — Medication 1 PACKET(S): at 11:30

## 2023-09-05 RX ADMIN — POLYETHYLENE GLYCOL 3350 17 GRAM(S): 17 POWDER, FOR SOLUTION ORAL at 11:30

## 2023-09-05 RX ADMIN — Medication 25 MILLIGRAM(S): at 10:10

## 2023-09-05 RX ADMIN — Medication 81 MILLIGRAM(S): at 11:30

## 2023-09-05 RX ADMIN — HEPARIN SODIUM 14 UNIT(S)/HR: 5000 INJECTION INTRAVENOUS; SUBCUTANEOUS at 07:38

## 2023-09-05 NOTE — PROGRESS NOTE ADULT - SUBJECTIVE AND OBJECTIVE BOX
INTERVAL HISTORY:  82 yo female with a Hx of medically managed CAD (pLM 40%, pLAD 40%, dLAD 50%, Cx mild, distal % ), pAF on Eliquis (last dose 8/22/2023 PM), LFLG severe AS pending TAVR, R internal carotid stenosis, PAD with chronic left first toe wound, HTN, HLD, hypothyroidism, and anxiety presenting initially as a transfer from [facility] for management of left foot hallux wound. CTA showing L CFA and popliteal artery occlusions. S/p L iliofemoral bypass graft with vein on 8/31/23 complicated by 2L blood loss s/p 6u pRBCs, 1u plts, and 2u FFP and requiring pressors. Transferred to SICU for management of shock. Now off nataly gtt, s/p 7th u pRBCs on 9/1, and downgraded to floors on 9/2. Cardiology consulted for assistance in pre-operative optimization for L partial hallux resection this week by podiatry.     Follows w/ Dr. Deleon as an outpatient in Cardiology. Follows w/ Dr. Serna for ongoing TAVR evaluation. Last Echo 4/11/2023 with EF 57%, no WMAs, low flow (3.7 m/s), low gradient (34.0 mmHg), valve area 0.69 cm2 severe AS. Last Cath 5/2023 w/ pLM 40% stenosis, pLAD 40% stenosis, dLAD 50%, Cx mild disease,  of RCA.     SUBJECTIVE:   Patient seen and examined at bedside.    Overnight Events: No acute events overnight. Denies chest pain or SOB      REVIEW OF SYSTEMS:  Gen: No fever, normal appetite  Eyes: No eye irritation or discharge  ENT: No ear pain, congestion, sore throat  Resp: No cough or trouble breathing  Cardiovascular: No chest pain or palpitation  Gastroenteric: No nausea/vomiting, diarrhea, constipation  :  No change in urine output; no dysuria  MS: No joint or muscle pain  Skin: No rashes  Neuro: No headache; no abnormal movements  Remainder negative, except as per the HPI    Current Meds:  acetaminophen     Tablet .. 975 milliGRAM(s) Oral every 6 hours PRN  aMIOdarone    Tablet 100 milliGRAM(s) Oral every 24 hours  aspirin enteric coated 81 milliGRAM(s) Oral daily  atorvastatin 20 milliGRAM(s) Oral at bedtime  heparin  Infusion 1700 Unit(s)/Hr IV Continuous <Continuous>  insulin lispro (ADMELOG) corrective regimen sliding scale   SubCutaneous at bedtime  levothyroxine 50 MICROGram(s) Oral daily  metoprolol tartrate 25 milliGRAM(s) Oral every 12 hours  oxyCODONE    IR 2.5 milliGRAM(s) Oral every 4 hours PRN  oxyCODONE    IR 5 milliGRAM(s) Oral every 4 hours PRN  polyethylene glycol 3350 17 Gram(s) Oral daily  senna 2 Tablet(s) Oral at bedtime      PAST MEDICAL & SURGICAL HISTORY:  HTN (hypertension)  HLD (hyperlipidemia)  Anxiety  CAD (coronary artery disease)  PAD (peripheral artery disease)  Hypothyroidism  AF (atrial fibrillation)  Carotid artery stenosis  AS (aortic stenosis)  Status post closed fracture of right femur          Vitals:  T(F): 99.3 (09-05), Max: 99.3 (09-05)  HR: 89 (09-05) (83 - 95)  BP: 109/67 (09-05) (97/66 - 115/67)  RR: 18 (09-05)  SpO2: 97% (09-05)  I&O's Summary    04 Sep 2023 07:01  -  05 Sep 2023 07:00  --------------------------------------------------------  IN: 1180 mL / OUT: 2320 mL / NET: -1140 mL        Physical Exam:  Appearance: No acute distress; well appearing  Eyes: PERRL, EOMI, pink conjunctiva  HENT: Normal oral mucosa  Cardiovascular: RRR, S1, S2, no murmurs, rubs, or gallops; no edema; no JVD  Respiratory: Clear to auscultation bilaterally  Gastrointestinal: soft, non-tender, non-distended with normal bowel sounds  Musculoskeletal: No clubbing; no joint deformity   Neurologic: Non-focal  Lymphatic: No lymphadenopathy  Psychiatry: AAOx3, mood & affect appropriate  Skin: No rashes, ecchymoses, or cyanosis    LABS:                     8.2    11.35 )-----------( 265      ( 05 Sep 2023 07:15 )             24.6     09-05    134<L>  |  99  |  20  ----------------------------<  127<H>  3.8   |  25  |  0.82    Ca    7.8<L>      05 Sep 2023 07:19  Phos  2.7     09-04  Mg     2.0     09-04    PTT - ( 05 Sep 2023 00:28 )  PTT:155.0 sec      Echo: Personally reviewed:  4/11/2023  CONCLUSIONS:   1. Normal left ventricular cavity size. The left ventricular wall thickness is normal. The left ventricular systolic function is normal with an ejection fraction of 57 % by Miguel's method of disks. There are no regional wall motion abnormalities seen.   2. The left ventricular diastolic function is indeterminate.   3. Normal right ventricular cavity size, normal wall thickness and normal systolic function. The tricuspid annular plane systolic excursion (TAPSE) is 1.8 cm (normal >=1.7 cm).   4. There is severe aortic stenosis. There is low flow, low gradient aortic stenosis with preserved EF.      The peak transaortic velocity is 3.70 m/s, peak transaortic gradient is 54.8 mmHg and mean transaortic gradient is 34.0 mmHg with an LVOT/aortic valve VTI ratio of 0.20. The aortic valve area is estimated at 0.69 cm² by the continuity equation. There is mild aortic regurgitation.   5. There is trace mitral and tricuspid regurgitation.   6. No pericardial effusion seen.   7. Compared to the transthoracic echocardiogram performed on 12/21/2022 there have been no significant interval changes.      Cath:  5/1/2023  LM   Proximal left main: Angiography shows moderate atherosclerosis. There  is a 40 % stenosis.  LAD   Proximal left anterior descending: Angiography shows moderate  atherosclerosis. There is a 40 % stenosis. Distal left anterior  descending: Angiography shows moderate atherosclerosis. There is a 50  % stenosis.  CX   Circumflex: Angiography shows mild atherosclerosis.    RCA   Distal right coronary artery: Angiography shows complete occlusion.  There is a 100 % stenosis.   INTERVAL HISTORY:  82 yo female with a Hx of medically managed CAD (pLM 40%, pLAD 40%, dLAD 50%, Cx mild, distal % ), pAF on Eliquis (last dose 8/22/2023 PM), LFLG severe AS pending TAVR, R internal carotid stenosis, PAD with chronic left first toe wound, HTN, HLD, hypothyroidism, and anxiety presenting initially as a transfer from [facility] for management of left foot hallux wound. CTA showing L CFA and popliteal artery occlusions. S/p L iliofemoral bypass graft with vein on 8/31/23 complicated by 2L blood loss s/p 6u pRBCs, 1u plts, and 2u FFP and requiring pressors. Transferred to SICU for management of shock. Now off nataly gtt, s/p 7th u pRBCs on 9/1, and downgraded to floors on 9/2. Cardiology consulted for assistance in pre-operative optimization for L partial hallux resection this week by podiatry.     Follows w/ Dr. Deleon as an outpatient in Cardiology. Follows w/ Dr. Serna for ongoing TAVR evaluation. Last Echo 4/11/2023 with EF 57%, no WMAs, low flow (3.7 m/s), low gradient (34.0 mmHg), valve area 0.69 cm2 severe AS. Last Cath 5/2023 w/ pLM 40% stenosis, pLAD 40% stenosis, dLAD 50%, Cx mild disease,  of RCA.     SUBJECTIVE:   Patient seen and examined at bedside. Denies chest pain, palpitations, shortness of breath, headaches, nausea, vomiting, chills, fevers, and abdominal pain. Last BM at 7pm.    Overnight Events: No acute events overnight. Denies chest pain or SOB. Feeling well.       REVIEW OF SYSTEMS:  Gen: No fever, normal appetite  Eyes: No eye irritation or discharge  ENT: No ear pain, congestion, sore throat  Resp: No cough or trouble breathing  Cardiovascular: No chest pain or palpitation  Gastroenteric: No nausea/vomiting, diarrhea, constipation  :  No change in urine output; no dysuria  MS: No joint or muscle pain  Skin: No rashes  Neuro: No headache; no abnormal movements  Remainder negative, except as per the HPI    Current Meds:  acetaminophen     Tablet .. 975 milliGRAM(s) Oral every 6 hours PRN  aMIOdarone    Tablet 100 milliGRAM(s) Oral every 24 hours  aspirin enteric coated 81 milliGRAM(s) Oral daily  atorvastatin 20 milliGRAM(s) Oral at bedtime  heparin  Infusion 1700 Unit(s)/Hr IV Continuous <Continuous>  insulin lispro (ADMELOG) corrective regimen sliding scale   SubCutaneous at bedtime  levothyroxine 50 MICROGram(s) Oral daily  metoprolol tartrate 25 milliGRAM(s) Oral every 12 hours  oxyCODONE    IR 2.5 milliGRAM(s) Oral every 4 hours PRN  oxyCODONE    IR 5 milliGRAM(s) Oral every 4 hours PRN  polyethylene glycol 3350 17 Gram(s) Oral daily  senna 2 Tablet(s) Oral at bedtime      PAST MEDICAL & SURGICAL HISTORY:  HTN (hypertension)  HLD (hyperlipidemia)  Anxiety  CAD (coronary artery disease)  PAD (peripheral artery disease)  Hypothyroidism  AF (atrial fibrillation)  Carotid artery stenosis  AS (aortic stenosis)  Status post closed fracture of right femur          Vitals:  T(F): 99.3 (09-05), Max: 99.3 (09-05)  HR: 89 (09-05) (83 - 95)  BP: 109/67 (09-05) (97/66 - 115/67)  RR: 18 (09-05)  SpO2: 97% (09-05)  I&O's Summary    04 Sep 2023 07:01  -  05 Sep 2023 07:00  --------------------------------------------------------  IN: 1180 mL / OUT: 2320 mL / NET: -1140 mL        Physical Exam:  General: Well developed; well nourished; NAD  Eyes: PERRLA, EOMI; conjunctiva and sclera clear  HEENT: NCAT, nasal mucosa normal, no nasal discharge  Neck: Supple, no cervical LAD, full ROM  Respiratory: No chest wall deformity, CTABL, no wheezes, rales, or rhonchi  Cardiovascular: RRR, +S1/S2, +systolic murmur  Abdominal: Normal BS, soft, non-tender, non-distended  Extremities: Full ROM, no cyanosis, no LE edema, LLE wrapped in ace bandages, L foot wrapped in gauze  Neurological: CN II-XII grossly intact, full ROM x4, strength and sensation grossly intact  Skin: WWP. No rashes or lesions      LABS:                     8.2    11.35 )-----------( 265      ( 05 Sep 2023 07:15 )             24.6     09-05    134<L>  |  99  |  20  ----------------------------<  127<H>  3.8   |  25  |  0.82    Ca    7.8<L>      05 Sep 2023 07:19  Phos  2.7     09-04  Mg     2.0     09-04    PTT - ( 05 Sep 2023 00:28 )  PTT:155.0 sec      Echo: Personally reviewed:  4/11/2023  CONCLUSIONS:   1. Normal left ventricular cavity size. The left ventricular wall thickness is normal. The left ventricular systolic function is normal with an ejection fraction of 57 % by Miguel's method of disks. There are no regional wall motion abnormalities seen.   2. The left ventricular diastolic function is indeterminate.   3. Normal right ventricular cavity size, normal wall thickness and normal systolic function. The tricuspid annular plane systolic excursion (TAPSE) is 1.8 cm (normal >=1.7 cm).   4. There is severe aortic stenosis. There is low flow, low gradient aortic stenosis with preserved EF.      The peak transaortic velocity is 3.70 m/s, peak transaortic gradient is 54.8 mmHg and mean transaortic gradient is 34.0 mmHg with an LVOT/aortic valve VTI ratio of 0.20. The aortic valve area is estimated at 0.69 cm² by the continuity equation. There is mild aortic regurgitation.   5. There is trace mitral and tricuspid regurgitation.   6. No pericardial effusion seen.   7. Compared to the transthoracic echocardiogram performed on 12/21/2022 there have been no significant interval changes.      Cath:  5/1/2023  LM   Proximal left main: Angiography shows moderate atherosclerosis. There  is a 40 % stenosis.  LAD   Proximal left anterior descending: Angiography shows moderate  atherosclerosis. There is a 40 % stenosis. Distal left anterior  descending: Angiography shows moderate atherosclerosis. There is a 50  % stenosis.  CX   Circumflex: Angiography shows mild atherosclerosis.    RCA   Distal right coronary artery: Angiography shows complete occlusion.  There is a 100 % stenosis.   INTERVAL HISTORY:  82 yo female with a hx. of medically managed CAD (pLM 40%, pLAD 40%, dLAD 50%, Cx mild, distal % ), pAF on Eliquis (last dose 8/22/2023 PM), low flow low gradient severe AS pending TAVR, R internal carotid stenosis, PAD with chronic left first toe wound, HTN, HLD, hypothyroidism, and anxiety.  Presented initially as a transfer from [facility] for management of left foot hallux wound. CTA showing L CFA and popliteal artery occlusions. S/P L ilio-femoral bypass graft with vein on 8/31/23, complicated by 2L blood loss s/p 6u pRBCs, 1u plts, 2u FFP and pressors; transferred to SICU for management of shock. Now off nataly gtt, s/p 7th u pRBCs on 9/1, and downgraded to floor on 9/2.     Cardiology now re-consulted for assistance in pre-operative optimization for L partial hallux resection this week by podiatry.     Follows w/ Dr. Deleon as an outpatient in Cardiology. Follows w/ Dr. Serna for ongoing TAVR evaluation. Last Echo 4/11/2023 with EF 57%, no WMAs, low flow (3.7 m/s), low gradient (34.0 mmHg), valve area 0.69 cm2 c/w severe AS. Last Cath 5/2023 w/ pLM 40% stenosis, pLAD 40% stenosis, dLAD 50%, Cx mild disease,  of RCA.     SUBJECTIVE:   Patient seen and examined at bedside. Denies chest pain, palpitations, shortness of breath, headaches, nausea, vomiting, chills, fevers, and abdominal pain. Last BM at 7pm.    Overnight Events: No acute events overnight. Denies chest pain or SOB. Feeling well.       REVIEW OF SYSTEMS:  Gen: No fever, normal appetite  Eyes: No eye irritation or discharge  ENT: No ear pain, congestion, sore throat  Resp: No cough or trouble breathing  Cardiovascular: No chest pain or palpitation  Gastroenteric: No nausea/vomiting, diarrhea, constipation  :  No change in urine output; no dysuria  MS: No joint or muscle pain  Skin: No rashes  Neuro: No headache; no abnormal movements  Remainder negative, except as per the HPI    Current Meds:  acetaminophen     Tablet .. 975 milliGRAM(s) Oral every 6 hours PRN  aMIOdarone    Tablet 100 milliGRAM(s) Oral every 24 hours  aspirin enteric coated 81 milliGRAM(s) Oral daily  atorvastatin 20 milliGRAM(s) Oral at bedtime  heparin  Infusion 1700 Unit(s)/Hr IV Continuous <Continuous>  insulin lispro (ADMELOG) corrective regimen sliding scale   SubCutaneous at bedtime  levothyroxine 50 MICROGram(s) Oral daily  metoprolol tartrate 25 milliGRAM(s) Oral every 12 hours  oxyCODONE    IR 2.5 milliGRAM(s) Oral every 4 hours PRN  oxyCODONE    IR 5 milliGRAM(s) Oral every 4 hours PRN  polyethylene glycol 3350 17 Gram(s) Oral daily  senna 2 Tablet(s) Oral at bedtime      PAST MEDICAL & SURGICAL HISTORY:  HTN (hypertension)  HLD (hyperlipidemia)  Anxiety  CAD (coronary artery disease)  PAD (peripheral artery disease)  Hypothyroidism  AF (atrial fibrillation)  Carotid artery stenosis  AS (aortic stenosis)  Status post closed fracture of right femur          Vitals:  T(F): 99.3 (09-05), Max: 99.3 (09-05)  HR: 89 (09-05) (83 - 95)  BP: 109/67 (09-05) (97/66 - 115/67)  RR: 18 (09-05)  SpO2: 97% (09-05)      I&O's Summary  04 Sep 2023 07:01  -  05 Sep 2023 07:00  --------------------------------------------------------  IN: 1180 mL / OUT: 2320 mL / NET: -1140 mL        Physical Exam:  General: Well developed; well nourished; NAD  Eyes: PERRLA, EOMI; conjunctiva and sclera clear  HEENT: NCAT, nasal mucosa normal, no nasal discharge  Neck: Supple, no cervical LAD, full ROM  Respiratory: No chest wall deformity; lungs clear to ausculation bilaterally - no wheezes, rales, or rhonchi  Cardiovascular: RRR, +S1/S2, +systolic murmur  Abdominal: Normal BS, soft, non-tender, non-distended  Extremities: Full ROM, no cyanosis, no LE edema, LLE wrapped in ace bandages, L foot wrapped in gauze  Neurological: CN II-XII grossly intact, full ROM x4, strength and sensation grossly intact  Skin:  No rashes or lesions      LABS:                     8.2    11.35 )-----------( 265      ( 05 Sep 2023 07:15 )             24.6     09-05  134<L>  |  99  |  20  ----------------------------<  127<H>  3.8   |  25  |  0.82    Ca    7.8<L>      05 Sep 2023 07:19  Phos  2.7     09-04  Mg     2.0     09-04    PTT - ( 05 Sep 2023 00:28 )  PTT:155.0 sec      Echo: Personally reviewed:  4/11/2023  CONCLUSIONS:   1. Normal left ventricular cavity size. The left ventricular wall thickness is normal. The left ventricular systolic function is normal with an ejection fraction of 57 % by Miguel's method of disks. There are no regional wall motion abnormalities seen.   2. The left ventricular diastolic function is indeterminate.   3. Normal right ventricular cavity size, normal wall thickness and normal systolic function. The tricuspid annular plane systolic excursion (TAPSE) is 1.8 cm (normal >=1.7 cm).   4. There is severe aortic stenosis. There is low flow, low gradient aortic stenosis with preserved EF.      The peak transaortic velocity is 3.70 m/s, peak transaortic gradient is 54.8 mmHg and mean transaortic gradient is 34.0 mmHg with an LVOT/aortic valve VTI ratio of 0.20. The aortic valve area is estimated at 0.69 cm² by the continuity equation. There is mild aortic regurgitation.   5. There is trace mitral and tricuspid regurgitation.   6. No pericardial effusion seen.   7. Compared to the transthoracic echocardiogram performed on 12/21/2022 there have been no significant interval changes.      Cath:  5/1/2023  LM   Proximal left main: Angiography shows moderate atherosclerosis. There is a 40% stenosis.  LAD   Proximal left anterior descending: Angiography shows moderate atherosclerosis. There is a 40% stenosis. Distal left anterior descending: Angiography shows moderate atherosclerosis. There is a 50% stenosis.  CX   Circumflex: Angiography shows mild atherosclerosis.    RCA   Distal right coronary artery: Angiography shows complete occlusion.  There is a 100 % stenosis.

## 2023-09-05 NOTE — PROVIDER CONTACT NOTE (CRITICAL VALUE NOTIFICATION) - TEST AND RESULT REPORTED:
aPTT 160.9
abscess culture from 8/24, gram positive cocci in pairs seen per oil power field  gram negative cocci seen per oil power field

## 2023-09-05 NOTE — PROGRESS NOTE ADULT - ASSESSMENT
83y Female s/p L iliofemoral bypass graft with vein on 8/31/23 complicated by EBL 2L blood loss s/p 6U pRBC, 1U plt, 3U FFP and requiring pressors, admitted to SICU for hemodynamic monitoring. Now off nataly ggt. S/p 1U pRBC on 9/1/23. Transferred to floor on 9/02. Recovering appropriately.     - Podiatry plan: L foot partial hallux resection this week; fu on timing  - Cardiac clearance for procedure  - Heparin gtt  - VT prophylaxis w/ SCD R leg  - Monitor I&O's  - Cont RICHELLE drain, monitor output   - Pain control as needed  - Regular Diet  - PT, OOB       Vascular Surgery  [id number]

## 2023-09-05 NOTE — CONSULT NOTE ADULT - CONSULT REQUESTED DATE/TIME
25-Aug-2023 14:11
05-Sep-2023 12:18
25-Aug-2023 11:51
31-Aug-2023 17:54
25-Aug-2023 11:46
03-Sep-2023 12:48

## 2023-09-05 NOTE — PROGRESS NOTE ADULT - SUBJECTIVE AND OBJECTIVE BOX
Patient is a 83y old  Female who presents with a chief complaint of "82 yo F w/ PMHx of CAD (prox LM 40%, prox LAD 40%, distal LAD 50%, Cx mild, distal % ), pAF on Eliquis, severe AS pending TAVR, R internal carotid stenosis, PAD w/ chronic L 1st toe wound, HTN, HLD, hypothyroidism, and anxiety presents as a transfer from [facility] (8/25/23) for LLE angiogram and TAVR work-up after presenting for months of worsening LLE burning pain and concern for L foot gangrenew / maggots. CT angiograph showed L CFA and popliteal artery occlusions warranting external iliac-peroneal bypass for limb salvage. Vascular surgery performed L external iliac-profunda bypass w/ jump saphenous graft to peroneal artery (8/31, Dr. Laura Tavares) remarkable for 2L EBL, hypokalemia requiring repletion, phenylephrine, heparin infusion requiring protamine sulfate, and blood products (6U pRBC, 1U plt, 3 FFP). Admitted to SICU for pressor requirement."     (01 Sep 2023 14:22)       INTERVAL HPI/OVERNIGHT EVENTS:  Patient seen and evaluated at bedside.  Pt is resting comfortable in NAD. Denies N/V/F/C.    Allergies    sulfa drugs (Unknown)  latex (Unknown)    Intolerances        Vital Signs Last 24 Hrs  T(C): 37.4 (05 Sep 2023 05:15), Max: 37.4 (05 Sep 2023 05:15)  T(F): 99.3 (05 Sep 2023 05:15), Max: 99.3 (05 Sep 2023 05:15)  HR: 89 (05 Sep 2023 05:15) (79 - 95)  BP: 109/67 (05 Sep 2023 05:15) (97/66 - 121/72)  BP(mean): --  RR: 18 (05 Sep 2023 05:15) (18 - 18)  SpO2: 97% (05 Sep 2023 05:15) (95% - 98%)    Parameters below as of 05 Sep 2023 05:15  Patient On (Oxygen Delivery Method): room air        LABS:                        8.2    11.35 )-----------( 265      ( 05 Sep 2023 07:15 )             24.6     09-05    134<L>  |  99  |  20  ----------------------------<  127<H>  3.8   |  25  |  0.82    Ca    7.8<L>      05 Sep 2023 07:19  Phos  2.7     09-04  Mg     2.0     09-04      PTT - ( 05 Sep 2023 00:28 )  PTT:155.0 sec  Urinalysis Basic - ( 05 Sep 2023 07:19 )    Color: x / Appearance: x / SG: x / pH: x  Gluc: 127 mg/dL / Ketone: x  / Bili: x / Urobili: x   Blood: x / Protein: x / Nitrite: x   Leuk Esterase: x / RBC: x / WBC x   Sq Epi: x / Non Sq Epi: x / Bacteria: x      CAPILLARY BLOOD GLUCOSE      POCT Blood Glucose.: 139 mg/dL (04 Sep 2023 22:03)  POCT Blood Glucose.: 227 mg/dL (04 Sep 2023 17:03)      Lower Extremity Physical Exam:  Vascular: DP/PT 0/4, B/L, CFT <3 seconds B/L, Temperature gradient warm to cool, B/L.   Neuro: Epicritic sensation intact to the level of digits, B/L.  Musculoskeletal/Ortho: unremarkable  Skin: Left foot dorsal hallux wound to subq with medial adhered eschar, erythema resolved, lateral 5th MTPJ wound to subQ, medial 1st MTPJ wound to subq, no drainage, no pus, no tracking, no tunneling, no acute signs of infection. Right foot no open wounds, no acute signs of infection.    RADIOLOGY & ADDITIONAL TESTS:

## 2023-09-05 NOTE — CONSULT NOTE ADULT - ASSESSMENT
Impression:    Sacral/bilateral Buttocks deep tissue damage  Incontinence of bowel and bladder  Incontinence dermatitis    Recommend:  1.) topical therapy: sacral/buttock injury - cleanse with incontinence cleanser, pat dry, apply Lu ointment BID and PRN for incontinent episodes  2.) Incontinence Management - incontinence cleanser, pads, pericare BID  3.) Maintain on an alternating air with low air loss surface  4.) Turn and reposition Q 2 hours  5.) Nutrition optimization - please add Kane  6.) Offload heels/feet with complete cair air fluidized boots; ensure that the soles of the feet are not resting on the foot board of the bed.  7.) chair cushion for chair sitting    Care as per medicine. Will not actively follow but will remain available. Please recall for new issues or deterioration.  Upon discharge f[facility]u [facility] at [facility] [address] [place] [address] [phone]  Thank you for this consult  Discussed with clinical nurse  Marisa Fitzgerald, ELIZABETH-C, CWOCN via TEAMS

## 2023-09-05 NOTE — PROGRESS NOTE ADULT - ASSESSMENT
84 yo female with a Hx of medically managed CAD (pLM 40%, pLAD 40%, dLAD 50%, Cx mild, distal % ), pAF on Eliquis (last dose 8/22/2023 PM), LFLG severe AS pending TAVR, R internal carotid stenosis, PAD with chronic left first toe wound, HTN, HLD, hypothyroidism, and anxiety presenting initially as a transfer from [facility] for management of left foot hallux wound. CTA showing L CFA and popliteal artery occlusions. S/p L iliofemoral bypass graft with vein on 8/31/23 complicated by 2L blood loss s/p 6u pRBCs, 1u plts, and 2u FFP and requiring pressors. Transferred to SICU for management of shock. Now off nataly gtt, s/p 7th u pRBCs on 9/1, and downgraded to floors on 9/2. Cardiology consulted for assistance in pre-operative optimization for L partial hallux resection this week by podiatry.     #Pre-operative Optimization  - Low risk procedure  - Known hx of CAD, LFLG severe AS pending TAVR, Afib on Eliquis,   - Home meds: amiodarone, lopressor, losartan, eliquis  - Current meds: Amiodarone 100 QD, Lopressor 25 BID,   - Pt asymptomatic w/o chest pain, SOB, palpitations, orthopnea, LE edema that may suggest ADHF, ACS, or new symptomatic AS,   - RCRI 1, 6% 30-day risk of MACE.   - Costa Score w/ 0.9% Risk of myocardial infarction or cardiac arrest, intraoperatively or up to 30 days post-op    Recommendations  - c/w holding Eliquis  - C/w amiodarone and metoprolol  - Can consider restarting losartan when blood pressure allows  - No further cardiac testing warranted at this time. Further testing unlikely to change medical course.  - Patient medically optimized to proceed to OR for L partial hallux resection from cardiology standpoint.     MIHAELA MARCH  PGY2  CARDIOLOGY CONSULT SERVICE    **Note incomplete until signed by attending*** 84 yo female with a Hx of medically managed CAD (pLM 40%, pLAD 40%, dLAD 50%, Cx mild, distal % ), pAF on Eliquis (last dose 8/22/2023 PM), LFLG severe AS pending TAVR, R internal carotid stenosis, PAD with chronic left first toe wound, HTN, HLD, hypothyroidism, and anxiety presenting initially as a transfer from [facility] for management of left foot hallux wound. CTA showing L CFA and popliteal artery occlusions. S/p L iliofemoral bypass graft with vein on 8/31/23 complicated by 2L blood loss s/p 6u pRBCs, 1u plts, and 2u FFP and requiring pressors. Transferred to SICU for management of shock. Now off nataly gtt, s/p 7th u pRBCs on 9/1, and downgraded to floors on 9/2. Cardiology consulted for assistance in pre-operative optimization for L partial hallux resection this week by podiatry.     #Pre-operative Optimization  - Low risk procedure  - Known hx of CAD, LFLG severe AS pending TAVR, Afib on Eliquis,   - Home meds: amiodarone, lopressor, losartan, eliquis  - Current meds: Amiodarone 100 QD, Lopressor 25 BID,   - Pt asymptomatic w/o chest pain, SOB, palpitations, orthopnea, LE edema that may suggest ADHF, ACS, or new symptomatic AS,   - RCRI 1, 6% 30-day risk of MACE.   - Costa Score w/ 0.9% Risk of myocardial infarction or cardiac arrest, intraoperatively or up to 30 days post-op    Recommendations  - c/w holding Eliquis  - C/w amiodarone and metoprolol  - Can consider restarting losartan when blood pressure allows  - No further cardiac testing warranted at this time. Further testing unlikely to change medical course.  - Patient medically optimized to proceed to OR for L partial hallux resection from cardiology standpoint.     MIHAELA CRUZ PGY2  CARDIOLOGY CONSULT SERVICE    **Note incomplete until signed by attending*** 84 yo F with hx. of medically managed CAD (pLM 40%, pLAD 40%, dLAD 50%, Cx mild, distal % ), pAF on Eliquis (last dose 8/22/2023 PM), LFLG severe AS pending TAVR, R internal carotid stenosis, PAD with chronic left first toe wound, HTN, HLD, hypothyroidism, and anxiety.  Presented initially as a transfer from [facility] for management of left foot hallux wound. CTA showed L CFA and popliteal artery occlusions. S/P L ilio-femoral bypass graft with vein on 8/31/23.  Course complicated by 2L blood loss, s/p 6u pRBCs, 1u plts, 2u FFP and pressors; required transfer to SICU for management of shock.   Now off nataly gtt; s/p 7th u pRBCs on 9/1 and downgraded to floor on 9/2.   Cardiology currently consulted for assistance in pre-operative optimization for L partial hallux resection this week by podiatry.       #Pre-operative Optimization  - Low risk procedure  - Known hx of CAD, low flow low gradient severe AS pending TAVR and A. fib on Eliquis.   - Home meds: amiodarone, lopressor, losartan, Eliquis  - Current meds: amiodarone 100 QD, Lopressor 25 BID,   - Pt asymptomatic w/o chest pain, SOB, palpitations, orthopnea, LE edema; no evidence of ADHF, ACS, or new symptomatic AS.   - RCRI 1, 6% 30-day risk of MACE.   - Costa Score w/ 0.9% Risk of myocardial infarction or cardiac arrest, intraoperatively or up to 30 days post-op    Recommendations  - c/w holding Eliquis  - C/w amiodarone and metoprolol  - Can consider restarting losartan when blood pressure allows  - No further cardiac testing warranted at this time. Further testing unlikely to change medical course.  - Patient medically optimized to proceed to OR for L partial hallux resection from cardiology standpoint.     MIHAELA MARCH M.D., PGY2  Cardiology resident.    Plan discussed with cardiology fellow and resident.  Patient seen and examined.  Hx., exam and labs as above.  I agree with the assessment and recommendations, which I have reviewed and edited where appropriate.  David Altamirano M.D.  Cardiology Attending, Consult Service    For Cardiology consults and questions, all Cardiology service information can be found 24/7 on [email or web address] - use password: [email or web address] to log in.

## 2023-09-05 NOTE — PROGRESS NOTE ADULT - SUBJECTIVE AND OBJECTIVE BOX
SURGERY DAILY PROGRESS NOTE    SUBJECTIVE: Patient seen and evaluated on AM rounds. Pt is resting comfortably in bed with no complaints. Pt reports appropriate surgical incisional pain, which is adequately controlled on current regimen. Ambulating well.   Denies fevers/chills, chest pain, dyspnea, abdominal pain, nausea, vomiting, and diarrhea    Overnight Events:  No acute events overnight  ------------------------------------------------------------------------------------------------------------  OBJECTIVE:  Vital Signs Last 24 Hrs  T(C): 37.4 (05 Sep 2023 05:15), Max: 37.4 (05 Sep 2023 05:15)  T(F): 99.3 (05 Sep 2023 05:15), Max: 99.3 (05 Sep 2023 05:15)  HR: 89 (05 Sep 2023 05:15) (79 - 95)  BP: 109/67 (05 Sep 2023 05:15) (97/66 - 121/72)  BP(mean): --  RR: 18 (05 Sep 2023 05:15) (18 - 18)  SpO2: 97% (05 Sep 2023 05:15) (95% - 98%)    Parameters below as of 05 Sep 2023 05:15  Patient On (Oxygen Delivery Method): room air      I&O's Detail    03 Sep 2023 07:01  -  04 Sep 2023 07:00  --------------------------------------------------------  IN:    Heparin: 180 mL    Oral Fluid: 390 mL  Total IN: 570 mL    OUT:    Bulb (mL): 190 mL    Voided (mL): 950 mL  Total OUT: 1140 mL    Total NET: -570 mL      04 Sep 2023 07:01  -  05 Sep 2023 06:39  --------------------------------------------------------  IN:    Oral Fluid: 760 mL  Total IN: 760 mL    OUT:    Bulb (mL): 110 mL    Voided (mL): 1350 mL  Total OUT: 1460 mL    Total NET: -700 mL        Daily     Daily   MEDICATIONS  (STANDING):  aMIOdarone    Tablet 100 milliGRAM(s) Oral every 24 hours  aspirin enteric coated 81 milliGRAM(s) Oral daily  atorvastatin 20 milliGRAM(s) Oral at bedtime  heparin  Infusion 1700 Unit(s)/Hr (14 mL/Hr) IV Continuous <Continuous>  insulin lispro (ADMELOG) corrective regimen sliding scale   SubCutaneous at bedtime  levothyroxine 50 MICROGram(s) Oral daily  metoprolol tartrate 25 milliGRAM(s) Oral every 12 hours  polyethylene glycol 3350 17 Gram(s) Oral daily  senna 2 Tablet(s) Oral at bedtime    MEDICATIONS  (PRN):  acetaminophen     Tablet .. 975 milliGRAM(s) Oral every 6 hours PRN Mild Pain (1 - 3)  oxyCODONE    IR 2.5 milliGRAM(s) Oral every 4 hours PRN Moderate Pain (4 - 6)  oxyCODONE    IR 5 milliGRAM(s) Oral every 4 hours PRN Severe Pain (7 - 10)      LABS:                        8.8    12.85 )-----------( 257      ( 04 Sep 2023 10:09 )             27.1     09-04    132<L>  |  99  |  23  ----------------------------<  129<H>  3.9   |  24  |  0.82    Ca    8.0<L>      04 Sep 2023 03:07  Phos  2.7     09-04  Mg     2.0     09-04        PTT - ( 05 Sep 2023 00:28 )  PTT:155.0 sec  Urinalysis Basic - ( 04 Sep 2023 03:07 )    Color: x / Appearance: x / SG: x / pH: x  Gluc: 129 mg/dL / Ketone: x  / Bili: x / Urobili: x   Blood: x / Protein: x / Nitrite: x   Leuk Esterase: x / RBC: x / WBC x   Sq Epi: x / Non Sq Epi: x / Bacteria: x        PHYSICAL EXAM:  Constitutional: Well developed, well nourished, NAD  Pulmonary: Symmetric chest rise bilaterally, no increased WOB  Gastrointestinal: Abdomen soft, nontender, nondistended  Groin: Soft, nontender, no ecchymosis/hematoma, no erythema, no edema. Aquacel dressing with strikethrough; removed and changed to gauze & tape on AM rounds  Extremities: LLE with gauze and tape covered with ACE wrap; dressings c/d/i; changed on AM rounds. (+) RICHELLE drain with SS output. (+) wound on LLE 1st digit. (+) DP/PT signals. FROM, normal strength, warm to touch, no clubbing/cyanosis/erythema/edema/hematoma.

## 2023-09-05 NOTE — PROVIDER CONTACT NOTE (CRITICAL VALUE NOTIFICATION) - SITUATION
abscess culture from 8/24, gram positive cocci in pairs seen per oil power field  gram negative cocci seen per oil power field
heparin gtt supratherapeutic

## 2023-09-05 NOTE — PROGRESS NOTE ADULT - ASSESSMENT
83F presents with left foot wounds.  - Patient seen and evaluated.  - Afebrile, WBC 11.35.  - Left foot dorsal hallux wound to subq with medial adhered eschar, erythema resolved, lateral 5th MTPJ wound to subQ, medial 1st MTPJ wound to subq, no drainage, no pus, no tracking, no tunneling, no acute signs of infection. Right foot no open wounds, no acute signs of infection.  - Left Foot X-Rays: No gas, no OM.  - 8/31 s/p left ileofemoral bypass, appreciated.  - Pod Plan: Left foot partial hallux resection likely Wed 9/6.  - Documented medical clearance for podiatric surgery under anesthesia, appreciated.  - Please document cardiac clearance for podiatric surgery under anesthesia.  - NPO tonight at midnight.  - AM labs ordered including CBC, BMP, coags, and type and screen.  - Discussed with attending. 83F presents with left foot wounds.  - Patient seen and evaluated.  - Afebrile, WBC 11.35.  - Left foot dorsal hallux wound to subq with medial adhered eschar, erythema resolved, lateral 5th MTPJ wound to subQ, medial 1st MTPJ wound to subq, no drainage, no pus, no tracking, no tunneling, no acute signs of infection. Right foot no open wounds, no acute signs of infection.  - Left Foot X-Rays: No gas, no OM.  - 8/31 s/p left ileofemoral bypass, appreciated.  - Pt understands that left foot currently has no acute signs of infection and left foot partial hallux amputation will be performed to prevent future infection seeding into the bypass site and the heart.   - Pod Plan: Booked for left foot partial hallux amputation tomorrow 9/6 at 10am with Dr. Mario Guzmán.  - Documented medical clearance for podiatric surgery under anesthesia, appreciated.  - Please document cardiac clearance for podiatric surgery under anesthesia.  - NPO tonight at midnight.  - AM labs ordered including CBC, BMP, coags, and type and screen.  - Discussed with attending.

## 2023-09-05 NOTE — PROGRESS NOTE ADULT - ATTENDING COMMENTS
Pt is doing well today s/p L iliac to profunda bypass and profunda to peroneal bypass  foot is warm, good doppler signals    coming down on pressors   cont ASA  will restart A/C in 1-2days   discussed with podiatry and they will plan for toe amp next week
Pt is preoped for L leg open revasc  We discussed risks including but not limited to bleeding, infection, bypass failure, worsening limb ischemia, limb loss, MI and death. We also discussed benefits of the procedure (potential limb salvage) and alternatives (wound care or amputation)  Pt. and family understand all the risks, benefits and alternatives and agree to proceed.   all the questions were answered.
Pt is preoped for L leg open revasc this week  cards and podiatry eval appreciated  cont Lovenox, asa and statin
ATTENDING ATTESTATION:    83F history of CAD, pAfib on eliquis, severe AS awaiting TAVR, PAD c/b chronic left toe wound s/p left iliofemoral bypass with SVG and dacron jump graft (8/31/23). Post-op course notable for postoperative vasodilatory shock requiring low dose phenylephrine.     N - AAOx4. Pain controlled.   C - Weaning down phenylephrine. Severe AS, will optimize preload and resume low dose metoprolol to facilitate diastolic filling. Home amiodarone. Home aspirin. Hold eliquis today.   P - Extubated on room air.   G - Advance diet. Bowel regimen.   R - No EYAL. D/c IVF.   H - Will resume eliquis tomorrow if stable.   I - Vanc and zosyn for left toe wound. Check vanc trough tomorrow.   E - Glucose controlled on ISS  MSK - plan for left toe amputation with podiatry next week.   TLD - RRAL, PIVs, caleb      Total time spent in the critical care of this patient today (excluding teaching & procedures): 35 minutes    Over 50% of the total time was spent in discussion and coordination of care with consulting services, dietary and rehab services.    Ledy Mattson MD  Surgical Critical Care
ATTENDING ATTESTATION:    83F history of CAD, pAfib on eliquis, severe AS awaiting TAVR, PAD c/b chronic left toe wound s/p left iliofemoral bypass with SVG and dacron jump graft (8/31/23). Post-op course notable for postoperative vasodilatory shock requiring low dose phenylephrine. Overall improved.     N - AAOx4. Pain controlled.   C - Off phenylephrine. Resume home metoprolol dose. Home amiodarone. Home aspirin. Hold ARB.   P - On room air.   G - Advance diet. Bowel regimen.   R - No EYAL.   H - Resume home eliquis for afib.   I - Vanc and zosyn for left toe wound. Check vanc trough today.  E - Glucose controlled on ISS  MSK - plan for left toe amputation with podiatry next week.   TLD - PIVs, d/c caleb  DISPO - transfer to vascular floor, full code      Total time spent in the critical care of this patient today (excluding teaching & procedures): 35 minutes    Over 50% of the total time was spent in discussion and coordination of care with consulting services, dietary and rehab services.    Ledy Mattson MD  Surgical Critical Care
Pt is stable  preop for L 1st toe amp tomorrow   cont Hep gtt and ASA
pvd with tissue loss  vein mapping reviewed  will plan for open revasc on Thursday

## 2023-09-05 NOTE — CONSULT NOTE ADULT - SUBJECTIVE AND OBJECTIVE BOX
Wound Surgery Consult Note:    HPI:  84 y/o female with a PMHx of medically managed CAD (prox LM 40%, prox LAD 40%, distal LAD 50%, Cx mild, distal % ), paroxysmal AF on Eliquis (last dose 8/22/2023 PM), severe AS (EFRAIN 0.69 sqcm) pending TAVR (transcatheter aortic valve replacement), right internal carotid stenosis, PAD with chronic left first toe wound, HTN, HLD, hypothyroidism, and anxiety presents for elective left lower extremity angiogram. Pt has had a wound in her left first toe for the past several months. Pt admits to intermittent "burning" pain in her toe and her left leg from her calf to her foot. Pt elicits that her pain is made worse with activity and improved with rest. Pt takes Tylenol as needed for the pain which provides mild to moderate relief. Pt was recently hospitalized at [facility]. X-ray of the left foot was negative for acute osteomyelitis. MONTY/PVR revealed moderate arterial flow limitation in the right lower extremity localizing to the femoropopliteal distribution with PVR waveforms suggesting flow-limiting disease in the iliofemoral distribution and small vessel disease in the left foot. Pt denies fever, chills, recent travel, headache, dizziness, visual deficits, chest pain, shortness of breath, orthopnea, palpitations, abdominal pain, N/V/D/C, hematochezia, melena, dysuria, hematuria, LOC, syncope. Pt now presented for left lower extremity angiogram, transfer from [facility] for TAVR work up.  (25 Aug 2023 07:40)    Request for wound care consult for sacral/bilateral buttocks skin breakdown received from nursing. Ms. Kelley was encountered on an alternating air with low air loss surface. She is occasionally incontinent of mushy/liquid stool and urine.   Her extreme immobility, inactivity, gross incontinence of stool as well as poor nutritional status all contribute to her high risk for pressure injury development and hinder healing. She stated that since her buttocks/sacrum started hurting, she has made an effort not to move too much in bed. I explained that this is likely making the pain in her buttocks/sacrum worse and advised her to move frequently and turn onto her sides as much as possible. She stated that she prefers to sit in a straight back chair and one was brought into her room for her to use. Principles of pressure injury prevention and treatment including but not limited to offloading and turning and repositioning review with patient.     PAST MEDICAL & SURGICAL HISTORY:  HTN (hypertension)  HLD (hyperlipidemia)  Anxiety  CAD (coronary artery disease)  PAD (peripheral artery disease)  Hypothyroidism  AF (atrial fibrillation)  Carotid artery stenosis  AS (aortic stenosis)  Status post closed fracture of right femur    REVIEW OF SYSTEMS:    Constitutional:        [x ] negative [ ] fevers [ ] chills [ ] weight loss [ ] weight gain  [ ] fatigue  HEENT:                  [x ] negative [ ] dry eyes [ ] eye irritation [ ] postnasal drip [ ] nasal congestion   CV:                         [ x] negative  [ ] chest pain [ ] orthopnea [ ] palpitations [ ] tachycardia  Resp:                     [ x] negative [ ] cough [ ] shortness of breath [ ] dyspnea [ ] wheezing [ ] sputum [ ] hemoptysis  GI:                          [ ] negative [ ] nausea [ ] vomiting [ ] diarrhea [ ] constipation [ ] abd pain [ ] dysphagia [x ] incontinent of bowel  :                        [ ] negative [ ] dysuria [ ] nocturia [ ] hematuria [ ] increased urinary frequency [ x] incontinent of urine  Musculoskeletal:     [ ] negative [ ] back pain [ ] myalgias [ ] arthralgias [ ] fracture [x ] ambulatory  [ ] non-ambulatory  Skin:                       [ ] negative [ ] rash [ ] itch [x ] wound [x ] skin discoloration  Neurological:        [x ] negative [ ] headache [ ] dizziness [ ] syncope [ ] weakness [ ] numbness  Psychiatric:           [ ] negative [x ] anxiety [ ] depression  Endocrine:            [ ] negative [ ] diabetes [x ] thyroid problem  Heme/Lymph:      [x ] negative [ ] anemia [ ] bleeding problem  Allergic/Immune: [x ] negative [ ] itchy eyes [ ] nasal discharge [ ] hives [ ] angioedema    [x ] All other systems negative    MEDICATIONS  (STANDING):  aMIOdarone    Tablet 100 milliGRAM(s) Oral every 24 hours  aspirin enteric coated 81 milliGRAM(s) Oral daily  atorvastatin 20 milliGRAM(s) Oral at bedtime  heparin  Infusion 1100 Unit(s)/Hr (11 mL/Hr) IV Continuous <Continuous>  insulin lispro (ADMELOG) corrective regimen sliding scale   SubCutaneous at bedtime  levothyroxine 50 MICROGram(s) Oral daily  metoprolol tartrate 25 milliGRAM(s) Oral every 12 hours  polyethylene glycol 3350 17 Gram(s) Oral daily  senna 2 Tablet(s) Oral at bedtime    MEDICATIONS  (PRN):  acetaminophen     Tablet .. 975 milliGRAM(s) Oral every 6 hours PRN Mild Pain (1 - 3)  oxyCODONE    IR 5 milliGRAM(s) Oral every 4 hours PRN Severe Pain (7 - 10)  oxyCODONE    IR 2.5 milliGRAM(s) Oral every 4 hours PRN Moderate Pain (4 - 6)    Allergies    sulfa drugs (Unknown)  latex (Unknown)    Intolerances    SOCIAL HISTORY:  , Denies smoking, ETOH, drugs    FAMILY HISTORY:  Family history of myocardial infarction (Father, Sibling)    Vital Signs Last 24 Hrs  T(C): 36.8 (05 Sep 2023 10:04), Max: 37.4 (05 Sep 2023 05:15)  T(F): 98.3 (05 Sep 2023 10:04), Max: 99.3 (05 Sep 2023 05:15)  HR: 83 (05 Sep 2023 10:04) (83 - 95)  BP: 131/80 (05 Sep 2023 10:04) (97/66 - 131/80)  BP(mean): --  RR: 18 (05 Sep 2023 10:04) (18 - 18)  SpO2: 99% (05 Sep 2023 10:04) (95% - 99%)    Parameters below as of 05 Sep 2023 10:04  Patient On (Oxygen Delivery Method): room air    PHYSICAL EXAM:  General: alert, WN  Ophthamology: clear sclera, no drainage  ENMT: neck supple, mucosal membranes moist  Respiratory: BL chest rise equal, no accessory muscle use  : draining clear yellow urine  GI: abd ND/NT  Neurology: verbal, following commands  Vascular: BLE edema equal  MSK: no contractions  Psych: calm, appropriate  Skin: Sacrum/bilateral buttocks with deep maroon discolored skin in and around the gluteal cleft with small area of broken skin centrally, L 4cm x W 5cm X D 0.1cm, no necrotic tissue, scant serosanguinous drainage  No erythema, induration, fluctuance, increase warmth, tenderness    LABS:  09-05    134<L>  |  99  |  20  ----------------------------<  127<H>  3.8   |  25  |  0.82    Ca    7.8<L>      05 Sep 2023 07:19  Phos  2.5     09-05  Mg     2.1     09-05                            8.2    11.35 )-----------( 265      ( 05 Sep 2023 07:15 )             24.6     PTT - ( 05 Sep 2023 09:02 )  PTT:160.9 sec  Urinalysis Basic - ( 05 Sep 2023 07:19 )    Color: x / Appearance: x / SG: x / pH: x  Gluc: 127 mg/dL / Ketone: x  / Bili: x / Urobili: x   Blood: x / Protein: x / Nitrite: x   Leuk Esterase: x / RBC: x / WBC x   Sq Epi: x / Non Sq Epi: x / Bacteria: x

## 2023-09-05 NOTE — CONSULT NOTE ADULT - CONSULT REASON
LLE bypass surgery w/ 2L EBL and pressor requirement
Foot wound
Medical Clearance
sacral/bilateral buttocks skin damage
L foot wounds
Pre-operative optimization

## 2023-09-06 ENCOUNTER — TRANSCRIPTION ENCOUNTER (OUTPATIENT)
Age: 83
End: 2023-09-06

## 2023-09-06 ENCOUNTER — RESULT REVIEW (OUTPATIENT)
Age: 83
End: 2023-09-06

## 2023-09-06 ENCOUNTER — APPOINTMENT (OUTPATIENT)
Dept: VASCULAR SURGERY | Facility: CLINIC | Age: 83
End: 2023-09-06

## 2023-09-06 LAB
ANION GAP SERPL CALC-SCNC: 9 MMOL/L — SIGNIFICANT CHANGE UP (ref 5–17)
APTT BLD: 79.9 SEC — HIGH (ref 24.5–35.6)
APTT BLD: 88.3 SEC — HIGH (ref 24.5–35.6)
BLD GP AB SCN SERPL QL: NEGATIVE — SIGNIFICANT CHANGE UP
BUN SERPL-MCNC: 17 MG/DL — SIGNIFICANT CHANGE UP (ref 7–23)
CALCIUM SERPL-MCNC: 8.3 MG/DL — LOW (ref 8.4–10.5)
CHLORIDE SERPL-SCNC: 99 MMOL/L — SIGNIFICANT CHANGE UP (ref 96–108)
CO2 SERPL-SCNC: 26 MMOL/L — SIGNIFICANT CHANGE UP (ref 22–31)
CREAT SERPL-MCNC: 0.69 MG/DL — SIGNIFICANT CHANGE UP (ref 0.5–1.3)
EGFR: 87 ML/MIN/1.73M2 — SIGNIFICANT CHANGE UP
GLUCOSE SERPL-MCNC: 142 MG/DL — HIGH (ref 70–99)
GRAM STN FLD: SIGNIFICANT CHANGE UP
HCT VFR BLD CALC: 25.3 % — LOW (ref 34.5–45)
HGB BLD-MCNC: 8.5 G/DL — LOW (ref 11.5–15.5)
INR BLD: 1.03 RATIO — SIGNIFICANT CHANGE UP (ref 0.85–1.18)
MAGNESIUM SERPL-MCNC: 2 MG/DL — SIGNIFICANT CHANGE UP (ref 1.6–2.6)
MCHC RBC-ENTMCNC: 32 PG — SIGNIFICANT CHANGE UP (ref 27–34)
MCHC RBC-ENTMCNC: 33.6 GM/DL — SIGNIFICANT CHANGE UP (ref 32–36)
MCV RBC AUTO: 95.1 FL — SIGNIFICANT CHANGE UP (ref 80–100)
NRBC # BLD: 0 /100 WBCS — SIGNIFICANT CHANGE UP (ref 0–0)
PHOSPHATE SERPL-MCNC: 2.7 MG/DL — SIGNIFICANT CHANGE UP (ref 2.5–4.5)
PLATELET # BLD AUTO: 300 K/UL — SIGNIFICANT CHANGE UP (ref 150–400)
POTASSIUM SERPL-MCNC: 3.8 MMOL/L — SIGNIFICANT CHANGE UP (ref 3.5–5.3)
POTASSIUM SERPL-SCNC: 3.8 MMOL/L — SIGNIFICANT CHANGE UP (ref 3.5–5.3)
PROTHROM AB SERPL-ACNC: 11.3 SEC — SIGNIFICANT CHANGE UP (ref 9.5–13)
RBC # BLD: 2.66 M/UL — LOW (ref 3.8–5.2)
RBC # FLD: 16 % — HIGH (ref 10.3–14.5)
RH IG SCN BLD-IMP: POSITIVE — SIGNIFICANT CHANGE UP
SODIUM SERPL-SCNC: 134 MMOL/L — LOW (ref 135–145)
SPECIMEN SOURCE: SIGNIFICANT CHANGE UP
WBC # BLD: 13.4 K/UL — HIGH (ref 3.8–10.5)
WBC # FLD AUTO: 13.4 K/UL — HIGH (ref 3.8–10.5)

## 2023-09-06 PROCEDURE — 73630 X-RAY EXAM OF FOOT: CPT | Mod: 26,LT

## 2023-09-06 PROCEDURE — 88311 DECALCIFY TISSUE: CPT | Mod: 26

## 2023-09-06 PROCEDURE — 88305 TISSUE EXAM BY PATHOLOGIST: CPT | Mod: 26

## 2023-09-06 RX ORDER — POTASSIUM PHOSPHATE, MONOBASIC POTASSIUM PHOSPHATE, DIBASIC 236; 224 MG/ML; MG/ML
30 INJECTION, SOLUTION INTRAVENOUS ONCE
Refills: 0 | Status: COMPLETED | OUTPATIENT
Start: 2023-09-06 | End: 2023-09-06

## 2023-09-06 RX ORDER — ACETAMINOPHEN 500 MG
3 TABLET ORAL
Qty: 0 | Refills: 0 | DISCHARGE
Start: 2023-09-06

## 2023-09-06 RX ORDER — MORPHINE SULFATE 50 MG/1
2 CAPSULE, EXTENDED RELEASE ORAL EVERY 4 HOURS
Refills: 0 | Status: DISCONTINUED | OUTPATIENT
Start: 2023-09-06 | End: 2023-09-08

## 2023-09-06 RX ORDER — ONDANSETRON 8 MG/1
4 TABLET, FILM COATED ORAL ONCE
Refills: 0 | Status: DISCONTINUED | OUTPATIENT
Start: 2023-09-06 | End: 2023-09-06

## 2023-09-06 RX ORDER — LANOLIN ALCOHOL/MO/W.PET/CERES
5 CREAM (GRAM) TOPICAL AT BEDTIME
Refills: 0 | Status: DISCONTINUED | OUTPATIENT
Start: 2023-09-06 | End: 2023-09-08

## 2023-09-06 RX ORDER — OXYCODONE AND ACETAMINOPHEN 5; 325 MG/1; MG/1
1 TABLET ORAL EVERY 4 HOURS
Refills: 0 | Status: DISCONTINUED | OUTPATIENT
Start: 2023-09-06 | End: 2023-09-08

## 2023-09-06 RX ORDER — INSULIN LISPRO 100/ML
VIAL (ML) SUBCUTANEOUS EVERY 6 HOURS
Refills: 0 | Status: DISCONTINUED | OUTPATIENT
Start: 2023-09-06 | End: 2023-09-06

## 2023-09-06 RX ORDER — SODIUM CHLORIDE 9 MG/ML
1000 INJECTION, SOLUTION INTRAVENOUS
Refills: 0 | Status: DISCONTINUED | OUTPATIENT
Start: 2023-09-06 | End: 2023-09-06

## 2023-09-06 RX ADMIN — ROSUVASTATIN CALCIUM 5 MILLIGRAM(S): 5 TABLET ORAL at 22:10

## 2023-09-06 RX ADMIN — Medication 975 MILLIGRAM(S): at 21:30

## 2023-09-06 RX ADMIN — AMIODARONE HYDROCHLORIDE 100 MILLIGRAM(S): 400 TABLET ORAL at 06:28

## 2023-09-06 RX ADMIN — Medication 25 MILLIGRAM(S): at 17:07

## 2023-09-06 RX ADMIN — Medication 81 MILLIGRAM(S): at 14:07

## 2023-09-06 RX ADMIN — Medication 50 MICROGRAM(S): at 06:27

## 2023-09-06 RX ADMIN — Medication 1 TABLET(S): at 17:39

## 2023-09-06 RX ADMIN — POTASSIUM PHOSPHATE, MONOBASIC POTASSIUM PHOSPHATE, DIBASIC 83.33 MILLIMOLE(S): 236; 224 INJECTION, SOLUTION INTRAVENOUS at 08:53

## 2023-09-06 RX ADMIN — HEPARIN SODIUM 11 UNIT(S)/HR: 5000 INJECTION INTRAVENOUS; SUBCUTANEOUS at 02:23

## 2023-09-06 RX ADMIN — Medication 25 MILLIGRAM(S): at 06:28

## 2023-09-06 RX ADMIN — Medication 5 MILLIGRAM(S): at 22:27

## 2023-09-06 RX ADMIN — Medication 975 MILLIGRAM(S): at 20:36

## 2023-09-06 NOTE — PROGRESS NOTE ADULT - ASSESSMENT
- Pt is scheduled for the OR at 10am with Dr. Perry for Left foot partial 1st ray resection, pt is aware of procedure and is NPO since midnight  - Latest vitals and labs including PT/PTT/INR  - Consent signed and in chart  - EKG in chart  - Chest X-ray on sunrise  - Medical/Cardiac clearance since 8/29 and documented in chart  - Procedure was explained to the patient in detail. All alternatives, risks and complications were discussed. All questions answered.  - Pt is scheduled for the OR at 10am with Dr. Perry for Left foot partial 1st ray resection, pt is aware of procedure and is NPO since midnight  - Consent signed and in chart  - EKG in chart  - Chest X-ray on sunrise  - Medical/Cardiac clearance since 8/29 and documented in chart  - Procedure was explained to the patient in detail. All alternatives, risks and complications were discussed. All questions answered.

## 2023-09-06 NOTE — BRIEF OPERATIVE NOTE - OPERATION/FINDINGS
left ileoprofunda bypass 8mm Dacron  jump graft with ipsilateral reversed SVG to peroneal
resection of left hallux distal to IPJ. No abscess or tracking sinuses, plantar fat pad largely viable. No bone necrosis at level of resection, dense quality. Low concern for residual infection, low concern for viability

## 2023-09-06 NOTE — PROGRESS NOTE ADULT - SUBJECTIVE AND OBJECTIVE BOX
VASCULAR SURGERY DAILY PROGRESS NOTE:     SUBJECTIVE/ROS:     Overnight: no acute events   Patient seen and evaluated on AM rounds.  Patient states pain is controlled. She has been OOB and did some walking yesterday.        OBJECTIVE:  Vital Signs Last 24 Hrs  T(C): 36.7 (06 Sep 2023 06:24), Max: 37.2 (05 Sep 2023 13:40)  T(F): 98.1 (06 Sep 2023 06:24), Max: 98.9 (05 Sep 2023 13:40)  HR: 83 (06 Sep 2023 06:24) (83 - 93)  BP: 134/73 (06 Sep 2023 06:24) (108/61 - 134/74)  BP(mean): --  RR: 17 (06 Sep 2023 06:24) (17 - 18)  SpO2: 94% (06 Sep 2023 06:24) (94% - 99%)    Parameters below as of 06 Sep 2023 06:24  Patient On (Oxygen Delivery Method): room air      I&O's Detail    05 Sep 2023 07:01  -  06 Sep 2023 07:00  --------------------------------------------------------  IN:    Oral Fluid: 1580 mL  Total IN: 1580 mL    OUT:    Bulb (mL): 35 mL  Total OUT: 35 mL    Total NET: 1545 mL        Daily     Daily   MEDICATIONS  (STANDING):  aMIOdarone    Tablet 100 milliGRAM(s) Oral every 24 hours  aspirin enteric coated 81 milliGRAM(s) Oral daily  heparin  Infusion 1100 Unit(s)/Hr (11 mL/Hr) IV Continuous <Continuous>  insulin lispro (ADMELOG) corrective regimen sliding scale   SubCutaneous at bedtime  insulin lispro (ADMELOG) corrective regimen sliding scale   SubCutaneous every 6 hours  levothyroxine 50 MICROGram(s) Oral daily  metoprolol tartrate 25 milliGRAM(s) Oral every 12 hours  polyethylene glycol 3350 17 Gram(s) Oral daily  rosuvastatin 5 milliGRAM(s) Oral at bedtime  senna 2 Tablet(s) Oral at bedtime    MEDICATIONS  (PRN):  acetaminophen     Tablet .. 975 milliGRAM(s) Oral every 6 hours PRN Mild Pain (1 - 3)  oxyCODONE    IR 2.5 milliGRAM(s) Oral every 4 hours PRN Moderate Pain (4 - 6)  oxyCODONE    IR 5 milliGRAM(s) Oral every 4 hours PRN Severe Pain (7 - 10)      Labs:                        8.2    11.35 )-----------( 265      ( 05 Sep 2023 07:15 )             24.6     09-06    134<L>  |  99  |  17  ----------------------------<  142<H>  3.8   |  26  |  0.69    Ca    8.3<L>      06 Sep 2023 06:54  Phos  2.7     09-06  Mg     2.0     09-06      PT/INR - ( 06 Sep 2023 06:54 )   PT: 11.3 sec;   INR: 1.03 ratio         PTT - ( 06 Sep 2023 06:54 )  PTT:88.3 sec  Urinalysis Basic - ( 06 Sep 2023 06:54 )    Color: x / Appearance: x / SG: x / pH: x  Gluc: 142 mg/dL / Ketone: x  / Bili: x / Urobili: x   Blood: x / Protein: x / Nitrite: x   Leuk Esterase: x / RBC: x / WBC x   Sq Epi: x / Non Sq Epi: x / Bacteria: x              PHYSICAL EXAM:  Constitutional: Well developed, well nourished, NAD  Pulmonary: Symmetric chest rise bilaterally, no increased WOB  Groin: Soft, nontender, no ecchymosis/hematoma, no erythema, no edema. Gauze & tape mild serous drainage, replaced on AM rounds  Extremities: LLE with gauze and tape covered with ACE wrap. (+) RICHELLE drain with SS output. (+) wound on LLE 1st digit. (+) DP/PT signals. FROM, normal strength, warm to touch, no clubbing/cyanosis/erythema/edema/hematoma.

## 2023-09-06 NOTE — PRE-OP CHECKLIST - WAS PATIENT ON BETA BLOCKER?
Dementia without behavioral disturbance, unspecified dementia type
GERD (gastroesophageal reflux disease)
Hip fracture
Yes
No

## 2023-09-06 NOTE — PRE-ANESTHESIA EVALUATION ADULT - NSANTHPMHFT_GEN_ALL_CORE
82 y/o female with a PMHx of medically managed CAD (prox LM 40%, prox LAD 40%, distal LAD 50%, Cx mild, distal % ), paroxysmal AF on Eliquis (last dose 8/22/2023 PM), severe AS (EFRAIN 0.69 sqcm) pending TAVR (transcatheter aortic valve replacement), right internal carotid stenosis, PAD with chronic left first toe wound, HTN, HLD, hypothyroidism, and anxiety. S/p L iliofemoral bypass graft with vein on 8/31/23 complicated by EBL 2L blood loss s/p 6U pRBC, 1U plt, 3U FFP and requiring pressors, admitted to SICU for hemodynamic monitoring. Now off nataly ggt. S/p 1U pRBC on 9/1/23. Transferred to floor on 9/02.

## 2023-09-06 NOTE — PRE PROCEDURE NOTE - DETAIL OF INTERVAL CHANGES
S/p L iliofemoral bypass graft with vein on 8/31/23 complicated by EBL 2L blood loss s/p 6U pRBC, 1U plt, 3U FFP and requiring pressors, admitted to SICU for hemodynamic monitoring. Now off nataly ggt. S/p 1U pRBC on 9/1/23. Transferred to floor on 9/02.

## 2023-09-06 NOTE — PROGRESS NOTE ADULT - ASSESSMENT
83y Female s/p L iliofemoral bypass graft with vein on 8/31/23 complicated by EBL 2L blood loss s/p 6U pRBC, 1U plt, 3U FFP and requiring pressors, admitted to SICU for hemodynamic monitoring. Now off nataly ggt. S/p 1U pRBC on 9/1/23. Transferred to floor on 9/02. Recovering appropriately.     - OR today with podiatry for L foot partial hallux resection    - Heparin gtt, dc prior to surgery   - VT prophylaxis w/ SCD R leg  - Cont RICHELLE drain, monitor output   - Pain control as needed  - NPO  - OOB   - follow up AM labs       Vascular Surgery  [id number]

## 2023-09-06 NOTE — PRE-OP CHECKLIST - BOWEL PREP
MWV completed, HM items reviewed and updated. Pneumo 13 given and CT lung screening ordered. Patient refused shingrix wait list and detailed note of lexapro medication [she is not taking]. Patient scheduled to see you in August. 
n/a
n/a

## 2023-09-06 NOTE — BRIEF OPERATIVE NOTE - NSICDXBRIEFPREOP_GEN_ALL_CORE_FT
PRE-OP DIAGNOSIS:  Peripheral arterial disease 31-Aug-2023 18:31:41  Robert Nickerson  
PRE-OP DIAGNOSIS:  Osteomyelitis of great toe of left foot 06-Sep-2023 12:09:01  Daniel Morris

## 2023-09-06 NOTE — BRIEF OPERATIVE NOTE - NSICDXBRIEFPROCEDURE_GEN_ALL_CORE_FT
PROCEDURES:  Partial amputation of first ray of left foot by open approach 06-Sep-2023 12:08:42  Daniel Morris  
PROCEDURES:  Iliofemoral bypass graft with vein 31-Aug-2023 18:31:16  Robert Nickerson  Creation, bypass, arterial, femoral to peroneal 31-Aug-2023 18:31:32  Robert Nickerson

## 2023-09-06 NOTE — PRE-ANESTHESIA EVALUATION ADULT - NSANTHOSAYNRD_GEN_A_CORE
No. MICHAEL screening performed.  STOP BANG Legend: 0-2 = LOW Risk; 3-4 = INTERMEDIATE Risk; 5-8 = HIGH Risk
No. MICHAEL screening performed.  STOP BANG Legend: 0-2 = LOW Risk; 3-4 = INTERMEDIATE Risk; 5-8 = HIGH Risk

## 2023-09-06 NOTE — BRIEF OPERATIVE NOTE - NSICDXBRIEFPOSTOP_GEN_ALL_CORE_FT
POST-OP DIAGNOSIS:  Osteomyelitis of great toe of left foot 06-Sep-2023 12:09:14  Daniel Morris  
POST-OP DIAGNOSIS:  Peripheral arterial disease 31-Aug-2023 18:31:44  Robert Nickerson

## 2023-09-06 NOTE — BRIEF OPERATIVE NOTE - COMMENTS
Pt is s/p left partial hallux amputation  - Low concern for residual bone infection, low concern for viability  - Stability for discharge from podiatry pending appearance of surgical site tomorrow   - Recommend 7 days PO Augmentin 875 BID

## 2023-09-06 NOTE — BRIEF OPERATIVE NOTE - SPECIMENS
Pathology: left hallux dirty bone, left hallux clean bone margin Micro: left hallux clean bone margin
none

## 2023-09-06 NOTE — PROGRESS NOTE ADULT - SUBJECTIVE AND OBJECTIVE BOX
Patient is a 83y old  Female who presents with a chief complaint of procedure (05 Sep 2023 12:15)       INTERVAL HPI/OVERNIGHT EVENTS:  Patient seen and evaluated at bedside.  Pt is resting comfortable in NAD. Denies N/V/F/C.    Allergies    sulfa drugs (Unknown)  latex (Unknown)    Intolerances        Vital Signs Last 24 Hrs  T(C): 36.7 (06 Sep 2023 06:24), Max: 37.2 (05 Sep 2023 13:40)  T(F): 98.1 (06 Sep 2023 06:24), Max: 98.9 (05 Sep 2023 13:40)  HR: 83 (06 Sep 2023 06:24) (83 - 93)  BP: 134/73 (06 Sep 2023 06:24) (108/61 - 134/74)  BP(mean): --  RR: 17 (06 Sep 2023 06:24) (17 - 18)  SpO2: 94% (06 Sep 2023 06:24) (94% - 99%)    Parameters below as of 06 Sep 2023 06:24  Patient On (Oxygen Delivery Method): room air        LABS:                        8.2    11.35 )-----------( 265      ( 05 Sep 2023 07:15 )             24.6     09-05    134<L>  |  99  |  20  ----------------------------<  127<H>  3.8   |  25  |  0.82    Ca    7.8<L>      05 Sep 2023 07:19  Phos  2.5     09-05  Mg     2.1     09-05      PTT - ( 06 Sep 2023 01:20 )  PTT:79.9 sec  Urinalysis Basic - ( 05 Sep 2023 07:19 )    Color: x / Appearance: x / SG: x / pH: x  Gluc: 127 mg/dL / Ketone: x  / Bili: x / Urobili: x   Blood: x / Protein: x / Nitrite: x   Leuk Esterase: x / RBC: x / WBC x   Sq Epi: x / Non Sq Epi: x / Bacteria: x      CAPILLARY BLOOD GLUCOSE      POCT Blood Glucose.: 224 mg/dL (05 Sep 2023 21:29)

## 2023-09-06 NOTE — PRE-OP CHECKLIST - 1.
emotional support provided to patient, pre op instruction and orientation to procedure provided to patient
patient oriented to unit and procedure

## 2023-09-07 ENCOUNTER — TRANSCRIPTION ENCOUNTER (OUTPATIENT)
Age: 83
End: 2023-09-07

## 2023-09-07 LAB
ANION GAP SERPL CALC-SCNC: 7 MMOL/L — SIGNIFICANT CHANGE UP (ref 5–17)
BUN SERPL-MCNC: 15 MG/DL — SIGNIFICANT CHANGE UP (ref 7–23)
CALCIUM SERPL-MCNC: 7.8 MG/DL — LOW (ref 8.4–10.5)
CHLORIDE SERPL-SCNC: 100 MMOL/L — SIGNIFICANT CHANGE UP (ref 96–108)
CO2 SERPL-SCNC: 26 MMOL/L — SIGNIFICANT CHANGE UP (ref 22–31)
CREAT SERPL-MCNC: 0.68 MG/DL — SIGNIFICANT CHANGE UP (ref 0.5–1.3)
EGFR: 87 ML/MIN/1.73M2 — SIGNIFICANT CHANGE UP
GLUCOSE SERPL-MCNC: 126 MG/DL — HIGH (ref 70–99)
HCT VFR BLD CALC: 25.4 % — LOW (ref 34.5–45)
HGB BLD-MCNC: 8.1 G/DL — LOW (ref 11.5–15.5)
MAGNESIUM SERPL-MCNC: 2 MG/DL — SIGNIFICANT CHANGE UP (ref 1.6–2.6)
MCHC RBC-ENTMCNC: 31.2 PG — SIGNIFICANT CHANGE UP (ref 27–34)
MCHC RBC-ENTMCNC: 31.9 GM/DL — LOW (ref 32–36)
MCV RBC AUTO: 97.7 FL — SIGNIFICANT CHANGE UP (ref 80–100)
NRBC # BLD: 0 /100 WBCS — SIGNIFICANT CHANGE UP (ref 0–0)
PHOSPHATE SERPL-MCNC: 3.5 MG/DL — SIGNIFICANT CHANGE UP (ref 2.5–4.5)
PLATELET # BLD AUTO: 320 K/UL — SIGNIFICANT CHANGE UP (ref 150–400)
POTASSIUM SERPL-MCNC: 4.5 MMOL/L — SIGNIFICANT CHANGE UP (ref 3.5–5.3)
POTASSIUM SERPL-SCNC: 4.5 MMOL/L — SIGNIFICANT CHANGE UP (ref 3.5–5.3)
RBC # BLD: 2.6 M/UL — LOW (ref 3.8–5.2)
RBC # FLD: 16.4 % — HIGH (ref 10.3–14.5)
SODIUM SERPL-SCNC: 133 MMOL/L — LOW (ref 135–145)
WBC # BLD: 12.68 K/UL — HIGH (ref 3.8–10.5)
WBC # FLD AUTO: 12.68 K/UL — HIGH (ref 3.8–10.5)

## 2023-09-07 RX ORDER — APIXABAN 2.5 MG/1
2.5 TABLET, FILM COATED ORAL EVERY 12 HOURS
Refills: 0 | Status: DISCONTINUED | OUTPATIENT
Start: 2023-09-07 | End: 2023-09-08

## 2023-09-07 RX ADMIN — Medication 50 MICROGRAM(S): at 05:11

## 2023-09-07 RX ADMIN — Medication 975 MILLIGRAM(S): at 22:50

## 2023-09-07 RX ADMIN — Medication 25 MILLIGRAM(S): at 08:43

## 2023-09-07 RX ADMIN — APIXABAN 2.5 MILLIGRAM(S): 2.5 TABLET, FILM COATED ORAL at 12:29

## 2023-09-07 RX ADMIN — Medication 975 MILLIGRAM(S): at 21:50

## 2023-09-07 RX ADMIN — Medication 5 MILLIGRAM(S): at 21:55

## 2023-09-07 RX ADMIN — Medication 81 MILLIGRAM(S): at 12:29

## 2023-09-07 RX ADMIN — Medication 1 TABLET(S): at 17:52

## 2023-09-07 RX ADMIN — Medication 1 TABLET(S): at 08:43

## 2023-09-07 RX ADMIN — AMIODARONE HYDROCHLORIDE 100 MILLIGRAM(S): 400 TABLET ORAL at 08:43

## 2023-09-07 RX ADMIN — APIXABAN 2.5 MILLIGRAM(S): 2.5 TABLET, FILM COATED ORAL at 17:52

## 2023-09-07 RX ADMIN — ROSUVASTATIN CALCIUM 5 MILLIGRAM(S): 5 TABLET ORAL at 21:49

## 2023-09-07 NOTE — DISCHARGE NOTE NURSING/CASE MANAGEMENT/SOCIAL WORK - NSDCVIVACCINE_GEN_ALL_CORE_FT
No Vaccines Administered. influenza, high-dose, quadrivalent; 08-Sep-2023 12:49; Amy Gaona); [employer]; [id number] (Exp. Date: 06-Sep-2024); IntraMuscular; Deltoid Left.; 0.7 milliLiter(s); VIS (VIS Published: 06-Aug-2021, VIS Presented: 08-Sep-2023);

## 2023-09-07 NOTE — PROGRESS NOTE ADULT - SUBJECTIVE AND OBJECTIVE BOX
Patient is a 83y old  Female who presents with a chief complaint of procedure (05 Sep 2023 12:15)       INTERVAL HPI/OVERNIGHT EVENTS:  Patient seen and evaluated at bedside.  Pt is resting comfortable in NAD. Denies N/V/F/C.    Allergies    sulfa drugs (Unknown)  latex (Unknown)    Intolerances        Vital Signs Last 24 Hrs  T(C): 37 (07 Sep 2023 08:38), Max: 37.1 (06 Sep 2023 21:32)  T(F): 98.6 (07 Sep 2023 08:38), Max: 98.7 (06 Sep 2023 21:32)  HR: 97 (07 Sep 2023 08:38) (64 - 97)  BP: 133/70 (07 Sep 2023 08:38) (95/56 - 139/62)  BP(mean): 69 (06 Sep 2023 16:41) (69 - 87)  RR: 20 (07 Sep 2023 08:38) (14 - 20)  SpO2: 94% (07 Sep 2023 08:38) (94% - 99%)    Parameters below as of 07 Sep 2023 08:38  Patient On (Oxygen Delivery Method): room air        LABS:                        8.1    12.68 )-----------( 320      ( 07 Sep 2023 07:13 )             25.4     09-07    133<L>  |  100  |  15  ----------------------------<  126<H>  4.5   |  26  |  0.68    Ca    7.8<L>      07 Sep 2023 07:13  Phos  3.5     09-07  Mg     2.0     09-07      PT/INR - ( 06 Sep 2023 06:54 )   PT: 11.3 sec;   INR: 1.03 ratio         PTT - ( 06 Sep 2023 06:54 )  PTT:88.3 sec  Urinalysis Basic - ( 07 Sep 2023 07:13 )    Color: x / Appearance: x / SG: x / pH: x  Gluc: 126 mg/dL / Ketone: x  / Bili: x / Urobili: x   Blood: x / Protein: x / Nitrite: x   Leuk Esterase: x / RBC: x / WBC x   Sq Epi: x / Non Sq Epi: x / Bacteria: x      CAPILLARY BLOOD GLUCOSE      POCT Blood Glucose.: 178 mg/dL (06 Sep 2023 21:55)  POCT Blood Glucose.: 134 mg/dL (06 Sep 2023 12:09)      Lower Extremity Physical Exam:  Vascular: DP/PT 0/4, B/L, CFT <3 seconds B/L, Temperature gradient warm to cool, B/L.   Neuro: Epicritic sensation intact to the level of digits, B/L.  Musculoskeletal/Ortho: unremarkable  Skin: 9/6 s/p left foot partial hallux amputation, closed: sutures intact, no dehiscence, flaps warm and viable, no hematoma formation. Left foot digits 2-5 nailbed eschars with no acute signs of infection. Right foot no open wounds, no acute signs of infection.    RADIOLOGY & ADDITIONAL TESTS:

## 2023-09-07 NOTE — PROGRESS NOTE ADULT - ASSESSMENT
83y Female s/p L iliofemoral bypass graft with vein on 8/31/23 complicated by EBL 2L blood loss s/p 6U pRBC, 1U plt, 3U FFP and requiring pressors, admitted to SICU for hemodynamic monitoring. Now off nataly ggt. S/p 1U pRBC on 9/1/23. Transferred to floor on 9/02. Recovering appropriately. S/p Partial amputation of first ray of left foot by open approach 2/2 OM of great toe of left foot.    Plan:   POD#1  - restart home eliquis today  - Cont RICHELLE drain, monitor output, possible removal 9/8 if no increase of bleeding  - Pain control as needed  - OOB   - PT to reassess  - Dispo- DCP tomorrow      Vascular Surgery  [id number]   83y Female s/p L iliofemoral bypass graft with vein on 8/31/23 complicated by EBL 2L blood loss s/p 6U pRBC, 1U plt, 3U FFP and requiring pressors, admitted to SICU for hemodynamic monitoring. Now off nataly ggt. S/p 1U pRBC on 9/1/23. Transferred to floor on 9/02. Recovering appropriately. S/p Partial amputation of first ray of left foot by open approach 2/2 OM of great toe of left foot.    Plan:   POD#1  - restart home eliquis today  - Cont RICHELLE drain, monitor output, possible removal 9/8 if no increase of bleeding  - Pain control as needed  - OOB   - PT to reassess  - Recommend 7 days PO Augmentin 875 BID on discharge  - Dispo- DCP tomorrow      Vascular Surgery  [id number]

## 2023-09-07 NOTE — PROGRESS NOTE ADULT - ASSESSMENT
83F presents with left foot wounds.  - Patient seen and evaluated.  - Afebrile, WBC 12.68.  - 9/6 s/p left foot partial hallux amputation, closed: sutures intact, no dehiscence, flaps warm and viable, no hematoma formation. Left foot digits 2-5 nailbed eschars with no acute signs of infection.   - Intraop Findings: Low concern for residual infection and viability.  - OR data to be followed outpatient.  - Pt is stable for discharge from the podiatry standpoint.  - Wound care information and followup instructions listed in discharge provider note.   - Discussed with attending. 83F s/p left foot partial hallux amputation, closed (DOS 9/6).  - Patient seen and evaluated.  - Afebrile, WBC 12.68.  - 9/6 s/p left foot partial hallux amputation, closed: sutures intact, no dehiscence, flaps warm and viable, no hematoma formation. Left foot digits 2-5 nailbed eschars with no acute signs of infection.   - Intraop Findings: Low concern for residual infection and viability.  - OR data to be followed outpatient.  - Pt is stable for discharge from the podiatry standpoint.  - Wound care information and followup instructions listed in discharge provider note.   - Discussed with attending.

## 2023-09-07 NOTE — PROGRESS NOTE ADULT - SUBJECTIVE AND OBJECTIVE BOX
SURGERY DAILY PROGRESS NOTE:     Overnight Events:  No acute events overnight.    SUBJECTIVE: Patient seen and evaluated on AM rounds. Pt is resting comfortably in bed with no complaints. Denies fever, chills, N/V, chest pain, or shortness of breath.    OBJECTIVE:  Vital Signs Last 24 Hrs  T(C): 37 (07 Sep 2023 08:38), Max: 37.1 (06 Sep 2023 09:45)  T(F): 98.6 (07 Sep 2023 08:38), Max: 98.8 (06 Sep 2023 09:45)  HR: 97 (07 Sep 2023 08:38) (64 - 97)  BP: 133/70 (07 Sep 2023 08:38) (95/56 - 139/62)  BP(mean): 69 (06 Sep 2023 16:41) (69 - 87)  RR: 20 (07 Sep 2023 08:38) (14 - 20)  SpO2: 94% (07 Sep 2023 08:38) (94% - 99%)    Parameters below as of 07 Sep 2023 08:38  Patient On (Oxygen Delivery Method): room air      I&O's Detail    06 Sep 2023 07:01  -  07 Sep 2023 07:00  --------------------------------------------------------  IN:  Total IN: 0 mL    OUT:    Bulb (mL): 10 mL  Total OUT: 10 mL    Total NET: -10 mL        Daily Height in cm: 162.56 (06 Sep 2023 09:45)    Daily     LABS:                        8.1    12.68 )-----------( 320      ( 07 Sep 2023 07:13 )             25.4     09-07    133<L>  |  100  |  15  ----------------------------<  126<H>  4.5   |  26  |  0.68    Ca    7.8<L>      07 Sep 2023 07:13  Phos  3.5     09-07  Mg     2.0     09-07      PT/INR - ( 06 Sep 2023 06:54 )   PT: 11.3 sec;   INR: 1.03 ratio         PTT - ( 06 Sep 2023 06:54 )  PTT:88.3 sec  Urinalysis Basic - ( 07 Sep 2023 07:13 )    Color: x / Appearance: x / SG: x / pH: x  Gluc: 126 mg/dL / Ketone: x  / Bili: x / Urobili: x   Blood: x / Protein: x / Nitrite: x   Leuk Esterase: x / RBC: x / WBC x   Sq Epi: x / Non Sq Epi: x / Bacteria: x      PHYSICAL EXAM:  Constitutional: Well developed, well nourished, NAD  Pulmonary: Symmetric chest rise bilaterally, no increased WOB  Groin: Soft, nontender, no ecchymosis/hematoma, no erythema, no edema. Gauze & tape mild serous drainage  Extremities: LLE with gauze and tape covered with ACE wrap. (+) RICHELLE drain with minimal SS output. (+) wound on LLE 1st digit. (+) DP/PT signals. FROM, normal strength, warm to touch, no clubbing/cyanosis/erythema/edema/hematoma

## 2023-09-07 NOTE — DISCHARGE NOTE NURSING/CASE MANAGEMENT/SOCIAL WORK - NSDCFUADDAPPT_GEN_ALL_CORE_FT
Podiatry Discharge Instructions:  Follow up: Please follow up with Dr. Mario Guzmán within 1 week of discharge from the hospital, please call [phone] for appointment and discuss that you recently were seen in the hospital.  Wound Care: Please apply iodosorb to left toe wounds on digits 2, 3, 4, 5, dry sterile gauze to great toe surgical site, followed by leida and ace bandage  Weight bearing: Please weight bear as tolerated in a surgical shoe.  Antibiotics: Please continue as instructed.

## 2023-09-08 VITALS
DIASTOLIC BLOOD PRESSURE: 71 MMHG | SYSTOLIC BLOOD PRESSURE: 128 MMHG | TEMPERATURE: 99 F | RESPIRATION RATE: 18 BRPM | HEART RATE: 90 BPM | OXYGEN SATURATION: 97 %

## 2023-09-08 LAB
ANION GAP SERPL CALC-SCNC: 10 MMOL/L — SIGNIFICANT CHANGE UP (ref 5–17)
BUN SERPL-MCNC: 17 MG/DL — SIGNIFICANT CHANGE UP (ref 7–23)
CALCIUM SERPL-MCNC: 8.5 MG/DL — SIGNIFICANT CHANGE UP (ref 8.4–10.5)
CHLORIDE SERPL-SCNC: 100 MMOL/L — SIGNIFICANT CHANGE UP (ref 96–108)
CO2 SERPL-SCNC: 25 MMOL/L — SIGNIFICANT CHANGE UP (ref 22–31)
CREAT SERPL-MCNC: 0.66 MG/DL — SIGNIFICANT CHANGE UP (ref 0.5–1.3)
EGFR: 88 ML/MIN/1.73M2 — SIGNIFICANT CHANGE UP
GLUCOSE SERPL-MCNC: 129 MG/DL — HIGH (ref 70–99)
HCT VFR BLD CALC: 27.9 % — LOW (ref 34.5–45)
HGB BLD-MCNC: 8.7 G/DL — LOW (ref 11.5–15.5)
MAGNESIUM SERPL-MCNC: 2.1 MG/DL — SIGNIFICANT CHANGE UP (ref 1.6–2.6)
MCHC RBC-ENTMCNC: 31.2 GM/DL — LOW (ref 32–36)
MCHC RBC-ENTMCNC: 31.6 PG — SIGNIFICANT CHANGE UP (ref 27–34)
MCV RBC AUTO: 101.5 FL — HIGH (ref 80–100)
NRBC # BLD: 0 /100 WBCS — SIGNIFICANT CHANGE UP (ref 0–0)
PHOSPHATE SERPL-MCNC: 3.1 MG/DL — SIGNIFICANT CHANGE UP (ref 2.5–4.5)
PLATELET # BLD AUTO: 396 K/UL — SIGNIFICANT CHANGE UP (ref 150–400)
POTASSIUM SERPL-MCNC: 4.6 MMOL/L — SIGNIFICANT CHANGE UP (ref 3.5–5.3)
POTASSIUM SERPL-SCNC: 4.6 MMOL/L — SIGNIFICANT CHANGE UP (ref 3.5–5.3)
RBC # BLD: 2.75 M/UL — LOW (ref 3.8–5.2)
RBC # FLD: 17.5 % — HIGH (ref 10.3–14.5)
SODIUM SERPL-SCNC: 135 MMOL/L — SIGNIFICANT CHANGE UP (ref 135–145)
WBC # BLD: 12.95 K/UL — HIGH (ref 3.8–10.5)
WBC # FLD AUTO: 12.95 K/UL — HIGH (ref 3.8–10.5)

## 2023-09-08 RX ORDER — INFLUENZA VIRUS VACCINE 15; 15; 15; 15 UG/.5ML; UG/.5ML; UG/.5ML; UG/.5ML
0.7 SUSPENSION INTRAMUSCULAR ONCE
Refills: 0 | Status: DISCONTINUED | OUTPATIENT
Start: 2023-09-08 | End: 2023-09-08

## 2023-09-08 RX ORDER — INFLUENZA VIRUS VACCINE 15; 15; 15; 15 UG/.5ML; UG/.5ML; UG/.5ML; UG/.5ML
0.7 SUSPENSION INTRAMUSCULAR ONCE
Refills: 0 | Status: COMPLETED | OUTPATIENT
Start: 2023-09-08 | End: 2023-09-08

## 2023-09-08 RX ORDER — OXYCODONE AND ACETAMINOPHEN 5; 325 MG/1; MG/1
1 TABLET ORAL
Qty: 5 | Refills: 0
Start: 2023-09-08

## 2023-09-08 RX ORDER — METOPROLOL TARTRATE 50 MG
1 TABLET ORAL
Refills: 0 | DISCHARGE

## 2023-09-08 RX ADMIN — Medication 25 MILLIGRAM(S): at 06:37

## 2023-09-08 RX ADMIN — Medication 81 MILLIGRAM(S): at 12:45

## 2023-09-08 RX ADMIN — INFLUENZA VIRUS VACCINE 0.7 MILLILITER(S): 15; 15; 15; 15 SUSPENSION INTRAMUSCULAR at 12:49

## 2023-09-08 RX ADMIN — POLYETHYLENE GLYCOL 3350 17 GRAM(S): 17 POWDER, FOR SOLUTION ORAL at 12:44

## 2023-09-08 RX ADMIN — APIXABAN 2.5 MILLIGRAM(S): 2.5 TABLET, FILM COATED ORAL at 06:37

## 2023-09-08 RX ADMIN — AMIODARONE HYDROCHLORIDE 100 MILLIGRAM(S): 400 TABLET ORAL at 06:36

## 2023-09-08 RX ADMIN — Medication 50 MICROGRAM(S): at 05:52

## 2023-09-08 RX ADMIN — Medication 1 TABLET(S): at 06:37

## 2023-09-08 NOTE — PROGRESS NOTE ADULT - SUBJECTIVE AND OBJECTIVE BOX
SUBJECTIVE: NAEO. AVSS. Pt tolerating diet, ambulating, and voiding. Pain well controlled and no complaints.    Vital Signs Last 24 Hrs  T(C): 37 (08 Sep 2023 05:03), Max: 37.1 (07 Sep 2023 17:02)  T(F): 98.6 (08 Sep 2023 05:03), Max: 98.8 (07 Sep 2023 17:02)  HR: 88 (08 Sep 2023 05:03) (88 - 97)  BP: 129/77 (08 Sep 2023 05:03) (98/52 - 133/70)  BP(mean): --  RR: 18 (08 Sep 2023 05:03) (18 - 20)  SpO2: 97% (08 Sep 2023 05:03) (94% - 97%)    Parameters below as of 08 Sep 2023 05:03  Patient On (Oxygen Delivery Method): room air        I&O's Detail    06 Sep 2023 07:01  -  07 Sep 2023 07:00  --------------------------------------------------------  IN:  Total IN: 0 mL    OUT:    Bulb (mL): 10 mL  Total OUT: 10 mL    Total NET: -10 mL      07 Sep 2023 07:01  -  08 Sep 2023 05:58  --------------------------------------------------------  IN:    Oral Fluid: 580 mL  Total IN: 580 mL    OUT:    Bulb (mL): 20 mL  Total OUT: 20 mL    Total NET: 560 mL      PHYSICAL EXAM:  Constitutional: Well developed, well nourished, NAD  Pulmonary: Symmetric chest rise bilaterally, no increased WOB  Groin: Soft, nontender, no ecchymosis/hematoma, no erythema, no edema. Gauze & tape mild serous drainage  Extremities: LLE with gauze and tape covered with ACE wrap. (+) RICHELLE drain with minimal SS output. (+) wound on LLE 1st digit. (+) DP/PT signals. FROM, normal strength, warm to touch, no clubbing/cyanosis/erythema/edema/hematoma    LABS:                        8.1    12.68 )-----------( 320      ( 07 Sep 2023 07:13 )             25.4     09-07    133<L>  |  100  |  15  ----------------------------<  126<H>  4.5   |  26  |  0.68    Ca    7.8<L>      07 Sep 2023 07:13  Phos  3.5     09-07  Mg     2.0     09-07      PT/INR - ( 06 Sep 2023 06:54 )   PT: 11.3 sec;   INR: 1.03 ratio         PTT - ( 06 Sep 2023 06:54 )  PTT:88.3 sec  Urinalysis Basic - ( 07 Sep 2023 07:13 )    Color: x / Appearance: x / SG: x / pH: x  Gluc: 126 mg/dL / Ketone: x  / Bili: x / Urobili: x   Blood: x / Protein: x / Nitrite: x   Leuk Esterase: x / RBC: x / WBC x   Sq Epi: x / Non Sq Epi: x / Bacteria: x        RADIOLOGY & ADDITIONAL STUDIES:   SUBJECTIVE: NAEO. AVSS. Pt tolerating diet, ambulating, and voiding. Pain well controlled and no complaints.    Vital Signs Last 24 Hrs  T(C): 37 (08 Sep 2023 05:03), Max: 37.1 (07 Sep 2023 17:02)  T(F): 98.6 (08 Sep 2023 05:03), Max: 98.8 (07 Sep 2023 17:02)  HR: 88 (08 Sep 2023 05:03) (88 - 97)  BP: 129/77 (08 Sep 2023 05:03) (98/52 - 133/70)  BP(mean): --  RR: 18 (08 Sep 2023 05:03) (18 - 20)  SpO2: 97% (08 Sep 2023 05:03) (94% - 97%)    Parameters below as of 08 Sep 2023 05:03  Patient On (Oxygen Delivery Method): room air        I&O's Detail    06 Sep 2023 07:01  -  07 Sep 2023 07:00  --------------------------------------------------------  IN:  Total IN: 0 mL    OUT:    Bulb (mL): 10 mL  Total OUT: 10 mL    Total NET: -10 mL      07 Sep 2023 07:01  -  08 Sep 2023 05:58  --------------------------------------------------------  IN:    Oral Fluid: 580 mL  Total IN: 580 mL    OUT:    Bulb (mL): 20 mL  Total OUT: 20 mL    Total NET: 560 mL      PHYSICAL EXAM:  Constitutional: Well developed, well nourished, NAD  Pulmonary: Symmetric chest rise bilaterally, no increased WOB  Groin: Soft, nontender, no ecchymosis/hematoma, no erythema, no edema. Gauze & tape mild serous drainage  Extremities: LLE with gauze and tape covered with ACE wrap. Dressings removed, incisions with overlying staples c/d/i. (+) RICHELLE drain with minimal SS output. RICHLELE removed. FROM, normal strength, warm to touch, no clubbing/cyanosis/erythema/edema/hematoma    LABS:                        8.1    12.68 )-----------( 320      ( 07 Sep 2023 07:13 )             25.4     09-07    133<L>  |  100  |  15  ----------------------------<  126<H>  4.5   |  26  |  0.68    Ca    7.8<L>      07 Sep 2023 07:13  Phos  3.5     09-07  Mg     2.0     09-07      PT/INR - ( 06 Sep 2023 06:54 )   PT: 11.3 sec;   INR: 1.03 ratio         PTT - ( 06 Sep 2023 06:54 )  PTT:88.3 sec  Urinalysis Basic - ( 07 Sep 2023 07:13 )    Color: x / Appearance: x / SG: x / pH: x  Gluc: 126 mg/dL / Ketone: x  / Bili: x / Urobili: x   Blood: x / Protein: x / Nitrite: x   Leuk Esterase: x / RBC: x / WBC x   Sq Epi: x / Non Sq Epi: x / Bacteria: x        RADIOLOGY & ADDITIONAL STUDIES:

## 2023-09-08 NOTE — CHART NOTE - NSCHARTNOTEFT_GEN_A_CORE
Discussed with structural heart team, DEE Mayes, concerning patient's TAVR workup. Patient is known to team and cleared to be discharged home. Patient will receive a phone call to make an appointment and follow up outpatient for further TAVR workup. If patient does not receive a call, she can  [phone] on 9/11 or 9/12 to make an appointment for further workup. Patient was instructed to follow up with structural heart team after discharge.    Vascular Team  [id number]
Wound Care Team Note:    Request for wound care consult for foot wounds received and referred to Podiatry. Will defer to Podiatry for management. Will not follow.    Marisa Fitzgerald NP-C, CWOCN
- Extensive discussion with pt regarding left foot partial first ray resection 2/2 vascular surgery's concern for seeding infection from the left foot.   - Even though there are no acute signs of infection in the left foot, explained that leaving the left foot first ray may become an area of concern for an infection and could seed to proximal areas. Therefore, removing the left hallux would remove any future concerns of infection.   - Answered all pt questions bedside and she requested some time to digest the information and make a decision.   - Discussed with attending.
Vascular Surgery Post op Check    Pt seen and examined without complaints. Pain is well-controlled. Denies SOB/CP/N/V. Recently extubated.    Vital Signs Last 24 Hrs  T(C): 36.1 (31 Aug 2023 19:00), Max: 37.4 (31 Aug 2023 05:00)  T(F): 97 (31 Aug 2023 19:00), Max: 99.4 (31 Aug 2023 05:00)  HR: 90 (31 Aug 2023 22:15) (78 - 99)  BP: 136/59 (31 Aug 2023 06:46) (133/74 - 136/59)  BP(mean): --  RR: 14 (31 Aug 2023 22:15) (12 - 23)  SpO2: 100% (31 Aug 2023 22:15) (94% - 100%)    Parameters below as of 31 Aug 2023 21:07      O2 Concentration (%): 30    I&O's Summary    30 Aug 2023 07:01  -  31 Aug 2023 07:00  --------------------------------------------------------  IN: 720 mL / OUT: 0 mL / NET: 720 mL    31 Aug 2023 07:01  -  31 Aug 2023 23:03  --------------------------------------------------------  IN: 880.9 mL / OUT: 175 mL / NET: 705.9 mL        Physical Exam  Gen: NAD, A&Ox3  Pulm: No respiratory distress, no subcostal retractions  CV: RRR, no JVD  Abd: Soft, NT, ND  Drains: RICHELLE x 1, minimal serosanguinous output  Groin: soft, no evidence of hematoma, dressings c/d  Extremities:  LLE: wound present on big toe, DP 2+ and PT 1+ signals on doppler, motor and sensory intact at foot, leg wrapped in ace from foot and groin; dressing c/d      A/P: 83y Female s/p iliofemoral bypass graft with vein, left leg, complicated by EBL 1000 mL  -Receive excellent SICU care  -DVT prophylaxis w/ SCD R leg  -Strict I&O's  -Monitor RICHELLE drain output  -Analgesia and antiemetics as needed  -Regular Diet  -Monitor overnight  -Dispo: pending

## 2023-09-08 NOTE — PROGRESS NOTE ADULT - PROVIDER SPECIALTY LIST ADULT
Podiatry
Vascular Surgery
Vascular Surgery
Cardiology
Podiatry
Podiatry
SICU
Vascular Surgery
Podiatry
SICU
Vascular Surgery

## 2023-09-08 NOTE — PROGRESS NOTE ADULT - ASSESSMENT
83y Female s/p L iliofemoral bypass graft with vein on 8/31/23 complicated by EBL 2L blood loss s/p 6U pRBC, 1U plt, 3U FFP and requiring pressors, admitted to SICU for hemodynamic monitoring. Now off nataly ggt. S/p 1U pRBC on 9/1/23. Transferred to floor on 9/02. Recovering appropriately. S/p Partial amputation of first ray of left foot by open approach 2/2 OM of great toe of left foot.    Plan:   - Dc RICHELLE drain  - Pain control as needed  - OOB   - PT recommend home PT  - Recommend 7 days PO Augmentin 875 BID on discharge  - Dispo- dc today with home wound care and home PT      Vascular Surgery  [id number] 83y Female s/p L iliofemoral bypass graft with vein on 8/31/23 complicated by EBL 2L blood loss s/p 6U pRBC, 1U plt, 3U FFP and requiring pressors, admitted to SICU for hemodynamic monitoring. Now off nataly ggt. S/p 1U pRBC on 9/1/23. Transferred to floor on 9/02. Recovering appropriately. S/p Partial amputation of first ray of left foot by open approach 2/2 OM of great toe of left foot on 9/7/23.    Plan:   - Dc'ed RICHELLE drain  - Pain control as needed  - OOB   - PT recommend home PT  - 7 days PO Augmentin 875 BID on discharge per podiatry  - Dispo- dc today with home wound care and home PT      Vascular Surgery  [id number]

## 2023-09-11 LAB
CULTURE RESULTS: SIGNIFICANT CHANGE UP
SPECIMEN SOURCE: SIGNIFICANT CHANGE UP

## 2023-09-12 ENCOUNTER — NON-APPOINTMENT (OUTPATIENT)
Age: 83
End: 2023-09-12

## 2023-09-16 ENCOUNTER — NON-APPOINTMENT (OUTPATIENT)
Age: 83
End: 2023-09-16

## 2023-09-22 ENCOUNTER — INPATIENT (INPATIENT)
Facility: HOSPITAL | Age: 83
LOS: 20 days | Discharge: NURSING, REHAB OR LONG-TERM CARE | End: 2023-10-13
Attending: SURGERY | Admitting: SURGERY
Payer: MEDICARE

## 2023-09-22 ENCOUNTER — APPOINTMENT (OUTPATIENT)
Dept: FAMILY MEDICINE | Facility: CLINIC | Age: 83
End: 2023-09-22

## 2023-09-22 ENCOUNTER — APPOINTMENT (OUTPATIENT)
Dept: VASCULAR SURGERY | Facility: CLINIC | Age: 83
End: 2023-09-22
Payer: MEDICARE

## 2023-09-22 VITALS
HEIGHT: 64 IN | RESPIRATION RATE: 18 BRPM | TEMPERATURE: 99 F | DIASTOLIC BLOOD PRESSURE: 61 MMHG | OXYGEN SATURATION: 99 % | HEART RATE: 95 BPM | SYSTOLIC BLOOD PRESSURE: 102 MMHG

## 2023-09-22 VITALS — SYSTOLIC BLOOD PRESSURE: 109 MMHG | HEART RATE: 97 BPM | DIASTOLIC BLOOD PRESSURE: 65 MMHG

## 2023-09-22 VITALS
WEIGHT: 130 LBS | BODY MASS INDEX: 22.2 KG/M2 | DIASTOLIC BLOOD PRESSURE: 70 MMHG | HEIGHT: 64 IN | SYSTOLIC BLOOD PRESSURE: 103 MMHG | HEART RATE: 102 BPM | TEMPERATURE: 98.8 F

## 2023-09-22 DIAGNOSIS — T81.9XXA UNSPECIFIED COMPLICATION OF PROCEDURE, INITIAL ENCOUNTER: ICD-10-CM

## 2023-09-22 DIAGNOSIS — L98.499 STRICTURE OF ARTERY: ICD-10-CM

## 2023-09-22 DIAGNOSIS — I77.1 STRICTURE OF ARTERY: ICD-10-CM

## 2023-09-22 DIAGNOSIS — Z87.81 PERSONAL HISTORY OF (HEALED) TRAUMATIC FRACTURE: Chronic | ICD-10-CM

## 2023-09-22 LAB
ALBUMIN SERPL ELPH-MCNC: 3.3 G/DL — SIGNIFICANT CHANGE UP (ref 3.3–5)
ALP SERPL-CCNC: 113 U/L — SIGNIFICANT CHANGE UP (ref 40–120)
ALT FLD-CCNC: 15 U/L — SIGNIFICANT CHANGE UP (ref 4–33)
ANION GAP SERPL CALC-SCNC: 12 MMOL/L — SIGNIFICANT CHANGE UP (ref 7–14)
APTT BLD: 29.4 SEC — SIGNIFICANT CHANGE UP (ref 24.5–35.6)
AST SERPL-CCNC: 21 U/L — SIGNIFICANT CHANGE UP (ref 4–32)
BASOPHILS # BLD AUTO: 0.04 K/UL — SIGNIFICANT CHANGE UP (ref 0–0.2)
BASOPHILS NFR BLD AUTO: 0.3 % — SIGNIFICANT CHANGE UP (ref 0–2)
BILIRUB SERPL-MCNC: 1 MG/DL — SIGNIFICANT CHANGE UP (ref 0.2–1.2)
BUN SERPL-MCNC: 26 MG/DL — HIGH (ref 7–23)
CALCIUM SERPL-MCNC: 8.8 MG/DL — SIGNIFICANT CHANGE UP (ref 8.4–10.5)
CHLORIDE SERPL-SCNC: 92 MMOL/L — LOW (ref 98–107)
CO2 SERPL-SCNC: 23 MMOL/L — SIGNIFICANT CHANGE UP (ref 22–31)
CREAT SERPL-MCNC: 0.89 MG/DL — SIGNIFICANT CHANGE UP (ref 0.5–1.3)
EGFR: 65 ML/MIN/1.73M2 — SIGNIFICANT CHANGE UP
EOSINOPHIL # BLD AUTO: 0.03 K/UL — SIGNIFICANT CHANGE UP (ref 0–0.5)
EOSINOPHIL NFR BLD AUTO: 0.2 % — SIGNIFICANT CHANGE UP (ref 0–6)
GLUCOSE SERPL-MCNC: 176 MG/DL — HIGH (ref 70–99)
HCT VFR BLD CALC: 31.7 % — LOW (ref 34.5–45)
HGB BLD-MCNC: 10.3 G/DL — LOW (ref 11.5–15.5)
IANC: 11.99 K/UL — HIGH (ref 1.8–7.4)
IMM GRANULOCYTES NFR BLD AUTO: 0.5 % — SIGNIFICANT CHANGE UP (ref 0–0.9)
INR BLD: 1.44 RATIO — HIGH (ref 0.85–1.18)
LYMPHOCYTES # BLD AUTO: 0.68 K/UL — LOW (ref 1–3.3)
LYMPHOCYTES # BLD AUTO: 5 % — LOW (ref 13–44)
MCHC RBC-ENTMCNC: 32.5 GM/DL — SIGNIFICANT CHANGE UP (ref 32–36)
MCHC RBC-ENTMCNC: 32.7 PG — SIGNIFICANT CHANGE UP (ref 27–34)
MCV RBC AUTO: 100.6 FL — HIGH (ref 80–100)
MONOCYTES # BLD AUTO: 0.87 K/UL — SIGNIFICANT CHANGE UP (ref 0–0.9)
MONOCYTES NFR BLD AUTO: 6.4 % — SIGNIFICANT CHANGE UP (ref 2–14)
NEUTROPHILS # BLD AUTO: 11.99 K/UL — HIGH (ref 1.8–7.4)
NEUTROPHILS NFR BLD AUTO: 87.6 % — HIGH (ref 43–77)
NRBC # BLD: 0 /100 WBCS — SIGNIFICANT CHANGE UP (ref 0–0)
NRBC # FLD: 0 K/UL — SIGNIFICANT CHANGE UP (ref 0–0)
OSMOLALITY SERPL: 280 MOSM/KG — SIGNIFICANT CHANGE UP (ref 275–295)
PLATELET # BLD AUTO: 240 K/UL — SIGNIFICANT CHANGE UP (ref 150–400)
POTASSIUM SERPL-MCNC: 4.3 MMOL/L — SIGNIFICANT CHANGE UP (ref 3.5–5.3)
POTASSIUM SERPL-SCNC: 4.3 MMOL/L — SIGNIFICANT CHANGE UP (ref 3.5–5.3)
PROT SERPL-MCNC: 6.9 G/DL — SIGNIFICANT CHANGE UP (ref 6–8.3)
PROTHROM AB SERPL-ACNC: 16 SEC — HIGH (ref 9.5–13)
RBC # BLD: 3.15 M/UL — LOW (ref 3.8–5.2)
RBC # FLD: 18 % — HIGH (ref 10.3–14.5)
SODIUM SERPL-SCNC: 127 MMOL/L — LOW (ref 135–145)
WBC # BLD: 13.68 K/UL — HIGH (ref 3.8–10.5)
WBC # FLD AUTO: 13.68 K/UL — HIGH (ref 3.8–10.5)

## 2023-09-22 PROCEDURE — 75635 CT ANGIO ABDOMINAL ARTERIES: CPT | Mod: 26,MA

## 2023-09-22 PROCEDURE — 93926 LOWER EXTREMITY STUDY: CPT

## 2023-09-22 PROCEDURE — 99024 POSTOP FOLLOW-UP VISIT: CPT

## 2023-09-22 PROCEDURE — 99285 EMERGENCY DEPT VISIT HI MDM: CPT

## 2023-09-22 PROCEDURE — 99223 1ST HOSP IP/OBS HIGH 75: CPT | Mod: 57

## 2023-09-22 RX ORDER — LOSARTAN POTASSIUM 100 MG/1
50 TABLET, FILM COATED ORAL DAILY
Refills: 0 | Status: DISCONTINUED | OUTPATIENT
Start: 2023-09-22 | End: 2023-10-13

## 2023-09-22 RX ORDER — PIPERACILLIN AND TAZOBACTAM 4; .5 G/20ML; G/20ML
3.38 INJECTION, POWDER, LYOPHILIZED, FOR SOLUTION INTRAVENOUS ONCE
Refills: 0 | Status: DISCONTINUED | OUTPATIENT
Start: 2023-09-22 | End: 2023-09-22

## 2023-09-22 RX ORDER — VANCOMYCIN HCL 1 G
1000 VIAL (EA) INTRAVENOUS EVERY 24 HOURS
Refills: 0 | Status: DISCONTINUED | OUTPATIENT
Start: 2023-09-23 | End: 2023-09-26

## 2023-09-22 RX ORDER — PIPERACILLIN AND TAZOBACTAM 4; .5 G/20ML; G/20ML
3.38 INJECTION, POWDER, LYOPHILIZED, FOR SOLUTION INTRAVENOUS ONCE
Refills: 0 | Status: COMPLETED | OUTPATIENT
Start: 2023-09-22 | End: 2023-09-22

## 2023-09-22 RX ORDER — ASPIRIN/CALCIUM CARB/MAGNESIUM 324 MG
81 TABLET ORAL DAILY
Refills: 0 | Status: DISCONTINUED | OUTPATIENT
Start: 2023-09-22 | End: 2023-10-13

## 2023-09-22 RX ORDER — APIXABAN 2.5 MG/1
2.5 TABLET, FILM COATED ORAL ONCE
Refills: 0 | Status: COMPLETED | OUTPATIENT
Start: 2023-09-22 | End: 2023-09-22

## 2023-09-22 RX ORDER — VANCOMYCIN HCL 1 G
1000 VIAL (EA) INTRAVENOUS ONCE
Refills: 0 | Status: COMPLETED | OUTPATIENT
Start: 2023-09-22 | End: 2023-09-22

## 2023-09-22 RX ORDER — LEVOTHYROXINE SODIUM 125 MCG
50 TABLET ORAL DAILY
Refills: 0 | Status: DISCONTINUED | OUTPATIENT
Start: 2023-09-22 | End: 2023-09-27

## 2023-09-22 RX ORDER — ATORVASTATIN CALCIUM 80 MG/1
20 TABLET, FILM COATED ORAL AT BEDTIME
Refills: 0 | Status: DISCONTINUED | OUTPATIENT
Start: 2023-09-22 | End: 2023-10-04

## 2023-09-22 RX ORDER — SODIUM CHLORIDE 9 MG/ML
1000 INJECTION INTRAMUSCULAR; INTRAVENOUS; SUBCUTANEOUS ONCE
Refills: 0 | Status: COMPLETED | OUTPATIENT
Start: 2023-09-22 | End: 2023-09-22

## 2023-09-22 RX ORDER — ONDANSETRON 8 MG/1
4 TABLET, FILM COATED ORAL EVERY 8 HOURS
Refills: 0 | Status: DISCONTINUED | OUTPATIENT
Start: 2023-09-22 | End: 2023-09-26

## 2023-09-22 RX ORDER — AMIODARONE HYDROCHLORIDE 400 MG/1
100 TABLET ORAL DAILY
Refills: 0 | Status: DISCONTINUED | OUTPATIENT
Start: 2023-09-22 | End: 2023-10-13

## 2023-09-22 RX ADMIN — Medication 250 MILLIGRAM(S): at 17:26

## 2023-09-22 RX ADMIN — PIPERACILLIN AND TAZOBACTAM 200 GRAM(S): 4; .5 INJECTION, POWDER, LYOPHILIZED, FOR SOLUTION INTRAVENOUS at 16:28

## 2023-09-22 RX ADMIN — Medication 81 MILLIGRAM(S): at 17:26

## 2023-09-22 RX ADMIN — APIXABAN 2.5 MILLIGRAM(S): 2.5 TABLET, FILM COATED ORAL at 17:26

## 2023-09-22 RX ADMIN — SODIUM CHLORIDE 1000 MILLILITER(S): 9 INJECTION INTRAMUSCULAR; INTRAVENOUS; SUBCUTANEOUS at 19:21

## 2023-09-22 RX ADMIN — PIPERACILLIN AND TAZOBACTAM 25 GRAM(S): 4; .5 INJECTION, POWDER, LYOPHILIZED, FOR SOLUTION INTRAVENOUS at 21:29

## 2023-09-22 RX ADMIN — ONDANSETRON 4 MILLIGRAM(S): 8 TABLET, FILM COATED ORAL at 17:26

## 2023-09-22 RX ADMIN — ATORVASTATIN CALCIUM 20 MILLIGRAM(S): 80 TABLET, FILM COATED ORAL at 21:30

## 2023-09-22 NOTE — ED ADULT TRIAGE NOTE - CHIEF COMPLAINT QUOTE
Pt presents to ED via wheelchair from vascular surgery office. Pt had vascular procedure on 8/31, and was advised to come to ED for CT scan. Pt denies pain or other physical symptoms.

## 2023-09-22 NOTE — ED PROVIDER NOTE - ATTENDING CONTRIBUTION TO CARE
84 yo F hx with a PMHx of medically managed CAD (prox LM 40%, prox LAD 40%, distal LAD 50%, Cx mild, distal % ), paroxysmal AF on Eliquis (last dose 8/22/2023 PM), severe AS (EFRAIN 0.69 sqcm) pending TAVR (transcatheter aortic valve replacement), right internal carotid stenosis, PAD with chronic left first toe wound, HTN, HLD, hypothyroidism, anxiety, sent in from vascular surgeon's office for evaluation of erythema and pain to incision site. Pt denies drainage from area, no fevers/chills. On exam, pt well appearing, NAD, heart rrr, lungs ctab, abd soft, nt/nd, no palpable pulses b/l LE (baseline per pt), no crepitus, no fluctuance appreciated. Appreciate vascular consult, plan for CTA LLE, IV abx, tba.

## 2023-09-22 NOTE — H&P ADULT - ATTENDING COMMENTS
83 year old woman with peripheral arterial disease  chronic limb threatening ischemia of the left lower extremity, [place] 5  s/p left external iliac artery to left deep femoral artery bypass with prosthetic graft and left deep femoral artery to peroneal artery bypass with reversed GSV on 08/31/23  sent in after post operative office visit with concerns for appearance of left groin  CT obtained in the ER which demonstrates large abscess - does not appear to have direct communication with prosthetic graft  leukocytosis [id number]  afebrile, hemodynamically stable  patient comfortable on exam without pain, ambulating without difficulty  will take to the operating room on Sunday 09/24 for left groin washout, possible arterial reconstruction, and will ask for plastic surgery consultation for muscle flap coverage of bypass graft  NPO at midnight on 09/23

## 2023-09-22 NOTE — ED ADULT NURSE NOTE - OBJECTIVE STATEMENT
Break RN: Pt is a 82 y/o Female, A&Ox4, ambulatory at baseline with a PHx of    Pt presents to the ED from vascular surgery doctors office for CT of left lower extremity. Pt has vascular procedure of LLE on 8/31 and has had erythema near left groin/inner thigh area. Pt denie any pain or neuro/sensory deficits in LLE. Respirations even and unlabored, chest rise equal b/l. VS as noted in flow sheets. Pt denies chest pain, SOB, fever, cough, chills, abdominal pain, N/V/D, h/a, dizziness, numbness/tingling or any urinary symptoms at this time. No acute distress noted. Safety maintained throughout. Will continue to monitor. Break RN: Pt is a 82 y/o Female, A&Ox4, ambulatory at baseline with a PHx of left common femoral artery and left popliteal artery occlusion s/p bypass surgery on 8/31/23, left hallux necrosis s/p partial amputation on 9/6/23, CAD on Eliquis, and HTN. Pt presents to the ED from vascular surgery doctors office for CT of left lower extremity. Pt states she had a vascular procedure of LLE on 8/31 and has had erythema near left groin/inner thigh area x 4 days. Pt also endorses nausea and burning with urination. Pt denies fevers, pain, numbness/tingling or neuro/sensory deficits in LLE. Pt has LLE wrapped. Upper left inner thigh appears red, swollen with some yellow exudate noted near incision site. Neuro/sensory intact. Respirations even and unlabored, chest rise equal b/l. Sinus rhythm noted on cardiac monitor. Abdomen soft, nondistended, nontender. VS as noted in flow sheets. Pt denies chest pain, SOB, fever, cough, chills, abdominal pain, V/D, h/a, dizziness, numbness/tingling or any other urinary symptoms at this time. No acute distress noted. 20g IVL placed in RAC. Labs collected and sent. Medications administered as ordered, see MAR. Safety maintained throughout. Report given to primary RN Joaquín.

## 2023-09-22 NOTE — PATIENT PROFILE ADULT - FALL HARM RISK - HARM RISK INTERVENTIONS
Assistance with ambulation/Assistance OOB with selected safe patient handling equipment/Communicate Risk of Fall with Harm to all staff/Discuss with provider need for PT consult/Monitor gait and stability/Provide patient with walking aids - walker, cane, crutches/Reinforce activity limits and safety measures with patient and family/Review medications for side effects contributing to fall risk/Sit up slowly, dangle for a short time, stand at bedside before walking/Tailored Fall Risk Interventions/Visual Cue: Yellow wristband and red socks/Bed in lowest position, wheels locked, appropriate side rails in place/Call bell, personal items and telephone in reach/Instruct patient to call for assistance before getting out of bed or chair/Non-slip footwear when patient is out of bed/[place] to call system/Physically safe environment - no spills, clutter or unnecessary equipment/Purposeful Proactive Rounding/Room/bathroom lighting operational, light cord in reach

## 2023-09-22 NOTE — ED PROVIDER NOTE - NS ED ROS FT
Pt notes recent appetite loss and postnasal drip at night.    Pt denies fever, headache, cough, congestion, sore throat, chest pain, abdominal pain, constipation, diarrhea, vaginal bleeding/discharge, increased leg swelling, or leg pain.

## 2023-09-22 NOTE — PATIENT PROFILE ADULT - NSPROPTRIGHTNOTIFY_GEN_A_NUR
Lane Cuba is a 68 year old male presenting for neurologic evaluation regarding restlessness of entire body.    Patient denies known allergies/sensitivity to Latex.  Medications verified, no changes.  Social History     Tobacco Use   Smoking Status Never Smoker   Smokeless Tobacco Never Used       Referred by: Eloy Velasco MD          PPE (mask, eye protection) has been worn by all healthcare professionals while in contact with patient during the entire visit.   declines

## 2023-09-22 NOTE — H&P ADULT - HISTORY OF PRESENT ILLNESS
82 yo F w/ PMHx of CAD (prox LM 40%, prox LAD 40%, distal LAD 50%, Cx mild, distal % ), pAF on Eliquis, severe AS pending TAVR, R internal carotid stenosis, HTN, HLD, hypothyroidism, and anxiety.     Patient with PAD and chronic L toe wounds s/p L ilioprofunda bypass with reverse GSV graft to peroneal 8/31/23 (Dr. Tavares) presents to ED with L groin erythema. Patient was seen in the office by Dr. Huber today and noted with L groin and calf erythema. Sent into ED for IV abx and CT scan. Patient reports she thinks the redness has been present for 1 week. Unsure if it is getting better or worst because covered most of the time. Patient has a VNS come and changethe dressing 3x a week. Recently completed antibiotics for toe wounds 2 days ago. Also reports persistent nausea while on abx. Denies fevers, chills, drainage from incision site.

## 2023-09-22 NOTE — ED PROVIDER NOTE - CLINICAL SUMMARY MEDICAL DECISION MAKING FREE TEXT BOX
Ben Morel - MS4: 82 y/o female with recent bypass surgery of the left common femoral artery and left popliteal artery, and partial amputation of the left hallux, who presents due to erythema and induration of the surgical site in the left groin and lower leg. Concern for vasculitis of the surgical site, as well as possible UTI given burning with urination. Will order CBC, CMP, UA, EKG, IV fluid, ondansetron, rectal temp. Will obtain CT angiogram per vascular surgery recommendation. Ben Morel - MS4: 82 y/o female with recent bypass surgery of the left common femoral artery and left popliteal artery, and partial amputation of the left hallux, who presents due to erythema and induration of the surgical site in the left groin and lower leg. Concern for vasculitis of the surgical site, as well as possible UTI given burning with urination. Will order CBC, CMP, coags, blood culture, UA, UC, EKG, IV fluid, ondansetron, rectal temp. Will obtain CT angiogram, and administering IV abx per vascular surgery recommendation. Will give IV vancomycin and Zosyn. Ben Morel - MS4: 84 y/o female with recent bypass surgery of the left common femoral artery and left popliteal artery, and partial amputation of the left hallux, who presents due to erythema and induration of the surgical site in the left groin and lower leg. Concern for vasculitis of the surgical site, as well as possible UTI given burning with urination. Will order CBC, CMP, coags, blood culture, UA, UC, EKG, IV fluid, ondansetron. Will obtain CT angiogram, and administering IV abx per vascular surgery recommendation. Will give IV vancomycin and Zosyn and reassess.

## 2023-09-22 NOTE — ED PROVIDER NOTE - OBJECTIVE STATEMENT
Pt is an 82 y/o female with history of left common femoral artery and left popliteal artery occlusion with bypass surgery on 8/31, left hallux necrosis with partial amputation on 9/6, CAD, AS, HTN, hypercholesterolemia, who presents due to 4 days of erythema of the groin and left lower leg, nausea, and burning with urination. Pt states she was on PO augmentin until 2 days ago after her left foot surgery for prophylaxis. Pt states she has had caregivers tending to her surgical wounds daily.    Pt states she was at a follow up appointment with the vascular surgeons today who recommended she come to the ED for evaluation of possible cellulitis of her surgical site.     Pt denies having any pain, fever, numbness, or tingling. Pt is an 84 y/o female with history of left common femoral artery and left popliteal artery occlusion with bypass surgery on 8/31, left hallux necrosis with partial amputation on 9/6, CAD, AS, HTN, hypercholesterolemia, on Eliquis daily, who presents due to 4 days of erythema of the groin and left lower leg, nausea, and burning with urination. Pt states she was on PO augmentin until 2 days ago after her left foot surgery for prophylaxis. Pt states she has had caregivers tending to her surgical wounds daily.    Pt states she was at a follow up appointment with the vascular surgeons today who recommended she come to the ED for evaluation of possible cellulitis of her surgical site.     Pt denies having any pain, fever, numbness, or tingling.

## 2023-09-22 NOTE — PATIENT PROFILE ADULT - VISION (WITH CORRECTIVE LENSES IF THE PATIENT USUALLY WEARS THEM):
Patient unable to be contacted regarding recent discharge and COVID-19 risk, will close episode at this time.
Normal vision: sees adequately in most situations; can see medication labels, newsprint

## 2023-09-22 NOTE — ED PROVIDER NOTE - PHYSICAL EXAMINATION
Pt laying semi-fowlers in hospital bed. No sign of distress. A/Ox3.    Cardiac exam reveals S1, S2, and 4/6 systolic murmur.    Lungs clear to auscultation in all fields. No signs of respiratory distress.    Abdomen soft and non-tender with normal contour.    There is a surgical scar with staples and sutures in place in the left leg extending from the groin to the mid lower leg. There is an approximately 10 cm diameter area of erythema and induration at the surgical site in the left groin. Does not appear to be fluctuant. Non-painful to palpation. There is an approximately 10x15 cm area of erythema at the surgical site on the left lower leg. The left leg is normal temp. The right leg appears cold. There is some pitting edema bilaterally. No pain to palpation of the calves bilaterally. Pulses in the feet are non-palpable bilaterally. Unable to perform doppler of lower extremities due to equipment failure. Pt laying semi-fowlers in hospital bed. No sign of distress. A/Ox3.    Cardiac exam reveals S1, S2, and 4/6 systolic murmur.    Lungs clear to auscultation in all fields. No signs of respiratory distress.    Abdomen soft and non-tender with normal contour.    There is a surgical scar with staples and sutures in place in the left leg extending from the groin to the mid lower leg. There is an approximately 10 cm diameter area of erythema and induration at the surgical site in the left groin. Does not appear to be fluctuant. Non-painful to palpation. No crepitus appreciated. There is an approximately 10x15 cm area of erythema at the surgical site on the left lower leg. Non-painful to palpation. No crepitus appreciated. The left leg is normal temp. The right leg appears cold. There is some pitting edema bilaterally. No pain to palpation of the calves bilaterally. Pulses in the feet are non-palpable bilaterally. Unable to perform doppler of lower extremities due to equipment failure.

## 2023-09-22 NOTE — H&P ADULT - ASSESSMENT
84 yo F w/ PMHx of CAD,, pAF on Eliquis, severe AS pending TAVR, R internal carotid stenosis, HTN, HLD, hypothyroidism, anxiety, PAD  s/p L ilioprofunda bypass with reverse GSV graft to peroneal 8/31/23 (Dr. Tavares) and L 1st ray partial resection presents with L groin erythema c/f infection    Plan:  - Admit to C team surgery under Dr. Kyle  - IV abx: zosyn and vanc   - F/u CT scan read to eval for fluid collection  - Regular diet  - Zofran PRN for nausea  - Home meds resumed       Plan discussed with Dr. Saroj Cisneros PGY-3   C team surgery  [id number]

## 2023-09-22 NOTE — ED ADULT TRIAGE NOTE - PATIENT ON (OXYGEN DELIVERY METHOD)
room air

## 2023-09-22 NOTE — H&P ADULT - TIME BILLING
peripheral arterial disease  chronic limb threatening ischemia  post operative surgical site infection  pre-operative planning  coordination of care

## 2023-09-22 NOTE — PATIENT PROFILE ADULT - FUNCTIONAL ASSESSMENT - BASIC MOBILITY 6.
3-calculated by average/Not able to assess (calculate score using [facility] averaging method)

## 2023-09-22 NOTE — H&P ADULT - NSHPPHYSICALEXAM_GEN_ALL_CORE
Physical exam:  General; NAD  Respiratory: nonlabored breathing  Extremities: L groin incision with surrounding erythema and fluctuance. No active drainage, purulence or induration. Area of erythema on anterior shin. Rest of incision c/d/i. First ray incision c/d/i. L DP/PT signals.

## 2023-09-23 ENCOUNTER — TRANSCRIPTION ENCOUNTER (OUTPATIENT)
Age: 83
End: 2023-09-23

## 2023-09-23 LAB
ANION GAP SERPL CALC-SCNC: 11 MMOL/L — SIGNIFICANT CHANGE UP (ref 7–14)
ANION GAP SERPL CALC-SCNC: 9 MMOL/L — SIGNIFICANT CHANGE UP (ref 7–14)
APPEARANCE UR: ABNORMAL
BACTERIA # UR AUTO: NEGATIVE /HPF — SIGNIFICANT CHANGE UP
BILIRUB UR-MCNC: NEGATIVE — SIGNIFICANT CHANGE UP
BLD GP AB SCN SERPL QL: NEGATIVE — SIGNIFICANT CHANGE UP
BUN SERPL-MCNC: 30 MG/DL — HIGH (ref 7–23)
BUN SERPL-MCNC: 34 MG/DL — HIGH (ref 7–23)
CALCIUM SERPL-MCNC: 8.2 MG/DL — LOW (ref 8.4–10.5)
CALCIUM SERPL-MCNC: 8.4 MG/DL — SIGNIFICANT CHANGE UP (ref 8.4–10.5)
CAST: 1 /LPF — SIGNIFICANT CHANGE UP (ref 0–4)
CHLORIDE SERPL-SCNC: 93 MMOL/L — LOW (ref 98–107)
CHLORIDE SERPL-SCNC: 94 MMOL/L — LOW (ref 98–107)
CK MB BLD-MCNC: 7.8 % — HIGH (ref 0–2.5)
CK MB CFR SERPL CALC: 2.1 NG/ML — SIGNIFICANT CHANGE UP
CK SERPL-CCNC: 27 U/L — SIGNIFICANT CHANGE UP (ref 25–170)
CK SERPL-CCNC: 32 U/L — SIGNIFICANT CHANGE UP (ref 25–170)
CO2 SERPL-SCNC: 23 MMOL/L — SIGNIFICANT CHANGE UP (ref 22–31)
CO2 SERPL-SCNC: 25 MMOL/L — SIGNIFICANT CHANGE UP (ref 22–31)
COLOR SPEC: SIGNIFICANT CHANGE UP
CREAT ?TM UR-MCNC: 153 MG/DL — SIGNIFICANT CHANGE UP
CREAT SERPL-MCNC: 1.11 MG/DL — SIGNIFICANT CHANGE UP (ref 0.5–1.3)
CREAT SERPL-MCNC: 1.35 MG/DL — HIGH (ref 0.5–1.3)
DIFF PNL FLD: NEGATIVE — SIGNIFICANT CHANGE UP
EGFR: 39 ML/MIN/1.73M2 — LOW
EGFR: 50 ML/MIN/1.73M2 — LOW
GLUCOSE SERPL-MCNC: 179 MG/DL — HIGH (ref 70–99)
GLUCOSE SERPL-MCNC: 189 MG/DL — HIGH (ref 70–99)
GLUCOSE UR QL: NEGATIVE MG/DL — SIGNIFICANT CHANGE UP
HCT VFR BLD CALC: 28.8 % — LOW (ref 34.5–45)
HCT VFR BLD CALC: 30 % — LOW (ref 34.5–45)
HGB BLD-MCNC: 9.5 G/DL — LOW (ref 11.5–15.5)
HGB BLD-MCNC: 9.7 G/DL — LOW (ref 11.5–15.5)
KETONES UR-MCNC: ABNORMAL MG/DL
LACTATE SERPL-SCNC: 2.3 MMOL/L — HIGH (ref 0.5–2)
LEUKOCYTE ESTERASE UR-ACNC: ABNORMAL
MAGNESIUM SERPL-MCNC: 2 MG/DL — SIGNIFICANT CHANGE UP (ref 1.6–2.6)
MAGNESIUM SERPL-MCNC: 2 MG/DL — SIGNIFICANT CHANGE UP (ref 1.6–2.6)
MCHC RBC-ENTMCNC: 32.2 PG — SIGNIFICANT CHANGE UP (ref 27–34)
MCHC RBC-ENTMCNC: 32.3 GM/DL — SIGNIFICANT CHANGE UP (ref 32–36)
MCHC RBC-ENTMCNC: 33 GM/DL — SIGNIFICANT CHANGE UP (ref 32–36)
MCHC RBC-ENTMCNC: 33 PG — SIGNIFICANT CHANGE UP (ref 27–34)
MCV RBC AUTO: 100 FL — SIGNIFICANT CHANGE UP (ref 80–100)
MCV RBC AUTO: 99.7 FL — SIGNIFICANT CHANGE UP (ref 80–100)
NITRITE UR-MCNC: NEGATIVE — SIGNIFICANT CHANGE UP
NRBC # BLD: 0 /100 WBCS — SIGNIFICANT CHANGE UP (ref 0–0)
NRBC # BLD: 0 /100 WBCS — SIGNIFICANT CHANGE UP (ref 0–0)
NRBC # FLD: 0 K/UL — SIGNIFICANT CHANGE UP (ref 0–0)
NRBC # FLD: 0 K/UL — SIGNIFICANT CHANGE UP (ref 0–0)
OSMOLALITY UR: 591 MOSM/KG — SIGNIFICANT CHANGE UP (ref 50–1200)
PH UR: 5.5 — SIGNIFICANT CHANGE UP (ref 5–8)
PHOSPHATE SERPL-MCNC: 3.5 MG/DL — SIGNIFICANT CHANGE UP (ref 2.5–4.5)
PHOSPHATE SERPL-MCNC: 3.5 MG/DL — SIGNIFICANT CHANGE UP (ref 2.5–4.5)
PLATELET # BLD AUTO: 227 K/UL — SIGNIFICANT CHANGE UP (ref 150–400)
PLATELET # BLD AUTO: 230 K/UL — SIGNIFICANT CHANGE UP (ref 150–400)
POTASSIUM SERPL-MCNC: 4.5 MMOL/L — SIGNIFICANT CHANGE UP (ref 3.5–5.3)
POTASSIUM SERPL-MCNC: 4.6 MMOL/L — SIGNIFICANT CHANGE UP (ref 3.5–5.3)
POTASSIUM SERPL-SCNC: 4.5 MMOL/L — SIGNIFICANT CHANGE UP (ref 3.5–5.3)
POTASSIUM SERPL-SCNC: 4.6 MMOL/L — SIGNIFICANT CHANGE UP (ref 3.5–5.3)
PROT UR-MCNC: 30 MG/DL
RBC # BLD: 2.88 M/UL — LOW (ref 3.8–5.2)
RBC # BLD: 3.01 M/UL — LOW (ref 3.8–5.2)
RBC # FLD: 17.7 % — HIGH (ref 10.3–14.5)
RBC # FLD: 17.8 % — HIGH (ref 10.3–14.5)
RBC CASTS # UR COMP ASSIST: 3 /HPF — SIGNIFICANT CHANGE UP (ref 0–4)
REVIEW: SIGNIFICANT CHANGE UP
RH IG SCN BLD-IMP: POSITIVE — SIGNIFICANT CHANGE UP
SODIUM SERPL-SCNC: 127 MMOL/L — LOW (ref 135–145)
SODIUM SERPL-SCNC: 128 MMOL/L — LOW (ref 135–145)
SODIUM UR-SCNC: <20 MMOL/L — SIGNIFICANT CHANGE UP
SP GR SPEC: 1.07 — HIGH (ref 1–1.03)
SQUAMOUS # UR AUTO: 43 /HPF — HIGH (ref 0–5)
TROPONIN T, HIGH SENSITIVITY RESULT: 193 NG/L — CRITICAL HIGH
UROBILINOGEN FLD QL: 1 MG/DL — SIGNIFICANT CHANGE UP (ref 0.2–1)
WBC # BLD: 10.04 K/UL — SIGNIFICANT CHANGE UP (ref 3.8–10.5)
WBC # BLD: 9.79 K/UL — SIGNIFICANT CHANGE UP (ref 3.8–10.5)
WBC # FLD AUTO: 10.04 K/UL — SIGNIFICANT CHANGE UP (ref 3.8–10.5)
WBC # FLD AUTO: 9.79 K/UL — SIGNIFICANT CHANGE UP (ref 3.8–10.5)
WBC UR QL: 43 /HPF — HIGH (ref 0–5)

## 2023-09-23 PROCEDURE — 93010 ELECTROCARDIOGRAM REPORT: CPT

## 2023-09-23 RX ORDER — SODIUM CHLORIDE 9 MG/ML
1000 INJECTION INTRAMUSCULAR; INTRAVENOUS; SUBCUTANEOUS
Refills: 0 | Status: DISCONTINUED | OUTPATIENT
Start: 2023-09-23 | End: 2023-09-25

## 2023-09-23 RX ORDER — NYSTATIN CREAM 100000 [USP'U]/G
1 CREAM TOPICAL
Refills: 0 | Status: DISCONTINUED | OUTPATIENT
Start: 2023-09-23 | End: 2023-10-13

## 2023-09-23 RX ORDER — ACETAMINOPHEN 500 MG
975 TABLET ORAL EVERY 6 HOURS
Refills: 0 | Status: DISCONTINUED | OUTPATIENT
Start: 2023-09-23 | End: 2023-09-25

## 2023-09-23 RX ORDER — SODIUM CHLORIDE 9 MG/ML
1000 INJECTION, SOLUTION INTRAVENOUS
Refills: 0 | Status: DISCONTINUED | OUTPATIENT
Start: 2023-09-23 | End: 2023-09-23

## 2023-09-23 RX ORDER — SODIUM CHLORIDE 9 MG/ML
500 INJECTION INTRAMUSCULAR; INTRAVENOUS; SUBCUTANEOUS ONCE
Refills: 0 | Status: COMPLETED | OUTPATIENT
Start: 2023-09-23 | End: 2023-09-23

## 2023-09-23 RX ORDER — PIPERACILLIN AND TAZOBACTAM 4; .5 G/20ML; G/20ML
3.38 INJECTION, POWDER, LYOPHILIZED, FOR SOLUTION INTRAVENOUS EVERY 8 HOURS
Refills: 0 | Status: DISCONTINUED | OUTPATIENT
Start: 2023-09-23 | End: 2023-09-25

## 2023-09-23 RX ORDER — SODIUM CHLORIDE 9 MG/ML
1000 INJECTION INTRAMUSCULAR; INTRAVENOUS; SUBCUTANEOUS ONCE
Refills: 0 | Status: COMPLETED | OUTPATIENT
Start: 2023-09-23 | End: 2023-09-23

## 2023-09-23 RX ORDER — SODIUM CHLORIDE 9 MG/ML
1000 INJECTION, SOLUTION INTRAVENOUS ONCE
Refills: 0 | Status: DISCONTINUED | OUTPATIENT
Start: 2023-09-23 | End: 2023-09-23

## 2023-09-23 RX ADMIN — LOSARTAN POTASSIUM 50 MILLIGRAM(S): 100 TABLET, FILM COATED ORAL at 06:32

## 2023-09-23 RX ADMIN — SODIUM CHLORIDE 1000 MILLILITER(S): 9 INJECTION INTRAMUSCULAR; INTRAVENOUS; SUBCUTANEOUS at 17:07

## 2023-09-23 RX ADMIN — NYSTATIN CREAM 1 APPLICATION(S): 100000 CREAM TOPICAL at 09:27

## 2023-09-23 RX ADMIN — PIPERACILLIN AND TAZOBACTAM 25 GRAM(S): 4; .5 INJECTION, POWDER, LYOPHILIZED, FOR SOLUTION INTRAVENOUS at 05:19

## 2023-09-23 RX ADMIN — AMIODARONE HYDROCHLORIDE 100 MILLIGRAM(S): 400 TABLET ORAL at 06:32

## 2023-09-23 RX ADMIN — PIPERACILLIN AND TAZOBACTAM 25 GRAM(S): 4; .5 INJECTION, POWDER, LYOPHILIZED, FOR SOLUTION INTRAVENOUS at 21:50

## 2023-09-23 RX ADMIN — SODIUM CHLORIDE 75 MILLILITER(S): 9 INJECTION INTRAMUSCULAR; INTRAVENOUS; SUBCUTANEOUS at 21:52

## 2023-09-23 RX ADMIN — SODIUM CHLORIDE 75 MILLILITER(S): 9 INJECTION, SOLUTION INTRAVENOUS at 18:00

## 2023-09-23 RX ADMIN — Medication 975 MILLIGRAM(S): at 17:11

## 2023-09-23 RX ADMIN — Medication 50 MICROGRAM(S): at 05:19

## 2023-09-23 RX ADMIN — ONDANSETRON 4 MILLIGRAM(S): 8 TABLET, FILM COATED ORAL at 13:16

## 2023-09-23 RX ADMIN — Medication 975 MILLIGRAM(S): at 16:41

## 2023-09-23 RX ADMIN — PIPERACILLIN AND TAZOBACTAM 25 GRAM(S): 4; .5 INJECTION, POWDER, LYOPHILIZED, FOR SOLUTION INTRAVENOUS at 13:12

## 2023-09-23 RX ADMIN — NYSTATIN CREAM 1 APPLICATION(S): 100000 CREAM TOPICAL at 17:07

## 2023-09-23 RX ADMIN — Medication 250 MILLIGRAM(S): at 17:57

## 2023-09-23 RX ADMIN — Medication 81 MILLIGRAM(S): at 13:12

## 2023-09-23 RX ADMIN — SODIUM CHLORIDE 1000 MILLILITER(S): 9 INJECTION INTRAMUSCULAR; INTRAVENOUS; SUBCUTANEOUS at 18:43

## 2023-09-23 NOTE — CHART NOTE - NSCHARTNOTEFT_GEN_A_CORE
T(C): 36.4 (09-23-23 @ 20:00), Max: 37.5 (09-23-23 @ 16:10)  HR: 85 (09-23-23 @ 20:00) (85 - 106)  BP: 90/51 (09-23-23 @ 20:00) (88/44 - 114/62)  RR: 18 (09-23-23 @ 20:00) (18 - 18)  SpO2: 95% (09-23-23 @ 20:00) (95% - 100%)  Wt(kg): --    EVENT: Called in by RN for notification of critical value of Troponin T, High Sensitivity Result: 193. Patient examined at bedside, asymptomatic, refers no SOB or chest pain. EKG taken in the afternoon noted a normal sinus rhythm 94bpm, with nonspecific intraventricular conduction delay, and a ST and T wave abnormality also seen in previous EKGs taken days before. QTc since admission >460ms, today EKG QTc was 500ms, which will be followed and considered before administering medications that can prolong it. Cardiology was consulted for appropriate follow up of results (Troponin and CK).    PLAN:  - Monitor vitals  - Monitor I/Os  - F/u cardiology consult  - Continue care    C team [id number]

## 2023-09-23 NOTE — PROGRESS NOTE ADULT - SUBJECTIVE AND OBJECTIVE BOX
Vascular Progress Note    S: Patient seen and examined. No acute events overnight. No fevers, chills, SOB.    O:  Physical Exam:  Gen: Laying in bed, NAD  HEENT: atrumatic, EMOI  Resp: Unlabored breathing  Vascular: L groin incision with surrounding erythema and fluctuance.      Vital Signs Last 24 Hrs  T(C): 36.9 (23 Sep 2023 05:09), Max: 37.1 (22 Sep 2023 14:16)  T(F): 98.5 (23 Sep 2023 05:09), Max: 98.7 (22 Sep 2023 14:16)  HR: 104 (23 Sep 2023 06:30) (93 - 104)  BP: 114/62 (23 Sep 2023 06:30) (102/61 - 114/62)  BP(mean): --  RR: 18 (23 Sep 2023 05:09) (18 - 18)  SpO2: 97% (23 Sep 2023 05:09) (96% - 100%)    Parameters below as of 23 Sep 2023 05:09  Patient On (Oxygen Delivery Method): room air        I&O's Detail    22 Sep 2023 07:01  -  23 Sep 2023 07:00  --------------------------------------------------------  IN:    Oral Fluid: 200 mL  Total IN: 200 mL    OUT:    Voided (mL): 600 mL  Total OUT: 600 mL    Total NET: -400 mL                                10.3   13.68 )-----------( 240      ( 22 Sep 2023 16:20 )             31.7       09-22    127<L>  |  92<L>  |  26<H>  ----------------------------<  176<H>  4.3   |  23  |  0.89    Ca    8.8      22 Sep 2023 16:20    TPro  6.9  /  Alb  3.3  /  TBili  1.0  /  DBili  x   /  AST  21  /  ALT  15  /  AlkPhos  113  09-22

## 2023-09-23 NOTE — CONSULT NOTE ADULT - SUBJECTIVE AND OBJECTIVE BOX
HISTORY OF PRESENT ILLNESS:      Allergies    latex (Unknown)  sulfa drugs (Unknown)    Intolerances    	    MEDICATIONS:  aMIOdarone    Tablet 100 milliGRAM(s) Oral daily  aspirin enteric coated 81 milliGRAM(s) Oral daily  losartan 50 milliGRAM(s) Oral daily    piperacillin/tazobactam IVPB.. 3.375 Gram(s) IV Intermittent every 8 hours  vancomycin  IVPB 1000 milliGRAM(s) IV Intermittent every 24 hours      acetaminophen     Tablet .. 975 milliGRAM(s) Oral every 6 hours PRN  ondansetron    Tablet 4 milliGRAM(s) Oral every 8 hours PRN      atorvastatin 20 milliGRAM(s) Oral at bedtime  levothyroxine 50 MICROGram(s) Oral daily    nystatin Powder 1 Application(s) Topical two times a day  sodium chloride 0.9%. 1000 milliLiter(s) IV Continuous <Continuous>      PAST MEDICAL & SURGICAL HISTORY:  HTN (hypertension)      HLD (hyperlipidemia)      Anxiety      CAD (coronary artery disease)      PAD (peripheral artery disease)      Hypothyroidism      AF (atrial fibrillation)      Carotid artery stenosis      AS (aortic stenosis)      Status post closed fracture of right femur          FAMILY HISTORY:  Family history of myocardial infarction (Father, Sibling)        SOCIAL HISTORY:    [ ] Non-smoker  [ ] Smoker  [ ] Alcohol    REVIEW OF SYSTEMS:  See HPI, otherwise complete 10 point review of systems negative    PHYSICAL EXAM:  T(C): 36.4 (09-23-23 @ 20:00), Max: 37.5 (09-23-23 @ 16:10)  HR: 85 (09-23-23 @ 20:00) (85 - 106)  BP: 91/56 (09-23-23 @ 21:34) (88/44 - 114/62)  RR: 18 (09-23-23 @ 20:00) (18 - 18)  SpO2: 95% (09-23-23 @ 20:00) (95% - 98%)  Wt(kg): --  I&O's Summary    22 Sep 2023 07:01  -  23 Sep 2023 07:00  --------------------------------------------------------  IN: 200 mL / OUT: 600 mL / NET: -400 mL    23 Sep 2023 07:01  -  23 Sep 2023 23:25  --------------------------------------------------------  IN: 320 mL / OUT: 680 mL / NET: -360 mL        Appearance: No Acute Distress	  HEENT:  Normal oral mucosa, PERRL, EOMI	  Cardiovascular: Normal S1 S2, No JVD, No murmurs/rubs/gallops  Respiratory: Lungs clear to auscultation bilaterally  Gastrointestinal:  Soft, Non-tender, + BS	  Skin: No rashes, No ecchymoses, No cyanosis	  Neurologic: Non-focal  Extremities: No clubbing, cyanosis or edema  Vascular: Peripheral pulses palpable 2+ bilaterally  Psychiatry: A & O x 3, Mood & affect appropriate    LABS:	 	    CBC Full  -  ( 23 Sep 2023 17:15 )  WBC Count : 9.79 K/uL  Hemoglobin : 9.5 g/dL  Hematocrit : 28.8 %  Platelet Count - Automated : 227 K/uL  Mean Cell Volume : 100.0 fL  Mean Cell Hemoglobin : 33.0 pg  Mean Cell Hemoglobin Concentration : 33.0 gm/dL  Auto Neutrophil # : x  Auto Lymphocyte # : x  Auto Monocyte # : x  Auto Eosinophil # : x  Auto Basophil # : x  Auto Neutrophil % : x  Auto Lymphocyte % : x  Auto Monocyte % : x  Auto Eosinophil % : x  Auto Basophil % : x    09-23    128<L>  |  94<L>  |  34<H>  ----------------------------<  179<H>  4.5   |  23  |  1.35<H>  09-23    127<L>  |  93<L>  |  30<H>  ----------------------------<  189<H>  4.6   |  25  |  1.11    Ca    8.2<L>      23 Sep 2023 17:15  Ca    8.4      23 Sep 2023 13:41  Phos  3.5     09-23  Phos  3.5     09-23  Mg     2.00     09-23  Mg     2.00     09-23    TPro  6.9  /  Alb  3.3  /  TBili  1.0  /  DBili  x   /  AST  21  /  ALT  15  /  AlkPhos  113  09-22      proBNP:   Lipid Profile:   HgA1c:   TSH:       CARDIAC MARKERS:            TELEMETRY: 	    ECG:  	  RADIOLOGY:  OTHER: 	    PREVIOUS DIAGNOSTIC TESTING:    [ ] Echocardiogram:  [ ] Catheterization:  [ ] Stress Test:  	  	       HISTORY OF PRESENT ILLNESS:    This is an 83 year old woman with past medical history of NOCAD (prox LM 40%, prox LAD 40%, distal LAD 50%, Cx mild, distal %  with collaterals 2023, EF 57% 2023), pAF on Eliquis, severe AS awaiting TAVR, R internal carotid stenosis, HTN, HLD, hypothyroidism, anxiety, known PAD s/p  s/p L ilioprofunda bypass with reverse GSV graft to peroneal 23 and 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  Cardiology called on consult for elevated troponins. Patient reports being under the care of Dr. Rider, Cardiologist.  She has had a catheterization in 2023 for pending TAVR.  Catheterization showed NOCAD with  of RCA with collaterals. She reports she last saw Dr. Serna 2023 with plans for TAVR once she has recovered from vascular surgeries.  She denies chest pain, SOB, HOLDER or diaphoresis.  On exam she is A+OX3 in no distress answering questions congruently. SR 80-90 with BP 94/60, SATing 96% on RA RR 14, lung sound clear in all fields, no JVD, no pedal edema, l groin to staples, left foot to ace wrap.  ECG did not show significant ischemic changes. labs show no leukocytosis, anemia with H+H 9.5/28.8, Sodium 128, creatinine 1.35 Troponin 135 with negative CK and CK-MBs.      Allergies    latex (Unknown)  sulfa drugs (Unknown)    Intolerances    	    MEDICATIONS:  aMIOdarone    Tablet 100 milliGRAM(s) Oral daily  aspirin enteric coated 81 milliGRAM(s) Oral daily  losartan 50 milliGRAM(s) Oral daily    piperacillin/tazobactam IVPB.. 3.375 Gram(s) IV Intermittent every 8 hours  vancomycin  IVPB 1000 milliGRAM(s) IV Intermittent every 24 hours      acetaminophen     Tablet .. 975 milliGRAM(s) Oral every 6 hours PRN  ondansetron    Tablet 4 milliGRAM(s) Oral every 8 hours PRN      atorvastatin 20 milliGRAM(s) Oral at bedtime  levothyroxine 50 MICROGram(s) Oral daily    nystatin Powder 1 Application(s) Topical two times a day  sodium chloride 0.9%. 1000 milliLiter(s) IV Continuous <Continuous>      PAST MEDICAL & SURGICAL HISTORY:  HTN (hypertension)      HLD (hyperlipidemia)      Anxiety      CAD (coronary artery disease)      PAD (peripheral artery disease)      Hypothyroidism      AF (atrial fibrillation)      Carotid artery stenosis      AS (aortic stenosis)      Status post closed fracture of right femur          FAMILY HISTORY:  Family history of myocardial infarction (Father, Sibling)          REVIEW OF SYSTEMS:    CONSTITUTIONAL: Left groin abscess. No weakness, fevers or chills  EYES/ENT: No visual changes;  No vertigo or throat pain   NECK: No pain or stiffness  RESPIRATORY: No cough, wheezing, hemoptysis; No shortness of breath  CARDIOVASCULAR: No chest pain or palpitations  GASTROINTESTINAL: No abdominal or epigastric pain. No nausea, vomiting, or hematemesis.  GENITOURINARY: No dysuria, frequency or hematuria  NEUROLOGICAL: No numbness or weakness  SKIN: No itching, burning, rashes, or lesions   All other review of systems is negative unless indicated above.    PHYSICAL EXAM:  T(C): 36.4 (23 @ 20:00), Max: 37.5 (23 @ 16:10)  HR: 85 (23 @ 20:00) (85 - 106)  BP: 91/56 (23 @ 21:34) (88/44 - 114/62)  RR: 18 (23 @ 20:00) (18 - 18)  SpO2: 95% (23 @ 20:00) (95% - 98%)  Wt(kg): --  I&O's Summary    22 Sep 2023 07:  -  23 Sep 2023 07:00  --------------------------------------------------------  IN: 200 mL / OUT: 600 mL / NET: -400 mL    23 Sep 2023 07:  -  23 Sep 2023 23:25  --------------------------------------------------------  IN: 320 mL / OUT: 680 mL / NET: -360 mL        Appearance: Left groin swelling, erythema to staples. left foot to dressing	  HEENT:  Normal oral mucosa, PERRL, EOMI	  Cardiovascular: Normal S1 S2, No JVD, No murmurs/rubs/gallops  Respiratory: Lungs clear to auscultation bilaterally  Gastrointestinal:  Soft, Non-tender, + BS	  Skin: No rashes, No ecchymoses, No cyanosis	  Neurologic: Non-focal  Extremities: left foot partial amputation. No clubbing, cyanosis or edema  Vascular: Peripheral pulses palpable 2+ bilaterally  Psychiatry: A & O x 3, Mood & affect appropriate    LABS:	 	    CBC Full  -  ( 23 Sep 2023 17:15 )  WBC Count : 9.79 K/uL  Hemoglobin : 9.5 g/dL  Hematocrit : 28.8 %  Platelet Count - Automated : 227 K/uL  Mean Cell Volume : 100.0 fL  Mean Cell Hemoglobin : 33.0 pg  Mean Cell Hemoglobin Concentration : 33.0 gm/dL  Auto Neutrophil # : x  Auto Lymphocyte # : x  Auto Monocyte # : x  Auto Eosinophil # : x  Auto Basophil # : x  Auto Neutrophil % : x  Auto Lymphocyte % : x  Auto Monocyte % : x  Auto Eosinophil % : x  Auto Basophil % : x        128<L>  |  94<L>  |  34<H>  ----------------------------<  179<H>  4.5   |  23  |  1.35<H>      127<L>  |  93<L>  |  30<H>  ----------------------------<  189<H>  4.6   |  25  |  1.11    Ca    8.2<L>      23 Sep 2023 17:15  Ca    8.4      23 Sep 2023 13:41  Phos  3.5       Phos  3.5       Mg     2.00       Mg     2.00         TPro  6.9  /  Alb  3.3  /  TBili  1.0  /  DBili  x   /  AST  21  /  ALT  15  /  AlkPhos  113        proBNP:   Lipid Profile:   HgA1c:   TSH:       CARDIAC MARKERS:            TELEMETRY: 	    ECG:  	  RADIOLOGY:  OTHER: 	    PREVIOUS DIAGNOSTIC TESTING:    [ ] Echocardiogram:  < from: TTE W or WO Ultrasound Enhancing Agent (23 @ 09:17) >    TRANSTHORACIC ECHOCARDIOGRAM REPORT  ________________________________________________________________________________                                      _______       Pt. Name:       NANO GARCIA Study Date:    2023  MRN:            [id number]     YOB: 1940  Accession #:    [id number]      Age:           83 years  Account#:       [id number]   Gender:        F  Heart Rate:     77 bpm         Height:        64.00 in (162.56 cm)  Rhythm:         sinus rhythm   Weight: 135.00 lb (61.24 kg)  Blood Pressure: 141/62 mmHg    BSA/BMI:       1.66 m² / 23.17 kg/m²  ________________________________________________________________________________________  Referring Physician:    [id number] Nghia Serna  Interpreting Physician: Jennifer Curiel  Primary Sonographer:    Martha Mclaughlin    CPT:               ECHO TTE WO CON COMP W DOPP - 94240.m; MYOCARDIAL STRAIN                     IMAGING - 63031.m  Indication(s):     Nonrheumatic aortic (valve) stenosis - I35.0  Procedure:         Transthoracic echocardiogram with 2-D, M-mode and complete                     spectral and color flow Doppler.  Ordering Location: [facility]/  Admission Status:  Outpatient  Study Information: Image quality for this study is adequate.    _______________________________________________________________________________________  CONCLUSIONS:      1. Normal left ventricular cavity size. The left ventricular wall thickness is normal. The left ventricular systolic function is normal with an ejection fraction of 57 % by Miguel's method of disks. There are no regional wall motion abnormalities seen.   2. The left ventricular diastolic function is indeterminate.   3. Normal right ventricular cavity size, normal wall thickness and normal systolic function. The tricuspid annular plane systolic excursion (TAPSE) is 1.8 cm (normal >=1.7 cm).   4. There is severe aortic stenosis. There is low flow, low gradient aortic stenosis with preserved EF.      The peak transaortic velocity is 3.70 m/s, peak transaortic gradient is 54.8 mmHg and mean transaortic gradient is 34.0 mmHg with an LVOT/aortic valve VTI ratio of 0.20. The aortic valve area is estimated at 0.69 cm² by the continuity equation. There is mild aortic regurgitation.   5. There is trace mitral and tricuspid regurgitation.   6. No pericardial effusion seen.   7. Compared to the transthoracic echocardiogram performed on 2022 there have been no significant interval changes.    ________________________________________________________________________________________  FINDINGS:  Left Ventricle:  Normal left ventricular cavity size. The left ventricular wall thickness is normal. The left ventricular systolic function is normal with a calculated ejection fraction of 57 % by the Miguel's biplane method of disks. There are no regional wall motion abnormalities seen. The left ventricular diastolic function is indeterminate, with normal filling pressure. Global longitudinal strain is 18.0 % (abnormal > -16%). GLS was assessedon [facility] echo machine with a heart rate of 77 bpm and a blood pressure of 141/62 mmHg.     Right Ventricle:  Normal right ventricular cavity size, normal wall thickness and normal systolic function. Tricuspid annular plane systolic excursion (TAPSE)is 1.8 cm (normal >=1.7 cm).     Left Atrium:  The left atrium is moderately dilated, with an indexed volume of 47 ml/m².     Right Atrium:  The right atrium is normal.     Aortic Valve:  The aortic valve appears trileaflet with reduced systolic excursion. There is severe calcification of the aortic valve leaflets. There is severe aortic stenosis. There is low flow, low gradient aortic stenosis with preserved EF. The peak transaortic velocity is 3.70 m/s, peak transaortic gradient is 54.8 mmHg and mean transaortic gradient is 34.0 mmHg with an LVOT/aortic valve VTI ratio of 0.20. The aortic valve area is estimated at 0.69 cm² by the continuity equation. Using biplane Miguel's stroke volume, the EFRAIN is 0.53 cm2. The biplane SVi is 28.3 mL/m2.There is mild aortic regurgitation.     Mitral Valve:  Structurally normal mitral valve with normal leaflet excursion. There is mild calcification of the mitral valve annulus. There is mild leaflet calcification. There is trace mitral regurgitation.     Tricuspid Valve:  Structurally normal tricuspid valve with normal leaflet excursion. There is trace tricuspid regurgitation. There is insufficient tricuspid regurgitation detected to calculate pulmonary artery systolic pressure.     Pulmonic Valve:  Structurally normal pulmonic valve with normal leaflet excursion. There is trace pulmonic regurgitation.     Aorta:  The aortic annulus and aortic root appear normal in size. Aortic root at sinuses of Valsalva is normal measuring 3.30 cm (indexed 1.99 cm/m²). The aortic root at the sinotubular junction measures 2.50 cm. The ascending aorta measures 2.80 cm.     Pericardium:  No pericardial effusion seen.     Systemic Veins:  The inferior vena cava is normal measuring 1.70 cm in diameter, (normal <2.1cm) with normal inspiratory collapse (normal >50%) consistent with normal right atrial pressure (~3, range 0-5mmHg).  ____________________________________________________________________  QUANTITATIVE DATA  Left Ventricle Measurements       [ ] Catheterization:  < from: Cardiac Catheterization (23 @ 15:57) >      Study Date:     2023   Name:           NANO GARCIA   :            1940   (83 years)   Gender:         female   MR#:            [id number]   MPI#:           [id number]   Patient Class:  Outpatient     Cath Lab Report    Diagnostic Cardiologist:       Kareem Donis MD   Fellow:                        Ivan Agosto MD   Referring Physician:           Nghia Serna MD     Procedures Performed   Procedures:              1.    Arterial Access - Right Radial     2.    Diagnostic CoronaryAngiography     Indications:               PreOp Evaluation   Suspected coronary artery disease     Lab Visit Indication:      pre-operative evaluation     Diagnostic Conclusions:     Moderate atherosclerosis in LM and LAD.  of dRCA with robust  collateral from LAD.  Recommendations:     Medical treatment of CAD and continue with TAVR eval with structural  heart team.    Acute complication:    No complications     Presentation:   84F with HTN, HLD, and severe AS pending TAVR eval.    Chief Compaint: Pre-TAVR evaluation      Procedure Narrative:   The risks and alternatives of the procedures and conscious sedation  were explained to the patient and informed consent was  obtained. The patient was brought to the cath lab and placed on the  exam table.  Access   Right radial artery:   The puncture site was infiltrated with 1% Lidocaine. Vascular access  was obtained using modified seldinger technique.    Coronary Angiography   Left Coronary System:   A EXPO FL3.5 was positioned into the vessel under fluoroscopic  guidance. Right Coronary System:  A EXPO FR4 was positioned into the vessel under fluoroscopic guidance.    Diagnostic Findings:     Coronary Angiography   The coronary circulation is right dominant. Cardiac catheterization  was performed electively.    Patient: NANO GARCIA            MRN: [id number]  Study Date: 2023   03:57 PM      Page 1 of 3          LM   Proximal left main: Angiography shows moderate atherosclerosis. There  is a 40 % stenosis.    LAD   Proximal left anterior descending: Angiography shows moderate  atherosclerosis. There is a 40 % stenosis. Distal left anterior  descending: Angiography shows moderate atherosclerosis. There is a 50  % stenosis.    CX   Circumflex: Angiography shows mild atherosclerosis.      RCA   Distal right coronary artery: Angiography shows complete occlusion.  There is a 100 % stenosis.    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  Cardiology called on consult for elevated troponins. Patient reports being under the care of Dr. Rider, Cardiologist.  She has had a catheterization in 2023 for pending TAVR.  Catheterization showed NOCAD with  of RCA with collaterals. She reports she last saw Dr. Serna 2023 with plans for TAVR once she has recovered from vascular surgeries.  She denies chest pain, SOB, HOLDER or diaphoresis.  On exam she is A+OX3 in no distress answering questions congruently. SR 80-90 with BP 94/60, SATing 96% on RA RR 14, lung sound clear in all fields, no JVD, no pedal edema, l groin to staples, left foot to ace wrap.  ECG did not show significant ischemic changes. labs show no leukocytosis, anemia with H+H 9.5/28.8, Sodium 128, creatinine 1.35 Troponin 197 with negative CK and CK-MBs.      Allergies    latex (Unknown)  sulfa drugs (Unknown)    Intolerances    	    MEDICATIONS:  aMIOdarone    Tablet 100 milliGRAM(s) Oral daily  aspirin enteric coated 81 milliGRAM(s) Oral daily  losartan 50 milliGRAM(s) Oral daily    piperacillin/tazobactam IVPB.. 3.375 Gram(s) IV Intermittent every 8 hours  vancomycin  IVPB 1000 milliGRAM(s) IV Intermittent every 24 hours      acetaminophen     Tablet .. 975 milliGRAM(s) Oral every 6 hours PRN  ondansetron    Tablet 4 milliGRAM(s) Oral every 8 hours PRN      atorvastatin 20 milliGRAM(s) Oral at bedtime  levothyroxine 50 MICROGram(s) Oral daily    nystatin Powder 1 Application(s) Topical two times a day  sodium chloride 0.9%. 1000 milliLiter(s) IV Continuous <Continuous>      PAST MEDICAL & SURGICAL HISTORY:  HTN (hypertension)      HLD (hyperlipidemia)      Anxiety      CAD (coronary artery disease)      PAD (peripheral artery disease)      Hypothyroidism      AF (atrial fibrillation)      Carotid artery stenosis      AS (aortic stenosis)      Status post closed fracture of right femur          FAMILY HISTORY:  Family history of myocardial infarction (Father, Sibling)          REVIEW OF SYSTEMS:    CONSTITUTIONAL: Left groin abscess. No weakness, fevers or chills  EYES/ENT: No visual changes;  No vertigo or throat pain   NECK: No pain or stiffness  RESPIRATORY: No cough, wheezing, hemoptysis; No shortness of breath  CARDIOVASCULAR: No chest pain or palpitations  GASTROINTESTINAL: No abdominal or epigastric pain. No nausea, vomiting, or hematemesis.  GENITOURINARY: No dysuria, frequency or hematuria  NEUROLOGICAL: No numbness or weakness  SKIN: No itching, burning, rashes, or lesions   All other review of systems is negative unless indicated above.    PHYSICAL EXAM:  T(C): 36.4 (23 @ 20:00), Max: 37.5 (23 @ 16:10)  HR: 85 (23 @ 20:00) (85 - 106)  BP: 91/56 (23 @ 21:34) (88/44 - 114/62)  RR: 18 (23 @ 20:00) (18 - 18)  SpO2: 95% (23 @ 20:00) (95% - 98%)  Wt(kg): --  I&O's Summary    22 Sep 2023 07:  -  23 Sep 2023 07:00  --------------------------------------------------------  IN: 200 mL / OUT: 600 mL / NET: -400 mL    23 Sep 2023 07:  -  23 Sep 2023 23:25  --------------------------------------------------------  IN: 320 mL / OUT: 680 mL / NET: -360 mL        Appearance: Left groin swelling, erythema to staples. left foot to dressing	  HEENT:  Normal oral mucosa, PERRL, EOMI	  Cardiovascular: Normal S1 S2, No JVD, No murmurs/rubs/gallops  Respiratory: Lungs clear to auscultation bilaterally  Gastrointestinal:  Soft, Non-tender, + BS	  Skin: No rashes, No ecchymoses, No cyanosis	  Neurologic: Non-focal  Extremities: left foot partial amputation. No clubbing, cyanosis or edema  Vascular: Peripheral pulses palpable 2+ bilaterally  Psychiatry: A & O x 3, Mood & affect appropriate    LABS:	 	    CBC Full  -  ( 23 Sep 2023 17:15 )  WBC Count : 9.79 K/uL  Hemoglobin : 9.5 g/dL  Hematocrit : 28.8 %  Platelet Count - Automated : 227 K/uL  Mean Cell Volume : 100.0 fL  Mean Cell Hemoglobin : 33.0 pg  Mean Cell Hemoglobin Concentration : 33.0 gm/dL  Auto Neutrophil # : x  Auto Lymphocyte # : x  Auto Monocyte # : x  Auto Eosinophil # : x  Auto Basophil # : x  Auto Neutrophil % : x  Auto Lymphocyte % : x  Auto Monocyte % : x  Auto Eosinophil % : x  Auto Basophil % : x        128<L>  |  94<L>  |  34<H>  ----------------------------<  179<H>  4.5   |  23  |  1.35<H>      127<L>  |  93<L>  |  30<H>  ----------------------------<  189<H>  4.6   |  25  |  1.11    Ca    8.2<L>      23 Sep 2023 17:15  Ca    8.4      23 Sep 2023 13:41  Phos  3.5       Phos  3.5       Mg     2.00       Mg     2.00         TPro  6.9  /  Alb  3.3  /  TBili  1.0  /  DBili  x   /  AST  21  /  ALT  15  /  AlkPhos  113        proBNP:   Lipid Profile:   HgA1c:   TSH:       CARDIAC MARKERS:            TELEMETRY: 	    ECG:  	  RADIOLOGY:  OTHER: 	    PREVIOUS DIAGNOSTIC TESTING:    [ ] Echocardiogram:  < from: TTE W or WO Ultrasound Enhancing Agent (23 @ 09:17) >    TRANSTHORACIC ECHOCARDIOGRAM REPORT  ________________________________________________________________________________                                      _______       Pt. Name:       NANO GARCIA Study Date:    2023  MRN:            [id number]     YOB: 1940  Accession #:    [id number]      Age:           83 years  Account#:       [id number]   Gender:        F  Heart Rate:     77 bpm         Height:        64.00 in (162.56 cm)  Rhythm:         sinus rhythm   Weight: 135.00 lb (61.24 kg)  Blood Pressure: 141/62 mmHg    BSA/BMI:       1.66 m² / 23.17 kg/m²  ________________________________________________________________________________________  Referring Physician:    [id number] Nghia Serna  Interpreting Physician: Jennifer Curiel  Primary Sonographer:    Martha Mclaughlin    CPT:               ECHO TTE WO CON COMP W DOPP - 62896.m; MYOCARDIAL STRAIN                     IMAGING - 93186.m  Indication(s):     Nonrheumatic aortic (valve) stenosis - I35.0  Procedure:         Transthoracic echocardiogram with 2-D, M-mode and complete                     spectral and color flow Doppler.  Ordering Location: [facility]/  Admission Status:  Outpatient  Study Information: Image quality for this study is adequate.    _______________________________________________________________________________________  CONCLUSIONS:      1. Normal left ventricular cavity size. The left ventricular wall thickness is normal. The left ventricular systolic function is normal with an ejection fraction of 57 % by Miguel's method of disks. There are no regional wall motion abnormalities seen.   2. The left ventricular diastolic function is indeterminate.   3. Normal right ventricular cavity size, normal wall thickness and normal systolic function. The tricuspid annular plane systolic excursion (TAPSE) is 1.8 cm (normal >=1.7 cm).   4. There is severe aortic stenosis. There is low flow, low gradient aortic stenosis with preserved EF.      The peak transaortic velocity is 3.70 m/s, peak transaortic gradient is 54.8 mmHg and mean transaortic gradient is 34.0 mmHg with an LVOT/aortic valve VTI ratio of 0.20. The aortic valve area is estimated at 0.69 cm² by the continuity equation. There is mild aortic regurgitation.   5. There is trace mitral and tricuspid regurgitation.   6. No pericardial effusion seen.   7. Compared to the transthoracic echocardiogram performed on 2022 there have been no significant interval changes.    ________________________________________________________________________________________  FINDINGS:  Left Ventricle:  Normal left ventricular cavity size. The left ventricular wall thickness is normal. The left ventricular systolic function is normal with a calculated ejection fraction of 57 % by the Miguel's biplane method of disks. There are no regional wall motion abnormalities seen. The left ventricular diastolic function is indeterminate, with normal filling pressure. Global longitudinal strain is 18.0 % (abnormal > -16%). GLS was assessedon [email or web address] echo machine with a heart rate of 77 bpm and a blood pressure of 141/62 mmHg.     Right Ventricle:  Normal right ventricular cavity size, normal wall thickness and normal systolic function. Tricuspid annular plane systolic excursion (TAPSE)is 1.8 cm (normal >=1.7 cm).     Left Atrium:  The left atrium is moderately dilated, with an indexed volume of 47 ml/m².     Right Atrium:  The right atrium is normal.     Aortic Valve:  The aortic valve appears trileaflet with reduced systolic excursion. There is severe calcification of the aortic valve leaflets. There is severe aortic stenosis. There is low flow, low gradient aortic stenosis with preserved EF. The peak transaortic velocity is 3.70 m/s, peak transaortic gradient is 54.8 mmHg and mean transaortic gradient is 34.0 mmHg with an LVOT/aortic valve VTI ratio of 0.20. The aortic valve area is estimated at 0.69 cm² by the continuity equation. Using biplane Miguel's stroke volume, the EFRAIN is 0.53 cm2. The biplane SVi is 28.3 mL/m2.There is mild aortic regurgitation.     Mitral Valve:  Structurally normal mitral valve with normal leaflet excursion. There is mild calcification of the mitral valve annulus. There is mild leaflet calcification. There is trace mitral regurgitation.     Tricuspid Valve:  Structurally normal tricuspid valve with normal leaflet excursion. There is trace tricuspid regurgitation. There is insufficient tricuspid regurgitation detected to calculate pulmonary artery systolic pressure.     Pulmonic Valve:  Structurally normal pulmonic valve with normal leaflet excursion. There is trace pulmonic regurgitation.     Aorta:  The aortic annulus and aortic root appear normal in size. Aortic root at sinuses of Valsalva is normal measuring 3.30 cm (indexed 1.99 cm/m²). The aortic root at the sinotubular junction measures 2.50 cm. The ascending aorta measures 2.80 cm.     Pericardium:  No pericardial effusion seen.     Systemic Veins:  The inferior vena cava is normal measuring 1.70 cm in diameter, (normal <2.1cm) with normal inspiratory collapse (normal >50%) consistent with normal right atrial pressure (~3, range 0-5mmHg).  ____________________________________________________________________  QUANTITATIVE DATA  Left Ventricle Measurements       [ ] Catheterization:  < from: Cardiac Catheterization (23 @ 15:57) >      Study Date:     2023   Name:           NANO GARCIA   :            1940   (83 years)   Gender:         female   MR#:            [id number]   MPI#:           [id number]   Patient Class:  Outpatient     Cath Lab Report    Diagnostic Cardiologist:       Kareem Donis MD   Fellow:                        Ivan Agosto MD   Referring Physician:           Nghia Serna MD     Procedures Performed   Procedures:              1.    Arterial Access - Right Radial     2.    Diagnostic CoronaryAngiography     Indications:               PreOp Evaluation   Suspected coronary artery disease     Lab Visit Indication:      pre-operative evaluation     Diagnostic Conclusions:     Moderate atherosclerosis in LM and LAD.  of dRCA with robust  collateral from LAD.  Recommendations:     Medical treatment of CAD and continue with TAVR eval with structural  heart team.    Acute complication:    No complications     Presentation:   84F with HTN, HLD, and severe AS pending TAVR eval.    Chief Compaint: Pre-TAVR evaluation      Procedure Narrative:   The risks and alternatives of the procedures and conscious sedation  were explained to the patient and informed consent was  obtained. The patient was brought to the cath lab and placed on the  exam table.  Access   Right radial artery:   The puncture site was infiltrated with 1% Lidocaine. Vascular access  was obtained using modified seldinger technique.    Coronary Angiography   Left Coronary System:   A EXPO FL3.5 was positioned into the vessel under fluoroscopic  guidance. Right Coronary System:  A EXPO FR4 was positioned into the vessel under fluoroscopic guidance.    Diagnostic Findings:     Coronary Angiography   The coronary circulation is right dominant. Cardiac catheterization  was performed electively.    Patient: NANO GARCIA            MRN: [id number]  Study Date: 2023   03:57 PM      Page 1 of 3          LM   Proximal left main: Angiography shows moderate atherosclerosis. There  is a 40 % stenosis.    LAD   Proximal left anterior descending: Angiography shows moderate  atherosclerosis. There is a 40 % stenosis. Distal left anterior  descending: Angiography shows moderate atherosclerosis. There is a 50  % stenosis.    CX   Circumflex: Angiography shows mild atherosclerosis.      RCA   Distal right coronary artery: Angiography shows complete occlusion.  There is a 100 % stenosis.    < end of copied text >      [ ] Stress Test:

## 2023-09-23 NOTE — PROGRESS NOTE ADULT - ASSESSMENT
84 yo F w/ PMHx of CAD,, pAF on Eliquis, severe AS pending TAVR, R internal carotid stenosis, HTN, HLD, hypothyroidism, anxiety, PAD  s/p L ilioprofunda bypass with reverse GSV graft to peroneal 8/31/23 (Dr. Tavares) and L 1st ray partial resection presents with L groin erythema c/f infection. CT showing 17cm L groin abscess.    Plan:  - possible OR tomorrow for L groin washout  - consult PRS for groin flap coverage  - IV abx: zosyn and vanc   - F/u CT scan read to eval for fluid collection  - Regular diet  - Zofran PRN for nausea  - Home meds resumed       Vascular Surgery [id number]

## 2023-09-23 NOTE — CHART NOTE - NSCHARTNOTEFT_GEN_A_CORE
Episode of hypotension with mild tachcardia this afternoon. Given 1 L bolus and BP/HR improved from SBP 80s HR 110s, SBP now up to 90s and HR down to 90. Alas placed and 300cc of dark urine obtained. On exam, left groin with induration and erythema stable from the morning. Otherwise, she denies CP, SOB, dizziness, ligtheadedness and is in her normal state. Will give 1 liter bolus and repeat labs at midnight. Plan for OR tomorrow. Consented.

## 2023-09-23 NOTE — CONSULT NOTE ADULT - PROBLEM SELECTOR RECOMMENDATION 9
Continue Telemetry  no need for antiplatelet therapy at this time  Cardiac Enzymes (Troponin, CK, CK-MB) q8h x 3 with serial ECGs  TTE in am to evaluate LV function  Cardiology to follow

## 2023-09-23 NOTE — CONSULT NOTE ADULT - ASSESSMENT
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

## 2023-09-24 ENCOUNTER — APPOINTMENT (OUTPATIENT)
Dept: VASCULAR SURGERY | Facility: HOSPITAL | Age: 83
End: 2023-09-24

## 2023-09-24 LAB
ANION GAP SERPL CALC-SCNC: 11 MMOL/L — SIGNIFICANT CHANGE UP (ref 7–14)
ANION GAP SERPL CALC-SCNC: 12 MMOL/L — SIGNIFICANT CHANGE UP (ref 7–14)
ANION GAP SERPL CALC-SCNC: 13 MMOL/L — SIGNIFICANT CHANGE UP (ref 7–14)
ANION GAP SERPL CALC-SCNC: 13 MMOL/L — SIGNIFICANT CHANGE UP (ref 7–14)
APTT BLD: 28.4 SEC — SIGNIFICANT CHANGE UP (ref 24.5–35.6)
BLD GP AB SCN SERPL QL: NEGATIVE — SIGNIFICANT CHANGE UP
BUN SERPL-MCNC: 30 MG/DL — HIGH (ref 7–23)
BUN SERPL-MCNC: 30 MG/DL — HIGH (ref 7–23)
BUN SERPL-MCNC: 34 MG/DL — HIGH (ref 7–23)
BUN SERPL-MCNC: 34 MG/DL — HIGH (ref 7–23)
CALCIUM SERPL-MCNC: 7.9 MG/DL — LOW (ref 8.4–10.5)
CALCIUM SERPL-MCNC: 7.9 MG/DL — LOW (ref 8.4–10.5)
CALCIUM SERPL-MCNC: 8 MG/DL — LOW (ref 8.4–10.5)
CALCIUM SERPL-MCNC: 8.2 MG/DL — LOW (ref 8.4–10.5)
CHLORIDE SERPL-SCNC: 93 MMOL/L — LOW (ref 98–107)
CHLORIDE SERPL-SCNC: 93 MMOL/L — LOW (ref 98–107)
CHLORIDE SERPL-SCNC: 96 MMOL/L — LOW (ref 98–107)
CHLORIDE SERPL-SCNC: 97 MMOL/L — LOW (ref 98–107)
CK MB BLD-MCNC: 8.5 % — HIGH (ref 0–2.5)
CK MB CFR SERPL CALC: 2.3 NG/ML — SIGNIFICANT CHANGE UP
CK SERPL-CCNC: 27 U/L — SIGNIFICANT CHANGE UP (ref 25–170)
CO2 SERPL-SCNC: 20 MMOL/L — LOW (ref 22–31)
CO2 SERPL-SCNC: 21 MMOL/L — LOW (ref 22–31)
CO2 SERPL-SCNC: 21 MMOL/L — LOW (ref 22–31)
CO2 SERPL-SCNC: 22 MMOL/L — SIGNIFICANT CHANGE UP (ref 22–31)
CREAT SERPL-MCNC: 1.07 MG/DL — SIGNIFICANT CHANGE UP (ref 0.5–1.3)
CREAT SERPL-MCNC: 1.11 MG/DL — SIGNIFICANT CHANGE UP (ref 0.5–1.3)
CREAT SERPL-MCNC: 1.32 MG/DL — HIGH (ref 0.5–1.3)
CREAT SERPL-MCNC: 1.33 MG/DL — HIGH (ref 0.5–1.3)
EGFR: 40 ML/MIN/1.73M2 — LOW
EGFR: 40 ML/MIN/1.73M2 — LOW
EGFR: 50 ML/MIN/1.73M2 — LOW
EGFR: 52 ML/MIN/1.73M2 — LOW
GLUCOSE SERPL-MCNC: 171 MG/DL — HIGH (ref 70–99)
GLUCOSE SERPL-MCNC: 173 MG/DL — HIGH (ref 70–99)
GLUCOSE SERPL-MCNC: 191 MG/DL — HIGH (ref 70–99)
GLUCOSE SERPL-MCNC: 201 MG/DL — HIGH (ref 70–99)
GRAM STN FLD: SIGNIFICANT CHANGE UP
HCT VFR BLD CALC: 25.9 % — LOW (ref 34.5–45)
HCT VFR BLD CALC: 37.3 % — SIGNIFICANT CHANGE UP (ref 34.5–45)
HGB BLD-MCNC: 11.9 G/DL — SIGNIFICANT CHANGE UP (ref 11.5–15.5)
HGB BLD-MCNC: 8.3 G/DL — LOW (ref 11.5–15.5)
INR BLD: 1.33 RATIO — HIGH (ref 0.85–1.18)
LACTATE SERPL-SCNC: 1.7 MMOL/L — SIGNIFICANT CHANGE UP (ref 0.5–2)
MAGNESIUM SERPL-MCNC: 2.1 MG/DL — SIGNIFICANT CHANGE UP (ref 1.6–2.6)
MCHC RBC-ENTMCNC: 30.2 PG — SIGNIFICANT CHANGE UP (ref 27–34)
MCHC RBC-ENTMCNC: 31.9 GM/DL — LOW (ref 32–36)
MCHC RBC-ENTMCNC: 32 GM/DL — SIGNIFICANT CHANGE UP (ref 32–36)
MCHC RBC-ENTMCNC: 32.5 PG — SIGNIFICANT CHANGE UP (ref 27–34)
MCV RBC AUTO: 101.6 FL — HIGH (ref 80–100)
MCV RBC AUTO: 94.7 FL — SIGNIFICANT CHANGE UP (ref 80–100)
NRBC # BLD: 0 /100 WBCS — SIGNIFICANT CHANGE UP (ref 0–0)
NRBC # BLD: 0 /100 WBCS — SIGNIFICANT CHANGE UP (ref 0–0)
NRBC # FLD: 0 K/UL — SIGNIFICANT CHANGE UP (ref 0–0)
NRBC # FLD: 0 K/UL — SIGNIFICANT CHANGE UP (ref 0–0)
PHOSPHATE SERPL-MCNC: 3.4 MG/DL — SIGNIFICANT CHANGE UP (ref 2.5–4.5)
PHOSPHATE SERPL-MCNC: 3.5 MG/DL — SIGNIFICANT CHANGE UP (ref 2.5–4.5)
PHOSPHATE SERPL-MCNC: 3.6 MG/DL — SIGNIFICANT CHANGE UP (ref 2.5–4.5)
PHOSPHATE SERPL-MCNC: 3.9 MG/DL — SIGNIFICANT CHANGE UP (ref 2.5–4.5)
PLATELET # BLD AUTO: 200 K/UL — SIGNIFICANT CHANGE UP (ref 150–400)
PLATELET # BLD AUTO: 218 K/UL — SIGNIFICANT CHANGE UP (ref 150–400)
POTASSIUM SERPL-MCNC: 4.3 MMOL/L — SIGNIFICANT CHANGE UP (ref 3.5–5.3)
POTASSIUM SERPL-MCNC: 4.4 MMOL/L — SIGNIFICANT CHANGE UP (ref 3.5–5.3)
POTASSIUM SERPL-MCNC: 4.8 MMOL/L — SIGNIFICANT CHANGE UP (ref 3.5–5.3)
POTASSIUM SERPL-MCNC: 5.5 MMOL/L — HIGH (ref 3.5–5.3)
POTASSIUM SERPL-SCNC: 4.3 MMOL/L — SIGNIFICANT CHANGE UP (ref 3.5–5.3)
POTASSIUM SERPL-SCNC: 4.4 MMOL/L — SIGNIFICANT CHANGE UP (ref 3.5–5.3)
POTASSIUM SERPL-SCNC: 4.8 MMOL/L — SIGNIFICANT CHANGE UP (ref 3.5–5.3)
POTASSIUM SERPL-SCNC: 5.5 MMOL/L — HIGH (ref 3.5–5.3)
PROTHROM AB SERPL-ACNC: 14.9 SEC — HIGH (ref 9.5–13)
RBC # BLD: 2.55 M/UL — LOW (ref 3.8–5.2)
RBC # BLD: 3.94 M/UL — SIGNIFICANT CHANGE UP (ref 3.8–5.2)
RBC # FLD: 17.7 % — HIGH (ref 10.3–14.5)
RBC # FLD: 20 % — HIGH (ref 10.3–14.5)
RH IG SCN BLD-IMP: POSITIVE — SIGNIFICANT CHANGE UP
SODIUM SERPL-SCNC: 127 MMOL/L — LOW (ref 135–145)
SODIUM SERPL-SCNC: 127 MMOL/L — LOW (ref 135–145)
SODIUM SERPL-SCNC: 129 MMOL/L — LOW (ref 135–145)
SODIUM SERPL-SCNC: 129 MMOL/L — LOW (ref 135–145)
SPECIMEN SOURCE: SIGNIFICANT CHANGE UP
TROPONIN T, HIGH SENSITIVITY RESULT: 188 NG/L — CRITICAL HIGH
WBC # BLD: 7.37 K/UL — SIGNIFICANT CHANGE UP (ref 3.8–10.5)
WBC # BLD: 8.09 K/UL — SIGNIFICANT CHANGE UP (ref 3.8–10.5)
WBC # FLD AUTO: 7.37 K/UL — SIGNIFICANT CHANGE UP (ref 3.8–10.5)
WBC # FLD AUTO: 8.09 K/UL — SIGNIFICANT CHANGE UP (ref 3.8–10.5)

## 2023-09-24 PROCEDURE — 10180 I&D COMPLEX PO WOUND INFCTJ: CPT | Mod: LT,59

## 2023-09-24 PROCEDURE — 97605 NEG PRS WND THER DME<=50SQCM: CPT | Mod: LT

## 2023-09-24 PROCEDURE — 35903 EXCISION GRAFT EXTREMITY: CPT | Mod: LT,78

## 2023-09-24 DEVICE — LIGATING CLIPS WECK HORIZON LARGE (ORANGE) 24: Type: IMPLANTABLE DEVICE | Site: LEFT | Status: FUNCTIONAL

## 2023-09-24 DEVICE — SURGICEL POWDER 3 GRAMS: Type: IMPLANTABLE DEVICE | Site: LEFT | Status: FUNCTIONAL

## 2023-09-24 DEVICE — SURGICEL NU-KNIT 6 X 9": Type: IMPLANTABLE DEVICE | Site: LEFT | Status: FUNCTIONAL

## 2023-09-24 DEVICE — VISTASEAL FIBRIN HUMAN 10ML: Type: IMPLANTABLE DEVICE | Site: LEFT | Status: FUNCTIONAL

## 2023-09-24 DEVICE — LIGATING CLIPS WECK HORIZON MEDIUM (BLUE) 24: Type: IMPLANTABLE DEVICE | Site: LEFT | Status: FUNCTIONAL

## 2023-09-24 DEVICE — LIGATING CLIPS WECK HORIZON SMALL-WIDE (RED) 24: Type: IMPLANTABLE DEVICE | Site: LEFT | Status: FUNCTIONAL

## 2023-09-24 RX ORDER — ONDANSETRON 8 MG/1
4 TABLET, FILM COATED ORAL ONCE
Refills: 0 | Status: DISCONTINUED | OUTPATIENT
Start: 2023-09-24 | End: 2023-09-24

## 2023-09-24 RX ORDER — OXYCODONE HYDROCHLORIDE 5 MG/1
5 TABLET ORAL EVERY 6 HOURS
Refills: 0 | Status: DISCONTINUED | OUTPATIENT
Start: 2023-09-24 | End: 2023-09-24

## 2023-09-24 RX ORDER — ACETAMINOPHEN 500 MG
650 TABLET ORAL EVERY 6 HOURS
Refills: 0 | Status: DISCONTINUED | OUTPATIENT
Start: 2023-09-24 | End: 2023-10-11

## 2023-09-24 RX ORDER — SODIUM CHLORIDE 9 MG/ML
1 INJECTION INTRAMUSCULAR; INTRAVENOUS; SUBCUTANEOUS
Refills: 0 | Status: DISCONTINUED | OUTPATIENT
Start: 2023-09-24 | End: 2023-09-24

## 2023-09-24 RX ORDER — IPRATROPIUM/ALBUTEROL SULFATE 18-103MCG
3 AEROSOL WITH ADAPTER (GRAM) INHALATION ONCE
Refills: 0 | Status: COMPLETED | OUTPATIENT
Start: 2023-09-24 | End: 2023-09-24

## 2023-09-24 RX ORDER — FENTANYL CITRATE 50 UG/ML
25 INJECTION INTRAVENOUS
Refills: 0 | Status: DISCONTINUED | OUTPATIENT
Start: 2023-09-24 | End: 2023-09-24

## 2023-09-24 RX ORDER — HEPARIN SODIUM 5000 [USP'U]/ML
5000 INJECTION INTRAVENOUS; SUBCUTANEOUS EVERY 8 HOURS
Refills: 0 | Status: DISCONTINUED | OUTPATIENT
Start: 2023-09-24 | End: 2023-10-11

## 2023-09-24 RX ORDER — HYDROMORPHONE HYDROCHLORIDE 2 MG/ML
0.5 INJECTION INTRAMUSCULAR; INTRAVENOUS; SUBCUTANEOUS
Refills: 0 | Status: DISCONTINUED | OUTPATIENT
Start: 2023-09-24 | End: 2023-09-24

## 2023-09-24 RX ORDER — SODIUM CHLORIDE 9 MG/ML
1 INJECTION INTRAMUSCULAR; INTRAVENOUS; SUBCUTANEOUS
Refills: 0 | Status: COMPLETED | OUTPATIENT
Start: 2023-09-24 | End: 2023-09-24

## 2023-09-24 RX ORDER — BENZOCAINE AND MENTHOL 5; 1 G/100ML; G/100ML
1 LIQUID ORAL DAILY
Refills: 0 | Status: DISCONTINUED | OUTPATIENT
Start: 2023-09-24 | End: 2023-09-25

## 2023-09-24 RX ADMIN — AMIODARONE HYDROCHLORIDE 100 MILLIGRAM(S): 400 TABLET ORAL at 06:06

## 2023-09-24 RX ADMIN — BENZOCAINE AND MENTHOL 1 LOZENGE: 5; 1 LIQUID ORAL at 22:45

## 2023-09-24 RX ADMIN — NYSTATIN CREAM 1 APPLICATION(S): 100000 CREAM TOPICAL at 06:05

## 2023-09-24 RX ADMIN — Medication 650 MILLIGRAM(S): at 23:10

## 2023-09-24 RX ADMIN — SODIUM CHLORIDE 1 GRAM(S): 9 INJECTION INTRAMUSCULAR; INTRAVENOUS; SUBCUTANEOUS at 06:06

## 2023-09-24 RX ADMIN — HEPARIN SODIUM 5000 UNIT(S): 5000 INJECTION INTRAVENOUS; SUBCUTANEOUS at 22:38

## 2023-09-24 RX ADMIN — Medication 250 MILLIGRAM(S): at 18:24

## 2023-09-24 RX ADMIN — ATORVASTATIN CALCIUM 20 MILLIGRAM(S): 80 TABLET, FILM COATED ORAL at 22:38

## 2023-09-24 RX ADMIN — Medication 650 MILLIGRAM(S): at 22:37

## 2023-09-24 RX ADMIN — Medication 50 MICROGRAM(S): at 05:17

## 2023-09-24 RX ADMIN — PIPERACILLIN AND TAZOBACTAM 25 GRAM(S): 4; .5 INJECTION, POWDER, LYOPHILIZED, FOR SOLUTION INTRAVENOUS at 22:33

## 2023-09-24 RX ADMIN — NYSTATIN CREAM 1 APPLICATION(S): 100000 CREAM TOPICAL at 18:25

## 2023-09-24 RX ADMIN — Medication 3 MILLILITER(S): at 12:48

## 2023-09-24 RX ADMIN — SODIUM CHLORIDE 1 GRAM(S): 9 INJECTION INTRAMUSCULAR; INTRAVENOUS; SUBCUTANEOUS at 03:43

## 2023-09-24 RX ADMIN — Medication 81 MILLIGRAM(S): at 12:25

## 2023-09-24 RX ADMIN — PIPERACILLIN AND TAZOBACTAM 25 GRAM(S): 4; .5 INJECTION, POWDER, LYOPHILIZED, FOR SOLUTION INTRAVENOUS at 06:05

## 2023-09-24 RX ADMIN — PIPERACILLIN AND TAZOBACTAM 25 GRAM(S): 4; .5 INJECTION, POWDER, LYOPHILIZED, FOR SOLUTION INTRAVENOUS at 13:45

## 2023-09-24 RX ADMIN — HEPARIN SODIUM 5000 UNIT(S): 5000 INJECTION INTRAVENOUS; SUBCUTANEOUS at 13:45

## 2023-09-24 NOTE — BRIEF OPERATIVE NOTE - NSICDXBRIEFPROCEDURE_GEN_ALL_CORE_FT
PROCEDURES:  Surgical removal of infected bypass graft of lower extremity 24-Sep-2023 11:37:38  Daniel Ding

## 2023-09-24 NOTE — PROGRESS NOTE ADULT - ASSESSMENT
84 yo F w/ PMHx of CAD,, pAF on Eliquis, severe AS pending TAVR, R internal carotid stenosis, HTN, HLD, hypothyroidism, anxiety, PAD  s/p L ilioprofunda bypass with reverse GSV graft to peroneal 8/31/23 (Dr. Tavares) and L 1st ray partial resection presents with L groin erythema c/f infection. CT showing 17cm L groin abscess.    Plan:  - OR today for L groin washout, possible flap coverage, possible bypass  - NPO/IVF  - IV abx: zosyn and vanc  - Zofran PRN for nausea  - Home meds resumed       Vascular Surgery [id number]

## 2023-09-24 NOTE — CHART NOTE - NSCHARTNOTEFT_GEN_A_CORE
POST-OPERATIVE NOTE    Subjective:  Patient is s/p L groin washout, explant of bypass graft, wound vac placement. L external iliac ligation. States she has no pain, no fevers, chills, chest pain, SOB. Had some nausea but improving.    Objective:  Physical Exam:  General: NAD, resting comfortably in bed  Pulmonary: Nonlabored breathing, no respiratory distress  Cardiovascular: RRR  Extremities: LLE wrapped in ace bandage, no strikethrough. LLE feels colder than RLE, no DP/PT signals on LLE. Sensation and motor intact b/l.  Groin: L groin wound vac in place, holding suction  : ellis in place draining mildly dark urine      Vital Signs Last 24 Hrs  T(C): 36.3 (24 Sep 2023 11:30), Max: 37.5 (23 Sep 2023 16:10)  T(F): 97.3 (24 Sep 2023 11:30), Max: 99.5 (23 Sep 2023 16:10)  HR: 91 (24 Sep 2023 14:00) (85 - 102)  BP: 130/69 (24 Sep 2023 14:00) (88/44 - 143/71)  BP(mean): 83 (24 Sep 2023 14:00) (70 - 102)  RR: 21 (24 Sep 2023 14:00) (14 - 35)  SpO2: 98% (24 Sep 2023 14:00) (92% - 100%)    Parameters below as of 24 Sep 2023 13:15  Patient On (Oxygen Delivery Method): room air      I&O's Detail    23 Sep 2023 07:01  -  24 Sep 2023 07:00  --------------------------------------------------------  IN:    Oral Fluid: 520 mL    sodium chloride 0.9%: 675 mL  Total IN: 1195 mL    OUT:    Indwelling Catheter - Urethral (mL): 400 mL    Voided (mL): 400 mL  Total OUT: 800 mL    Total NET: 395 mL      24 Sep 2023 07:01  -  24 Sep 2023 15:22  --------------------------------------------------------  IN:    sodium chloride 0.9%: 225 mL  Total IN: 225 mL    OUT:    Indwelling Catheter - Urethral (mL): 60 mL  Total OUT: 60 mL    Total NET: 165 mL        piperacillin/tazobactam IVPB.. 3.375  vancomycin  IVPB 1000  aMIOdarone    Tablet 100  aspirin enteric coated 81  heparin   Injectable 5000  losartan 50  piperacillin/tazobactam IVPB.. 3.375  vancomycin  IVPB 1000    PAST MEDICAL & SURGICAL HISTORY:  HTN (hypertension)      HLD (hyperlipidemia)      Anxiety      CAD (coronary artery disease)      PAD (peripheral artery disease)      Hypothyroidism      AF (atrial fibrillation)      Carotid artery stenosis      AS (aortic stenosis)      Status post closed fracture of right femur              LABS:                        11.9   8.09  )-----------( 200      ( 24 Sep 2023 12:00 )             37.3     09-24    129<L>  |  96<L>  |  30<H>  ----------------------------<  201<H>  4.8   |  20<L>  |  1.07    Ca    8.0<L>      24 Sep 2023 14:23  Phos  3.4     09-24  Mg     2.10     09-24    TPro  6.9  /  Alb  3.3  /  TBili  1.0  /  DBili  x   /  AST  21  /  ALT  15  /  AlkPhos  113  09-22    PT/INR - ( 24 Sep 2023 00:30 )   PT: 14.9 sec;   INR: 1.33 ratio         PTT - ( 24 Sep 2023 00:30 )  PTT:28.4 sec  CAPILLARY BLOOD GLUCOSE          Radiology and Additional Studies:    Assessment:  The patient is a 83y Female who is now several hours post-op from a L groin washout, explant of bypass graft, wound vac placement, L external iliac ligation.    Plan:  - pain control prn  - f/u OR cultures  - vanc/zosyn  - ASA  - SubQ heparin  - PACU for 6 hours -> if stable, floor    Vascular Surgery [id number]

## 2023-09-24 NOTE — CONSULT NOTE ADULT - SUBJECTIVE AND OBJECTIVE BOX
Plastic Surgery Consult Note      Humza Lazo MD FACS  Plastic & Reconstructive Surgery  [address] [facility] [address]    [phone]        HPI:  84 yo F w/ PMHx of CAD (prox LM 40%, prox LAD 40%, distal LAD 50%, Cx mild, distal % ), pAF on Eliquis, severe AS pending TAVR, R internal carotid stenosis, HTN, HLD, hypothyroidism, and anxiety.     Patient with PAD and chronic L toe wounds s/p L ilioprofunda bypass with reverse GSV graft to peroneal 8/31/23 (Dr. Tavares) presents to ED with L groin erythema. Patient was seen in the office by Dr. Huber and noted with L groin and calf erythema. Sent into ED for IV abx and CT scan. Patient reports she thinks the redness has been present for 1 week. Unsure if it is getting better or worst because covered most of the time. Patient has a VNS come and changethe dressing 3x a week. Recently completed antibiotics for toe wounds 2 days ago. Also reports persistent nausea while on abx. Denies fevers, chills, drainage from incision site.  (22 Sep 2023 16:21)    CT scan with collection. Plan for OR today for washout. Asked to evaluate for involvement if there needs to be a muscle flap or some other wound management after washout      PAST MEDICAL & SURGICAL HISTORY:  HTN (hypertension)      HLD (hyperlipidemia)      Anxiety      CAD (coronary artery disease)      PAD (peripheral artery disease)      Hypothyroidism      AF (atrial fibrillation)      Carotid artery stenosis      AS (aortic stenosis)      Status post closed fracture of right femur          Allergies    latex (Unknown)  sulfa drugs (Unknown)    Intolerances        Home Medications:  acetaminophen 325 mg oral tablet: 3 tab(s) orally every 6 hours As needed Mild Pain (1 - 3) (06 Sep 2023 13:08)  amiodarone 200 mg oral tablet: 0.5 tab(s) orally once a day (24 Aug 2023 09:11)  aspirin 81 mg oral delayed release tablet: 1 tab(s) orally once a day (at bedtime) (24 Aug 2023 09:11)  Eliquis 2.5 mg oral tablet: 1 tab(s) orally 2 times a day (24 Aug 2023 09:11)  levothyroxine 50 mcg (0.05 mg) oral tablet: 1 tab(s) orally once a day (24 Aug 2023 13:39)  rosuvastatin 5 mg oral tablet: 1 tab(s) orally once a day (at bedtime) (24 Aug 2023 09:11)  telmisartan 40 mg oral tablet: 1 tab(s) orally once a day (24 Aug 2023 09:11)      MEDICATIONS  (STANDING):  aMIOdarone    Tablet 100 milliGRAM(s) Oral daily  aspirin enteric coated 81 milliGRAM(s) Oral daily  atorvastatin 20 milliGRAM(s) Oral at bedtime  levothyroxine 50 MICROGram(s) Oral daily  losartan 50 milliGRAM(s) Oral daily  nystatin Powder 1 Application(s) Topical two times a day  piperacillin/tazobactam IVPB.. 3.375 Gram(s) IV Intermittent every 8 hours  sodium chloride 1 Gram(s) Oral two times a day  sodium chloride 0.9%. 1000 milliLiter(s) (75 mL/Hr) IV Continuous <Continuous>  vancomycin  IVPB 1000 milliGRAM(s) IV Intermittent every 24 hours      SOCIAL HISTORY:    FAMILY HISTORY:  Family history of myocardial infarction (Father, Sibling)        ___________________________________________  REVIEW OF SYSTEMS:    CONSTITUTIONAL: Left groin abscess. No weakness, fevers or chills  EYES/ENT: No visual changes;  No vertigo or throat pain   NECK: No pain or stiffness  RESPIRATORY: No cough, wheezing, hemoptysis; No shortness of breath  CARDIOVASCULAR: No chest pain or palpitations  GASTROINTESTINAL: No abdominal or epigastric pain. No nausea, vomiting, or hematemesis.  GENITOURINARY: No dysuria, frequency or hematuria  NEUROLOGICAL: No numbness or weakness  SKIN: Redness left groin  All other review of systems is negative unless indicated above.    ___________________________________________  OBJECTIVE:  Vital Signs Last 24 Hrs  T(C): 36.6 (24 Sep 2023 06:30), Max: 37.5 (23 Sep 2023 16:10)  T(F): 97.8 (24 Sep 2023 06:30), Max: 99.5 (23 Sep 2023 16:10)  HR: 87 (24 Sep 2023 06:30) (85 - 106)  BP: 105/62 (24 Sep 2023 06:30) (88/44 - 105/62)  BP(mean): --  RR: 18 (24 Sep 2023 06:30) (18 - 18)  SpO2: 100% (24 Sep 2023 06:30) (95% - 100%)    Parameters below as of 24 Sep 2023 04:30  Patient On (Oxygen Delivery Method): room air    CAPILLARY BLOOD GLUCOSE        I&O's Detail    23 Sep 2023 07:01  -  24 Sep 2023 07:00  --------------------------------------------------------  IN:    Oral Fluid: 520 mL    sodium chloride 0.9%: 675 mL  Total IN: 1195 mL    OUT:    Indwelling Catheter - Urethral (mL): 400 mL    Voided (mL): 400 mL  Total OUT: 800 mL    Total NET: 395 mL        Physical Exam    Appearance: Left groin swelling, erythema to staples. left foot to dressing	  HEENT:  Normal oral mucosa, PERRL, EOMI	  Cardiovascular: Normal S1 S2, No JVD, No murmurs/rubs/gallops  Respiratory: Lungs clear to auscultation bilaterally  Gastrointestinal:  Soft, Non-tender, + BS	  Skin: Erythema and swelling left groin	  Neurologic: Non-focal  Extremities: left foot partial amputation. No clubbing, cyanosis or edema  Vascular: Extremities perfused  ____________________________________________  LABS:  CBC Full  -  ( 24 Sep 2023 00:30 )  WBC Count : 7.37 K/uL  RBC Count : 2.55 M/uL  Hemoglobin : 8.3 g/dL  Hematocrit : 25.9 %  Platelet Count - Automated : 218 K/uL  Mean Cell Volume : 101.6 fL  Mean Cell Hemoglobin : 32.5 pg  Mean Cell Hemoglobin Concentration : 32.0 gm/dL  Auto Neutrophil # : x  Auto Lymphocyte # : x  Auto Monocyte # : x  Auto Eosinophil # : x  Auto Basophil # : x  Auto Neutrophil % : x  Auto Lymphocyte % : x  Auto Monocyte % : x  Auto Eosinophil % : x  Auto Basophil % : x    09-24    127<L>  |  93<L>  |  34<H>  ----------------------------<  171<H>  4.3   |  21<L>  |  1.32<H>    Ca    7.9<L>      24 Sep 2023 00:30  Phos  3.5     09-24  Mg     2.10     09-24    TPro  6.9  /  Alb  3.3  /  TBili  1.0  /  DBili  x   /  AST  21  /  ALT  15  /  AlkPhos  113  09-22    LIVER FUNCTIONS - ( 22 Sep 2023 16:20 )  Alb: 3.3 g/dL / Pro: 6.9 g/dL / ALK PHOS: 113 U/L / ALT: 15 U/L / AST: 21 U/L / GGT: x           PT/INR - ( 24 Sep 2023 00:30 )   PT: 14.9 sec;   INR: 1.33 ratio         PTT - ( 24 Sep 2023 00:30 )  PTT:28.4 sec  Urinalysis Basic - ( 24 Sep 2023 00:30 )    Color: x / Appearance: x / SG: x / pH: x  Gluc: 171 mg/dL / Ketone: x  / Bili: x / Urobili: x   Blood: x / Protein: x / Nitrite: x   Leuk Esterase: x / RBC: x / WBC x   Sq Epi: x / Non Sq Epi: x / Bacteria: x      CARDIAC MARKERS ( 24 Sep 2023 06:14 )  x     / x     / 27 U/L / x     / 2.3 ng/mL  CARDIAC MARKERS ( 23 Sep 2023 21:30 )  x     / x     / 27 U/L / x     / 2.1 ng/mL  CARDIAC MARKERS ( 23 Sep 2023 19:33 )  x     / x     / 32 U/L / x     / x            ____________________________________________  MICRO:    Culture - Blood (collected 22 Sep 2023 14:10)  Source: .Blood Blood-Peripheral  Preliminary Report (23 Sep 2023 21:01):    No growth at 24 hours    Culture - Blood (collected 22 Sep 2023 14:00)  Source: .Blood Blood-Peripheral  Preliminary Report (23 Sep 2023 21:01):    No growth at 24 hours    IMP: Left groin erythema and collection on CT in setting of vascular bypass /prosthetic material    Plan for   1. Washout by vascular surgery/assessment  2. If patient needs coverage    Plastic Surgery Consult Note      Humza aLzo MD FACS  Plastic & Reconstructive Surgery  [address] [facility] [address]    [phone]        HPI:  84 yo F w/ PMHx of CAD (prox LM 40%, prox LAD 40%, distal LAD 50%, Cx mild, distal % ), pAF on Eliquis, severe AS pending TAVR, R internal carotid stenosis, HTN, HLD, hypothyroidism, and anxiety.     Patient with PAD and chronic L toe wounds s/p L ilioprofunda bypass with reverse GSV graft to peroneal 8/31/23 (Dr. Tavares) presents to ED with L groin erythema. Patient was seen in the office by Dr. Huber and noted with L groin and calf erythema. Sent into ED for IV abx and CT scan. Patient reports she thinks the redness has been present for 1 week. Unsure if it is getting better or worst because covered most of the time. Patient has a VNS come and changethe dressing 3x a week. Recently completed antibiotics for toe wounds 2 days ago. Also reports persistent nausea while on abx. Denies fevers, chills, drainage from incision site.  (22 Sep 2023 16:21)    CT scan with collection. Plan for OR today for washout. Asked to evaluate for involvement if there needs to be a muscle flap or some other wound management after washout      PAST MEDICAL & SURGICAL HISTORY:  HTN (hypertension)      HLD (hyperlipidemia)      Anxiety      CAD (coronary artery disease)      PAD (peripheral artery disease)      Hypothyroidism      AF (atrial fibrillation)      Carotid artery stenosis      AS (aortic stenosis)      Status post closed fracture of right femur          Allergies    latex (Unknown)  sulfa drugs (Unknown)    Intolerances        Home Medications:  acetaminophen 325 mg oral tablet: 3 tab(s) orally every 6 hours As needed Mild Pain (1 - 3) (06 Sep 2023 13:08)  amiodarone 200 mg oral tablet: 0.5 tab(s) orally once a day (24 Aug 2023 09:11)  aspirin 81 mg oral delayed release tablet: 1 tab(s) orally once a day (at bedtime) (24 Aug 2023 09:11)  Eliquis 2.5 mg oral tablet: 1 tab(s) orally 2 times a day (24 Aug 2023 09:11)  levothyroxine 50 mcg (0.05 mg) oral tablet: 1 tab(s) orally once a day (24 Aug 2023 13:39)  rosuvastatin 5 mg oral tablet: 1 tab(s) orally once a day (at bedtime) (24 Aug 2023 09:11)  telmisartan 40 mg oral tablet: 1 tab(s) orally once a day (24 Aug 2023 09:11)      MEDICATIONS  (STANDING):  aMIOdarone    Tablet 100 milliGRAM(s) Oral daily  aspirin enteric coated 81 milliGRAM(s) Oral daily  atorvastatin 20 milliGRAM(s) Oral at bedtime  levothyroxine 50 MICROGram(s) Oral daily  losartan 50 milliGRAM(s) Oral daily  nystatin Powder 1 Application(s) Topical two times a day  piperacillin/tazobactam IVPB.. 3.375 Gram(s) IV Intermittent every 8 hours  sodium chloride 1 Gram(s) Oral two times a day  sodium chloride 0.9%. 1000 milliLiter(s) (75 mL/Hr) IV Continuous <Continuous>  vancomycin  IVPB 1000 milliGRAM(s) IV Intermittent every 24 hours      SOCIAL HISTORY:    FAMILY HISTORY:  Family history of myocardial infarction (Father, Sibling)        ___________________________________________  REVIEW OF SYSTEMS:    CONSTITUTIONAL: Left groin abscess. No weakness, fevers or chills  EYES/ENT: No visual changes;  No vertigo or throat pain   NECK: No pain or stiffness  RESPIRATORY: No cough, wheezing, hemoptysis; No shortness of breath  CARDIOVASCULAR: No chest pain or palpitations  GASTROINTESTINAL: No abdominal or epigastric pain. No nausea, vomiting, or hematemesis.  GENITOURINARY: No dysuria, frequency or hematuria  NEUROLOGICAL: No numbness or weakness  SKIN: Redness left groin  All other review of systems is negative unless indicated above.    ___________________________________________  OBJECTIVE:  Vital Signs Last 24 Hrs  T(C): 36.6 (24 Sep 2023 06:30), Max: 37.5 (23 Sep 2023 16:10)  T(F): 97.8 (24 Sep 2023 06:30), Max: 99.5 (23 Sep 2023 16:10)  HR: 87 (24 Sep 2023 06:30) (85 - 106)  BP: 105/62 (24 Sep 2023 06:30) (88/44 - 105/62)  BP(mean): --  RR: 18 (24 Sep 2023 06:30) (18 - 18)  SpO2: 100% (24 Sep 2023 06:30) (95% - 100%)    Parameters below as of 24 Sep 2023 04:30  Patient On (Oxygen Delivery Method): room air    CAPILLARY BLOOD GLUCOSE        I&O's Detail    23 Sep 2023 07:01  -  24 Sep 2023 07:00  --------------------------------------------------------  IN:    Oral Fluid: 520 mL    sodium chloride 0.9%: 675 mL  Total IN: 1195 mL    OUT:    Indwelling Catheter - Urethral (mL): 400 mL    Voided (mL): 400 mL  Total OUT: 800 mL    Total NET: 395 mL        Physical Exam    Appearance: Left groin swelling, erythema to staples. left foot to dressing	  HEENT:  Normal oral mucosa, PERRL, EOMI	  Cardiovascular: Normal S1 S2, No JVD, No murmurs/rubs/gallops  Respiratory: Lungs clear to auscultation bilaterally  Gastrointestinal:  Soft, Non-tender, + BS	  Skin: Erythema and swelling left groin	  Neurologic: Non-focal  Extremities: left foot partial amputation. No clubbing, cyanosis or edema  Vascular: Extremities perfused  ____________________________________________  LABS:  CBC Full  -  ( 24 Sep 2023 00:30 )  WBC Count : 7.37 K/uL  RBC Count : 2.55 M/uL  Hemoglobin : 8.3 g/dL  Hematocrit : 25.9 %  Platelet Count - Automated : 218 K/uL  Mean Cell Volume : 101.6 fL  Mean Cell Hemoglobin : 32.5 pg  Mean Cell Hemoglobin Concentration : 32.0 gm/dL  Auto Neutrophil # : x  Auto Lymphocyte # : x  Auto Monocyte # : x  Auto Eosinophil # : x  Auto Basophil # : x  Auto Neutrophil % : x  Auto Lymphocyte % : x  Auto Monocyte % : x  Auto Eosinophil % : x  Auto Basophil % : x    09-24    127<L>  |  93<L>  |  34<H>  ----------------------------<  171<H>  4.3   |  21<L>  |  1.32<H>    Ca    7.9<L>      24 Sep 2023 00:30  Phos  3.5     09-24  Mg     2.10     09-24    TPro  6.9  /  Alb  3.3  /  TBili  1.0  /  DBili  x   /  AST  21  /  ALT  15  /  AlkPhos  113  09-22    LIVER FUNCTIONS - ( 22 Sep 2023 16:20 )  Alb: 3.3 g/dL / Pro: 6.9 g/dL / ALK PHOS: 113 U/L / ALT: 15 U/L / AST: 21 U/L / GGT: x           PT/INR - ( 24 Sep 2023 00:30 )   PT: 14.9 sec;   INR: 1.33 ratio         PTT - ( 24 Sep 2023 00:30 )  PTT:28.4 sec  Urinalysis Basic - ( 24 Sep 2023 00:30 )    Color: x / Appearance: x / SG: x / pH: x  Gluc: 171 mg/dL / Ketone: x  / Bili: x / Urobili: x   Blood: x / Protein: x / Nitrite: x   Leuk Esterase: x / RBC: x / WBC x   Sq Epi: x / Non Sq Epi: x / Bacteria: x      CARDIAC MARKERS ( 24 Sep 2023 06:14 )  x     / x     / 27 U/L / x     / 2.3 ng/mL  CARDIAC MARKERS ( 23 Sep 2023 21:30 )  x     / x     / 27 U/L / x     / 2.1 ng/mL  CARDIAC MARKERS ( 23 Sep 2023 19:33 )  x     / x     / 32 U/L / x     / x            ____________________________________________  MICRO:    Culture - Blood (collected 22 Sep 2023 14:10)  Source: .Blood Blood-Peripheral  Preliminary Report (23 Sep 2023 21:01):    No growth at 24 hours    Culture - Blood (collected 22 Sep 2023 14:00)  Source: .Blood Blood-Peripheral  Preliminary Report (23 Sep 2023 21:01):    No growth at 24 hours    IMP: Left groin erythema and collection on CT in setting of vascular bypass /prosthetic material    Plan for   1. Washout by vascular surgery/assessment  2. If patient needs Soft tissue coverage will be available

## 2023-09-24 NOTE — BRIEF OPERATIVE NOTE - NSICDXBRIEFPOSTOP_GEN_ALL_CORE_FT
POST-OP DIAGNOSIS:  Infected prosthetic vascular graft, subsequent encounter 24-Sep-2023 11:38:21  Daniel Ding

## 2023-09-24 NOTE — PROGRESS NOTE ADULT - SUBJECTIVE AND OBJECTIVE BOX
Vascular Progress Note    S: Patient seen and examined. Patient was mildly tachycardic (low mid 100s) and hypotensive (systolic 80s) during the day yesterday. Received 1L and 500cc NS bolus, with maintenance fluids started thereafter. Normal HR and BP this AM. Cardiac enzymes downtrending. Patient had no complaints this AM, no chest pain, SOB, fevers, chills, nausea or vomiting.    O:  Physical Exam:  Gen: Laying in bed, NAD  HEENT: atrumatic, EMOI  Resp: Unlabored breathing  Groin: L groin incision with surrounding erythema and fluctuance, stable from yesterday      Vital Signs Last 24 Hrs  T(C): 36.9 (24 Sep 2023 07:39), Max: 37.5 (23 Sep 2023 16:10)  T(F): 98.5 (24 Sep 2023 07:39), Max: 99.5 (23 Sep 2023 16:10)  HR: 95 (24 Sep 2023 07:39) (85 - 106)  BP: 117/87 (24 Sep 2023 07:39) (88/44 - 117/87)  BP(mean): --  RR: 16 (24 Sep 2023 07:39) (16 - 18)  SpO2: 96% (24 Sep 2023 07:39) (95% - 100%)    Parameters below as of 24 Sep 2023 07:39  Patient On (Oxygen Delivery Method): room air        I&O's Detail    23 Sep 2023 07:01  -  24 Sep 2023 07:00  --------------------------------------------------------  IN:    Oral Fluid: 520 mL    sodium chloride 0.9%: 675 mL  Total IN: 1195 mL    OUT:    Indwelling Catheter - Urethral (mL): 400 mL    Voided (mL): 400 mL  Total OUT: 800 mL    Total NET: 395 mL                                8.3    7.37  )-----------( 218      ( 24 Sep 2023 00:30 )             25.9       09-24    127<L>  |  93<L>  |  34<H>  ----------------------------<  171<H>  4.3   |  21<L>  |  1.32<H>    Ca    7.9<L>      24 Sep 2023 00:30  Phos  3.5     09-24  Mg     2.10     09-24    TPro  6.9  /  Alb  3.3  /  TBili  1.0  /  DBili  x   /  AST  21  /  ALT  15  /  AlkPhos  113  09-22

## 2023-09-24 NOTE — BRIEF OPERATIVE NOTE - NSICDXBRIEFPREOP_GEN_ALL_CORE_FT
PRE-OP DIAGNOSIS:  Infected prosthetic vascular graft, initial encounter 24-Sep-2023 11:38:03  Daniel Ding

## 2023-09-25 DIAGNOSIS — R73.9 HYPERGLYCEMIA, UNSPECIFIED: ICD-10-CM

## 2023-09-25 LAB
A1C WITH ESTIMATED AVERAGE GLUCOSE RESULT: 5.4 % — SIGNIFICANT CHANGE UP (ref 4–5.6)
ANION GAP SERPL CALC-SCNC: 14 MMOL/L — SIGNIFICANT CHANGE UP (ref 7–14)
BUN SERPL-MCNC: 28 MG/DL — HIGH (ref 7–23)
CALCIUM SERPL-MCNC: 8 MG/DL — LOW (ref 8.4–10.5)
CHLORIDE SERPL-SCNC: 95 MMOL/L — LOW (ref 98–107)
CO2 SERPL-SCNC: 18 MMOL/L — LOW (ref 22–31)
CREAT SERPL-MCNC: 0.92 MG/DL — SIGNIFICANT CHANGE UP (ref 0.5–1.3)
EGFR: 62 ML/MIN/1.73M2 — SIGNIFICANT CHANGE UP
ESTIMATED AVERAGE GLUCOSE: 108 — SIGNIFICANT CHANGE UP
GLUCOSE BLDC GLUCOMTR-MCNC: 229 MG/DL — HIGH (ref 70–99)
GLUCOSE BLDC GLUCOMTR-MCNC: 270 MG/DL — HIGH (ref 70–99)
GLUCOSE SERPL-MCNC: 243 MG/DL — HIGH (ref 70–99)
GRAM STN FLD: SIGNIFICANT CHANGE UP
HCT VFR BLD CALC: 35.5 % — SIGNIFICANT CHANGE UP (ref 34.5–45)
HGB BLD-MCNC: 11.9 G/DL — SIGNIFICANT CHANGE UP (ref 11.5–15.5)
MAGNESIUM SERPL-MCNC: 2 MG/DL — SIGNIFICANT CHANGE UP (ref 1.6–2.6)
MCHC RBC-ENTMCNC: 30.7 PG — SIGNIFICANT CHANGE UP (ref 27–34)
MCHC RBC-ENTMCNC: 33.5 GM/DL — SIGNIFICANT CHANGE UP (ref 32–36)
MCV RBC AUTO: 91.5 FL — SIGNIFICANT CHANGE UP (ref 80–100)
NIGHT BLUE STAIN TISS: SIGNIFICANT CHANGE UP
NRBC # BLD: 0 /100 WBCS — SIGNIFICANT CHANGE UP (ref 0–0)
NRBC # FLD: 0 K/UL — SIGNIFICANT CHANGE UP (ref 0–0)
PHOSPHATE SERPL-MCNC: 3.1 MG/DL — SIGNIFICANT CHANGE UP (ref 2.5–4.5)
PLATELET # BLD AUTO: 235 K/UL — SIGNIFICANT CHANGE UP (ref 150–400)
POTASSIUM SERPL-MCNC: 4.5 MMOL/L — SIGNIFICANT CHANGE UP (ref 3.5–5.3)
POTASSIUM SERPL-SCNC: 4.5 MMOL/L — SIGNIFICANT CHANGE UP (ref 3.5–5.3)
RBC # BLD: 3.88 M/UL — SIGNIFICANT CHANGE UP (ref 3.8–5.2)
RBC # FLD: 21.2 % — HIGH (ref 10.3–14.5)
SODIUM SERPL-SCNC: 127 MMOL/L — LOW (ref 135–145)
SPECIMEN SOURCE: SIGNIFICANT CHANGE UP
WBC # BLD: 12.07 K/UL — HIGH (ref 3.8–10.5)
WBC # FLD AUTO: 12.07 K/UL — HIGH (ref 3.8–10.5)

## 2023-09-25 PROCEDURE — 71045 X-RAY EXAM CHEST 1 VIEW: CPT | Mod: 26

## 2023-09-25 PROCEDURE — 99232 SBSQ HOSP IP/OBS MODERATE 35: CPT

## 2023-09-25 PROCEDURE — 99223 1ST HOSP IP/OBS HIGH 75: CPT

## 2023-09-25 PROCEDURE — 99232 SBSQ HOSP IP/OBS MODERATE 35: CPT | Mod: GC

## 2023-09-25 PROCEDURE — 99222 1ST HOSP IP/OBS MODERATE 55: CPT | Mod: GC

## 2023-09-25 RX ORDER — INSULIN LISPRO 100/ML
VIAL (ML) SUBCUTANEOUS
Refills: 0 | Status: DISCONTINUED | OUTPATIENT
Start: 2023-09-25 | End: 2023-09-27

## 2023-09-25 RX ORDER — INSULIN LISPRO 100/ML
VIAL (ML) SUBCUTANEOUS AT BEDTIME
Refills: 0 | Status: DISCONTINUED | OUTPATIENT
Start: 2023-09-25 | End: 2023-10-11

## 2023-09-25 RX ORDER — PIPERACILLIN AND TAZOBACTAM 4; .5 G/20ML; G/20ML
3.38 INJECTION, POWDER, LYOPHILIZED, FOR SOLUTION INTRAVENOUS EVERY 8 HOURS
Refills: 0 | Status: DISCONTINUED | OUTPATIENT
Start: 2023-09-25 | End: 2023-09-27

## 2023-09-25 RX ADMIN — PIPERACILLIN AND TAZOBACTAM 25 GRAM(S): 4; .5 INJECTION, POWDER, LYOPHILIZED, FOR SOLUTION INTRAVENOUS at 06:25

## 2023-09-25 RX ADMIN — HEPARIN SODIUM 5000 UNIT(S): 5000 INJECTION INTRAVENOUS; SUBCUTANEOUS at 06:27

## 2023-09-25 RX ADMIN — PIPERACILLIN AND TAZOBACTAM 25 GRAM(S): 4; .5 INJECTION, POWDER, LYOPHILIZED, FOR SOLUTION INTRAVENOUS at 21:44

## 2023-09-25 RX ADMIN — Medication 650 MILLIGRAM(S): at 16:27

## 2023-09-25 RX ADMIN — Medication 650 MILLIGRAM(S): at 05:15

## 2023-09-25 RX ADMIN — HEPARIN SODIUM 5000 UNIT(S): 5000 INJECTION INTRAVENOUS; SUBCUTANEOUS at 15:20

## 2023-09-25 RX ADMIN — NYSTATIN CREAM 1 APPLICATION(S): 100000 CREAM TOPICAL at 06:25

## 2023-09-25 RX ADMIN — Medication 650 MILLIGRAM(S): at 10:08

## 2023-09-25 RX ADMIN — Medication 650 MILLIGRAM(S): at 04:36

## 2023-09-25 RX ADMIN — NYSTATIN CREAM 1 APPLICATION(S): 100000 CREAM TOPICAL at 17:33

## 2023-09-25 RX ADMIN — Medication 81 MILLIGRAM(S): at 11:29

## 2023-09-25 RX ADMIN — ONDANSETRON 4 MILLIGRAM(S): 8 TABLET, FILM COATED ORAL at 15:27

## 2023-09-25 RX ADMIN — Medication 650 MILLIGRAM(S): at 11:08

## 2023-09-25 RX ADMIN — Medication 50 MICROGRAM(S): at 05:39

## 2023-09-25 RX ADMIN — LOSARTAN POTASSIUM 50 MILLIGRAM(S): 100 TABLET, FILM COATED ORAL at 06:28

## 2023-09-25 RX ADMIN — PIPERACILLIN AND TAZOBACTAM 25 GRAM(S): 4; .5 INJECTION, POWDER, LYOPHILIZED, FOR SOLUTION INTRAVENOUS at 15:19

## 2023-09-25 RX ADMIN — Medication 650 MILLIGRAM(S): at 15:27

## 2023-09-25 RX ADMIN — Medication 1: at 22:43

## 2023-09-25 RX ADMIN — Medication 4: at 17:39

## 2023-09-25 RX ADMIN — HEPARIN SODIUM 5000 UNIT(S): 5000 INJECTION INTRAVENOUS; SUBCUTANEOUS at 21:45

## 2023-09-25 RX ADMIN — Medication 250 MILLIGRAM(S): at 17:33

## 2023-09-25 RX ADMIN — AMIODARONE HYDROCHLORIDE 100 MILLIGRAM(S): 400 TABLET ORAL at 06:27

## 2023-09-25 NOTE — PROGRESS NOTE ADULT - SUBJECTIVE AND OBJECTIVE BOX
Vascular Surgery Daily Progress Note  =====================================================    SUBJECTIVE: Patient seen and examined at bedside on AM rounds. Patient endorses no new complaints. Denies chest pain, SOB, fever, chills.    PAST MEDICAL & SURGICAL HISTORY:  HTN (hypertension)  HLD (hyperlipidemia)  Anxiety  CAD (coronary artery disease)  PAD (peripheral artery disease)  Hypothyroidism  AF (atrial fibrillation)  Carotid artery stenosis  AS (aortic stenosis)  Status post closed fracture of right femur      ALLERGIES:  latex (Unknown)  sulfa drugs (Unknown)    --------------------------------------------------------------------------------------    MEDICATIONS:    Neurologic Medications  acetaminophen     Tablet .. 650 milliGRAM(s) Oral every 6 hours  ondansetron    Tablet 4 milliGRAM(s) Oral every 8 hours PRN Nausea and/or Vomiting  oxyCODONE    IR 5 milliGRAM(s) Oral every 6 hours PRN Severe Pain (7 - 10)    Respiratory Medications    Cardiovascular Medications  aMIOdarone    Tablet 100 milliGRAM(s) Oral daily  losartan 50 milliGRAM(s) Oral daily    Gastrointestinal Medications    Genitourinary Medications    Hematologic/Oncologic Medications  aspirin enteric coated 81 milliGRAM(s) Oral daily  heparin   Injectable 5000 Unit(s) SubCutaneous every 8 hours    Antimicrobial/Immunologic Medications  piperacillin/tazobactam IVPB.. 3.375 Gram(s) IV Intermittent every 8 hours  vancomycin  IVPB 1000 milliGRAM(s) IV Intermittent every 24 hours    Endocrine/Metabolic Medications  atorvastatin 20 milliGRAM(s) Oral at bedtime  levothyroxine 50 MICROGram(s) Oral daily    Topical/Other Medications  nystatin Powder 1 Application(s) Topical two times a day    --------------------------------------------------------------------------------------    VITAL SIGNS:  T(C): 36.6 (09-25-23 @ 04:30), Max: 36.8 (09-24-23 @ 15:00)  HR: 93 (09-25-23 @ 06:20) (88 - 100)  BP: 118/70 (09-25-23 @ 06:20) (100/83 - 143/71)  RR: 18 (09-25-23 @ 04:30) (14 - 35)  SpO2: 95% (09-25-23 @ 04:30) (92% - 100%)  --------------------------------------------------------------------------------------    EXAM    General: NAD, resting in bed comfortably. A&Ox3.   Cardiac: regular rate  Respiratory: Nonlabored respirations, normal cw expansion.  Abdomen: soft, nontender, nondistended.   Extremities: moving extremities  Vascular: LE vac holding suction; dry gangrene of toes.     --------------------------------------------------------------------------------------    LABS                          11.9   12.07 )-----------( 235      ( 25 Sep 2023 05:50 )             35.5     09-25    127<L>  |  95<L>  |  28<H>  ----------------------------<  243<H>  4.5   |  18<L>  |  0.92    Ca    8.0<L>      25 Sep 2023 05:50  Phos  3.1     09-25  Mg     2.00     09-25      --------------------------------------------------------------------------------------      I&Os:    09-24-23 @ 07:01  -  09-25-23 @ 07:00  --------------------------------------------------------  IN: 2050 mL / OUT: 890 mL / NET: 1160 mL        --------------------------------------------------------------------------------------         Vascular Surgery Daily Progress Note  =====================================================    SUBJECTIVE: Patient seen and examined at bedside on AM rounds. Patient endorses no new complaints. Denies chest pain, SOB, fever, chills.    PAST MEDICAL & SURGICAL HISTORY:  HTN (hypertension)  HLD (hyperlipidemia)  Anxiety  CAD (coronary artery disease)  PAD (peripheral artery disease)  Hypothyroidism  AF (atrial fibrillation)  Carotid artery stenosis  AS (aortic stenosis)  Status post closed fracture of right femur      ALLERGIES:  latex (Unknown)  sulfa drugs (Unknown)    --------------------------------------------------------------------------------------    MEDICATIONS:    Neurologic Medications  acetaminophen     Tablet .. 650 milliGRAM(s) Oral every 6 hours  ondansetron    Tablet 4 milliGRAM(s) Oral every 8 hours PRN Nausea and/or Vomiting  oxyCODONE    IR 5 milliGRAM(s) Oral every 6 hours PRN Severe Pain (7 - 10)    Respiratory Medications    Cardiovascular Medications  aMIOdarone    Tablet 100 milliGRAM(s) Oral daily  losartan 50 milliGRAM(s) Oral daily    Gastrointestinal Medications    Genitourinary Medications    Hematologic/Oncologic Medications  aspirin enteric coated 81 milliGRAM(s) Oral daily  heparin   Injectable 5000 Unit(s) SubCutaneous every 8 hours    Antimicrobial/Immunologic Medications  piperacillin/tazobactam IVPB.. 3.375 Gram(s) IV Intermittent every 8 hours  vancomycin  IVPB 1000 milliGRAM(s) IV Intermittent every 24 hours    Endocrine/Metabolic Medications  atorvastatin 20 milliGRAM(s) Oral at bedtime  levothyroxine 50 MICROGram(s) Oral daily    Topical/Other Medications  nystatin Powder 1 Application(s) Topical two times a day    --------------------------------------------------------------------------------------    VITAL SIGNS:  T(C): 36.6 (09-25-23 @ 04:30), Max: 36.8 (09-24-23 @ 15:00)  HR: 93 (09-25-23 @ 06:20) (88 - 100)  BP: 118/70 (09-25-23 @ 06:20) (100/83 - 143/71)  RR: 18 (09-25-23 @ 04:30) (14 - 35)  SpO2: 95% (09-25-23 @ 04:30) (92% - 100%)  --------------------------------------------------------------------------------------    EXAM    General: NAD, resting in bed comfortably.   Cardiac: regular rate  Respiratory: Nonlabored respirations, normal cw expansion.  Abdomen: soft, nontender, nondistended.   Extremities: moving extremities  Vascular: LE vac holding suction; dry gangrene of toes.     --------------------------------------------------------------------------------------    LABS                          11.9   12.07 )-----------( 235      ( 25 Sep 2023 05:50 )             35.5     09-25    127<L>  |  95<L>  |  28<H>  ----------------------------<  243<H>  4.5   |  18<L>  |  0.92    Ca    8.0<L>      25 Sep 2023 05:50  Phos  3.1     09-25  Mg     2.00     09-25      --------------------------------------------------------------------------------------      I&Os:    09-24-23 @ 07:01  -  09-25-23 @ 07:00  --------------------------------------------------------  IN: 2050 mL / OUT: 890 mL / NET: 1160 mL        --------------------------------------------------------------------------------------

## 2023-09-25 NOTE — CONSULT NOTE ADULT - ASSESSMENT
82 yo F w/ PMHx of CAD (prox LM 40%, prox LAD 40%, distal LAD 50%, Cx mild, distal % ), pAF on Eliquis, severe AS pending TAVR, R internal carotid stenosis, HTN, HLD, hypothyroidism, and anxiety.  Patient with PAD and chronic L toe wounds s/p L ilioprofunda bypass with reverse GSV graft to peroneal 8/31/23 (Dr. Tavares) presents to ED with L groin erythema.  She is s/p L groin washout, explant of bypass graft, wound vac placement, L external iliac ligation with 300 cc of purulent material drained.  She is also being treated for LLL consolidation.  She is getting vanco and zosyn in dextrose.  Endocrinology team consulted for hyperglycemia in a non-diabetic patient with A1c 5.4%.     # Stress Hyperglycemia from infection and abx   # pre-DM   A1c: 5.4%   Home meds: none   GFR: 62     - Pt's most recent A1c is 5.4%.  As per primary team, pt did not received any blood or iron transfusion (h/h improved from 8 to 11).  No reason to suspect falsely low A1c. Pt also not receiving any steroids.    - Pt is getting antibiotics in dextrose, please give antibiotics in fluids other than dextrose.   - Please monitor fingerstick blood glucose at least 4 times a day and start mod scale ss with meals and low dose at night   - Blood glucose target while hospitalized 140-180 mg/dL  - Carbohydrate controlled diet, no concentrated sweets  DISCHARGE: TBD     # Dyslipidemia: c/w Lipitor 20 mg   # Hypertension: BP goal < 130/80, will defer to primary team   # hypothyroid: please check TSH and free T4, c/w home dose Lt4 50 mcg daily     Reina Marcos MD  Pager: 9AM - 5PM (Mon-Fri): [phone]  After 5PM and on Weekends: [phone]     For nonurgent matters, please email [email or web address] for assistance.

## 2023-09-25 NOTE — CONSULT NOTE ADULT - CONSULT REASON
Left groin collection after vascular procedure
Hyperglycemia
Elevated Troponins
LLE bypass infection

## 2023-09-25 NOTE — CONSULT NOTE ADULT - SUBJECTIVE AND OBJECTIVE BOX
Reason For Consult: Hyperglycemia     HPI:  84 yo F w/ PMHx of CAD (prox LM 40%, prox LAD 40%, distal LAD 50%, Cx mild, distal % ), pAF on Eliquis, severe AS pending TAVR, R internal carotid stenosis, HTN, HLD, hypothyroidism, and anxiety.     Patient with PAD and chronic L toe wounds s/p L ilioprofunda bypass with reverse GSV graft to peroneal 8/31/23 (Dr. Tavares) presents to ED with L groin erythema. Patient was seen in the office by Dr. Huber today and noted with L groin and calf erythema. Sent into ED for IV abx and CT scan. Patient reports she thinks the redness has been present for 1 week. Unsure if it is getting better or worst because covered most of the time. Patient has a VNS come and changethe dressing 3x a week. Recently completed antibiotics for toe wounds 2 days ago. Also reports persistent nausea while on abx. Denies fevers, chills, drainage from incision site.  (22 Sep 2023 16:21)    Endocrinology team consulted for hyperglycemia.  Pt is being treated for infection and is getting abx in dextrose q8H.    A1c is 5.4%, pt denies prior dx of DM, states possible pre-DM.  She gets blood work done every 3 months.    She avoids refined sugar since she was told about being possible pre diabetic.   Weight has been stable.   Does not know family h/o.     Today, reports nausea but no vomiting.   tolerating diet.     PAST MEDICAL & SURGICAL HISTORY:  HTN (hypertension)      HLD (hyperlipidemia)      Anxiety      CAD (coronary artery disease)      PAD (peripheral artery disease)      Hypothyroidism      AF (atrial fibrillation)      Carotid artery stenosis      AS (aortic stenosis)      Status post closed fracture of right femur          FAMILY HISTORY:  Family history of myocardial infarction (Father, Sibling)        Social History: denies alcohol, tobacco or IVDA     Outpatient Medications:    MEDICATIONS  (STANDING):  acetaminophen     Tablet .. 650 milliGRAM(s) Oral every 6 hours  aMIOdarone    Tablet 100 milliGRAM(s) Oral daily  aspirin enteric coated 81 milliGRAM(s) Oral daily  atorvastatin 20 milliGRAM(s) Oral at bedtime  heparin   Injectable 5000 Unit(s) SubCutaneous every 8 hours  levothyroxine 50 MICROGram(s) Oral daily  losartan 50 milliGRAM(s) Oral daily  nystatin Powder 1 Application(s) Topical two times a day  piperacillin/tazobactam IVPB.. 3.375 Gram(s) IV Intermittent every 8 hours  vancomycin  IVPB 1000 milliGRAM(s) IV Intermittent every 24 hours    MEDICATIONS  (PRN):  ondansetron    Tablet 4 milliGRAM(s) Oral every 8 hours PRN Nausea and/or Vomiting  oxyCODONE    IR 5 milliGRAM(s) Oral every 6 hours PRN Severe Pain (7 - 10)      Allergies    latex (Unknown)  sulfa drugs (Unknown)    Intolerances      Review of Systems:  Constitutional: No fever  Eyes: No blurry vision  Neuro: No tremors  HEENT: No pain  Cardiovascular: No chest pain, palpitations  Respiratory: No SOB, no cough  GI: + nausea, no vomiting, no abdominal pain  : No dysuria  Skin: no rash  Psych: no depression  Endocrine: no polyuria, polydipsia  Hem/lymph: no swelling  Osteoporosis: no fractures    ALL OTHER SYSTEMS REVIEWED AND NEGATIVE    PHYSICAL EXAM:  VITALS: T(C): 36.3 (09-25-23 @ 12:13)  T(F): 97.4 (09-25-23 @ 12:13), Max: 97.9 (09-25-23 @ 09:03)  HR: 95 (09-25-23 @ 12:13) (90 - 100)  BP: 114/60 (09-25-23 @ 12:13) (113/81 - 136/74)  RR:  (18 - 18)  SpO2:  (94% - 95%)  Wt(kg): --  GENERAL: NAD, well-groomed, well-developed  EYES: No proptosis, no lid lag, anicteric  HEENT:  Atraumatic, Normocephalic, moist mucous membranes  THYROID: Normal size, no palpable nodules  RESPIRATORY: Clear to auscultation bilaterally; No rales, rhonchi, wheezing, or rubs  CARDIOVASCULAR: Regular rate and rhythm; No murmurs; no peripheral edema  GI: Soft, nontender, non distended, normal bowel sounds  NEURO: sensation intact, extraocular movements intact, no tremor, normal reflexes  PSYCH: Alert and oriented x 3, normal affect, normal mood  CUSHING'S SIGNS: no striae                              11.9   12.07 )-----------( 235      ( 25 Sep 2023 05:50 )             35.5       09-25    127<L>  |  95<L>  |  28<H>  ----------------------------<  243<H>  4.5   |  18<L>  |  0.92    eGFR: 62    Ca    8.0<L>      09-25  Mg     2.00     09-25  Phos  3.1     09-25    TPro  6.9  /  Alb  3.3  /  TBili  1.0  /  DBili  x   /  AST  21  /  ALT  15  /  AlkPhos  113  09-22

## 2023-09-25 NOTE — PROGRESS NOTE ADULT - SUBJECTIVE AND OBJECTIVE BOX
Patient seen and examined at bedside.    Overnight Events:   s/p L groin washout over the weekend   NAEO   SR with PVC and HR in the 90s on telemetry   Denies acute complaints     Denies chest pain, SOB, nausea, vomiting, diaphoresis.     REVIEW OF SYSTEMS:  CONSTITUTIONAL: No weakness, fevers or chills  EYES/ENT: No visual changes;  No dysphagia  NECK: No pain or stiffness  RESPIRATORY: No cough, wheezing, hemoptysis; No shortness of breath  CARDIOVASCULAR: No chest pain or palpitations; No lower extremity edema  GASTROINTESTINAL: No abdominal or epigastric pain. No nausea, vomiting, or hematemesis; No diarrhea or constipation. No melena or hematochezia.  BACK: No back pain  GENITOURINARY: No dysuria, frequency or hematuria  NEUROLOGICAL: No numbness or weakness  SKIN: No itching, burning, rashes, or lesions   All other review of systems is negative unless indicated above.            Current Meds:  acetaminophen     Tablet .. 650 milliGRAM(s) Oral every 6 hours  aMIOdarone    Tablet 100 milliGRAM(s) Oral daily  aspirin enteric coated 81 milliGRAM(s) Oral daily  atorvastatin 20 milliGRAM(s) Oral at bedtime  heparin   Injectable 5000 Unit(s) SubCutaneous every 8 hours  insulin lispro (ADMELOG) corrective regimen sliding scale   SubCutaneous three times a day before meals  insulin lispro (ADMELOG) corrective regimen sliding scale   SubCutaneous at bedtime  levothyroxine 50 MICROGram(s) Oral daily  losartan 50 milliGRAM(s) Oral daily  nystatin Powder 1 Application(s) Topical two times a day  ondansetron    Tablet 4 milliGRAM(s) Oral every 8 hours PRN  oxyCODONE    IR 5 milliGRAM(s) Oral every 6 hours PRN  piperacillin/tazobactam IVPB.. 3.375 Gram(s) IV Intermittent every 8 hours  vancomycin  IVPB 1000 milliGRAM(s) IV Intermittent every 24 hours      Vitals:  T(F): 97.9 (09-25), Max: 97.9 (09-25)  HR: 95 (09-25) (90 - 100)  BP: 101/81 (09-25) (101/81 - 136/74)  RR: 18 (09-25)  SpO2: 94% (09-25)  I&O's Summary    24 Sep 2023 07:01  -  25 Sep 2023 07:00  --------------------------------------------------------  IN: 2050 mL / OUT: 890 mL / NET: 1160 mL        Physical Exam:  Appearance: No acute distress; well appearing  Eyes: PERRL, EOMI, pink conjunctiva  HEENT: Normal oral mucosa  Cardiovascular: RRR, S1, S2, no murmurs, rubs, or gallops; no edema; no JVD  Respiratory: Clear to auscultation bilaterally  Gastrointestinal: soft, non-tender, non-distended with normal bowel sounds  Musculoskeletal: No clubbing; no joint deformity   Neurologic: Non-focal  Lymphatic: No lymphadenopathy  Psychiatry: AAOx3, mood & affect appropriate  Skin: No rashes, ecchymoses, or cyanosis                          11.9   12.07 )-----------( 235      ( 25 Sep 2023 05:50 )             35.5     09-25    127<L>  |  95<L>  |  28<H>  ----------------------------<  243<H>  4.5   |  18<L>  |  0.92    Ca    8.0<L>      25 Sep 2023 05:50  Phos  3.1     09-25  Mg     2.00     09-25      PT/INR - ( 24 Sep 2023 00:30 )   PT: 14.9 sec;   INR: 1.33 ratio         PTT - ( 24 Sep 2023 00:30 )  PTT:28.4 sec  CARDIAC MARKERS ( 24 Sep 2023 06:14 )  x     / x     / 27 U/L / x     / 2.3 ng/mL  CARDIAC MARKERS ( 23 Sep 2023 21:30 )  x     / x     / 27 U/L / x     / 2.1 ng/mL  CARDIAC MARKERS ( 23 Sep 2023 19:33 )  x     / x     / 32 U/L / x     / x                  New ECG(s): Personally reviewed    Echo:  4/11/2023  CONCLUSIONS:   1. Normal left ventricular cavity size. The left ventricular wall thickness is normal. The left ventricular systolic function is normal with an ejection fraction of 57 % by Miguel's method of disks. There are no regional wall motion abnormalities seen.   2. The left ventricular diastolic function is indeterminate.   3. Normal right ventricular cavity size, normal wall thickness and normal systolic function. The tricuspid annular plane systolic excursion (TAPSE) is 1.8 cm (normal >=1.7 cm).   4. There is severe aortic stenosis. There is low flow, low gradient aortic stenosis with preserved EF.      The peak transaortic velocity is 3.70 m/s, peak transaortic gradient is 54.8 mmHg and mean transaortic gradient is 34.0 mmHg with an LVOT/aortic valve VTI ratio of 0.20. The aortic valve area is estimated at 0.69 cm² by the continuity equation. There is mild aortic regurgitation.   5. There is trace mitral and tricuspid regurgitation.   6. No pericardial effusion seen.   7. Compared to the transthoracic echocardiogram performed on 12/21/2022 there have been no significant interval changes.    Cath:  5/2023  Diagnostic Findings:     Coronary Angiography   The coronary circulation is right dominant. Cardiac catheterization  was performed electively.    LM   Proximal left main: Angiography shows moderate atherosclerosis. There  is a 40 % stenosis.    LAD   Proximal left anterior descending: Angiography shows moderate  atherosclerosis. There is a 40 % stenosis. Distal left anterior  descending: Angiography shows moderate atherosclerosis. There is a 50  % stenosis.    CX   Circumflex: Angiography shows mild atherosclerosis.      RCA   Distal right coronary artery: Angiography shows complete occlusion.  There is a 100 % stenosis.

## 2023-09-25 NOTE — PROGRESS NOTE ADULT - ASSESSMENT
82 yo F w/ PMHx of CAD, pAF on Eliquis, severe AS pending TAVR, R internal carotid stenosis, HTN, HLD, hypothyroidism, anxiety, PAD  s/p L ilioprofunda bypass with reverse GSV graft to peroneal 8/31/23 (Dr. Tavares) and L 1st ray partial resection who presented with L groin erythema c/f infection; CT on admission showing 17cm L groin abscess. Patient now s/p removal of infected bypass graft of LLE on 9/24.     Plan:  - Diet: Regular   - pain control prn  - IV abx: zosyn and vanc  - dressing changes   - ID consult today- f/u recs  - Home meds resumed   - OOB/ambulate as tolerated  - dispo: pending     Vascular Surgery   [id number]

## 2023-09-25 NOTE — CONSULT NOTE ADULT - ATTENDING COMMENTS
83 year old with CAD and PVD with a chronic left foot found  Prior left toe amputation with vascular bypass with graft on 8/31    She developed left groin swelling with erythema  She presented on 9/22  CT of left lower extremity showed a left groin abscess with ? left calf abscess    S/p left groin I and D with removal of the graft    Cultures are pending but prelim with GNR/ GPC    Can continue vancomycin and zosyn pending additional culture data    She notes new cough, check chest x ray and RVP.

## 2023-09-25 NOTE — CONSULT NOTE ADULT - SUBJECTIVE AND OBJECTIVE BOX
HPI:    84 yo Female with PMHx of CAD, pAF on Eliquis, PAD with chronic left foot wounds s/p L ilioprofunda bypass with reverse GSV graft to peroneal 8/31/23, severe AS pending TAVR, R internal carotid stenosis, HTN, HLD, hypothyroidism, and anxiety  presented to ED on 9/22 with L groin erythema. Patient was seen in the office by Dr. Huber today and noted with L groin and calf erythema. Sent into ED for IV abx and CT scan. Patient reports she thinks the redness has been present for 1 week. Unsure if it is getting better or worst because covered most of the time. Patient has a VNS come and changethe dressing 3x a week. Recently completed antibiotics for toe wounds 2 days ago. Also reports persistent nausea while on abx. Denies fevers, chills, drainage from incision site.      ID consulted for further recommendations.        REVIEW OF SYSTEMS  [  ] ROS unobtainable because:    [  ] All other systems negative except as noted below    Constitutional:  [ ] fever [ ] chills  [ ] weight loss  [ ]night sweat  [ ]poor appetite/PO intake [ ]fatigue   Skin:  [ ] rash [ ] phlebitis	  Eyes: [ ] icterus [ ] pain  [ ] discharge	  ENMT: [ ] sore throat  [ ] thrush [ ] ulcers [ ] exudates [ ]anosmia  Respiratory: [ ] dyspnea [ ] hemoptysis [ ] cough [ ] sputum	  Cardiovascular:  [ ] chest pain [ ] palpitations [ ] edema	  Gastrointestinal:  [ ] nausea [ ] vomiting [ ] diarrhea [ ] constipation [ ] pain	  Genitourinary:  [ ] dysuria [ ] frequency [ ] hematuria [ ] discharge [ ] flank pain  [ ] incontinence  Musculoskeletal:  [ ] myalgias [ ] arthralgias [ ] arthritis  [ ] back pain  Neurological:  [ ] headache [ ] weakness [ ] seizures  [ ] confusion/altered mental status    prior hospital charts reviewed [V]  primary team notes reviewed [V]  other consultant notes reviewed [V]    PAST MEDICAL & SURGICAL HISTORY:  HTN (hypertension)    HLD (hyperlipidemia)    Anxiety    CAD (coronary artery disease)    PAD (peripheral artery disease)    Hypothyroidism    AF (atrial fibrillation)    Carotid artery stenosis    AS (aortic stenosis)    Status post closed fracture of right femur    SOCIAL HISTORY:  - Denied smoking/vaping/alcohol/recreational drug use    FAMILY HISTORY:  Family history of myocardial infarction (Father, Sibling)    Allergies  latex (Unknown)  sulfa drugs (Unknown)    IMICROBIALS:  piperacillin/tazobactam IVPB.. 3.375 every 8 hours  vancomycin  IVPB 1000 every 24 hours    ANTIMICROBIALS (past 90 days):  MEDICATIONS  (STANDING):    piperacillin/tazobactam IVPB..   25 mL/Hr IV Intermittent (09-25-23 @ 06:25)   25 mL/Hr IV Intermittent (09-24-23 @ 22:33)   25 mL/Hr IV Intermittent (09-24-23 @ 13:45)   25 mL/Hr IV Intermittent (09-24-23 @ 06:05)   25 mL/Hr IV Intermittent (09-23-23 @ 21:50)   25 mL/Hr IV Intermittent (09-23-23 @ 13:12)   25 mL/Hr IV Intermittent (09-23-23 @ 05:19)    piperacillin/tazobactam IVPB..   25 mL/Hr IV Intermittent (09-22-23 @ 21:29)    piperacillin/tazobactam IVPB...   200 mL/Hr IV Intermittent (09-22-23 @ 16:28)    vancomycin  IVPB   250 mL/Hr IV Intermittent (09-24-23 @ 18:24)   250 mL/Hr IV Intermittent (09-23-23 @ 17:57)    vancomycin  IVPB.   250 mL/Hr IV Intermittent (09-22-23 @ 17:26)    OTHER MEDS:   MEDICATIONS  (STANDING):  acetaminophen     Tablet .. 650 every 6 hours  aMIOdarone    Tablet 100 daily  aspirin enteric coated 81 daily  atorvastatin 20 at bedtime  heparin   Injectable 5000 every 8 hours  levothyroxine 50 daily  losartan 50 daily  ondansetron    Tablet 4 every 8 hours PRN  oxyCODONE    IR 5 every 6 hours PRN    VITALS:  Vital Signs Last 24 Hrs  T(F): 97.9 (09-25-23 @ 09:03), Max: 99.5 (09-23-23 @ 16:10)    Vital Signs Last 24 Hrs  HR: 99 (09-25-23 @ 09:03) (88 - 100)  BP: 113/81 (09-25-23 @ 09:03) (100/83 - 143/71)  RR: 18 (09-25-23 @ 09:03)  SpO2: 94% (09-25-23 @ 09:03) (92% - 100%)  Wt(kg): --    EXAM:  Physical Exam:  Constitutional:  well preserved, comfortable  Head/Eyes: no icterus, PERRL, EOMI  ENT:  supple; no thrush  LUNGS:  CTA  CVS:  normal S1, S2, no murmur  Abd:  soft, non-tender; non-distended  Ext:  no edema  Vascular:  IV site no erythema tenderness or discharge  MSK:  joints without swelling  Neuro: AAO X 3, non- focal    Labs:                        11.9   12.07 )-----------( 235      ( 25 Sep 2023 05:50 )             35.5     09-25    127<L>  |  95<L>  |  28<H>  ----------------------------<  243<H>  4.5   |  18<L>  |  0.92    Ca    8.0<L>      25 Sep 2023 05:50  Phos  3.1     09-25  Mg     2.00     09-25    WBC Trend:  WBC Count: 12.07 (09-25-23 @ 05:50)  WBC Count: 8.09 (09-24-23 @ 12:00)  WBC Count: 7.37 (09-24-23 @ 00:30)  WBC Count: 9.79 (09-23-23 @ 17:15)    Auto Neutrophil #: 11.99 K/uL (09-22-23 @ 16:20)  Auto Neutrophil #: 8.58 K/uL (08-25-23 @ 07:09)  Auto Neutrophil #: 5.82 K/uL (08-03-23 @ 19:50)  Auto Neutrophil #: 5.67 K/uL (05-01-23 @ 13:53)    Creatine Trend:  Creatinine: 0.92 (09-25)  Creatinine: 1.07 (09-24)  Creatinine: 1.11 (09-24)  Creatinine: 1.32 (09-24)    Liver Biochemical Testing Trend:  Alanine Aminotransferase (ALT/SGPT): 15 (09-22)  Alanine Aminotransferase (ALT/SGPT): 14 (09-01)  Alanine Aminotransferase (ALT/SGPT): 13 (09-01)  Alanine Aminotransferase (ALT/SGPT): 14 (09-01)  Alanine Aminotransferase (ALT/SGPT): 12 (08-31)  Aspartate Aminotransferase (AST/SGOT): 21 (09-22-23 @ 16:20)  Aspartate Aminotransferase (AST/SGOT): 30 (09-01-23 @ 23:28)  Aspartate Aminotransferase (AST/SGOT): 30 (09-01-23 @ 17:46)  Aspartate Aminotransferase (AST/SGOT): 20 (09-01-23 @ 00:28)  Aspartate Aminotransferase (AST/SGOT): 19 (08-31-23 @ 18:21)  Bilirubin Total: 1.0 (09-22)  Bilirubin Total: 0.7 (09-01)  Bilirubin Total: 0.6 (09-01)  Bilirubin Total: 1.1 (09-01)  Bilirubin Total: 1.1 (08-31)    Trend LDH    Auto Eosinophil %: 0.2 % (09-22-23 @ 16:20)    Urinalysis Basic - ( 25 Sep 2023 05:50 )    Color: x / Appearance: x / SG: x / pH: x  Gluc: 243 mg/dL / Ketone: x  / Bili: x / Urobili: x   Blood: x / Protein: x / Nitrite: x   Leuk Esterase: x / RBC: x / WBC x   Sq Epi: x / Non Sq Epi: x / Bacteria: x    MICROBIOLOGY:    MRSA PCR Result.: NotDetec (08-05-23 @ 11:19)    Culture - Tissue with Gram Stain (collected 24 Sep 2023 10:24)  Source: .Tissue LEFT EXTREMLY INFECTED BYPASS    Culture - Tissue with Gram Stain (collected 24 Sep 2023 09:29)  Source: .Tissue LEFT LOWER EXTREMITY ABSCESS CAVITY    Culture - Abscess with Gram Stain (collected 24 Sep 2023 09:28)  Source: .Abscess LEFT LOWER EXTREMITY    Culture - Abscess with Gram Stain (collected 24 Sep 2023 09:27)  Source: .Abscess LEFT LOWER EXTREMITY    Culture - Blood (collected 22 Sep 2023 14:10)  Source: .Blood Blood-Peripheral  Preliminary Report:    No growth at 48 Hours    Culture - Blood (collected 22 Sep 2023 14:00)  Source: .Blood Blood-Peripheral  Preliminary Report:    No growth at 48 Hours    Culture - Tissue with Gram Stain (collected 06 Sep 2023 13:42)  Source: .Tissue Other  Final Report:    No growth at 5 days    Culture - Abscess with Gram Stain (collected 24 Aug 2023 19:24)  Source: .Abscess left foot wound culture  Final Report:    Moderate Klebsiella oxytoca/Raoultella ornithinolytica    Moderate Proteus mirabilis    Unable to evaluate further due to Proteus overgrowth  Organism: Klebsiella oxytoca /Raoutella ornithinolytica  Proteus mirabilis  Organism: Proteus mirabilis    Sensitivities:      Method Type: THOMPSON      -  Amikacin: S <=16      -  Amoxicillin/Clavulanic Acid: S <=8/4      -  Ampicillin: S <=8 These ampicillin results predict results for amoxicillin      -  Ampicillin/Sulbactam: S <=4/2      -  Aztreonam: S <=4      -  Cefazolin: S <=2      -  Cefepime: S <=2      -  Cefoxitin: S <=8      -  Ceftriaxone: S <=1      -  Ciprofloxacin: R 1      -  Ertapenem: S <=0.5      -  Gentamicin: S <=2      -  Levofloxacin: I 1      -  Meropenem: S <=1      -  Piperacillin/Tazobactam: S <=8      -  Tobramycin: S <=2      -  Trimethoprim/Sulfamethoxazole: R >2/38  Organism: Klebsiella oxytoca /Raoutella ornithinolytica    Sensitivities:      Method Type: THOMPSON      -  Amikacin: S <=16      -  Amoxicillin/Clavulanic Acid: S <=8/4      -  Ampicillin: R >16 These ampicillin results predict results for amoxicillin      -  Ampicillin/Sulbactam: S 8/4      -  Aztreonam: S <=4      -  Cefazolin: R >16      -  Cefepime: S <=2      -  Cefoxitin: S <=8      -  Ceftriaxone: S <=1      -  Ciprofloxacin: S <=0.25      -  Ertapenem: S <=0.5      -  Gentamicin: S <=2      -  Imipenem: S <=1      -  Levofloxacin: S <=0.5      -  Meropenem: S <=1      -  Piperacillin/Tazobactam: S <=8      -  Tobramycin: S <=2      -  Trimethoprim/Sulfamethoxazole: S <=0.5/9.5    Troponin T, High Sensitivity Result: 188 (09-24)  Troponin T, High Sensitivity Result: 193 (09-23)    Lactate, Blood: 1.7 (09-24 @ 00:30)  Lactate, Blood: 2.3 (09-23 @ 17:15)    A1C with Estimated Average Glucose Result: 5.4 % (09-25-23 @ 05:50)  A1C with Estimated Average Glucose Result: 5.6 % (09-04-23 @ 03:07)    RADIOLOGY:  imaging below personally reviewed    < from: CT Angio Abd Aorta w/run-off w/ IV Cont (09.22.23 @ 18:57) >  IMPRESSION:    Large abscess in the groin measuring up to 17.1 cm.    Question additional linear shaped abscess in the left calf. In addition   there are a few foci of gas within the soft tissues of the left calf.    Underlying left leg cellulitis.    Chronically occluded superficial femoral arteries with distal   reconstruction bilaterally. Patent left common femoral artery to   tibioperoneal trunk bypass graft.    Findings were discussed with Dr. Layne 9/22/2023 8:03 PM by Dr. Larsen   with read back confirmation.    --- End of Report ---    < end of copied text >           HPI:    84 yo Female with PMHx of CAD, pAF on Eliquis, PAD with chronic left foot wounds s/p L ilioprofunda bypass with reverse GSV graft to peroneal 8/31/23, severe AS pending TAVR, R internal carotid stenosis, HTN, HLD, hypothyroidism, and anxiety  presented to ED on 9/22 with L groin erythema. Patient was seen in the office by Dr. Huber today and noted with L groin and calf erythema. Sent into ED for IV abx and CT scan. Patient reports she thinks the redness has been present for 1 week. Unsure if it is getting better or worst because covered most of the time. Patient has a VNS come and changethe dressing 3x a week. Recently completed antibiotics for toe wounds 2 days ago. Also reports persistent nausea while on abx. Denies fevers, chills, drainage from incision site.      ID consulted for further recommendations.      REVIEW OF SYSTEMS  [  ] ROS unobtainable because:    [X] All other systems negative except as noted below    Constitutional:  [ ] fever [ ] chills  [ ] weight loss  [ ]night sweat  [ ]poor appetite/PO intake [ ]fatigue   Skin:  [ ] rash [ ] phlebitis	  Eyes: [ ] icterus [ ] pain  [ ] discharge	  ENMT: [ ] sore throat  [ ] thrush [ ] ulcers [ ] exudates [ ]anosmia  Respiratory: [ ] dyspnea [ ] hemoptysis [X] cough [X] sputum	  Cardiovascular:  [ ] chest pain [ ] palpitations [ ] edema	  Gastrointestinal:  [ ] nausea [ ] vomiting [ ] diarrhea [ ] constipation [ ] pain	  Genitourinary:  [ ] dysuria [ ] frequency [ ] hematuria [ ] discharge [ ] flank pain  [ ] incontinence  Musculoskeletal:  [ ] myalgias [ ] arthralgias [ ] arthritis  [ ] back pain  Neurological:  [ ] headache [ ] weakness [ ] seizures  [ ] confusion/altered mental status    prior hospital charts reviewed [V]  primary team notes reviewed [V]  other consultant notes reviewed [V]    PAST MEDICAL & SURGICAL HISTORY:  HTN (hypertension)    HLD (hyperlipidemia)    Anxiety    CAD (coronary artery disease)    PAD (peripheral artery disease)    Hypothyroidism    AF (atrial fibrillation)    Carotid artery stenosis    AS (aortic stenosis)    Status post closed fracture of right femur    SOCIAL HISTORY:  - Denied smoking/vaping/alcohol/recreational drug use    FAMILY HISTORY:  Family history of myocardial infarction (Father, Sibling)    Allergies  latex (Unknown)  sulfa drugs (Unknown)    IMICROBIALS:  piperacillin/tazobactam IVPB.. 3.375 every 8 hours  vancomycin  IVPB 1000 every 24 hours    ANTIMICROBIALS (past 90 days):  MEDICATIONS  (STANDING):    piperacillin/tazobactam IVPB..   25 mL/Hr IV Intermittent (09-25-23 @ 06:25)   25 mL/Hr IV Intermittent (09-24-23 @ 22:33)   25 mL/Hr IV Intermittent (09-24-23 @ 13:45)   25 mL/Hr IV Intermittent (09-24-23 @ 06:05)   25 mL/Hr IV Intermittent (09-23-23 @ 21:50)   25 mL/Hr IV Intermittent (09-23-23 @ 13:12)   25 mL/Hr IV Intermittent (09-23-23 @ 05:19)    piperacillin/tazobactam IVPB..   25 mL/Hr IV Intermittent (09-22-23 @ 21:29)    piperacillin/tazobactam IVPB...   200 mL/Hr IV Intermittent (09-22-23 @ 16:28)    vancomycin  IVPB   250 mL/Hr IV Intermittent (09-24-23 @ 18:24)   250 mL/Hr IV Intermittent (09-23-23 @ 17:57)    vancomycin  IVPB.   250 mL/Hr IV Intermittent (09-22-23 @ 17:26)    OTHER MEDS:   MEDICATIONS  (STANDING):  acetaminophen     Tablet .. 650 every 6 hours  aMIOdarone    Tablet 100 daily  aspirin enteric coated 81 daily  atorvastatin 20 at bedtime  heparin   Injectable 5000 every 8 hours  levothyroxine 50 daily  losartan 50 daily  ondansetron    Tablet 4 every 8 hours PRN  oxyCODONE    IR 5 every 6 hours PRN    VITALS:  Vital Signs Last 24 Hrs  T(F): 97.9 (09-25-23 @ 09:03), Max: 99.5 (09-23-23 @ 16:10)    Vital Signs Last 24 Hrs  HR: 99 (09-25-23 @ 09:03) (88 - 100)  BP: 113/81 (09-25-23 @ 09:03) (100/83 - 143/71)  RR: 18 (09-25-23 @ 09:03)  SpO2: 94% (09-25-23 @ 09:03) (92% - 100%)  Wt(kg): --    EXAM:  Physical Exam:  Constitutional:  well preserved, comfortable  Head/Eyes: no icterus, PERRL, EOMI  ENT:  supple; no thrush  LUNGS:  left sided crackles   CVS:  normal S1, S2, no murmur  Abd:  soft, non-tender; non-distended  Ext:  LLE surgical site covered with clean dressing, wound vac in place  Vascular:  IV site no erythema tenderness or discharge  MSK:  joints without swelling  Neuro: AAO X 3, non- focal    Labs:                        11.9   12.07 )-----------( 235      ( 25 Sep 2023 05:50 )             35.5     09-25    127<L>  |  95<L>  |  28<H>  ----------------------------<  243<H>  4.5   |  18<L>  |  0.92    Ca    8.0<L>      25 Sep 2023 05:50  Phos  3.1     09-25  Mg     2.00     09-25    WBC Trend:  WBC Count: 12.07 (09-25-23 @ 05:50)  WBC Count: 8.09 (09-24-23 @ 12:00)  WBC Count: 7.37 (09-24-23 @ 00:30)  WBC Count: 9.79 (09-23-23 @ 17:15)    Auto Neutrophil #: 11.99 K/uL (09-22-23 @ 16:20)  Auto Neutrophil #: 8.58 K/uL (08-25-23 @ 07:09)  Auto Neutrophil #: 5.82 K/uL (08-03-23 @ 19:50)  Auto Neutrophil #: 5.67 K/uL (05-01-23 @ 13:53)    Creatine Trend:  Creatinine: 0.92 (09-25)  Creatinine: 1.07 (09-24)  Creatinine: 1.11 (09-24)  Creatinine: 1.32 (09-24)    Liver Biochemical Testing Trend:  Alanine Aminotransferase (ALT/SGPT): 15 (09-22)  Alanine Aminotransferase (ALT/SGPT): 14 (09-01)  Alanine Aminotransferase (ALT/SGPT): 13 (09-01)  Alanine Aminotransferase (ALT/SGPT): 14 (09-01)  Alanine Aminotransferase (ALT/SGPT): 12 (08-31)  Aspartate Aminotransferase (AST/SGOT): 21 (09-22-23 @ 16:20)  Aspartate Aminotransferase (AST/SGOT): 30 (09-01-23 @ 23:28)  Aspartate Aminotransferase (AST/SGOT): 30 (09-01-23 @ 17:46)  Aspartate Aminotransferase (AST/SGOT): 20 (09-01-23 @ 00:28)  Aspartate Aminotransferase (AST/SGOT): 19 (08-31-23 @ 18:21)  Bilirubin Total: 1.0 (09-22)  Bilirubin Total: 0.7 (09-01)  Bilirubin Total: 0.6 (09-01)  Bilirubin Total: 1.1 (09-01)  Bilirubin Total: 1.1 (08-31)    Trend LDH    Auto Eosinophil %: 0.2 % (09-22-23 @ 16:20)    Urinalysis Basic - ( 25 Sep 2023 05:50 )    Color: x / Appearance: x / SG: x / pH: x  Gluc: 243 mg/dL / Ketone: x  / Bili: x / Urobili: x   Blood: x / Protein: x / Nitrite: x   Leuk Esterase: x / RBC: x / WBC x   Sq Epi: x / Non Sq Epi: x / Bacteria: x    MICROBIOLOGY:    MRSA PCR Result.: Alexisc (08-05-23 @ 11:19)    Culture - Tissue with Gram Stain (collected 24 Sep 2023 10:24)  Source: .Tissue LEFT EXTREMLY INFECTED BYPASS    Culture - Tissue with Gram Stain (collected 24 Sep 2023 09:29)  Source: .Tissue LEFT LOWER EXTREMITY ABSCESS CAVITY    Culture - Abscess with Gram Stain (collected 24 Sep 2023 09:28)  Source: .Abscess LEFT LOWER EXTREMITY    Culture - Abscess with Gram Stain (collected 24 Sep 2023 09:27)  Source: .Abscess LEFT LOWER EXTREMITY    Culture - Blood (collected 22 Sep 2023 14:10)  Source: .Blood Blood-Peripheral  Preliminary Report:    No growth at 48 Hours    Culture - Blood (collected 22 Sep 2023 14:00)  Source: .Blood Blood-Peripheral  Preliminary Report:    No growth at 48 Hours    Culture - Tissue with Gram Stain (collected 06 Sep 2023 13:42)  Source: .Tissue Other  Final Report:    No growth at 5 days    Culture - Abscess with Gram Stain (collected 24 Aug 2023 19:24)  Source: .Abscess left foot wound culture  Final Report:    Moderate Klebsiella oxytoca/Raoultella ornithinolytica    Moderate Proteus mirabilis    Unable to evaluate further due to Proteus overgrowth  Organism: Klebsiella oxytoca /Raoutella ornithinolytica  Proteus mirabilis  Organism: Proteus mirabilis    Sensitivities:      Method Type: THOMPSON      -  Amikacin: S <=16      -  Amoxicillin/Clavulanic Acid: S <=8/4      -  Ampicillin: S <=8 These ampicillin results predict results for amoxicillin      -  Ampicillin/Sulbactam: S <=4/2      -  Aztreonam: S <=4      -  Cefazolin: S <=2      -  Cefepime: S <=2      -  Cefoxitin: S <=8      -  Ceftriaxone: S <=1      -  Ciprofloxacin: R 1      -  Ertapenem: S <=0.5      -  Gentamicin: S <=2      -  Levofloxacin: I 1      -  Meropenem: S <=1      -  Piperacillin/Tazobactam: S <=8      -  Tobramycin: S <=2      -  Trimethoprim/Sulfamethoxazole: R >2/38  Organism: Klebsiella oxytoca /Raoutella ornithinolytica    Sensitivities:      Method Type: THOMPSON      -  Amikacin: S <=16      -  Amoxicillin/Clavulanic Acid: S <=8/4      -  Ampicillin: R >16 These ampicillin results predict results for amoxicillin      -  Ampicillin/Sulbactam: S 8/4      -  Aztreonam: S <=4      -  Cefazolin: R >16      -  Cefepime: S <=2      -  Cefoxitin: S <=8      -  Ceftriaxone: S <=1      -  Ciprofloxacin: S <=0.25      -  Ertapenem: S <=0.5      -  Gentamicin: S <=2      -  Imipenem: S <=1      -  Levofloxacin: S <=0.5      -  Meropenem: S <=1      -  Piperacillin/Tazobactam: S <=8      -  Tobramycin: S <=2      -  Trimethoprim/Sulfamethoxazole: S <=0.5/9.5    Troponin T, High Sensitivity Result: 188 (09-24)  Troponin T, High Sensitivity Result: 193 (09-23)    Lactate, Blood: 1.7 (09-24 @ 00:30)  Lactate, Blood: 2.3 (09-23 @ 17:15)    A1C with Estimated Average Glucose Result: 5.4 % (09-25-23 @ 05:50)  A1C with Estimated Average Glucose Result: 5.6 % (09-04-23 @ 03:07)    RADIOLOGY:  imaging below personally reviewed    < from: CT Angio Abd Aorta w/run-off w/ IV Cont (09.22.23 @ 18:57) >  IMPRESSION:    Large abscess in the groin measuring up to 17.1 cm.    Question additional linear shaped abscess in the left calf. In addition   there are a few foci of gas within the soft tissues of the left calf.    Underlying left leg cellulitis.    Chronically occluded superficial femoral arteries with distal   reconstruction bilaterally. Patent left common femoral artery to   tibioperoneal trunk bypass graft.    Findings were discussed with Dr. Layne 9/22/2023 8:03 PM by Dr. Larsen   with read back confirmation.    --- End of Report ---    < end of copied text >

## 2023-09-25 NOTE — CONSULT NOTE ADULT - ASSESSMENT
82 yo Female with PMHx of CAD, pAF on Eliquis, PAD with chronic left foot wounds s/p L ilioprofunda bypass with reverse GSV graft to peroneal 8/31/23, severe AS pending TAVR, R internal carotid stenosis, HTN, HLD, hypothyroidism, and anxiety  presented to ED on 9/22 with L groin erythema. Patient was seen in the office by Dr. Huber today and noted with L groin and calf erythema.     Workup:   CT angio LLE (9/22):Large abscess in the groin measuring up to 17.1 cm.  Question additional linear shaped abscess in the left calf. In addition there are a few foci of gas within the soft tissues of the left calf. Underlying left leg cellulitis.  Chronically occluded superficial femoral arteries with distal reconstruction bilaterally. Patent left common femoral artery to   tibioperoneal trunk bypass graft.    s/p L groin washout, explant of bypass graft, wound vac placement, L external iliac ligation with 300 cc of purulent material drained   Abscess Cx (9/24): GNR  Tissue Cx Bypass (9/24): GNR and GPC     Left foot wound Cx during previous hospitalization on 8/24: Proteus mirabilis and Klebsiella oxytoca   Blood Cx (9/22): NGTD    Afebrile   Leukocytosis 13K--> trended normal, up to 12 K today     Antimicrobials:  Vanc   Zosyn     #17.1 cm L groin abscess, LLE cellulitis in the setting of LLE bypass on 8/31, now s/p L groin washout, explant of bypass graft, wound vac placement, L external iliac ligation  #Leukocytosis     Recommendations:          PT TO BE SEEN. PRELIM NOTE  PENDING RECS. PLEASE WAIT FOR FINAL RECS AFTER DISCUSSION WITH ATTENDING#           82 yo Female with PMHx of CAD, pAF on Eliquis, PAD with chronic left foot wounds s/p L ilioprofunda bypass with reverse GSV graft to peroneal 8/31/23, severe AS pending TAVR, R internal carotid stenosis, HTN, HLD, hypothyroidism, and anxiety  presented to ED on 9/22 with L groin erythema. Patient was seen in the office by Dr. Huber today and noted with L groin and calf erythema.     Workup:   CT angio LLE (9/22):Large abscess in the groin measuring up to 17.1 cm.  Question additional linear shaped abscess in the left calf. In addition there are a few foci of gas within the soft tissues of the left calf. Underlying left leg cellulitis.  Chronically occluded superficial femoral arteries with distal reconstruction bilaterally. Patent left common femoral artery to   tibioperoneal trunk bypass graft.    s/p L groin washout, explant of bypass graft, wound vac placement, L external iliac ligation with 300 cc of purulent material drained   Abscess Cx (9/24): GNR  Tissue Cx Bypass (9/24): GNR and GPC     Left foot wound Cx during previous hospitalization on 8/24: Proteus mirabilis and Klebsiella oxytoca   Blood Cx (9/22): NGTD    CXR:  LLL consolidation     Afebrile   Leukocytosis 13K--> trended normal, up to 12 K today     Antimicrobials:  Vanc   Zosyn     #17.1 cm L groin abscess, LLE cellulitis in the setting of LLE bypass on 8/31, now s/p L groin washout, explant of bypass graft, wound vac placement, L external iliac ligation  #LLL consolidation   #Leukocytosis     Recommendations:  -Continue Zosyn 3.375 g IV q8hrs   -Continue Vancomycin 1 g daily (f/up Vancomycin level)  -F/up speciation and sensitivity of GNR and GPC   -Send RVP   -Monitor T curve and WBC trend     Seen and discussed with Dr Elliott Thomas MD, PGY5  ID fellow  [employer] Teams Preferred  After 5pm/weekends call [phone]

## 2023-09-25 NOTE — PROGRESS NOTE ADULT - ASSESSMENT
82 yo woman with history of CAD, pAF on AC, severe AS pending TAVR, R ICA stenosis, HTN, HLD, hypothyroidism, and PAD admitted with L groin abscess at surgical site. Found to have elevated troponin without chest pain. Cardiology consulted for elevated troponin.     #Elevated troponin   #CAD  Trop 193>188. Likely in the setting of renal dysfunction and active infection. No chest pain at the time. Unlikely to be ACS - not managed as ACS.   -TTE pending   -continue with ASA  -increase atorvastatin to 80mg daily   -continue with losartan 50mg daily   -add metoprolol XL 25mg daily     Please follow up attending attestation for final recommendations.     Phoenix Glover MD   Cardiology Fellow PGY4

## 2023-09-26 LAB
-  AMIKACIN: SIGNIFICANT CHANGE UP
-  AMOXICILLIN/CLAVULANIC ACID: SIGNIFICANT CHANGE UP
-  AMPICILLIN/SULBACTAM: SIGNIFICANT CHANGE UP
-  AMPICILLIN: SIGNIFICANT CHANGE UP
-  AZTREONAM: SIGNIFICANT CHANGE UP
-  CEFAZOLIN: SIGNIFICANT CHANGE UP
-  CEFEPIME: -1 — SIGNIFICANT CHANGE UP
-  CEFEPIME: -1 — SIGNIFICANT CHANGE UP
-  CEFEPIME: SIGNIFICANT CHANGE UP
-  CEFOXITIN: SIGNIFICANT CHANGE UP
-  CEFTAZIDIME/AVIBACTAM: SIGNIFICANT CHANGE UP
-  CEFTAZIDIME/AVIBACTAM: SIGNIFICANT CHANGE UP
-  CEFTOLOZANE/TAZOBACTAM: SIGNIFICANT CHANGE UP
-  CEFTOLOZANE/TAZOBACTAM: SIGNIFICANT CHANGE UP
-  CEFTRIAXONE: SIGNIFICANT CHANGE UP
-  CIPROFLOXACIN: SIGNIFICANT CHANGE UP
-  ERTAPENEM: SIGNIFICANT CHANGE UP
-  GENTAMICIN: SIGNIFICANT CHANGE UP
-  IMIPENEM: SIGNIFICANT CHANGE UP
-  IMIPENEM: SIGNIFICANT CHANGE UP
-  LEVOFLOXACIN: SIGNIFICANT CHANGE UP
-  MEROPENEM/VABORBACTAM: SIGNIFICANT CHANGE UP
-  MEROPENEM/VABORBACTAM: SIGNIFICANT CHANGE UP
-  MEROPENEM: -1 — SIGNIFICANT CHANGE UP
-  MEROPENEM: -1 — SIGNIFICANT CHANGE UP
-  MEROPENEM: SIGNIFICANT CHANGE UP
-  PIPERACILLIN/TAZOBACTAM: SIGNIFICANT CHANGE UP
-  RESISTANCE GENE KPC: SIGNIFICANT CHANGE UP
-  RESISTANCE GENE KPC: SIGNIFICANT CHANGE UP
-  TOBRAMYCIN: SIGNIFICANT CHANGE UP
-  TRIMETHOPRIM/SULFAMETHOXAZOLE: SIGNIFICANT CHANGE UP
ANION GAP SERPL CALC-SCNC: 13 MMOL/L — SIGNIFICANT CHANGE UP (ref 7–14)
BLANDM BLD POS QL PROBE: SIGNIFICANT CHANGE UP
BLANDM BLD POS QL PROBE: SIGNIFICANT CHANGE UP
BUN SERPL-MCNC: 26 MG/DL — HIGH (ref 7–23)
CALCIUM SERPL-MCNC: 8.4 MG/DL — SIGNIFICANT CHANGE UP (ref 8.4–10.5)
CHLORIDE SERPL-SCNC: 94 MMOL/L — LOW (ref 98–107)
CO2 SERPL-SCNC: 22 MMOL/L — SIGNIFICANT CHANGE UP (ref 22–31)
CREAT SERPL-MCNC: 0.92 MG/DL — SIGNIFICANT CHANGE UP (ref 0.5–1.3)
EGFR: 62 ML/MIN/1.73M2 — SIGNIFICANT CHANGE UP
GLUCOSE BLDC GLUCOMTR-MCNC: 140 MG/DL — HIGH (ref 70–99)
GLUCOSE BLDC GLUCOMTR-MCNC: 169 MG/DL — HIGH (ref 70–99)
GLUCOSE BLDC GLUCOMTR-MCNC: 169 MG/DL — HIGH (ref 70–99)
GLUCOSE BLDC GLUCOMTR-MCNC: 172 MG/DL — HIGH (ref 70–99)
GLUCOSE SERPL-MCNC: 160 MG/DL — HIGH (ref 70–99)
HCT VFR BLD CALC: 35.7 % — SIGNIFICANT CHANGE UP (ref 34.5–45)
HGB BLD-MCNC: 11.7 G/DL — SIGNIFICANT CHANGE UP (ref 11.5–15.5)
MAGNESIUM SERPL-MCNC: 2.1 MG/DL — SIGNIFICANT CHANGE UP (ref 1.6–2.6)
MCHC RBC-ENTMCNC: 30.3 PG — SIGNIFICANT CHANGE UP (ref 27–34)
MCHC RBC-ENTMCNC: 32.8 GM/DL — SIGNIFICANT CHANGE UP (ref 32–36)
MCV RBC AUTO: 92.5 FL — SIGNIFICANT CHANGE UP (ref 80–100)
METHOD TYPE: SIGNIFICANT CHANGE UP
NRBC # BLD: 0 /100 WBCS — SIGNIFICANT CHANGE UP (ref 0–0)
NRBC # FLD: 0 K/UL — SIGNIFICANT CHANGE UP (ref 0–0)
PHOSPHATE SERPL-MCNC: 1.9 MG/DL — LOW (ref 2.5–4.5)
PLATELET # BLD AUTO: 241 K/UL — SIGNIFICANT CHANGE UP (ref 150–400)
POTASSIUM SERPL-MCNC: 4.5 MMOL/L — SIGNIFICANT CHANGE UP (ref 3.5–5.3)
POTASSIUM SERPL-SCNC: 4.5 MMOL/L — SIGNIFICANT CHANGE UP (ref 3.5–5.3)
RBC # BLD: 3.86 M/UL — SIGNIFICANT CHANGE UP (ref 3.8–5.2)
RBC # FLD: 20.8 % — HIGH (ref 10.3–14.5)
SODIUM SERPL-SCNC: 129 MMOL/L — LOW (ref 135–145)
T4 FREE SERPL-MCNC: 1.2 NG/DL — SIGNIFICANT CHANGE UP (ref 0.9–1.8)
TSH SERPL-MCNC: 8.19 UIU/ML — HIGH (ref 0.27–4.2)
VANCOMYCIN TROUGH SERPL-MCNC: 6.9 UG/ML — LOW (ref 10–20)
WBC # BLD: 12.49 K/UL — HIGH (ref 3.8–10.5)
WBC # FLD AUTO: 12.49 K/UL — HIGH (ref 3.8–10.5)

## 2023-09-26 PROCEDURE — 93306 TTE W/DOPPLER COMPLETE: CPT | Mod: 26

## 2023-09-26 PROCEDURE — 99233 SBSQ HOSP IP/OBS HIGH 50: CPT

## 2023-09-26 RX ORDER — VANCOMYCIN HCL 1 G
750 VIAL (EA) INTRAVENOUS ONCE
Refills: 0 | Status: COMPLETED | OUTPATIENT
Start: 2023-09-26 | End: 2023-09-26

## 2023-09-26 RX ORDER — VANCOMYCIN HCL 1 G
VIAL (EA) INTRAVENOUS
Refills: 0 | Status: DISCONTINUED | OUTPATIENT
Start: 2023-09-26 | End: 2023-09-27

## 2023-09-26 RX ORDER — VANCOMYCIN HCL 1 G
750 VIAL (EA) INTRAVENOUS EVERY 12 HOURS
Refills: 0 | Status: DISCONTINUED | OUTPATIENT
Start: 2023-09-27 | End: 2023-09-27

## 2023-09-26 RX ORDER — SODIUM,POTASSIUM PHOSPHATES 278-250MG
2 POWDER IN PACKET (EA) ORAL ONCE
Refills: 0 | Status: COMPLETED | OUTPATIENT
Start: 2023-09-26 | End: 2023-09-26

## 2023-09-26 RX ADMIN — NYSTATIN CREAM 1 APPLICATION(S): 100000 CREAM TOPICAL at 17:02

## 2023-09-26 RX ADMIN — Medication 81 MILLIGRAM(S): at 12:43

## 2023-09-26 RX ADMIN — Medication 650 MILLIGRAM(S): at 17:02

## 2023-09-26 RX ADMIN — LOSARTAN POTASSIUM 50 MILLIGRAM(S): 100 TABLET, FILM COATED ORAL at 10:19

## 2023-09-26 RX ADMIN — PIPERACILLIN AND TAZOBACTAM 25 GRAM(S): 4; .5 INJECTION, POWDER, LYOPHILIZED, FOR SOLUTION INTRAVENOUS at 15:31

## 2023-09-26 RX ADMIN — Medication 650 MILLIGRAM(S): at 13:54

## 2023-09-26 RX ADMIN — PIPERACILLIN AND TAZOBACTAM 25 GRAM(S): 4; .5 INJECTION, POWDER, LYOPHILIZED, FOR SOLUTION INTRAVENOUS at 21:58

## 2023-09-26 RX ADMIN — Medication 2: at 17:56

## 2023-09-26 RX ADMIN — HEPARIN SODIUM 5000 UNIT(S): 5000 INJECTION INTRAVENOUS; SUBCUTANEOUS at 06:22

## 2023-09-26 RX ADMIN — NYSTATIN CREAM 1 APPLICATION(S): 100000 CREAM TOPICAL at 06:20

## 2023-09-26 RX ADMIN — Medication 250 MILLIGRAM(S): at 18:51

## 2023-09-26 RX ADMIN — Medication 50 MICROGRAM(S): at 06:15

## 2023-09-26 RX ADMIN — AMIODARONE HYDROCHLORIDE 100 MILLIGRAM(S): 400 TABLET ORAL at 10:19

## 2023-09-26 RX ADMIN — HEPARIN SODIUM 5000 UNIT(S): 5000 INJECTION INTRAVENOUS; SUBCUTANEOUS at 15:33

## 2023-09-26 RX ADMIN — HEPARIN SODIUM 5000 UNIT(S): 5000 INJECTION INTRAVENOUS; SUBCUTANEOUS at 22:00

## 2023-09-26 RX ADMIN — Medication 650 MILLIGRAM(S): at 12:43

## 2023-09-26 RX ADMIN — Medication 2 TABLET(S): at 10:19

## 2023-09-26 RX ADMIN — Medication 2: at 12:39

## 2023-09-26 RX ADMIN — PIPERACILLIN AND TAZOBACTAM 25 GRAM(S): 4; .5 INJECTION, POWDER, LYOPHILIZED, FOR SOLUTION INTRAVENOUS at 06:10

## 2023-09-26 RX ADMIN — Medication 650 MILLIGRAM(S): at 18:07

## 2023-09-26 NOTE — PHYSICAL THERAPY INITIAL EVALUATION ADULT - GENERAL OBSERVATIONS, REHAB EVAL
Pt received semi-supine in bed, +wound vac, +IV, +Alas, all lines intact, in NAD. Pt agreeable to participate in PT evaluation.

## 2023-09-26 NOTE — PHYSICAL THERAPY INITIAL EVALUATION ADULT - ADDITIONAL COMMENTS
Pt lives in a private house with her sister, no steps to negotiate, was independent with all functional mobility and ADL performance without use of an assistive device prior to admission.     Pt left seated in recliner at bedside, all lines intact, all needs in reach, in NAD. LEIGH zafar. Heart rate 90 beats per minute.

## 2023-09-26 NOTE — PROGRESS NOTE ADULT - SUBJECTIVE AND OBJECTIVE BOX
[facility] Daily Progress Note  =====================================================    No acute events overnight    SUBJECTIVE: Patient seen and examined at bedside on AM rounds. Patient reports that they're feeling well. Denies fever, chills, N/V, chest pain, SOB    ALLERGIES:  latex (Unknown)  sulfa drugs (Unknown)      --------------------------------------------------------------------------------------    MEDICATIONS:    Neurologic Medications  acetaminophen     Tablet .. 650 milliGRAM(s) Oral every 6 hours  ondansetron    Tablet 4 milliGRAM(s) Oral every 8 hours PRN Nausea and/or Vomiting  oxyCODONE    IR 5 milliGRAM(s) Oral every 6 hours PRN Severe Pain (7 - 10)    Respiratory Medications    Cardiovascular Medications  aMIOdarone    Tablet 100 milliGRAM(s) Oral daily  losartan 50 milliGRAM(s) Oral daily    Gastrointestinal Medications    Genitourinary Medications    Hematologic/Oncologic Medications  aspirin enteric coated 81 milliGRAM(s) Oral daily  heparin   Injectable 5000 Unit(s) SubCutaneous every 8 hours    Antimicrobial/Immunologic Medications  piperacillin/tazobactam IVPB.. 3.375 Gram(s) IV Intermittent every 8 hours  vancomycin  IVPB 1000 milliGRAM(s) IV Intermittent every 24 hours    Endocrine/Metabolic Medications  atorvastatin 20 milliGRAM(s) Oral at bedtime  insulin lispro (ADMELOG) corrective regimen sliding scale   SubCutaneous three times a day before meals  insulin lispro (ADMELOG) corrective regimen sliding scale   SubCutaneous at bedtime  levothyroxine 50 MICROGram(s) Oral daily    Topical/Other Medications  nystatin Powder 1 Application(s) Topical two times a day    --------------------------------------------------------------------------------------    VITAL SIGNS:  T(C): 36.8 (09-26-23 @ 00:15), Max: 36.8 (09-26-23 @ 00:15)  HR: 93 (09-26-23 @ 00:15) (93 - 99)  BP: 111/69 (09-26-23 @ 00:15) (101/81 - 118/70)  RR: 18 (09-26-23 @ 00:15) (18 - 18)  SpO2: 96% (09-26-23 @ 00:15) (94% - 99%)  --------------------------------------------------------------------------------------    INS AND OUTS:    09-24-23 @ 07:01  -  09-25-23 @ 07:00  --------------------------------------------------------  IN: 2050 mL / OUT: 890 mL / NET: 1160 mL    09-25-23 @ 07:01  -  09-26-23 @ 05:29  --------------------------------------------------------  IN: 120 mL / OUT: 1100 mL / NET: -980 mL      --------------------------------------------------------------------------------------      EXAM    General: NAD, resting in bed comfortably.   Cardiac: regular rate  Respiratory: Nonlabored respirations, normal cw expansion.  Abdomen: soft, nontender, nondistended.   Extremities: moving extremities  Vascular: LE vac holding suction; dry gangrene of toes, left dressing with ace wrap C/d/i     --------------------------------------------------------------------------------------    LABS                          11.9   12.07 )-----------( 235      ( 25 Sep 2023 05:50 )             35.5     09-25    127<L>  |  95<L>  |  28<H>  ----------------------------<  243<H>  4.5   |  18<L>  |  0.92    Ca    8.0<L>      25 Sep 2023 05:50  Phos  3.1     09-25  Mg     2.00     09-25        Urinalysis Basic - ( 25 Sep 2023 05:50 )    Color: x / Appearance: x / SG: x / pH: x  Gluc: 243 mg/dL / Ketone: x  / Bili: x / Urobili: x   Blood: x / Protein: x / Nitrite: x   Leuk Esterase: x / RBC: x / WBC x   Sq Epi: x / Non Sq Epi: x / Bacteria: x         Vascular Surgery Daily Progress Note  =====================================================    SUBJECTIVE: Patient seen and examined at bedside on AM rounds. Patient endorses no new complaints. Denies chest pain, SOB, fever, chills.    PAST MEDICAL & SURGICAL HISTORY:  HTN (hypertension)  HLD (hyperlipidemia)  Anxiety  CAD (coronary artery disease)  PAD (peripheral artery disease)  Hypothyroidism  AF (atrial fibrillation)  Carotid artery stenosis  AS (aortic stenosis)  Status post closed fracture of right femur      ALLERGIES:  latex (Unknown)  sulfa drugs (Unknown)    --------------------------------------------------------------------------------------    MEDICATIONS:    Neurologic Medications  acetaminophen     Tablet .. 650 milliGRAM(s) Oral every 6 hours  ondansetron    Tablet 4 milliGRAM(s) Oral every 8 hours PRN Nausea and/or Vomiting  oxyCODONE    IR 5 milliGRAM(s) Oral every 6 hours PRN Severe Pain (7 - 10)    Respiratory Medications    Cardiovascular Medications  aMIOdarone    Tablet 100 milliGRAM(s) Oral daily  losartan 50 milliGRAM(s) Oral daily    Gastrointestinal Medications    Genitourinary Medications    Hematologic/Oncologic Medications  aspirin enteric coated 81 milliGRAM(s) Oral daily  heparin   Injectable 5000 Unit(s) SubCutaneous every 8 hours    Antimicrobial/Immunologic Medications  piperacillin/tazobactam IVPB.. 3.375 Gram(s) IV Intermittent every 8 hours  vancomycin  IVPB 1000 milliGRAM(s) IV Intermittent every 24 hours    Endocrine/Metabolic Medications  atorvastatin 20 milliGRAM(s) Oral at bedtime  insulin lispro (ADMELOG) corrective regimen sliding scale   SubCutaneous three times a day before meals  insulin lispro (ADMELOG) corrective regimen sliding scale   SubCutaneous at bedtime  levothyroxine 50 MICROGram(s) Oral daily    Topical/Other Medications  nystatin Powder 1 Application(s) Topical two times a day    --------------------------------------------------------------------------------------    VITAL SIGNS:  T(C): 36.3 (09-26-23 @ 06:00), Max: 36.8 (09-26-23 @ 00:15)  HR: 87 (09-26-23 @ 06:00) (87 - 99)  BP: 131/72 (09-26-23 @ 06:00) (101/81 - 131/72)  RR: 18 (09-26-23 @ 06:00) (18 - 18)  SpO2: 100% (09-26-23 @ 06:00) (94% - 100%)  --------------------------------------------------------------------------------------    EXAM    General: NAD, resting in bed comfortably. A&Ox3.   Cardiac: regular rate  Respiratory: Nonlabored respirations, normal cw expansion.  Abdomen: soft, nontender, nondistended.   Extremities: moving extremities  Vascular: LE vac holding suction; dry gangrene of toes, left dressing with ace wrap C/d/i     --------------------------------------------------------------------------------------    LABS                          11.7   12.49 )-----------( 241      ( 26 Sep 2023 06:00 )             35.7     09-26    129<L>  |  94<L>  |  26<H>  ----------------------------<  160<H>  4.5   |  22  |  0.92    Ca    8.4      26 Sep 2023 06:00  Phos  3.1     09-25  Mg     2.10     09-26        --------------------------------------------------------------------------------------      I&Os:    09-25-23 @ 07:01  -  09-26-23 @ 07:00  --------------------------------------------------------  IN: 240 mL / OUT: 1425 mL / NET: -1185 mL        --------------------------------------------------------------------------------------

## 2023-09-26 NOTE — PHYSICAL THERAPY INITIAL EVALUATION ADULT - GAIT DISTANCE, PT EVAL
pt deferred further mobility despite encouragement stating "this is the most I am doing on the first day"/bed to chair/10 feet

## 2023-09-26 NOTE — PROGRESS NOTE ADULT - ASSESSMENT
84 yo F w/ PMHx of CAD, pAF on Eliquis, severe AS pending TAVR, R internal carotid stenosis, HTN, HLD, hypothyroidism, anxiety, PAD  s/p L ilioprofunda bypass with reverse GSV graft to peroneal 8/31/23 (Dr. Tavares) and L 1st ray partial resection who presented with L groin erythema c/f infection; CT on admission showing 17cm L groin abscess. Patient now s/p removal of infected bypass graft of LLE on 9/24.     Plan:  - Diet: Regular   - pain control prn  - IV abx: zosyn and vanc  - dressing changes   - ID consult today- f/u recs  - Home meds resumed   - OOB/ambulate as tolerated  - dispo: pending     Vascular Surgery   [id number] 84 yo F w/ PMHx of CAD, pAF on Eliquis, severe AS pending TAVR, R internal carotid stenosis, HTN, HLD, hypothyroidism, anxiety, PAD  s/p L ilioprofunda bypass with reverse GSV graft to peroneal 8/31/23 (Dr. Tavares) and L 1st ray partial resection who presented with L groin erythema c/f infection; CT on admission showing 17cm L groin abscess. Patient now s/p removal of infected bypass graft of LLE on 9/24.     Plan:  - Diet: Regular   - pain control prn  - dressing changes daily. vac change today  - ID following- continue abx and f/u recs   - Home meds  - OOB/ambulate as tolerated  - dispo: pending     Vascular Surgery   [id number]

## 2023-09-26 NOTE — PROGRESS NOTE ADULT - ASSESSMENT
83 year old with CAD and PVD with a chronic left foot found  Prior left toe amputation with vascular bypass with graft on 8/31    She developed left groin swelling with erythema  She presented on 9/22  CT of left lower extremity showed a left groin abscess with ? left calf abscess    S/p left groin I and D with removal of the graft    Cultures are pending but prelim with GNR/ GPC- proteus and klebsiella    Can continue vancomycin and zosyn pending additional culture data

## 2023-09-26 NOTE — PROGRESS NOTE ADULT - SUBJECTIVE AND OBJECTIVE BOX
Follow Up:      Inverval History/ROS:Patient is a 83y old  Female who presents with a chief complaint of c/f L groin infection (26 Sep 2023 05:29)    No fever  No events      Allergies    latex (Unknown)  sulfa drugs (Unknown)    Intolerances        ANTIMICROBIALS:  piperacillin/tazobactam IVPB.. 3.375 every 8 hours  vancomycin  IVPB 1000 every 24 hours      OTHER MEDS:  acetaminophen     Tablet .. 650 milliGRAM(s) Oral every 6 hours  aMIOdarone    Tablet 100 milliGRAM(s) Oral daily  aspirin enteric coated 81 milliGRAM(s) Oral daily  atorvastatin 20 milliGRAM(s) Oral at bedtime  heparin   Injectable 5000 Unit(s) SubCutaneous every 8 hours  insulin lispro (ADMELOG) corrective regimen sliding scale   SubCutaneous three times a day before meals  insulin lispro (ADMELOG) corrective regimen sliding scale   SubCutaneous at bedtime  levothyroxine 50 MICROGram(s) Oral daily  losartan 50 milliGRAM(s) Oral daily  nystatin Powder 1 Application(s) Topical two times a day  oxyCODONE    IR 5 milliGRAM(s) Oral every 6 hours PRN      Vital Signs Last 24 Hrs  T(C): 36.6 (26 Sep 2023 12:13), Max: 36.8 (26 Sep 2023 00:15)  T(F): 97.8 (26 Sep 2023 12:13), Max: 98.3 (26 Sep 2023 00:15)  HR: 89 (26 Sep 2023 12:13) (87 - 98)  BP: 109/61 (26 Sep 2023 12:13) (101/81 - 131/72)  BP(mean): --  RR: 17 (26 Sep 2023 12:13) (17 - 18)  SpO2: 99% (26 Sep 2023 12:13) (94% - 100%)    Parameters below as of 26 Sep 2023 12:13  Patient On (Oxygen Delivery Method): room air        PHYSICAL EXAM:  General: x[ ] non-toxic  HEAD/EYES: [ ] PERRL [x ] white sclera [ ] icterus  ENT:  [ ] normal [ ] supple [ ] thrush [ ] pharyngeal exudate  Cardiovascular:   [ ] murmur [x ] normal [ ] PPM/AICD  Respiratory:  x[ ] clear to ausculation bilaterally  GI:  [ x] soft, non-tender, normal bowel sounds  :  [ ] ellis [ ] no CVA tenderness   Musculoskeletal:  [ ] no synovitis  Neurologic:  [ ] non-focal exam   Skin:  [x ] no rash  Lymph: [ ] no lymphadenopathy  Psychiatric:  [ ] appropriate affect [x ] alert & oriented  Lines:  [x ] no phlebitis [ ] central line                                11.7   12.49 )-----------( 241      ( 26 Sep 2023 06:00 )             35.7       09-26    129<L>  |  94<L>  |  26<H>  ----------------------------<  160<H>  4.5   |  22  |  0.92    Ca    8.4      26 Sep 2023 06:00  Phos  1.9     09-26  Mg     2.10     09-26        Urinalysis Basic - ( 26 Sep 2023 06:00 )    Color: x / Appearance: x / SG: x / pH: x  Gluc: 160 mg/dL / Ketone: x  / Bili: x / Urobili: x   Blood: x / Protein: x / Nitrite: x   Leuk Esterase: x / RBC: x / WBC x   Sq Epi: x / Non Sq Epi: x / Bacteria: x        MICROBIOLOGY:Culture Results:   Moderate Klebsiella pneumoniae (09-24-23 @ 10:24)  Culture Results:   Testing in progress (09-24-23 @ 10:24)  Culture Results:   Numerous Klebsiella pneumoniae (09-24-23 @ 09:29)  Culture Results:   Testing in progress (09-24-23 @ 09:29)  Culture Results:   Numerous Klebsiella pneumoniae  Rare Proteus mirabilis (09-24-23 @ 09:28)  Culture Results:   Testing in progress (09-24-23 @ 09:28)  Culture Results:   Numerous Klebsiella pneumoniae  See previous culture [id number] (09-24-23 @ 09:27)  Culture Results:   Testing in progress (09-24-23 @ 09:27)  Culture Results:   No growth at 72 Hours (09-22-23 @ 14:10)  Culture Results:   No growth at 72 Hours (09-22-23 @ 14:00)      RADIOLOGY:

## 2023-09-26 NOTE — PHYSICAL THERAPY INITIAL EVALUATION ADULT - PERTINENT HX OF CURRENT PROBLEM, REHAB EVAL
83 year old Female with PMH stated below, of CAD, presents with Left groin erythema concern for infection.

## 2023-09-27 DIAGNOSIS — E03.9 HYPOTHYROIDISM, UNSPECIFIED: ICD-10-CM

## 2023-09-27 DIAGNOSIS — E78.5 HYPERLIPIDEMIA, UNSPECIFIED: ICD-10-CM

## 2023-09-27 DIAGNOSIS — I10 ESSENTIAL (PRIMARY) HYPERTENSION: ICD-10-CM

## 2023-09-27 LAB
ANION GAP SERPL CALC-SCNC: 9 MMOL/L — SIGNIFICANT CHANGE UP (ref 7–14)
BUN SERPL-MCNC: 18 MG/DL — SIGNIFICANT CHANGE UP (ref 7–23)
CALCIUM SERPL-MCNC: 7.9 MG/DL — LOW (ref 8.4–10.5)
CHLORIDE SERPL-SCNC: 100 MMOL/L — SIGNIFICANT CHANGE UP (ref 98–107)
CO2 SERPL-SCNC: 22 MMOL/L — SIGNIFICANT CHANGE UP (ref 22–31)
CREAT SERPL-MCNC: 0.73 MG/DL — SIGNIFICANT CHANGE UP (ref 0.5–1.3)
CULTURE RESULTS: SIGNIFICANT CHANGE UP
CULTURE RESULTS: SIGNIFICANT CHANGE UP
EGFR: 82 ML/MIN/1.73M2 — SIGNIFICANT CHANGE UP
GLUCOSE BLDC GLUCOMTR-MCNC: 134 MG/DL — HIGH (ref 70–99)
GLUCOSE BLDC GLUCOMTR-MCNC: 143 MG/DL — HIGH (ref 70–99)
GLUCOSE BLDC GLUCOMTR-MCNC: 145 MG/DL — HIGH (ref 70–99)
GLUCOSE BLDC GLUCOMTR-MCNC: 166 MG/DL — HIGH (ref 70–99)
GLUCOSE SERPL-MCNC: 111 MG/DL — HIGH (ref 70–99)
HCT VFR BLD CALC: 35.2 % — SIGNIFICANT CHANGE UP (ref 34.5–45)
HGB BLD-MCNC: 11.5 G/DL — SIGNIFICANT CHANGE UP (ref 11.5–15.5)
MAGNESIUM SERPL-MCNC: 2 MG/DL — SIGNIFICANT CHANGE UP (ref 1.6–2.6)
MCHC RBC-ENTMCNC: 30.6 PG — SIGNIFICANT CHANGE UP (ref 27–34)
MCHC RBC-ENTMCNC: 32.7 GM/DL — SIGNIFICANT CHANGE UP (ref 32–36)
MCV RBC AUTO: 93.6 FL — SIGNIFICANT CHANGE UP (ref 80–100)
NRBC # BLD: 0 /100 WBCS — SIGNIFICANT CHANGE UP (ref 0–0)
NRBC # FLD: 0 K/UL — SIGNIFICANT CHANGE UP (ref 0–0)
PHOSPHATE SERPL-MCNC: 2.2 MG/DL — LOW (ref 2.5–4.5)
PLATELET # BLD AUTO: 250 K/UL — SIGNIFICANT CHANGE UP (ref 150–400)
POTASSIUM SERPL-MCNC: 3.8 MMOL/L — SIGNIFICANT CHANGE UP (ref 3.5–5.3)
POTASSIUM SERPL-SCNC: 3.8 MMOL/L — SIGNIFICANT CHANGE UP (ref 3.5–5.3)
RBC # BLD: 3.76 M/UL — LOW (ref 3.8–5.2)
RBC # FLD: 20 % — HIGH (ref 10.3–14.5)
SODIUM SERPL-SCNC: 131 MMOL/L — LOW (ref 135–145)
SPECIMEN SOURCE: SIGNIFICANT CHANGE UP
SPECIMEN SOURCE: SIGNIFICANT CHANGE UP
WBC # BLD: 10.35 K/UL — SIGNIFICANT CHANGE UP (ref 3.8–10.5)
WBC # FLD AUTO: 10.35 K/UL — SIGNIFICANT CHANGE UP (ref 3.8–10.5)

## 2023-09-27 PROCEDURE — 99233 SBSQ HOSP IP/OBS HIGH 50: CPT

## 2023-09-27 PROCEDURE — 99232 SBSQ HOSP IP/OBS MODERATE 35: CPT

## 2023-09-27 PROCEDURE — 93010 ELECTROCARDIOGRAM REPORT: CPT

## 2023-09-27 RX ORDER — DEXTROSE 50 % IN WATER 50 %
25 SYRINGE (ML) INTRAVENOUS ONCE
Refills: 0 | Status: DISCONTINUED | OUTPATIENT
Start: 2023-09-27 | End: 2023-10-11

## 2023-09-27 RX ORDER — SODIUM,POTASSIUM PHOSPHATES 278-250MG
2 POWDER IN PACKET (EA) ORAL ONCE
Refills: 0 | Status: COMPLETED | OUTPATIENT
Start: 2023-09-27 | End: 2023-09-27

## 2023-09-27 RX ORDER — LINAGLIPTIN 5 MG/1
5 TABLET, FILM COATED ORAL DAILY
Refills: 0 | Status: DISCONTINUED | OUTPATIENT
Start: 2023-09-27 | End: 2023-10-11

## 2023-09-27 RX ORDER — DEXTROSE 50 % IN WATER 50 %
15 SYRINGE (ML) INTRAVENOUS ONCE
Refills: 0 | Status: DISCONTINUED | OUTPATIENT
Start: 2023-09-27 | End: 2023-09-28

## 2023-09-27 RX ORDER — INSULIN LISPRO 100/ML
VIAL (ML) SUBCUTANEOUS
Refills: 0 | Status: DISCONTINUED | OUTPATIENT
Start: 2023-09-27 | End: 2023-10-10

## 2023-09-27 RX ORDER — GLUCAGON INJECTION, SOLUTION 0.5 MG/.1ML
1 INJECTION, SOLUTION SUBCUTANEOUS ONCE
Refills: 0 | Status: DISCONTINUED | OUTPATIENT
Start: 2023-09-27 | End: 2023-09-28

## 2023-09-27 RX ORDER — DEXTROSE 50 % IN WATER 50 %
25 SYRINGE (ML) INTRAVENOUS ONCE
Refills: 0 | Status: DISCONTINUED | OUTPATIENT
Start: 2023-09-27 | End: 2023-10-03

## 2023-09-27 RX ORDER — CIPROFLOXACIN LACTATE 400MG/40ML
500 VIAL (ML) INTRAVENOUS EVERY 12 HOURS
Refills: 0 | Status: DISCONTINUED | OUTPATIENT
Start: 2023-09-27 | End: 2023-10-10

## 2023-09-27 RX ORDER — LEVOTHYROXINE SODIUM 125 MCG
75 TABLET ORAL DAILY
Refills: 0 | Status: DISCONTINUED | OUTPATIENT
Start: 2023-09-28 | End: 2023-10-13

## 2023-09-27 RX ORDER — DEXTROSE 50 % IN WATER 50 %
12.5 SYRINGE (ML) INTRAVENOUS ONCE
Refills: 0 | Status: DISCONTINUED | OUTPATIENT
Start: 2023-09-27 | End: 2023-10-11

## 2023-09-27 RX ADMIN — HEPARIN SODIUM 5000 UNIT(S): 5000 INJECTION INTRAVENOUS; SUBCUTANEOUS at 23:19

## 2023-09-27 RX ADMIN — ATORVASTATIN CALCIUM 20 MILLIGRAM(S): 80 TABLET, FILM COATED ORAL at 23:19

## 2023-09-27 RX ADMIN — Medication 650 MILLIGRAM(S): at 06:36

## 2023-09-27 RX ADMIN — Medication 1 TABLET(S): at 18:14

## 2023-09-27 RX ADMIN — LINAGLIPTIN 5 MILLIGRAM(S): 5 TABLET, FILM COATED ORAL at 11:36

## 2023-09-27 RX ADMIN — Medication 81 MILLIGRAM(S): at 11:35

## 2023-09-27 RX ADMIN — Medication 500 MILLIGRAM(S): at 18:14

## 2023-09-27 RX ADMIN — HEPARIN SODIUM 5000 UNIT(S): 5000 INJECTION INTRAVENOUS; SUBCUTANEOUS at 06:08

## 2023-09-27 RX ADMIN — Medication 650 MILLIGRAM(S): at 23:20

## 2023-09-27 RX ADMIN — PIPERACILLIN AND TAZOBACTAM 25 GRAM(S): 4; .5 INJECTION, POWDER, LYOPHILIZED, FOR SOLUTION INTRAVENOUS at 06:09

## 2023-09-27 RX ADMIN — NYSTATIN CREAM 1 APPLICATION(S): 100000 CREAM TOPICAL at 06:09

## 2023-09-27 RX ADMIN — AMIODARONE HYDROCHLORIDE 100 MILLIGRAM(S): 400 TABLET ORAL at 08:58

## 2023-09-27 RX ADMIN — Medication 650 MILLIGRAM(S): at 23:50

## 2023-09-27 RX ADMIN — Medication 650 MILLIGRAM(S): at 06:09

## 2023-09-27 RX ADMIN — Medication 250 MILLIGRAM(S): at 06:24

## 2023-09-27 RX ADMIN — Medication 2: at 12:11

## 2023-09-27 RX ADMIN — Medication 50 MICROGRAM(S): at 06:08

## 2023-09-27 RX ADMIN — NYSTATIN CREAM 1 APPLICATION(S): 100000 CREAM TOPICAL at 18:16

## 2023-09-27 RX ADMIN — HEPARIN SODIUM 5000 UNIT(S): 5000 INJECTION INTRAVENOUS; SUBCUTANEOUS at 14:56

## 2023-09-27 RX ADMIN — Medication 2 TABLET(S): at 11:36

## 2023-09-27 NOTE — PROGRESS NOTE ADULT - SUBJECTIVE AND OBJECTIVE BOX
Follow Up:      Inverval History/ROS:Patient is a 83y old  Female who presents with a chief complaint of c/f L groin infection (27 Sep 2023 06:00)      Allergies    latex (Unknown)  sulfa drugs (Unknown)    Intolerances        ANTIMICROBIALS:      OTHER MEDS:  acetaminophen     Tablet .. 650 milliGRAM(s) Oral every 6 hours  aMIOdarone    Tablet 100 milliGRAM(s) Oral daily  aspirin enteric coated 81 milliGRAM(s) Oral daily  atorvastatin 20 milliGRAM(s) Oral at bedtime  heparin   Injectable 5000 Unit(s) SubCutaneous every 8 hours  insulin lispro (ADMELOG) corrective regimen sliding scale   SubCutaneous three times a day before meals  insulin lispro (ADMELOG) corrective regimen sliding scale   SubCutaneous at bedtime  levothyroxine 50 MICROGram(s) Oral daily  losartan 50 milliGRAM(s) Oral daily  nystatin Powder 1 Application(s) Topical two times a day  oxyCODONE    IR 5 milliGRAM(s) Oral every 6 hours PRN  potassium phosphate / sodium phosphate Tablet (K-PHOS No. 2) 2 Tablet(s) Oral once      Vital Signs Last 24 Hrs  T(C): 36.3 (27 Sep 2023 08:59), Max: 36.9 (26 Sep 2023 20:06)  T(F): 97.4 (27 Sep 2023 08:59), Max: 98.4 (26 Sep 2023 20:06)  HR: 87 (27 Sep 2023 08:59) (85 - 89)  BP: 100/57 (27 Sep 2023 08:59) (93/72 - 120/71)  BP(mean): --  RR: 18 (27 Sep 2023 08:59) (17 - 18)  SpO2: 98% (27 Sep 2023 08:59) (95% - 99%)    Parameters below as of 27 Sep 2023 08:59  Patient On (Oxygen Delivery Method): room air        PHYSICAL EXAM:  General: [ ] non-toxic  HEAD/EYES: [ ] PERRL [ ] white sclera [ ] icterus  ENT:  [ ] normal [ ] supple [ ] thrush [ ] pharyngeal exudate  Cardiovascular:   [ ] murmur [ ] normal [ ] PPM/AICD  Respiratory:  [ ] clear to ausculation bilaterally  GI:  [ ] soft, non-tender, normal bowel sounds  :  [ ] ellis [ ] no CVA tenderness   Musculoskeletal:  [ ] no synovitis  Neurologic:  [ ] non-focal exam   Skin:  [ ] no rash  Lymph: [ ] no lymphadenopathy  Psychiatric:  [ ] appropriate affect [ ] alert & oriented  Lines:  [ ] no phlebitis [ ] central line                                11.5   10.35 )-----------( 250      ( 27 Sep 2023 06:09 )             35.2       09-27    131<L>  |  100  |  18  ----------------------------<  111<H>  3.8   |  22  |  0.73    Ca    7.9<L>      27 Sep 2023 06:09  Phos  2.2     09-27  Mg     2.00     09-27        Urinalysis Basic - ( 27 Sep 2023 06:09 )    Color: x / Appearance: x / SG: x / pH: x  Gluc: 111 mg/dL / Ketone: x  / Bili: x / Urobili: x   Blood: x / Protein: x / Nitrite: x   Leuk Esterase: x / RBC: x / WBC x   Sq Epi: x / Non Sq Epi: x / Bacteria: x        MICROBIOLOGY:Culture Results:   Moderate Klebsiella pneumoniae (Carbapenem Resistant) (09-24-23 @ 10:24)  Culture Results:   Testing in progress (09-24-23 @ 10:24)  Culture Results:   Numerous Klebsiella pneumoniae (09-24-23 @ 09:29)  Culture Results:   Testing in progress (09-24-23 @ 09:29)  Culture Results:   Numerous Klebsiella pneumoniae (Carbapenem Resistant)  Rare Proteus mirabilis (09-24-23 @ 09:28)  Culture Results:   Testing in progress (09-24-23 @ 09:28)  Culture Results:   Numerous Klebsiella pneumoniae  See previous culture [id number] (09-24-23 @ 09:27)  Culture Results:   Testing in progress (09-24-23 @ 09:27)  Culture Results:   No growth at 4 days (09-22-23 @ 14:10)  Culture Results:   No growth at 4 days (09-22-23 @ 14:00)      RADIOLOGY:     Follow Up:      Inverval History/ROS:Patient is a 83y old  Female who presents with a chief complaint of c/f L groin infection (27 Sep 2023 06:00)    No fever    Allergies    latex (Unknown)  sulfa drugs (Unknown)    Intolerances        ANTIMICROBIALS:      OTHER MEDS:  acetaminophen     Tablet .. 650 milliGRAM(s) Oral every 6 hours  aMIOdarone    Tablet 100 milliGRAM(s) Oral daily  aspirin enteric coated 81 milliGRAM(s) Oral daily  atorvastatin 20 milliGRAM(s) Oral at bedtime  heparin   Injectable 5000 Unit(s) SubCutaneous every 8 hours  insulin lispro (ADMELOG) corrective regimen sliding scale   SubCutaneous three times a day before meals  insulin lispro (ADMELOG) corrective regimen sliding scale   SubCutaneous at bedtime  levothyroxine 50 MICROGram(s) Oral daily  losartan 50 milliGRAM(s) Oral daily  nystatin Powder 1 Application(s) Topical two times a day  oxyCODONE    IR 5 milliGRAM(s) Oral every 6 hours PRN  potassium phosphate / sodium phosphate Tablet (K-PHOS No. 2) 2 Tablet(s) Oral once      Vital Signs Last 24 Hrs  T(C): 36.3 (27 Sep 2023 08:59), Max: 36.9 (26 Sep 2023 20:06)  T(F): 97.4 (27 Sep 2023 08:59), Max: 98.4 (26 Sep 2023 20:06)  HR: 87 (27 Sep 2023 08:59) (85 - 89)  BP: 100/57 (27 Sep 2023 08:59) (93/72 - 120/71)  BP(mean): --  RR: 18 (27 Sep 2023 08:59) (17 - 18)  SpO2: 98% (27 Sep 2023 08:59) (95% - 99%)    Parameters below as of 27 Sep 2023 08:59  Patient On (Oxygen Delivery Method): room air        PHYSICAL EXAM:  General: [x ] non-toxic  HEAD/EYES: [ ] PERRL [x ] white sclera [ ] icterus  ENT:  [ ] normal x[ ] supple [ ] thrush [ ] pharyngeal exudate  Cardiovascular:   [ ] murmur [x ] normal [ ] PPM/AICD  Respiratory:  [x ] clear to ausculation bilaterally  GI:  [ x] soft, non-tender, normal bowel sounds  :  [ ] ellis [ ] no CVA tenderness   Musculoskeletal:  [ ] no synovitis  Neurologic:  [ ] non-focal exam   Skin:  [ ] no rash  Lymph: [ ] no lymphadenopathy  Psychiatric:  x[ ] appropriate affect [ ] alert & oriented  Lines:  [x ] no phlebitis [ ] central line                                11.5   10.35 )-----------( 250      ( 27 Sep 2023 06:09 )             35.2       09-27    131<L>  |  100  |  18  ----------------------------<  111<H>  3.8   |  22  |  0.73    Ca    7.9<L>      27 Sep 2023 06:09  Phos  2.2     09-27  Mg     2.00     09-27        Urinalysis Basic - ( 27 Sep 2023 06:09 )    Color: x / Appearance: x / SG: x / pH: x  Gluc: 111 mg/dL / Ketone: x  / Bili: x / Urobili: x   Blood: x / Protein: x / Nitrite: x   Leuk Esterase: x / RBC: x / WBC x   Sq Epi: x / Non Sq Epi: x / Bacteria: x        MICROBIOLOGY:Culture Results:   Moderate Klebsiella pneumoniae (Carbapenem Resistant) (09-24-23 @ 10:24)  Culture Results:   Testing in progress (09-24-23 @ 10:24)  Culture Results:   Numerous Klebsiella pneumoniae (09-24-23 @ 09:29)  Culture Results:   Testing in progress (09-24-23 @ 09:29)  Culture Results:   Numerous Klebsiella pneumoniae (Carbapenem Resistant)  Rare Proteus mirabilis (09-24-23 @ 09:28)  Culture Results:   Testing in progress (09-24-23 @ 09:28)  Culture Results:   Numerous Klebsiella pneumoniae  See previous culture [id number] (09-24-23 @ 09:27)  Culture Results:   Testing in progress (09-24-23 @ 09:27)  Culture Results:   No growth at 4 days (09-22-23 @ 14:10)  Culture Results:   No growth at 4 days (09-22-23 @ 14:00)      RADIOLOGY:

## 2023-09-27 NOTE — PROGRESS NOTE ADULT - ASSESSMENT
83 year old with CAD and PVD with a chronic left foot found  Prior left toe amputation with vascular bypass with graft on 8/31    She developed left groin swelling with erythema  She presented on 9/22  CT of left lower extremity showed a left groin abscess with ? left calf abscess    S/p left groin I and D with removal of the graft    Cultures with MDRO klebsiella and proteus    Change to Cipro 500 m g po q 12 with augmentin 875 mg po q 12 for 10 additional days    There is a drug drug interaction with amiodarone.   Check EKG daily for the next three days.  Optimize electrolytes (goal Potassium greater than 4)     Reimage ? collection in the left calf       83 year old with CAD and PVD with a chronic left foot found  Prior left toe amputation with vascular bypass with graft on 8/31    She developed left groin swelling with erythema  She presented on 9/22  CT of left lower extremity showed a left groin abscess with ? left calf abscess    S/p left groin I and D with removal of the graft    Cultures with MDRO klebsiella and proteus    Change to Cipro 500 m g po q 12 with augmentin 875 mg po q 12 for 10 additional days    There is a drug drug interaction with amiodarone.   Check EKG daily for the next three days.  Optimize electrolytes (goal Potassium greater than 4)     Re-image ? collection in the left calf

## 2023-09-27 NOTE — PROGRESS NOTE ADULT - SUBJECTIVE AND OBJECTIVE BOX
TE[facility] V[facility] Daily Progress Note  =====================================================    SUBJECTIVE: Patient seen and examined at bedside on AM rounds. Patient reports that they're feeling well. Denies fever, chills, N/V, chest pain, SOB    ALLERGIES:  latex (Unknown)  sulfa drugs (Unknown)      --------------------------------------------------------------------------------------    MEDICATIONS:    Neurologic Medications  acetaminophen     Tablet .. 650 milliGRAM(s) Oral every 6 hours  oxyCODONE    IR 5 milliGRAM(s) Oral every 6 hours PRN Severe Pain (7 - 10)    Respiratory Medications    Cardiovascular Medications  aMIOdarone    Tablet 100 milliGRAM(s) Oral daily  losartan 50 milliGRAM(s) Oral daily    Gastrointestinal Medications    Genitourinary Medications    Hematologic/Oncologic Medications  aspirin enteric coated 81 milliGRAM(s) Oral daily  heparin   Injectable 5000 Unit(s) SubCutaneous every 8 hours    Antimicrobial/Immunologic Medications  piperacillin/tazobactam IVPB.. 3.375 Gram(s) IV Intermittent every 8 hours  vancomycin  IVPB      vancomycin  IVPB 750 milliGRAM(s) IV Intermittent every 12 hours    Endocrine/Metabolic Medications  atorvastatin 20 milliGRAM(s) Oral at bedtime  insulin lispro (ADMELOG) corrective regimen sliding scale   SubCutaneous three times a day before meals  insulin lispro (ADMELOG) corrective regimen sliding scale   SubCutaneous at bedtime  levothyroxine 50 MICROGram(s) Oral daily    Topical/Other Medications  nystatin Powder 1 Application(s) Topical two times a day    --------------------------------------------------------------------------------------    VITAL SIGNS:  T(C): 36.7 (09-27-23 @ 04:22), Max: 36.9 (09-26-23 @ 20:06)  HR: 85 (09-27-23 @ 04:22) (85 - 90)  BP: 120/71 (09-27-23 @ 04:22) (93/72 - 120/71)  RR: 18 (09-27-23 @ 04:22) (17 - 18)  SpO2: 95% (09-27-23 @ 04:22) (95% - 99%)  --------------------------------------------------------------------------------------    INS AND OUTS:    09-25-23 @ 07:01  -  09-26-23 @ 07:00  --------------------------------------------------------  IN: 240 mL / OUT: 1425 mL / NET: -1185 mL    09-26-23 @ 07:01  -  09-27-23 @ 06:00  --------------------------------------------------------  IN: 120 mL / OUT: 1475 mL / NET: -1355 mL      --------------------------------------------------------------------------------------      EXAM  General: NAD, resting in bed comfortably. A&Ox3.   Cardiac: regular rate  Respiratory: Nonlabored respirations, normal cw expansion.  Abdomen: soft, nontender, nondistended.   Extremities: moving extremities  Vascular: LE vac holding suction; dry gangrene of toes, left dressing with ace wrap C/d/i     --------------------------------------------------------------------------------------    LABS                          11.7   12.49 )-----------( 241      ( 26 Sep 2023 06:00 )             35.7     09-26    129<L>  |  94<L>  |  26<H>  ----------------------------<  160<H>  4.5   |  22  |  0.92    Ca    8.4      26 Sep 2023 06:00  Phos  1.9     09-26  Mg     2.10     09-26        Urinalysis Basic - ( 26 Sep 2023 06:00 )    Color: x / Appearance: x / SG: x / pH: x  Gluc: 160 mg/dL / Ketone: x  / Bili: x / Urobili: x   Blood: x / Protein: x / Nitrite: x   Leuk Esterase: x / RBC: x / WBC x   Sq Epi: x / Non Sq Epi: x / Bacteria: x         [facility] Daily Progress Note  =====================================================    SUBJECTIVE: Patient seen and examined at bedside on AM rounds. Patient reports that they're feeling well. Denies fever, chills, N/V, chest pain, SOB    ALLERGIES:  latex (Unknown)  sulfa drugs (Unknown)      --------------------------------------------------------------------------------------    MEDICATIONS:    Neurologic Medications  acetaminophen     Tablet .. 650 milliGRAM(s) Oral every 6 hours  oxyCODONE    IR 5 milliGRAM(s) Oral every 6 hours PRN Severe Pain (7 - 10)    Respiratory Medications    Cardiovascular Medications  aMIOdarone    Tablet 100 milliGRAM(s) Oral daily  losartan 50 milliGRAM(s) Oral daily    Gastrointestinal Medications    Genitourinary Medications    Hematologic/Oncologic Medications  aspirin enteric coated 81 milliGRAM(s) Oral daily  heparin   Injectable 5000 Unit(s) SubCutaneous every 8 hours    Antimicrobial/Immunologic Medications  piperacillin/tazobactam IVPB.. 3.375 Gram(s) IV Intermittent every 8 hours  vancomycin  IVPB      vancomycin  IVPB 750 milliGRAM(s) IV Intermittent every 12 hours    Endocrine/Metabolic Medications  atorvastatin 20 milliGRAM(s) Oral at bedtime  insulin lispro (ADMELOG) corrective regimen sliding scale   SubCutaneous three times a day before meals  insulin lispro (ADMELOG) corrective regimen sliding scale   SubCutaneous at bedtime  levothyroxine 50 MICROGram(s) Oral daily    Topical/Other Medications  nystatin Powder 1 Application(s) Topical two times a day    --------------------------------------------------------------------------------------    VITAL SIGNS:  T(C): 36.7 (09-27-23 @ 04:22), Max: 36.9 (09-26-23 @ 20:06)  HR: 85 (09-27-23 @ 04:22) (85 - 90)  BP: 120/71 (09-27-23 @ 04:22) (93/72 - 120/71)  RR: 18 (09-27-23 @ 04:22) (17 - 18)  SpO2: 95% (09-27-23 @ 04:22) (95% - 99%)  --------------------------------------------------------------------------------------    INS AND OUTS:    09-25-23 @ 07:01  -  09-26-23 @ 07:00  --------------------------------------------------------  IN: 240 mL / OUT: 1425 mL / NET: -1185 mL    09-26-23 @ 07:01  -  09-27-23 @ 06:00  --------------------------------------------------------  IN: 120 mL / OUT: 1475 mL / NET: -1355 mL      --------------------------------------------------------------------------------------      EXAM  General: NAD, resting in bed comfortably. A&Ox3.   Cardiac: regular rate  Respiratory: Nonlabored respirations, normal cw expansion.  Abdomen: soft, nontender, nondistended.   Extremities: moving extremities  Vascular: LE vac holding suction; dry gangrene of toes, left dressing with ace wrap C/d/i . Left foot cooler compared to right.     --------------------------------------------------------------------------------------    LABS                          11.7   12.49 )-----------( 241      ( 26 Sep 2023 06:00 )             35.7     09-26    129<L>  |  94<L>  |  26<H>  ----------------------------<  160<H>  4.5   |  22  |  0.92    Ca    8.4      26 Sep 2023 06:00  Phos  1.9     09-26  Mg     2.10     09-26        Urinalysis Basic - ( 26 Sep 2023 06:00 )    Color: x / Appearance: x / SG: x / pH: x  Gluc: 160 mg/dL / Ketone: x  / Bili: x / Urobili: x   Blood: x / Protein: x / Nitrite: x   Leuk Esterase: x / RBC: x / WBC x   Sq Epi: x / Non Sq Epi: x / Bacteria: x

## 2023-09-27 NOTE — PROGRESS NOTE ADULT - ASSESSMENT
84 yo F w/ PMHx of CAD, pAF on Eliquis, severe AS pending TAVR, R internal carotid stenosis, HTN, HLD, hypothyroidism, anxiety, PAD  s/p L ilioprofunda bypass with reverse GSV graft to peroneal 8/31/23 (Dr. Tavares) and L 1st ray partial resection who presented with L groin erythema c/f infection; CT on admission showing 17cm L groin abscess. Patient now s/p removal of infected bypass graft of LLE on 9/24.     Plan:  - Diet: Regular   - pain control prn  - dressing changes daily. vac changed yesterday  - ID following- f/u given abx and f/u recs   - Home meds  - OOB/ambulate as tolerated  - dispo: pending     Vascular Surgery   [id number] 84 yo F w/ PMHx of CAD, pAF on Eliquis, severe AS pending TAVR, R internal carotid stenosis, HTN, HLD, hypothyroidism, anxiety, PAD  s/p L ilioprofunda bypass with reverse GSV graft to peroneal 8/31/23 (Dr. Tavares) and L 1st ray partial resection who presented with L groin erythema c/f infection; CT on admission showing 17cm L groin abscess. Patient now s/p removal of infected bypass graft of LLE on 9/24.     Plan:  - Diet: Regular   - pain control prn  - dressing changes daily. vac changed yesterday  - DC Alas   - f/u ID recommendation for abx given sensitivity results   - Home meds  - OOB/ambulate as tolerated  - dispo: pending     Vascular Surgery   [id number]

## 2023-09-27 NOTE — PROGRESS NOTE ADULT - ASSESSMENT
84 yo F w/ PMHx of CAD (prox LM 40%, prox LAD 40%, distal LAD 50%, Cx mild, distal % ), pAF on Eliquis, severe AS pending TAVR, R internal carotid stenosis, HTN, HLD, hypothyroidism, and anxiety.  Patient with PAD and chronic L toe wounds s/p L ilioprofunda bypass with reverse GSV graft to peroneal 8/31/23 (Dr. Tavares) presents to ED with L groin erythema.  She is s/p L groin washout, explant of bypass graft, wound vac placement, L external iliac ligation with 300 cc of purulent material drained.  She is also being treated for LLL consolidation.  She is getting vanco and zosyn in dextrose.  Endocrinology team consulted for hyperglycemia in a non-diabetic patient with A1c 5.4%.     1. Stress Hyperglycemia (r/t infection?)/History of pre-diabetes   A1c: 5.4% (prior 5.6%, as per primary team, pt did not received any blood or iron transfusion (h/h improved from 8 to 11). Pt also not receiving any steroids.    Home meds: none     While inpatient  BG target 100-180 mg/dl   Continue Admelog LOW dose correctional scale before meals, low dose at bedtime  Start Tradjenta 5 mg PO daily  No standing Lantus or pre-meal Admelog  Check BG before meals and bedtime  Hypoglycemia protocol   Consistent carbohydrate diet    Discharge Plan:  Patient's FS values inpatient have not aligned with her A1c value (5.4%). Unclear if this is related to current infection/stress, or if her FS are elevated at home as well and A1c is inaccurate in setting of anemia. Started Tradjenta 5 mg PO daily inpatient and will monitor on this.   Will either discharge on once daily Tradjenta or no diabetes medication, based on inpatient trend  Would ensure patient has prescription for new glucometer and supplies so she can monitor her FS at home  Followup with PCP    2. Hypothyroidism  09-26 @ 06:00 TSH 8.19 FreeT4 1.2  TSH elevated on home dosing of Levothyroxine 50 mcg PO daily (patient taking this correctly at home)  No overt symptoms of hypothyroidism reported by patient  Recommend increase to Levothyroxine 75 mcg PO daily  Followup for repeat TFTs in 4-6 weeks as outpatient    3. HTN  BP goal less than 130/80  Losartan held today for tightly controlled SBP     4. HLD  LDL target less than 70  Continue Atorvastatin 20 mg daily if no contraindications     Dotty Norwood  Nurse Practitioner  Division of Endocrinology & Diabetes  In house pager #[phone]/long range pager #[phone]    If before 9AM or after 6PM, or on weekends/holidays, please call endocrine answering service for assistance ([phone]).  For nonurgent matters email [email or web address] for assistance.      84 yo F w/ PMHx of CAD (prox LM 40%, prox LAD 40%, distal LAD 50%, Cx mild, distal % ), pAF on Eliquis, severe AS pending TAVR, R internal carotid stenosis, HTN, HLD, hypothyroidism, and anxiety.  Patient with PAD and chronic L toe wounds s/p L ilioprofunda bypass with reverse GSV graft to peroneal 8/31/23 (Dr. Tavares) presents to ED with L groin erythema.  She is s/p L groin washout, explant of bypass graft, wound vac placement, L external iliac ligation with 300 cc of purulent material drained.  She is also being treated for LLL consolidation.  She is getting vanco and zosyn in dextrose.  Endocrinology team consulted for hyperglycemia in a non-diabetic patient with A1c 5.4%.     1. Stress Hyperglycemia (r/t infection?)/History of pre-diabetes   A1c: 5.4% (prior 5.6%, as per primary team, pt did not received any blood or iron transfusion (h/h improved from 8 to 11). Pt also not receiving any steroids.    Home meds: none     While inpatient  BG target 100-180 mg/dl   Reduce to Admelog LOW dose correctional scale before meals, low dose at bedtime  Start Tradjenta 5 mg PO daily  No standing Lantus or pre-meal Admelog  Check BG before meals and bedtime, will place standing order  Hypoglycemia protocol --> will order  Consistent carbohydrate diet, currently regular diet, will adjust    Discharge Plan:  Patient's FS values inpatient have not aligned with her A1c value (5.4%). Unclear if this is related to current infection/stress, or if her FS are elevated at home as well and A1c is inaccurate in setting of anemia. Started Tradjenta 5 mg PO daily inpatient and will monitor on this.   Will either discharge on once daily Tradjenta or no diabetes medication, based on inpatient trend  Would ensure patient has prescription for new glucometer and supplies so she can monitor her FS at home  Followup with PCP    2. Hypothyroidism  09-26 @ 06:00 TSH 8.19 FreeT4 1.2  TSH elevated on home dosing of Levothyroxine 50 mcg PO daily (patient taking this correctly at home)  No overt symptoms of hypothyroidism reported by patient  Recommend increase to Levothyroxine 75 mcg PO daily  Followup for repeat TFTs in 4-6 weeks as outpatient    3. HTN  BP goal less than 130/80  Losartan held today for tightly controlled SBP     4. HLD  LDL target less than 70  Continue Atorvastatin 20 mg daily if no contraindications     Dotty Norwood  Nurse Practitioner  Division of Endocrinology & Diabetes  In house pager #[phone]/long range pager #[phone]    If before 9AM or after 6PM, or on weekends/holidays, please call endocrine answering service for assistance ([phone]).  For nonurgent matters email [email or web address] for assistance.

## 2023-09-27 NOTE — PROGRESS NOTE ADULT - SUBJECTIVE AND OBJECTIVE BOX
Chief Complaint: Hyperglycemia, hypothyroidism    History: Patient seen at bedside. Patient tolerating meals, denies n/v, denies s/s of hypoglycemia  Prior to admission, patient endorses adherence to Levothyroxine 50 mcg PO daily, states she took medication in the AM upon waking, with a full glass of water, then would wait about 45 minutes to eat her breakfast. Denies taking with MVI, calcium, iron - although she was not sure if she took these within 4 hours of LT4 at home. Is agreeable to dose increase and states she will followup with PCP, explained she will need TFTs checked in 4-6 weeks    In relation to hyperglycemia, patient endorses history of pre-diabetes and watches her diet at home  A1c here is 5.4%, not in diabetes range, however, FS have been ranging 140s-200s inpatient    MEDICATIONS  (STANDING):  acetaminophen     Tablet .. 650 milliGRAM(s) Oral every 6 hours  aMIOdarone    Tablet 100 milliGRAM(s) Oral daily  amoxicillin  875 milliGRAM(s)/clavulanate 1 Tablet(s) Oral two times a day  aspirin enteric coated 81 milliGRAM(s) Oral daily  atorvastatin 20 milliGRAM(s) Oral at bedtime  ciprofloxacin     Tablet 500 milliGRAM(s) Oral every 12 hours  heparin   Injectable 5000 Unit(s) SubCutaneous every 8 hours  insulin lispro (ADMELOG) corrective regimen sliding scale   SubCutaneous three times a day before meals  insulin lispro (ADMELOG) corrective regimen sliding scale   SubCutaneous at bedtime  levothyroxine 50 MICROGram(s) Oral daily  linagliptin 5 milliGRAM(s) Oral daily  losartan 50 milliGRAM(s) Oral daily  nystatin Powder 1 Application(s) Topical two times a day    MEDICATIONS  (PRN):  oxyCODONE    IR 5 milliGRAM(s) Oral every 6 hours PRN Severe Pain (7 - 10)    Allergies  latex (Unknown)  sulfa drugs (Unknown)    Review of Systems:  Cardiovascular: No chest pain  Respiratory: No SOB  GI: No nausea, vomiting  Endocrine: no hypoglycemia     PHYSICAL EXAM:  VITALS: T(C): 36.8 (09-27-23 @ 11:37)  T(F): 98.3 (09-27-23 @ 11:37), Max: 98.4 (09-26-23 @ 20:06)  HR: 87 (09-27-23 @ 11:37) (85 - 87)  BP: 97/52 (09-27-23 @ 11:37) (93/72 - 120/71)  RR:  (18 - 18)  SpO2:  (95% - 99%)  Wt(kg): --  GENERAL: NAD  EYES: No proptosis, anicteric  HEENT:  Atraumatic, Normocephalic  RESPIRATORY: unlabored respirations     CAPILLARY BLOOD GLUCOSE    POCT Blood Glucose.: 166 mg/dL (27 Sep 2023 11:59)  POCT Blood Glucose.: 145 mg/dL (27 Sep 2023 08:07)  POCT Blood Glucose.: 140 mg/dL (26 Sep 2023 22:08)  POCT Blood Glucose.: 169 mg/dL (26 Sep 2023 17:05)      09-27    131<L>  |  100  |  18  ----------------------------<  111<H>  3.8   |  22  |  0.73    eGFR: 82    Ca    7.9<L>      09-27  Mg     2.00     09-27  Phos  2.2     09-27        Thyroid Function Tests:  09-26 @ 06:00 TSH 8.19 FreeT4 1.2 T3 -- Anti TPO -- Anti Thyroglobulin Ab -- TSI --      Anion Gap: 9 mmol/L (09-27-23 @ 06:09)  Anion Gap: 13 mmol/L (09-26-23 @ 06:00)  Anion Gap: 14 mmol/L (09-25-23 @ 05:50)      A1C with Estimated Average Glucose Result: 5.4 % (09-25-23 @ 05:50)  A1C with Estimated Average Glucose Result: 5.6 % (09-04-23 @ 03:07)      Diet, Regular (09-24-23 @ 18:44)

## 2023-09-28 LAB
-  AMIKACIN: SIGNIFICANT CHANGE UP
-  AMOXICILLIN/CLAVULANIC ACID: SIGNIFICANT CHANGE UP
-  AMPICILLIN/SULBACTAM: SIGNIFICANT CHANGE UP
-  AMPICILLIN: SIGNIFICANT CHANGE UP
-  AZTREONAM: SIGNIFICANT CHANGE UP
-  CEFAZOLIN: SIGNIFICANT CHANGE UP
-  CEFEPIME: SIGNIFICANT CHANGE UP
-  CEFTAZIDIME/AVIBACTAM: SIGNIFICANT CHANGE UP
-  CEFTOLOZANE/TAZOBACTAM: SIGNIFICANT CHANGE UP
-  CEFTRIAXONE: SIGNIFICANT CHANGE UP
-  CIPROFLOXACIN: SIGNIFICANT CHANGE UP
-  ERTAPENEM: SIGNIFICANT CHANGE UP
-  GENTAMICIN: SIGNIFICANT CHANGE UP
-  IMIPENEM: SIGNIFICANT CHANGE UP
-  LEVOFLOXACIN: SIGNIFICANT CHANGE UP
-  MEROPENEM/VABORBACTAM: SIGNIFICANT CHANGE UP
-  MEROPENEM: SIGNIFICANT CHANGE UP
-  PIPERACILLIN/TAZOBACTAM: SIGNIFICANT CHANGE UP
-  RESISTANCE GENE KPC: SIGNIFICANT CHANGE UP
-  TOBRAMYCIN: SIGNIFICANT CHANGE UP
-  TRIMETHOPRIM/SULFAMETHOXAZOLE: SIGNIFICANT CHANGE UP
ANION GAP SERPL CALC-SCNC: 13 MMOL/L — SIGNIFICANT CHANGE UP (ref 7–14)
BLANDM BLD POS QL PROBE: SIGNIFICANT CHANGE UP
BUN SERPL-MCNC: 15 MG/DL — SIGNIFICANT CHANGE UP (ref 7–23)
CALCIUM SERPL-MCNC: 8 MG/DL — LOW (ref 8.4–10.5)
CHLORIDE SERPL-SCNC: 96 MMOL/L — LOW (ref 98–107)
CO2 SERPL-SCNC: 24 MMOL/L — SIGNIFICANT CHANGE UP (ref 22–31)
CREAT SERPL-MCNC: 0.75 MG/DL — SIGNIFICANT CHANGE UP (ref 0.5–1.3)
CULTURE RESULTS: SIGNIFICANT CHANGE UP
EGFR: 79 ML/MIN/1.73M2 — SIGNIFICANT CHANGE UP
GLUCOSE BLDC GLUCOMTR-MCNC: 115 MG/DL — HIGH (ref 70–99)
GLUCOSE BLDC GLUCOMTR-MCNC: 143 MG/DL — HIGH (ref 70–99)
GLUCOSE BLDC GLUCOMTR-MCNC: 149 MG/DL — HIGH (ref 70–99)
GLUCOSE BLDC GLUCOMTR-MCNC: 156 MG/DL — HIGH (ref 70–99)
GLUCOSE SERPL-MCNC: 108 MG/DL — HIGH (ref 70–99)
HCT VFR BLD CALC: 33.9 % — LOW (ref 34.5–45)
HGB BLD-MCNC: 11.1 G/DL — LOW (ref 11.5–15.5)
MAGNESIUM SERPL-MCNC: 2 MG/DL — SIGNIFICANT CHANGE UP (ref 1.6–2.6)
MCHC RBC-ENTMCNC: 30.3 PG — SIGNIFICANT CHANGE UP (ref 27–34)
MCHC RBC-ENTMCNC: 32.7 GM/DL — SIGNIFICANT CHANGE UP (ref 32–36)
MCV RBC AUTO: 92.6 FL — SIGNIFICANT CHANGE UP (ref 80–100)
METHOD TYPE: SIGNIFICANT CHANGE UP
METHOD TYPE: SIGNIFICANT CHANGE UP
NRBC # BLD: 0 /100 WBCS — SIGNIFICANT CHANGE UP (ref 0–0)
NRBC # FLD: 0 K/UL — SIGNIFICANT CHANGE UP (ref 0–0)
ORGANISM # SPEC MICROSCOPIC CNT: SIGNIFICANT CHANGE UP
PHOSPHATE SERPL-MCNC: 2.6 MG/DL — SIGNIFICANT CHANGE UP (ref 2.5–4.5)
PLATELET # BLD AUTO: 263 K/UL — SIGNIFICANT CHANGE UP (ref 150–400)
POTASSIUM SERPL-MCNC: 3.9 MMOL/L — SIGNIFICANT CHANGE UP (ref 3.5–5.3)
POTASSIUM SERPL-SCNC: 3.9 MMOL/L — SIGNIFICANT CHANGE UP (ref 3.5–5.3)
RBC # BLD: 3.66 M/UL — LOW (ref 3.8–5.2)
RBC # FLD: 19.6 % — HIGH (ref 10.3–14.5)
SODIUM SERPL-SCNC: 133 MMOL/L — LOW (ref 135–145)
SPECIMEN SOURCE: SIGNIFICANT CHANGE UP
WBC # BLD: 11.08 K/UL — HIGH (ref 3.8–10.5)
WBC # FLD AUTO: 11.08 K/UL — HIGH (ref 3.8–10.5)

## 2023-09-28 PROCEDURE — 99233 SBSQ HOSP IP/OBS HIGH 50: CPT

## 2023-09-28 PROCEDURE — 93010 ELECTROCARDIOGRAM REPORT: CPT

## 2023-09-28 RX ORDER — ONDANSETRON 8 MG/1
4 TABLET, FILM COATED ORAL ONCE
Refills: 0 | Status: DISCONTINUED | OUTPATIENT
Start: 2023-09-28 | End: 2023-09-28

## 2023-09-28 RX ORDER — SODIUM,POTASSIUM PHOSPHATES 278-250MG
2 POWDER IN PACKET (EA) ORAL ONCE
Refills: 0 | Status: COMPLETED | OUTPATIENT
Start: 2023-09-28 | End: 2023-09-28

## 2023-09-28 RX ORDER — FAMOTIDINE 10 MG/ML
20 INJECTION INTRAVENOUS DAILY
Refills: 0 | Status: DISCONTINUED | OUTPATIENT
Start: 2023-09-28 | End: 2023-10-03

## 2023-09-28 RX ADMIN — HEPARIN SODIUM 5000 UNIT(S): 5000 INJECTION INTRAVENOUS; SUBCUTANEOUS at 06:34

## 2023-09-28 RX ADMIN — Medication 2 TABLET(S): at 09:22

## 2023-09-28 RX ADMIN — Medication 650 MILLIGRAM(S): at 07:01

## 2023-09-28 RX ADMIN — HEPARIN SODIUM 5000 UNIT(S): 5000 INJECTION INTRAVENOUS; SUBCUTANEOUS at 21:42

## 2023-09-28 RX ADMIN — Medication 650 MILLIGRAM(S): at 06:31

## 2023-09-28 RX ADMIN — Medication 1 TABLET(S): at 06:32

## 2023-09-28 RX ADMIN — Medication 81 MILLIGRAM(S): at 13:53

## 2023-09-28 RX ADMIN — FAMOTIDINE 20 MILLIGRAM(S): 10 INJECTION INTRAVENOUS at 19:04

## 2023-09-28 RX ADMIN — HEPARIN SODIUM 5000 UNIT(S): 5000 INJECTION INTRAVENOUS; SUBCUTANEOUS at 13:51

## 2023-09-28 RX ADMIN — AMIODARONE HYDROCHLORIDE 100 MILLIGRAM(S): 400 TABLET ORAL at 06:33

## 2023-09-28 RX ADMIN — Medication 500 MILLIGRAM(S): at 19:06

## 2023-09-28 RX ADMIN — NYSTATIN CREAM 1 APPLICATION(S): 100000 CREAM TOPICAL at 06:34

## 2023-09-28 RX ADMIN — Medication 75 MICROGRAM(S): at 06:33

## 2023-09-28 RX ADMIN — LOSARTAN POTASSIUM 50 MILLIGRAM(S): 100 TABLET, FILM COATED ORAL at 06:31

## 2023-09-28 RX ADMIN — Medication 1 TABLET(S): at 19:05

## 2023-09-28 NOTE — PROGRESS NOTE ADULT - ASSESSMENT
83 year old with CAD and PVD with a chronic left foot found  Prior left toe amputation with vascular bypass with graft on 8/31    She developed left groin swelling with erythema  She presented on 9/22  CT of left lower extremity showed a left groin abscess with ? left calf abscess    S/p left groin I and D and left lower leg I and D  with removal of the graft    Cultures with MDRO klebsiella and proteus     Cipro 500 m g po q 12 with augmentin 875 mg po q 12  through 10/7    There is a drug drug interaction with amiodarone.   Check EKG daily for the next three days.  Optimize electrolytes (goal Potassium greater than 4)   QTc is about 500 but pt has partial left bundle    D/w vascular

## 2023-09-28 NOTE — PROVIDER CONTACT NOTE (CRITICAL VALUE NOTIFICATION) - BACKGROUND
Patient had EKG done today.
pt admitted for infected prosthetic vascular graft. s/p groin washout + wound vac placement 9/24

## 2023-09-28 NOTE — PROGRESS NOTE ADULT - SUBJECTIVE AND OBJECTIVE BOX
TEAM Vascular Surgery Daily Progress Note  =====================================================    - NO acute events overnight     SUBJECTIVE: Patient seen and examined at bedside on AM rounds. Patient reports that they're feeling well. Denies fever, chills, N/V, chest pain, SOB    ALLERGIES:  latex (Unknown)  sulfa drugs (Unknown)      --------------------------------------------------------------------------------------    MEDICATIONS:    Neurologic Medications  acetaminophen     Tablet .. 650 milliGRAM(s) Oral every 6 hours  oxyCODONE    IR 5 milliGRAM(s) Oral every 6 hours PRN Severe Pain (7 - 10)    Respiratory Medications    Cardiovascular Medications  aMIOdarone    Tablet 100 milliGRAM(s) Oral daily  losartan 50 milliGRAM(s) Oral daily    Gastrointestinal Medications    Genitourinary Medications    Hematologic/Oncologic Medications  aspirin enteric coated 81 milliGRAM(s) Oral daily  heparin   Injectable 5000 Unit(s) SubCutaneous every 8 hours    Antimicrobial/Immunologic Medications  amoxicillin  875 milliGRAM(s)/clavulanate 1 Tablet(s) Oral two times a day  ciprofloxacin     Tablet 500 milliGRAM(s) Oral every 12 hours    Endocrine/Metabolic Medications  atorvastatin 20 milliGRAM(s) Oral at bedtime  dextrose 50% Injectable 25 Gram(s) IV Push once  dextrose 50% Injectable 12.5 Gram(s) IV Push once  dextrose 50% Injectable 25 Gram(s) IV Push once  dextrose Oral Gel 15 Gram(s) Oral once  glucagon  Injectable 1 milliGRAM(s) IntraMuscular once  insulin lispro (ADMELOG) corrective regimen sliding scale   SubCutaneous at bedtime  insulin lispro (ADMELOG) corrective regimen sliding scale   SubCutaneous three times a day before meals  levothyroxine 75 MICROGram(s) Oral daily  linagliptin 5 milliGRAM(s) Oral daily    Topical/Other Medications  nystatin Powder 1 Application(s) Topical two times a day    --------------------------------------------------------------------------------------    VITAL SIGNS:  T(C): 36.6 (09-28-23 @ 04:36), Max: 36.8 (09-27-23 @ 11:37)  HR: 84 (09-28-23 @ 04:36) (84 - 99)  BP: 119/62 (09-28-23 @ 04:36) (97/52 - 136/59)  RR: 18 (09-28-23 @ 04:36) (18 - 18)  SpO2: 99% (09-28-23 @ 04:36) (96% - 100%)  --------------------------------------------------------------------------------------    INS AND OUTS:    09-26-23 @ 07:01  -  09-27-23 @ 07:00  --------------------------------------------------------  IN: 240 mL / OUT: 1550 mL / NET: -1310 mL    09-27-23 @ 07:01  -  09-28-23 @ 05:35  --------------------------------------------------------  IN: 140 mL / OUT: 1450 mL / NET: -1310 mL      --------------------------------------------------------------------------------------      EXAM    General: NAD, resting in bed comfortably. A&Ox3.   Cardiac: regular rate  Respiratory: Nonlabored respirations, normal cw expansion.  Abdomen: soft, nontender, nondistended.   Extremities: moving extremities  Vascular: LE vac holding suction; dry gangrene of toes, left dressing with ace wrap C/d/i . Left foot cooler compared to right.   --------------------------------------------------------------------------------------    LABS                          11.5   10.35 )-----------( 250      ( 27 Sep 2023 06:09 )             35.2     09-27    131<L>  |  100  |  18  ----------------------------<  111<H>  3.8   |  22  |  0.73    Ca    7.9<L>      27 Sep 2023 06:09  Phos  2.2     09-27  Mg     2.00     09-27        Urinalysis Basic - ( 27 Sep 2023 06:09 )    Color: x / Appearance: x / SG: x / pH: x  Gluc: 111 mg/dL / Ketone: x  / Bili: x / Urobili: x   Blood: x / Protein: x / Nitrite: x   Leuk Esterase: x / RBC: x / WBC x   Sq Epi: x / Non Sq Epi: x / Bacteria: x         TE[facility] Vascular Surgery Daily Progress Note  =====================================================    - NO acute events overnight     SUBJECTIVE: Patient seen and examined at bedside on AM rounds. Patient doing well. Denies fever, chills, N/V, chest pain, SOB    ALLERGIES:  latex (Unknown)  sulfa drugs (Unknown)      --------------------------------------------------------------------------------------    MEDICATIONS:    Neurologic Medications  acetaminophen     Tablet .. 650 milliGRAM(s) Oral every 6 hours  oxyCODONE    IR 5 milliGRAM(s) Oral every 6 hours PRN Severe Pain (7 - 10)    Respiratory Medications    Cardiovascular Medications  aMIOdarone    Tablet 100 milliGRAM(s) Oral daily  losartan 50 milliGRAM(s) Oral daily    Gastrointestinal Medications    Genitourinary Medications    Hematologic/Oncologic Medications  aspirin enteric coated 81 milliGRAM(s) Oral daily  heparin   Injectable 5000 Unit(s) SubCutaneous every 8 hours    Antimicrobial/Immunologic Medications  amoxicillin  875 milliGRAM(s)/clavulanate 1 Tablet(s) Oral two times a day  ciprofloxacin     Tablet 500 milliGRAM(s) Oral every 12 hours    Endocrine/Metabolic Medications  atorvastatin 20 milliGRAM(s) Oral at bedtime  dextrose 50% Injectable 25 Gram(s) IV Push once  dextrose 50% Injectable 12.5 Gram(s) IV Push once  dextrose 50% Injectable 25 Gram(s) IV Push once  dextrose Oral Gel 15 Gram(s) Oral once  glucagon  Injectable 1 milliGRAM(s) IntraMuscular once  insulin lispro (ADMELOG) corrective regimen sliding scale   SubCutaneous at bedtime  insulin lispro (ADMELOG) corrective regimen sliding scale   SubCutaneous three times a day before meals  levothyroxine 75 MICROGram(s) Oral daily  linagliptin 5 milliGRAM(s) Oral daily    Topical/Other Medications  nystatin Powder 1 Application(s) Topical two times a day    --------------------------------------------------------------------------------------    VITAL SIGNS:  T(C): 36.6 (09-28-23 @ 04:36), Max: 36.8 (09-27-23 @ 11:37)  HR: 84 (09-28-23 @ 04:36) (84 - 99)  BP: 119/62 (09-28-23 @ 04:36) (97/52 - 136/59)  RR: 18 (09-28-23 @ 04:36) (18 - 18)  SpO2: 99% (09-28-23 @ 04:36) (96% - 100%)  --------------------------------------------------------------------------------------    INS AND OUTS:    09-26-23 @ 07:01  -  09-27-23 @ 07:00  --------------------------------------------------------  IN: 240 mL / OUT: 1550 mL / NET: -1310 mL    09-27-23 @ 07:01  -  09-28-23 @ 05:35  --------------------------------------------------------  IN: 140 mL / OUT: 1450 mL / NET: -1310 mL      --------------------------------------------------------------------------------------      EXAM    General: NAD, resting in bed comfortably. A&Ox3.   Cardiac: regular rate  Respiratory: Nonlabored respirations, normal cw expansion.  Abdomen: soft, nontender, nondistended.   Extremities: moving extremities  Vascular: LE vac holding suction; dry gangrene of toes, left dressing with ace wrap C/d/i . Left foot cooler compared to right. Left foot amputated toe abit dusky today.   --------------------------------------------------------------------------------------    LABS                          11.5   10.35 )-----------( 250      ( 27 Sep 2023 06:09 )             35.2     09-27    131<L>  |  100  |  18  ----------------------------<  111<H>  3.8   |  22  |  0.73    Ca    7.9<L>      27 Sep 2023 06:09  Phos  2.2     09-27  Mg     2.00     09-27        Urinalysis Basic - ( 27 Sep 2023 06:09 )    Color: x / Appearance: x / SG: x / pH: x  Gluc: 111 mg/dL / Ketone: x  / Bili: x / Urobili: x   Blood: x / Protein: x / Nitrite: x   Leuk Esterase: x / RBC: x / WBC x   Sq Epi: x / Non Sq Epi: x / Bacteria: x

## 2023-09-28 NOTE — PROGRESS NOTE ADULT - SUBJECTIVE AND OBJECTIVE BOX
Follow Up:      Inverval History/ROS:Patient is a 83y old  Female who presents with a chief complaint of c/f L groin infection (28 Sep 2023 05:35)    No fever   No events    Allergies    latex (Unknown)  sulfa drugs (Unknown)    Intolerances        ANTIMICROBIALS:  amoxicillin  875 milliGRAM(s)/clavulanate 1 two times a day  ciprofloxacin     Tablet 500 every 12 hours      OTHER MEDS:  acetaminophen     Tablet .. 650 milliGRAM(s) Oral every 6 hours  aMIOdarone    Tablet 100 milliGRAM(s) Oral daily  aspirin enteric coated 81 milliGRAM(s) Oral daily  atorvastatin 20 milliGRAM(s) Oral at bedtime  dextrose 50% Injectable 25 Gram(s) IV Push once  dextrose 50% Injectable 25 Gram(s) IV Push once  dextrose 50% Injectable 12.5 Gram(s) IV Push once  famotidine    Tablet 20 milliGRAM(s) Oral daily  heparin   Injectable 5000 Unit(s) SubCutaneous every 8 hours  insulin lispro (ADMELOG) corrective regimen sliding scale   SubCutaneous three times a day before meals  insulin lispro (ADMELOG) corrective regimen sliding scale   SubCutaneous at bedtime  levothyroxine 75 MICROGram(s) Oral daily  linagliptin 5 milliGRAM(s) Oral daily  losartan 50 milliGRAM(s) Oral daily  nystatin Powder 1 Application(s) Topical two times a day  oxyCODONE    IR 5 milliGRAM(s) Oral every 6 hours PRN      Vital Signs Last 24 Hrs  T(C): 36.6 (28 Sep 2023 16:22), Max: 36.8 (28 Sep 2023 00:15)  T(F): 97.9 (28 Sep 2023 16:22), Max: 98.2 (28 Sep 2023 00:15)  HR: 88 (28 Sep 2023 16:22) (84 - 99)  BP: 128/65 (28 Sep 2023 16:22) (114/65 - 136/59)  BP(mean): --  RR: 18 (28 Sep 2023 16:22) (16 - 18)  SpO2: 100% (28 Sep 2023 16:22) (96% - 100%)    Parameters below as of 28 Sep 2023 16:22  Patient On (Oxygen Delivery Method): room air        PHYSICAL EXAM:  General: x[ ] non-toxic  HEAD/EYES: [ ] PERRL [ x] white sclera [ ] icterus  ENT:  [ ] normal [x ] supple [ ] thrush [ ] pharyngeal exudate  Cardiovascular:   [ ] murmur [x ] normal [ ] PPM/AICD  Respiratory:  [x ] clear to ausculation bilaterally  GI:  [x ] soft, non-tender, normal bowel sounds  :  [ ] ellis [ ] no CVA tenderness   Musculoskeletal:  [ ] no synovitis  Neurologic:  [ ] non-focal exam   Skin:  [ ] no rash  Lymph: [x ] no lymphadenopathy  Psychiatric:  [ ] appropriate affect [ ] alert & oriented  Lines:  [ x] no phlebitis [ ] central line                                11.1   11.08 )-----------( 263      ( 28 Sep 2023 04:54 )             33.9       09-28    133<L>  |  96<L>  |  15  ----------------------------<  108<H>  3.9   |  24  |  0.75    Ca    8.0<L>      28 Sep 2023 04:54  Phos  2.6     09-28  Mg     2.00     09-28        Urinalysis Basic - ( 28 Sep 2023 04:54 )    Color: x / Appearance: x / SG: x / pH: x  Gluc: 108 mg/dL / Ketone: x  / Bili: x / Urobili: x   Blood: x / Protein: x / Nitrite: x   Leuk Esterase: x / RBC: x / WBC x   Sq Epi: x / Non Sq Epi: x / Bacteria: x        MICROBIOLOGY:Culture Results:   Moderate Klebsiella pneumoniae (Carbapenem Resistant) (09-24-23 @ 10:24)  Culture Results:   Culture is being performed. Fungal cultures are held for 4 weeks. (09-24-23 @ 10:24)  Culture Results:   Culture is being performed. (09-24-23 @ 10:24)  Culture Results:   Numerous Klebsiella pneumoniae (Carbapenem Resistant) (09-24-23 @ 09:29)  Culture Results:   Culture is being performed. Fungal cultures are held for 4 weeks. (09-24-23 @ 09:29)  Culture Results:   Culture is being performed. (09-24-23 @ 09:29)  Culture Results:   Numerous Klebsiella pneumoniae (Carbapenem Resistant)  Rare Proteus mirabilis (09-24-23 @ 09:28)  Culture Results:   Culture is being performed. Fungal cultures are held for 4 weeks. (09-24-23 @ 09:28)  Culture Results:   Culture is being performed. (09-24-23 @ 09:28)  Culture Results:   Numerous Klebsiella pneumoniae  See previous culture [id number] (09-24-23 @ 09:27)  Culture Results:   Culture is being performed. Fungal cultures are held for 4 weeks. (09-24-23 @ 09:27)  Culture Results:   Culture is being performed. (09-24-23 @ 09:27)  Culture Results:   No growth at 5 days (09-22-23 @ 14:10)  Culture Results:   No growth at 5 days (09-22-23 @ 14:00)      RADIOLOGY:

## 2023-09-28 NOTE — PROVIDER CONTACT NOTE (CRITICAL VALUE NOTIFICATION) - ACTION/TREATMENT ORDERED:
Provider stated they will come assess patient at the bedside. No new orders.
MD made aware, no new orders at this time

## 2023-09-28 NOTE — PROVIDER CONTACT NOTE (CRITICAL VALUE NOTIFICATION) - SITUATION
pt blood cultures from 9/24 resulted:  tissues - numerous klebsiella pneumoniae   abscess - numerous klebsiella pneumoniae and proteus mirabillis
Abscess culture (9/24): rare polymorphonucleic leukocytes per low power field and rare gram negative rods per oil field    Tissue culture  (9/24): Few gram negative rods and rare gram positive cocci per oil field
Notified provider of critical lab value reported by lab.

## 2023-09-28 NOTE — PROGRESS NOTE ADULT - ASSESSMENT
84 yo F w/ PMHx of CAD, pAF on Eliquis, severe AS pending TAVR, R internal carotid stenosis, HTN, HLD, hypothyroidism, anxiety, PAD  s/p L ilioprofunda bypass with reverse GSV graft to peroneal 8/31/23 (Dr. Tavares) and L 1st ray partial resection who presented with L groin erythema c/f infection; CT on admission showing 17cm L groin abscess. Patient now s/p removal of infected bypass graft of LLE on 9/24. Cultures with MDRO klebsiella and proteus    Plan:  - Diet: Regular   - pain control prn  - dressing changes daily. VAC change today  - Appreciate ID recs- Cipro 500 m g po q 12 with augmentin 875 mg po q 12 for 10 additional days  - Home meds  - OOB/ambulate as tolerated  - dispo: pending   -  drug drug interaction with amiodarone and antibiotics. Check EKG for three days         Vascular Surgery   [id number] 82 yo F w/ PMHx of CAD, pAF on Eliquis, severe AS pending TAVR, R internal carotid stenosis, HTN, HLD, hypothyroidism, anxiety, PAD  s/p L ilioprofunda bypass with reverse GSV graft to peroneal 8/31/23 (Dr. Tavares) and L 1st ray partial resection who presented with L groin erythema c/f infection; CT on admission showing 17cm L groin abscess. Patient now s/p removal of infected bypass graft of LLE on 9/24. Cultures with MDRO klebsiella and proteus    Plan:  - Diet: Regular   - pain control prn  - dressing changes daily. VAC change today  - Appreciate ID recs- Cipro 500 m g po q 12 with augmentin 875 mg po q 12 for 10 additional days  - drug drug interaction with amiodarone and antibiotics. Check EKG for three days Q6  - Hold Cipro elevated QTc 505 in AM   - Contact ID for Cipro alternative given   - Home meds  - OOB/ambulate as tolerated  - dispo: pending         Vascular Surgery   [id number]

## 2023-09-28 NOTE — PROVIDER CONTACT NOTE (CRITICAL VALUE NOTIFICATION) - TEST AND RESULT REPORTED:
Abscess culture and tissue culture
Troponin T, High Sensitivity Result = 193
cultures, tissue numerous klebsiella p., abscess numerous klebsiella p. & proteus mirabillis

## 2023-09-29 LAB
ANION GAP SERPL CALC-SCNC: 15 MMOL/L — HIGH (ref 7–14)
BUN SERPL-MCNC: 15 MG/DL — SIGNIFICANT CHANGE UP (ref 7–23)
CALCIUM SERPL-MCNC: 8.3 MG/DL — LOW (ref 8.4–10.5)
CHLORIDE SERPL-SCNC: 94 MMOL/L — LOW (ref 98–107)
CO2 SERPL-SCNC: 23 MMOL/L — SIGNIFICANT CHANGE UP (ref 22–31)
CREAT SERPL-MCNC: 0.74 MG/DL — SIGNIFICANT CHANGE UP (ref 0.5–1.3)
CULTURE RESULTS: SIGNIFICANT CHANGE UP
EGFR: 81 ML/MIN/1.73M2 — SIGNIFICANT CHANGE UP
GLUCOSE BLDC GLUCOMTR-MCNC: 141 MG/DL — HIGH (ref 70–99)
GLUCOSE BLDC GLUCOMTR-MCNC: 142 MG/DL — HIGH (ref 70–99)
GLUCOSE BLDC GLUCOMTR-MCNC: 147 MG/DL — HIGH (ref 70–99)
GLUCOSE BLDC GLUCOMTR-MCNC: 182 MG/DL — HIGH (ref 70–99)
GLUCOSE SERPL-MCNC: 118 MG/DL — HIGH (ref 70–99)
HCT VFR BLD CALC: 37 % — SIGNIFICANT CHANGE UP (ref 34.5–45)
HGB BLD-MCNC: 11.7 G/DL — SIGNIFICANT CHANGE UP (ref 11.5–15.5)
MAGNESIUM SERPL-MCNC: 2.2 MG/DL — SIGNIFICANT CHANGE UP (ref 1.6–2.6)
MCHC RBC-ENTMCNC: 30.5 PG — SIGNIFICANT CHANGE UP (ref 27–34)
MCHC RBC-ENTMCNC: 31.6 GM/DL — LOW (ref 32–36)
MCV RBC AUTO: 96.4 FL — SIGNIFICANT CHANGE UP (ref 80–100)
NRBC # BLD: 0 /100 WBCS — SIGNIFICANT CHANGE UP (ref 0–0)
NRBC # FLD: 0 K/UL — SIGNIFICANT CHANGE UP (ref 0–0)
ORGANISM # SPEC MICROSCOPIC CNT: SIGNIFICANT CHANGE UP
PHOSPHATE SERPL-MCNC: 2.7 MG/DL — SIGNIFICANT CHANGE UP (ref 2.5–4.5)
PLATELET # BLD AUTO: 280 K/UL — SIGNIFICANT CHANGE UP (ref 150–400)
POTASSIUM SERPL-MCNC: 4.1 MMOL/L — SIGNIFICANT CHANGE UP (ref 3.5–5.3)
POTASSIUM SERPL-SCNC: 4.1 MMOL/L — SIGNIFICANT CHANGE UP (ref 3.5–5.3)
RBC # BLD: 3.84 M/UL — SIGNIFICANT CHANGE UP (ref 3.8–5.2)
RBC # FLD: 19.9 % — HIGH (ref 10.3–14.5)
SODIUM SERPL-SCNC: 132 MMOL/L — LOW (ref 135–145)
SPECIMEN SOURCE: SIGNIFICANT CHANGE UP
WBC # BLD: 11.23 K/UL — HIGH (ref 3.8–10.5)
WBC # FLD AUTO: 11.23 K/UL — HIGH (ref 3.8–10.5)

## 2023-09-29 PROCEDURE — 99232 SBSQ HOSP IP/OBS MODERATE 35: CPT

## 2023-09-29 PROCEDURE — 93010 ELECTROCARDIOGRAM REPORT: CPT

## 2023-09-29 RX ORDER — SODIUM HYPOCHLORITE 0.125 %
1 SOLUTION, NON-ORAL MISCELLANEOUS DAILY
Refills: 0 | Status: DISCONTINUED | OUTPATIENT
Start: 2023-09-29 | End: 2023-10-10

## 2023-09-29 RX ADMIN — Medication 650 MILLIGRAM(S): at 12:23

## 2023-09-29 RX ADMIN — LINAGLIPTIN 5 MILLIGRAM(S): 5 TABLET, FILM COATED ORAL at 11:54

## 2023-09-29 RX ADMIN — NYSTATIN CREAM 1 APPLICATION(S): 100000 CREAM TOPICAL at 05:19

## 2023-09-29 RX ADMIN — Medication 1 APPLICATION(S): at 09:55

## 2023-09-29 RX ADMIN — AMIODARONE HYDROCHLORIDE 100 MILLIGRAM(S): 400 TABLET ORAL at 05:18

## 2023-09-29 RX ADMIN — HEPARIN SODIUM 5000 UNIT(S): 5000 INJECTION INTRAVENOUS; SUBCUTANEOUS at 21:16

## 2023-09-29 RX ADMIN — HEPARIN SODIUM 5000 UNIT(S): 5000 INJECTION INTRAVENOUS; SUBCUTANEOUS at 12:00

## 2023-09-29 RX ADMIN — FAMOTIDINE 20 MILLIGRAM(S): 10 INJECTION INTRAVENOUS at 11:53

## 2023-09-29 RX ADMIN — Medication 75 MICROGRAM(S): at 05:18

## 2023-09-29 RX ADMIN — Medication 1: at 12:08

## 2023-09-29 RX ADMIN — Medication 500 MILLIGRAM(S): at 05:18

## 2023-09-29 RX ADMIN — HEPARIN SODIUM 5000 UNIT(S): 5000 INJECTION INTRAVENOUS; SUBCUTANEOUS at 05:19

## 2023-09-29 RX ADMIN — Medication 1 TABLET(S): at 18:02

## 2023-09-29 RX ADMIN — LOSARTAN POTASSIUM 50 MILLIGRAM(S): 100 TABLET, FILM COATED ORAL at 05:18

## 2023-09-29 RX ADMIN — Medication 500 MILLIGRAM(S): at 18:01

## 2023-09-29 RX ADMIN — Medication 0: at 08:38

## 2023-09-29 RX ADMIN — Medication 650 MILLIGRAM(S): at 11:53

## 2023-09-29 RX ADMIN — NYSTATIN CREAM 1 APPLICATION(S): 100000 CREAM TOPICAL at 17:59

## 2023-09-29 RX ADMIN — Medication 1 TABLET(S): at 05:19

## 2023-09-29 RX ADMIN — Medication 81 MILLIGRAM(S): at 11:53

## 2023-09-29 NOTE — PROGRESS NOTE ADULT - ASSESSMENT
82 yo F w/ PMHx of CAD, pAF on Eliquis, severe AS pending TAVR, R internal carotid stenosis, HTN, HLD, hypothyroidism, anxiety, PAD  s/p L ilioprofunda bypass with reverse GSV graft to peroneal 8/31/23 (Dr. Tavares) and L 1st ray partial resection who presented with L groin erythema c/f infection; CT on admission showing 17cm L groin abscess. Patient now s/p removal of infected bypass graft of LLE on 9/24. Cultures with MDRO klebsiella and proteus    Plan:  - Diet: Regular   - pain control prn  - dressing changes daily. VAC change today with wound care team   - Appreciate ID recs- Cipro 500 m g po q 12 with augmentin 875 mg po q 12 for 10 additional days  - drug drug interaction with amiodarone and antibiotics. Check EKG for three days Q6  - Contact ID for Cipro alternative given   - Home meds  - OOB/ambulate as tolerated  - dispo: pending         Vascular Surgery   [id number] 82 yo F w/ PMHx of CAD, pAF on Eliquis, severe AS pending TAVR, R internal carotid stenosis, HTN, HLD, hypothyroidism, anxiety, PAD  s/p L ilioprofunda bypass with reverse GSV graft to peroneal 8/31/23 (Dr. Tavares) and L 1st ray partial resection who presented with L groin erythema c/f infection; CT on admission showing 17cm L groin abscess. Patient now s/p removal of infected bypass graft of LLE on 9/24. Cultures with MDRO klebsiella and proteus    Plan:  - Diet: Regular   - pain control prn  - dressing changes daily. VAC change today with wound care team   - Possible vac placement on left leg lower medial wound  - DC vac from left groin, pack wet to dry w dakins   - Appreciate ID recs- Cipro 500 m g po q 12 with augmentin 875 mg po q 12 for 10 additional days  - drug drug interaction with amiodarone and antibiotics. Check EKG for three days Q6. Overnight   - Home meds  - OOB/ambulate as tolerated  - dispo: pending         Vascular Surgery   [id number]

## 2023-09-29 NOTE — DIETITIAN INITIAL EVALUATION ADULT - PERTINENT MEDS FT
MEDICATIONS  (STANDING):  acetaminophen     Tablet .. 650 milliGRAM(s) Oral every 6 hours  aMIOdarone    Tablet 100 milliGRAM(s) Oral daily  amoxicillin  875 milliGRAM(s)/clavulanate 1 Tablet(s) Oral two times a day  aspirin enteric coated 81 milliGRAM(s) Oral daily  atorvastatin 20 milliGRAM(s) Oral at bedtime  ciprofloxacin     Tablet 500 milliGRAM(s) Oral every 12 hours  Dakins Solution - 1/4 Strength 1 Application(s) Topical daily  dextrose 50% Injectable 25 Gram(s) IV Push once  dextrose 50% Injectable 12.5 Gram(s) IV Push once  dextrose 50% Injectable 25 Gram(s) IV Push once  famotidine    Tablet 20 milliGRAM(s) Oral daily  heparin   Injectable 5000 Unit(s) SubCutaneous every 8 hours  insulin lispro (ADMELOG) corrective regimen sliding scale   SubCutaneous three times a day before meals  insulin lispro (ADMELOG) corrective regimen sliding scale   SubCutaneous at bedtime  levothyroxine 75 MICROGram(s) Oral daily  linagliptin 5 milliGRAM(s) Oral daily  losartan 50 milliGRAM(s) Oral daily  nystatin Powder 1 Application(s) Topical two times a day    MEDICATIONS  (PRN):  oxyCODONE    IR 5 milliGRAM(s) Oral every 6 hours PRN Severe Pain (7 - 10)

## 2023-09-29 NOTE — PROGRESS NOTE ADULT - ASSESSMENT
82 yo F w/ PMHx of CAD (prox LM 40%, prox LAD 40%, distal LAD 50%, Cx mild, distal % ), pAF on Eliquis, severe AS pending TAVR, R internal carotid stenosis, HTN, HLD, hypothyroidism, and anxiety.  Patient with PAD and chronic L toe wounds s/p L ilioprofunda bypass with reverse GSV graft to peroneal 8/31/23 (Dr. Tavares) presents to ED with L groin erythema.  She is s/p L groin washout, explant of bypass graft, wound vac placement, L external iliac ligation with 300 cc of purulent material drained.  She is also being treated for LLL consolidation.  She is getting vanco and zosyn in dextrose.  Endocrinology team consulted for hyperglycemia in a non-diabetic patient with A1c 5.4%.     1. Stress Hyperglycemia (r/t infection?)/History of pre-diabetes   A1c: 5.4% (prior 5.6%, as per primary team, pt did not received any blood or iron transfusion (h/h improved from 8 to 11). Pt also not receiving any steroids.    Home meds: none     While inpatient  BG target 100-180 mg/dl   Continue Linagliptin (Tradjenta) 5 mg PO daily  Continue Admelog LOW dose correctional scale before meals, low dose at bedtime  No standing Lantus or pre-meal Admelog  Check BG before meals and bedtime  Hypoglycemia protocol  Consistent carbohydrate diet    Discharge Plan:  Patient's FS values inpatient have not aligned with her A1c value (5.4%). Unclear if this is related to current infection/stress, or if her FS are elevated at home as well and A1c is inaccurate in setting of anemia.   Started Tradjenta 5 mg PO daily inpatient and will monitor on this. If hyperglycemia is related to infection, FS values should downtrend as treatment for infection progresses.  Will either discharge on once daily Tradjenta or no diabetes medication, based on inpatient trend  Please check insurance coverage for Linagliptin (Tradjenta) 5 mg PO daily, alternative is Januvia (Sitagliptin) 100 mg PO daily  Would ensure patient has prescription for new glucometer and supplies so she can monitor her FS at home  Followup with PCP    2. Hypothyroidism  09-26 @ 06:00 TSH 8.19 FreeT4 1.2  TSH elevated on home dosing of Levothyroxine 50 mcg PO daily (patient taking this correctly at home)  No overt symptoms of hypothyroidism reported by patient  Recommend increase to Levothyroxine 75 mcg PO daily (first dose 9/28/23)  Followup for repeat TFTs in 4-6 weeks as outpatient    3. HTN  BP goal less than 130/80  Losartan held today for tightly controlled SBP     4. HLD  LDL target less than 70  Continue Atorvastatin 20 mg daily if no contraindications     Dotty Norwood  Nurse Practitioner  Division of Endocrinology & Diabetes  In house pager #[phone]/long range pager #[phone]    If before 9AM or after 6PM, or on weekends/holidays, please call endocrine answering service for assistance ([phone]).  For nonurgent matters email [email or web address] for assistance.      82 yo F w/ PMHx of CAD (prox LM 40%, prox LAD 40%, distal LAD 50%, Cx mild, distal % ), pAF on Eliquis, severe AS pending TAVR, R internal carotid stenosis, HTN, HLD, hypothyroidism, and anxiety.  Patient with PAD and chronic L toe wounds s/p L ilioprofunda bypass with reverse GSV graft to peroneal 8/31/23 (Dr. Tavares) presents to ED with L groin erythema.  She is s/p L groin washout, explant of bypass graft, wound vac placement, L external iliac ligation with 300 cc of purulent material drained.  She is also being treated for LLL consolidation.  She is getting vanco and zosyn in dextrose.  Endocrinology team consulted for hyperglycemia in a non-diabetes patient with A1c 5.4%.     1. Stress Hyperglycemia (r/t infection?)/History of pre-diabetes   A1c: 5.4% (prior 5.6%, as per primary team, pt did not received any blood or iron transfusion (h/h improved from 8 to 11). Pt also not receiving any steroids.    Home meds: none     While inpatient  BG target 100-180 mg/dl   Continue Linagliptin (Tradjenta) 5 mg PO daily  Continue Admelog LOW dose correctional scale before meals, low dose at bedtime  No standing Lantus or pre-meal Admelog  Check BG before meals and bedtime  Hypoglycemia protocol  Consistent carbohydrate diet    Discharge Plan:  Patient's FS values inpatient have not aligned with her A1c value (5.4%). Unclear if this is related to current infection/stress, or if her FS are elevated at home as well and A1c is inaccurate in setting of anemia.   Started Tradjenta 5 mg PO daily inpatient and will monitor on this. If hyperglycemia is related to infection, FS values should downtrend as treatment for infection progresses.  Will either discharge on once daily Tradjenta or no diabetes medication, based on inpatient trend  Please check insurance coverage for Linagliptin (Tradjenta) 5 mg PO daily, alternative is Januvia (Sitagliptin) 100 mg PO daily  Would ensure patient has prescription for new glucometer and supplies so she can monitor her FS at home  Followup with PCP    2. Hypothyroidism  09-26 @ 06:00 TSH 8.19 FreeT4 1.2  TSH elevated on home dosing of Levothyroxine 50 mcg PO daily (patient taking this correctly at home)  No overt symptoms of hypothyroidism reported by patient  Recommend increase to Levothyroxine 75 mcg PO daily (first dose 9/28/23)  Followup for repeat TFTs in 4-6 weeks as outpatient    3. HTN  BP goal less than 130/80  Losartan held today for tightly controlled SBP     4. HLD  LDL target less than 70  Continue Atorvastatin 20 mg daily if no contraindications     Dotty Norwood  Nurse Practitioner  Division of Endocrinology & Diabetes  In house pager #[phone]/long range pager #[phone]    If before 9AM or after 6PM, or on weekends/holidays, please call endocrine answering service for assistance ([phone]).  For nonurgent matters email [email or web address] for assistance.

## 2023-09-29 NOTE — PROGRESS NOTE ADULT - SUBJECTIVE AND OBJECTIVE BOX
Chief Complaint: Hyperglycemia, hypothyroidism     History: Patient seen at bedside. Tolerating meals, although states she does not like the food here. No nausea/vomiting, no hypoglycemia    Patient endorses history of pre-diabetes and watches her diet at home, A1c here is 5.4%, not in diabetes range, however, FS was ranging 140s-200s impatient prior to starting Tradjenta 5 mg daily    Prior to admission, patient endorses adherence to Levothyroxine 50 mcg PO daily, states she took medication in the AM upon waking, with a full glass of water, then would wait about 45 minutes to eat her breakfast. Denies taking with MVI, calcium, iron - although she was not sure if she took these within 4 hours of LT4 at home.   Agreeable to dose increase (Levothyroxine 75 mcg started 9/28) and states she will followup with PCP, explained she will need TFTs checked in 4-6 weeks    MEDICATIONS  (STANDING):  acetaminophen     Tablet .. 650 milliGRAM(s) Oral every 6 hours  aMIOdarone    Tablet 100 milliGRAM(s) Oral daily  amoxicillin  875 milliGRAM(s)/clavulanate 1 Tablet(s) Oral two times a day  aspirin enteric coated 81 milliGRAM(s) Oral daily  atorvastatin 20 milliGRAM(s) Oral at bedtime  ciprofloxacin     Tablet 500 milliGRAM(s) Oral every 12 hours  Dakins Solution - 1/4 Strength 1 Application(s) Topical daily  dextrose 50% Injectable 25 Gram(s) IV Push once  dextrose 50% Injectable 12.5 Gram(s) IV Push once  dextrose 50% Injectable 25 Gram(s) IV Push once  famotidine    Tablet 20 milliGRAM(s) Oral daily  heparin   Injectable 5000 Unit(s) SubCutaneous every 8 hours  insulin lispro (ADMELOG) corrective regimen sliding scale   SubCutaneous three times a day before meals  insulin lispro (ADMELOG) corrective regimen sliding scale   SubCutaneous at bedtime  levothyroxine 75 MICROGram(s) Oral daily  linagliptin 5 milliGRAM(s) Oral daily  losartan 50 milliGRAM(s) Oral daily  nystatin Powder 1 Application(s) Topical two times a day    MEDICATIONS  (PRN):  oxyCODONE    IR 5 milliGRAM(s) Oral every 6 hours PRN Severe Pain (7 - 10)    Allergies  latex (Unknown)  sulfa drugs (Unknown)    Review of Systems:  Cardiovascular: No chest pain  Respiratory: No SOB  GI: No nausea, vomiting  Endocrine: no hypoglycemia     PHYSICAL EXAM:  VITALS: T(C): 36.6 (09-29-23 @ 12:10)  T(F): 97.9 (09-29-23 @ 12:10), Max: 98.1 (09-28-23 @ 20:01)  HR: 92 (09-29-23 @ 12:10) (87 - 92)  BP: 120/44 (09-29-23 @ 12:10) (116/74 - 129/60)  RR:  (18 - 18)  SpO2:  (97% - 100%)  Wt(kg): --  GENERAL: NAD  EYES: No proptosis, anicteric  HEENT:  Atraumatic, Normocephalic  RESPIRATORY: unlabored respirations     CAPILLARY BLOOD GLUCOSE    POCT Blood Glucose.: 182 mg/dL (29 Sep 2023 12:04)  POCT Blood Glucose.: 147 mg/dL (29 Sep 2023 08:04)  POCT Blood Glucose.: 156 mg/dL (28 Sep 2023 22:13)  POCT Blood Glucose.: 143 mg/dL (28 Sep 2023 17:17)      09-29    132<L>  |  94<L>  |  15  ----------------------------<  118<H>  4.1   |  23  |  0.74    eGFR: 81    Ca    8.3<L>      09-29  Mg     2.20     09-29  Phos  2.7     09-29        Thyroid Function Tests:  09-26 @ 06:00 TSH 8.19 FreeT4 1.2 T3 -- Anti TPO -- Anti Thyroglobulin Ab -- TSI --      Anion Gap: 15 mmol/L (09-29-23 @ 04:50)  Anion Gap: 13 mmol/L (09-28-23 @ 04:54)  Anion Gap: 9 mmol/L (09-27-23 @ 06:09)      A1C with Estimated Average Glucose Result: 5.4 % (09-25-23 @ 05:50)  A1C with Estimated Average Glucose Result: 5.6 % (09-04-23 @ 03:07)      Diet, Regular:   Consistent Carbohydrate Evening Snack (CSTCHOSN) (09-27-23 @ 15:07)

## 2023-09-29 NOTE — DIETITIAN INITIAL EVALUATION ADULT - OTHER INFO
Medical course: Per chart 83 year old Female w/ PMHx of CAD, pAF on Eliquis, severe AS pending TAVR, R internal carotid stenosis, HTN, HLD, hypothyroidism, anxiety, PAD  s/p L ilioprofunda bypass with reverse GSV graft to peroneal 8/31/23 (Dr. Tavares) and L 1st ray partial resection who presented with L groin erythema c/f infection; CT on admission showing 17cm L groin abscess. Patient now s/p removal of infected bypass graft of LLE on 9/24. Cultures with MDRO klebsiella and proteus.    Nutrition interview: Pt A&OX4. Pt reports some nausea and change in taste of antibiotics. No recent episodes of diarrhea or constipation, last BM noted on 9/27 per RN flowsheets. Denies any chewing/swallowing difficulties. No known food allergies. Stated UBW: 130 lbs and has been stable. Per HIE weight 135lbs x8 months. Food preferences explored and noted. Intake is 50-75% per RN flowsheets. Pt reports fair appetite 2/2 change in taste. Feeding skills: independent.   Pt seen by endo 2/2 hyperglycemia, A1c 5.4%. Per endo note 9/27 Pt has not received blood transfusion, possibly stress hyperglycemia r/t infection. Pt declines need for nutrition education at this time.

## 2023-09-29 NOTE — DIETITIAN INITIAL EVALUATION ADULT - PERTINENT LABORATORY DATA
09-29    132<L>  |  94<L>  |  15  ----------------------------<  118<H>  4.1   |  23  |  0.74    Ca    8.3<L>      29 Sep 2023 04:50  Phos  2.7     09-29  Mg     2.20     09-29    POCT Blood Glucose.: 147 mg/dL (09-29-23 @ 08:04)  A1C with Estimated Average Glucose Result: 5.4 % (09-25-23 @ 05:50)  A1C with Estimated Average Glucose Result: 5.6 % (09-04-23 @ 03:07)

## 2023-09-29 NOTE — DIETITIAN INITIAL EVALUATION ADULT - ORAL INTAKE PTA/DIET HISTORY
Pt lives at home with alone. Reports good appetite PTA. Followed low salt and low sugar diet. No supplements/Vitamins taken PTA.

## 2023-09-29 NOTE — DIETITIAN INITIAL EVALUATION ADULT - ADD RECOMMEND
1) Continue CSTCHO diet   2) Monitor weights, labs, BM's, skin integrity, p.o. intake.   3) Encourage PO intake and honor food preferences as able.

## 2023-09-29 NOTE — PROGRESS NOTE ADULT - SUBJECTIVE AND OBJECTIVE BOX
[facility] Daily Progress Note  =====================================================    No acute events overnight     SUBJECTIVE: Patient seen and examined at bedside on AM rounds. Patient reports that they're feeling well. Denies fever, chills, N/V, chest pain, SOB      --------------------------------------------------------------------------------------    VITAL SIGNS:  T(C): 36.5 (09-29-23 @ 05:00), Max: 36.7 (09-28-23 @ 20:01)  HR: 87 (09-29-23 @ 05:00) (85 - 91)  BP: 127/70 (09-29-23 @ 05:00) (114/65 - 129/60)  RR: 18 (09-29-23 @ 05:00) (16 - 18)  SpO2: 97% (09-29-23 @ 05:00) (97% - 100%)  --------------------------------------------------------------------------------------    INS AND OUTS:    09-27-23 @ 07:01  -  09-28-23 @ 07:00  --------------------------------------------------------  IN: 140 mL / OUT: 1700 mL / NET: -1560 mL    09-28-23 @ 07:01 - 09-29-23 @ 05:15  --------------------------------------------------------  IN: 375 mL / OUT: 700 mL / NET: -325 mL      --------------------------------------------------------------------------------------      EXAM      General: NAD, resting in bed comfortably. A&Ox3.   Cardiac: regular rate  Respiratory: Nonlabored respirations, normal cw expansion.  Abdomen: soft, nontender, nondistended.   Extremities: moving extremities  Vascular: LE vac holding suction; dry gangrene of toes, left dressing with ace wrap C/d/i . Left foot cooler compared to right. Left foot amputated toe abit dusky today.   --------------------------------------------------------------------------------------    LABS                          11.1   11.08 )-----------( 263      ( 28 Sep 2023 04:54 )             33.9     09-28    133<L>  |  96<L>  |  15  ----------------------------<  108<H>  3.9   |  24  |  0.75    Ca    8.0<L>      28 Sep 2023 04:54  Phos  2.6     09-28  Mg     2.00     09-28        Urinalysis Basic - ( 28 Sep 2023 04:54 )    Color: x / Appearance: x / SG: x / pH: x  Gluc: 108 mg/dL / Ketone: x  / Bili: x / Urobili: x   Blood: x / Protein: x / Nitrite: x   Leuk Esterase: x / RBC: x / WBC x   Sq Epi: x / Non Sq Epi: x / Bacteria: x         [facility] Daily Progress Note  =====================================================    No acute events overnight     SUBJECTIVE: Patient seen and examined at bedside on AM rounds. Patient reports that they're feeling well. Denies fever, chills, N/V, chest pain, SOB      --------------------------------------------------------------------------------------    VITAL SIGNS:  T(C): 36.5 (09-29-23 @ 05:00), Max: 36.7 (09-28-23 @ 20:01)  HR: 87 (09-29-23 @ 05:00) (85 - 91)  BP: 127/70 (09-29-23 @ 05:00) (114/65 - 129/60)  RR: 18 (09-29-23 @ 05:00) (16 - 18)  SpO2: 97% (09-29-23 @ 05:00) (97% - 100%)  --------------------------------------------------------------------------------------    INS AND OUTS:    09-27-23 @ 07:01  -  09-28-23 @ 07:00  --------------------------------------------------------  IN: 140 mL / OUT: 1700 mL / NET: -1560 mL    09-28-23 @ 07:01 - 09-29-23 @ 05:15  --------------------------------------------------------  IN: 375 mL / OUT: 700 mL / NET: -325 mL      --------------------------------------------------------------------------------------      EXAM      General: NAD, resting in bed comfortably. A&Ox3.   Cardiac: regular rate  Respiratory: Nonlabored respirations, normal cw expansion.  Abdomen: soft, nontender, nondistended.   Extremities: moving extremities  Vascular: LE vac holding suction; dry gangrene of toes, left dressing with ace wrap C/d/i . Left foot cooler compared to right. Left foot amputated toe dusky unchanged. Motor intact.   --------------------------------------------------------------------------------------    LABS                          11.1   11.08 )-----------( 263      ( 28 Sep 2023 04:54 )             33.9     09-28    133<L>  |  96<L>  |  15  ----------------------------<  108<H>  3.9   |  24  |  0.75    Ca    8.0<L>      28 Sep 2023 04:54  Phos  2.6     09-28  Mg     2.00     09-28        Urinalysis Basic - ( 28 Sep 2023 04:54 )    Color: x / Appearance: x / SG: x / pH: x  Gluc: 108 mg/dL / Ketone: x  / Bili: x / Urobili: x   Blood: x / Protein: x / Nitrite: x   Leuk Esterase: x / RBC: x / WBC x   Sq Epi: x / Non Sq Epi: x / Bacteria: x

## 2023-09-30 LAB
ANION GAP SERPL CALC-SCNC: 8 MMOL/L — SIGNIFICANT CHANGE UP (ref 7–14)
BUN SERPL-MCNC: 14 MG/DL — SIGNIFICANT CHANGE UP (ref 7–23)
CALCIUM SERPL-MCNC: 8.6 MG/DL — SIGNIFICANT CHANGE UP (ref 8.4–10.5)
CHLORIDE SERPL-SCNC: 98 MMOL/L — SIGNIFICANT CHANGE UP (ref 98–107)
CO2 SERPL-SCNC: 28 MMOL/L — SIGNIFICANT CHANGE UP (ref 22–31)
CREAT SERPL-MCNC: 0.72 MG/DL — SIGNIFICANT CHANGE UP (ref 0.5–1.3)
EGFR: 83 ML/MIN/1.73M2 — SIGNIFICANT CHANGE UP
GLUCOSE BLDC GLUCOMTR-MCNC: 130 MG/DL — HIGH (ref 70–99)
GLUCOSE BLDC GLUCOMTR-MCNC: 135 MG/DL — HIGH (ref 70–99)
GLUCOSE BLDC GLUCOMTR-MCNC: 139 MG/DL — HIGH (ref 70–99)
GLUCOSE BLDC GLUCOMTR-MCNC: 176 MG/DL — HIGH (ref 70–99)
GLUCOSE SERPL-MCNC: 147 MG/DL — HIGH (ref 70–99)
HCT VFR BLD CALC: 38.6 % — SIGNIFICANT CHANGE UP (ref 34.5–45)
HGB BLD-MCNC: 12.3 G/DL — SIGNIFICANT CHANGE UP (ref 11.5–15.5)
MAGNESIUM SERPL-MCNC: 2.1 MG/DL — SIGNIFICANT CHANGE UP (ref 1.6–2.6)
MCHC RBC-ENTMCNC: 30.8 PG — SIGNIFICANT CHANGE UP (ref 27–34)
MCHC RBC-ENTMCNC: 31.9 GM/DL — LOW (ref 32–36)
MCV RBC AUTO: 96.7 FL — SIGNIFICANT CHANGE UP (ref 80–100)
NRBC # BLD: 0 /100 WBCS — SIGNIFICANT CHANGE UP (ref 0–0)
NRBC # FLD: 0 K/UL — SIGNIFICANT CHANGE UP (ref 0–0)
PHOSPHATE SERPL-MCNC: 2.7 MG/DL — SIGNIFICANT CHANGE UP (ref 2.5–4.5)
PLATELET # BLD AUTO: 286 K/UL — SIGNIFICANT CHANGE UP (ref 150–400)
POTASSIUM SERPL-MCNC: 4.6 MMOL/L — SIGNIFICANT CHANGE UP (ref 3.5–5.3)
POTASSIUM SERPL-SCNC: 4.6 MMOL/L — SIGNIFICANT CHANGE UP (ref 3.5–5.3)
RBC # BLD: 3.99 M/UL — SIGNIFICANT CHANGE UP (ref 3.8–5.2)
RBC # FLD: 19.6 % — HIGH (ref 10.3–14.5)
SODIUM SERPL-SCNC: 134 MMOL/L — LOW (ref 135–145)
WBC # BLD: 11.74 K/UL — HIGH (ref 3.8–10.5)
WBC # FLD AUTO: 11.74 K/UL — HIGH (ref 3.8–10.5)

## 2023-09-30 PROCEDURE — 99232 SBSQ HOSP IP/OBS MODERATE 35: CPT

## 2023-09-30 PROCEDURE — 93010 ELECTROCARDIOGRAM REPORT: CPT

## 2023-09-30 RX ORDER — CALCIUM CARBONATE 500(1250)
1 TABLET ORAL ONCE
Refills: 0 | Status: COMPLETED | OUTPATIENT
Start: 2023-09-30 | End: 2023-10-01

## 2023-09-30 RX ORDER — ONDANSETRON 8 MG/1
4 TABLET, FILM COATED ORAL ONCE
Refills: 0 | Status: COMPLETED | OUTPATIENT
Start: 2023-09-30 | End: 2023-09-30

## 2023-09-30 RX ORDER — SODIUM,POTASSIUM PHOSPHATES 278-250MG
2 POWDER IN PACKET (EA) ORAL ONCE
Refills: 0 | Status: COMPLETED | OUTPATIENT
Start: 2023-09-30 | End: 2023-09-30

## 2023-09-30 RX ADMIN — Medication 500 MILLIGRAM(S): at 17:30

## 2023-09-30 RX ADMIN — Medication 1 TABLET(S): at 05:03

## 2023-09-30 RX ADMIN — Medication 2 TABLET(S): at 12:32

## 2023-09-30 RX ADMIN — Medication 75 MICROGRAM(S): at 05:03

## 2023-09-30 RX ADMIN — HEPARIN SODIUM 5000 UNIT(S): 5000 INJECTION INTRAVENOUS; SUBCUTANEOUS at 23:01

## 2023-09-30 RX ADMIN — LOSARTAN POTASSIUM 50 MILLIGRAM(S): 100 TABLET, FILM COATED ORAL at 05:04

## 2023-09-30 RX ADMIN — Medication 650 MILLIGRAM(S): at 05:04

## 2023-09-30 RX ADMIN — Medication 650 MILLIGRAM(S): at 23:01

## 2023-09-30 RX ADMIN — Medication 81 MILLIGRAM(S): at 12:32

## 2023-09-30 RX ADMIN — LINAGLIPTIN 5 MILLIGRAM(S): 5 TABLET, FILM COATED ORAL at 12:32

## 2023-09-30 RX ADMIN — NYSTATIN CREAM 1 APPLICATION(S): 100000 CREAM TOPICAL at 05:03

## 2023-09-30 RX ADMIN — AMIODARONE HYDROCHLORIDE 100 MILLIGRAM(S): 400 TABLET ORAL at 05:03

## 2023-09-30 RX ADMIN — Medication 1: at 17:30

## 2023-09-30 RX ADMIN — ONDANSETRON 4 MILLIGRAM(S): 8 TABLET, FILM COATED ORAL at 15:21

## 2023-09-30 RX ADMIN — Medication 500 MILLIGRAM(S): at 05:04

## 2023-09-30 RX ADMIN — HEPARIN SODIUM 5000 UNIT(S): 5000 INJECTION INTRAVENOUS; SUBCUTANEOUS at 12:33

## 2023-09-30 RX ADMIN — FAMOTIDINE 20 MILLIGRAM(S): 10 INJECTION INTRAVENOUS at 12:32

## 2023-09-30 RX ADMIN — HEPARIN SODIUM 5000 UNIT(S): 5000 INJECTION INTRAVENOUS; SUBCUTANEOUS at 05:04

## 2023-09-30 RX ADMIN — Medication 1 TABLET(S): at 17:30

## 2023-09-30 RX ADMIN — NYSTATIN CREAM 1 APPLICATION(S): 100000 CREAM TOPICAL at 17:29

## 2023-09-30 NOTE — PROGRESS NOTE ADULT - SUBJECTIVE AND OBJECTIVE BOX
Follow Up:      Inverval History/ROS:Patient is a 83y old  Female who presents with a chief complaint of c/f L groin infection (30 Sep 2023 08:54)  EKGs with Qtx 490s to 505- stable  No fever      Allergies    latex (Unknown)  sulfa drugs (Unknown)    Intolerances        ANTIMICROBIALS:  amoxicillin  875 milliGRAM(s)/clavulanate 1 two times a day  ciprofloxacin     Tablet 500 every 12 hours      OTHER MEDS:  acetaminophen     Tablet .. 650 milliGRAM(s) Oral every 6 hours  aMIOdarone    Tablet 100 milliGRAM(s) Oral daily  aspirin enteric coated 81 milliGRAM(s) Oral daily  atorvastatin 20 milliGRAM(s) Oral at bedtime  Dakins Solution - 1/4 Strength 1 Application(s) Topical daily  dextrose 50% Injectable 25 Gram(s) IV Push once  dextrose 50% Injectable 25 Gram(s) IV Push once  dextrose 50% Injectable 12.5 Gram(s) IV Push once  famotidine    Tablet 20 milliGRAM(s) Oral daily  heparin   Injectable 5000 Unit(s) SubCutaneous every 8 hours  insulin lispro (ADMELOG) corrective regimen sliding scale   SubCutaneous at bedtime  insulin lispro (ADMELOG) corrective regimen sliding scale   SubCutaneous three times a day before meals  levothyroxine 75 MICROGram(s) Oral daily  linagliptin 5 milliGRAM(s) Oral daily  losartan 50 milliGRAM(s) Oral daily  nystatin Powder 1 Application(s) Topical two times a day  oxyCODONE    IR 5 milliGRAM(s) Oral every 6 hours PRN      Vital Signs Last 24 Hrs  T(C): 36.8 (30 Sep 2023 13:14), Max: 36.8 (30 Sep 2023 13:14)  T(F): 98.3 (30 Sep 2023 13:14), Max: 98.3 (30 Sep 2023 13:14)  HR: 82 (30 Sep 2023 13:14) (82 - 99)  BP: 113/55 (30 Sep 2023 13:14) (102/44 - 135/95)  BP(mean): --  RR: 17 (30 Sep 2023 13:14) (17 - 18)  SpO2: 95% (30 Sep 2023 13:14) (94% - 100%)    Parameters below as of 30 Sep 2023 13:14  Patient On (Oxygen Delivery Method): room air        PHYSICAL EXAM:  General: x[ ] non-toxic  HEAD/EYES: [ ] PERRL [x ] white sclera [ ] icterus  ENT:  [ ] normal [x ] supple [ ] thrush [ ] pharyngeal exudate  Cardiovascular:   [ ] murmur [ ] normal [ ] PPM/AICD  Respiratory:  [ ] clear to ausculation bilaterally  GI:  [x ] soft, non-tender, normal bowel sounds  :  [ ] ellis [ ] no CVA tenderness   Musculoskeletal:  [ ] no synovitis  Neurologic:  [ ] non-focal exam   Skin:  [x ] no rash  Lymph: x[ ] no lymphadenopathy  Psychiatric:  [ ] appropriate affect [ ] alert & oriented  Lines:  [x ] no phlebitis [ ] central line                                12.3   11.74 )-----------( 286      ( 30 Sep 2023 06:25 )             38.6       09-30    134<L>  |  98  |  14  ----------------------------<  147<H>  4.6   |  28  |  0.72    Ca    8.6      30 Sep 2023 06:25  Phos  2.7     09-30  Mg     2.10     09-30        Urinalysis Basic - ( 30 Sep 2023 06:25 )    Color: x / Appearance: x / SG: x / pH: x  Gluc: 147 mg/dL / Ketone: x  / Bili: x / Urobili: x   Blood: x / Protein: x / Nitrite: x   Leuk Esterase: x / RBC: x / WBC x   Sq Epi: x / Non Sq Epi: x / Bacteria: x        MICROBIOLOGY:Culture Results:   Moderate Klebsiella pneumoniae (Carbapenem Resistant) (09-24-23 @ 10:24)  Culture Results:   Culture is being performed. Fungal cultures are held for 4 weeks. (09-24-23 @ 10:24)  Culture Results:   Culture is being performed. (09-24-23 @ 10:24)  Culture Results:   Numerous Klebsiella pneumoniae (Carbapenem Resistant) (09-24-23 @ 09:29)  Culture Results:   Culture is being performed. Fungal cultures are held for 4 weeks. (09-24-23 @ 09:29)  Culture Results:   Culture is being performed. (09-24-23 @ 09:29)  Culture Results:   Numerous Klebsiella pneumoniae (Carbapenem Resistant)  Rare Proteus mirabilis (09-24-23 @ 09:28)  Culture Results:   Culture is being performed. Fungal cultures are held for 4 weeks. (09-24-23 @ 09:28)  Culture Results:   Culture is being performed. (09-24-23 @ 09:28)  Culture Results:   Numerous Klebsiella pneumoniae  See previous culture [id number] (09-24-23 @ 09:27)  Culture Results:   Culture is being performed. Fungal cultures are held for 4 weeks. (09-24-23 @ 09:27)  Culture Results:   Culture is being performed. (09-24-23 @ 09:27)      RADIOLOGY:

## 2023-09-30 NOTE — PROGRESS NOTE ADULT - ASSESSMENT
82 yo F w/ PMHx of CAD, pAF on Eliquis, severe AS pending TAVR, R internal carotid stenosis, HTN, HLD, hypothyroidism, anxiety, PAD  s/p L ilioprofunda bypass with reverse GSV graft to peroneal 8/31/23 (Dr. Tavares) and L 1st ray partial resection who presented with L groin erythema c/f infection; CT on admission showing 17cm L groin abscess. Patient now s/p removal of infected bypass graft of LLE on 9/24. Cultures with MDRO klebsiella and proteus    Plan:  - Diet: Regular   - Groin BID dressing changes with Dakins wet to drys   -  vac placement on left leg lower medial wound- care per wound team   - Appreciate ID recs- Cipro 500 m g po q 12 with augmentin 875 mg po q 12 for 10 additional days  - drug drug interaction with amiodarone and antibiotics. Check EKG for three days Q6. Overnight   - dispo: pending         Vascular Surgery   [id number]

## 2023-09-30 NOTE — PROGRESS NOTE ADULT - ASSESSMENT
83 year old with CAD and PVD with a chronic left foot found  Prior left toe amputation with vascular bypass with graft on 8/31    She developed left groin swelling with erythema  She presented on 9/22  CT of left lower extremity showed a left groin abscess with ? left calf abscess    S/p left groin I and D and left lower leg I and D  with removal of the graft    Cultures with MDRO klebsiella and proteus     Cipro 500 m g po q 12 with augmentin 875 mg po q 12  through 10/7    There is a drug drug interaction with amiodarone.   QTc is stable around 500   Optimize electrolytes (goal Potassium greater than 4)

## 2023-09-30 NOTE — PROGRESS NOTE ADULT - SUBJECTIVE AND OBJECTIVE BOX
[facility] Daily Progress Note  =====================================================    No acute events overnight    SUBJECTIVE: Patient seen and examined at bedside on AM rounds. Patient reports that they're feeling well. Denies fever, chills, N/V, chest pain, SOB    ALLERGIES:  latex (Unknown)  sulfa drugs (Unknown)      --------------------------------------------------------------------------------------    MEDICATIONS:    Neurologic Medications  acetaminophen     Tablet .. 650 milliGRAM(s) Oral every 6 hours  oxyCODONE    IR 5 milliGRAM(s) Oral every 6 hours PRN Severe Pain (7 - 10)    Respiratory Medications    Cardiovascular Medications  aMIOdarone    Tablet 100 milliGRAM(s) Oral daily  losartan 50 milliGRAM(s) Oral daily    Gastrointestinal Medications  famotidine    Tablet 20 milliGRAM(s) Oral daily    Genitourinary Medications    Hematologic/Oncologic Medications  aspirin enteric coated 81 milliGRAM(s) Oral daily  heparin   Injectable 5000 Unit(s) SubCutaneous every 8 hours    Antimicrobial/Immunologic Medications  amoxicillin  875 milliGRAM(s)/clavulanate 1 Tablet(s) Oral two times a day  ciprofloxacin     Tablet 500 milliGRAM(s) Oral every 12 hours    Endocrine/Metabolic Medications  atorvastatin 20 milliGRAM(s) Oral at bedtime  dextrose 50% Injectable 25 Gram(s) IV Push once  dextrose 50% Injectable 12.5 Gram(s) IV Push once  dextrose 50% Injectable 25 Gram(s) IV Push once  insulin lispro (ADMELOG) corrective regimen sliding scale   SubCutaneous three times a day before meals  insulin lispro (ADMELOG) corrective regimen sliding scale   SubCutaneous at bedtime  levothyroxine 75 MICROGram(s) Oral daily  linagliptin 5 milliGRAM(s) Oral daily    Topical/Other Medications  Dakins Solution - 1/4 Strength 1 Application(s) Topical daily  nystatin Powder 1 Application(s) Topical two times a day    --------------------------------------------------------------------------------------    VITAL SIGNS:  T(C): 36.7 (09-30-23 @ 08:27), Max: 36.7 (09-29-23 @ 23:30)  HR: 87 (09-30-23 @ 08:27) (87 - 99)  BP: 135/95 (09-30-23 @ 08:27) (102/44 - 135/95)  RR: 18 (09-30-23 @ 08:27) (17 - 18)  SpO2: 98% (09-30-23 @ 08:27) (94% - 100%)  --------------------------------------------------------------------------------------    INS AND OUTS:    09-29-23 @ 07:01  -  09-30-23 @ 07:00  --------------------------------------------------------  IN: 800 mL / OUT: 1360 mL / NET: -560 mL      --------------------------------------------------------------------------------------      EXAM    General: NAD, resting in bed comfortably. A&Ox3.   Cardiac: regular rate  Respiratory: Nonlabored respirations, normal cw expansion.  Abdomen: soft, nontender, nondistended.   Extremities: moving extremities  Vascular: LE vac holding suction; dry gangrene of toes, left dressing with ace wrap C/d/i .  -LLE groin with Dakins wet to drys  --------------------------------------------------------------------------------------    LABS                          12.3   11.74 )-----------( 286      ( 30 Sep 2023 06:25 )             38.6     09-30    134<L>  |  98  |  14  ----------------------------<  147<H>  4.6   |  28  |  0.72    Ca    8.6      30 Sep 2023 06:25  Phos  2.7     09-30  Mg     2.10     09-30        Urinalysis Basic - ( 30 Sep 2023 06:25 )    Color: x / Appearance: x / SG: x / pH: x  Gluc: 147 mg/dL / Ketone: x  / Bili: x / Urobili: x   Blood: x / Protein: x / Nitrite: x   Leuk Esterase: x / RBC: x / WBC x   Sq Epi: x / Non Sq Epi: x / Bacteria: x

## 2023-10-01 LAB
ANION GAP SERPL CALC-SCNC: 8 MMOL/L — SIGNIFICANT CHANGE UP (ref 7–14)
BUN SERPL-MCNC: 14 MG/DL — SIGNIFICANT CHANGE UP (ref 7–23)
CALCIUM SERPL-MCNC: 8.6 MG/DL — SIGNIFICANT CHANGE UP (ref 8.4–10.5)
CHLORIDE SERPL-SCNC: 99 MMOL/L — SIGNIFICANT CHANGE UP (ref 98–107)
CO2 SERPL-SCNC: 27 MMOL/L — SIGNIFICANT CHANGE UP (ref 22–31)
CREAT SERPL-MCNC: 0.73 MG/DL — SIGNIFICANT CHANGE UP (ref 0.5–1.3)
EGFR: 82 ML/MIN/1.73M2 — SIGNIFICANT CHANGE UP
GLUCOSE BLDC GLUCOMTR-MCNC: 134 MG/DL — HIGH (ref 70–99)
GLUCOSE BLDC GLUCOMTR-MCNC: 135 MG/DL — HIGH (ref 70–99)
GLUCOSE BLDC GLUCOMTR-MCNC: 139 MG/DL — HIGH (ref 70–99)
GLUCOSE BLDC GLUCOMTR-MCNC: 157 MG/DL — HIGH (ref 70–99)
GLUCOSE SERPL-MCNC: 129 MG/DL — HIGH (ref 70–99)
HCT VFR BLD CALC: 37.6 % — SIGNIFICANT CHANGE UP (ref 34.5–45)
HGB BLD-MCNC: 12 G/DL — SIGNIFICANT CHANGE UP (ref 11.5–15.5)
MAGNESIUM SERPL-MCNC: 2.1 MG/DL — SIGNIFICANT CHANGE UP (ref 1.6–2.6)
MCHC RBC-ENTMCNC: 30.7 PG — SIGNIFICANT CHANGE UP (ref 27–34)
MCHC RBC-ENTMCNC: 31.9 GM/DL — LOW (ref 32–36)
MCV RBC AUTO: 96.2 FL — SIGNIFICANT CHANGE UP (ref 80–100)
NRBC # BLD: 0 /100 WBCS — SIGNIFICANT CHANGE UP (ref 0–0)
NRBC # FLD: 0 K/UL — SIGNIFICANT CHANGE UP (ref 0–0)
PHOSPHATE SERPL-MCNC: 2.9 MG/DL — SIGNIFICANT CHANGE UP (ref 2.5–4.5)
PLATELET # BLD AUTO: 297 K/UL — SIGNIFICANT CHANGE UP (ref 150–400)
POTASSIUM SERPL-MCNC: 4.3 MMOL/L — SIGNIFICANT CHANGE UP (ref 3.5–5.3)
POTASSIUM SERPL-SCNC: 4.3 MMOL/L — SIGNIFICANT CHANGE UP (ref 3.5–5.3)
RBC # BLD: 3.91 M/UL — SIGNIFICANT CHANGE UP (ref 3.8–5.2)
RBC # FLD: 19.8 % — HIGH (ref 10.3–14.5)
SODIUM SERPL-SCNC: 134 MMOL/L — LOW (ref 135–145)
WBC # BLD: 10.48 K/UL — SIGNIFICANT CHANGE UP (ref 3.8–10.5)
WBC # FLD AUTO: 10.48 K/UL — SIGNIFICANT CHANGE UP (ref 3.8–10.5)

## 2023-10-01 PROCEDURE — 93010 ELECTROCARDIOGRAM REPORT: CPT | Mod: 76

## 2023-10-01 RX ORDER — METOCLOPRAMIDE HCL 10 MG
5 TABLET ORAL ONCE
Refills: 0 | Status: COMPLETED | OUTPATIENT
Start: 2023-10-01 | End: 2023-10-01

## 2023-10-01 RX ADMIN — Medication 75 MICROGRAM(S): at 04:58

## 2023-10-01 RX ADMIN — Medication 81 MILLIGRAM(S): at 12:29

## 2023-10-01 RX ADMIN — NYSTATIN CREAM 1 APPLICATION(S): 100000 CREAM TOPICAL at 06:18

## 2023-10-01 RX ADMIN — LINAGLIPTIN 5 MILLIGRAM(S): 5 TABLET, FILM COATED ORAL at 12:29

## 2023-10-01 RX ADMIN — HEPARIN SODIUM 5000 UNIT(S): 5000 INJECTION INTRAVENOUS; SUBCUTANEOUS at 06:18

## 2023-10-01 RX ADMIN — Medication 1 TABLET(S): at 22:38

## 2023-10-01 RX ADMIN — Medication 650 MILLIGRAM(S): at 00:19

## 2023-10-01 RX ADMIN — Medication 1 TABLET(S): at 17:07

## 2023-10-01 RX ADMIN — Medication 1 TABLET(S): at 06:18

## 2023-10-01 RX ADMIN — NYSTATIN CREAM 1 APPLICATION(S): 100000 CREAM TOPICAL at 17:08

## 2023-10-01 RX ADMIN — HEPARIN SODIUM 5000 UNIT(S): 5000 INJECTION INTRAVENOUS; SUBCUTANEOUS at 14:22

## 2023-10-01 RX ADMIN — FAMOTIDINE 20 MILLIGRAM(S): 10 INJECTION INTRAVENOUS at 12:29

## 2023-10-01 RX ADMIN — HEPARIN SODIUM 5000 UNIT(S): 5000 INJECTION INTRAVENOUS; SUBCUTANEOUS at 22:34

## 2023-10-01 RX ADMIN — Medication 1: at 17:05

## 2023-10-01 RX ADMIN — LOSARTAN POTASSIUM 50 MILLIGRAM(S): 100 TABLET, FILM COATED ORAL at 06:18

## 2023-10-01 RX ADMIN — AMIODARONE HYDROCHLORIDE 100 MILLIGRAM(S): 400 TABLET ORAL at 06:17

## 2023-10-01 RX ADMIN — Medication 5 MILLIGRAM(S): at 12:37

## 2023-10-01 RX ADMIN — Medication 500 MILLIGRAM(S): at 17:06

## 2023-10-01 RX ADMIN — Medication 500 MILLIGRAM(S): at 06:19

## 2023-10-01 NOTE — PROGRESS NOTE ADULT - SUBJECTIVE AND OBJECTIVE BOX
Vascular SURGERY DAILY PROGRESS NOTE    SUBJECTIVE: Patient seen and evaluated on AM rounds. Pt is resting comfortably in bed with no complaints. Passing gas and having bowel movements. Tolerating diet and Ambulating well.  Pain is adequately controlled on current regimen.  Denies fevers/chills, chest pain, dyspnea, abdominal pain, nausea, vomiting, and diarrhea    Overnight Events:  No acute events overnight  -----------------------------------------------------------------------------------------------------------------------------------------------------------------------------------------------------------  OBJECTIVE:  Vital Signs Last 24 Hrs  T(C): 36.5 (01 Oct 2023 04:54), Max: 36.8 (30 Sep 2023 13:14)  T(F): 97.7 (01 Oct 2023 04:54), Max: 98.3 (30 Sep 2023 13:14)  HR: 81 (01 Oct 2023 04:54) (81 - 88)  BP: 127/70 (01 Oct 2023 04:54) (111/50 - 135/95)  BP(mean): --  RR: 18 (01 Oct 2023 04:54) (17 - 18)  SpO2: 95% (01 Oct 2023 04:54) (95% - 98%)    Parameters below as of 01 Oct 2023 04:54  Patient On (Oxygen Delivery Method): room air      I&O's Detail    29 Sep 2023 07:01  -  30 Sep 2023 07:00  --------------------------------------------------------  IN:    Oral Fluid: 800 mL  Total IN: 800 mL    OUT:    Drain (mL): 0 mL    VAC (Vacuum Assisted Closure) System (mL): 60 mL    Voided (mL): 1300 mL  Total OUT: 1360 mL    Total NET: -560 mL      30 Sep 2023 07:01  -  01 Oct 2023 05:56  --------------------------------------------------------  IN:  Total IN: 0 mL    OUT:    Oral Fluid: 0 mL    VAC (Vacuum Assisted Closure) System (mL): 20 mL    Voided (mL): 700 mL  Total OUT: 720 mL    Total NET: -720 mL        Daily     Daily   MEDICATIONS  (STANDING):  acetaminophen     Tablet .. 650 milliGRAM(s) Oral every 6 hours  aMIOdarone    Tablet 100 milliGRAM(s) Oral daily  amoxicillin  875 milliGRAM(s)/clavulanate 1 Tablet(s) Oral two times a day  aspirin enteric coated 81 milliGRAM(s) Oral daily  atorvastatin 20 milliGRAM(s) Oral at bedtime  ciprofloxacin     Tablet 500 milliGRAM(s) Oral every 12 hours  Dakins Solution - 1/4 Strength 1 Application(s) Topical daily  dextrose 50% Injectable 25 Gram(s) IV Push once  dextrose 50% Injectable 25 Gram(s) IV Push once  dextrose 50% Injectable 12.5 Gram(s) IV Push once  famotidine    Tablet 20 milliGRAM(s) Oral daily  heparin   Injectable 5000 Unit(s) SubCutaneous every 8 hours  insulin lispro (ADMELOG) corrective regimen sliding scale   SubCutaneous at bedtime  insulin lispro (ADMELOG) corrective regimen sliding scale   SubCutaneous three times a day before meals  levothyroxine 75 MICROGram(s) Oral daily  linagliptin 5 milliGRAM(s) Oral daily  losartan 50 milliGRAM(s) Oral daily  nystatin Powder 1 Application(s) Topical two times a day    MEDICATIONS  (PRN):  calcium carbonate    500 mG (Tums) Chewable 1 Tablet(s) Chew once PRN Gas  oxyCODONE    IR 5 milliGRAM(s) Oral every 6 hours PRN Severe Pain (7 - 10)      LABS:                        12.3   11.74 )-----------( 286      ( 30 Sep 2023 06:25 )             38.6     09-30    134<L>  |  98  |  14  ----------------------------<  147<H>  4.6   |  28  |  0.72    Ca    8.6      30 Sep 2023 06:25  Phos  2.7     09-30  Mg     2.10     09-30        Urinalysis Basic - ( 30 Sep 2023 06:25 )    Color: x / Appearance: x / SG: x / pH: x  Gluc: 147 mg/dL / Ketone: x  / Bili: x / Urobili: x   Blood: x / Protein: x / Nitrite: x   Leuk Esterase: x / RBC: x / WBC x   Sq Epi: x / Non Sq Epi: x / Bacteria: x        PHYSICAL EXAM:  Physical Exam:  General: WN/WD NAD  Neurology: nonfocal, follows commands  Respiratory: nonlabored breathing on RA  CV: HDS  Extremities: LE vac holding suction; dry gangrene of toes, left dressing with ace wrap C/d/i . LLE groin with Dakins wet to dry

## 2023-10-01 NOTE — PROGRESS NOTE ADULT - ASSESSMENT
· Assessment	  82 yo F w/ PMHx of CAD, pAF on Eliquis, severe AS pending TAVR, R internal carotid stenosis, HTN, HLD, hypothyroidism, anxiety, PAD  s/p L ilioprofunda bypass with reverse GSV graft to peroneal 8/31/23 (Dr. Tavares) and L 1st ray partial resection who presented with L groin erythema c/f infection; CT on admission showing 17cm L groin abscess. Patient now s/p removal of infected bypass graft of LLE on 9/24. Cultures with MDRO klebsiella and proteus    Plan:  - Diet: Regular   - Groin BID dressing changes with Dakins wet to drys   -  vac placement on left leg lower medial wound- care per wound team   - Appreciate ID recs- Cipro 500 m g po q 12 with augmentin 875 mg po q 12 for through 10/7.  - drug drug interaction with amiodarone and antibiotics. Check EKG  - dispo: pending         Vascular Surgery   [id number] · Assessment	  82 yo F w/ PMHx of CAD, pAF on Eliquis, severe AS pending TAVR, R internal carotid stenosis, HTN, HLD, hypothyroidism, anxiety, PAD  s/p L ilioprofunda bypass with reverse GSV graft to peroneal 8/31/23 (Dr. Tavares) and L 1st ray partial resection who presented with L groin erythema c/f infection; CT on admission showing 17cm L groin abscess. Patient now s/p removal of infected bypass graft of LLE on 9/24. Cultures with MDRO klebsiella and proteus    Plan:  - Today AM   - Diet: Regular   - Groin BID dressing changes with Dakins wet to drys   -  vac placement on left leg lower medial wound- care per wound team   - Appreciate ID recs- Cipro 500 m g po q 12 with augmentin 875 mg po q 12 for through 10/7.  - drug drug interaction with amiodarone and antibiotics. Check EKG  - dispo: pending         Vascular Surgery   [id number]

## 2023-10-02 LAB
ANION GAP SERPL CALC-SCNC: 6 MMOL/L — LOW (ref 7–14)
BUN SERPL-MCNC: 16 MG/DL — SIGNIFICANT CHANGE UP (ref 7–23)
CALCIUM SERPL-MCNC: 8.7 MG/DL — SIGNIFICANT CHANGE UP (ref 8.4–10.5)
CHLORIDE SERPL-SCNC: 96 MMOL/L — LOW (ref 98–107)
CO2 SERPL-SCNC: 28 MMOL/L — SIGNIFICANT CHANGE UP (ref 22–31)
CREAT SERPL-MCNC: 0.85 MG/DL — SIGNIFICANT CHANGE UP (ref 0.5–1.3)
EGFR: 68 ML/MIN/1.73M2 — SIGNIFICANT CHANGE UP
GLUCOSE SERPL-MCNC: 137 MG/DL — HIGH (ref 70–99)
HCT VFR BLD CALC: 36.2 % — SIGNIFICANT CHANGE UP (ref 34.5–45)
HGB BLD-MCNC: 11.8 G/DL — SIGNIFICANT CHANGE UP (ref 11.5–15.5)
MAGNESIUM SERPL-MCNC: 2.2 MG/DL — SIGNIFICANT CHANGE UP (ref 1.6–2.6)
MCHC RBC-ENTMCNC: 31.4 PG — SIGNIFICANT CHANGE UP (ref 27–34)
MCHC RBC-ENTMCNC: 32.6 GM/DL — SIGNIFICANT CHANGE UP (ref 32–36)
MCV RBC AUTO: 96.3 FL — SIGNIFICANT CHANGE UP (ref 80–100)
NRBC # BLD: 0 /100 WBCS — SIGNIFICANT CHANGE UP (ref 0–0)
NRBC # FLD: 0 K/UL — SIGNIFICANT CHANGE UP (ref 0–0)
PHOSPHATE SERPL-MCNC: 2.9 MG/DL — SIGNIFICANT CHANGE UP (ref 2.5–4.5)
PLATELET # BLD AUTO: 268 K/UL — SIGNIFICANT CHANGE UP (ref 150–400)
POTASSIUM SERPL-MCNC: 5.1 MMOL/L — SIGNIFICANT CHANGE UP (ref 3.5–5.3)
POTASSIUM SERPL-SCNC: 5.1 MMOL/L — SIGNIFICANT CHANGE UP (ref 3.5–5.3)
RBC # BLD: 3.76 M/UL — LOW (ref 3.8–5.2)
RBC # FLD: 19.4 % — HIGH (ref 10.3–14.5)
SODIUM SERPL-SCNC: 130 MMOL/L — LOW (ref 135–145)
WBC # BLD: 11.78 K/UL — HIGH (ref 3.8–10.5)
WBC # FLD AUTO: 11.78 K/UL — HIGH (ref 3.8–10.5)

## 2023-10-02 PROCEDURE — 93010 ELECTROCARDIOGRAM REPORT: CPT | Mod: 76

## 2023-10-02 PROCEDURE — 99232 SBSQ HOSP IP/OBS MODERATE 35: CPT

## 2023-10-02 RX ORDER — METOCLOPRAMIDE HCL 10 MG
5 TABLET ORAL ONCE
Refills: 0 | Status: DISCONTINUED | OUTPATIENT
Start: 2023-10-02 | End: 2023-10-02

## 2023-10-02 RX ORDER — METOCLOPRAMIDE HCL 10 MG
5 TABLET ORAL ONCE
Refills: 0 | Status: COMPLETED | OUTPATIENT
Start: 2023-10-02 | End: 2023-10-02

## 2023-10-02 RX ORDER — CALCIUM CARBONATE 500(1250)
1 TABLET ORAL ONCE
Refills: 0 | Status: COMPLETED | OUTPATIENT
Start: 2023-10-02 | End: 2023-10-02

## 2023-10-02 RX ORDER — CALCIUM CARBONATE 500(1250)
2 TABLET ORAL DAILY
Refills: 0 | Status: DISCONTINUED | OUTPATIENT
Start: 2023-10-02 | End: 2023-10-05

## 2023-10-02 RX ORDER — METOCLOPRAMIDE HCL 10 MG
5 TABLET ORAL EVERY 8 HOURS
Refills: 0 | Status: DISCONTINUED | OUTPATIENT
Start: 2023-10-02 | End: 2023-10-11

## 2023-10-02 RX ADMIN — NYSTATIN CREAM 1 APPLICATION(S): 100000 CREAM TOPICAL at 06:47

## 2023-10-02 RX ADMIN — Medication 5 MILLIGRAM(S): at 15:58

## 2023-10-02 RX ADMIN — Medication 1 TABLET(S): at 17:34

## 2023-10-02 RX ADMIN — Medication 1 TABLET(S): at 06:47

## 2023-10-02 RX ADMIN — LOSARTAN POTASSIUM 50 MILLIGRAM(S): 100 TABLET, FILM COATED ORAL at 06:47

## 2023-10-02 RX ADMIN — HEPARIN SODIUM 5000 UNIT(S): 5000 INJECTION INTRAVENOUS; SUBCUTANEOUS at 06:48

## 2023-10-02 RX ADMIN — Medication 500 MILLIGRAM(S): at 17:34

## 2023-10-02 RX ADMIN — LINAGLIPTIN 5 MILLIGRAM(S): 5 TABLET, FILM COATED ORAL at 12:38

## 2023-10-02 RX ADMIN — NYSTATIN CREAM 1 APPLICATION(S): 100000 CREAM TOPICAL at 17:35

## 2023-10-02 RX ADMIN — Medication 81 MILLIGRAM(S): at 12:38

## 2023-10-02 RX ADMIN — Medication 500 MILLIGRAM(S): at 06:47

## 2023-10-02 RX ADMIN — FAMOTIDINE 20 MILLIGRAM(S): 10 INJECTION INTRAVENOUS at 12:37

## 2023-10-02 RX ADMIN — Medication 2 TABLET(S): at 21:03

## 2023-10-02 RX ADMIN — HEPARIN SODIUM 5000 UNIT(S): 5000 INJECTION INTRAVENOUS; SUBCUTANEOUS at 22:49

## 2023-10-02 RX ADMIN — Medication 1 TABLET(S): at 13:14

## 2023-10-02 RX ADMIN — HEPARIN SODIUM 5000 UNIT(S): 5000 INJECTION INTRAVENOUS; SUBCUTANEOUS at 12:38

## 2023-10-02 RX ADMIN — AMIODARONE HYDROCHLORIDE 100 MILLIGRAM(S): 400 TABLET ORAL at 06:47

## 2023-10-02 RX ADMIN — Medication 75 MICROGRAM(S): at 05:23

## 2023-10-02 NOTE — PROGRESS NOTE ADULT - ASSESSMENT
83 year old with CAD and PVD with a chronic left foot found  Prior left toe amputation with vascular bypass with graft on 8/31    She developed left groin swelling with erythema  She presented on 9/22  CT of left lower extremity showed a left groin abscess with ? left calf abscess    S/p left groin I and D and left lower leg I and D  with removal of the graft    Cultures with MDRO klebsiella and proteus     Cipro 500 m g po q 12 with augmentin 875 mg po q 12  through 10/7    There is a drug drug interaction with amiodarone.   Check EKG daily for the next three days.  Optimize electrolytes (goal Potassium greater than 4)   QTc is about 500 but pt has partial left bundle    Call ID service with questions  Will sign off.

## 2023-10-02 NOTE — PROGRESS NOTE ADULT - SUBJECTIVE AND OBJECTIVE BOX
Follow Up:      Inverval History/ROS:Patient is a 83y old  Female who presents with a chief complaint of c/f L groin infection (02 Oct 2023 05:33)    No fever  No events    Allergies    latex (Unknown)  sulfa drugs (Unknown)    Intolerances        ANTIMICROBIALS:  amoxicillin  875 milliGRAM(s)/clavulanate 1 two times a day  ciprofloxacin     Tablet 500 every 12 hours      OTHER MEDS:  acetaminophen     Tablet .. 650 milliGRAM(s) Oral every 6 hours  aMIOdarone    Tablet 100 milliGRAM(s) Oral daily  aspirin enteric coated 81 milliGRAM(s) Oral daily  atorvastatin 20 milliGRAM(s) Oral at bedtime  Dakins Solution - 1/4 Strength 1 Application(s) Topical daily  dextrose 50% Injectable 25 Gram(s) IV Push once  dextrose 50% Injectable 25 Gram(s) IV Push once  dextrose 50% Injectable 12.5 Gram(s) IV Push once  famotidine    Tablet 20 milliGRAM(s) Oral daily  heparin   Injectable 5000 Unit(s) SubCutaneous every 8 hours  insulin lispro (ADMELOG) corrective regimen sliding scale   SubCutaneous three times a day before meals  insulin lispro (ADMELOG) corrective regimen sliding scale   SubCutaneous at bedtime  levothyroxine 75 MICROGram(s) Oral daily  linagliptin 5 milliGRAM(s) Oral daily  losartan 50 milliGRAM(s) Oral daily  nystatin Powder 1 Application(s) Topical two times a day      Vital Signs Last 24 Hrs  T(C): 36.5 (02 Oct 2023 12:15), Max: 36.8 (02 Oct 2023 08:38)  T(F): 97.7 (02 Oct 2023 12:15), Max: 98.3 (02 Oct 2023 08:38)  HR: 87 (02 Oct 2023 12:15) (82 - 92)  BP: 100/47 (02 Oct 2023 12:15) (100/47 - 120/66)  BP(mean): --  RR: 18 (02 Oct 2023 12:15) (16 - 18)  SpO2: 96% (02 Oct 2023 12:15) (94% - 98%)    Parameters below as of 02 Oct 2023 12:15  Patient On (Oxygen Delivery Method): room air        PHYSICAL EXAM:  General: [x ] non-toxic  HEAD/EYES: [ ] PERRL x[ ] white sclera [ ] icterus  ENT:  [ ] normal [x ] supple [ ] thrush [ ] pharyngeal exudate  Cardiovascular:   [ ] murmur x[ ] normal [ ] PPM/AICD  Respiratory:  [x ] clear to ausculation bilaterally  GI:  [ x] soft, non-tender, normal bowel sounds  :  [ ] ellis [ ] no CVA tenderness   Musculoskeletal:  [ ] no synovitis  Neurologic:  [ ] non-focal exam   Skin:  [ ] no rash  Lymph: [x ] no lymphadenopathy  Psychiatric:  [x ] appropriate affect [ ] alert & oriented  Lines:  [x ] no phlebitis [ ] central line                                11.8   11.78 )-----------( 268      ( 02 Oct 2023 06:34 )             36.2       10-02    130<L>  |  96<L>  |  16  ----------------------------<  137<H>  5.1   |  28  |  0.85    Ca    8.7      02 Oct 2023 06:34  Phos  2.9     10-02  Mg     2.20     10-02        Urinalysis Basic - ( 02 Oct 2023 06:34 )    Color: x / Appearance: x / SG: x / pH: x  Gluc: 137 mg/dL / Ketone: x  / Bili: x / Urobili: x   Blood: x / Protein: x / Nitrite: x   Leuk Esterase: x / RBC: x / WBC x   Sq Epi: x / Non Sq Epi: x / Bacteria: x        MICROBIOLOGY:    RADIOLOGY:

## 2023-10-02 NOTE — PROGRESS NOTE ADULT - ASSESSMENT
· Assessment	  82 yo F w/ PMHx of CAD, pAF on Eliquis, severe AS pending TAVR, R internal carotid stenosis, HTN, HLD, hypothyroidism, anxiety, PAD  s/p L ilioprofunda bypass with reverse GSV graft to peroneal 8/31/23 (Dr. Tavares) and L 1st ray partial resection who presented with L groin erythema c/f infection; CT on admission showing 17cm L groin abscess. Patient now s/p removal of infected bypass graft of LLE on 9/24. Cultures with MDRO klebsiella and proteus    Plan:  - Today AM   - Diet: Regular   - Groin BID dressing changes with Dakins wet to drys   -  vac placement on left leg lower medial wound- care per wound team   - Appreciate ID recs- Cipro 500 m g po q 12 with augmentin 875 mg po q 12 for through 10/7.  - drug drug interaction with amiodarone and antibiotics. Check EKG  - dispo: pending         Vascular Surgery   [id number] · Assessment	  82 yo F w/ PMHx of CAD, pAF on Eliquis, severe AS pending TAVR, R internal carotid stenosis, HTN, HLD, hypothyroidism, anxiety, PAD  s/p L ilioprofunda bypass with reverse GSV graft to peroneal 8/31/23 (Dr. Tavares) and L 1st ray partial resection who presented with L groin erythema c/f infection; CT on admission showing 17cm L groin abscess. Patient now s/p removal of infected bypass graft of LLE on 9/24. Cultures with MDRO klebsiella and proteus    Plan:    - Diet: Regular   - Groin BID dressing changes with Dakins wet to drys   -  vac placement on left leg lower medial wound- care per wound team - change today   - Appreciate ID recs- Cipro 500 m g po q 12 with augmentin 875 mg po q 12 for through 10/7.  - drug drug interaction with amiodarone and antibiotics. Check EKG  - dispo: pending         Vascular Surgery   [id number]

## 2023-10-02 NOTE — PROGRESS NOTE ADULT - SUBJECTIVE AND OBJECTIVE BOX
TEAM Vascular Surgery Daily Progress Note  =====================================================    SUBJECTIVE: Patient seen and examined at bedside on AM rounds. Patient reports that they're feeling well. Denies fever, chills, N/V, chest pain, SOB    ALLERGIES:  latex (Unknown)  sulfa drugs (Unknown)      --------------------------------------------------------------------------------------    MEDICATIONS:    Neurologic Medications  acetaminophen     Tablet .. 650 milliGRAM(s) Oral every 6 hours    Respiratory Medications    Cardiovascular Medications  aMIOdarone    Tablet 100 milliGRAM(s) Oral daily  losartan 50 milliGRAM(s) Oral daily    Gastrointestinal Medications  famotidine    Tablet 20 milliGRAM(s) Oral daily    Genitourinary Medications    Hematologic/Oncologic Medications  aspirin enteric coated 81 milliGRAM(s) Oral daily  heparin   Injectable 5000 Unit(s) SubCutaneous every 8 hours    Antimicrobial/Immunologic Medications  amoxicillin  875 milliGRAM(s)/clavulanate 1 Tablet(s) Oral two times a day  ciprofloxacin     Tablet 500 milliGRAM(s) Oral every 12 hours    Endocrine/Metabolic Medications  atorvastatin 20 milliGRAM(s) Oral at bedtime  dextrose 50% Injectable 25 Gram(s) IV Push once  dextrose 50% Injectable 12.5 Gram(s) IV Push once  dextrose 50% Injectable 25 Gram(s) IV Push once  insulin lispro (ADMELOG) corrective regimen sliding scale   SubCutaneous at bedtime  insulin lispro (ADMELOG) corrective regimen sliding scale   SubCutaneous three times a day before meals  levothyroxine 75 MICROGram(s) Oral daily  linagliptin 5 milliGRAM(s) Oral daily    Topical/Other Medications  Dakins Solution - 1/4 Strength 1 Application(s) Topical daily  nystatin Powder 1 Application(s) Topical two times a day    --------------------------------------------------------------------------------------    VITAL SIGNS:  T(C): 36.4 (10-02-23 @ 00:02), Max: 36.7 (10-01-23 @ 15:55)  HR: 90 (10-02-23 @ 00:02) (86 - 92)  BP: 120/66 (10-02-23 @ 00:02) (106/64 - 120/66)  RR: 16 (10-02-23 @ 00:02) (16 - 18)  SpO2: 94% (10-02-23 @ 00:02) (94% - 98%)  --------------------------------------------------------------------------------------    INS AND OUTS:    09-30-23 @ 07:01  -  10-01-23 @ 07:00  --------------------------------------------------------  IN: 360 mL / OUT: 1020 mL / NET: -660 mL    10-01-23 @ 07:01  -  10-02-23 @ 05:33  --------------------------------------------------------  IN: 280 mL / OUT: 850 mL / NET: -570 mL      --------------------------------------------------------------------------------------      EXAM    General: WN/WD NAD  Neurology: nonfocal, follows commands  Respiratory: nonlabored breathing on RA  CV: HDS  Extremities: LE vac holding suction; dry gangrene of toes, left dressing with ace wrap C/d/i . LLE groin with Dakins wet to dry  --------------------------------------------------------------------------------------    LABS                          12.0   10.48 )-----------( 297      ( 01 Oct 2023 06:38 )             37.6     10-01    134<L>  |  99  |  14  ----------------------------<  129<H>  4.3   |  27  |  0.73    Ca    8.6      01 Oct 2023 06:38  Phos  2.9     10-01  Mg     2.10     10-01        Urinalysis Basic - ( 01 Oct 2023 06:38 )    Color: x / Appearance: x / SG: x / pH: x  Gluc: 129 mg/dL / Ketone: x  / Bili: x / Urobili: x   Blood: x / Protein: x / Nitrite: x   Leuk Esterase: x / RBC: x / WBC x   Sq Epi: x / Non Sq Epi: x / Bacteria: x         TEAM Vascular Surgery Daily Progress Note  =====================================================    -No acute events overnight  -overnight qtc 486    SUBJECTIVE: Patient seen and examined at bedside on AM rounds. Patient reports that they're feeling well. Denies fever, chills, N/V, chest pain, SOB    ALLERGIES:  latex (Unknown)  sulfa drugs (Unknown)      --------------------------------------------------------------------------------------    MEDICATIONS:    Neurologic Medications  acetaminophen     Tablet .. 650 milliGRAM(s) Oral every 6 hours    Respiratory Medications    Cardiovascular Medications  aMIOdarone    Tablet 100 milliGRAM(s) Oral daily  losartan 50 milliGRAM(s) Oral daily    Gastrointestinal Medications  famotidine    Tablet 20 milliGRAM(s) Oral daily    Genitourinary Medications    Hematologic/Oncologic Medications  aspirin enteric coated 81 milliGRAM(s) Oral daily  heparin   Injectable 5000 Unit(s) SubCutaneous every 8 hours    Antimicrobial/Immunologic Medications  amoxicillin  875 milliGRAM(s)/clavulanate 1 Tablet(s) Oral two times a day  ciprofloxacin     Tablet 500 milliGRAM(s) Oral every 12 hours    Endocrine/Metabolic Medications  atorvastatin 20 milliGRAM(s) Oral at bedtime  dextrose 50% Injectable 25 Gram(s) IV Push once  dextrose 50% Injectable 12.5 Gram(s) IV Push once  dextrose 50% Injectable 25 Gram(s) IV Push once  insulin lispro (ADMELOG) corrective regimen sliding scale   SubCutaneous at bedtime  insulin lispro (ADMELOG) corrective regimen sliding scale   SubCutaneous three times a day before meals  levothyroxine 75 MICROGram(s) Oral daily  linagliptin 5 milliGRAM(s) Oral daily    Topical/Other Medications  Dakins Solution - 1/4 Strength 1 Application(s) Topical daily  nystatin Powder 1 Application(s) Topical two times a day    --------------------------------------------------------------------------------------    VITAL SIGNS:  T(C): 36.4 (10-02-23 @ 00:02), Max: 36.7 (10-01-23 @ 15:55)  HR: 90 (10-02-23 @ 00:02) (86 - 92)  BP: 120/66 (10-02-23 @ 00:02) (106/64 - 120/66)  RR: 16 (10-02-23 @ 00:02) (16 - 18)  SpO2: 94% (10-02-23 @ 00:02) (94% - 98%)  --------------------------------------------------------------------------------------    INS AND OUTS:    09-30-23 @ 07:01  -  10-01-23 @ 07:00  --------------------------------------------------------  IN: 360 mL / OUT: 1020 mL / NET: -660 mL    10-01-23 @ 07:01  -  10-02-23 @ 05:33  --------------------------------------------------------  IN: 280 mL / OUT: 850 mL / NET: -570 mL      --------------------------------------------------------------------------------------      EXAM    General: WN/WD NAD  Neurology: nonfocal, follows commands  Respiratory: nonlabored breathing on RA  CV: HDS  Extremities: LE vac holding suction; dry gangrene of toes, left dressing with ace wrap C/d/i . LLE groin with Dakins wet to dry  --------------------------------------------------------------------------------------    LABS                          12.0   10.48 )-----------( 297      ( 01 Oct 2023 06:38 )             37.6     10-01    134<L>  |  99  |  14  ----------------------------<  129<H>  4.3   |  27  |  0.73    Ca    8.6      01 Oct 2023 06:38  Phos  2.9     10-01  Mg     2.10     10-01        Urinalysis Basic - ( 01 Oct 2023 06:38 )    Color: x / Appearance: x / SG: x / pH: x  Gluc: 129 mg/dL / Ketone: x  / Bili: x / Urobili: x   Blood: x / Protein: x / Nitrite: x   Leuk Esterase: x / RBC: x / WBC x   Sq Epi: x / Non Sq Epi: x / Bacteria: x

## 2023-10-03 ENCOUNTER — APPOINTMENT (OUTPATIENT)
Dept: INTERNAL MEDICINE | Facility: CLINIC | Age: 83
End: 2023-10-03

## 2023-10-03 LAB
ANION GAP SERPL CALC-SCNC: 10 MMOL/L — SIGNIFICANT CHANGE UP (ref 7–14)
BUN SERPL-MCNC: 20 MG/DL — SIGNIFICANT CHANGE UP (ref 7–23)
CALCIUM SERPL-MCNC: 8.8 MG/DL — SIGNIFICANT CHANGE UP (ref 8.4–10.5)
CHLORIDE SERPL-SCNC: 98 MMOL/L — SIGNIFICANT CHANGE UP (ref 98–107)
CO2 SERPL-SCNC: 24 MMOL/L — SIGNIFICANT CHANGE UP (ref 22–31)
CREAT SERPL-MCNC: 0.74 MG/DL — SIGNIFICANT CHANGE UP (ref 0.5–1.3)
EGFR: 81 ML/MIN/1.73M2 — SIGNIFICANT CHANGE UP
GLUCOSE SERPL-MCNC: 144 MG/DL — HIGH (ref 70–99)
HCT VFR BLD CALC: 37.2 % — SIGNIFICANT CHANGE UP (ref 34.5–45)
HGB BLD-MCNC: 12.3 G/DL — SIGNIFICANT CHANGE UP (ref 11.5–15.5)
MAGNESIUM SERPL-MCNC: 2.1 MG/DL — SIGNIFICANT CHANGE UP (ref 1.6–2.6)
MCHC RBC-ENTMCNC: 31.3 PG — SIGNIFICANT CHANGE UP (ref 27–34)
MCHC RBC-ENTMCNC: 33.1 GM/DL — SIGNIFICANT CHANGE UP (ref 32–36)
MCV RBC AUTO: 94.7 FL — SIGNIFICANT CHANGE UP (ref 80–100)
NRBC # BLD: 0 /100 WBCS — SIGNIFICANT CHANGE UP (ref 0–0)
NRBC # FLD: 0 K/UL — SIGNIFICANT CHANGE UP (ref 0–0)
PHOSPHATE SERPL-MCNC: 2.8 MG/DL — SIGNIFICANT CHANGE UP (ref 2.5–4.5)
PLATELET # BLD AUTO: 256 K/UL — SIGNIFICANT CHANGE UP (ref 150–400)
POTASSIUM SERPL-MCNC: 4.7 MMOL/L — SIGNIFICANT CHANGE UP (ref 3.5–5.3)
POTASSIUM SERPL-SCNC: 4.7 MMOL/L — SIGNIFICANT CHANGE UP (ref 3.5–5.3)
RBC # BLD: 3.93 M/UL — SIGNIFICANT CHANGE UP (ref 3.8–5.2)
RBC # FLD: 19.3 % — HIGH (ref 10.3–14.5)
SODIUM SERPL-SCNC: 132 MMOL/L — LOW (ref 135–145)
WBC # BLD: 12.58 K/UL — HIGH (ref 3.8–10.5)
WBC # FLD AUTO: 12.58 K/UL — HIGH (ref 3.8–10.5)

## 2023-10-03 PROCEDURE — 99232 SBSQ HOSP IP/OBS MODERATE 35: CPT

## 2023-10-03 PROCEDURE — 93010 ELECTROCARDIOGRAM REPORT: CPT

## 2023-10-03 RX ORDER — PANTOPRAZOLE SODIUM 20 MG/1
40 TABLET, DELAYED RELEASE ORAL
Refills: 0 | Status: DISCONTINUED | OUTPATIENT
Start: 2023-10-03 | End: 2023-10-13

## 2023-10-03 RX ORDER — CALCIUM CARBONATE 500(1250)
2 TABLET ORAL ONCE
Refills: 0 | Status: DISCONTINUED | OUTPATIENT
Start: 2023-10-03 | End: 2023-10-03

## 2023-10-03 RX ADMIN — HEPARIN SODIUM 5000 UNIT(S): 5000 INJECTION INTRAVENOUS; SUBCUTANEOUS at 12:27

## 2023-10-03 RX ADMIN — LOSARTAN POTASSIUM 50 MILLIGRAM(S): 100 TABLET, FILM COATED ORAL at 07:09

## 2023-10-03 RX ADMIN — Medication 650 MILLIGRAM(S): at 07:09

## 2023-10-03 RX ADMIN — LINAGLIPTIN 5 MILLIGRAM(S): 5 TABLET, FILM COATED ORAL at 12:21

## 2023-10-03 RX ADMIN — AMIODARONE HYDROCHLORIDE 100 MILLIGRAM(S): 400 TABLET ORAL at 07:09

## 2023-10-03 RX ADMIN — HEPARIN SODIUM 5000 UNIT(S): 5000 INJECTION INTRAVENOUS; SUBCUTANEOUS at 22:06

## 2023-10-03 RX ADMIN — PANTOPRAZOLE SODIUM 40 MILLIGRAM(S): 20 TABLET, DELAYED RELEASE ORAL at 12:32

## 2023-10-03 RX ADMIN — Medication 81 MILLIGRAM(S): at 12:20

## 2023-10-03 RX ADMIN — Medication 500 MILLIGRAM(S): at 07:10

## 2023-10-03 RX ADMIN — HEPARIN SODIUM 5000 UNIT(S): 5000 INJECTION INTRAVENOUS; SUBCUTANEOUS at 05:51

## 2023-10-03 RX ADMIN — Medication 1 TABLET(S): at 07:10

## 2023-10-03 RX ADMIN — Medication 1 TABLET(S): at 17:32

## 2023-10-03 RX ADMIN — NYSTATIN CREAM 1 APPLICATION(S): 100000 CREAM TOPICAL at 17:32

## 2023-10-03 RX ADMIN — NYSTATIN CREAM 1 APPLICATION(S): 100000 CREAM TOPICAL at 05:51

## 2023-10-03 RX ADMIN — Medication 75 MICROGRAM(S): at 05:51

## 2023-10-03 RX ADMIN — Medication 5 MILLIGRAM(S): at 10:45

## 2023-10-03 RX ADMIN — Medication 500 MILLIGRAM(S): at 17:32

## 2023-10-03 RX ADMIN — Medication 1: at 12:22

## 2023-10-03 NOTE — PROGRESS NOTE ADULT - ASSESSMENT
82 yo F w/ PMHx of CAD (prox LM 40%, prox LAD 40%, distal LAD 50%, Cx mild, distal % ), pAF on Eliquis, severe AS pending TAVR, R internal carotid stenosis, HTN, HLD, hypothyroidism, and anxiety.  Patient with PAD and chronic L toe wounds s/p L ilioprofunda bypass with reverse GSV graft to peroneal 8/31/23 (Dr. Tavares) presents to ED with L groin erythema.  She is s/p L groin washout, explant of bypass graft, wound vac placement, L external iliac ligation with 300 cc of purulent material drained.  She is also being treated for LLL consolidation.  She is getting vanco and zosyn in dextrose.  Endocrinology team consulted for hyperglycemia in a non-diabetes patient with A1c 5.4%.     1. Stress Hyperglycemia (r/t infection?)/History of pre-diabetes   A1c: 5.4% (prior 5.6%, as per primary team, pt did not received any blood or iron transfusion (h/h improved from 8 to 11). Pt also not receiving any steroids.    Home meds: none     While inpatient  BG target 100-180 mg/dl: above goal at lunch pt was eating at time of FS.    Continue Linagliptin (Tradjenta) 5 mg PO daily  Continue Admelog LOW dose correctional scale before meals, low dose at bedtime  No standing Lantus or pre-meal Admelog  Check BG before meals and bedtime  Hypoglycemia protocol  Consistent carbohydrate diet  Provider to rn for glucometer teaching with teach prior discharge. Please document     Discharge Plan:  Patient's FS values inpatient have not aligned with her A1c value (5.4%). Unclear if this is related to current infection/stress, or if her FS are elevated at home as well and A1c is inaccurate in setting of anemia.   Started Tradjenta 5 mg PO daily inpatient and will monitor on this. If hyperglycemia is related to infection, FS values should downtrend as treatment for infection progresses.  Please discharge pt on once daily Tradjenta. Would like to avoid hyperglycemia in setting of infection. Advised pt to trend glucose at home and based on glucose trend will then recommend whether to continue Tradjenta or discontinue (this should be done by outside provider)  Please check insurance coverage for Linagliptin (Tradjenta) 5 mg PO daily, alternative is Januvia (Sitagliptin) 100 mg PO daily  Would ensure patient has prescription for new glucometer and supplies so she can monitor her FS at home  Please call your doctor if your fs is 70 or below and or 250 and above   Reviewed importance of glucose monitoring  Followup with PCP    2. Hypothyroidism  09-26 @ 06:00 TSH 8.19 FreeT4 1.2  TSH elevated on home dosing of Levothyroxine 50 mcg PO daily (patient taking this correctly at home)  No overt symptoms of hypothyroidism reported by patient  Recommend increase to Levothyroxine 75 mcg PO daily (first dose 9/28/23)  Followup for repeat TFTs in 4-6 weeks as outpatient    3. HTN  BP goal less than 130/80  Losartan held today for tightly controlled SBP     4. HLD  LDL target less than 70  Continue Atorvastatin 20 mg daily if no contraindications   Please obtain Lipid profile as an outpt     Endocrine to sign off. Please call with any questions     D/w Vascular [id number]     Ellie Parker  Nurse Practitioner  Division of Endocrinology & Diabetes  In house pager #[phone]    If before 9AM or after 6PM, or on weekends/holidays, please call endocrine answering service for assistance ([phone]).For nonurgent matters email [email or web address] for assistance.      84 yo F w/ PMHx of CAD (prox LM 40%, prox LAD 40%, distal LAD 50%, Cx mild, distal % ), pAF on Eliquis, severe AS pending TAVR, R internal carotid stenosis, HTN, HLD, hypothyroidism, and anxiety.  Patient with PAD and chronic L toe wounds s/p L ilioprofunda bypass with reverse GSV graft to peroneal 8/31/23 (Dr. Tavares) presents to ED with L groin erythema.  She is s/p L groin washout, explant of bypass graft, wound vac placement, L external iliac ligation with 300 cc of purulent material drained.  She is also being treated for LLL consolidation.  She is getting vanco and zosyn in dextrose.  Endocrinology team consulted for hyperglycemia in a non-diabetes patient with A1c 5.4%.     1. Stress Hyperglycemia (r/t infection?)/History of pre-diabetes   A1c: 5.4% (prior 5.6%, as per primary team, pt did not received any blood or iron transfusion (h/h improved from 8 to 11). Pt also not receiving any steroids.    Home meds: none     While inpatient  BG target 100-180 mg/dl: stable today   Continue Linagliptin (Tradjenta) 5 mg PO daily  Continue Admelog LOW dose correctional scale before meals, low dose at bedtime  No standing Lantus or pre-meal Admelog  Check BG before meals and bedtime  Hypoglycemia protocol  Consistent carbohydrate diet  Provider to rn for glucometer teaching with teach prior discharge. Please document     Discharge Plan:  Patient's FS values inpatient have not aligned with her A1c value (5.4%). Unclear if this is related to current infection/stress, or if her FS are elevated at home as well and A1c is inaccurate in setting of anemia.   Started Tradjenta 5 mg PO daily inpatient and will monitor on this. If hyperglycemia is related to infection, FS values should downtrend as treatment for infection progresses.  Please discharge pt on once daily Tradjenta. Would like to avoid hyperglycemia in setting of infection. Advised pt to trend glucose at home and based on glucose trend will then recommend whether to continue Tradjenta or discontinue (this should be done by outside provider)  Please check insurance coverage for Linagliptin (Tradjenta) 5 mg PO daily, alternative is Januvia (Sitagliptin) 100 mg PO daily  Would ensure patient has prescription for new glucometer and supplies so she can monitor her FS at home  Please call your doctor if your fs is 70 or below and or 250 and above   Reviewed importance of glucose monitoring  Followup with PCP    2. Hypothyroidism  09-26 @ 06:00 TSH 8.19 FreeT4 1.2  TSH elevated on home dosing of Levothyroxine 50 mcg PO daily (patient taking this correctly at home)  No overt symptoms of hypothyroidism reported by patient  Recommend increase to Levothyroxine 75 mcg PO daily (first dose 9/28/23)  Followup for repeat TFTs in 4-6 weeks as outpatient    3. HTN  BP goal less than 130/80  Losartan held today for tightly controlled SBP     4. HLD  LDL target less than 70  Continue Atorvastatin 20 mg daily if no contraindications   Please obtain Lipid profile as an outpt     Endocrine to sign off. Please call with any questions     D/w Vascular [id number]     Ellie Parker  Nurse Practitioner  Division of Endocrinology & Diabetes  In house pager #[phone]    If before 9AM or after 6PM, or on weekends/holidays, please call endocrine answering service for assistance ([phone]).For nonurgent matters email [email or web address] for assistance.

## 2023-10-03 NOTE — PROGRESS NOTE ADULT - SUBJECTIVE AND OBJECTIVE BOX
Chief Complaint: Stress Hyperglycemia (r/t infection?)/History of pre-diabetes     History: Pt seen at bedside. Pt tolerating oral diet. Pt denies nausea and vomiting/any signs of hypoglycemia. Pt reports an adequate appetite.     MEDICATIONS  (STANDING):  acetaminophen     Tablet .. 650 milliGRAM(s) Oral every 6 hours  aMIOdarone    Tablet 100 milliGRAM(s) Oral daily  amoxicillin  875 milliGRAM(s)/clavulanate 1 Tablet(s) Oral two times a day  aspirin enteric coated 81 milliGRAM(s) Oral daily  atorvastatin 20 milliGRAM(s) Oral at bedtime  ciprofloxacin     Tablet 500 milliGRAM(s) Oral every 12 hours  Dakins Solution - 1/4 Strength 1 Application(s) Topical daily  dextrose 50% Injectable 25 Gram(s) IV Push once  dextrose 50% Injectable 12.5 Gram(s) IV Push once  heparin   Injectable 5000 Unit(s) SubCutaneous every 8 hours  insulin lispro (ADMELOG) corrective regimen sliding scale   SubCutaneous three times a day before meals  insulin lispro (ADMELOG) corrective regimen sliding scale   SubCutaneous at bedtime  levothyroxine 75 MICROGram(s) Oral daily  linagliptin 5 milliGRAM(s) Oral daily  losartan 50 milliGRAM(s) Oral daily  nystatin Powder 1 Application(s) Topical two times a day  pantoprazole    Tablet 40 milliGRAM(s) Oral before breakfast    MEDICATIONS  (PRN):  calcium carbonate    500 mG (Tums) Chewable 2 Tablet(s) Chew daily PRN Heartburn  metoclopramide Injectable 5 milliGRAM(s) IV Push every 8 hours PRN nausea      Allergies: latex (Unknown)  sulfa drugs (Unknown)    Review of Systems:  Respiratory: No SOB, no cough  GI: No nausea, vomiting, abdominal pain  Endocrine: no polyuria, polydipsia    PHYSICAL EXAM:  VITALS: T(C): 36.8 (10-03-23 @ 12:01)  T(F): 98.3 (10-03-23 @ 12:01), Max: 98.4 (10-03-23 @ 04:30)  HR: 86 (10-03-23 @ 12:01) (82 - 98)  BP: 102/56 (10-03-23 @ 12:01) (101/62 - 135/79)  RR:  (18 - 18)  SpO2:  (95% - 99%)  Wt(kg): --  GENERAL: NAD, well-groomed, well-developed  RESPIRATORY: No labored breathing   GI: Soft, nontender, non distended  PSYCH: Alert and oriented x 3, normal affect, normal mood      CAPILLARY BLOOD GLUCOSE  POCT Blood Glucose.: 161 mg/dL (03 Oct 2023 12:07)  POCT Blood Glucose.: 136 mg/dL (03 Oct 2023 08:16)  POCT Blood Glucose.: 157 mg/dL (02 Oct 2023 22:06)  POCT Blood Glucose.: 137 mg/dL (02 Oct 2023 17:04)    A1C with Estimated Average Glucose (09.25.23 @ 05:50)    A1C with Estimated Average Glucose Result: 5.4   Estimated Average Glucose: 108      10-03    132<L>  |  98  |  20  ----------------------------<  144<H>  4.7   |  24  |  0.74    eGFR: 81    Ca    8.8      10-03  Mg     2.10     10-03  Phos  2.8     10-03    Thyroid Function Tests:  09-26 @ 06:00 TSH 8.19 FreeT4 1.2 T3 -- Anti TPO -- Anti Thyroglobulin Ab -- TSI --    Diet, Regular:   Consistent Carbohydrate Evening Snack (CSTCHOSN) (10-03-23 @ 08:37) [Active]

## 2023-10-03 NOTE — PROGRESS NOTE ADULT - ASSESSMENT
84 yo F w/ PMHx of CAD, pAF on Eliquis, severe AS pending TAVR, R internal carotid stenosis, HTN, HLD, hypothyroidism, anxiety, PAD  s/p L ilioprofunda bypass with reverse GSV graft to peroneal 8/31/23 (Dr. Tavares) and L 1st ray partial resection who presented with L groin erythema c/f infection; CT on admission showing 17cm L groin abscess. Patient now s/p removal of infected bypass graft of LLE on 9/24. Cultures with MDRO klebsiella and proteus.     Plan:  - Diet: Regular   - Groin BID dressing changes with Dakins wet to drys - may require further debridement in the OR  - vac placement on left leg lower medial wound- care per wound team (MWF)  - Appreciate ID recs- Cipro 500 m g po q 12 with augmentin 875 mg po q 12 for through 10/7.  - drug drug interaction with amiodarone and antibiotics. Check EKG q12hr to monitor QTc  - continue home meds/ASA  - DVT ppx: SQH  - OOB/ambulate  - dispo: Rehab once patient medically stable     Vascular Surgery   [id number]

## 2023-10-03 NOTE — PROGRESS NOTE ADULT - SUBJECTIVE AND OBJECTIVE BOX
Vascular Surgery Daily Progress Note  =====================================================    SUBJECTIVE: Patient seen and examined at bedside on AM rounds. Patient endorses no new complaints. Denies chest pain, SOB, fever, chills.    PAST MEDICAL & SURGICAL HISTORY:  HTN (hypertension)  HLD (hyperlipidemia)  Anxiety  CAD (coronary artery disease)  PAD (peripheral artery disease)  Hypothyroidism  AF (atrial fibrillation)  Carotid artery stenosis  AS (aortic stenosis)  Status post closed fracture of right femur      ALLERGIES:  latex (Unknown)  sulfa drugs (Unknown)    --------------------------------------------------------------------------------------    MEDICATIONS:    Neurologic Medications  acetaminophen     Tablet .. 650 milliGRAM(s) Oral every 6 hours  metoclopramide Injectable 5 milliGRAM(s) IV Push every 8 hours PRN nausea    Respiratory Medications    Cardiovascular Medications  aMIOdarone    Tablet 100 milliGRAM(s) Oral daily  losartan 50 milliGRAM(s) Oral daily    Gastrointestinal Medications  calcium carbonate    500 mG (Tums) Chewable 2 Tablet(s) Chew daily PRN Heartburn  famotidine    Tablet 20 milliGRAM(s) Oral daily    Genitourinary Medications    Hematologic/Oncologic Medications  aspirin enteric coated 81 milliGRAM(s) Oral daily  heparin   Injectable 5000 Unit(s) SubCutaneous every 8 hours    Antimicrobial/Immunologic Medications  amoxicillin  875 milliGRAM(s)/clavulanate 1 Tablet(s) Oral two times a day  ciprofloxacin     Tablet 500 milliGRAM(s) Oral every 12 hours    Endocrine/Metabolic Medications  atorvastatin 20 milliGRAM(s) Oral at bedtime  dextrose 50% Injectable 25 Gram(s) IV Push once  dextrose 50% Injectable 12.5 Gram(s) IV Push once  insulin lispro (ADMELOG) corrective regimen sliding scale   SubCutaneous three times a day before meals  insulin lispro (ADMELOG) corrective regimen sliding scale   SubCutaneous at bedtime  levothyroxine 75 MICROGram(s) Oral daily  linagliptin 5 milliGRAM(s) Oral daily    Topical/Other Medications  Dakins Solution - 1/4 Strength 1 Application(s) Topical daily  nystatin Powder 1 Application(s) Topical two times a day    --------------------------------------------------------------------------------------    VITAL SIGNS:  T(C): 36.9 (10-03-23 @ 04:30), Max: 36.9 (10-03-23 @ 04:30)  HR: 98 (10-03-23 @ 07:06) (82 - 98)  BP: 135/79 (10-03-23 @ 07:06) (100/47 - 135/79)  RR: 18 (10-03-23 @ 04:30) (18 - 18)  SpO2: 97% (10-03-23 @ 04:30) (95% - 98%)  --------------------------------------------------------------------------------------    EXAM    General: NAD, resting in bed comfortably. A&Ox3.   Cardiac: regular rate  Respiratory: Nonlabored respirations, normal cw expansion.  Extremities: moving extremities  Vascular: LE vac holding suction; dry gangrene of toes. LLE groin with Dakins wet to dry.    --------------------------------------------------------------------------------------    LABS                          12.3   12.58 )-----------( 256      ( 03 Oct 2023 06:31 )             37.2     10-02    130<L>  |  96<L>  |  16  ----------------------------<  137<H>  5.1   |  28  |  0.85    Ca    8.7      02 Oct 2023 06:34  Phos  2.9     10-02  Mg     2.20     10-02      --------------------------------------------------------------------------------------      I&Os:    10-02-23 @ 07:01  -  10-03-23 @ 07:00  --------------------------------------------------------  IN: 380 mL / OUT: 1275 mL / NET: -895 mL        --------------------------------------------------------------------------------------

## 2023-10-04 LAB
ANION GAP SERPL CALC-SCNC: 9 MMOL/L — SIGNIFICANT CHANGE UP (ref 7–14)
BUN SERPL-MCNC: 17 MG/DL — SIGNIFICANT CHANGE UP (ref 7–23)
CALCIUM SERPL-MCNC: 8.7 MG/DL — SIGNIFICANT CHANGE UP (ref 8.4–10.5)
CHLORIDE SERPL-SCNC: 96 MMOL/L — LOW (ref 98–107)
CO2 SERPL-SCNC: 26 MMOL/L — SIGNIFICANT CHANGE UP (ref 22–31)
CREAT SERPL-MCNC: 0.8 MG/DL — SIGNIFICANT CHANGE UP (ref 0.5–1.3)
EGFR: 74 ML/MIN/1.73M2 — SIGNIFICANT CHANGE UP
GLUCOSE SERPL-MCNC: 127 MG/DL — HIGH (ref 70–99)
HCT VFR BLD CALC: 39.1 % — SIGNIFICANT CHANGE UP (ref 34.5–45)
HGB BLD-MCNC: 12.7 G/DL — SIGNIFICANT CHANGE UP (ref 11.5–15.5)
MAGNESIUM SERPL-MCNC: 2.1 MG/DL — SIGNIFICANT CHANGE UP (ref 1.6–2.6)
MCHC RBC-ENTMCNC: 31 PG — SIGNIFICANT CHANGE UP (ref 27–34)
MCHC RBC-ENTMCNC: 32.5 GM/DL — SIGNIFICANT CHANGE UP (ref 32–36)
MCV RBC AUTO: 95.4 FL — SIGNIFICANT CHANGE UP (ref 80–100)
NRBC # BLD: 0 /100 WBCS — SIGNIFICANT CHANGE UP (ref 0–0)
NRBC # FLD: 0 K/UL — SIGNIFICANT CHANGE UP (ref 0–0)
PHOSPHATE SERPL-MCNC: 2.6 MG/DL — SIGNIFICANT CHANGE UP (ref 2.5–4.5)
PLATELET # BLD AUTO: 279 K/UL — SIGNIFICANT CHANGE UP (ref 150–400)
POTASSIUM SERPL-MCNC: 4.4 MMOL/L — SIGNIFICANT CHANGE UP (ref 3.5–5.3)
POTASSIUM SERPL-SCNC: 4.4 MMOL/L — SIGNIFICANT CHANGE UP (ref 3.5–5.3)
RBC # BLD: 4.1 M/UL — SIGNIFICANT CHANGE UP (ref 3.8–5.2)
RBC # FLD: 19.5 % — HIGH (ref 10.3–14.5)
SODIUM SERPL-SCNC: 131 MMOL/L — LOW (ref 135–145)
WBC # BLD: 13.11 K/UL — HIGH (ref 3.8–10.5)
WBC # FLD AUTO: 13.11 K/UL — HIGH (ref 3.8–10.5)

## 2023-10-04 PROCEDURE — 93010 ELECTROCARDIOGRAM REPORT: CPT

## 2023-10-04 RX ADMIN — PANTOPRAZOLE SODIUM 40 MILLIGRAM(S): 20 TABLET, DELAYED RELEASE ORAL at 06:24

## 2023-10-04 RX ADMIN — LOSARTAN POTASSIUM 50 MILLIGRAM(S): 100 TABLET, FILM COATED ORAL at 06:11

## 2023-10-04 RX ADMIN — Medication 1 TABLET(S): at 06:11

## 2023-10-04 RX ADMIN — HEPARIN SODIUM 5000 UNIT(S): 5000 INJECTION INTRAVENOUS; SUBCUTANEOUS at 06:11

## 2023-10-04 RX ADMIN — Medication 2 TABLET(S): at 22:09

## 2023-10-04 RX ADMIN — LINAGLIPTIN 5 MILLIGRAM(S): 5 TABLET, FILM COATED ORAL at 13:08

## 2023-10-04 RX ADMIN — HEPARIN SODIUM 5000 UNIT(S): 5000 INJECTION INTRAVENOUS; SUBCUTANEOUS at 22:10

## 2023-10-04 RX ADMIN — Medication 500 MILLIGRAM(S): at 06:11

## 2023-10-04 RX ADMIN — AMIODARONE HYDROCHLORIDE 100 MILLIGRAM(S): 400 TABLET ORAL at 06:11

## 2023-10-04 RX ADMIN — Medication 0: at 22:09

## 2023-10-04 RX ADMIN — NYSTATIN CREAM 1 APPLICATION(S): 100000 CREAM TOPICAL at 17:56

## 2023-10-04 RX ADMIN — Medication 1 APPLICATION(S): at 13:04

## 2023-10-04 RX ADMIN — Medication 1 TABLET(S): at 17:57

## 2023-10-04 RX ADMIN — HEPARIN SODIUM 5000 UNIT(S): 5000 INJECTION INTRAVENOUS; SUBCUTANEOUS at 14:25

## 2023-10-04 RX ADMIN — NYSTATIN CREAM 1 APPLICATION(S): 100000 CREAM TOPICAL at 06:12

## 2023-10-04 RX ADMIN — Medication 81 MILLIGRAM(S): at 13:02

## 2023-10-04 RX ADMIN — Medication 75 MICROGRAM(S): at 05:00

## 2023-10-04 RX ADMIN — Medication 500 MILLIGRAM(S): at 17:57

## 2023-10-04 NOTE — PROGRESS NOTE ADULT - ASSESSMENT
· Assessment	  84 yo F w/ PMHx of CAD, pAF on Eliquis, severe AS pending TAVR, R internal carotid stenosis, HTN, HLD, hypothyroidism, anxiety, PAD  s/p L ilioprofunda bypass with reverse GSV graft to peroneal 8/31/23 (Dr. Tavares) and L 1st ray partial resection who presented with L groin erythema c/f infection; CT on admission showing 17cm L groin abscess. Patient now s/p removal of infected bypass graft of LLE on 9/24. Cultures with MDRO klebsiella and proteus.     Plan:  - Appreciate Endo recs  - Diet: Regular   - Groin BID dressing changes with Dakins wet to drys - may require further debridement in the OR  - vac placement on left leg lower medial wound- care per wound team (MWF)  - Appreciate ID recs- Cipro 500 m g po q 12 with augmentin 875 mg po q 12 for through 10/7.  - drug drug interaction with amiodarone and antibiotics. Check EKG q12hr to monitor QTc  - continue home meds/ASA  - DVT ppx: SQH  - OOB/ambulate  - dispo: Rehab once patient medically stable     Vascular Surgery   [id number]

## 2023-10-04 NOTE — PROGRESS NOTE ADULT - SUBJECTIVE AND OBJECTIVE BOX
Vascular SURGERY DAILY PROGRESS NOTE    SUBJECTIVE: Patient seen and evaluated on AM rounds. Pt is resting comfortably in bed with no complaints. Denies fevers/chills, chest pain, dyspnea, abdominal pain, nausea, vomiting, and diarrhea    Overnight Events:  No acute events overnight  -----------------------------------------------------------------------------------------------------------------------------------------------------------------------------------------------------------  OBJECTIVE:  Vital Signs Last 24 Hrs  T(C): 36.7 (04 Oct 2023 04:24), Max: 36.8 (03 Oct 2023 12:01)  T(F): 98.1 (04 Oct 2023 04:24), Max: 98.3 (03 Oct 2023 12:01)  HR: 88 (04 Oct 2023 06:10) (86 - 91)  BP: 134/65 (04 Oct 2023 06:10) (102/56 - 134/65)  BP(mean): --  RR: 17 (04 Oct 2023 04:24) (17 - 18)  SpO2: 97% (04 Oct 2023 04:24) (95% - 99%)    Parameters below as of 04 Oct 2023 04:24  Patient On (Oxygen Delivery Method): room air      I&O's Detail    03 Oct 2023 07:01  -  04 Oct 2023 07:00  --------------------------------------------------------  IN:    Oral Fluid: 600 mL  Total IN: 600 mL    OUT:    VAC (Vacuum Assisted Closure) System (mL): 150 mL    Voided (mL): 650 mL  Total OUT: 800 mL    Total NET: -200 mL        Daily     Daily   MEDICATIONS  (STANDING):  acetaminophen     Tablet .. 650 milliGRAM(s) Oral every 6 hours  aMIOdarone    Tablet 100 milliGRAM(s) Oral daily  amoxicillin  875 milliGRAM(s)/clavulanate 1 Tablet(s) Oral two times a day  aspirin enteric coated 81 milliGRAM(s) Oral daily  atorvastatin 20 milliGRAM(s) Oral at bedtime  ciprofloxacin     Tablet 500 milliGRAM(s) Oral every 12 hours  Dakins Solution - 1/4 Strength 1 Application(s) Topical daily  dextrose 50% Injectable 25 Gram(s) IV Push once  dextrose 50% Injectable 12.5 Gram(s) IV Push once  heparin   Injectable 5000 Unit(s) SubCutaneous every 8 hours  insulin lispro (ADMELOG) corrective regimen sliding scale   SubCutaneous at bedtime  insulin lispro (ADMELOG) corrective regimen sliding scale   SubCutaneous three times a day before meals  levothyroxine 75 MICROGram(s) Oral daily  linagliptin 5 milliGRAM(s) Oral daily  losartan 50 milliGRAM(s) Oral daily  nystatin Powder 1 Application(s) Topical two times a day  pantoprazole    Tablet 40 milliGRAM(s) Oral before breakfast    MEDICATIONS  (PRN):  calcium carbonate    500 mG (Tums) Chewable 2 Tablet(s) Chew daily PRN Heartburn  metoclopramide Injectable 5 milliGRAM(s) IV Push every 8 hours PRN nausea      LABS:                        12.7   13.11 )-----------( 279      ( 04 Oct 2023 06:22 )             39.1     10-03    132<L>  |  98  |  20  ----------------------------<  144<H>  4.7   |  24  |  0.74    Ca    8.8      03 Oct 2023 06:31  Phos  2.8     10-03  Mg     2.10     10-03        Urinalysis Basic - ( 03 Oct 2023 06:31 )    Color: x / Appearance: x / SG: x / pH: x  Gluc: 144 mg/dL / Ketone: x  / Bili: x / Urobili: x   Blood: x / Protein: x / Nitrite: x   Leuk Esterase: x / RBC: x / WBC x   Sq Epi: x / Non Sq Epi: x / Bacteria: x        PHYSICAL EXAM:  Physical Exam:  General: WN/WD NAD  Neurology: nonfocal, follows commands  Respiratory: nonlabored breathing on RA  CV: HDS  Extremities: LE vac holding suction; dry gangrene of toes. LLE groin with Dakins wet to dry.

## 2023-10-05 PROBLEM — Z00.00 ENCOUNTER FOR PREVENTIVE HEALTH EXAMINATION: Status: ACTIVE | Noted: 2023-10-05

## 2023-10-05 LAB
ANION GAP SERPL CALC-SCNC: 11 MMOL/L — SIGNIFICANT CHANGE UP (ref 7–14)
BUN SERPL-MCNC: 18 MG/DL — SIGNIFICANT CHANGE UP (ref 7–23)
CALCIUM SERPL-MCNC: 8.4 MG/DL — SIGNIFICANT CHANGE UP (ref 8.4–10.5)
CHLORIDE SERPL-SCNC: 94 MMOL/L — LOW (ref 98–107)
CO2 SERPL-SCNC: 26 MMOL/L — SIGNIFICANT CHANGE UP (ref 22–31)
CREAT SERPL-MCNC: 0.79 MG/DL — SIGNIFICANT CHANGE UP (ref 0.5–1.3)
EGFR: 75 ML/MIN/1.73M2 — SIGNIFICANT CHANGE UP
GLUCOSE SERPL-MCNC: 120 MG/DL — HIGH (ref 70–99)
HCT VFR BLD CALC: 36.7 % — SIGNIFICANT CHANGE UP (ref 34.5–45)
HGB BLD-MCNC: 11.8 G/DL — SIGNIFICANT CHANGE UP (ref 11.5–15.5)
MAGNESIUM SERPL-MCNC: 2.1 MG/DL — SIGNIFICANT CHANGE UP (ref 1.6–2.6)
MCHC RBC-ENTMCNC: 31.2 PG — SIGNIFICANT CHANGE UP (ref 27–34)
MCHC RBC-ENTMCNC: 32.2 GM/DL — SIGNIFICANT CHANGE UP (ref 32–36)
MCV RBC AUTO: 97.1 FL — SIGNIFICANT CHANGE UP (ref 80–100)
NRBC # BLD: 0 /100 WBCS — SIGNIFICANT CHANGE UP (ref 0–0)
NRBC # FLD: 0 K/UL — SIGNIFICANT CHANGE UP (ref 0–0)
PHOSPHATE SERPL-MCNC: 2.5 MG/DL — SIGNIFICANT CHANGE UP (ref 2.5–4.5)
PLATELET # BLD AUTO: 236 K/UL — SIGNIFICANT CHANGE UP (ref 150–400)
POTASSIUM SERPL-MCNC: 4.6 MMOL/L — SIGNIFICANT CHANGE UP (ref 3.5–5.3)
POTASSIUM SERPL-SCNC: 4.6 MMOL/L — SIGNIFICANT CHANGE UP (ref 3.5–5.3)
RBC # BLD: 3.78 M/UL — LOW (ref 3.8–5.2)
RBC # FLD: 19.4 % — HIGH (ref 10.3–14.5)
SODIUM SERPL-SCNC: 131 MMOL/L — LOW (ref 135–145)
WBC # BLD: 10.14 K/UL — SIGNIFICANT CHANGE UP (ref 3.8–10.5)
WBC # FLD AUTO: 10.14 K/UL — SIGNIFICANT CHANGE UP (ref 3.8–10.5)

## 2023-10-05 PROCEDURE — 93010 ELECTROCARDIOGRAM REPORT: CPT | Mod: 76

## 2023-10-05 RX ORDER — CALCIUM CARBONATE 500(1250)
1 TABLET ORAL
Refills: 0 | Status: DISCONTINUED | OUTPATIENT
Start: 2023-10-05 | End: 2023-10-11

## 2023-10-05 RX ADMIN — LINAGLIPTIN 5 MILLIGRAM(S): 5 TABLET, FILM COATED ORAL at 13:09

## 2023-10-05 RX ADMIN — HEPARIN SODIUM 5000 UNIT(S): 5000 INJECTION INTRAVENOUS; SUBCUTANEOUS at 13:09

## 2023-10-05 RX ADMIN — Medication 500 MILLIGRAM(S): at 18:09

## 2023-10-05 RX ADMIN — Medication 75 MICROGRAM(S): at 05:03

## 2023-10-05 RX ADMIN — Medication 5 MILLIGRAM(S): at 18:16

## 2023-10-05 RX ADMIN — LOSARTAN POTASSIUM 50 MILLIGRAM(S): 100 TABLET, FILM COATED ORAL at 06:03

## 2023-10-05 RX ADMIN — NYSTATIN CREAM 1 APPLICATION(S): 100000 CREAM TOPICAL at 06:03

## 2023-10-05 RX ADMIN — Medication 81 MILLIGRAM(S): at 13:09

## 2023-10-05 RX ADMIN — PANTOPRAZOLE SODIUM 40 MILLIGRAM(S): 20 TABLET, DELAYED RELEASE ORAL at 06:02

## 2023-10-05 RX ADMIN — NYSTATIN CREAM 1 APPLICATION(S): 100000 CREAM TOPICAL at 18:10

## 2023-10-05 RX ADMIN — HEPARIN SODIUM 5000 UNIT(S): 5000 INJECTION INTRAVENOUS; SUBCUTANEOUS at 21:26

## 2023-10-05 RX ADMIN — Medication 1 TABLET(S): at 06:02

## 2023-10-05 RX ADMIN — Medication 1 TABLET(S): at 20:00

## 2023-10-05 RX ADMIN — AMIODARONE HYDROCHLORIDE 100 MILLIGRAM(S): 400 TABLET ORAL at 06:02

## 2023-10-05 RX ADMIN — Medication 1 TABLET(S): at 18:09

## 2023-10-05 RX ADMIN — HEPARIN SODIUM 5000 UNIT(S): 5000 INJECTION INTRAVENOUS; SUBCUTANEOUS at 06:03

## 2023-10-05 RX ADMIN — Medication 2 TABLET(S): at 10:00

## 2023-10-05 RX ADMIN — Medication 500 MILLIGRAM(S): at 06:03

## 2023-10-05 NOTE — PROGRESS NOTE ADULT - SUBJECTIVE AND OBJECTIVE BOX
[facility] Vascular Surgery Daily Progress Note  =====================================================    No acute events overnight     SUBJECTIVE: Patient seen and examined at bedside on AM rounds. Patient reports that they're feeling well. Denies fever, chills, N/V, chest pain, SOB    ALLERGIES:  latex (Unknown)  sulfa drugs (Unknown)      --------------------------------------------------------------------------------------    MEDICATIONS:    Neurologic Medications  acetaminophen     Tablet .. 650 milliGRAM(s) Oral every 6 hours  metoclopramide Injectable 5 milliGRAM(s) IV Push every 8 hours PRN nausea    Respiratory Medications    Cardiovascular Medications  aMIOdarone    Tablet 100 milliGRAM(s) Oral daily  losartan 50 milliGRAM(s) Oral daily    Gastrointestinal Medications  calcium carbonate    500 mG (Tums) Chewable 2 Tablet(s) Chew daily PRN Heartburn  pantoprazole    Tablet 40 milliGRAM(s) Oral before breakfast    Genitourinary Medications    Hematologic/Oncologic Medications  aspirin enteric coated 81 milliGRAM(s) Oral daily  heparin   Injectable 5000 Unit(s) SubCutaneous every 8 hours    Antimicrobial/Immunologic Medications  amoxicillin  875 milliGRAM(s)/clavulanate 1 Tablet(s) Oral two times a day  ciprofloxacin     Tablet 500 milliGRAM(s) Oral every 12 hours    Endocrine/Metabolic Medications  dextrose 50% Injectable 25 Gram(s) IV Push once  dextrose 50% Injectable 12.5 Gram(s) IV Push once  insulin lispro (ADMELOG) corrective regimen sliding scale   SubCutaneous three times a day before meals  insulin lispro (ADMELOG) corrective regimen sliding scale   SubCutaneous at bedtime  levothyroxine 75 MICROGram(s) Oral daily  linagliptin 5 milliGRAM(s) Oral daily    Topical/Other Medications  Dakins Solution - 1/4 Strength 1 Application(s) Topical daily  nystatin Powder 1 Application(s) Topical two times a day    --------------------------------------------------------------------------------------    VITAL SIGNS:  T(C): 36.4 (10-05-23 @ 04:45), Max: 36.9 (10-04-23 @ 08:00)  HR: 76 (10-05-23 @ 04:45) (76 - 96)  BP: 125/69 (10-05-23 @ 04:45) (100/62 - 134/65)  RR: 18 (10-05-23 @ 04:45) (16 - 18)  SpO2: 94% (10-05-23 @ 04:45) (94% - 96%)  --------------------------------------------------------------------------------------    INS AND OUTS:    10-03-23 @ 07:01  -  10-04-23 @ 07:00  --------------------------------------------------------  IN: 600 mL / OUT: 800 mL / NET: -200 mL      --------------------------------------------------------------------------------------      EXAM  General: NAD, resting in bed comfortably. A&Ox3.   Cardiac: regular rate  Respiratory: Nonlabored respirations, normal cw expansion.  Extremities: moving extremities  Vascular: LE vac holding suction; dry gangrene of toes. LLE groin with Dakins wet to dry.  --------------------------------------------------------------------------------------    LABS                          12.7   13.11 )-----------( 279      ( 04 Oct 2023 06:22 )             39.1     10-04    131<L>  |  96<L>  |  17  ----------------------------<  127<H>  4.4   |  26  |  0.80    Ca    8.7      04 Oct 2023 06:22  Phos  2.6     10-04  Mg     2.10     10-04        Urinalysis Basic - ( 04 Oct 2023 06:22 )    Color: x / Appearance: x / SG: x / pH: x  Gluc: 127 mg/dL / Ketone: x  / Bili: x / Urobili: x   Blood: x / Protein: x / Nitrite: x   Leuk Esterase: x / RBC: x / WBC x   Sq Epi: x / Non Sq Epi: x / Bacteria: x

## 2023-10-05 NOTE — PHYSICAL EXAM
[Normal Conjunctiva] : normal conjunctiva [Normal S1, S2] : normal S1, S2 [Soft] : abdomen soft [Non Tender] : non-tender [Normal Gait] : normal gait [No Edema] : no edema [Normal] : moves all extremities, no focal deficits, normal speech [Alert and Oriented] : alert and oriented Hemostasis: Electrocautery

## 2023-10-05 NOTE — PROGRESS NOTE ADULT - ASSESSMENT
82 yo F w/ PMHx of CAD, pAF on Eliquis, severe AS pending TAVR, R internal carotid stenosis, HTN, HLD, hypothyroidism, anxiety, PAD  s/p L ilioprofunda bypass with reverse GSV graft to peroneal 8/31/23 (Dr. Tavares) and L 1st ray partial resection who presented with L groin erythema c/f infection; CT on admission showing 17cm L groin abscess. Patient now s/p removal of infected bypass graft of LLE on 9/24. Cultures with MDRO klebsiella and proteus.     Plan:  - Diet: Regular   - Groin BID dressing changes with Dakins wet to drys - may require further debridement in the OR  - vac placement on left leg lower medial wound- care per wound team (MWF)  - Appreciate ID recs- Cipro 500 m g po q 12 with augmentin 875 mg po q 12 for through 10/7.  - drug drug interaction with amiodarone and antibiotics. Check EKG q12hr to monitor QTc  - continue home meds/ASA  - DVT ppx: SQH  - OOB/ambulate  - dispo: Rehab once patient medically stable     Vascular Surgery   [id number] 82 yo F w/ PMHx of CAD, pAF on Eliquis, severe AS pending TAVR, R internal carotid stenosis, HTN, HLD, hypothyroidism, anxiety, PAD  s/p L ilioprofunda bypass with reverse GSV graft to peroneal 8/31/23 (Dr. Tavares) and L 1st ray partial resection who presented with L groin erythema c/f infection; CT on admission showing 17cm L groin abscess. Patient now s/p removal of infected bypass graft of LLE on 9/24. Cultures with MDRO klebsiella and proteus. QTC have been stable. The patient remains hospitalized for possible further debridement in the OR and medical management.     Plan:  - Diet: Regular   - Groin BID dressing changes with Dakins wet to drys - may require further debridement in the OR  - vac placement on left leg lower medial wound- care per wound team (MWF)  - Appreciate ID recs- Cipro 500 m g po q 12 with augmentin 875 mg po q 12 for through 10/7.  - drug drug interaction with amiodarone and antibiotics. Check EKG q12hr to monitor QTc  - continue home meds/ASA  - DVT ppx: SQH  - OOB/ambulate  - dispo: Rehab once patient medically stable     Vascular Surgery   [id number]

## 2023-10-06 LAB
ANION GAP SERPL CALC-SCNC: 12 MMOL/L — SIGNIFICANT CHANGE UP (ref 7–14)
BUN SERPL-MCNC: 17 MG/DL — SIGNIFICANT CHANGE UP (ref 7–23)
CALCIUM SERPL-MCNC: 8.5 MG/DL — SIGNIFICANT CHANGE UP (ref 8.4–10.5)
CHLORIDE SERPL-SCNC: 98 MMOL/L — SIGNIFICANT CHANGE UP (ref 98–107)
CO2 SERPL-SCNC: 19 MMOL/L — LOW (ref 22–31)
CREAT SERPL-MCNC: 0.73 MG/DL — SIGNIFICANT CHANGE UP (ref 0.5–1.3)
EGFR: 82 ML/MIN/1.73M2 — SIGNIFICANT CHANGE UP
GLUCOSE SERPL-MCNC: 124 MG/DL — HIGH (ref 70–99)
HCT VFR BLD CALC: 36.9 % — SIGNIFICANT CHANGE UP (ref 34.5–45)
HGB BLD-MCNC: 12 G/DL — SIGNIFICANT CHANGE UP (ref 11.5–15.5)
MAGNESIUM SERPL-MCNC: 2 MG/DL — SIGNIFICANT CHANGE UP (ref 1.6–2.6)
MCHC RBC-ENTMCNC: 30.9 PG — SIGNIFICANT CHANGE UP (ref 27–34)
MCHC RBC-ENTMCNC: 32.5 GM/DL — SIGNIFICANT CHANGE UP (ref 32–36)
MCV RBC AUTO: 95.1 FL — SIGNIFICANT CHANGE UP (ref 80–100)
NRBC # BLD: 0 /100 WBCS — SIGNIFICANT CHANGE UP (ref 0–0)
NRBC # FLD: 0 K/UL — SIGNIFICANT CHANGE UP (ref 0–0)
PHOSPHATE SERPL-MCNC: 2.7 MG/DL — SIGNIFICANT CHANGE UP (ref 2.5–4.5)
PLATELET # BLD AUTO: 222 K/UL — SIGNIFICANT CHANGE UP (ref 150–400)
POTASSIUM SERPL-MCNC: 4.5 MMOL/L — SIGNIFICANT CHANGE UP (ref 3.5–5.3)
POTASSIUM SERPL-SCNC: 4.5 MMOL/L — SIGNIFICANT CHANGE UP (ref 3.5–5.3)
RBC # BLD: 3.88 M/UL — SIGNIFICANT CHANGE UP (ref 3.8–5.2)
RBC # FLD: 19.5 % — HIGH (ref 10.3–14.5)
SODIUM SERPL-SCNC: 129 MMOL/L — LOW (ref 135–145)
WBC # BLD: 9.25 K/UL — SIGNIFICANT CHANGE UP (ref 3.8–10.5)
WBC # FLD AUTO: 9.25 K/UL — SIGNIFICANT CHANGE UP (ref 3.8–10.5)

## 2023-10-06 PROCEDURE — 99231 SBSQ HOSP IP/OBS SF/LOW 25: CPT | Mod: GC,24

## 2023-10-06 PROCEDURE — 93010 ELECTROCARDIOGRAM REPORT: CPT

## 2023-10-06 RX ORDER — COLLAGENASE CLOSTRIDIUM HIST. 250 UNIT/G
1 OINTMENT (GRAM) TOPICAL DAILY
Refills: 0 | Status: DISCONTINUED | OUTPATIENT
Start: 2023-10-06 | End: 2023-10-10

## 2023-10-06 RX ADMIN — Medication 500 MILLIGRAM(S): at 17:29

## 2023-10-06 RX ADMIN — Medication 1 TABLET(S): at 06:40

## 2023-10-06 RX ADMIN — Medication 650 MILLIGRAM(S): at 06:40

## 2023-10-06 RX ADMIN — HEPARIN SODIUM 5000 UNIT(S): 5000 INJECTION INTRAVENOUS; SUBCUTANEOUS at 13:18

## 2023-10-06 RX ADMIN — Medication 1 APPLICATION(S): at 17:02

## 2023-10-06 RX ADMIN — AMIODARONE HYDROCHLORIDE 100 MILLIGRAM(S): 400 TABLET ORAL at 06:40

## 2023-10-06 RX ADMIN — Medication 75 MICROGRAM(S): at 04:53

## 2023-10-06 RX ADMIN — HEPARIN SODIUM 5000 UNIT(S): 5000 INJECTION INTRAVENOUS; SUBCUTANEOUS at 06:41

## 2023-10-06 RX ADMIN — Medication 500 MILLIGRAM(S): at 06:40

## 2023-10-06 RX ADMIN — LOSARTAN POTASSIUM 50 MILLIGRAM(S): 100 TABLET, FILM COATED ORAL at 06:40

## 2023-10-06 RX ADMIN — NYSTATIN CREAM 1 APPLICATION(S): 100000 CREAM TOPICAL at 06:41

## 2023-10-06 RX ADMIN — Medication 81 MILLIGRAM(S): at 13:17

## 2023-10-06 RX ADMIN — Medication 1 TABLET(S): at 22:25

## 2023-10-06 RX ADMIN — PANTOPRAZOLE SODIUM 40 MILLIGRAM(S): 20 TABLET, DELAYED RELEASE ORAL at 06:41

## 2023-10-06 RX ADMIN — Medication 1 TABLET(S): at 13:35

## 2023-10-06 RX ADMIN — LINAGLIPTIN 5 MILLIGRAM(S): 5 TABLET, FILM COATED ORAL at 13:18

## 2023-10-06 RX ADMIN — NYSTATIN CREAM 1 APPLICATION(S): 100000 CREAM TOPICAL at 17:29

## 2023-10-06 RX ADMIN — Medication 1 TABLET(S): at 17:29

## 2023-10-06 RX ADMIN — HEPARIN SODIUM 5000 UNIT(S): 5000 INJECTION INTRAVENOUS; SUBCUTANEOUS at 22:25

## 2023-10-06 NOTE — PROGRESS NOTE ADULT - SUBJECTIVE AND OBJECTIVE BOX
[facility] Vascular Surgery Daily Progress Note  =====================================================    No acute events overnight     SUBJECTIVE: Patient seen and examined at bedside on AM rounds. Patient reports that they're feeling well. Denies fever, chills, N/V, chest pain, SOB    ALLERGIES:  latex (Unknown)  sulfa drugs (Unknown)      --------------------------------------------------------------------------------------    MEDICATIONS:    Neurologic Medications  acetaminophen     Tablet .. 650 milliGRAM(s) Oral every 6 hours  metoclopramide Injectable 5 milliGRAM(s) IV Push every 8 hours PRN nausea    Respiratory Medications    Cardiovascular Medications  aMIOdarone    Tablet 100 milliGRAM(s) Oral daily  losartan 50 milliGRAM(s) Oral daily    Gastrointestinal Medications  calcium carbonate    500 mG (Tums) Chewable 1 Tablet(s) Chew two times a day PRN Indigestion  pantoprazole    Tablet 40 milliGRAM(s) Oral before breakfast    Genitourinary Medications    Hematologic/Oncologic Medications  aspirin enteric coated 81 milliGRAM(s) Oral daily  heparin   Injectable 5000 Unit(s) SubCutaneous every 8 hours    Antimicrobial/Immunologic Medications  amoxicillin  875 milliGRAM(s)/clavulanate 1 Tablet(s) Oral two times a day  ciprofloxacin     Tablet 500 milliGRAM(s) Oral every 12 hours    Endocrine/Metabolic Medications  dextrose 50% Injectable 25 Gram(s) IV Push once  dextrose 50% Injectable 12.5 Gram(s) IV Push once  insulin lispro (ADMELOG) corrective regimen sliding scale   SubCutaneous three times a day before meals  insulin lispro (ADMELOG) corrective regimen sliding scale   SubCutaneous at bedtime  levothyroxine 75 MICROGram(s) Oral daily  linagliptin 5 milliGRAM(s) Oral daily    Topical/Other Medications  Dakins Solution - 1/4 Strength 1 Application(s) Topical daily  nystatin Powder 1 Application(s) Topical two times a day    --------------------------------------------------------------------------------------    VITAL SIGNS:  T(C): 36.3 (10-06-23 @ 04:24), Max: 37.4 (10-05-23 @ 08:00)  HR: 85 (10-06-23 @ 04:24) (83 - 89)  BP: 121/57 (10-06-23 @ 04:24) (104/51 - 121/57)  RR: 16 (10-06-23 @ 04:24) (16 - 18)  SpO2: 100% (10-06-23 @ 04:24) (97% - 100%)  --------------------------------------------------------------------------------------    INS AND OUTS:    10-05-23 @ 07:01  -  10-06-23 @ 05:41  --------------------------------------------------------  IN: 458 mL / OUT: 50 mL / NET: 408 mL      --------------------------------------------------------------------------------------      EXAM    EXAM  General: NAD, resting in bed comfortably. A&Ox3.   Cardiac: regular rate  Respiratory: Nonlabored respirations, normal cw expansion.  Extremities: moving extremities  Vascular: LE vac holding suction; dry gangrene of toes. LLE groin with Dakins wet to dry.  --------------------------------------------------------------------------------------    LABS                          11.8   10.14 )-----------( 236      ( 05 Oct 2023 04:36 )             36.7     10-05    131<L>  |  94<L>  |  18  ----------------------------<  120<H>  4.6   |  26  |  0.79    Ca    8.4      05 Oct 2023 04:36  Phos  2.5     10-05  Mg     2.10     10-05        Urinalysis Basic - ( 05 Oct 2023 04:36 )    Color: x / Appearance: x / SG: x / pH: x  Gluc: 120 mg/dL / Ketone: x  / Bili: x / Urobili: x   Blood: x / Protein: x / Nitrite: x   Leuk Esterase: x / RBC: x / WBC x   Sq Epi: x / Non Sq Epi: x / Bacteria: x         Vascular Surgery Daily Progress Note  =====================================================    SUBJECTIVE: Patient seen and examined at bedside on AM rounds. Patient endorses no new complaints. Denies chest pain, SOB, fever, chills.    PAST MEDICAL & SURGICAL HISTORY:  HTN (hypertension)  HLD (hyperlipidemia)  Anxiety  CAD (coronary artery disease)  PAD (peripheral artery disease)  Hypothyroidism  AF (atrial fibrillation)  Carotid artery stenosis  AS (aortic stenosis)  Status post closed fracture of right femur      ALLERGIES:  latex (Unknown)  sulfa drugs (Unknown)    --------------------------------------------------------------------------------------    MEDICATIONS:    Neurologic Medications  acetaminophen     Tablet .. 650 milliGRAM(s) Oral every 6 hours  metoclopramide Injectable 5 milliGRAM(s) IV Push every 8 hours PRN nausea    Respiratory Medications    Cardiovascular Medications  aMIOdarone    Tablet 100 milliGRAM(s) Oral daily  losartan 50 milliGRAM(s) Oral daily    Gastrointestinal Medications  calcium carbonate    500 mG (Tums) Chewable 1 Tablet(s) Chew two times a day PRN Indigestion  pantoprazole    Tablet 40 milliGRAM(s) Oral before breakfast    Genitourinary Medications    Hematologic/Oncologic Medications  aspirin enteric coated 81 milliGRAM(s) Oral daily  heparin   Injectable 5000 Unit(s) SubCutaneous every 8 hours    Antimicrobial/Immunologic Medications  amoxicillin  875 milliGRAM(s)/clavulanate 1 Tablet(s) Oral two times a day  ciprofloxacin     Tablet 500 milliGRAM(s) Oral every 12 hours    Endocrine/Metabolic Medications  dextrose 50% Injectable 25 Gram(s) IV Push once  dextrose 50% Injectable 12.5 Gram(s) IV Push once  insulin lispro (ADMELOG) corrective regimen sliding scale   SubCutaneous at bedtime  insulin lispro (ADMELOG) corrective regimen sliding scale   SubCutaneous three times a day before meals  levothyroxine 75 MICROGram(s) Oral daily  linagliptin 5 milliGRAM(s) Oral daily    Topical/Other Medications  Dakins Solution - 1/4 Strength 1 Application(s) Topical daily  nystatin Powder 1 Application(s) Topical two times a day    --------------------------------------------------------------------------------------    VITAL SIGNS:  T(C): 36.3 (10-06-23 @ 04:24), Max: 36.9 (10-05-23 @ 12:33)  HR: 85 (10-06-23 @ 04:24) (83 - 88)  BP: 121/57 (10-06-23 @ 04:24) (104/51 - 121/57)  RR: 16 (10-06-23 @ 04:24) (16 - 18)  SpO2: 100% (10-06-23 @ 04:24) (97% - 100%)  --------------------------------------------------------------------------------------    EXAM  General: NAD, resting in bed comfortably. A&Ox3.   Cardiac: regular rate  Respiratory: Nonlabored respirations, normal cw expansion.  Extremities: moving extremities  Vascular: LE vac holding suction; dry gangrene of toes. LLE groin with Dakins wet to dry.  --------------------------------------------------------------------------------------    LABS                        11.8   10.14 )-----------( 236      ( 05 Oct 2023 04:36 )             36.7     10-05    131<L>  |  94<L>  |  18  ----------------------------<  120<H>  4.6   |  26  |  0.79    Ca    8.4      05 Oct 2023 04:36  Phos  2.5     10-05  Mg     2.10     10-05          --------------------------------------------------------------------------------------      I&Os:    10-05-23 @ 07:01  -  10-06-23 @ 07:00  --------------------------------------------------------  IN: 558 mL / OUT: 100 mL / NET: 458 mL      --------------------------------------------------------------------------------------

## 2023-10-06 NOTE — PROGRESS NOTE ADULT - ASSESSMENT
82 yo F w/ PMHx of CAD, pAF on Eliquis, severe AS pending TAVR, R internal carotid stenosis, HTN, HLD, hypothyroidism, anxiety, PAD  s/p L ilioprofunda bypass with reverse GSV graft to peroneal 8/31/23 (Dr. Tavares) and L 1st ray partial resection who presented with L groin erythema c/f infection; CT on admission showing 17cm L groin abscess. Patient now s/p removal of infected bypass graft of LLE on 9/24. Cultures with MDRO klebsiella and proteus. QTC have been stable. The patient remains hospitalized for medical management.     Plan:  - Diet: Regular   - Groin BID dressing changes with Dakins wet to drys - may require further debridement in the OR  - vac placement on left leg lower medial wound- care per wound team (MWF)  - Appreciate ID recs- Cipro 500 m g po q 12 with augmentin 875 mg po q 12 for through 10/7.  - drug drug interaction with amiodarone and antibiotics. Check EKG q12hr to monitor QTc  - continue home meds/ASA  - DVT ppx: SQH  - OOB/ambulate  - dispo: Rehab once patient medically stable     Vascular Surgery   [id number] 82 yo F w/ PMHx of CAD, pAF on Eliquis, severe AS pending TAVR, R internal carotid stenosis, HTN, HLD, hypothyroidism, anxiety, PAD  s/p L ilioprofunda bypass with reverse GSV graft to peroneal 8/31/23 (Dr. Tavares) and L 1st ray partial resection who presented with L groin erythema c/f infection; CT on admission showing 17cm L groin abscess. Patient now s/p removal of infected bypass graft of LLE on 9/24. Cultures with MDRO klebsiella and proteus. QTC have been stable. The patient remains hospitalized for medical management and wound care.     Plan:  - Diet: Regular   - Groin BID dressing changes with Dakins wet to drys - may require further debridement in the OR  - vac placement on left leg lower medial wound- care per wound team (MWF)  - Appreciate ID recs- Cipro 500 m g po q 12 with augmentin 875 mg po q 12 for through 10/7.  - drug drug interaction with amiodarone and antibiotics. Check EKG q12hr to monitor QTc  - continue home meds/ASA  - DVT ppx: SQH  - OOB/ambulate    - dispo: Rehab once patient medically stable     Vascular Surgery   [id number] 82 yo F w/ PMHx of CAD, pAF on Eliquis, severe AS pending TAVR, R internal carotid stenosis, HTN, HLD, hypothyroidism, anxiety, PAD  s/p L ilioprofunda bypass with reverse GSV graft to peroneal 8/31/23 (Dr. Tavares) and L 1st ray partial resection who presented with L groin erythema c/f infection; CT on admission showing 17cm L groin abscess. Patient now s/p removal of infected bypass graft of LLE on 9/24. Cultures with MDRO klebsiella and proteus. QTC have been stable. The patient remains hospitalized for medical management and wound care.     Plan:  - Diet: Regular   - Groin BID dressing changes with Dakins wet to drys - collagenase today   - vac placement on left leg lower medial wound- care per wound team (MWF)  - Appreciate ID recs- Cipro 500 m g po q 12 with augmentin 875 mg po q 12 for through 10/7.  - drug drug interaction with amiodarone and antibiotics. Check EKG q12hr to monitor QTc  - continue home meds/ASA  - DVT ppx: SQH  - OOB/ambulate  - dispo: Rehab once patient medically stable     Vascular Surgery   [id number]

## 2023-10-07 LAB
ANION GAP SERPL CALC-SCNC: 10 MMOL/L — SIGNIFICANT CHANGE UP (ref 7–14)
BUN SERPL-MCNC: 18 MG/DL — SIGNIFICANT CHANGE UP (ref 7–23)
CALCIUM SERPL-MCNC: 8.4 MG/DL — SIGNIFICANT CHANGE UP (ref 8.4–10.5)
CHLORIDE SERPL-SCNC: 99 MMOL/L — SIGNIFICANT CHANGE UP (ref 98–107)
CO2 SERPL-SCNC: 22 MMOL/L — SIGNIFICANT CHANGE UP (ref 22–31)
CREAT SERPL-MCNC: 0.72 MG/DL — SIGNIFICANT CHANGE UP (ref 0.5–1.3)
EGFR: 83 ML/MIN/1.73M2 — SIGNIFICANT CHANGE UP
GLUCOSE SERPL-MCNC: 114 MG/DL — HIGH (ref 70–99)
HCT VFR BLD CALC: 37.5 % — SIGNIFICANT CHANGE UP (ref 34.5–45)
HGB BLD-MCNC: 12.2 G/DL — SIGNIFICANT CHANGE UP (ref 11.5–15.5)
MAGNESIUM SERPL-MCNC: 2 MG/DL — SIGNIFICANT CHANGE UP (ref 1.6–2.6)
MCHC RBC-ENTMCNC: 30.8 PG — SIGNIFICANT CHANGE UP (ref 27–34)
MCHC RBC-ENTMCNC: 32.5 GM/DL — SIGNIFICANT CHANGE UP (ref 32–36)
MCV RBC AUTO: 94.7 FL — SIGNIFICANT CHANGE UP (ref 80–100)
NRBC # BLD: 0 /100 WBCS — SIGNIFICANT CHANGE UP (ref 0–0)
NRBC # FLD: 0 K/UL — SIGNIFICANT CHANGE UP (ref 0–0)
PHOSPHATE SERPL-MCNC: 2.6 MG/DL — SIGNIFICANT CHANGE UP (ref 2.5–4.5)
PLATELET # BLD AUTO: 214 K/UL — SIGNIFICANT CHANGE UP (ref 150–400)
POTASSIUM SERPL-MCNC: 4.6 MMOL/L — SIGNIFICANT CHANGE UP (ref 3.5–5.3)
POTASSIUM SERPL-SCNC: 4.6 MMOL/L — SIGNIFICANT CHANGE UP (ref 3.5–5.3)
RBC # BLD: 3.96 M/UL — SIGNIFICANT CHANGE UP (ref 3.8–5.2)
RBC # FLD: 19.4 % — HIGH (ref 10.3–14.5)
SODIUM SERPL-SCNC: 131 MMOL/L — LOW (ref 135–145)
WBC # BLD: 9.5 K/UL — SIGNIFICANT CHANGE UP (ref 3.8–10.5)
WBC # FLD AUTO: 9.5 K/UL — SIGNIFICANT CHANGE UP (ref 3.8–10.5)

## 2023-10-07 PROCEDURE — 93010 ELECTROCARDIOGRAM REPORT: CPT

## 2023-10-07 RX ORDER — SODIUM HYPOCHLORITE 0.125 %
1 SOLUTION, NON-ORAL MISCELLANEOUS
Refills: 0 | Status: DISCONTINUED | OUTPATIENT
Start: 2023-10-07 | End: 2023-10-07

## 2023-10-07 RX ADMIN — Medication 500 MILLIGRAM(S): at 17:23

## 2023-10-07 RX ADMIN — HEPARIN SODIUM 5000 UNIT(S): 5000 INJECTION INTRAVENOUS; SUBCUTANEOUS at 14:28

## 2023-10-07 RX ADMIN — Medication 650 MILLIGRAM(S): at 09:35

## 2023-10-07 RX ADMIN — Medication 500 MILLIGRAM(S): at 06:58

## 2023-10-07 RX ADMIN — AMIODARONE HYDROCHLORIDE 100 MILLIGRAM(S): 400 TABLET ORAL at 06:58

## 2023-10-07 RX ADMIN — LOSARTAN POTASSIUM 50 MILLIGRAM(S): 100 TABLET, FILM COATED ORAL at 06:58

## 2023-10-07 RX ADMIN — Medication 75 MICROGRAM(S): at 05:36

## 2023-10-07 RX ADMIN — NYSTATIN CREAM 1 APPLICATION(S): 100000 CREAM TOPICAL at 06:58

## 2023-10-07 RX ADMIN — Medication 650 MILLIGRAM(S): at 09:05

## 2023-10-07 RX ADMIN — LINAGLIPTIN 5 MILLIGRAM(S): 5 TABLET, FILM COATED ORAL at 12:36

## 2023-10-07 RX ADMIN — HEPARIN SODIUM 5000 UNIT(S): 5000 INJECTION INTRAVENOUS; SUBCUTANEOUS at 22:54

## 2023-10-07 RX ADMIN — Medication 1 TABLET(S): at 17:24

## 2023-10-07 RX ADMIN — Medication 81 MILLIGRAM(S): at 12:36

## 2023-10-07 RX ADMIN — HEPARIN SODIUM 5000 UNIT(S): 5000 INJECTION INTRAVENOUS; SUBCUTANEOUS at 05:36

## 2023-10-07 RX ADMIN — NYSTATIN CREAM 1 APPLICATION(S): 100000 CREAM TOPICAL at 17:23

## 2023-10-07 RX ADMIN — PANTOPRAZOLE SODIUM 40 MILLIGRAM(S): 20 TABLET, DELAYED RELEASE ORAL at 06:58

## 2023-10-07 RX ADMIN — Medication 1 TABLET(S): at 06:58

## 2023-10-07 NOTE — PROGRESS NOTE ADULT - SUBJECTIVE AND OBJECTIVE BOX
TE[facility] Vascular Surgery Daily Progress Note  =====================================================    OVERNIGHT EVENTS:     - no acute events    SUBJECTIVE: Patient seen and examined at bedside on AM rounds. Patient reports that they're feeling well. Denies fever, chills, N/V, chest pain, SOB    ALLERGIES:  latex (Unknown)  sulfa drugs (Unknown)      --------------------------------------------------------------------------------------    MEDICATIONS:    Neurologic Medications  acetaminophen     Tablet .. 650 milliGRAM(s) Oral every 6 hours  metoclopramide Injectable 5 milliGRAM(s) IV Push every 8 hours PRN nausea    Respiratory Medications    Cardiovascular Medications  aMIOdarone    Tablet 100 milliGRAM(s) Oral daily  losartan 50 milliGRAM(s) Oral daily    Gastrointestinal Medications  calcium carbonate    500 mG (Tums) Chewable 1 Tablet(s) Chew two times a day PRN Indigestion  pantoprazole    Tablet 40 milliGRAM(s) Oral before breakfast    Genitourinary Medications    Hematologic/Oncologic Medications  aspirin enteric coated 81 milliGRAM(s) Oral daily  heparin   Injectable 5000 Unit(s) SubCutaneous every 8 hours    Antimicrobial/Immunologic Medications  amoxicillin  875 milliGRAM(s)/clavulanate 1 Tablet(s) Oral two times a day  ciprofloxacin     Tablet 500 milliGRAM(s) Oral every 12 hours    Endocrine/Metabolic Medications  dextrose 50% Injectable 25 Gram(s) IV Push once  dextrose 50% Injectable 12.5 Gram(s) IV Push once  insulin lispro (ADMELOG) corrective regimen sliding scale   SubCutaneous at bedtime  insulin lispro (ADMELOG) corrective regimen sliding scale   SubCutaneous three times a day before meals  levothyroxine 75 MICROGram(s) Oral daily  linagliptin 5 milliGRAM(s) Oral daily    Topical/Other Medications  collagenase Ointment 1 Application(s) Topical daily  Dakins Solution - 1/4 Strength 1 Application(s) Topical daily  nystatin Powder 1 Application(s) Topical two times a day    --------------------------------------------------------------------------------------    VITAL SIGNS:  T(C): 36.7 (10-07-23 @ 05:27), Max: 36.8 (10-07-23 @ 01:37)  HR: 81 (10-07-23 @ 05:27) (80 - 96)  BP: 113/61 (10-07-23 @ 05:27) (100/57 - 115/58)  RR: 17 (10-07-23 @ 05:27) (17 - 17)  SpO2: 98% (10-07-23 @ 05:27) (95% - 98%)  --------------------------------------------------------------------------------------    INS AND OUTS:    10-05-23 @ 07:01  -  10-06-23 @ 07:00  --------------------------------------------------------  IN: 558 mL / OUT: 100 mL / NET: 458 mL      --------------------------------------------------------------------------------------      EXAM    General: NAD, resting in bed comfortably.  Cardiac: regular rate, warm and well perfused  Respiratory: Nonlabored respirations, normal cw expansion.  Abdomen: soft, nontender, nondistended.   Extremities: normal strength, FROM, no deformities  Vascular: LE vac holding suction; dry gangrene of toes. LLE groin with Dakins wet to dry.  --------------------------------------------------------------------------------------    LABS                          12.2   9.50  )-----------( 214      ( 07 Oct 2023 06:28 )             37.5     10-06    129<L>  |  98  |  17  ----------------------------<  124<H>  4.5   |  19<L>  |  0.73    Ca    8.5      06 Oct 2023 06:25  Phos  2.7     10-06  Mg     2.00     10-06        Urinalysis Basic - ( 06 Oct 2023 06:25 )    Color: x / Appearance: x / SG: x / pH: x  Gluc: 124 mg/dL / Ketone: x  / Bili: x / Urobili: x   Blood: x / Protein: x / Nitrite: x   Leuk Esterase: x / RBC: x / WBC x   Sq Epi: x / Non Sq Epi: x / Bacteria: x

## 2023-10-07 NOTE — PROGRESS NOTE ADULT - ASSESSMENT
82 yo F w/ PMHx of CAD, pAF on Eliquis, severe AS pending TAVR, R internal carotid stenosis, HTN, HLD, hypothyroidism, anxiety, PAD  s/p L ilioprofunda bypass with reverse GSV graft to peroneal 8/31/23 (Dr. Tavares) and L 1st ray partial resection who presented with L groin erythema c/f infection; CT on admission showing 17cm L groin abscess. Patient now s/p removal of infected bypass graft of LLE on 9/24. Cultures with MDRO klebsiella and proteus. QTC have been stable. The patient remains hospitalized for medical management and wound care.     Plan:  - Diet: Regular   - Groin BID dressing changes with Dakins wet to drys - now with collagenase   - vac placement on left leg lower medial wound- care per wound team (MWF)  - Appreciate ID recs- Cipro 500 m g po q 12 with augmentin 875 mg po q 12 for through 10/7.  - drug drug interaction with amiodarone and antibiotics. Check EKG q12hr to monitor QTc  - continue home meds/ASA  - DVT ppx: SQH  - OOB/ambulate  - dispo: Rehab once patient medically stable     Vascular Surgery   [id number]

## 2023-10-08 LAB
ANION GAP SERPL CALC-SCNC: 11 MMOL/L — SIGNIFICANT CHANGE UP (ref 7–14)
BUN SERPL-MCNC: 17 MG/DL — SIGNIFICANT CHANGE UP (ref 7–23)
CALCIUM SERPL-MCNC: 8.5 MG/DL — SIGNIFICANT CHANGE UP (ref 8.4–10.5)
CHLORIDE SERPL-SCNC: 96 MMOL/L — LOW (ref 98–107)
CO2 SERPL-SCNC: 24 MMOL/L — SIGNIFICANT CHANGE UP (ref 22–31)
CREAT SERPL-MCNC: 0.82 MG/DL — SIGNIFICANT CHANGE UP (ref 0.5–1.3)
EGFR: 71 ML/MIN/1.73M2 — SIGNIFICANT CHANGE UP
GLUCOSE SERPL-MCNC: 100 MG/DL — HIGH (ref 70–99)
HCT VFR BLD CALC: 41 % — SIGNIFICANT CHANGE UP (ref 34.5–45)
HGB BLD-MCNC: 13.2 G/DL — SIGNIFICANT CHANGE UP (ref 11.5–15.5)
MAGNESIUM SERPL-MCNC: 2.2 MG/DL — SIGNIFICANT CHANGE UP (ref 1.6–2.6)
MCHC RBC-ENTMCNC: 30.9 PG — SIGNIFICANT CHANGE UP (ref 27–34)
MCHC RBC-ENTMCNC: 32.2 GM/DL — SIGNIFICANT CHANGE UP (ref 32–36)
MCV RBC AUTO: 96 FL — SIGNIFICANT CHANGE UP (ref 80–100)
NRBC # BLD: 0 /100 WBCS — SIGNIFICANT CHANGE UP (ref 0–0)
NRBC # FLD: 0 K/UL — SIGNIFICANT CHANGE UP (ref 0–0)
PHOSPHATE SERPL-MCNC: 2.4 MG/DL — LOW (ref 2.5–4.5)
PLATELET # BLD AUTO: 228 K/UL — SIGNIFICANT CHANGE UP (ref 150–400)
POTASSIUM SERPL-MCNC: 4.3 MMOL/L — SIGNIFICANT CHANGE UP (ref 3.5–5.3)
POTASSIUM SERPL-SCNC: 4.3 MMOL/L — SIGNIFICANT CHANGE UP (ref 3.5–5.3)
RBC # BLD: 4.27 M/UL — SIGNIFICANT CHANGE UP (ref 3.8–5.2)
RBC # FLD: 19.4 % — HIGH (ref 10.3–14.5)
SODIUM SERPL-SCNC: 131 MMOL/L — LOW (ref 135–145)
WBC # BLD: 8.71 K/UL — SIGNIFICANT CHANGE UP (ref 3.8–10.5)
WBC # FLD AUTO: 8.71 K/UL — SIGNIFICANT CHANGE UP (ref 3.8–10.5)

## 2023-10-08 PROCEDURE — 93010 ELECTROCARDIOGRAM REPORT: CPT

## 2023-10-08 RX ORDER — ONDANSETRON 8 MG/1
4 TABLET, FILM COATED ORAL ONCE
Refills: 0 | Status: COMPLETED | OUTPATIENT
Start: 2023-10-08 | End: 2023-10-08

## 2023-10-08 RX ORDER — SODIUM,POTASSIUM PHOSPHATES 278-250MG
2 POWDER IN PACKET (EA) ORAL ONCE
Refills: 0 | Status: COMPLETED | OUTPATIENT
Start: 2023-10-08 | End: 2023-10-08

## 2023-10-08 RX ADMIN — LINAGLIPTIN 5 MILLIGRAM(S): 5 TABLET, FILM COATED ORAL at 17:58

## 2023-10-08 RX ADMIN — Medication 650 MILLIGRAM(S): at 13:10

## 2023-10-08 RX ADMIN — NYSTATIN CREAM 1 APPLICATION(S): 100000 CREAM TOPICAL at 09:21

## 2023-10-08 RX ADMIN — Medication 650 MILLIGRAM(S): at 06:21

## 2023-10-08 RX ADMIN — HEPARIN SODIUM 5000 UNIT(S): 5000 INJECTION INTRAVENOUS; SUBCUTANEOUS at 06:23

## 2023-10-08 RX ADMIN — Medication 2 TABLET(S): at 09:21

## 2023-10-08 RX ADMIN — ONDANSETRON 4 MILLIGRAM(S): 8 TABLET, FILM COATED ORAL at 17:56

## 2023-10-08 RX ADMIN — Medication 1 TABLET(S): at 17:56

## 2023-10-08 RX ADMIN — LOSARTAN POTASSIUM 50 MILLIGRAM(S): 100 TABLET, FILM COATED ORAL at 06:23

## 2023-10-08 RX ADMIN — HEPARIN SODIUM 5000 UNIT(S): 5000 INJECTION INTRAVENOUS; SUBCUTANEOUS at 13:09

## 2023-10-08 RX ADMIN — Medication 500 MILLIGRAM(S): at 17:58

## 2023-10-08 RX ADMIN — PANTOPRAZOLE SODIUM 40 MILLIGRAM(S): 20 TABLET, DELAYED RELEASE ORAL at 06:23

## 2023-10-08 RX ADMIN — Medication 1 TABLET(S): at 06:22

## 2023-10-08 RX ADMIN — Medication 500 MILLIGRAM(S): at 06:23

## 2023-10-08 RX ADMIN — AMIODARONE HYDROCHLORIDE 100 MILLIGRAM(S): 400 TABLET ORAL at 06:24

## 2023-10-08 RX ADMIN — Medication 75 MICROGRAM(S): at 05:40

## 2023-10-08 RX ADMIN — Medication 81 MILLIGRAM(S): at 13:09

## 2023-10-08 RX ADMIN — HEPARIN SODIUM 5000 UNIT(S): 5000 INJECTION INTRAVENOUS; SUBCUTANEOUS at 21:36

## 2023-10-08 RX ADMIN — Medication 650 MILLIGRAM(S): at 13:40

## 2023-10-08 NOTE — PROGRESS NOTE ADULT - SUBJECTIVE AND OBJECTIVE BOX
[facility] Vascular Surgery Daily Progress Note  =====================================================    SUBJECTIVE: Patient seen and examined at bedside on AM rounds. Patient reports that they're feeling well. Denies fever, chills, N/V, chest pain, SOB    ALLERGIES:  latex (Unknown)  sulfa drugs (Unknown)      --------------------------------------------------------------------------------------    MEDICATIONS:    Neurologic Medications  acetaminophen     Tablet .. 650 milliGRAM(s) Oral every 6 hours  metoclopramide Injectable 5 milliGRAM(s) IV Push every 8 hours PRN nausea    Respiratory Medications    Cardiovascular Medications  aMIOdarone    Tablet 100 milliGRAM(s) Oral daily  losartan 50 milliGRAM(s) Oral daily    Gastrointestinal Medications  calcium carbonate    500 mG (Tums) Chewable 1 Tablet(s) Chew two times a day PRN Indigestion  pantoprazole    Tablet 40 milliGRAM(s) Oral before breakfast    Genitourinary Medications    Hematologic/Oncologic Medications  aspirin enteric coated 81 milliGRAM(s) Oral daily  heparin   Injectable 5000 Unit(s) SubCutaneous every 8 hours    Antimicrobial/Immunologic Medications  amoxicillin  875 milliGRAM(s)/clavulanate 1 Tablet(s) Oral two times a day  ciprofloxacin     Tablet 500 milliGRAM(s) Oral every 12 hours    Endocrine/Metabolic Medications  dextrose 50% Injectable 25 Gram(s) IV Push once  dextrose 50% Injectable 12.5 Gram(s) IV Push once  insulin lispro (ADMELOG) corrective regimen sliding scale   SubCutaneous at bedtime  insulin lispro (ADMELOG) corrective regimen sliding scale   SubCutaneous three times a day before meals  levothyroxine 75 MICROGram(s) Oral daily  linagliptin 5 milliGRAM(s) Oral daily    Topical/Other Medications  collagenase Ointment 1 Application(s) Topical daily  Dakins Solution - 1/4 Strength 1 Application(s) Topical daily  nystatin Powder 1 Application(s) Topical two times a day    --------------------------------------------------------------------------------------    VITAL SIGNS:  T(C): 36.6 (10-08-23 @ 05:00), Max: 36.8 (10-07-23 @ 20:36)  HR: 84 (10-08-23 @ 05:00) (78 - 87)  BP: 125/51 (10-08-23 @ 05:00) (100/45 - 125/51)  RR: 16 (10-08-23 @ 05:00) (16 - 18)  SpO2: 96% (10-08-23 @ 05:00) (96% - 100%)  --------------------------------------------------------------------------------------    INS AND OUTS:    10-06-23 @ 07:01  -  10-07-23 @ 07:00  --------------------------------------------------------  IN: 300 mL / OUT: 700 mL / NET: -400 mL      --------------------------------------------------------------------------------------      EXAM    General: NAD, resting in bed comfortably.  Cardiac: regular rate, warm and well perfused  Respiratory: Nonlabored respirations, normal cw expansion.  Abdomen: soft, nontender, nondistended.   Extremities: normal strength, FROM, no deformities  Vascular: LE vac holding suction; dry gangrene of toes. LLE groin with Dakins wet to dry.    --------------------------------------------------------------------------------------    LABS                          12.2   9.50  )-----------( 214      ( 07 Oct 2023 06:28 )             37.5     10-07    131<L>  |  99  |  18  ----------------------------<  114<H>  4.6   |  22  |  0.72    Ca    8.4      07 Oct 2023 06:28  Phos  2.6     10-07  Mg     2.00     10-07        Urinalysis Basic - ( 07 Oct 2023 06:28 )    Color: x / Appearance: x / SG: x / pH: x  Gluc: 114 mg/dL / Ketone: x  / Bili: x / Urobili: x   Blood: x / Protein: x / Nitrite: x   Leuk Esterase: x / RBC: x / WBC x   Sq Epi: x / Non Sq Epi: x / Bacteria: x

## 2023-10-08 NOTE — PROGRESS NOTE ADULT - ASSESSMENT
82 yo F w/ PMHx of CAD, pAF on Eliquis, severe AS pending TAVR, R internal carotid stenosis, HTN, HLD, hypothyroidism, anxiety, PAD  s/p L ilioprofunda bypass with reverse GSV graft to peroneal 8/31/23 (Dr. Tavares) and L 1st ray partial resection who presented with L groin erythema c/f infection; CT on admission showing 17cm L groin abscess. Patient now s/p removal of infected bypass graft of LLE on 9/24. Cultures with MDRO klebsiella and proteus. QTC have been stable. The patient remains hospitalized for medical management and wound care.     Plan:  - Diet: Regular   - Groin BID dressing changes with Dakins wet to drys - now with collagenase   - vac placement on left leg lower medial wound- care per wound team (MWF)  - Appreciate ID recs- Cipro 500 m g po q 12 with augmentin 875 mg po q 12 for through 10/7.  - drug drug interaction with amiodarone and antibiotics. Check EKG q12hr to monitor QTc  - continue home meds/ASA  - DVT ppx: SQH  - OOB/ambulate  - dispo: Rehab once patient medically stable     Vascular Surgery   [id number]

## 2023-10-09 LAB
ANION GAP SERPL CALC-SCNC: 9 MMOL/L — SIGNIFICANT CHANGE UP (ref 7–14)
BUN SERPL-MCNC: 14 MG/DL — SIGNIFICANT CHANGE UP (ref 7–23)
CALCIUM SERPL-MCNC: 8.6 MG/DL — SIGNIFICANT CHANGE UP (ref 8.4–10.5)
CHLORIDE SERPL-SCNC: 96 MMOL/L — LOW (ref 98–107)
CO2 SERPL-SCNC: 26 MMOL/L — SIGNIFICANT CHANGE UP (ref 22–31)
CREAT SERPL-MCNC: 0.76 MG/DL — SIGNIFICANT CHANGE UP (ref 0.5–1.3)
EGFR: 78 ML/MIN/1.73M2 — SIGNIFICANT CHANGE UP
GLUCOSE SERPL-MCNC: 113 MG/DL — HIGH (ref 70–99)
HCT VFR BLD CALC: 40.7 % — SIGNIFICANT CHANGE UP (ref 34.5–45)
HGB BLD-MCNC: 13.2 G/DL — SIGNIFICANT CHANGE UP (ref 11.5–15.5)
MAGNESIUM SERPL-MCNC: 2 MG/DL — SIGNIFICANT CHANGE UP (ref 1.6–2.6)
MCHC RBC-ENTMCNC: 31.1 PG — SIGNIFICANT CHANGE UP (ref 27–34)
MCHC RBC-ENTMCNC: 32.4 GM/DL — SIGNIFICANT CHANGE UP (ref 32–36)
MCV RBC AUTO: 95.8 FL — SIGNIFICANT CHANGE UP (ref 80–100)
NRBC # BLD: 0 /100 WBCS — SIGNIFICANT CHANGE UP (ref 0–0)
NRBC # FLD: 0 K/UL — SIGNIFICANT CHANGE UP (ref 0–0)
PHOSPHATE SERPL-MCNC: 2.7 MG/DL — SIGNIFICANT CHANGE UP (ref 2.5–4.5)
PLATELET # BLD AUTO: 209 K/UL — SIGNIFICANT CHANGE UP (ref 150–400)
POTASSIUM SERPL-MCNC: 4.6 MMOL/L — SIGNIFICANT CHANGE UP (ref 3.5–5.3)
POTASSIUM SERPL-SCNC: 4.6 MMOL/L — SIGNIFICANT CHANGE UP (ref 3.5–5.3)
RBC # BLD: 4.25 M/UL — SIGNIFICANT CHANGE UP (ref 3.8–5.2)
RBC # FLD: 19.2 % — HIGH (ref 10.3–14.5)
SODIUM SERPL-SCNC: 131 MMOL/L — LOW (ref 135–145)
WBC # BLD: 8.57 K/UL — SIGNIFICANT CHANGE UP (ref 3.8–10.5)
WBC # FLD AUTO: 8.57 K/UL — SIGNIFICANT CHANGE UP (ref 3.8–10.5)

## 2023-10-09 PROCEDURE — 99232 SBSQ HOSP IP/OBS MODERATE 35: CPT

## 2023-10-09 PROCEDURE — 93010 ELECTROCARDIOGRAM REPORT: CPT

## 2023-10-09 RX ADMIN — Medication 500 MILLIGRAM(S): at 18:36

## 2023-10-09 RX ADMIN — Medication 75 MICROGRAM(S): at 05:14

## 2023-10-09 RX ADMIN — AMIODARONE HYDROCHLORIDE 100 MILLIGRAM(S): 400 TABLET ORAL at 06:20

## 2023-10-09 RX ADMIN — Medication 325 MILLIGRAM(S): at 18:37

## 2023-10-09 RX ADMIN — Medication 500 MILLIGRAM(S): at 06:20

## 2023-10-09 RX ADMIN — Medication 1 TABLET(S): at 06:19

## 2023-10-09 RX ADMIN — HEPARIN SODIUM 5000 UNIT(S): 5000 INJECTION INTRAVENOUS; SUBCUTANEOUS at 06:20

## 2023-10-09 RX ADMIN — Medication 650 MILLIGRAM(S): at 19:07

## 2023-10-09 RX ADMIN — LINAGLIPTIN 5 MILLIGRAM(S): 5 TABLET, FILM COATED ORAL at 11:37

## 2023-10-09 RX ADMIN — NYSTATIN CREAM 1 APPLICATION(S): 100000 CREAM TOPICAL at 06:20

## 2023-10-09 RX ADMIN — HEPARIN SODIUM 5000 UNIT(S): 5000 INJECTION INTRAVENOUS; SUBCUTANEOUS at 15:04

## 2023-10-09 RX ADMIN — HEPARIN SODIUM 5000 UNIT(S): 5000 INJECTION INTRAVENOUS; SUBCUTANEOUS at 22:14

## 2023-10-09 RX ADMIN — PANTOPRAZOLE SODIUM 40 MILLIGRAM(S): 20 TABLET, DELAYED RELEASE ORAL at 06:20

## 2023-10-09 RX ADMIN — Medication 81 MILLIGRAM(S): at 11:38

## 2023-10-09 RX ADMIN — LOSARTAN POTASSIUM 50 MILLIGRAM(S): 100 TABLET, FILM COATED ORAL at 06:20

## 2023-10-09 RX ADMIN — Medication 1 TABLET(S): at 18:37

## 2023-10-09 NOTE — PROGRESS NOTE ADULT - SUBJECTIVE AND OBJECTIVE BOX
Vascular Surgery Daily Progress Note  =====================================================    SUBJECTIVE: Patient seen and examined at bedside on AM rounds. Patient endorses no new complaints. Denies chest pain, SOB, fever, chills.    PAST MEDICAL & SURGICAL HISTORY:  HTN (hypertension)  HLD (hyperlipidemia)  Anxiety  CAD (coronary artery disease)  PAD (peripheral artery disease)  Hypothyroidism  AF (atrial fibrillation)  Carotid artery stenosis  AS (aortic stenosis)  Status post closed fracture of right femur    ALLERGIES:  latex (Unknown)  sulfa drugs (Unknown)    --------------------------------------------------------------------------------------    MEDICATIONS:    Neurologic Medications  acetaminophen     Tablet .. 650 milliGRAM(s) Oral every 6 hours  metoclopramide Injectable 5 milliGRAM(s) IV Push every 8 hours PRN nausea    Respiratory Medications    Cardiovascular Medications  aMIOdarone    Tablet 100 milliGRAM(s) Oral daily  losartan 50 milliGRAM(s) Oral daily    Gastrointestinal Medications  calcium carbonate    500 mG (Tums) Chewable 1 Tablet(s) Chew two times a day PRN Indigestion  pantoprazole    Tablet 40 milliGRAM(s) Oral before breakfast    Genitourinary Medications    Hematologic/Oncologic Medications  aspirin enteric coated 81 milliGRAM(s) Oral daily  heparin   Injectable 5000 Unit(s) SubCutaneous every 8 hours    Antimicrobial/Immunologic Medications  amoxicillin  875 milliGRAM(s)/clavulanate 1 Tablet(s) Oral two times a day  ciprofloxacin     Tablet 500 milliGRAM(s) Oral every 12 hours    Endocrine/Metabolic Medications  dextrose 50% Injectable 25 Gram(s) IV Push once  dextrose 50% Injectable 12.5 Gram(s) IV Push once  insulin lispro (ADMELOG) corrective regimen sliding scale   SubCutaneous at bedtime  insulin lispro (ADMELOG) corrective regimen sliding scale   SubCutaneous three times a day before meals  levothyroxine 75 MICROGram(s) Oral daily  linagliptin 5 milliGRAM(s) Oral daily    Topical/Other Medications  collagenase Ointment 1 Application(s) Topical daily  Dakins Solution - 1/4 Strength 1 Application(s) Topical daily  nystatin Powder 1 Application(s) Topical two times a day    --------------------------------------------------------------------------------------    VITAL SIGNS:  T(C): 36.7 (10-09-23 @ 04:30), Max: 36.8 (10-08-23 @ 09:32)  HR: 83 (10-09-23 @ 04:30) (77 - 88)  BP: 120/70 (10-09-23 @ 04:30) (109/54 - 122/68)  RR: 18 (10-09-23 @ 04:30) (18 - 18)  SpO2: 97% (10-09-23 @ 04:30) (95% - 99%)  --------------------------------------------------------------------------------------    EXAM    General: NAD, resting in bed comfortably. A&Ox3.   Cardiac: regular rate  Respiratory: Nonlabored respirations, normal cw expansion.  Extremities: moving extremities  Vascular: LE vac holding suction; dry gangrene of toes. LLE groin with some fibrinous tissue.     --------------------------------------------------------------------------------------    LABS                          13.2   8.71  )-----------( 228      ( 08 Oct 2023 05:14 )             41.0     10-08    131<L>  |  96<L>  |  17  ----------------------------<  100<H>  4.3   |  24  |  0.82    Ca    8.5      08 Oct 2023 05:14  Phos  2.4     10-08  Mg     2.20     10-08      --------------------------------------------------------------------------------------      I&Os:    10-08-23 @ 07:01  -  10-09-23 @ 07:00  --------------------------------------------------------  IN: 1450 mL / OUT: 1400 mL / NET: 50 mL        --------------------------------------------------------------------------------------

## 2023-10-09 NOTE — PROGRESS NOTE ADULT - ASSESSMENT
84 yo F w/ PMHx of CAD, pAF on Eliquis, severe AS pending TAVR, R internal carotid stenosis, HTN, HLD, hypothyroidism, anxiety, PAD  s/p L ilioprofunda bypass with reverse GSV graft to peroneal 8/31/23 (Dr. Tavares) and L 1st ray partial resection who presented with L groin erythema c/f infection; CT on admission showing 17cm L groin abscess. Patient now s/p removal of infected bypass graft of LLE on 9/24. Cultures with MDRO klebsiella and proteus. QTC have been stable. The patient remains hospitalized for medical management and wound care.     Plan:  - Diet: Regular   - plan for vac placement of LLE groin with collagenase   - vac placement on left leg lower medial wound- care per wound team (MWF)  - Appreciate ID recs- Cipro 500 m g po q 12 with augmentin 875 mg po q 12  - drug drug interaction with amiodarone and antibiotics. Check EKG q12hr to monitor QTc  - continue home meds/ASA  - DVT ppx: SQH  - OOB/ambulate  - dispo: Rehab once patient medically stable     Vascular Surgery   [id number]

## 2023-10-09 NOTE — PROGRESS NOTE ADULT - SUBJECTIVE AND OBJECTIVE BOX
Follow Up:      Inverval History/ROS:Patient is a 83y old  Female who presents with a chief complaint of c/f L groin infection (09 Oct 2023 07:35)    No fever  No events    Spoke with vascular surgery. Wound has been debrided- no purulence but does not look healthy        Allergies    latex (Unknown)  sulfa drugs (Unknown)    Intolerances        ANTIMICROBIALS:  amoxicillin  875 milliGRAM(s)/clavulanate 1 two times a day  ciprofloxacin     Tablet 500 every 12 hours      OTHER MEDS:  acetaminophen     Tablet .. 650 milliGRAM(s) Oral every 6 hours  aMIOdarone    Tablet 100 milliGRAM(s) Oral daily  aspirin enteric coated 81 milliGRAM(s) Oral daily  calcium carbonate    500 mG (Tums) Chewable 1 Tablet(s) Chew two times a day PRN  collagenase Ointment 1 Application(s) Topical daily  Dakins Solution - 1/4 Strength 1 Application(s) Topical daily  dextrose 50% Injectable 12.5 Gram(s) IV Push once  dextrose 50% Injectable 25 Gram(s) IV Push once  heparin   Injectable 5000 Unit(s) SubCutaneous every 8 hours  insulin lispro (ADMELOG) corrective regimen sliding scale   SubCutaneous three times a day before meals  insulin lispro (ADMELOG) corrective regimen sliding scale   SubCutaneous at bedtime  levothyroxine 75 MICROGram(s) Oral daily  linagliptin 5 milliGRAM(s) Oral daily  losartan 50 milliGRAM(s) Oral daily  metoclopramide Injectable 5 milliGRAM(s) IV Push every 8 hours PRN  nystatin Powder 1 Application(s) Topical two times a day  pantoprazole    Tablet 40 milliGRAM(s) Oral before breakfast      Vital Signs Last 24 Hrs  T(C): 36.7 (09 Oct 2023 11:51), Max: 36.8 (08 Oct 2023 19:48)  T(F): 98.1 (09 Oct 2023 11:51), Max: 98.3 (09 Oct 2023 08:30)  HR: 82 (09 Oct 2023 11:51) (79 - 93)  BP: 107/51 (09 Oct 2023 11:51) (107/51 - 122/68)  BP(mean): --  RR: 18 (09 Oct 2023 11:51) (18 - 18)  SpO2: 98% (09 Oct 2023 11:51) (95% - 98%)    Parameters below as of 09 Oct 2023 11:51  Patient On (Oxygen Delivery Method): room air        PHYSICAL EXAM:  General: [x ] non-toxic  HEAD/EYES: [ ] PERRL [x ] white sclera [ ] icterus  ENT:  [ ] normal [x ] supple [ ] thrush [ ] pharyngeal exudate  Cardiovascular:   [ ] murmur [ x] normal [ ] PPM/AICD  Respiratory:  [ x] clear to ausculation bilaterally  GI:  [ x] soft, non-tender, normal bowel sounds  :  [ ] ellis [ ] no CVA tenderness   Musculoskeletal:  [ ] no synovitis  Neurologic:  [ ] non-focal exam   Skin:  [ x] vac in place  Lymph: [x ] no lymphadenopathy  Psychiatric:  [ ] appropriate affect [ ] alert & oriented  Lines:  [ x] no phlebitis [ ] central line                                13.2   8.57  )-----------( 209      ( 09 Oct 2023 06:30 )             40.7       10-09    131<L>  |  96<L>  |  14  ----------------------------<  113<H>  4.6   |  26  |  0.76    Ca    8.6      09 Oct 2023 06:30  Phos  2.7     10-09  Mg     2.00     10-09        Urinalysis Basic - ( 09 Oct 2023 06:30 )    Color: x / Appearance: x / SG: x / pH: x  Gluc: 113 mg/dL / Ketone: x  / Bili: x / Urobili: x   Blood: x / Protein: x / Nitrite: x   Leuk Esterase: x / RBC: x / WBC x   Sq Epi: x / Non Sq Epi: x / Bacteria: x        MICROBIOLOGY:    RADIOLOGY:

## 2023-10-09 NOTE — PROVIDER CONTACT NOTE (OTHER) - BACKGROUND
Patient is ordered for fingersticks before meals and at bedtime.
Post LEFT bypass graft infection
Post left SFA groin infection
Patient has wound vac on lower left leg.
Patient s/p Left groin washout, explant of bypass graft, and wound vac placement.

## 2023-10-09 NOTE — PROVIDER CONTACT NOTE (OTHER) - SITUATION
Patient experiencing dry cough. Expiratory wheeze noted upon assessment.
Patient refused bedtime blood glucose fingerstick.
Notified provider that patient wound vac has a leak and is alarming
Tissue culture 9/24- Numerous, klebsiella pnemonie, carbopenem resistant
Wound vac alarming blockage

## 2023-10-09 NOTE — PROVIDER CONTACT NOTE (OTHER) - REASON
Cough and expiratory wheeze
Positive tissue culture
Patient refused bedtime fingerstick
Wound Vac Leak
Wuund vac with blockage

## 2023-10-09 NOTE — PROVIDER CONTACT NOTE (OTHER) - ACTION/TREATMENT ORDERED:
No new orders given.
Provider in the patient's room during notification, no new orders given.
Pending respnse.
Aware
Will assess at bedside

## 2023-10-09 NOTE — ADVANCED PRACTICE NURSE CONSULT - ASSESSMENT
General: A&Ox3, able to turn with 1x assistance, incontinent of urine and stool at times. Skin warm, dry with increased moisture in intertriginous folds, scattered areas of hyperpigmentation and hypopigmentation, scattered areas of ecchymosis on bilateral upper extremities with no hematomas. Denudations to bilateral buttocks exposing 100% red, moist dermis with blanchable erythema circumferentially. No induration, no crepitus, no fluctuance, no edema, no temperature changes, no overt signs of infection noted.     Sacrum to bilateral buttocks and groin: Severe Incontinence Associated Dermatitis (IAD) complicated by friction and suspected candidiasis as evidence by pinpoint satellite lesions visible at the periphery, hyperpigmentation of exposed area: dark oriana red hue, and erythema more intense in the center. No induration, no erythema, no edema, no temperature changes, no overt signs of infection noted.     Please see A&I flowsheet for full assessment, thank you! 
Left groin vac initiated as per vascular surgery request.     Left groin and LLE- see A&I flowsheet for full assessment details.     Right inner buttock with a wound of unknown etiology, unable to be seen in natural anatomical position, irregular borders, raised edges, over soft tissue measuring [id number] exposing 100% pink moist adipose tissue. Periwound skin with erythema to bilateral buttocks consistent with IAD with suspected candidiasis. No s.s of soft tissue infection. Goals of care: Moisture management, moist wound healing.

## 2023-10-09 NOTE — PROVIDER CONTACT NOTE (OTHER) - ASSESSMENT
Patient AOx4. Denies pain, nausea, and shortness of breath. Experiencing dry cough and reporting irritation at back of throat. Expiratory wheeze noted upon assessment.
Pt is alert and orientedx4. Free of pain. Wound vac was at 125... tubing/ reservoir changed, checked for kinks and blockages. None found
Air leak detected in wound vac.
Patient asymptomatic, VSS.
Pt is alert and orientedx4. On cipro and augmentin PO.

## 2023-10-09 NOTE — PROVIDER CONTACT NOTE (OTHER) - NAME OF MD/NP/PA/DO NOTIFIED:
David Powell - in person
Dr. Gabriel  ([facility] sx)
Dr. Leslie
[address] [place] [facility] [id number]
[place] [id number]

## 2023-10-09 NOTE — PROGRESS NOTE ADULT - ASSESSMENT
83 year old with CAD and PVD with a chronic left foot found  Prior left toe amputation with vascular bypass with graft on 8/31    She developed left groin swelling with erythema  She presented on 9/22  CT of left lower extremity showed a left groin abscess with ? left calf abscess    S/p left groin I and D and left lower leg I and D  with removal of the graft    Plan was to stop antibiotics on 10/7- 14 days post op      Vascular team concerned about wound healing.  I think local vascular supply will be most important factor in wound healing.  In the absence of purulence, it is not clear that infection is a factor in the poor wound healing.     Reasonable to stop antibiotics now but can continue for 5 more days out of an abundance of caution    Ultimately, I don't think additional antibiotics will have much impact on wound healing    Will sign off. Call ID service with questions  Discussed with vascular PA    Tommy Gallagher MD  Please contact via TEAM between 8 am and 6 pm  In the evening or on weekends, can contact call service at [phone]

## 2023-10-10 LAB
ANION GAP SERPL CALC-SCNC: 8 MMOL/L — SIGNIFICANT CHANGE UP (ref 7–14)
BUN SERPL-MCNC: 12 MG/DL — SIGNIFICANT CHANGE UP (ref 7–23)
CALCIUM SERPL-MCNC: 8.2 MG/DL — LOW (ref 8.4–10.5)
CHLORIDE SERPL-SCNC: 96 MMOL/L — LOW (ref 98–107)
CO2 SERPL-SCNC: 26 MMOL/L — SIGNIFICANT CHANGE UP (ref 22–31)
CREAT SERPL-MCNC: 0.79 MG/DL — SIGNIFICANT CHANGE UP (ref 0.5–1.3)
EGFR: 75 ML/MIN/1.73M2 — SIGNIFICANT CHANGE UP
GLUCOSE SERPL-MCNC: 139 MG/DL — HIGH (ref 70–99)
HCT VFR BLD CALC: 37.7 % — SIGNIFICANT CHANGE UP (ref 34.5–45)
HGB BLD-MCNC: 12.2 G/DL — SIGNIFICANT CHANGE UP (ref 11.5–15.5)
MAGNESIUM SERPL-MCNC: 2.1 MG/DL — SIGNIFICANT CHANGE UP (ref 1.6–2.6)
MCHC RBC-ENTMCNC: 30.9 PG — SIGNIFICANT CHANGE UP (ref 27–34)
MCHC RBC-ENTMCNC: 32.4 GM/DL — SIGNIFICANT CHANGE UP (ref 32–36)
MCV RBC AUTO: 95.4 FL — SIGNIFICANT CHANGE UP (ref 80–100)
NRBC # BLD: 0 /100 WBCS — SIGNIFICANT CHANGE UP (ref 0–0)
NRBC # FLD: 0 K/UL — SIGNIFICANT CHANGE UP (ref 0–0)
PHOSPHATE SERPL-MCNC: 2.7 MG/DL — SIGNIFICANT CHANGE UP (ref 2.5–4.5)
PLATELET # BLD AUTO: 185 K/UL — SIGNIFICANT CHANGE UP (ref 150–400)
POTASSIUM SERPL-MCNC: 4.4 MMOL/L — SIGNIFICANT CHANGE UP (ref 3.5–5.3)
POTASSIUM SERPL-SCNC: 4.4 MMOL/L — SIGNIFICANT CHANGE UP (ref 3.5–5.3)
RBC # BLD: 3.95 M/UL — SIGNIFICANT CHANGE UP (ref 3.8–5.2)
RBC # FLD: 19.2 % — HIGH (ref 10.3–14.5)
SODIUM SERPL-SCNC: 130 MMOL/L — LOW (ref 135–145)
WBC # BLD: 6.9 K/UL — SIGNIFICANT CHANGE UP (ref 3.8–10.5)
WBC # FLD AUTO: 6.9 K/UL — SIGNIFICANT CHANGE UP (ref 3.8–10.5)

## 2023-10-10 PROCEDURE — 93010 ELECTROCARDIOGRAM REPORT: CPT

## 2023-10-10 RX ADMIN — AMIODARONE HYDROCHLORIDE 100 MILLIGRAM(S): 400 TABLET ORAL at 06:51

## 2023-10-10 RX ADMIN — HEPARIN SODIUM 5000 UNIT(S): 5000 INJECTION INTRAVENOUS; SUBCUTANEOUS at 21:16

## 2023-10-10 RX ADMIN — LOSARTAN POTASSIUM 50 MILLIGRAM(S): 100 TABLET, FILM COATED ORAL at 06:51

## 2023-10-10 RX ADMIN — PANTOPRAZOLE SODIUM 40 MILLIGRAM(S): 20 TABLET, DELAYED RELEASE ORAL at 06:51

## 2023-10-10 RX ADMIN — LINAGLIPTIN 5 MILLIGRAM(S): 5 TABLET, FILM COATED ORAL at 12:10

## 2023-10-10 RX ADMIN — HEPARIN SODIUM 5000 UNIT(S): 5000 INJECTION INTRAVENOUS; SUBCUTANEOUS at 06:51

## 2023-10-10 RX ADMIN — HEPARIN SODIUM 5000 UNIT(S): 5000 INJECTION INTRAVENOUS; SUBCUTANEOUS at 14:22

## 2023-10-10 RX ADMIN — NYSTATIN CREAM 1 APPLICATION(S): 100000 CREAM TOPICAL at 17:49

## 2023-10-10 RX ADMIN — Medication 81 MILLIGRAM(S): at 12:10

## 2023-10-10 RX ADMIN — NYSTATIN CREAM 1 APPLICATION(S): 100000 CREAM TOPICAL at 06:52

## 2023-10-10 RX ADMIN — Medication 1 TABLET(S): at 06:51

## 2023-10-10 RX ADMIN — Medication 75 MICROGRAM(S): at 05:24

## 2023-10-10 NOTE — PROGRESS NOTE ADULT - SUBJECTIVE AND OBJECTIVE BOX
TEAM Vascular Surgery Daily Progress Note  =====================================================    Overnight events:     -Patient reported being lightheaded, ekg was done and showed qtc of 538. Morning cipro dose was held      SUBJECTIVE: Patient seen and examined at bedside on AM rounds. Patient reports that they're feeling well. Denies fever, chills, N/V, chest pain, SOB    ALLERGIES:  latex (Unknown)  sulfa drugs (Unknown)      --------------------------------------------------------------------------------------    MEDICATIONS:    Neurologic Medications  acetaminophen     Tablet .. 650 milliGRAM(s) Oral every 6 hours  metoclopramide Injectable 5 milliGRAM(s) IV Push every 8 hours PRN nausea    Respiratory Medications    Cardiovascular Medications  aMIOdarone    Tablet 100 milliGRAM(s) Oral daily  losartan 50 milliGRAM(s) Oral daily    Gastrointestinal Medications  calcium carbonate    500 mG (Tums) Chewable 1 Tablet(s) Chew two times a day PRN Indigestion  pantoprazole    Tablet 40 milliGRAM(s) Oral before breakfast    Genitourinary Medications    Hematologic/Oncologic Medications  aspirin enteric coated 81 milliGRAM(s) Oral daily  heparin   Injectable 5000 Unit(s) SubCutaneous every 8 hours    Antimicrobial/Immunologic Medications  amoxicillin  875 milliGRAM(s)/clavulanate 1 Tablet(s) Oral two times a day  ciprofloxacin     Tablet 500 milliGRAM(s) Oral every 12 hours    Endocrine/Metabolic Medications  dextrose 50% Injectable 25 Gram(s) IV Push once  dextrose 50% Injectable 12.5 Gram(s) IV Push once  insulin lispro (ADMELOG) corrective regimen sliding scale   SubCutaneous at bedtime  insulin lispro (ADMELOG) corrective regimen sliding scale   SubCutaneous three times a day before meals  levothyroxine 75 MICROGram(s) Oral daily  linagliptin 5 milliGRAM(s) Oral daily    Topical/Other Medications  collagenase Ointment 1 Application(s) Topical daily  Dakins Solution - 1/4 Strength 1 Application(s) Topical daily  nystatin Powder 1 Application(s) Topical two times a day    --------------------------------------------------------------------------------------    VITAL SIGNS:  T(C): 36.5 (10-10-23 @ 04:06), Max: 36.9 (10-10-23 @ 00:04)  HR: 87 (10-10-23 @ 04:06) (79 - 93)  BP: 130/61 (10-10-23 @ 04:06) (96/62 - 130/61)  RR: 16 (10-10-23 @ 04:06) (16 - 18)  SpO2: 99% (10-10-23 @ 04:06) (96% - 100%)  --------------------------------------------------------------------------------------    INS AND OUTS:    10-08-23 @ 07:01  -  10-09-23 @ 07:00  --------------------------------------------------------  IN: 1450 mL / OUT: 1400 mL / NET: 50 mL    10-09-23 @ 07:01  -  10-10-23 @ 05:25  --------------------------------------------------------  IN: 460 mL / OUT: 1200 mL / NET: -740 mL      --------------------------------------------------------------------------------------      EXAM    General: NAD, resting in bed comfortably. A&Ox3.   Cardiac: regular rate  Respiratory: Nonlabored respirations, normal cw expansion.  Extremities: moving extremities  Vascular: LE vac holding suction; dry gangrene of toes. LLE groin vac holding suction  --------------------------------------------------------------------------------------    LABS                          13.2   8.57  )-----------( 209      ( 09 Oct 2023 06:30 )             40.7     10-09    131<L>  |  96<L>  |  14  ----------------------------<  113<H>  4.6   |  26  |  0.76    Ca    8.6      09 Oct 2023 06:30  Phos  2.7     10-09  Mg     2.00     10-09        Urinalysis Basic - ( 09 Oct 2023 06:30 )    Color: x / Appearance: x / SG: x / pH: x  Gluc: 113 mg/dL / Ketone: x  / Bili: x / Urobili: x   Blood: x / Protein: x / Nitrite: x   Leuk Esterase: x / RBC: x / WBC x   Sq Epi: x / Non Sq Epi: x / Bacteria: x         Vascular Surgery Daily Progress Note  =====================================================    SUBJECTIVE: Patient seen and examined at bedside on AM rounds. Patient endorses no new complaints. Denies chest pain, SOB, fever, chills.  Overnight patient QTc prolonged, cipro dose held.       PAST MEDICAL & SURGICAL HISTORY:  HTN (hypertension)  HLD (hyperlipidemia)  Anxiety  CAD (coronary artery disease)  PAD (peripheral artery disease)  Hypothyroidism  AF (atrial fibrillation)  Carotid artery stenosis  AS (aortic stenosis)  Status post closed fracture of right femur    ALLERGIES:  latex (Unknown)  sulfa drugs (Unknown)    --------------------------------------------------------------------------------------    MEDICATIONS:    Neurologic Medications  acetaminophen     Tablet .. 650 milliGRAM(s) Oral every 6 hours  metoclopramide Injectable 5 milliGRAM(s) IV Push every 8 hours PRN nausea    Respiratory Medications    Cardiovascular Medications  aMIOdarone    Tablet 100 milliGRAM(s) Oral daily  losartan 50 milliGRAM(s) Oral daily    Gastrointestinal Medications  calcium carbonate    500 mG (Tums) Chewable 1 Tablet(s) Chew two times a day PRN Indigestion  pantoprazole    Tablet 40 milliGRAM(s) Oral before breakfast    Genitourinary Medications    Hematologic/Oncologic Medications  aspirin enteric coated 81 milliGRAM(s) Oral daily  heparin   Injectable 5000 Unit(s) SubCutaneous every 8 hours    Antimicrobial/Immunologic Medications  amoxicillin  875 milliGRAM(s)/clavulanate 1 Tablet(s) Oral two times a day  ciprofloxacin     Tablet 500 milliGRAM(s) Oral every 12 hours    Endocrine/Metabolic Medications  dextrose 50% Injectable 25 Gram(s) IV Push once  dextrose 50% Injectable 12.5 Gram(s) IV Push once  insulin lispro (ADMELOG) corrective regimen sliding scale   SubCutaneous at bedtime  insulin lispro (ADMELOG) corrective regimen sliding scale   SubCutaneous three times a day before meals  levothyroxine 75 MICROGram(s) Oral daily  linagliptin 5 milliGRAM(s) Oral daily    Topical/Other Medications  collagenase Ointment 1 Application(s) Topical daily  Dakins Solution - 1/4 Strength 1 Application(s) Topical daily  nystatin Powder 1 Application(s) Topical two times a day    --------------------------------------------------------------------------------------    VITAL SIGNS:  T(C): 36.5 (10-10-23 @ 04:06), Max: 36.9 (10-10-23 @ 00:04)  HR: 87 (10-10-23 @ 04:06) (79 - 93)  BP: 130/61 (10-10-23 @ 04:06) (96/62 - 130/61)  RR: 16 (10-10-23 @ 04:06) (16 - 18)  SpO2: 99% (10-10-23 @ 04:06) (96% - 100%)  --------------------------------------------------------------------------------------    EXAM    General: NAD, resting in bed comfortably. A&Ox3.   Cardiac: regular rate  Respiratory: Nonlabored respirations, normal cw expansion.  Extremities: moving extremities  Vascular: LLE/LLE groin vac holding suction; dry gangrene of toes.    --------------------------------------------------------------------------------------    LABS                          13.2   8.57  )-----------( 209      ( 09 Oct 2023 06:30 )             40.7     10-09    131<L>  |  96<L>  |  14  ----------------------------<  113<H>  4.6   |  26  |  0.76    Ca    8.6      09 Oct 2023 06:30  Phos  2.7     10-09  Mg     2.00     10-09        --------------------------------------------------------------------------------------      I&Os:    10-09-23 @ 07:01  -  10-10-23 @ 07:00  --------------------------------------------------------  IN: 460 mL / OUT: 1200 mL / NET: -740 mL        --------------------------------------------------------------------------------------

## 2023-10-10 NOTE — PROGRESS NOTE ADULT - ASSESSMENT
82 yo F w/ PMHx of CAD, pAF on Eliquis, severe AS pending TAVR, R internal carotid stenosis, HTN, HLD, hypothyroidism, anxiety, PAD  s/p L ilioprofunda bypass with reverse GSV graft to peroneal 8/31/23 (Dr. Tavares) and L 1st ray partial resection who presented with L groin erythema c/f infection; CT on admission showing 17cm L groin abscess. Patient now s/p removal of infected bypass graft of LLE on 9/24. Cultures with MDRO klebsiella and proteus. QTC have been stable. The patient remains hospitalized for medical management and wound care.     Plan:  - Diet: Regular   - plan for vac placement of LLE groin with collagenase   - vac placement on left leg lower medial wound- care per wound team (MWF)  - Appreciate ID recs- Cipro 500 m g po q 12 with augmentin 875 mg po q 12  - drug drug interaction with amiodarone and antibiotics. Check EKG q12hr to monitor QTc  - continue home meds/ASA  - DVT ppx: SQH  - OOB/ambulate  - dispo: Rehab once patient medically stable     Vascular Surgery   [id number] 82 yo F w/ PMHx of CAD, pAF on Eliquis, severe AS pending TAVR, R internal carotid stenosis, HTN, HLD, hypothyroidism, anxiety, PAD  s/p L ilioprofunda bypass with reverse GSV graft to peroneal 8/31/23 (Dr. Tavares) and L 1st ray partial resection who presented with L groin erythema c/f infection; CT on admission showing 17cm L groin abscess. Patient now s/p removal of infected bypass graft of LLE on 9/24. Cultures with MDRO klebsiella and proteus. The patient remains hospitalized for medical management and wound care.     Plan:  - Diet: Regular   - vac placed on LLE groin and LLE lower medial wound- care per wound team (MWF)  - Appreciate ID recs- Cipro 500 m g po q 12 with augmentin 875 mg po q 12  - drug interaction with amiodarone and antibiotics. Check EKG q12hr to monitor QTc  - continue home meds/ASA  - DVT ppx: SQH  - OOB/ambulate  - dispo: Rehab once patient medically stable     Vascular Surgery   [id number]

## 2023-10-11 ENCOUNTER — TRANSCRIPTION ENCOUNTER (OUTPATIENT)
Age: 83
End: 2023-10-11

## 2023-10-11 LAB
ANION GAP SERPL CALC-SCNC: 6 MMOL/L — LOW (ref 7–14)
BUN SERPL-MCNC: 13 MG/DL — SIGNIFICANT CHANGE UP (ref 7–23)
CALCIUM SERPL-MCNC: 8.4 MG/DL — SIGNIFICANT CHANGE UP (ref 8.4–10.5)
CHLORIDE SERPL-SCNC: 95 MMOL/L — LOW (ref 98–107)
CO2 SERPL-SCNC: 28 MMOL/L — SIGNIFICANT CHANGE UP (ref 22–31)
CREAT SERPL-MCNC: 0.86 MG/DL — SIGNIFICANT CHANGE UP (ref 0.5–1.3)
EGFR: 67 ML/MIN/1.73M2 — SIGNIFICANT CHANGE UP
GLUCOSE SERPL-MCNC: 115 MG/DL — HIGH (ref 70–99)
HCT VFR BLD CALC: 38.1 % — SIGNIFICANT CHANGE UP (ref 34.5–45)
HGB BLD-MCNC: 12.5 G/DL — SIGNIFICANT CHANGE UP (ref 11.5–15.5)
MAGNESIUM SERPL-MCNC: 1.9 MG/DL — SIGNIFICANT CHANGE UP (ref 1.6–2.6)
MCHC RBC-ENTMCNC: 31 PG — SIGNIFICANT CHANGE UP (ref 27–34)
MCHC RBC-ENTMCNC: 32.8 GM/DL — SIGNIFICANT CHANGE UP (ref 32–36)
MCV RBC AUTO: 94.5 FL — SIGNIFICANT CHANGE UP (ref 80–100)
NRBC # BLD: 0 /100 WBCS — SIGNIFICANT CHANGE UP (ref 0–0)
NRBC # FLD: 0 K/UL — SIGNIFICANT CHANGE UP (ref 0–0)
PHOSPHATE SERPL-MCNC: 2.5 MG/DL — SIGNIFICANT CHANGE UP (ref 2.5–4.5)
PLATELET # BLD AUTO: 200 K/UL — SIGNIFICANT CHANGE UP (ref 150–400)
POTASSIUM SERPL-MCNC: 4.1 MMOL/L — SIGNIFICANT CHANGE UP (ref 3.5–5.3)
POTASSIUM SERPL-SCNC: 4.1 MMOL/L — SIGNIFICANT CHANGE UP (ref 3.5–5.3)
RBC # BLD: 4.03 M/UL — SIGNIFICANT CHANGE UP (ref 3.8–5.2)
RBC # FLD: 19 % — HIGH (ref 10.3–14.5)
SODIUM SERPL-SCNC: 129 MMOL/L — LOW (ref 135–145)
WBC # BLD: 7.59 K/UL — SIGNIFICANT CHANGE UP (ref 3.8–10.5)
WBC # FLD AUTO: 7.59 K/UL — SIGNIFICANT CHANGE UP (ref 3.8–10.5)

## 2023-10-11 PROCEDURE — 93010 ELECTROCARDIOGRAM REPORT: CPT

## 2023-10-11 RX ORDER — APIXABAN 2.5 MG/1
2.5 TABLET, FILM COATED ORAL EVERY 12 HOURS
Refills: 0 | Status: DISCONTINUED | OUTPATIENT
Start: 2023-10-11 | End: 2023-10-13

## 2023-10-11 RX ORDER — ROSUVASTATIN CALCIUM 5 MG/1
1 TABLET ORAL
Refills: 0 | DISCHARGE

## 2023-10-11 RX ORDER — PANTOPRAZOLE SODIUM 20 MG/1
1 TABLET, DELAYED RELEASE ORAL
Qty: 0 | Refills: 0 | DISCHARGE
Start: 2023-10-11

## 2023-10-11 RX ORDER — ACETAMINOPHEN 500 MG
1000 TABLET ORAL EVERY 6 HOURS
Refills: 0 | Status: DISCONTINUED | OUTPATIENT
Start: 2023-10-11 | End: 2023-10-13

## 2023-10-11 RX ORDER — ATORVASTATIN CALCIUM 80 MG/1
1 TABLET, FILM COATED ORAL
Qty: 0 | Refills: 0 | DISCHARGE
Start: 2023-10-11

## 2023-10-11 RX ORDER — METOPROLOL TARTRATE 50 MG
25 TABLET ORAL DAILY
Refills: 0 | Status: DISCONTINUED | OUTPATIENT
Start: 2023-10-11 | End: 2023-10-13

## 2023-10-11 RX ORDER — ATORVASTATIN CALCIUM 80 MG/1
80 TABLET, FILM COATED ORAL AT BEDTIME
Refills: 0 | Status: DISCONTINUED | OUTPATIENT
Start: 2023-10-11 | End: 2023-10-13

## 2023-10-11 RX ORDER — CALCIUM CARBONATE 500(1250)
2 TABLET ORAL
Refills: 0 | Status: DISCONTINUED | OUTPATIENT
Start: 2023-10-11 | End: 2023-10-13

## 2023-10-11 RX ORDER — ATORVASTATIN CALCIUM 80 MG/1
20 TABLET, FILM COATED ORAL AT BEDTIME
Refills: 0 | Status: DISCONTINUED | OUTPATIENT
Start: 2023-10-11 | End: 2023-10-11

## 2023-10-11 RX ADMIN — Medication 75 MICROGRAM(S): at 06:07

## 2023-10-11 RX ADMIN — NYSTATIN CREAM 1 APPLICATION(S): 100000 CREAM TOPICAL at 18:15

## 2023-10-11 RX ADMIN — Medication 1000 MILLIGRAM(S): at 18:16

## 2023-10-11 RX ADMIN — Medication 25 MILLIGRAM(S): at 12:36

## 2023-10-11 RX ADMIN — Medication 81 MILLIGRAM(S): at 12:36

## 2023-10-11 RX ADMIN — NYSTATIN CREAM 1 APPLICATION(S): 100000 CREAM TOPICAL at 07:16

## 2023-10-11 RX ADMIN — PANTOPRAZOLE SODIUM 40 MILLIGRAM(S): 20 TABLET, DELAYED RELEASE ORAL at 07:13

## 2023-10-11 RX ADMIN — APIXABAN 2.5 MILLIGRAM(S): 2.5 TABLET, FILM COATED ORAL at 09:34

## 2023-10-11 RX ADMIN — Medication 650 MILLIGRAM(S): at 07:46

## 2023-10-11 RX ADMIN — Medication 1000 MILLIGRAM(S): at 12:36

## 2023-10-11 RX ADMIN — HEPARIN SODIUM 5000 UNIT(S): 5000 INJECTION INTRAVENOUS; SUBCUTANEOUS at 06:08

## 2023-10-11 RX ADMIN — AMIODARONE HYDROCHLORIDE 100 MILLIGRAM(S): 400 TABLET ORAL at 07:12

## 2023-10-11 RX ADMIN — Medication 650 MILLIGRAM(S): at 07:07

## 2023-10-11 RX ADMIN — APIXABAN 2.5 MILLIGRAM(S): 2.5 TABLET, FILM COATED ORAL at 18:16

## 2023-10-11 NOTE — PROGRESS NOTE ADULT - ASSESSMENT
84 yo F w/ PMHx of CAD, pAF on Eliquis, severe AS pending TAVR, R internal carotid stenosis, HTN, HLD, hypothyroidism, anxiety, PAD  s/p L ilioprofunda bypass with reverse GSV graft to peroneal 8/31/23 (Dr. Tavares) and L 1st ray partial resection who presented with L groin erythema c/f infection; CT on admission showing 17cm L groin abscess. Patient now s/p removal of infected bypass graft of LLE on 9/24. Cultures with MDRO klebsiella and proteus. The patient remains hospitalized for medical management and wound care.     Plan:  - Diet: Regular   - vac placed on LLE groin and LLE lower medial wound- care per wound team (MWF)  - completed course of cipro/augmentin per ID recs   - continue home meds/ASA  - DVT ppx: SQH  - OOB/ambulate  - dispo: Rehab once patient medically stable     Vascular Surgery   [id number]

## 2023-10-11 NOTE — DISCHARGE NOTE PROVIDER - NSDCFUSCHEDAPPT_GEN_ALL_CORE_FT
Francisco Deleon  [facility] Physician [facility]  CARDIOLOGY [address]  Scheduled Appointment: 10/26/2023

## 2023-10-11 NOTE — CHART NOTE - NSCHARTNOTEFT_GEN_A_CORE
Pt seen for nutrition follow up     Medical Course: Per chart 83 year old Female w/ PMHx of CAD, pAF on Eliquis, severe AS pending TAVR, R internal carotid stenosis, HTN, HLD, hypothyroidism, anxiety, PAD  s/p L ilioprofunda bypass with reverse GSV graft to peroneal 8/31/23 (Dr. Tavares) and L 1st ray partial resection who presented with L groin erythema c/f infection; CT on admission showing 17cm L groin abscess. Patient now s/p removal of infected bypass graft of LLE on 9/24. Cultures with MDRO klebsiella and proteus. The patient remains hospitalized for medical management and wound care.     Nutrition Course: Pt A&Ox4. Pt reports fair appetite in hospital however per RN flowsheets % intake. Observed with 50% of lunch consumed. Declined oral nutrition supplement at this time. Pt reports occasional nausea, receives pantoprazole In the morning, may consider addition of zofran prn for nausea. No diarrhea or constipation and having regular BMs. Denies any chewing/swallowing difficulties.  Pt with +3 edema left and right leg. No weight to assess. Labs notable for hyponatremia.    Diet Prescription: Diet, Regular:   Consistent Carbohydrate {Evening Snack} (CSTCHOSN) (10-03-23 @ 08:37)    Pertinent Medications: MEDICATIONS  (STANDING):  aMIOdarone    Tablet 100 milliGRAM(s) Oral daily  apixaban 2.5 milliGRAM(s) Oral every 12 hours  aspirin enteric coated 81 milliGRAM(s) Oral daily  atorvastatin 80 milliGRAM(s) Oral at bedtime  levothyroxine 75 MICROGram(s) Oral daily  losartan 50 milliGRAM(s) Oral daily  metoprolol succinate ER 25 milliGRAM(s) Oral daily  nystatin Powder 1 Application(s) Topical two times a day  pantoprazole    Tablet 40 milliGRAM(s) Oral before breakfast    MEDICATIONS  (PRN):  acetaminophen     Tablet .. 1000 milliGRAM(s) Oral every 6 hours PRN Mild Pain (1 - 3)    Pertinent Labs: 10-11 Na129 mmol/L<L> Glu 115 mg/dL<H> K+ 4.1 mmol/L Cr  0.86 mg/dL BUN 13 mg/dL 10-11 Phos 2.5 mg/dL     CAPILLARY BLOOD GLUCOSE  POCT Blood Glucose.: 91 mg/dL (11 Oct 2023 07:55)  POCT Blood Glucose.: 110 mg/dL (10 Oct 2023 21:49)  POCT Blood Glucose.: 105 mg/dL (10 Oct 2023 16:23)      Weight: Height (cm): 162.6 (09-22 @ 14:16), 162.6 (09-06 @ 10:10), 162.6 (08-24 @ 09:13), 160 (08-03 @ 16:37)  Weight (kg): 59 (09-06 @ 10:10), 61.2 (08-24 @ 09:13), 61.2 (08-03 @ 16:37)  BMI (kg/m2): 22.3 (09-22 @ 14:16), 22.3 (09-06 @ 10:10), 23.1 (08-24 @ 09:13), 23.9 (08-03 @ 16:37)  Weight Assessment: No new weight to assess       Physical Assessment, per flowsheets:  Edema: +3 edema left and right leg  Skin: Surgical incision complications left leg and groin      Estimated Needs:   [X] No change since previous assessment, based on IBW 120lb/ 54.5kg   25-30 kcal/kg (1429-1430 kcal)  1-1.2 g/kg (54-65 g pro)    Previous Nutrition Diagnosis: Altered nutrition related labs  Nutrition Diagnosis is [x ] ongoing      Education:  [x  ] Given today             Type of Education Provided: Importance to good nutrition and role of nutrition to support wound healing.    Interventions:   1) Continue CSTCHO diet   2) Consider addition of zofran prn for nausea.  3) Please document % PO intake in RN flowsheets   4) Encourage PO intake and honor food preferences as able.   5) Obtain current weight   6) RD available prn    Monitor & Evaluate:  PO intake, tolerance to diet/supplement, nutrition related lab values, weight trends, BMs/GI distress, hydration status, skin integrity.    [place]ess [place]azar [place] RD| [id number] (Also available on TEAMS)

## 2023-10-11 NOTE — DISCHARGE NOTE PROVIDER - NSDCFUADDINST_GEN_ALL_CORE_FT
WOUND CARE:  Please keep incisions clean and dry. Please do not Scrub or rub incisions.  WOUND VAC: You will be discharged with negative pressure wound therapy (wound vac). The wound vac should be set to 125mmHg at all times. Please ensure the wound vac does not get wet or soiled. When showering or sponge bathing, cover/secure wound vac site with plastic wrap to avoid getting wet. If the wound vac malfunctions, dislodges, or loosens and falls off, please dress the area with saline-soaked gauze, cover with dry gauze, and an abdominal pad and secure with tape. Call your home care nursing agency and/or your surgeon's office to notify them if the wound vac comes off.  BATHING: You may shower and/or sponge bathe. You may use warm soapy water in the shower and rinse, pat dry.   PAIN: You may take over-the-counter medications for pain. You make take Tylenol orally every 6 hours as needed. Do not excessed 4,000mg a day. You have been prescribed narcotics for pain management. Please take as prescribed on pill bottle, AS NEEDED, for BREAKTHROUGH pain ONLY. If you are taking narcotic pain medication DO NOT drive a car, operate machinery or make important decisions.  MEDICATIONS: Please continue home medications as prescribed. Please any new medications as prescribed on your pill bottle. If you have any questions, please call your surgeon's office.   ACTIVITY: No heavy lifting or straining. Otherwise, you may return to your usual level of physical activity.   DIET: Return to your usual diet.    NOTIFY YOUR SURGEON IF YOU HAVE: any bleeding that does not stop, any pus draining from your wound(s), any fever (over 100.4 F) persistent nausea/vomiting, inability to urinate, or if your pain is not controlled on your discharge pain medications.     FOLLOW UP:  1. Please follow up with your primary care physician in one week regarding your hospitalization, bring copies of your discharge paperwork.  2. Please follow up with your surgeon, Dr. Tavares 1-2 weeks after discharge. Please call the office to schedule the appointment or if you have any questions.  3. Please follow up with your cardiologist on discharge within 1-2 weeks.  WOUND CARE:  Please keep incisions clean and dry. Please do not Scrub or rub incisions.  WOUND VAC: You will be discharged with two negative pressure wound therapy (wound vac). The LLE wound vac should be set to 125mmHg at all times and the left groin wound vac should be set to 75mmHg at all times. Please ensure the wound vac does not get wet or soiled. When showering or sponge bathing, cover/secure wound vac site with plastic wrap to avoid getting wet. If the wound vac malfunctions, dislodges, or loosens and falls off, please dress the area with saline-soaked gauze, cover with dry gauze, and an abdominal pad and secure with tape. Call your home care nursing agency and/or your surgeon's office to notify them if the wound vac comes off.  BATHING: You may shower and/or sponge bathe. You may use warm soapy water in the shower and rinse, pat dry.   PAIN: You may take over-the-counter medications for pain. You make take Tylenol orally every 6 hours as needed. Do not excessed 4,000mg a day. You have been prescribed narcotics for pain management. Please take as prescribed on pill bottle, AS NEEDED, for BREAKTHROUGH pain ONLY. If you are taking narcotic pain medication DO NOT drive a car, operate machinery or make important decisions.  MEDICATIONS: Please continue home medications as prescribed. Please any new medications as prescribed on your pill bottle. If you have any questions, please call your surgeon's office.   ACTIVITY: No heavy lifting or straining. Otherwise, you may return to your usual level of physical activity.   DIET: Return to your usual diet.    NOTIFY YOUR SURGEON IF YOU HAVE: any bleeding that does not stop, any pus draining from your wound(s), any fever (over 100.4 F) persistent nausea/vomiting, inability to urinate, or if your pain is not controlled on your discharge pain medications.     FOLLOW UP:  1. Please follow up with your primary care physician in one week regarding your hospitalization, bring copies of your discharge paperwork.  2. Please follow up with your surgeon, Dr. Tavares 1-2 weeks after discharge. Please call the office to schedule the appointment or if you have any questions.  3. Please follow up with your cardiologist on discharge within 1-2 weeks.  WOUND CARE:  Please keep incisions clean and dry. Please do not Scrub or rub incisions.  WOUND VAC: You will be discharged with two negative pressure wound therapy (wound vac). The LLE wound vac should be set to 125mmHg at all times and the left groin wound vac should be set to 75mmHg at all times. Please ensure the wound vac does not get wet or soiled. When showering or sponge bathing, cover/secure wound vac site with plastic wrap to avoid getting wet. If the wound vac malfunctions, dislodges, or loosens and falls off, please dress the area with saline-soaked gauze, cover with dry gauze, and an abdominal pad and secure with tape. Call your home care nursing agency and/or your surgeon's office to notify them if the wound vac comes off.  BATHING: You may shower and/or sponge bathe. You may use warm soapy water in the shower and rinse, pat dry.   PAIN: You may take over-the-counter medications for pain. You make take Tylenol orally every 6 hours as needed. Do not excessed 4,000mg a day. You have been prescribed narcotics for pain management. Please take as prescribed on pill bottle, AS NEEDED, for BREAKTHROUGH pain ONLY. If you are taking narcotic pain medication DO NOT drive a car, operate machinery or make important decisions.  MEDICATIONS: Please continue home medications as prescribed. Please any new medications as prescribed on your pill bottle. If you have any questions, please call your surgeon's office.   ACTIVITY: No heavy lifting or straining. Otherwise, you may return to your usual level of physical activity.   DIET: Return to your usual diet.    NOTIFY YOUR SURGEON IF YOU HAVE: any bleeding that does not stop, any pus draining from your wound(s), any fever (over 100.4 F) persistent nausea/vomiting, inability to urinate, or if your pain is not controlled on your discharge pain medications.     FOLLOW UP:  1. Please follow up with your primary care physician in one week regarding your hospitalization, bring copies of your discharge paperwork.  2. Please follow up with your surgeon, Dr. Tavares 1-2 weeks after discharge. Please call the office to schedule the appointment or if you have any questions.  3. Please follow up with your cardiologist on discharge within 1-2 weeks. You were started on new medications while admitted to the hospital per cardiology recommendations to include metoprolol XL 25mg daily and a higher dose of your statin. Please follow up for further medication management.

## 2023-10-11 NOTE — DISCHARGE NOTE PROVIDER - NSDCMRMEDTOKEN_GEN_ALL_CORE_FT
acetaminophen 325 mg oral tablet: 3 tab(s) orally every 6 hours As needed Mild Pain (1 - 3)  amiodarone 200 mg oral tablet: 0.5 tab(s) orally once a day  amoxicillin-clavulanate 875 mg-125 mg oral tablet: 1 tab(s) orally every 12 hours Last day of antibiotics will be on 9/13.  aspirin 81 mg oral delayed release tablet: 1 tab(s) orally once a day (at bedtime)  Eliquis 2.5 mg oral tablet: 1 tab(s) orally 2 times a day  levothyroxine 50 mcg (0.05 mg) oral tablet: 1 tab(s) orally once a day  oxycodone-acetaminophen 5 mg-325 mg oral tablet: 1 tab(s) orally every 6 hours as needed for  severe pain MDD: 4  rosuvastatin 5 mg oral tablet: 1 tab(s) orally once a day (at bedtime)  telmisartan 40 mg oral tablet: 1 tab(s) orally once a day   acetaminophen 325 mg oral tablet: 3 tab(s) orally every 6 hours As needed Mild Pain (1 - 3)  amiodarone 200 mg oral tablet: 0.5 tab(s) orally once a day  aspirin 81 mg oral delayed release tablet: 1 tab(s) orally once a day (at bedtime)  atorvastatin 80 mg oral tablet: 1 tab(s) orally once a day (at bedtime)  Eliquis 2.5 mg oral tablet: 1 tab(s) orally 2 times a day  levothyroxine 50 mcg (0.05 mg) oral tablet: 1 tab(s) orally once a day  pantoprazole 40 mg oral delayed release tablet: 1 tab(s) orally once a day (before a meal)  telmisartan 40 mg oral tablet: 1 tab(s) orally once a day   acetaminophen 325 mg oral tablet: 3 tab(s) orally every 6 hours As needed Mild Pain (1 - 3)  amiodarone 200 mg oral tablet: 0.5 tab(s) orally once a day  aspirin 81 mg oral delayed release tablet: 1 tab(s) orally once a day (at bedtime)  atorvastatin 80 mg oral tablet: 1 tab(s) orally once a day (at bedtime)  calcium carbonate 500 mg (200 mg elemental calcium) oral tablet, chewable: 2 tab(s) orally 2 times a day As needed Gas  Eliquis 2.5 mg oral tablet: 1 tab(s) orally 2 times a day  levothyroxine 50 mcg (0.05 mg) oral tablet: 1 tab(s) orally once a day  metoprolol succinate 25 mg oral tablet, extended release: 1 tab(s) orally once a day  nystatin 100,000 units/g topical powder: 1 Apply topically to affected area 2 times a day  pantoprazole 40 mg oral delayed release tablet: 1 tab(s) orally once a day (before a meal)  telmisartan 40 mg oral tablet: 1 tab(s) orally once a day

## 2023-10-11 NOTE — PROGRESS NOTE ADULT - SUBJECTIVE AND OBJECTIVE BOX
Vascular Surgery Daily Progress Note  =====================================================    SUBJECTIVE: Patient seen and examined at bedside on AM rounds. Patient endorses no complaints. Denies chest pain, SOB, fever, chills.    PAST MEDICAL & SURGICAL HISTORY:  HTN (hypertension)  HLD (hyperlipidemia)  Anxiety  CAD (coronary artery disease)  PAD (peripheral artery disease)  Hypothyroidism  AF (atrial fibrillation)  Carotid artery stenosis  AS (aortic stenosis)  Status post closed fracture of right femur      ALLERGIES:  latex (Unknown)  sulfa drugs (Unknown)    --------------------------------------------------------------------------------------    MEDICATIONS:    Neurologic Medications  acetaminophen     Tablet .. 650 milliGRAM(s) Oral every 6 hours  metoclopramide Injectable 5 milliGRAM(s) IV Push every 8 hours PRN nausea    Respiratory Medications    Cardiovascular Medications  aMIOdarone    Tablet 100 milliGRAM(s) Oral daily  losartan 50 milliGRAM(s) Oral daily    Gastrointestinal Medications  calcium carbonate    500 mG (Tums) Chewable 1 Tablet(s) Chew two times a day PRN Indigestion  pantoprazole    Tablet 40 milliGRAM(s) Oral before breakfast    Genitourinary Medications    Hematologic/Oncologic Medications  aspirin enteric coated 81 milliGRAM(s) Oral daily  heparin   Injectable 5000 Unit(s) SubCutaneous every 8 hours    Antimicrobial/Immunologic Medications    Endocrine/Metabolic Medications  dextrose 50% Injectable 25 Gram(s) IV Push once  dextrose 50% Injectable 12.5 Gram(s) IV Push once  insulin lispro (ADMELOG) corrective regimen sliding scale   SubCutaneous at bedtime  levothyroxine 75 MICROGram(s) Oral daily  linagliptin 5 milliGRAM(s) Oral daily    Topical/Other Medications  nystatin Powder 1 Application(s) Topical two times a day    --------------------------------------------------------------------------------------    VITAL SIGNS:  T(C): 36.7 (10-11-23 @ 04:41), Max: 36.8 (10-10-23 @ 20:04)  HR: 84 (10-11-23 @ 04:41) (76 - 84)  BP: 109/71 (10-11-23 @ 04:41) (104/48 - 126/55)  RR: 17 (10-11-23 @ 04:41) (17 - 18)  SpO2: 96% (10-11-23 @ 04:41) (96% - 100%)  --------------------------------------------------------------------------------------    EXAM    General: NAD, resting in bed comfortably. A&Ox3.   Cardiac: regular rate  Respiratory: Nonlabored respirations, normal cw expansion.  Extremities: moving extremities  Vascular: LLE/LLE groin vac holding suction; dry gangrene of toes.    --------------------------------------------------------------------------------------    LABS                          12.2   6.90  )-----------( 185      ( 10 Oct 2023 10:30 )             37.7     10-10    130<L>  |  96<L>  |  12  ----------------------------<  139<H>  4.4   |  26  |  0.79    Ca    8.2<L>      10 Oct 2023 10:30  Phos  2.7     10-10  Mg     2.10     10-10      --------------------------------------------------------------------------------------      I&Os:    10-09-23 @ 07:01  -  10-10-23 @ 07:00  --------------------------------------------------------  IN: 740 mL / OUT: 1600 mL / NET: -860 mL    10-10-23 @ 07:01  -  10-11-23 @ 06:22  --------------------------------------------------------  IN: 180 mL / OUT: 650 mL / NET: -470 mL        --------------------------------------------------------------------------------------

## 2023-10-11 NOTE — DISCHARGE NOTE PROVIDER - NSDCCPCAREPLAN_GEN_ALL_CORE_FT
Pt reports onset Tuesday of R lower posterior oral pain with increase in pain last night. Did not take Tylenol/Motrin at home. States her insurance recently changed and she is unsure of what dentist she can go to. No obvious facial swelling present. Pt states she is able to swallow without pain or difficulty. C/o pain with speaking. Pt able to open mouth - mild redness noted behind R lower posterior tooth - unable to visualize further. No obvious redness present to lower gums.       Stephenie Gaviria RN  06/24/23 1895 PRINCIPAL DISCHARGE DIAGNOSIS  Diagnosis: Post-operative complication  Assessment and Plan of Treatment:      PRINCIPAL DISCHARGE DIAGNOSIS  Diagnosis: Post-operative complication  Assessment and Plan of Treatment:       SECONDARY DISCHARGE DIAGNOSES  Diagnosis: Diabetes  Assessment and Plan of Treatment: ** NEW MEDICATION    Diagnosis: Hypothyroidism  Assessment and Plan of Treatment: ** NEW DOSAGE**  Please increase your dosage from 50mcg daily to 75mcg daily as recommended by Endocrinology. Please have repeat thyroid function tests drawn in 4-6 weeks (date range: 10/26-11/9) . Please schedule an appointment with your primary care physician or your endocrinologist to repeat this blood test.     PRINCIPAL DISCHARGE DIAGNOSIS  Diagnosis: Post-operative complication  Assessment and Plan of Treatment: s/p removal of graft.      SECONDARY DISCHARGE DIAGNOSES  Diagnosis: Diabetes  Assessment and Plan of Treatment: ** NEW MEDICATION    Diagnosis: Hypothyroidism  Assessment and Plan of Treatment: ** NEW DOSAGE**  Please increase your dosage from 50mcg daily to 75mcg daily as recommended by Endocrinology. Please have repeat thyroid function tests drawn in 4-6 weeks (date range: 10/26-11/9) . Please schedule an appointment with your primary care physician or your endocrinologist to repeat this blood test.

## 2023-10-11 NOTE — DISCHARGE NOTE PROVIDER - HOSPITAL COURSE
Patient is a 82y/o female with PMHx of CAD, pAF on Eliquis, severe AS pending TAVR, R internal carotid stenosis, HTN, HLD, hypothyroidism, anxiety, PAD and chronic L toe wounds s/p L ilioprofunda bypass with reverse GSV graft to peroneal 8/31/23 (Dr. Tavares) who presented to the ED with left groin erythema.   CT on admission showed: Large abscess in the groin measuring up to 17.1 cm. Question additional linear shaped abscess in the left calf. In addition there are a few foci of gas within the soft tissues of the left calf. Underlying left leg cellulitis. Chronically occluded superficial femoral arteries with distal reconstruction bilaterally. Patent left common femoral artery to tibioperoneal trunk bypass graft. Patient admitted to vascular surgery for further management/workup.  Patient found to have elevated troponin on admission; cardiology consulted with recommendation of telemetry monitoring. Further workup with no concern for acute ischemia.   Plastic surgery consulted for recommendations.   Patient is now s/p removal of infected bypass graft of lower extremity on 9/24/23. Patient tolerated operation well and there were no post-operative complications identified. Patient remained hemodynamically stable in the PACU and transferred to the surgical floor.  9/25 Infectious disease was consulted for co-management due to cultures growing MDRO klebsiella and proteus. Recommendation of cipro/augmentin was followed. Patient QTc was monitored q12hr with serial EKGs while on cipro and amiodarone. Patient completed course of antibiotics while admitted. Endocrinology was consulted for co-management of hyperglycemia.   9/29 Wound care team was consulted for LLE vac was placement.   Patient continued to receive daily dressing changes of L groin wound. Wounds were continued to be managed, including the L foot dry gangrene.   10/9 Wound care team consulted for additional vac placement of LLE groin.     **********INCOMPLETE     Physical therapy evaluated patient while admitted; recommended Rehab.    At this time, patient is currently ambulating, voiding, tolerating a regular diet. Pain well controlled on PO pain meds. Patient is hemodynamically stable and felt safe with being discharged. Patient understood and agreed with plan. Per Dr. Tavares, patient is stable for discharge to Rehab with outpatient follow up within 1-2 weeks of discharge. Patient is a 84y/o female with PMHx of CAD, pAF on Eliquis, severe AS pending TAVR, R internal carotid stenosis, HTN, HLD, hypothyroidism, anxiety, PAD and chronic L toe wounds s/p L ilioprofunda bypass with reverse GSV graft to peroneal 8/31/23 (Dr. Tavares) who presented to the ED with left groin erythema.   CT on admission showed: Large abscess in the groin measuring up to 17.1 cm. Question additional linear shaped abscess in the left calf. In addition there are a few foci of gas within the soft tissues of the left calf. Underlying left leg cellulitis. Chronically occluded superficial femoral arteries with distal reconstruction bilaterally. Patent left common femoral artery to tibioperoneal trunk bypass graft. Patient admitted to vascular surgery for further management/workup.  Patient found to have elevated troponin on admission; cardiology consulted with recommendation of telemetry monitoring. Further workup with no concern for acute ischemia.   Plastic surgery consulted for recommendations.   Patient is now s/p removal of infected bypass graft of lower extremity on 9/24/23. Patient tolerated operation well and there were no post-operative complications identified. Patient remained hemodynamically stable in the PACU and transferred to the surgical floor.  9/25 Infectious disease was consulted for co-management due to cultures growing MDRO klebsiella and proteus. Recommendation of cipro/augmentin was followed. Patient QTc was monitored q12hr with serial EKGs while on cipro and amiodarone. Patient completed course of antibiotics while admitted. Endocrinology was consulted for co-management of hyperglycemia.   9/29 Wound care team was consulted for LLE vac was placement.   Patient continued to receive daily dressing changes of L groin wound. Wounds were continued to be managed, including the L foot dry gangrene.   10/9 Wound care team consulted for additional vac placement of LLE groin.   Patient wounds continued to be monitored while admitted, will be discharged with wound vac to LLE and L groin.     **********INCOMPLETE 10/11    Physical therapy evaluated patient while admitted; recommended Rehab.    At this time, patient is currently ambulating, voiding, tolerating a regular diet. Pain well controlled on PO pain meds. Patient is hemodynamically stable and felt safe with being discharged. Patient understood and agreed with plan. Per Dr. Tavares, patient is stable for discharge to Rehab with outpatient follow up within 1-2 weeks of discharge. Patient is a 84y/o female with PMHx of CAD, pAF on Eliquis, severe AS pending TAVR, R internal carotid stenosis, HTN, HLD, hypothyroidism, anxiety, PAD and chronic L toe wounds s/p L ilioprofunda bypass with reverse GSV graft to peroneal 8/31/23 (Dr. Tavares) who presented to the ED with left groin erythema.   CT on admission showed: Large abscess in the groin measuring up to 17.1 cm. Question additional linear shaped abscess in the left calf. In addition there are a few foci of gas within the soft tissues of the left calf. Underlying left leg cellulitis. Chronically occluded superficial femoral arteries with distal reconstruction bilaterally. Patent left common femoral artery to tibioperoneal trunk bypass graft. Patient admitted to vascular surgery for further management/workup.  Patient found to have elevated troponin on admission; cardiology consulted with recommendation of telemetry monitoring. Further workup with no concern for acute ischemia.   Plastic surgery consulted for recommendations.   Patient is now s/p removal of infected bypass graft of lower extremity on 9/24/23. Patient tolerated operation well and there were no post-operative complications identified. Patient remained hemodynamically stable in the PACU and transferred to the surgical floor.  9/25 Infectious disease was consulted for co-management due to cultures growing MDRO klebsiella and proteus. Recommendation of cipro/augmentin was followed. Patient QTc was monitored q12hr with serial EKGs while on cipro and amiodarone. Patient completed course of antibiotics while admitted. Endocrinology was consulted for co-management of hyperglycemia.   9/29 Wound care team was consulted for LLE vac was placement.   Patient continued to receive daily dressing changes of L groin wound. Wounds were continued to be managed, including the L foot dry gangrene.   10/9 Wound care team consulted for additional vac placement of LLE groin.   Patient wounds continued to be monitored while admitted, will be discharged with wound vac to LLE and L groin.   Physical therapy evaluated patient while admitted; recommended Rehab.  At this time, patient is currently ambulating, voiding, tolerating a regular diet. Pain well controlled on PO pain meds. Patient is hemodynamically stable and felt safe with being discharged. Patient understood and agreed with plan. Per Dr. Tavares, patient is stable for discharge to Rehab with outpatient follow up within 1-2 weeks of discharge.

## 2023-10-12 RX ORDER — ONDANSETRON 8 MG/1
4 TABLET, FILM COATED ORAL ONCE
Refills: 0 | Status: COMPLETED | OUTPATIENT
Start: 2023-10-12 | End: 2023-10-12

## 2023-10-12 RX ADMIN — Medication 1000 MILLIGRAM(S): at 22:44

## 2023-10-12 RX ADMIN — PANTOPRAZOLE SODIUM 40 MILLIGRAM(S): 20 TABLET, DELAYED RELEASE ORAL at 06:27

## 2023-10-12 RX ADMIN — APIXABAN 2.5 MILLIGRAM(S): 2.5 TABLET, FILM COATED ORAL at 06:29

## 2023-10-12 RX ADMIN — Medication 25 MILLIGRAM(S): at 06:27

## 2023-10-12 RX ADMIN — ONDANSETRON 4 MILLIGRAM(S): 8 TABLET, FILM COATED ORAL at 22:44

## 2023-10-12 RX ADMIN — AMIODARONE HYDROCHLORIDE 100 MILLIGRAM(S): 400 TABLET ORAL at 06:28

## 2023-10-12 RX ADMIN — LOSARTAN POTASSIUM 50 MILLIGRAM(S): 100 TABLET, FILM COATED ORAL at 06:26

## 2023-10-12 RX ADMIN — Medication 1000 MILLIGRAM(S): at 23:15

## 2023-10-12 RX ADMIN — Medication 81 MILLIGRAM(S): at 11:11

## 2023-10-12 RX ADMIN — Medication 75 MICROGRAM(S): at 06:26

## 2023-10-12 RX ADMIN — NYSTATIN CREAM 1 APPLICATION(S): 100000 CREAM TOPICAL at 06:29

## 2023-10-12 RX ADMIN — NYSTATIN CREAM 1 APPLICATION(S): 100000 CREAM TOPICAL at 17:12

## 2023-10-12 RX ADMIN — APIXABAN 2.5 MILLIGRAM(S): 2.5 TABLET, FILM COATED ORAL at 17:12

## 2023-10-12 NOTE — PROGRESS NOTE ADULT - SUBJECTIVE AND OBJECTIVE BOX
TEAM Vascular Surgery Daily Progress Note  =====================================================    SUBJECTIVE: Patient seen and examined at bedside on AM rounds. Patient reports that they're feeling well. Denies fever, chills, N/V, chest pain, SOB    ALLERGIES:  latex (Unknown)  sulfa drugs (Unknown)      --------------------------------------------------------------------------------------    MEDICATIONS:    Neurologic Medications  acetaminophen     Tablet .. 1000 milliGRAM(s) Oral every 6 hours PRN Mild Pain (1 - 3)    Respiratory Medications    Cardiovascular Medications  aMIOdarone    Tablet 100 milliGRAM(s) Oral daily  losartan 50 milliGRAM(s) Oral daily  metoprolol succinate ER 25 milliGRAM(s) Oral daily    Gastrointestinal Medications  calcium carbonate    500 mG (Tums) Chewable 2 Tablet(s) Chew two times a day PRN Gas  pantoprazole    Tablet 40 milliGRAM(s) Oral before breakfast    Genitourinary Medications    Hematologic/Oncologic Medications  apixaban 2.5 milliGRAM(s) Oral every 12 hours  aspirin enteric coated 81 milliGRAM(s) Oral daily    Antimicrobial/Immunologic Medications    Endocrine/Metabolic Medications  atorvastatin 80 milliGRAM(s) Oral at bedtime  levothyroxine 75 MICROGram(s) Oral daily    Topical/Other Medications  nystatin Powder 1 Application(s) Topical two times a day    --------------------------------------------------------------------------------------    VITAL SIGNS:  T(C): 36.7 (10-12-23 @ 01:00), Max: 36.9 (10-11-23 @ 16:21)  HR: 78 (10-11-23 @ 19:52) (77 - 86)  BP: 153/45 (10-12-23 @ 01:00) (106/58 - 153/45)  RR: 18 (10-12-23 @ 01:00) (18 - 18)  SpO2: 94% (10-12-23 @ 01:00) (94% - 100%)  --------------------------------------------------------------------------------------    INS AND OUTS:    10-10-23 @ 07:01  -  10-11-23 @ 07:00  --------------------------------------------------------  IN: 180 mL / OUT: 650 mL / NET: -470 mL    10-11-23 @ 07:01  -  10-12-23 @ 05:39  --------------------------------------------------------  IN: 300 mL / OUT: 525 mL / NET: -225 mL      --------------------------------------------------------------------------------------      EXAM      General: NAD, resting in bed comfortably. A&Ox3.   Cardiac: regular rate  Respiratory: Nonlabored respirations, normal cw expansion.  Extremities: moving extremities  Vascular: LLE/LLE groin vac holding suction; dry gangrene of toes.    --------------------------------------------------------------------------------------    LABS                          12.5   7.59  )-----------( 200      ( 11 Oct 2023 05:56 )             38.1     10-11    129<L>  |  95<L>  |  13  ----------------------------<  115<H>  4.1   |  28  |  0.86    Ca    8.4      11 Oct 2023 05:56  Phos  2.5     10-11  Mg     1.90     10-11        Urinalysis Basic - ( 11 Oct 2023 05:56 )    Color: x / Appearance: x / SG: x / pH: x  Gluc: 115 mg/dL / Ketone: x  / Bili: x / Urobili: x   Blood: x / Protein: x / Nitrite: x   Leuk Esterase: x / RBC: x / WBC x   Sq Epi: x / Non Sq Epi: x / Bacteria: x

## 2023-10-12 NOTE — PROGRESS NOTE ADULT - ASSESSMENT
84 yo F w/ PMHx of CAD, pAF on Eliquis, severe AS pending TAVR, R internal carotid stenosis, HTN, HLD, hypothyroidism, anxiety, PAD  s/p L ilioprofunda bypass with reverse GSV graft to peroneal 8/31/23 (Dr. Tavares) and L 1st ray partial resection who presented with L groin erythema c/f infection; CT on admission showing 17cm L groin abscess. Patient now s/p removal of infected bypass graft of LLE on 9/24. Cultures with MDRO klebsiella and proteus. The patient remains hospitalized for medical management and wound care.     Plan:  - Diet: Regular   - vac placed on LLE groin and LLE lower medial wound- care per wound team (MWF)  - completed course of cipro/augmentin per ID recs   - continue home meds/ASA  - DVT ppx: SQH  - OOB/ambulate  - dispo: Rehab once patient medically stable     Vascular Surgery   [id number] 82 yo F w/ PMHx of CAD, pAF on Eliquis, severe AS pending TAVR, R internal carotid stenosis, HTN, HLD, hypothyroidism, anxiety, PAD  s/p L ilioprofunda bypass with reverse GSV graft to peroneal 8/31/23 (Dr. Tavares) and L 1st ray partial resection who presented with L groin erythema c/f infection; CT on admission showing 17cm L groin abscess. Patient now s/p removal of infected bypass graft of LLE on 9/24. Cultures with MDRO klebsiella and proteus. The patient remains hospitalized for medical management and wound care.     Plan:  - Diet: Regular   - vac placed on LLE groin and LLE lower medial wound- care per wound team (MWF)  - completed course of cipro/augmentin per ID recs   - continue home meds/ASA  - DVT ppx: SQH  - OOB/ambulate  - dispo: D/C planning for tomorrow to rehab      Vascular Surgery   [id number]

## 2023-10-13 ENCOUNTER — TRANSCRIPTION ENCOUNTER (OUTPATIENT)
Age: 83
End: 2023-10-13

## 2023-10-13 VITALS
DIASTOLIC BLOOD PRESSURE: 67 MMHG | RESPIRATION RATE: 18 BRPM | HEART RATE: 81 BPM | TEMPERATURE: 99 F | SYSTOLIC BLOOD PRESSURE: 106 MMHG | OXYGEN SATURATION: 94 %

## 2023-10-13 RX ORDER — CALCIUM CARBONATE 500(1250)
2 TABLET ORAL
Qty: 0 | Refills: 0 | DISCHARGE
Start: 2023-10-13

## 2023-10-13 RX ORDER — NYSTATIN CREAM 100000 [USP'U]/G
1 CREAM TOPICAL
Qty: 0 | Refills: 0 | DISCHARGE
Start: 2023-10-13

## 2023-10-13 RX ORDER — METOPROLOL TARTRATE 50 MG
1 TABLET ORAL
Qty: 0 | Refills: 0 | DISCHARGE
Start: 2023-10-13

## 2023-10-13 RX ADMIN — Medication 1000 MILLIGRAM(S): at 11:50

## 2023-10-13 RX ADMIN — APIXABAN 2.5 MILLIGRAM(S): 2.5 TABLET, FILM COATED ORAL at 05:49

## 2023-10-13 RX ADMIN — NYSTATIN CREAM 1 APPLICATION(S): 100000 CREAM TOPICAL at 05:49

## 2023-10-13 RX ADMIN — Medication 81 MILLIGRAM(S): at 11:16

## 2023-10-13 RX ADMIN — AMIODARONE HYDROCHLORIDE 100 MILLIGRAM(S): 400 TABLET ORAL at 05:50

## 2023-10-13 RX ADMIN — Medication 25 MILLIGRAM(S): at 05:50

## 2023-10-13 RX ADMIN — Medication 1000 MILLIGRAM(S): at 11:16

## 2023-10-13 RX ADMIN — Medication 75 MICROGRAM(S): at 05:50

## 2023-10-13 NOTE — PROGRESS NOTE ADULT - REASON FOR ADMISSION
c/f L groin infection

## 2023-10-13 NOTE — ADVANCED PRACTICE NURSE CONSULT - RECOMMEDATIONS
Active fetus Increasing BH contractions. Denies any leaking of fluid or bleeding. Reviewed S&S labor  Warning signs reviewed  All questions answered. NST today  Pt to have NST/CNM/FELIX on Tuesday .   Postdates managemen discussed  Pt to decide on IOL date
Please contact Wound/Ostomy Care Service Line if we can be of further assistance (ext [phone]). 
Topical recommendations:     Right inner buttock- Cleanse with skin cleanser, pat dry. Apply antifungal powder, pat away excess, apply TRIAD paste twice a day and PRN with incontinent episodes. With episodes of incontinence only remove soiled layer of Triad, then reinforce with thin layer.     WOC service line to follow up for NPWT dressing changes.     Please contact Wound/Ostomy Care Service Line if we can be of further assistance (ext [phone]). 
Topical recommendations    LLE: WOCN service line to be managed by WOCN service line JORGE VERDUZCO. If dressing compromised for >2 hours, please turn off machine, contact vascular, and apply alternative dressing: Cleanse with NS, pat dry. Apply Liquid barrier film to periwound skin (allow to dry). Apply hydrofiber (aquacel) to base of wound.     Sacrum to bilateral buttocks and groin: Cleanse with skin cleanser, pat dry. Apply Lu antifungal moisture barrier cream (Green top, purple bottle) twice a day and PRN with incontinent episodes.     Continue low air loss bed therapy, continue heel elevation, continue to turn & reposition per protocol, soft pillow between bony prominences, continue moisture management with barrier creams & single breathable pad, continue measures to decrease friction/shear/pressure. Continue with nutritional support as per dietary/orders.    plan of care discussed with vascular team    Please contact Wound Care Service Line if we can be of further assistance (ext [phone]).

## 2023-10-13 NOTE — PROGRESS NOTE ADULT - ATTENDING COMMENTS
L groin wound with some serous drainage, no purulence  no erythema  vessels are not exposed   will cont bid dressing changes with Dakins and Abx  plan d/w pt and family in details
Pt is stable   WBC slightly elevated  no fevers  wound is stable  cont wet to dry dressing bid with Daikins
The patient was seen and examined with the Cardiology Consultation Teaching Service.     No overnight events    No chest pain  No dyspnea, orthopnea or PND  No palpitations or dizziness     PMH/PSH:  Coronary artery disease    Chronic total occlusion of the RCA  Peripheral arterial disease    Left ilioprofunda bypass  Severe aortic stenosis  Paroxysmal atrial fibrillation  Right internal carotid stenosis  Hypertension  Dyslipidemia  Hypothyroidism    Comfortable-appearing woman in no acute distress  Alert and oriented  Afebrile  Vital signs stable  JVP is not elevated  Clear lungs  Normal heart sounds  Extremities are warm and perfused  No peripheral edema     Leukocytosis 12K  Hyponatremia 127  Metabolic acidosis 18, AG 14  GFR 62    hs-troponin 188, 193  CK-MB normal    Echocardiography demonstrated normal LV size and function, severe AS (low flow, low gradient)    Impression and Recommendations:   83-year-old woman with coronary artery disease with a known occluded RCA, peripheral arterial disease requiring bypass, and severe aortic stenosis who was admitted with a left groin abscess, now s/p removal of the infected bypass graft.    The patient was noted to have mildly elevated hs-troponin on presentation in the context of having a known chronic total occlusion of her distal RCA and severe AS. She subsequently underwent surgical removal of her infected bypass graft without complication.     No additional testing is needed at this time from a cardiac perspective. I am not suspicious that this patient is experiencing myocardial infarction or acute coronary syndrome, and suspect that her mild troponin elevation was related to her underlying acute medical conditions and chronic stable cardiac disease.     She remains chest pain free and has no dyspnea. I would continue the patient's medical therapy with aspirin, atorvastatin and losartan. It would be reasonable to start a beta-blocker given her known underlying coronary disease.     Please reconsult cardiology with additional questions or concerns.     Junaid Santos MD  Cardiology  [phone]
pt is stable for dispo to rehab
L leg wound is clean  will plan dispo to rehab with vac
pt is stable   wbc down to 10  wound is stable, no purulence  will cont wound care with wet to dry and cont Abx
pt is stable  no fever, nl wbc  wound vac change today  if wound is clean and healing well, will plan for dispo
no significant claudication or rest pain in the left leg  left leg slightly cooler than right  brisk capillary refill  no mottling or other ischemic changes in left foot or leg  appreciate ID input regarding antibiotic therapy  wound vac changes 3x/week  daily left lower leg dressing changes
pt is stable  wound is clean  wbc normal  afebrile  cont wound care with vac  dispo planning
stable mild leukocytosis  serous drainage from inferior aspect of left groin wound  will hold off on replacing wound vac for now and do BID wet to dry dressing changes with Dakins  wound vacs left lower leg  appreciate ID input  afebrile, hemodynamically stable  pt reports to me that she has been nauseous after eating  will start Zofran PRN  patient should be out of bed to chair  denies pain, numbness, tingling in LLE  left hallux appears dusky but stable, no dehiscence of left toe amputation site
L groin wound with granulation tissue and minimal fibrin  lower leg wound is clean and healing well  WBC normal  afebrile  will start santyl in the groin and cont bid dressing changes  cont ABx

## 2023-10-13 NOTE — PROGRESS NOTE ADULT - SUBJECTIVE AND OBJECTIVE BOX
TEAM Vascular Surgery Daily Progress Note  =====================================================    SUBJECTIVE: Patient seen and examined at bedside on AM rounds. Patient reports that they're feeling well. Denies fever, chills, N/V, chest pain, SOB    ALLERGIES:  latex (Unknown)  sulfa drugs (Unknown)      --------------------------------------------------------------------------------------    MEDICATIONS:    Neurologic Medications  acetaminophen     Tablet .. 1000 milliGRAM(s) Oral every 6 hours PRN Mild Pain (1 - 3)    Respiratory Medications    Cardiovascular Medications  aMIOdarone    Tablet 100 milliGRAM(s) Oral daily  losartan 50 milliGRAM(s) Oral daily  metoprolol succinate ER 25 milliGRAM(s) Oral daily    Gastrointestinal Medications  calcium carbonate    500 mG (Tums) Chewable 2 Tablet(s) Chew two times a day PRN Gas  pantoprazole    Tablet 40 milliGRAM(s) Oral before breakfast    Genitourinary Medications    Hematologic/Oncologic Medications  apixaban 2.5 milliGRAM(s) Oral every 12 hours  aspirin enteric coated 81 milliGRAM(s) Oral daily    Antimicrobial/Immunologic Medications    Endocrine/Metabolic Medications  atorvastatin 80 milliGRAM(s) Oral at bedtime  levothyroxine 75 MICROGram(s) Oral daily    Topical/Other Medications  nystatin Powder 1 Application(s) Topical two times a day    --------------------------------------------------------------------------------------    VITAL SIGNS:  T(C): 36.6 (10-13-23 @ 04:26), Max: 36.9 (10-12-23 @ 09:15)  HR: 80 (10-13-23 @ 04:26) (72 - 86)  BP: 113/51 (10-13-23 @ 04:26) (109/49 - 117/55)  RR: 17 (10-13-23 @ 04:26) (17 - 18)  SpO2: 95% (10-13-23 @ 04:26) (95% - 100%)  --------------------------------------------------------------------------------------    INS AND OUTS:    10-11-23 @ 07:01  -  10-12-23 @ 07:00  --------------------------------------------------------  IN: 300 mL / OUT: 525 mL / NET: -225 mL    10-12-23 @ 07:01  -  10-13-23 @ 05:50  --------------------------------------------------------  IN: 840 mL / OUT: 700 mL / NET: 140 mL      --------------------------------------------------------------------------------------      EXAM    General: NAD, resting in bed comfortably.  Cardiac: regular rate, warm and well perfused  Respiratory: Nonlabored respirations, normal cw expansion.  Abdomen: soft, nontender, nondistended.   Extremities: normal strength, FROM, no deformities  Vascular: LLE/LLE groin vac holding suction; dry gangrene of toes.  --------------------------------------------------------------------------------------    LABS                          12.5   7.59  )-----------( 200      ( 11 Oct 2023 05:56 )             38.1     10-11    129<L>  |  95<L>  |  13  ----------------------------<  115<H>  4.1   |  28  |  0.86    Ca    8.4      11 Oct 2023 05:56  Phos  2.5     10-11  Mg     1.90     10-11        Urinalysis Basic - ( 11 Oct 2023 05:56 )    Color: x / Appearance: x / SG: x / pH: x  Gluc: 115 mg/dL / Ketone: x  / Bili: x / Urobili: x   Blood: x / Protein: x / Nitrite: x   Leuk Esterase: x / RBC: x / WBC x   Sq Epi: x / Non Sq Epi: x / Bacteria: x

## 2023-10-13 NOTE — PROGRESS NOTE ADULT - SUBJECTIVE AND OBJECTIVE BOX
Vascular Surgery Daily Progress Note  =====================================================    SUBJECTIVE: Patient seen and examined at bedside on AM rounds. Patient endorses no new complaints. Denies chest pain, SOB, fever, chills.      ALLERGIES:  latex (Unknown)  sulfa drugs (Unknown)    --------------------------------------------------------------------------------------    MEDICATIONS:    Neurologic Medications  acetaminophen     Tablet .. 1000 milliGRAM(s) Oral every 6 hours PRN Mild Pain (1 - 3)    Respiratory Medications    Cardiovascular Medications  aMIOdarone    Tablet 100 milliGRAM(s) Oral daily  losartan 50 milliGRAM(s) Oral daily  metoprolol succinate ER 25 milliGRAM(s) Oral daily    Gastrointestinal Medications  calcium carbonate    500 mG (Tums) Chewable 2 Tablet(s) Chew two times a day PRN Gas  pantoprazole    Tablet 40 milliGRAM(s) Oral before breakfast    Genitourinary Medications    Hematologic/Oncologic Medications  apixaban 2.5 milliGRAM(s) Oral every 12 hours  aspirin enteric coated 81 milliGRAM(s) Oral daily    Antimicrobial/Immunologic Medications    Endocrine/Metabolic Medications  atorvastatin 80 milliGRAM(s) Oral at bedtime  levothyroxine 75 MICROGram(s) Oral daily    Topical/Other Medications  nystatin Powder 1 Application(s) Topical two times a day    --------------------------------------------------------------------------------------    VITAL SIGNS:  T(C): 36.6 (10-13-23 @ 04:26), Max: 36.9 (10-12-23 @ 09:15)  HR: 80 (10-13-23 @ 04:26) (72 - 86)  BP: 113/51 (10-13-23 @ 04:26) (109/49 - 117/55)  RR: 17 (10-13-23 @ 04:26) (17 - 18)  SpO2: 95% (10-13-23 @ 04:26) (95% - 100%)  --------------------------------------------------------------------------------------    EXAM    General: NAD, resting in bed comfortably. A&Ox3.   Cardiac: regular rate  Respiratory: Nonlabored respirations, normal cw expansion.  Abdomen: soft, nontender, nondistended.   Extremities: moving extremities  Vascular: LLE/LLE groin vac holding suction; dry gangrene of toes    --------------------------------------------------------------------------------------    LABS      --------------------------------------------------------------------------------------      I&Os:    10-11-23 @ 07:01  -  10-12-23 @ 07:00  --------------------------------------------------------  IN: 300 mL / OUT: 525 mL / NET: -225 mL    10-12-23 @ 07:01  -  10-13-23 @ 05:47  --------------------------------------------------------  IN: 840 mL / OUT: 700 mL / NET: 140 mL        --------------------------------------------------------------------------------------

## 2023-10-13 NOTE — DISCHARGE NOTE NURSING/CASE MANAGEMENT/SOCIAL WORK - NSDCVIVACCINE_GEN_ALL_CORE_FT
influenza, high-dose, quadrivalent; 08-Sep-2023 12:49; Amy Gaona); [employer]; [id number] (Exp. Date: 06-Sep-2024); IntraMuscular; Deltoid Left.; 0.7 milliLiter(s); VIS (VIS Published: 06-Aug-2021, VIS Presented: 08-Sep-2023);

## 2023-10-13 NOTE — PROGRESS NOTE ADULT - ASSESSMENT
84 yo F w/ PMHx of CAD, pAF on Eliquis, severe AS pending TAVR, R internal carotid stenosis, HTN, HLD, hypothyroidism, anxiety, PAD  s/p L ilioprofunda bypass with reverse GSV graft to peroneal 8/31/23 (Dr. Tavares) and L 1st ray partial resection who presented with L groin erythema c/f infection; CT on admission showing 17cm L groin abscess. Patient now s/p removal of infected bypass graft of LLE on 9/24. Cultures with MDRO klebsiella and proteus. The patient remains hospitalized for medical management and wound care.     Plan:  - Diet: Regular   - vac placed on LLE groin and LLE lower medial wound- care per wound team (MWF)  - continue home meds/ASA  - DVT ppx: SQH  - OOB/ambulate  - dispo: D/C planning for today to rehab @2pm      Vascular Surgery   [id number]

## 2023-10-13 NOTE — ADVANCED PRACTICE NURSE CONSULT - REASON FOR CONSULT
Attempted to see patient for scheduled NPWT vac change. Upon arrival vascular PA at bedside removing vac dressing as patient to be discharged today. 
Patient seen on skin care rounds for NPWT/VAC placement to LLE. Wound care team will continue to see patient on M W F schedule. 
Patient seen for scheduled NPWT dressing change.

## 2023-10-13 NOTE — PROGRESS NOTE ADULT - ASSESSMENT
84 yo F w/ PMHx of CAD, pAF on Eliquis, severe AS pending TAVR, R internal carotid stenosis, HTN, HLD, hypothyroidism, anxiety, PAD  s/p L ilioprofunda bypass with reverse GSV graft to peroneal 8/31/23 (Dr. Tavares) and L 1st ray partial resection who presented with L groin erythema c/f infection; CT on admission showing 17cm L groin abscess. Patient now s/p removal of infected bypass graft of LLE on 9/24. Cultures with MDRO klebsiella and proteus. The patient remains hospitalized for medical management and wound care.     Plan:  - Diet: Regular   - vac placed on LLE groin and LLE lower medial wound- care per wound team (MWF)  - completed course of cipro/augmentin per ID recs   - continue home meds/ASA  - DVT ppx: SQH  - OOB/ambulate  - dispo: D/C planning for rehab today

## 2023-10-13 NOTE — DISCHARGE NOTE NURSING/CASE MANAGEMENT/SOCIAL WORK - NSDCPEFALRISK_GEN_ALL_CORE
For information on Fall & Injury Prevention, visit: [email or web address] OR  [email or web address] OR  https://www.cdc.gov/steadi/patient.html

## 2023-10-13 NOTE — DISCHARGE NOTE NURSING/CASE MANAGEMENT/SOCIAL WORK - PATIENT PORTAL LINK FT
You can access the FollowMyHealth Patient Portal offered by [facility] by registering at the following website: [email or web address]. By joining [employer]’s FollowMyHealth portal, you will also be able to view your health information using other applications (apps) compatible with our system.

## 2023-10-13 NOTE — PROGRESS NOTE ADULT - PROVIDER SPECIALTY LIST ADULT
Endocrinology
Infectious Disease
Vascular Surgery
Vascular Surgery
Endocrinology
Infectious Disease
Vascular Surgery
Cardiology
Vascular Surgery
Infectious Disease
Infectious Disease
Vascular Surgery
Endocrinology

## 2023-10-25 LAB

## 2023-10-26 ENCOUNTER — APPOINTMENT (OUTPATIENT)
Dept: CARDIOLOGY | Facility: CLINIC | Age: 83
End: 2023-10-26
Payer: MEDICARE

## 2023-10-26 ENCOUNTER — NON-APPOINTMENT (OUTPATIENT)
Age: 83
End: 2023-10-26

## 2023-10-26 ENCOUNTER — EMERGENCY (EMERGENCY)
Facility: HOSPITAL | Age: 83
LOS: 0 days | Discharge: ANOTHER HOSPITAL | End: 2023-10-27
Attending: EMERGENCY MEDICINE
Payer: MEDICARE

## 2023-10-26 VITALS
HEART RATE: 81 BPM | HEIGHT: 64 IN | SYSTOLIC BLOOD PRESSURE: 110 MMHG | DIASTOLIC BLOOD PRESSURE: 60 MMHG | OXYGEN SATURATION: 100 %

## 2023-10-26 VITALS
HEIGHT: 64 IN | TEMPERATURE: 98 F | OXYGEN SATURATION: 91 % | SYSTOLIC BLOOD PRESSURE: 132 MMHG | DIASTOLIC BLOOD PRESSURE: 68 MMHG | HEART RATE: 87 BPM | RESPIRATION RATE: 18 BRPM

## 2023-10-26 DIAGNOSIS — Z79.82 LONG TERM (CURRENT) USE OF ASPIRIN: ICD-10-CM

## 2023-10-26 DIAGNOSIS — I48.91 UNSPECIFIED ATRIAL FIBRILLATION: ICD-10-CM

## 2023-10-26 DIAGNOSIS — F41.9 ANXIETY DISORDER, UNSPECIFIED: ICD-10-CM

## 2023-10-26 DIAGNOSIS — Z87.81 PERSONAL HISTORY OF (HEALED) TRAUMATIC FRACTURE: Chronic | ICD-10-CM

## 2023-10-26 DIAGNOSIS — I73.9 PERIPHERAL VASCULAR DISEASE, UNSPECIFIED: ICD-10-CM

## 2023-10-26 DIAGNOSIS — L53.8 OTHER SPECIFIED ERYTHEMATOUS CONDITIONS: ICD-10-CM

## 2023-10-26 DIAGNOSIS — I35.0 NONRHEUMATIC AORTIC (VALVE) STENOSIS: ICD-10-CM

## 2023-10-26 DIAGNOSIS — E78.5 HYPERLIPIDEMIA, UNSPECIFIED: ICD-10-CM

## 2023-10-26 DIAGNOSIS — E03.9 HYPOTHYROIDISM, UNSPECIFIED: ICD-10-CM

## 2023-10-26 DIAGNOSIS — Z79.01 LONG TERM (CURRENT) USE OF ANTICOAGULANTS: ICD-10-CM

## 2023-10-26 DIAGNOSIS — Z88.2 ALLERGY STATUS TO SULFONAMIDES: ICD-10-CM

## 2023-10-26 DIAGNOSIS — M79.662 PAIN IN LEFT LOWER LEG: ICD-10-CM

## 2023-10-26 DIAGNOSIS — I99.8 OTHER DISORDER OF CIRCULATORY SYSTEM: ICD-10-CM

## 2023-10-26 DIAGNOSIS — I10 ESSENTIAL (PRIMARY) HYPERTENSION: ICD-10-CM

## 2023-10-26 DIAGNOSIS — I25.10 ATHEROSCLEROTIC HEART DISEASE OF NATIVE CORONARY ARTERY WITHOUT ANGINA PECTORIS: ICD-10-CM

## 2023-10-26 DIAGNOSIS — I25.10 ATHEROSCLEROTIC HEART DISEASE OF NATIVE CORONARY ARTERY W/OUT ANGINA PECTORIS: ICD-10-CM

## 2023-10-26 DIAGNOSIS — Z91.040 LATEX ALLERGY STATUS: ICD-10-CM

## 2023-10-26 LAB
ALBUMIN SERPL ELPH-MCNC: 2.5 G/DL — LOW (ref 3.3–5)
ALBUMIN SERPL ELPH-MCNC: 2.5 G/DL — LOW (ref 3.3–5)
ALP SERPL-CCNC: 126 U/L — HIGH (ref 40–120)
ALP SERPL-CCNC: 126 U/L — HIGH (ref 40–120)
ALT FLD-CCNC: 18 U/L — SIGNIFICANT CHANGE UP (ref 12–78)
ALT FLD-CCNC: 18 U/L — SIGNIFICANT CHANGE UP (ref 12–78)
ANION GAP SERPL CALC-SCNC: 5 MMOL/L — SIGNIFICANT CHANGE UP (ref 5–17)
ANION GAP SERPL CALC-SCNC: 5 MMOL/L — SIGNIFICANT CHANGE UP (ref 5–17)
APTT BLD: 30.2 SEC — SIGNIFICANT CHANGE UP (ref 24.5–35.6)
APTT BLD: 30.2 SEC — SIGNIFICANT CHANGE UP (ref 24.5–35.6)
AST SERPL-CCNC: 14 U/L — LOW (ref 15–37)
AST SERPL-CCNC: 14 U/L — LOW (ref 15–37)
BASOPHILS # BLD AUTO: 0.04 K/UL — SIGNIFICANT CHANGE UP (ref 0–0.2)
BASOPHILS # BLD AUTO: 0.04 K/UL — SIGNIFICANT CHANGE UP (ref 0–0.2)
BASOPHILS NFR BLD AUTO: 0.4 % — SIGNIFICANT CHANGE UP (ref 0–2)
BASOPHILS NFR BLD AUTO: 0.4 % — SIGNIFICANT CHANGE UP (ref 0–2)
BILIRUB SERPL-MCNC: 0.8 MG/DL — SIGNIFICANT CHANGE UP (ref 0.2–1.2)
BILIRUB SERPL-MCNC: 0.8 MG/DL — SIGNIFICANT CHANGE UP (ref 0.2–1.2)
BUN SERPL-MCNC: 17 MG/DL — SIGNIFICANT CHANGE UP (ref 7–23)
BUN SERPL-MCNC: 17 MG/DL — SIGNIFICANT CHANGE UP (ref 7–23)
CALCIUM SERPL-MCNC: 8.3 MG/DL — LOW (ref 8.5–10.1)
CALCIUM SERPL-MCNC: 8.3 MG/DL — LOW (ref 8.5–10.1)
CHLORIDE SERPL-SCNC: 99 MMOL/L — SIGNIFICANT CHANGE UP (ref 96–108)
CHLORIDE SERPL-SCNC: 99 MMOL/L — SIGNIFICANT CHANGE UP (ref 96–108)
CO2 SERPL-SCNC: 28 MMOL/L — SIGNIFICANT CHANGE UP (ref 22–31)
CO2 SERPL-SCNC: 28 MMOL/L — SIGNIFICANT CHANGE UP (ref 22–31)
CREAT SERPL-MCNC: 0.86 MG/DL — SIGNIFICANT CHANGE UP (ref 0.5–1.3)
CREAT SERPL-MCNC: 0.86 MG/DL — SIGNIFICANT CHANGE UP (ref 0.5–1.3)
EGFR: 67 ML/MIN/1.73M2 — SIGNIFICANT CHANGE UP
EGFR: 67 ML/MIN/1.73M2 — SIGNIFICANT CHANGE UP
EOSINOPHIL # BLD AUTO: 0.17 K/UL — SIGNIFICANT CHANGE UP (ref 0–0.5)
EOSINOPHIL # BLD AUTO: 0.17 K/UL — SIGNIFICANT CHANGE UP (ref 0–0.5)
EOSINOPHIL NFR BLD AUTO: 1.8 % — SIGNIFICANT CHANGE UP (ref 0–6)
EOSINOPHIL NFR BLD AUTO: 1.8 % — SIGNIFICANT CHANGE UP (ref 0–6)
GLUCOSE SERPL-MCNC: 138 MG/DL — HIGH (ref 70–99)
GLUCOSE SERPL-MCNC: 138 MG/DL — HIGH (ref 70–99)
HCT VFR BLD CALC: 35.8 % — SIGNIFICANT CHANGE UP (ref 34.5–45)
HCT VFR BLD CALC: 35.8 % — SIGNIFICANT CHANGE UP (ref 34.5–45)
HGB BLD-MCNC: 12.1 G/DL — SIGNIFICANT CHANGE UP (ref 11.5–15.5)
HGB BLD-MCNC: 12.1 G/DL — SIGNIFICANT CHANGE UP (ref 11.5–15.5)
IMM GRANULOCYTES NFR BLD AUTO: 1.8 % — HIGH (ref 0–0.9)
IMM GRANULOCYTES NFR BLD AUTO: 1.8 % — HIGH (ref 0–0.9)
INR BLD: 1.29 RATIO — HIGH (ref 0.85–1.18)
INR BLD: 1.29 RATIO — HIGH (ref 0.85–1.18)
LACTATE SERPL-SCNC: 1.4 MMOL/L — SIGNIFICANT CHANGE UP (ref 0.7–2)
LACTATE SERPL-SCNC: 1.4 MMOL/L — SIGNIFICANT CHANGE UP (ref 0.7–2)
LYMPHOCYTES # BLD AUTO: 1.08 K/UL — SIGNIFICANT CHANGE UP (ref 1–3.3)
LYMPHOCYTES # BLD AUTO: 1.08 K/UL — SIGNIFICANT CHANGE UP (ref 1–3.3)
LYMPHOCYTES # BLD AUTO: 11.6 % — LOW (ref 13–44)
LYMPHOCYTES # BLD AUTO: 11.6 % — LOW (ref 13–44)
MCHC RBC-ENTMCNC: 31.8 PG — SIGNIFICANT CHANGE UP (ref 27–34)
MCHC RBC-ENTMCNC: 31.8 PG — SIGNIFICANT CHANGE UP (ref 27–34)
MCHC RBC-ENTMCNC: 33.8 GM/DL — SIGNIFICANT CHANGE UP (ref 32–36)
MCHC RBC-ENTMCNC: 33.8 GM/DL — SIGNIFICANT CHANGE UP (ref 32–36)
MCV RBC AUTO: 94 FL — SIGNIFICANT CHANGE UP (ref 80–100)
MCV RBC AUTO: 94 FL — SIGNIFICANT CHANGE UP (ref 80–100)
MONOCYTES # BLD AUTO: 0.91 K/UL — HIGH (ref 0–0.9)
MONOCYTES # BLD AUTO: 0.91 K/UL — HIGH (ref 0–0.9)
MONOCYTES NFR BLD AUTO: 9.8 % — SIGNIFICANT CHANGE UP (ref 2–14)
MONOCYTES NFR BLD AUTO: 9.8 % — SIGNIFICANT CHANGE UP (ref 2–14)
NEUTROPHILS # BLD AUTO: 6.93 K/UL — SIGNIFICANT CHANGE UP (ref 1.8–7.4)
NEUTROPHILS # BLD AUTO: 6.93 K/UL — SIGNIFICANT CHANGE UP (ref 1.8–7.4)
NEUTROPHILS NFR BLD AUTO: 74.6 % — SIGNIFICANT CHANGE UP (ref 43–77)
NEUTROPHILS NFR BLD AUTO: 74.6 % — SIGNIFICANT CHANGE UP (ref 43–77)
PLATELET # BLD AUTO: 247 K/UL — SIGNIFICANT CHANGE UP (ref 150–400)
PLATELET # BLD AUTO: 247 K/UL — SIGNIFICANT CHANGE UP (ref 150–400)
POTASSIUM SERPL-MCNC: 3.8 MMOL/L — SIGNIFICANT CHANGE UP (ref 3.5–5.3)
POTASSIUM SERPL-MCNC: 3.8 MMOL/L — SIGNIFICANT CHANGE UP (ref 3.5–5.3)
POTASSIUM SERPL-SCNC: 3.8 MMOL/L — SIGNIFICANT CHANGE UP (ref 3.5–5.3)
POTASSIUM SERPL-SCNC: 3.8 MMOL/L — SIGNIFICANT CHANGE UP (ref 3.5–5.3)
PROT SERPL-MCNC: 6.7 GM/DL — SIGNIFICANT CHANGE UP (ref 6–8.3)
PROT SERPL-MCNC: 6.7 GM/DL — SIGNIFICANT CHANGE UP (ref 6–8.3)
PROTHROM AB SERPL-ACNC: 14.5 SEC — HIGH (ref 9.5–13)
PROTHROM AB SERPL-ACNC: 14.5 SEC — HIGH (ref 9.5–13)
RBC # BLD: 3.81 M/UL — SIGNIFICANT CHANGE UP (ref 3.8–5.2)
RBC # BLD: 3.81 M/UL — SIGNIFICANT CHANGE UP (ref 3.8–5.2)
RBC # FLD: 17.7 % — HIGH (ref 10.3–14.5)
RBC # FLD: 17.7 % — HIGH (ref 10.3–14.5)
SODIUM SERPL-SCNC: 132 MMOL/L — LOW (ref 135–145)
SODIUM SERPL-SCNC: 132 MMOL/L — LOW (ref 135–145)
WBC # BLD: 9.3 K/UL — SIGNIFICANT CHANGE UP (ref 3.8–10.5)
WBC # BLD: 9.3 K/UL — SIGNIFICANT CHANGE UP (ref 3.8–10.5)
WBC # FLD AUTO: 9.3 K/UL — SIGNIFICANT CHANGE UP (ref 3.8–10.5)
WBC # FLD AUTO: 9.3 K/UL — SIGNIFICANT CHANGE UP (ref 3.8–10.5)

## 2023-10-26 PROCEDURE — 85730 THROMBOPLASTIN TIME PARTIAL: CPT

## 2023-10-26 PROCEDURE — 87040 BLOOD CULTURE FOR BACTERIA: CPT

## 2023-10-26 PROCEDURE — 99215 OFFICE O/P EST HI 40 MIN: CPT

## 2023-10-26 PROCEDURE — 99285 EMERGENCY DEPT VISIT HI MDM: CPT

## 2023-10-26 PROCEDURE — 96374 THER/PROPH/DIAG INJ IV PUSH: CPT

## 2023-10-26 PROCEDURE — 93926 LOWER EXTREMITY STUDY: CPT | Mod: LT

## 2023-10-26 PROCEDURE — 93971 EXTREMITY STUDY: CPT | Mod: LT

## 2023-10-26 PROCEDURE — 99285 EMERGENCY DEPT VISIT HI MDM: CPT | Mod: 25

## 2023-10-26 PROCEDURE — 85610 PROTHROMBIN TIME: CPT

## 2023-10-26 PROCEDURE — 83605 ASSAY OF LACTIC ACID: CPT

## 2023-10-26 PROCEDURE — 93000 ELECTROCARDIOGRAM COMPLETE: CPT | Mod: PD

## 2023-10-26 PROCEDURE — 93971 EXTREMITY STUDY: CPT | Mod: 26,LT

## 2023-10-26 PROCEDURE — 80053 COMPREHEN METABOLIC PANEL: CPT

## 2023-10-26 PROCEDURE — 36415 COLL VENOUS BLD VENIPUNCTURE: CPT

## 2023-10-26 PROCEDURE — 93926 LOWER EXTREMITY STUDY: CPT | Mod: 26,LT

## 2023-10-26 PROCEDURE — 85025 COMPLETE CBC W/AUTO DIFF WBC: CPT

## 2023-10-26 RX ORDER — ONDANSETRON 8 MG/1
4 TABLET, FILM COATED ORAL ONCE
Refills: 0 | Status: COMPLETED | OUTPATIENT
Start: 2023-10-26 | End: 2023-10-26

## 2023-10-26 RX ADMIN — ONDANSETRON 4 MILLIGRAM(S): 8 TABLET, FILM COATED ORAL at 21:30

## 2023-10-26 NOTE — ED ADULT NURSE NOTE - CHPI ED NUR SYMPTOMS NEG
Floor
no chills/no decreased eating/drinking/no dizziness/no fever/no nausea/no pain/no tingling/no vomiting/no weakness

## 2023-10-26 NOTE — ED PROVIDER NOTE - PHYSICAL EXAMINATION
Constitutional: NAD AOx3  Eyes: PERRL EOMI  Head: Normocephalic atraumatic  Mouth: MMM  Cardiac: regular rate and rhythm  Resp: Lungs CTAB  GI: Abd s/nd/nt  Neuro: CN2-12 grossly intact, KHAN x 4  MSK: Bilateral LEs with edema, same temperature. Left LE surgical scars on the toes, big toe is erythematous with sutures. No discharge or foul smell. Non-palpable pulse. Medial incision from calf to groin is erythematous. + PT pulse by doppler.   Skin: No visible rashes

## 2023-10-26 NOTE — ED PROVIDER NOTE - NSPREEXISTRELATION_ED_A_ED_FT
vascular surgeon and podiatrist at [facility], recent admit and operation at [facility]

## 2023-10-26 NOTE — ED ADULT NURSE NOTE - OBJECTIVE STATEMENT
presents to ed sent in by visiting nurse. patient received open heart surgery in which she had a left leg bypass graft that became infected and is currently receiving at home IV abx multiple times a day via picc line. patient sent in for cold food and unpalpable pulses. wound noted on left leg and on left toes. patient denies fevers, denies pain, denies loss of sensation in legs and feet, able to wiggle toes. presents to ed sent in by visiting nurse. patient received  a left leg bypass graft that became infected and is currently receiving at home IV abx multiple times a day via picc line. patient sent in for cold foot and unpalpable pulses. wound noted on left leg and on left toes. patient denies fevers, denies pain, denies loss of sensation in legs and feet, able to wiggle toes.

## 2023-10-26 NOTE — ED ADULT TRIAGE NOTE - HEIGHT IN CM
Patient Education     Sleep and Women     Taking a warm bath can help you relax before bed.   Do you have trouble sleeping? Many women do. Some life changes are unique to women, such as pregnancy or menopause. These changes, along with the demands of family and work, can affect your health and your sleep. Talk to your healthcare provider if your sleep problems last more than a few weeks.   What affects your sleep  Many factors can affect how well you sleep. Hormone changes can cause mood swings, insomnia, and other problems. Balancing many roles, such as mother, partner, worker, and caretaker can also take a toll on your sleep. Worries about these competing demands can keep you awake at night. And, of course, with so much to do, who even has time for sleep? But you need to sleep well to be healthy and have energy. The good news is there are steps you can take to sleep better.   Tips for better sleep  Here are some steps you can take to sleep better:    Keep a regular sleep schedule. Go to bed and get up at the same time each day.    Exercise regularly. But avoid strenuous exercise 2 hours to 4 hours before bedtime.    Learn to relax. Try a warm bath, yoga, or meditation. Reading a book or listening to music can help you relax before bedtime.    Create a comfortable setting for sleep. Make sure the room is quiet, dark, and not too hot or too cold.    Use your bed only for sleep and sex.    Don't have caffeine, nicotine, or alcohol, or limit how much of these you have.    Don't nap after 3 p.m.  To learn more  American Academy of Sleep Medicine www.sleepeducation.com 259-229-0491   National Sleep Foundation www.sleepfoundation.org 202-419-1713   [employer] last reviewed this educational content on 5/1/2020 2000-2021 [employer]. All rights reserved. This information is not intended as a substitute for professional medical care. Always follow your healthcare professional's instructions.           Take  lexapro 10 mg once a day for 7 days. Then start duloxetine 30 mg once a day.     Follow up in 2 weeks for urine bhg and nexplanon placement. Re-assess duloxetine at this time also.     Take melatonin before bed for sleep.    162.56

## 2023-10-26 NOTE — ED ADULT NURSE REASSESSMENT NOTE - NS ED NURSE REASSESS COMMENT FT1
report given to LEIGH Sanchez. no other needs at this time.
pt received from previous LEIGH Kinney. pt returned from US. pt resting comfortably in stretcher. pt states shes in "no pain, just some discomfort". pt  at bedside.

## 2023-10-26 NOTE — ED ADULT NURSE NOTE - NSFALLHARMRISKINTERV_ED_ALL_ED
Assistance OOB with selected safe patient handling equipment if applicable/Assistance with ambulation/Communicate risk of Fall with Harm to all staff, patient, and family/Monitor gait and stability/Provide visual cue: red socks, yellow wristband, yellow gown, etc/Reinforce activity limits and safety measures with patient and family/Bed in lowest position, wheels locked, appropriate side rails in place/Call bell, personal items and telephone in reach/Instruct patient to call for assistance before getting out of bed/chair/stretcher/Non-slip footwear applied when patient is off stretcher/[place] to call system/Physically safe environment - no spills, clutter or unnecessary equipment/Purposeful Proactive Rounding/Room/bathroom lighting operational, light cord in reach

## 2023-10-26 NOTE — ED PROVIDER NOTE - PROGRESS NOTE DETAILS
Durga Haas: Discussed with surgery resident for vascular consult. vasc surg at [place] consult recommend transfer to [facility] for further eval with her vascular surgeon dr fairbanks regarding her PAD  and her podiatrist, concern for possible infection vs chronic wound changes, as well as suture removal. MD PUNEET

## 2023-10-26 NOTE — CONSULT NOTE ADULT - SUBJECTIVE AND OBJECTIVE BOX
84 y/o female with PMHx of Afib on Eliquis and 81mg ASA, PAD, CAD, HTN, HLD, hypothyroid, and carotid artery stenosis BIBA from [facility] to ED c/o decreased left foot circulation/possible infection. Pt s/p left ilioprofunda bypass with reverse GSV graft to peroneal 8/31/23 (Dr. Tavares at [facility]) and left 1st ray partial resection, found to have left groin erythema c/f infection with 17cm abscess with subsequent removal of infected bypass graft of LLE 9/24/2023. Pulse status unknown at this time. Pt was discharged to [facility] 10/13/2023. Pt states she has been getting daily wound care, and today the team was concerned about how cold the foot felt, recommending she come into the hospital. She denies fevers, chills, numbness/tingling to the LLE, weakness, decreased ROM, or pain to the extremity.    Physical Exam:  Pt is AAOx3  General: Well developed, in no acute distress.   Chest: Non-labored breathing, symmetric chest rise  Heart: RR   Abdomen: Soft, no tenderness, nondistended  Back: Normal curvature, no tenderness.   Neuro: Physiological, no localizing findings.   Extremities: S/p L partial first ray resection. LLE with non-palpable, non-dopplarable pedal pulses. Skin is blue-tinged and cold from the midfoot to the toes. FROM of toes, sensation intact. Sutures intact to the distal 1st toe. 1+ pitting edema.     Vital Signs Last 24 Hrs  T(C): 36.6 (26 Oct 2023 20:05), Max: 36.6 (26 Oct 2023 20:05)  T(F): 97.9 (26 Oct 2023 20:05), Max: 97.9 (26 Oct 2023 20:05)  HR: 87 (26 Oct 2023 20:05) (87 - 87)  BP: 103/75 (26 Oct 2023 20:05) (103/75 - 132/68)  BP(mean): --  RR: 18 (26 Oct 2023 20:05) (18 - 18)  SpO2: 91% (26 Oct 2023 20:05) (91% - 91%)    Parameters below as of 26 Oct 2023 20:05  Patient On (Oxygen Delivery Method): room air, Dr. Haas made aware of oxygen saturation; no interventions at this time                            12.1   9.30  )-----------( 247      ( 26 Oct 2023 18:47 )             35.8     10-26    132<L>  |  99  |  17  ----------------------------<  138<H>  3.8   |  28  |  0.86    Ca    8.3<L>      26 Oct 2023 18:47    TPro  6.7  /  Alb  2.5<L>  /  TBili  0.8  /  DBili  x   /  AST  14<L>  /  ALT  18  /  AlkPhos  126<H>  10-26    I&O's Summary    < from: US Duplex Venous Lower Ext Ltd, Left (10.26.23 @ 20:04) >  IMPRESSION:  No evidence of left lower extremity deep venous thrombosis.    < end of copied text >  < from: US Duplex Arterial Lower Ext Ltd, Left (10.26.23 @ 20:03) >  IMPRESSION:    Markedly limited examination essentially nondiagnostic for evaluation of   left lower extremity graft patency.    < end of copied text >  
None

## 2023-10-26 NOTE — ED ADULT TRIAGE NOTE - CHIEF COMPLAINT QUOTE
sima from [facility], had bypass sx with graft from [facility], complcated by LLE infection. on long term abx via L PICC. sp left great toe amputation, sent in for c/o decreased circulation to E

## 2023-10-26 NOTE — ED PROVIDER NOTE - OBJECTIVE STATEMENT
84 y/o female with PMHx of Afib on Eliquis and 81mg ASA, PAD, CAD, HTN, HLD, hypothyroid, and carotid artery stenosis BIBA from [facility] to ED c/o decreased left foot circulation/possible infection. Pt s/p left ilioprofunda bypass with reverse GSV graft to peroneal 8/31/23 (Dr. Tavares) and left 1st ray partial resection, found to have left groin erythema c/f infection with 17cm abscess with subsequent removal of infected bypass graft of LLE 9/24/2023. Pt was discharged to [facility] 10/20/2023, since then with worsening left foot coldness and redness of the big toe and notes stitches should have been removed but never were. Vascular/wound care evaluated the pt yesterday noted the foot was worsening. Pt has been able to ambulate, denies fevers. +AC.   Vascular: Dr. Finkelstein

## 2023-10-26 NOTE — CONSULT NOTE ADULT - ASSESSMENT
84yo F s/p left ilioprofunda bypass with reverse GSV graft to peroneal and left 1st ray partial resection, followed by resection of infected graft at [facility], presents with coldness to the L foot.    Recommendations:  - the patient should be transferred to [facility] for workup by her vascular surgery team      Discussed with Dr. Buenrostro

## 2023-10-26 NOTE — ED ADULT NURSE NOTE - CHIEF COMPLAINT QUOTE
sima from [facility], had bypass sx with graft from [facility], complcated by LLE infection. on long term abx via L PICC. sp left great toe amputation, sent in for c/o decreased circulation to E

## 2023-10-27 ENCOUNTER — INPATIENT (INPATIENT)
Facility: HOSPITAL | Age: 83
LOS: 21 days | Discharge: NURSING, REHAB OR LONG-TERM CARE | End: 2023-11-18
Attending: SURGERY | Admitting: SURGERY
Payer: MEDICARE

## 2023-10-27 VITALS
OXYGEN SATURATION: 93 % | RESPIRATION RATE: 17 BRPM | SYSTOLIC BLOOD PRESSURE: 91 MMHG | TEMPERATURE: 97 F | DIASTOLIC BLOOD PRESSURE: 44 MMHG | HEART RATE: 85 BPM

## 2023-10-27 VITALS
TEMPERATURE: 98 F | DIASTOLIC BLOOD PRESSURE: 68 MMHG | OXYGEN SATURATION: 91 % | SYSTOLIC BLOOD PRESSURE: 132 MMHG | HEIGHT: 64 IN | HEART RATE: 84 BPM | RESPIRATION RATE: 16 BRPM

## 2023-10-27 DIAGNOSIS — Z87.81 PERSONAL HISTORY OF (HEALED) TRAUMATIC FRACTURE: Chronic | ICD-10-CM

## 2023-10-27 DIAGNOSIS — E03.9 HYPOTHYROIDISM, UNSPECIFIED: ICD-10-CM

## 2023-10-27 DIAGNOSIS — M79.673 PAIN IN UNSPECIFIED FOOT: ICD-10-CM

## 2023-10-27 DIAGNOSIS — I35.0 NONRHEUMATIC AORTIC (VALVE) STENOSIS: ICD-10-CM

## 2023-10-27 DIAGNOSIS — I99.8 OTHER DISORDER OF CIRCULATORY SYSTEM: ICD-10-CM

## 2023-10-27 DIAGNOSIS — Z95.828 PRESENCE OF OTHER VASCULAR IMPLANTS AND GRAFTS: Chronic | ICD-10-CM

## 2023-10-27 DIAGNOSIS — I10 ESSENTIAL (PRIMARY) HYPERTENSION: ICD-10-CM

## 2023-10-27 DIAGNOSIS — I48.0 PAROXYSMAL ATRIAL FIBRILLATION: ICD-10-CM

## 2023-10-27 DIAGNOSIS — E78.5 HYPERLIPIDEMIA, UNSPECIFIED: ICD-10-CM

## 2023-10-27 LAB
ALBUMIN SERPL ELPH-MCNC: 2.9 G/DL — LOW (ref 3.3–5)
ALBUMIN SERPL ELPH-MCNC: 2.9 G/DL — LOW (ref 3.3–5)
ALP SERPL-CCNC: 106 U/L — SIGNIFICANT CHANGE UP (ref 40–120)
ALP SERPL-CCNC: 106 U/L — SIGNIFICANT CHANGE UP (ref 40–120)
ALT FLD-CCNC: 11 U/L — SIGNIFICANT CHANGE UP (ref 4–33)
ALT FLD-CCNC: 11 U/L — SIGNIFICANT CHANGE UP (ref 4–33)
ANION GAP SERPL CALC-SCNC: 13 MMOL/L — SIGNIFICANT CHANGE UP (ref 7–14)
ANION GAP SERPL CALC-SCNC: 13 MMOL/L — SIGNIFICANT CHANGE UP (ref 7–14)
APTT BLD: 30.8 SEC — SIGNIFICANT CHANGE UP (ref 24.5–35.6)
APTT BLD: 30.8 SEC — SIGNIFICANT CHANGE UP (ref 24.5–35.6)
AST SERPL-CCNC: 20 U/L — SIGNIFICANT CHANGE UP (ref 4–32)
AST SERPL-CCNC: 20 U/L — SIGNIFICANT CHANGE UP (ref 4–32)
BASOPHILS # BLD AUTO: 0.04 K/UL — SIGNIFICANT CHANGE UP (ref 0–0.2)
BASOPHILS # BLD AUTO: 0.04 K/UL — SIGNIFICANT CHANGE UP (ref 0–0.2)
BASOPHILS NFR BLD AUTO: 0.5 % — SIGNIFICANT CHANGE UP (ref 0–2)
BASOPHILS NFR BLD AUTO: 0.5 % — SIGNIFICANT CHANGE UP (ref 0–2)
BILIRUB SERPL-MCNC: 0.6 MG/DL — SIGNIFICANT CHANGE UP (ref 0.2–1.2)
BILIRUB SERPL-MCNC: 0.6 MG/DL — SIGNIFICANT CHANGE UP (ref 0.2–1.2)
BLD GP AB SCN SERPL QL: NEGATIVE — SIGNIFICANT CHANGE UP
BLD GP AB SCN SERPL QL: NEGATIVE — SIGNIFICANT CHANGE UP
BUN SERPL-MCNC: 15 MG/DL — SIGNIFICANT CHANGE UP (ref 7–23)
BUN SERPL-MCNC: 15 MG/DL — SIGNIFICANT CHANGE UP (ref 7–23)
CALCIUM SERPL-MCNC: 8.5 MG/DL — SIGNIFICANT CHANGE UP (ref 8.4–10.5)
CALCIUM SERPL-MCNC: 8.5 MG/DL — SIGNIFICANT CHANGE UP (ref 8.4–10.5)
CHLORIDE SERPL-SCNC: 97 MMOL/L — LOW (ref 98–107)
CHLORIDE SERPL-SCNC: 97 MMOL/L — LOW (ref 98–107)
CO2 SERPL-SCNC: 24 MMOL/L — SIGNIFICANT CHANGE UP (ref 22–31)
CO2 SERPL-SCNC: 24 MMOL/L — SIGNIFICANT CHANGE UP (ref 22–31)
CREAT SERPL-MCNC: 0.69 MG/DL — SIGNIFICANT CHANGE UP (ref 0.5–1.3)
CREAT SERPL-MCNC: 0.69 MG/DL — SIGNIFICANT CHANGE UP (ref 0.5–1.3)
CRP SERPL-MCNC: 51.7 MG/L — HIGH
CRP SERPL-MCNC: 51.7 MG/L — HIGH
EGFR: 87 ML/MIN/1.73M2 — SIGNIFICANT CHANGE UP
EGFR: 87 ML/MIN/1.73M2 — SIGNIFICANT CHANGE UP
EOSINOPHIL # BLD AUTO: 0.11 K/UL — SIGNIFICANT CHANGE UP (ref 0–0.5)
EOSINOPHIL # BLD AUTO: 0.11 K/UL — SIGNIFICANT CHANGE UP (ref 0–0.5)
EOSINOPHIL NFR BLD AUTO: 1.3 % — SIGNIFICANT CHANGE UP (ref 0–6)
EOSINOPHIL NFR BLD AUTO: 1.3 % — SIGNIFICANT CHANGE UP (ref 0–6)
ERYTHROCYTE [SEDIMENTATION RATE] IN BLOOD: 29 MM/HR — HIGH (ref 4–25)
ERYTHROCYTE [SEDIMENTATION RATE] IN BLOOD: 29 MM/HR — HIGH (ref 4–25)
GLUCOSE SERPL-MCNC: 111 MG/DL — HIGH (ref 70–99)
GLUCOSE SERPL-MCNC: 111 MG/DL — HIGH (ref 70–99)
GRAM STN FLD: SIGNIFICANT CHANGE UP
GRAM STN FLD: SIGNIFICANT CHANGE UP
HCT VFR BLD CALC: 33.9 % — LOW (ref 34.5–45)
HCT VFR BLD CALC: 33.9 % — LOW (ref 34.5–45)
HCT VFR BLD CALC: 34.3 % — LOW (ref 34.5–45)
HCT VFR BLD CALC: 34.3 % — LOW (ref 34.5–45)
HGB BLD-MCNC: 11 G/DL — LOW (ref 11.5–15.5)
HGB BLD-MCNC: 11 G/DL — LOW (ref 11.5–15.5)
HGB BLD-MCNC: 11.2 G/DL — LOW (ref 11.5–15.5)
HGB BLD-MCNC: 11.2 G/DL — LOW (ref 11.5–15.5)
IANC: 6.52 K/UL — SIGNIFICANT CHANGE UP (ref 1.8–7.4)
IANC: 6.52 K/UL — SIGNIFICANT CHANGE UP (ref 1.8–7.4)
IMM GRANULOCYTES NFR BLD AUTO: 0.6 % — SIGNIFICANT CHANGE UP (ref 0–0.9)
IMM GRANULOCYTES NFR BLD AUTO: 0.6 % — SIGNIFICANT CHANGE UP (ref 0–0.9)
INR BLD: 1.26 RATIO — HIGH (ref 0.85–1.18)
INR BLD: 1.26 RATIO — HIGH (ref 0.85–1.18)
LYMPHOCYTES # BLD AUTO: 1 K/UL — SIGNIFICANT CHANGE UP (ref 1–3.3)
LYMPHOCYTES # BLD AUTO: 1 K/UL — SIGNIFICANT CHANGE UP (ref 1–3.3)
LYMPHOCYTES # BLD AUTO: 11.8 % — LOW (ref 13–44)
LYMPHOCYTES # BLD AUTO: 11.8 % — LOW (ref 13–44)
MAGNESIUM SERPL-MCNC: 1.9 MG/DL — SIGNIFICANT CHANGE UP (ref 1.6–2.6)
MAGNESIUM SERPL-MCNC: 1.9 MG/DL — SIGNIFICANT CHANGE UP (ref 1.6–2.6)
MCHC RBC-ENTMCNC: 30.9 PG — SIGNIFICANT CHANGE UP (ref 27–34)
MCHC RBC-ENTMCNC: 32.4 GM/DL — SIGNIFICANT CHANGE UP (ref 32–36)
MCHC RBC-ENTMCNC: 32.4 GM/DL — SIGNIFICANT CHANGE UP (ref 32–36)
MCHC RBC-ENTMCNC: 32.7 GM/DL — SIGNIFICANT CHANGE UP (ref 32–36)
MCHC RBC-ENTMCNC: 32.7 GM/DL — SIGNIFICANT CHANGE UP (ref 32–36)
MCV RBC AUTO: 94.5 FL — SIGNIFICANT CHANGE UP (ref 80–100)
MCV RBC AUTO: 94.5 FL — SIGNIFICANT CHANGE UP (ref 80–100)
MCV RBC AUTO: 95.2 FL — SIGNIFICANT CHANGE UP (ref 80–100)
MCV RBC AUTO: 95.2 FL — SIGNIFICANT CHANGE UP (ref 80–100)
MONOCYTES # BLD AUTO: 0.78 K/UL — SIGNIFICANT CHANGE UP (ref 0–0.9)
MONOCYTES # BLD AUTO: 0.78 K/UL — SIGNIFICANT CHANGE UP (ref 0–0.9)
MONOCYTES NFR BLD AUTO: 9.2 % — SIGNIFICANT CHANGE UP (ref 2–14)
MONOCYTES NFR BLD AUTO: 9.2 % — SIGNIFICANT CHANGE UP (ref 2–14)
NEUTROPHILS # BLD AUTO: 6.52 K/UL — SIGNIFICANT CHANGE UP (ref 1.8–7.4)
NEUTROPHILS # BLD AUTO: 6.52 K/UL — SIGNIFICANT CHANGE UP (ref 1.8–7.4)
NEUTROPHILS NFR BLD AUTO: 76.6 % — SIGNIFICANT CHANGE UP (ref 43–77)
NEUTROPHILS NFR BLD AUTO: 76.6 % — SIGNIFICANT CHANGE UP (ref 43–77)
NRBC # BLD: 0 /100 WBCS — SIGNIFICANT CHANGE UP (ref 0–0)
NRBC # FLD: 0 K/UL — SIGNIFICANT CHANGE UP (ref 0–0)
PHOSPHATE SERPL-MCNC: 2.9 MG/DL — SIGNIFICANT CHANGE UP (ref 2.5–4.5)
PHOSPHATE SERPL-MCNC: 2.9 MG/DL — SIGNIFICANT CHANGE UP (ref 2.5–4.5)
PLATELET # BLD AUTO: 224 K/UL — SIGNIFICANT CHANGE UP (ref 150–400)
PLATELET # BLD AUTO: 224 K/UL — SIGNIFICANT CHANGE UP (ref 150–400)
PLATELET # BLD AUTO: 253 K/UL — SIGNIFICANT CHANGE UP (ref 150–400)
PLATELET # BLD AUTO: 253 K/UL — SIGNIFICANT CHANGE UP (ref 150–400)
POTASSIUM SERPL-MCNC: 4 MMOL/L — SIGNIFICANT CHANGE UP (ref 3.5–5.3)
POTASSIUM SERPL-MCNC: 4 MMOL/L — SIGNIFICANT CHANGE UP (ref 3.5–5.3)
POTASSIUM SERPL-SCNC: 4 MMOL/L — SIGNIFICANT CHANGE UP (ref 3.5–5.3)
POTASSIUM SERPL-SCNC: 4 MMOL/L — SIGNIFICANT CHANGE UP (ref 3.5–5.3)
PROT SERPL-MCNC: 5.9 G/DL — LOW (ref 6–8.3)
PROT SERPL-MCNC: 5.9 G/DL — LOW (ref 6–8.3)
PROTHROM AB SERPL-ACNC: 14.1 SEC — HIGH (ref 9.5–13)
PROTHROM AB SERPL-ACNC: 14.1 SEC — HIGH (ref 9.5–13)
RBC # BLD: 3.56 M/UL — LOW (ref 3.8–5.2)
RBC # BLD: 3.56 M/UL — LOW (ref 3.8–5.2)
RBC # BLD: 3.63 M/UL — LOW (ref 3.8–5.2)
RBC # BLD: 3.63 M/UL — LOW (ref 3.8–5.2)
RBC # FLD: 17.7 % — HIGH (ref 10.3–14.5)
RH IG SCN BLD-IMP: POSITIVE — SIGNIFICANT CHANGE UP
RH IG SCN BLD-IMP: POSITIVE — SIGNIFICANT CHANGE UP
SODIUM SERPL-SCNC: 134 MMOL/L — LOW (ref 135–145)
SODIUM SERPL-SCNC: 134 MMOL/L — LOW (ref 135–145)
SPECIMEN SOURCE: SIGNIFICANT CHANGE UP
SPECIMEN SOURCE: SIGNIFICANT CHANGE UP
WBC # BLD: 8.5 K/UL — SIGNIFICANT CHANGE UP (ref 3.8–10.5)
WBC # BLD: 8.5 K/UL — SIGNIFICANT CHANGE UP (ref 3.8–10.5)
WBC # BLD: 9.26 K/UL — SIGNIFICANT CHANGE UP (ref 3.8–10.5)
WBC # BLD: 9.26 K/UL — SIGNIFICANT CHANGE UP (ref 3.8–10.5)
WBC # FLD AUTO: 8.5 K/UL — SIGNIFICANT CHANGE UP (ref 3.8–10.5)
WBC # FLD AUTO: 8.5 K/UL — SIGNIFICANT CHANGE UP (ref 3.8–10.5)
WBC # FLD AUTO: 9.26 K/UL — SIGNIFICANT CHANGE UP (ref 3.8–10.5)
WBC # FLD AUTO: 9.26 K/UL — SIGNIFICANT CHANGE UP (ref 3.8–10.5)

## 2023-10-27 PROCEDURE — 73630 X-RAY EXAM OF FOOT: CPT | Mod: 26,LT

## 2023-10-27 PROCEDURE — 93970 EXTREMITY STUDY: CPT | Mod: 26

## 2023-10-27 PROCEDURE — 99222 1ST HOSP IP/OBS MODERATE 55: CPT | Mod: 24,GC

## 2023-10-27 PROCEDURE — 99285 EMERGENCY DEPT VISIT HI MDM: CPT | Mod: GC

## 2023-10-27 PROCEDURE — 99223 1ST HOSP IP/OBS HIGH 75: CPT

## 2023-10-27 RX ORDER — PIPERACILLIN AND TAZOBACTAM 4; .5 G/20ML; G/20ML
3.38 INJECTION, POWDER, LYOPHILIZED, FOR SOLUTION INTRAVENOUS ONCE
Refills: 0 | Status: COMPLETED | OUTPATIENT
Start: 2023-10-27 | End: 2023-10-27

## 2023-10-27 RX ORDER — PIPERACILLIN AND TAZOBACTAM 4; .5 G/20ML; G/20ML
3.38 INJECTION, POWDER, LYOPHILIZED, FOR SOLUTION INTRAVENOUS EVERY 8 HOURS
Refills: 0 | Status: COMPLETED | OUTPATIENT
Start: 2023-10-27 | End: 2023-11-03

## 2023-10-27 RX ORDER — HEPARIN SODIUM 5000 [USP'U]/ML
1000 INJECTION INTRAVENOUS; SUBCUTANEOUS
Qty: 25000 | Refills: 0 | Status: DISCONTINUED | OUTPATIENT
Start: 2023-10-27 | End: 2023-10-27

## 2023-10-27 RX ORDER — ATORVASTATIN CALCIUM 80 MG/1
80 TABLET, FILM COATED ORAL AT BEDTIME
Refills: 0 | Status: DISCONTINUED | OUTPATIENT
Start: 2023-10-27 | End: 2023-11-18

## 2023-10-27 RX ORDER — LOSARTAN POTASSIUM 100 MG/1
50 TABLET, FILM COATED ORAL DAILY
Refills: 0 | Status: DISCONTINUED | OUTPATIENT
Start: 2023-10-27 | End: 2023-10-27

## 2023-10-27 RX ORDER — ASPIRIN/CALCIUM CARB/MAGNESIUM 324 MG
81 TABLET ORAL DAILY
Refills: 0 | Status: DISCONTINUED | OUTPATIENT
Start: 2023-10-27 | End: 2023-11-18

## 2023-10-27 RX ORDER — DEXTROSE MONOHYDRATE, SODIUM CHLORIDE, AND POTASSIUM CHLORIDE 50; .745; 4.5 G/1000ML; G/1000ML; G/1000ML
1000 INJECTION, SOLUTION INTRAVENOUS
Refills: 0 | Status: DISCONTINUED | OUTPATIENT
Start: 2023-10-27 | End: 2023-10-27

## 2023-10-27 RX ORDER — PANTOPRAZOLE SODIUM 20 MG/1
40 TABLET, DELAYED RELEASE ORAL
Refills: 0 | Status: DISCONTINUED | OUTPATIENT
Start: 2023-10-27 | End: 2023-11-18

## 2023-10-27 RX ORDER — VANCOMYCIN HCL 1 G
1000 VIAL (EA) INTRAVENOUS EVERY 24 HOURS
Refills: 0 | Status: COMPLETED | OUTPATIENT
Start: 2023-10-27 | End: 2023-10-27

## 2023-10-27 RX ORDER — AMIODARONE HYDROCHLORIDE 400 MG/1
100 TABLET ORAL ONCE
Refills: 0 | Status: COMPLETED | OUTPATIENT
Start: 2023-10-27 | End: 2023-10-27

## 2023-10-27 RX ORDER — LEVOTHYROXINE SODIUM 125 MCG
50 TABLET ORAL DAILY
Refills: 0 | Status: DISCONTINUED | OUTPATIENT
Start: 2023-10-27 | End: 2023-11-18

## 2023-10-27 RX ORDER — METOPROLOL TARTRATE 50 MG
25 TABLET ORAL DAILY
Refills: 0 | Status: DISCONTINUED | OUTPATIENT
Start: 2023-10-27 | End: 2023-10-27

## 2023-10-27 RX ADMIN — Medication 50 MICROGRAM(S): at 09:18

## 2023-10-27 RX ADMIN — PIPERACILLIN AND TAZOBACTAM 25 GRAM(S): 4; .5 INJECTION, POWDER, LYOPHILIZED, FOR SOLUTION INTRAVENOUS at 15:39

## 2023-10-27 RX ADMIN — PANTOPRAZOLE SODIUM 40 MILLIGRAM(S): 20 TABLET, DELAYED RELEASE ORAL at 09:18

## 2023-10-27 RX ADMIN — PIPERACILLIN AND TAZOBACTAM 25 GRAM(S): 4; .5 INJECTION, POWDER, LYOPHILIZED, FOR SOLUTION INTRAVENOUS at 22:01

## 2023-10-27 RX ADMIN — AMIODARONE HYDROCHLORIDE 100 MILLIGRAM(S): 400 TABLET ORAL at 12:49

## 2023-10-27 RX ADMIN — Medication 81 MILLIGRAM(S): at 09:18

## 2023-10-27 RX ADMIN — Medication 250 MILLIGRAM(S): at 12:45

## 2023-10-27 RX ADMIN — PIPERACILLIN AND TAZOBACTAM 200 GRAM(S): 4; .5 INJECTION, POWDER, LYOPHILIZED, FOR SOLUTION INTRAVENOUS at 11:27

## 2023-10-27 NOTE — ADVANCED PRACTICE NURSE CONSULT - RECOMMEDATIONS
Recommending Dakins 1/4 strength & collagenase for L medial LE wound    Topical Recommendations    L groin: NPWT/VAC to be managed by WOCN service line M, W, F. If dressing compromised for >2 hours, please call vascular team, turn off machine, remove dressing and replace with alternative: Cleanse with NS, pat dry. Apply Liquid barrier film to periwound skin (allow to dry). Apply hydrofiber (aquacel) to base of wound, cover with silicone foam with borders. Change daily and PRN if soiled.     Left lower extremity: Cleanse with NS, pat dry. Apply Liquid barrier film to periwound skin (allow to dry). Apply collagenase (Santyl), nickel-coin thickness, to entire base of wound, then pack wound with 1/4 strength dakins moistened kerlix. Secure with abdominal pad and tegarderm, change daily and PRN if soiled.     Sacrum to BL buttocks: Cleanse with NS, pat dry. Apply Liquid barrier film to periwound skin (allow to dry). Apply hydrocolloid to base of wound. Once adhered, apply critic-aide moisture barrier ointment twice a day and PRN with incontinent episodes.     Continue low air loss bed therapy, continue heel elevation, continue to turn & reposition per protocol, soft pillow between bony prominences, continue moisture management with barrier creams & single breathable pad, continue measures to decrease friction/shear/pressure. Continue with nutritional support as per dietary/orders.     Plan of care discussed with vascular team and patient, all questions answered.    Please contact Wound Care Service Line if we can be of further assistance (ext [phone]).

## 2023-10-27 NOTE — ED ADULT NURSE NOTE - OBJECTIVE STATEMENT
Received pt in room who is a transfer from [facility] for vascular consult. Pt has hx of LLE bypass. Noted surgical wound to left groin, left leg. Left great toe amputation with sutures. Left foot red and swollen. PICC left upper arm and IV heplock #20 to right arm. Pt is A&Ox4. Speaks in complete sentences. Reports moderate pain in left foot. LLE cool to the touch. NAD at this time. Awaiting orders. Vascular at bedside.

## 2023-10-27 NOTE — H&P ADULT - NSICDXPASTSURGICALHX_GEN_ALL_CORE_FT
PAST SURGICAL HISTORY:  H/O arterial bypass of lower limb     Status post closed fracture of right femur

## 2023-10-27 NOTE — H&P ADULT - NSHPPHYSICALEXAM_GEN_ALL_CORE
VITAL SIGNS:  Vital Signs Last 24 Hrs  T(C): 36.5 (27 Oct 2023 04:08), Max: 36.6 (26 Oct 2023 20:05)  T(F): 97.7 (27 Oct 2023 04:08), Max: 97.9 (26 Oct 2023 20:05)  HR: 84 (27 Oct 2023 04:08) (84 - 87)  BP: 132/68 (27 Oct 2023 04:08) (91/44 - 132/68)  BP(mean): 65 (27 Oct 2023 03:15) (65 - 65)  RR: 16 (27 Oct 2023 04:08) (16 - 18)  SpO2: 91% (27 Oct 2023 04:08) (91% - 93%)    Parameters below as of 27 Oct 2023 04:08  Patient On (Oxygen Delivery Method): room air          PHYSICAL EXAM:    General: NAD, resting comfortably, dangling legs of side of bed  HEENT: NC/AT, EOMI, normal hearing, no oral lesions, no LAD, neck supple  Pulmonary: normal resp effort, patent airway  Abdominal: soft, ND/NT, no organomegaly  L groin: healing wound with packing, no active bleeding  Neuro: CNs II-XII grossly intact, normal sensation, no focal deficits  LLE: L foot cooler vs R, L delayed cap refill, s/p partial 1st toe resection, multiple tender wound across toes, decreased sensation, decreased motor  RLE: pitting edema  Pulses: (p=palpable, s=signal, x=absent)  - Right: femoral (p), popliteal (p), PT (s), DP (s)  - Left: femoral (deferred 2/2 wound), popliteal (s), PT (x), DP (x)

## 2023-10-27 NOTE — CONSULT NOTE ADULT - ASSESSMENT
83F with PMHx of CAD, pAF on Eliquis, severe AS pending TAVR, R internal carotid stenosis, HTN, HLD, hypothyroidism, anxiety, PAD and chronic L toe wounds s/p L ilioprofunda bypass with reverse GSV graft to peroneal 8/31/23 (Dr. Tavares) c/b large L groin abscess (17cm) requiring removal of infected bypass graft of lower extremity on 9/24/23. Wound care involved for R groin wound and R calf incision dehiscence.   Pt states she has been getting daily wound care, and today the rehab team was concerned about how cold the foot felt, recommending she come into the hospital. Pt is poor historian, but she says the wounds have been worse over the last 2 days.    PLAN  - Appreciate podiatry consult  - Final plans              83F with PMHx of CAD, pAF on Eliquis, severe AS pending TAVR, R internal carotid stenosis, HTN, HLD, hypothyroidism, anxiety, PAD and chronic L toe wounds s/p L ilioprofunda bypass with reverse GSV graft to peroneal 8/31/23 (Dr. Tavares) c/b large L groin abscess (17cm) requiring removal of infected bypass graft of lower extremity and groin washout on 9/24/23. Wound care involved for R groin wound and R calf incision wound. Pt states she has been getting daily wound care, and today the rehab team was concerned about how cold the foot felt, recommending she come into the hospital. Pt is poor historian, but she says the wounds have been worse over the last 2 days.     PLAN  - Appreciate podiatry consult  - Final plans pending    To be discussed with Dr. Marcos on behalf of Dr. Tavares    C Team Vascular Surgery  [id number]

## 2023-10-27 NOTE — ED ADULT NURSE NOTE - NSFALLHARMRISKINTERV_ED_ALL_ED
Assistance OOB with selected safe patient handling equipment if applicable/Assistance with ambulation/Communicate risk of Fall with Harm to all staff, patient, and family/Monitor gait and stability/Provide visual cue: red socks, yellow wristband, yellow gown, etc/Reinforce activity limits and safety measures with patient and family/Bed in lowest position, wheels locked, appropriate side rails in place/Call bell, personal items and telephone in reach/Instruct patient to call for assistance before getting out of bed/chair/stretcher/Non-slip footwear applied when patient is off stretcher/[place] to call system/Physically safe environment - no spills, clutter or unnecessary equipment/Purposeful Proactive Rounding/Room/bathroom lighting operational, light cord in reach Forehead Units/Cc: 6

## 2023-10-27 NOTE — ED PROVIDER NOTE - OBJECTIVE STATEMENT
This is an 83-year-old female past medical history of A-fib, PAD, CAD, hypertension, hyperlipidemia, hypothyroid presenting as a transfer for vascular evaluation.  Patient brought to [place] ED due to concern of left foot poor circulation and possible infection.  In August patient had left ilio profunda bypass with reverse GSV graft and left first ray partial resection.  Patient has had worsening coldness and redness of the big toe and stitches that were not removed.  Patient transferred for vascular evaluation.  Patient has been able to ambulate, denies fever, chills, CP, SOB, abdominal, nausea, vomiting.

## 2023-10-27 NOTE — CONSULT NOTE ADULT - SUBJECTIVE AND OBJECTIVE BOX
C TEAM VASCULAR SURGERY CONSULT  83F with PMHx of CAD, pAF on Eliquis, severe AS pending TAVR, R internal carotid stenosis, HTN, HLD, hypothyroidism, anxiety, PAD and chronic L toe wounds s/p L ilioprofunda bypass with reverse GSV graft to peroneal 8/31/23 (Dr. Tavares) c/b large L groin abscess (17cm) requiring removal of infected bypass graft of lower extremity on 9/24/23. Wound care involved for R groin wound and R calf incision dehiscence.   Pt states she has been getting daily wound care, and today the rehab team was concerned about how cold the foot felt, recommending she come into the hospital. Pt is poor historian, but she says the wounds have been worse over the last 2 days. She denies fevers, chills, any pain, worsening numbness/tingling to the LLE, worsening weakness.    PAST MEDICAL & SURGICAL HISTORY:  HTN (hypertension)      HLD (hyperlipidemia)      Anxiety      CAD (coronary artery disease)      PAD (peripheral artery disease)      Hypothyroidism      AF (atrial fibrillation)      Carotid artery stenosis      AS (aortic stenosis)      Status post closed fracture of right femur          MEDICATIONS  (STANDING):    MEDICATIONS  (PRN):      Allergies    latex (Unknown)  sulfa drugs (Unknown)    Intolerances    Physical Exam:  General: NAD, resting comfortably, dangling legs of side of bed  HEENT: NC/AT, EOMI, normal hearing, no oral lesions, no LAD, neck supple  Pulmonary: normal resp effort, patent airway  Abdominal: soft, ND/NT, no organomegaly  L groin: healing wound with packing, no active bleeding  Neuro: CNs II-XII grossly intact, normal sensation, no focal deficits  LLE: L foot cooler vs R, L delayed cap refill, s/p partial 1st toe resection, multiple tender wound across toes, decreased sensation, decreased motor  RLE: pitting edema  Pulses: (p=palpable, s=signal, x=absent)  - Right: femoral (p), popliteal (p), PT (s), DP (s)  - Left: femoral (deferred 2/2 wound), popliteal (s), PT (x), DP (x)      Vital Signs Last 24 Hrs  T(C): 36.5 (27 Oct 2023 04:08), Max: 36.6 (26 Oct 2023 20:05)  T(F): 97.7 (27 Oct 2023 04:08), Max: 97.9 (26 Oct 2023 20:05)  HR: 84 (27 Oct 2023 04:08) (84 - 87)  BP: 132/68 (27 Oct 2023 04:08) (91/44 - 132/68)  BP(mean): 65 (27 Oct 2023 03:15) (65 - 65)  RR: 16 (27 Oct 2023 04:08) (16 - 18)  SpO2: 91% (27 Oct 2023 04:08) (91% - 93%)    Parameters below as of 27 Oct 2023 04:08  Patient On (Oxygen Delivery Method): room air        I&O's Summary          LABS:                        12.1   9.30  )-----------( 247      ( 26 Oct 2023 18:47 )             35.8     10-26    132<L>  |  99  |  17  ----------------------------<  138<H>  3.8   |  28  |  0.86    Ca    8.3<L>      26 Oct 2023 18:47    TPro  6.7  /  Alb  2.5<L>  /  TBili  0.8  /  DBili  x   /  AST  14<L>  /  ALT  18  /  AlkPhos  126<H>  10-26    PT/INR - ( 26 Oct 2023 18:47 )   PT: 14.5 sec;   INR: 1.29 ratio         PTT - ( 26 Oct 2023 18:47 )  PTT:30.2 sec  Urinalysis Basic - ( 26 Oct 2023 18:47 )    Color: x / Appearance: x / SG: x / pH: x  Gluc: 138 mg/dL / Ketone: x  / Bili: x / Urobili: x   Blood: x / Protein: x / Nitrite: x   Leuk Esterase: x / RBC: x / WBC x   Sq Epi: x / Non Sq Epi: x / Bacteria: x    LIVER FUNCTIONS - ( 26 Oct 2023 18:47 )  Alb: 2.5 g/dL / Pro: 6.7 gm/dL / ALK PHOS: 126 U/L / ALT: 18 U/L / AST: 14 U/L / GGT: x           RADIOLOGY & ADDITIONAL STUDIES:   C TEAM VASCULAR SURGERY CONSULT  83F with PMHx of CAD, pAF on Eliquis, severe AS pending TAVR, R internal carotid stenosis, HTN, HLD, hypothyroidism, anxiety, PAD and chronic L toe wounds s/p L ilioprofunda bypass with reverse GSV graft to peroneal 8/31/23 (Dr. Tavares) c/b large L groin abscess (17cm) requiring removal of infected bypass graft of lower extremity and groin washout on 9/24/23. Wound care involved for R groin wound and R calf incision wound. Pt states she has been getting daily wound care, and today the rehab team was concerned about how cold the foot felt, recommending she come into the hospital. Pt is poor historian, but she says the wounds have been worse over the last 2 days. She denies fevers, chills, any pain, worsening numbness/tingling to the LLE, worsening weakness.    PAST MEDICAL & SURGICAL HISTORY:  HTN (hypertension)      HLD (hyperlipidemia)      Anxiety      CAD (coronary artery disease)      PAD (peripheral artery disease)      Hypothyroidism      AF (atrial fibrillation)      Carotid artery stenosis      AS (aortic stenosis)      Status post closed fracture of right femur          MEDICATIONS  (STANDING):    MEDICATIONS  (PRN):      Allergies    latex (Unknown)  sulfa drugs (Unknown)    Intolerances    Physical Exam:  General: NAD, resting comfortably, dangling legs of side of bed  HEENT: NC/AT, EOMI, normal hearing, no oral lesions, no LAD, neck supple  Pulmonary: normal resp effort, patent airway  Abdominal: soft, ND/NT, no organomegaly  L groin: healing wound with packing, no active bleeding  Neuro: CNs II-XII grossly intact, normal sensation, no focal deficits  LLE: L foot cooler vs R, L delayed cap refill, s/p partial 1st toe resection, multiple tender wound across toes, decreased sensation, decreased motor  RLE: pitting edema  Pulses: (p=palpable, s=signal, x=absent)  - Right: femoral (p), popliteal (p), PT (s), DP (s)  - Left: femoral (deferred 2/2 wound), popliteal (s), PT (x), DP (x)      Vital Signs Last 24 Hrs  T(C): 36.5 (27 Oct 2023 04:08), Max: 36.6 (26 Oct 2023 20:05)  T(F): 97.7 (27 Oct 2023 04:08), Max: 97.9 (26 Oct 2023 20:05)  HR: 84 (27 Oct 2023 04:08) (84 - 87)  BP: 132/68 (27 Oct 2023 04:08) (91/44 - 132/68)  BP(mean): 65 (27 Oct 2023 03:15) (65 - 65)  RR: 16 (27 Oct 2023 04:08) (16 - 18)  SpO2: 91% (27 Oct 2023 04:08) (91% - 93%)    Parameters below as of 27 Oct 2023 04:08  Patient On (Oxygen Delivery Method): room air        I&O's Summary          LABS:                        12.1   9.30  )-----------( 247      ( 26 Oct 2023 18:47 )             35.8     10-26    132<L>  |  99  |  17  ----------------------------<  138<H>  3.8   |  28  |  0.86    Ca    8.3<L>      26 Oct 2023 18:47    TPro  6.7  /  Alb  2.5<L>  /  TBili  0.8  /  DBili  x   /  AST  14<L>  /  ALT  18  /  AlkPhos  126<H>  10-26    PT/INR - ( 26 Oct 2023 18:47 )   PT: 14.5 sec;   INR: 1.29 ratio         PTT - ( 26 Oct 2023 18:47 )  PTT:30.2 sec  Urinalysis Basic - ( 26 Oct 2023 18:47 )    Color: x / Appearance: x / SG: x / pH: x  Gluc: 138 mg/dL / Ketone: x  / Bili: x / Urobili: x   Blood: x / Protein: x / Nitrite: x   Leuk Esterase: x / RBC: x / WBC x   Sq Epi: x / Non Sq Epi: x / Bacteria: x    LIVER FUNCTIONS - ( 26 Oct 2023 18:47 )  Alb: 2.5 g/dL / Pro: 6.7 gm/dL / ALK PHOS: 126 U/L / ALT: 18 U/L / AST: 14 U/L / GGT: x           RADIOLOGY & ADDITIONAL STUDIES:

## 2023-10-27 NOTE — ED PROVIDER NOTE - PROGRESS NOTE DETAILS
Yoel SHAH PGY-1: Spoke with vascular team, plan to admit to vascular service for antibiotics, no surgery planned currently. Admit to [facility].

## 2023-10-27 NOTE — ED PROVIDER NOTE - PHYSICAL EXAMINATION
GENERAL: well appearing in no acute distress, non-toxic appearing  HEAD: normocephalic, atraumatic  HEENT: normal conjunctiva, oral mucosa moist, uvula midline, no tonsilar exudates, neck supple, no JVD  CARDIAC: regular rate and rhythm, normal S1S2, no appreciable murmurs, 2+ pulses in UE/LE b/l  PULM: normal breath sounds, clear to ascultation bilaterally, no rales, rhonchi, wheezing  GI: abdomen nondistended, soft, nontender, no guarding, rebound tenderness  NEURO: no focal motor or sensory deficits, normal speech, normal gait, AAOx3  MSK: +open medial LLE surgical incision without erythema or purulence, post amputation 1st L toe with sutures and erythema  SKIN: +LLE cool, no visible rashes  PSYCH: appropriate mood and affect

## 2023-10-27 NOTE — ED ADULT TRIAGE NOTE - CHIEF COMPLAINT QUOTE
Pt a transfer from [facility]. S/p bypass surgery left lower extremity 8/31 that became infected, gangrene s/p amputation 9/24 of the big toe. Since 10/20 pt's leg cool to touch and has "abnormal pulse."  Here for vascular consult. Arrived with 20G IV on left hand. Pt has PICC on left upper chest wall for IV abx. PMHx afib on Eliquis, CHF, PVD, hypothyroid, HTN

## 2023-10-27 NOTE — H&P ADULT - ASSESSMENT
83F with PMHx of CAD, pAF on Eliquis, severe AS pending TAVR, R internal carotid stenosis, HTN, HLD, hypothyroidism, anxiety, PAD and chronic L toe wounds s/p L ilioprofunda bypass with reverse GSV graft to peroneal 8/31/23 (Dr. Tavares) c/b large L groin abscess (17cm) requiring removal of infected bypass graft of lower extremity and groin washout on 9/24/23. Wound care involved for R groin wound and R calf incision wound. Pt states she has been getting daily wound care, and today the rehab team was concerned about how cold the foot felt, recommending she come into the hospital. Pt is poor historian, but she says the wounds have been worse over the last 2 days.     PLAN:  - Admit to C team Surgery under Dr. Tavares  - IV abx  - Local wound care to groin and leg wounds  - Appreciate Podiatry evaluation  - Pain control as needed  - Hep gtt for hx of Afib.    DHAVAL Ramírez, PGY-4  [facility]  C Team Surgery  [id number]

## 2023-10-27 NOTE — H&P ADULT - ATTENDING COMMENTS
pt s/p L leg bypass excision 1m ago for infection  now returns from rehab for progressive foot ischemia  Pt denies any rest pain, able to ambulate  on exam L groins wound is healing well, no signs of infection, Calf wound with some fibrin, no infection  forefoot is ischemic   motor and sensation are intact  WBC nl  A/P: L foot chronic ischemia  will cont wound care with vac  IV Abx  will plan for angio to access any further revasc options  at this point pt has a very high chance of limb loss

## 2023-10-27 NOTE — ED PROVIDER NOTE - CLINICAL SUMMARY MEDICAL DECISION MAKING FREE TEXT BOX
83-year-old female past medical history of A-fib, PAD, CAD, hypertension, hyperlipidemia, hypothyroid presenting as a transfer for vascular evaluation.  Patient brought to [place] ED due to concern of left foot poor circulation and possible infection.  In August patient had left ilio profunda bypass with reverse GSV graft and left first ray partial resection.  Patient has had worsening coldness and redness of the big toe and stitches that were not removed.  Patient transferred for vascular evaluation.  Patient has been able to ambulate, denies fever, chills, CP, SOB, abdominal, nausea, vomiting.    Patient presents afebrile and hemodynamically stable.  On exam noted to have open surgical incision on medial left lower extremityOf amputated first toe on the left.  Vascular team notified prior to transfer.  Vascular team at bedside.  Will order preop labs and x-ray of the extremity.  Will consult podiatry.

## 2023-10-27 NOTE — H&P ADULT - HISTORY OF PRESENT ILLNESS
83F with PMHx of CAD, pAF on Eliquis, severe AS pending TAVR, R internal carotid stenosis, HTN, HLD, hypothyroidism, anxiety, PAD and chronic L toe wounds s/p L ilioprofunda bypass with reverse GSV graft to peroneal 8/31/23 (Dr. Tavares) c/b large L groin abscess (17cm) requiring removal of infected bypass graft of lower extremity and groin washout on 9/24/23. Wound care involved for R groin wound and R calf incision wound. Pt states she has been getting daily wound care, and today the rehab team was concerned about how cold the foot felt, recommending she come into the hospital. Pt is poor historian, but she says the wounds have been worse over the last 2 days.

## 2023-10-27 NOTE — ED PROVIDER NOTE - ATTENDING CONTRIBUTION TO CARE
82 y/o F with h/o Afib on Eliquis, CAD, HTN, hyperlipidemia, hypothyroidism, and carotid artery stenosis, PAD s/p ft ilioprofunda bypass with reverse GSV graft to peroneal 8/31/23 (Dr. Tavares) and left 1st ray partial resection, found to have left groin erythema c/f infection with 17cm abscess with subsequent removal of infected bypass graft of LLE 9/24/2023 transferred from [place] ED for decreased left foot circulation/possible infection for vascular and podiatry consult.  Pt reports noting skin changes (increased areas of redness and blue to toes with cool toes) beginning yesterday.  No fever, chills, recent trauma, discharge or bleeding.    Well appearing, sitting in stretcher, awake and alert, nontoxic.  AF/VSS.  Lungs cta bl.  Cards nl S1/S2, RRR, no MRG.  Abd soft ntnd.  L distal toes cool to touch with mild cyanosis, proximal erythema, ?faintly dopplerable DP pulse.  No discharge.  No crepitus.    Vascular and podiatry consulted, will send repeat labs, likely admit pending consultant recommendations.

## 2023-10-27 NOTE — CONSULT NOTE ADULT - SUBJECTIVE AND OBJECTIVE BOX
Patient is a 83y old  Female who presents with a chief complaint of     HPI:      PAST MEDICAL & SURGICAL HISTORY:  HTN (hypertension)      HLD (hyperlipidemia)      Anxiety      CAD (coronary artery disease)      PAD (peripheral artery disease)      Hypothyroidism      AF (atrial fibrillation)      Carotid artery stenosis      AS (aortic stenosis)      Status post closed fracture of right femur          MEDICATIONS  (STANDING):    MEDICATIONS  (PRN):      Allergies    latex (Unknown)  sulfa drugs (Unknown)    Intolerances        VITALS:    Vital Signs Last 24 Hrs  T(C): 36.5 (27 Oct 2023 04:08), Max: 36.6 (26 Oct 2023 20:05)  T(F): 97.7 (27 Oct 2023 04:08), Max: 97.9 (26 Oct 2023 20:05)  HR: 84 (27 Oct 2023 04:08) (84 - 87)  BP: 132/68 (27 Oct 2023 04:08) (91/44 - 132/68)  BP(mean): 65 (27 Oct 2023 03:15) (65 - 65)  RR: 16 (27 Oct 2023 04:08) (16 - 18)  SpO2: 91% (27 Oct 2023 04:08) (91% - 93%)    Parameters below as of 27 Oct 2023 04:08  Patient On (Oxygen Delivery Method): room air        LABS:                          12.1   9.30  )-----------( 247      ( 26 Oct 2023 18:47 )             35.8       10-26    132<L>  |  99  |  17  ----------------------------<  138<H>  3.8   |  28  |  0.86    Ca    8.3<L>      26 Oct 2023 18:47    TPro  6.7  /  Alb  2.5<L>  /  TBili  0.8  /  DBili  x   /  AST  14<L>  /  ALT  18  /  AlkPhos  126<H>  10-26      CAPILLARY BLOOD GLUCOSE          PT/INR - ( 26 Oct 2023 18:47 )   PT: 14.5 sec;   INR: 1.29 ratio         PTT - ( 26 Oct 2023 18:47 )  PTT:30.2 sec    LOWER EXTREMITY PHYSICAL EXAM:    Vascular: DP/PT 0/4, B/L, CFT >3 seconds B/L, Temperature gradient warm to cold, B/L.   Neuro: Epicritic sensation intact to the level of digits, B/L.  Musculoskeletal/Ortho: L foot partial 1 digit amputation (DOS 9/6)  Skin: s/p left foot partial hallux amputation (DOS 9/6): sutures intact, dehiscence, flaps cold. blistering to distal digits 2,3,4 with serous drainage, 4th distal lateral digit eschar intact, early ischemic changes to digits 1 through 5 extending to metatarsophalangeal joints, interdigital maceration to interspaces 1-4, Left foot digits 2-5 nailbed eschars with no acute signs of infection.     RADIOLOGY & ADDITIONAL STUDIES:     Patient is a 83y old  Female who presents with a chief complaint of     HPI: This is an 83-year-old female past medical history of A-fib, PAD, CAD, hypertension, hyperlipidemia, hypothyroid presenting as a transfer for vascular evaluation.  Patient brought to [place] ED due to concern of left foot poor circulation and possible infection.  In August patient had left ilio profunda bypass with reverse GSV graft and left first ray partial resection.  Patient has had worsening coldness and redness of the big toe and stitches that were not removed.  Patient transferred for vascular evaluation.  Patient has been able to ambulate, denies fever, chills, CP, SOB, abdominal, nausea, vomiting.      PAST MEDICAL & SURGICAL HISTORY:  HTN (hypertension)      HLD (hyperlipidemia)      Anxiety      CAD (coronary artery disease)      PAD (peripheral artery disease)      Hypothyroidism      AF (atrial fibrillation)      Carotid artery stenosis      AS (aortic stenosis)      Status post closed fracture of right femur          MEDICATIONS  (STANDING):    MEDICATIONS  (PRN):      Allergies    latex (Unknown)  sulfa drugs (Unknown)    Intolerances        VITALS:    Vital Signs Last 24 Hrs  T(C): 36.5 (27 Oct 2023 04:08), Max: 36.6 (26 Oct 2023 20:05)  T(F): 97.7 (27 Oct 2023 04:08), Max: 97.9 (26 Oct 2023 20:05)  HR: 84 (27 Oct 2023 04:08) (84 - 87)  BP: 132/68 (27 Oct 2023 04:08) (91/44 - 132/68)  BP(mean): 65 (27 Oct 2023 03:15) (65 - 65)  RR: 16 (27 Oct 2023 04:08) (16 - 18)  SpO2: 91% (27 Oct 2023 04:08) (91% - 93%)    Parameters below as of 27 Oct 2023 04:08  Patient On (Oxygen Delivery Method): room air        LABS:                          12.1   9.30  )-----------( 247      ( 26 Oct 2023 18:47 )             35.8       10-26    132<L>  |  99  |  17  ----------------------------<  138<H>  3.8   |  28  |  0.86    Ca    8.3<L>      26 Oct 2023 18:47    TPro  6.7  /  Alb  2.5<L>  /  TBili  0.8  /  DBili  x   /  AST  14<L>  /  ALT  18  /  AlkPhos  126<H>  10-26      CAPILLARY BLOOD GLUCOSE          PT/INR - ( 26 Oct 2023 18:47 )   PT: 14.5 sec;   INR: 1.29 ratio         PTT - ( 26 Oct 2023 18:47 )  PTT:30.2 sec    LOWER EXTREMITY PHYSICAL EXAM:    Vascular: DP/PT 0/4, B/L, CFT >3 seconds B/L, Temperature gradient warm to cold, B/L.   Neuro: Epicritic sensation intact to the level of digits, B/L.  Musculoskeletal/Ortho: L foot partial 1 digit amputation (DOS 9/6)  Skin:  s/p left foot partial hallux amputation (DOS 9/6): sutures intact, dehiscence, flaps cold. blistering to distal digits 2,3,4 with serous drainage, 4th distal lateral digit eschar intact, early ischemic changes to digits 1 through 5 extending to metatarsophalangeal joints, interdigital maceration to interspaces 1-4, Left foot digits 2-5 nailbed eschars with no acute signs of infection.    RADIOLOGY & ADDITIONAL STUDIES:

## 2023-10-27 NOTE — ED ADULT NURSE NOTE - CHIEF COMPLAINT QUOTE
Pt a transfer from [facility]. S/p bypass surgery left lower extremity 8/31 that became infected, gangrene s/p amputation 9/24 of the big toe. Since 10/20 pt's leg cool to touch and has "abnormal pulse."  Here for vascular consult. Arrived with 20G IV on left hand. Pt has PICC on left upper chest wall for IV abx. PMHx afib on Eliquis, CHF, PVD, hypothyroid, HTN

## 2023-10-27 NOTE — CONSULT NOTE ADULT - PROBLEM SELECTOR RECOMMENDATION 3
- echo from 9/23 reviewed - pt with EF of 20-25% along with severe aortic stenosis   - pt states she was told she needs a valve surgery but is unsure when that will take place  - would recommend cardio for further workup/optimization

## 2023-10-27 NOTE — ED PROVIDER NOTE - NS ED ROS FT
General: denies fever, chills  HENT: denies nasal congestion, rhinorrhea  Eyes: denies visual changes, blurred vision  CV: denies chest pain, palpitations  Resp: denies difficulty breathing, cough  Abdominal: denies nausea, vomiting, diarrhea, abdominal pain  MSK: +LLE coldness, denies muscle aches, leg swelling  Neuro: denies headaches, numbness, tingling  Skin: denies rashes, bruises

## 2023-10-27 NOTE — ED ADULT NURSE REASSESSMENT NOTE - NS ED NURSE REASSESS COMMENT FT1
Received report from night shift RN. Pt A&OX4, sitting comfortably in stretcher. L foot wrapped with dry dressing. L calf wound open at this time. As per patient, wound only to be dressed by vascular. Pt denies any pain, chills/fevers. Pt admitted. Pending bed assignment.  Pt educated on call bell use. Call bell within reach.

## 2023-10-27 NOTE — ED ADULT NURSE REASSESSMENT NOTE - NS ED NURSE REASSESS COMMENT FT1
Pt actively bleeding from L groin site. Pt hypotensive. Vascular surgery resident Ashley le. Surgery to come see patient in the ED. Heparin infusing held at this time. Pt denies dizziness/lightheadedness. Awaiting further orders.

## 2023-10-27 NOTE — PATIENT PROFILE ADULT - FALL HARM RISK - HARM RISK INTERVENTIONS

## 2023-10-27 NOTE — CONSULT NOTE ADULT - ASSESSMENT
83F s/p left foot partial hallux amputation dehiscence and forefoot ischemic changes (DOS 9/6).  - Patient seen and evaluated.  - Afebrile, no leukocytosis   - s/p left foot partial hallux amputation (DOS 9/6): sutures intact, dehiscence, flaps cold. blistering to distal digits 2,3,4 with serous drainage, 4th distal lateral digit eschar intact, early ischemic changes to digits 1 through 5 extending to metatarsophalangeal joints, interdigital maceration to interspaces 1-4, Left foot digits 2-5 nailbed eschars with no acute signs of infection.   - L foot XR ordered   - L foot wound cultured   - Intraop Findings: Low concern for residual infection and viability.  - pod plan local wound care vs TMA pending vascular recommendations   - please document medical clearance for possible podiatric surgical intervention this admit  - Discussed with attending 83F s/p left foot partial hallux amputation dehiscence and forefoot ischemic changes (DOS 9/6).  - Patient seen and evaluated.  - Afebrile, no leukocytosis   - s/p left foot partial hallux amputation (DOS 9/6): sutures intact, dehiscence, flaps cold. blistering to distal digits 2,3,4 with serous drainage, 4th distal lateral digit eschar intact, early ischemic changes to digits 1 through 5 extending to metatarsophalangeal joints, interdigital maceration to interspaces 1-4, Left foot digits 2-5 nailbed eschars with no acute signs of infection.   - L foot XR ordered   - L foot wound cultured   - L hallux amputation site sutures removed with sterile suture removal kit.   - Intraop Findings: Low concern for residual infection and viability.  - pod plan local wound care vs TMA pending vascular recommendations   - please document medical clearance for possible podiatric surgical intervention this admit  - Discussed with attending

## 2023-10-27 NOTE — ED ADULT NURSE REASSESSMENT NOTE - NS ED NURSE REASSESS COMMENT FT1
Vascular surgery resident at bedside. L groin wound re-packed by MD. As per MD, bleeding controlled at this time. Pt denies dizziness, lightheadedness. Report given to [facility] Rick. Pt transported to [facility].

## 2023-10-27 NOTE — CONSULT NOTE ADULT - SUBJECTIVE AND OBJECTIVE BOX
HPI:  83F w/ cad, paroxysmal Afib on Eliquis, severe AS pending TAVR, R internal carotid stenosis, HTN, HLD, hypothyroidism, anxiety, PAD and chronic L toe wounds s/p L ilioprofunda bypass with reverse GSV graft to peroneal 8/31/23 (Dr. Tavares) c/b large L groin abscess (17cm) requiring removal of infected bypass graft of lower extremity and groin washout on 9/24/23. Wound care involved for R groin wound and R calf incision wound. According to patient she has been getting daily wound care for her left leg but felt it has not been healing as well as it could.  She feels there has been more drainage from the shin wound for the past few days.  She denies sob or chest pain.   she is at a rehab currently but feels the increased pain has limited her ability to participate.       PAST MEDICAL & SURGICAL HISTORY:  HTN (hypertension)  HLD (hyperlipidemia)  Anxiety  CAD (coronary artery disease)  PAD (peripheral artery disease)  Hypothyroidism  AF (atrial fibrillation)  Carotid artery stenosis  AS (aortic stenosis)  Status post closed fracture of right femur  H/O arterial bypass of lower limb      Review of Systems:   CONSTITUTIONAL: No fever  EYES: No eye pain, or discharge  ENMT:  No difficulty hearing; No sinus or throat pain  NECK: No pain or stiffness  BREASTS: No pain, masses, or nipple discharge  RESPIRATORY: No cough, wheezing, chills or hemoptysis; No shortness of breath  CARDIOVASCULAR: No chest pain, palpitations, dizziness, or leg swelling  GASTROINTESTINAL: No abdominal or epigastric pain. No nausea, vomiting, or hematemesis; No diarrhea or constipation. No melena or hematochezia.  GENITOURINARY: No dysuria, frequency  NEUROLOGICAL: No headaches, memory loss, loss of strength, numbness, or tremors  SKIN: L leg with wounds   ENDOCRINE: No heat or cold intolerance; No hair loss  MUSCULOSKELETAL: No joint pain or swelling; No muscle, back, or extremity pain  PSYCHIATRIC: No depression, anxiety, mood swings, or difficulty sleeping  HEME/LYMPH: No easy bruising, or bleeding gums    Allergies    latex (Unknown)  sulfa drugs (Unknown)    Intolerances      Social History: lives at rehab; HCP is [facility]    FAMILY HISTORY:  Family history of myocardial infarction (Father, Sibling)      MEDICATIONS  (STANDING):  aspirin enteric coated 81 milliGRAM(s) Oral daily  atorvastatin 80 milliGRAM(s) Oral at bedtime  levothyroxine 50 MICROGram(s) Oral daily  pantoprazole    Tablet 40 milliGRAM(s) Oral before breakfast  piperacillin/tazobactam IVPB.- 3.375 Gram(s) IV Intermittent once  piperacillin/tazobactam IVPB.. 3.375 Gram(s) IV Intermittent every 8 hours  silver nitrate Applicator 1 Application(s) Topical once    MEDICATIONS  (PRN):    CAPILLARY BLOOD GLUCOSE    POCT Blood Glucose.: 194 mg/dL (27 Oct 2023 13:22)    I&O's Summary      PHYSICAL EXAM:  GENERAL: NAD, well-developed  HEAD:  Atraumatic, Normocephalic  EYES: EOMI, conjunctiva and sclera clear  NECK: Supple, No JVD  CHEST/LUNG: Clear to auscultation bilaterally; No wheeze  HEART: Regular rate and rhythm;   ABDOMEN: Soft, Nontender, Nondistended; Bowel sounds present  EXTREMITIES: L shin with open wound and drainage; L toes with wounds and drainage   PSYCH: AAOx 2 (self and place)  NEUROLOGY: non-focal      LABS:                        11.2   9.26  )-----------( 224      ( 27 Oct 2023 11:03 )             34.3     10-27    134<L>  |  97<L>  |  15  ----------------------------<  111<H>  4.0   |  24  |  0.69    Ca    8.5      27 Oct 2023 06:55  Phos  2.9     10-27  Mg     1.90     10-27    TPro  5.9<L>  /  Alb  2.9<L>  /  TBili  0.6  /  DBili  x   /  AST  20  /  ALT  11  /  AlkPhos  106  10-27    PT/INR - ( 27 Oct 2023 06:55 )   PT: 14.1 sec;   INR: 1.26 ratio         PTT - ( 27 Oct 2023 06:55 )  PTT:30.8 sec      Urinalysis Basic - ( 27 Oct 2023 06:55 )    Color: x / Appearance: x / SG: x / pH: x  Gluc: 111 mg/dL / Ketone: x  / Bili: x / Urobili: x   Blood: x / Protein: x / Nitrite: x   Leuk Esterase: x / RBC: x / WBC x   Sq Epi: x / Non Sq Epi: x / Bacteria: x        RADIOLOGY & ADDITIONAL TESTS:    Imaging Personally Reviewed:    Consultant(s) Notes Reviewed:      Care Discussed with Consultants/Other Providers:

## 2023-10-27 NOTE — H&P ADULT - NSHPLABSRESULTS_GEN_ALL_CORE
LABS:                        11.0   8.50  )-----------( 253      ( 27 Oct 2023 06:55 )             33.9     27 Oct 2023 06:55    134    |  97     |  15     ----------------------------<  111    4.0     |  24     |  0.69     Ca    8.5        27 Oct 2023 06:55  Phos  2.9       27 Oct 2023 06:55  Mg     1.90      27 Oct 2023 06:55    TPro  5.9    /  Alb  2.9    /  TBili  0.6    /  DBili  x      /  AST  20     /  ALT  11     /  AlkPhos  106    27 Oct 2023 06:55    PT/INR - ( 27 Oct 2023 06:55 )   PT: 14.1 sec;   INR: 1.26 ratio         PTT - ( 27 Oct 2023 06:55 )  PTT:30.8 sec  CAPILLARY BLOOD GLUCOSE            LIVER FUNCTIONS - ( 27 Oct 2023 06:55 )  Alb: 2.9 g/dL / Pro: 5.9 g/dL / ALK PHOS: 106 U/L / ALT: 11 U/L / AST: 20 U/L / GGT: x             Urinalysis Basic - ( 27 Oct 2023 06:55 )    Color: x / Appearance: x / SG: x / pH: x  Gluc: 111 mg/dL / Ketone: x  / Bili: x / Urobili: x   Blood: x / Protein: x / Nitrite: x   Leuk Esterase: x / RBC: x / WBC x   Sq Epi: x / Non Sq Epi: x / Bacteria: x

## 2023-10-27 NOTE — CONSULT NOTE ADULT - ASSESSMENT
83F w/ cad, paroxysmal Afib on Eliquis, severe AS pending TAVR, R internal carotid stenosis, HTN, HLD, hypothyroidism, anxiety, PAD and chronic L toe wounds s/p L ilioprofunda bypass with reverse GSV graft to peroneal 8/31/23 (Dr. Tavares) c/b large L groin abscess (17cm) requiring removal of infected bypass graft of lower extremity and groin washout on 9/24/23. Wound care involved for R groin wound and R calf incision wound.  Pt has been experiencing increased pain at the LLE and now concerning for limb ischemia.

## 2023-10-28 DIAGNOSIS — I50.22 CHRONIC SYSTOLIC (CONGESTIVE) HEART FAILURE: ICD-10-CM

## 2023-10-28 LAB
ANION GAP SERPL CALC-SCNC: 9 MMOL/L — SIGNIFICANT CHANGE UP (ref 7–14)
ANION GAP SERPL CALC-SCNC: 9 MMOL/L — SIGNIFICANT CHANGE UP (ref 7–14)
BUN SERPL-MCNC: 16 MG/DL — SIGNIFICANT CHANGE UP (ref 7–23)
BUN SERPL-MCNC: 16 MG/DL — SIGNIFICANT CHANGE UP (ref 7–23)
CALCIUM SERPL-MCNC: 8.7 MG/DL — SIGNIFICANT CHANGE UP (ref 8.4–10.5)
CALCIUM SERPL-MCNC: 8.7 MG/DL — SIGNIFICANT CHANGE UP (ref 8.4–10.5)
CHLORIDE SERPL-SCNC: 96 MMOL/L — LOW (ref 98–107)
CHLORIDE SERPL-SCNC: 96 MMOL/L — LOW (ref 98–107)
CO2 SERPL-SCNC: 28 MMOL/L — SIGNIFICANT CHANGE UP (ref 22–31)
CO2 SERPL-SCNC: 28 MMOL/L — SIGNIFICANT CHANGE UP (ref 22–31)
CREAT SERPL-MCNC: 0.82 MG/DL — SIGNIFICANT CHANGE UP (ref 0.5–1.3)
CREAT SERPL-MCNC: 0.82 MG/DL — SIGNIFICANT CHANGE UP (ref 0.5–1.3)
EGFR: 71 ML/MIN/1.73M2 — SIGNIFICANT CHANGE UP
EGFR: 71 ML/MIN/1.73M2 — SIGNIFICANT CHANGE UP
GLUCOSE SERPL-MCNC: 145 MG/DL — HIGH (ref 70–99)
GLUCOSE SERPL-MCNC: 145 MG/DL — HIGH (ref 70–99)
GRAM STN FLD: ABNORMAL
GRAM STN FLD: ABNORMAL
HCT VFR BLD CALC: 31.9 % — LOW (ref 34.5–45)
HCT VFR BLD CALC: 31.9 % — LOW (ref 34.5–45)
HGB BLD-MCNC: 10.5 G/DL — LOW (ref 11.5–15.5)
HGB BLD-MCNC: 10.5 G/DL — LOW (ref 11.5–15.5)
MAGNESIUM SERPL-MCNC: 1.9 MG/DL — SIGNIFICANT CHANGE UP (ref 1.6–2.6)
MAGNESIUM SERPL-MCNC: 1.9 MG/DL — SIGNIFICANT CHANGE UP (ref 1.6–2.6)
MCHC RBC-ENTMCNC: 31.3 PG — SIGNIFICANT CHANGE UP (ref 27–34)
MCHC RBC-ENTMCNC: 31.3 PG — SIGNIFICANT CHANGE UP (ref 27–34)
MCHC RBC-ENTMCNC: 32.9 GM/DL — SIGNIFICANT CHANGE UP (ref 32–36)
MCHC RBC-ENTMCNC: 32.9 GM/DL — SIGNIFICANT CHANGE UP (ref 32–36)
MCV RBC AUTO: 94.9 FL — SIGNIFICANT CHANGE UP (ref 80–100)
MCV RBC AUTO: 94.9 FL — SIGNIFICANT CHANGE UP (ref 80–100)
NRBC # BLD: 0 /100 WBCS — SIGNIFICANT CHANGE UP (ref 0–0)
NRBC # BLD: 0 /100 WBCS — SIGNIFICANT CHANGE UP (ref 0–0)
NRBC # FLD: 0 K/UL — SIGNIFICANT CHANGE UP (ref 0–0)
NRBC # FLD: 0 K/UL — SIGNIFICANT CHANGE UP (ref 0–0)
PHOSPHATE SERPL-MCNC: 2.9 MG/DL — SIGNIFICANT CHANGE UP (ref 2.5–4.5)
PHOSPHATE SERPL-MCNC: 2.9 MG/DL — SIGNIFICANT CHANGE UP (ref 2.5–4.5)
PLATELET # BLD AUTO: 223 K/UL — SIGNIFICANT CHANGE UP (ref 150–400)
PLATELET # BLD AUTO: 223 K/UL — SIGNIFICANT CHANGE UP (ref 150–400)
POTASSIUM SERPL-MCNC: 4.5 MMOL/L — SIGNIFICANT CHANGE UP (ref 3.5–5.3)
POTASSIUM SERPL-MCNC: 4.5 MMOL/L — SIGNIFICANT CHANGE UP (ref 3.5–5.3)
POTASSIUM SERPL-SCNC: 4.5 MMOL/L — SIGNIFICANT CHANGE UP (ref 3.5–5.3)
POTASSIUM SERPL-SCNC: 4.5 MMOL/L — SIGNIFICANT CHANGE UP (ref 3.5–5.3)
RBC # BLD: 3.36 M/UL — LOW (ref 3.8–5.2)
RBC # BLD: 3.36 M/UL — LOW (ref 3.8–5.2)
RBC # FLD: 17.9 % — HIGH (ref 10.3–14.5)
RBC # FLD: 17.9 % — HIGH (ref 10.3–14.5)
SODIUM SERPL-SCNC: 133 MMOL/L — LOW (ref 135–145)
SODIUM SERPL-SCNC: 133 MMOL/L — LOW (ref 135–145)
WBC # BLD: 9.7 K/UL — SIGNIFICANT CHANGE UP (ref 3.8–10.5)
WBC # BLD: 9.7 K/UL — SIGNIFICANT CHANGE UP (ref 3.8–10.5)
WBC # FLD AUTO: 9.7 K/UL — SIGNIFICANT CHANGE UP (ref 3.8–10.5)
WBC # FLD AUTO: 9.7 K/UL — SIGNIFICANT CHANGE UP (ref 3.8–10.5)

## 2023-10-28 PROCEDURE — 99222 1ST HOSP IP/OBS MODERATE 55: CPT | Mod: FS,GC

## 2023-10-28 PROCEDURE — 99233 SBSQ HOSP IP/OBS HIGH 50: CPT

## 2023-10-28 PROCEDURE — 93010 ELECTROCARDIOGRAM REPORT: CPT

## 2023-10-28 RX ORDER — METOPROLOL TARTRATE 50 MG
25 TABLET ORAL DAILY
Refills: 0 | Status: DISCONTINUED | OUTPATIENT
Start: 2023-10-28 | End: 2023-11-18

## 2023-10-28 RX ORDER — AMIODARONE HYDROCHLORIDE 400 MG/1
100 TABLET ORAL DAILY
Refills: 0 | Status: DISCONTINUED | OUTPATIENT
Start: 2023-10-28 | End: 2023-11-18

## 2023-10-28 RX ORDER — ETHACRYNIC ACID 25 MG/1
50 TABLET ORAL DAILY
Refills: 0 | Status: DISCONTINUED | OUTPATIENT
Start: 2023-10-28 | End: 2023-10-28

## 2023-10-28 RX ORDER — BUMETANIDE 0.25 MG/ML
1 INJECTION INTRAMUSCULAR; INTRAVENOUS ONCE
Refills: 0 | Status: COMPLETED | OUTPATIENT
Start: 2023-10-28 | End: 2023-10-28

## 2023-10-28 RX ADMIN — BUMETANIDE 1 MILLIGRAM(S): 0.25 INJECTION INTRAMUSCULAR; INTRAVENOUS at 23:05

## 2023-10-28 RX ADMIN — Medication 81 MILLIGRAM(S): at 13:33

## 2023-10-28 RX ADMIN — Medication 25 MILLIGRAM(S): at 19:07

## 2023-10-28 RX ADMIN — PIPERACILLIN AND TAZOBACTAM 25 GRAM(S): 4; .5 INJECTION, POWDER, LYOPHILIZED, FOR SOLUTION INTRAVENOUS at 05:41

## 2023-10-28 RX ADMIN — PIPERACILLIN AND TAZOBACTAM 25 GRAM(S): 4; .5 INJECTION, POWDER, LYOPHILIZED, FOR SOLUTION INTRAVENOUS at 13:33

## 2023-10-28 RX ADMIN — ATORVASTATIN CALCIUM 80 MILLIGRAM(S): 80 TABLET, FILM COATED ORAL at 23:05

## 2023-10-28 RX ADMIN — PANTOPRAZOLE SODIUM 40 MILLIGRAM(S): 20 TABLET, DELAYED RELEASE ORAL at 07:43

## 2023-10-28 RX ADMIN — PIPERACILLIN AND TAZOBACTAM 25 GRAM(S): 4; .5 INJECTION, POWDER, LYOPHILIZED, FOR SOLUTION INTRAVENOUS at 23:05

## 2023-10-28 RX ADMIN — Medication 50 MICROGRAM(S): at 05:43

## 2023-10-28 NOTE — PROGRESS NOTE ADULT - SUBJECTIVE AND OBJECTIVE BOX
Patient is a 83y old  Female who presents with a chief complaint of left toe wounds (27 Oct 2023 13:23)       INTERVAL HPI/OVERNIGHT EVENTS:  Patient seen and evaluated at bedside.  Pt is resting comfortable in NAD. Denies N/V/F/C.    Allergies    latex (Unknown)  sulfa drugs (Unknown)    Intolerances        Vital Signs Last 24 Hrs  T(C): 36.7 (28 Oct 2023 10:00), Max: 37 (28 Oct 2023 02:00)  T(F): 98.1 (28 Oct 2023 10:00), Max: 98.6 (28 Oct 2023 02:00)  HR: 82 (28 Oct 2023 10:00) (82 - 96)  BP: 128/84 (28 Oct 2023 10:00) (90/71 - 128/84)  BP(mean): --  RR: 18 (28 Oct 2023 10:00) (17 - 20)  SpO2: 97% (28 Oct 2023 10:00) (86% - 100%)    Parameters below as of 28 Oct 2023 10:00  Patient On (Oxygen Delivery Method): room air        LABS:                        10.5   9.70  )-----------( 223      ( 28 Oct 2023 03:16 )             31.9     10-28    133<L>  |  96<L>  |  16  ----------------------------<  145<H>  4.5   |  28  |  0.82    Ca    8.7      28 Oct 2023 03:16  Phos  2.9     10-28  Mg     1.90     10-28    TPro  5.9<L>  /  Alb  2.9<L>  /  TBili  0.6  /  DBili  x   /  AST  20  /  ALT  11  /  AlkPhos  106  10-27    PT/INR - ( 27 Oct 2023 06:55 )   PT: 14.1 sec;   INR: 1.26 ratio         PTT - ( 27 Oct 2023 06:55 )  PTT:30.8 sec  Urinalysis Basic - ( 28 Oct 2023 03:16 )    Color: x / Appearance: x / SG: x / pH: x  Gluc: 145 mg/dL / Ketone: x  / Bili: x / Urobili: x   Blood: x / Protein: x / Nitrite: x   Leuk Esterase: x / RBC: x / WBC x   Sq Epi: x / Non Sq Epi: x / Bacteria: x      CAPILLARY BLOOD GLUCOSE      POCT Blood Glucose.: 194 mg/dL (27 Oct 2023 13:22)      Lower Extremity Physical Exam:  Vascular: DP/PT 0/4, B/L, CFT >3 seconds B/L, Temperature gradient warm to cold, B/L.   Neuro: Epicritic sensation intact to the level of digits, B/L.  Musculoskeletal/Ortho: L foot partial 1 digit amputation (DOS 9/6)  Skin:  s/p left foot partial hallux amputation (DOS 9/6): sutures intact, dehiscence, flaps cold. blistering to distal digits 2,3,4 with serous drainage, 4th distal lateral digit eschar intact, early ischemic changes to digits 1 through 5 extending to metatarsophalangeal joints, interdigital maceration to interspaces 1-4, Left foot digits 2-5 nailbed eschars with no acute signs of infection.      RADIOLOGY & ADDITIONAL TESTS:  < from: Xray Foot AP + Lateral + Oblique, Left (10.27.23 @ 07:31) >  ACC: [id number] EXAM:  XR FOOT COMP MIN 3 VIEWS LT   ORDERED BY: JIMMY HAN     PROCEDURE DATE:  10/27/2023          INTERPRETATION:  CLINICAL INDICATION: partial left hallux amputation;   pain and erythema    EXAM:  Frontal oblique lateral leftfoot from 10/27/2023 at 0731. Compared to   prior study from 8/25/2023.    IMPRESSION:  Partial left hallux amputation defect through proximal phalanx shaft with   sharp smooth osseous stump margin and preserved overlying soft tissue   coverage.    Generalized soft tissue swelling with questionable focal small soft   tissue ulceration over medial 1st metatarsal margin. No adjacent tracking   gas collections or gross radiographic evidence for osteomyelitis.    No acute fractures or dislocations.    Tarsometatarsal alignment maintained without evidence for a Lisfranc   injury. Preserved remaining visualized joint spaces and no joint margin   erosions.    Plantar and posterior calcaneal enthesophytes.    Generalized osteopenia otherwise no discrete lytic or blastic lesions.    Scant scattered vascular calcifications.    --- End of Report ---            CHERRY CARDOSO MD; Attending Radiologist  This document has been electronically signed. Oct 27 2023  9:46AM    < end of copied text >

## 2023-10-28 NOTE — PROGRESS NOTE ADULT - SUBJECTIVE AND OBJECTIVE BOX
Date of Admission:  10/27/23  CHIEF COMPLAINT:    HISTORY OF PRESENT ILLNESS:    83F with PMHx of CAD, pAF on Eliquis, severe AS pending TAVR, R internal carotid stenosis, HTN, HLD, hypothyroidism, anxiety, PAD and chronic L toe wounds s/p L ilioprofunda bypass with reverse GSV graft to peroneal 8/31/23 (Dr. Tavares) c/b large L groin abscess (17cm) requiring removal of infected bypass graft of lower extremity and groin washout on 9/24/23. Wound care involved for R groin wound and R calf incision wound. Pt states she has been getting daily wound care, and today the rehab team was concerned about how cold the foot felt, recommending she come into the hospital. Pt is poor historian, but she says the wounds have been worse over the last 2 days.     Cardiology consulted for optimization prior to peripheral angiogram.    Allergies    latex (Unknown)  sulfa drugs (Unknown)    Intolerances    	    MEDICATIONS:  aMIOdarone    Tablet 100 milliGRAM(s) Oral daily  aspirin enteric coated 81 milliGRAM(s) Oral daily  ethacrynic acid 50 milliGRAM(s) Oral daily  metoprolol succinate ER 25 milliGRAM(s) Oral daily  piperacillin/tazobactam IVPB.. 3.375 Gram(s) IV Intermittent every 8 hours  pantoprazole    Tablet 40 milliGRAM(s) Oral before breakfast  atorvastatin 80 milliGRAM(s) Oral at bedtime  levothyroxine 50 MICROGram(s) Oral daily  silver nitrate Applicator 1 Application(s) Topical once    PAST MEDICAL & SURGICAL HISTORY:  HTN (hypertension)  HLD (hyperlipidemia)  Anxiety  CAD (coronary artery disease)  PAD (peripheral artery disease  Hypothyroidism  AF (atrial fibrillation)  Carotid artery stenosis  AS (aortic stenosis)  Status post closed fracture of right femur  H/O arterial bypass of lower limb      FAMILY HISTORY:  Family history of myocardial infarction (Father, Sibling)      REVIEW OF SYSTEMS:  See HPI. Otherwise, 10 point ROS done and otherwise negative.      T(C): 36.7 (10-28-23 @ 10:00), Max: 37 (10-28-23 @ 02:00)  HR: 82 (10-28-23 @ 10:00) (82 - 96)  BP: 128/84 (10-28-23 @ 10:00) (90/71 - 128/84)  RR: 18 (10-28-23 @ 10:00) (17 - 20)  SpO2: 97% (10-28-23 @ 10:00) (86% - 100%)  Wt(kg): --  I&O's Summary    27 Oct 2023 07:01  -  28 Oct 2023 07:00  --------------------------------------------------------  IN: 685 mL / OUT: 400 mL / NET: 285 mL    Physical Exam:  General: NAD, AOx3  HEENT:  EOMI,   Pulmonary: normal resp effort, patent airway  Cardiac: +S1+S2  Abdominal: soft, ND/NT, no organomegaly  Vascular:Left  groin with healing wound with packing, no active bleeding  LLE: L foot cooler vs R, L delayed cap refill, s/p partial 1st toe resection, multiple tender wound across toes, decreased sensation, decreased motor  RLE: pitting edema  Pulses:  - Right: femoral (p), popliteal (p), PT (s), DP (s)  - Left: femoral (deferred 2/2 wound), popliteal (s), PT (x), DP (x)       LABS:	   	    CBC Full  -  ( 28 Oct 2023 03:16 )  WBC Count : 9.70 K/uL  Hemoglobin : 10.5 g/dL  Hematocrit : 31.9 %  Platelet Count - Automated : 223 K/uL  Mean Cell Volume : 94.9 fL  Mean Cell Hemoglobin : 31.3 pg  Mean Cell Hemoglobin Concentration : 32.9 gm/dL  Auto Neutrophil # : x  Auto Lymphocyte # : x  Auto Monocyte # : x  Auto Eosinophil # : x  Auto Basophil # : x  Auto Neutrophil % : x  Auto Lymphocyte % : x  Auto Monocyte % : x  Auto Eosinophil % : x  Auto Basophil % : x    10-28    133<L>  |  96<L>  |  16  ----------------------------<  145<H>  4.5   |  28  |  0.82  10-27    134<L>  |  97<L>  |  15  ----------------------------<  111<H>  4.0   |  24  |  0.69    Ca    8.7      28 Oct 2023 03:16  Ca    8.5      27 Oct 2023 06:55  Phos  2.9     10-28  Phos  2.9     10-27  Mg     1.90     10-28  Mg     1.90     10-27    TPro  5.9<L>  /  Alb  2.9<L>  /  TBili  0.6  /  DBili  x   /  AST  20  /  ALT  11  /  AlkPhos  106  10-27  TPro  6.7  /  Alb  2.5<L>  /  TBili  0.8  /  DBili  x   /  AST  14<L>  /  ALT  18  /  AlkPhos  126<H>  10-26    < from: TTE W or WO Ultrasound Enhancing Agent (09.26.23 @ 13:43) >    TRANSTHORACIC ECHOCARDIOGRAM REPORT  ________________________________________________________________________________                                      _______       Pt. Name:       NANO GARCIA Study Date:    9/26/2023  MRN:            [id number]        YOB: 1940  Accession #:    [id number]        Age:           83 years  Account#:       [id number]         Gender:        F  Heart Rate:                      Height:        64.00 in (162.56 cm)  Rhythm:                          Weight:        130.00 lb (58.97 kg)  Blood Pressure: 109/61 mmHg      BSA/BMI:       1.63 m² / 22.31 kg/m²  ________________________________________________________________________________________  Referring Physician:    [id number] Enrique Kyle  Interpreting Physician: Joey Rojas M.D.  Primary Sonographer:    Kalen HURT    CPT:                ECHO TTE WO CON COMP W DOPP - 14773.m  Indication(s):      Unspecified atrial fibrillation - I48.91  Procedure:          Transthoracic echocardiogram with 2-D, M-mode and complete                      spectral and color flow Doppler.  Ordering Location:  [id number]  Contrast Injection: Verbal consent was obtained for injection of Ultrasonic                      Enhancing Agent following a discussionof risks and                      benefits.  UEA Reaction:       Patient had no adverse reaction after injection of                      Ultrasound Enhancing Agent.  Study Information:  Image quality for this study is technically difficult.    _______________________________________________________________________________________     CONCLUSIONS:      1. Technically difficult image quality.   2. Left ventricular systolic function is severely decreased with an ejection fraction visually estimated at 20 to 25 %. There is global left ventricular hypokinesis. No evidence of a thrombus in the left ventricle.   3. There is mild (grade 1) left ventricular diastolic dysfunction.   4. Right ventricular cavity is mildly enlarged in size and reduced systolic function.   5. Mild mitral regurgitation.   6. Aortic valve anatomy cannot be determined with reduced systolic excursion. severe calcification of the aortic valve leaflets. severe aortic stenosis.   7. Severe aortic stenosis (estimated aortic valve area ~ 0.5 cm2 (by the continuity equation).   8. Mild aortic regurgitation.   9. The left atrium is mildly dilated in size.  10. Left pleural effusion noted.  11. No prior echocardiogram is available for comparison.    ________________________________________________________________________________________  FINDINGS:       < end of copied text >    Cath:  5/1/2023    LM   Proximal left main: Angiography shows moderate atherosclerosis. There  is a 40 % stenosis.    LAD   Proximal left anterior descending: Angiography shows moderate  atherosclerosis. There is a 40 % stenosis. Distal left anterior  descending: Angiography shows moderate atherosclerosis. There is a 50  % stenosis.    CX   Circumflex: Angiography shows mild atherosclerosis.      RCA   Distal right coronary artery: Angiography shows complete occlusion.  There is a 100 % stenosis.    Imaging:

## 2023-10-28 NOTE — CONSULT NOTE ADULT - NS ATTEND AMEND GEN_ALL_CORE FT
Adina Kelley is an 83-year-old woman with history of CAD, pAF on Eliquis, severe AS pending TAVR, R internal carotid stenosis, HTN, HLD, hypothyroidism, anxiety and PAD. She has chronic L toe wounds s/p L ilioprofunda bypass with reverse GSV graft to peroneal 8/31/23 (Dr. Tavares) complicated by left groin abscess (17cm) requiring removal of infected bypass graft of lower extremity and groin washout 9/24/23. Peripheral angiogram is now planned. She appeared volume overloaded. Start bumetanide (Bumex) 1 mg IV twice daily, holding for SBP < 85 mm Hg and aiming for net negative fluid balance 1.5 L/day. Maintain serum K > 4.0 mmol/L and serum Mg > 2.0 mg/dL. TTE planned for reassessment of LV systolic function and status of aortic valve. There is plan for TAVR for sever AS. Maintain apixaban (Eliquis) 5 mg oral twice daily and metoprolol succ ER (Toprol XL) 25 mg daily for pAFIB.

## 2023-10-28 NOTE — PROGRESS NOTE ADULT - SUBJECTIVE AND OBJECTIVE BOX
PROGRESS NOTE:     Patient is a 83y old  Female who presents with a chief complaint of Ruther 5 CLTI left toe wounds (28 Oct 2023 11:51)      SUBJECTIVE / OVERNIGHT EVENTS: No acute events.     ADDITIONAL REVIEW OF SYSTEMS:    MEDICATIONS  (STANDING):  aspirin enteric coated 81 milliGRAM(s) Oral daily  atorvastatin 80 milliGRAM(s) Oral at bedtime  levothyroxine 50 MICROGram(s) Oral daily  pantoprazole    Tablet 40 milliGRAM(s) Oral before breakfast  piperacillin/tazobactam IVPB.. 3.375 Gram(s) IV Intermittent every 8 hours  silver nitrate Applicator 1 Application(s) Topical once    MEDICATIONS  (PRN):      CAPILLARY BLOOD GLUCOSE      POCT Blood Glucose.: 194 mg/dL (27 Oct 2023 13:22)    I&O's Summary    27 Oct 2023 07:01  -  28 Oct 2023 07:00  --------------------------------------------------------  IN: 685 mL / OUT: 400 mL / NET: 285 mL        PHYSICAL EXAM:  Vital Signs Last 24 Hrs  T(C): 36.7 (28 Oct 2023 10:00), Max: 37 (28 Oct 2023 02:00)  T(F): 98.1 (28 Oct 2023 10:00), Max: 98.6 (28 Oct 2023 02:00)  HR: 82 (28 Oct 2023 10:00) (82 - 96)  BP: 128/84 (28 Oct 2023 10:00) (90/71 - 128/84)  BP(mean): --  RR: 18 (28 Oct 2023 10:00) (17 - 20)  SpO2: 97% (28 Oct 2023 10:00) (86% - 100%)    Parameters below as of 28 Oct 2023 10:00  Patient On (Oxygen Delivery Method): room air      GENERAL: NAD, well-developed  EYES: EOMI, conjunctiva and sclera clear  NECK: Supple, No JVD  CHEST/LUNG: Clear to auscultation bilaterally; No wheeze  HEART: Regular rate and rhythm; S1S2  ABDOMEN: Soft, Nontender, Nondistended; Bowel sounds present  EXTREMITIES: L shin with open wound and drainage; L toes with wounds and drainage. _LE edema    PSYCH: AAOx 2 (self and place)  NEUROLOGY: non-focal      LABS:                        10.5   9.70  )-----------( 223      ( 28 Oct 2023 03:16 )             31.9     10-28    133<L>  |  96<L>  |  16  ----------------------------<  145<H>  4.5   |  28  |  0.82    Ca    8.7      28 Oct 2023 03:16  Phos  2.9     10-28  Mg     1.90     10-28    TPro  5.9<L>  /  Alb  2.9<L>  /  TBili  0.6  /  DBili  x   /  AST  20  /  ALT  11  /  AlkPhos  106  10-27    PT/INR - ( 27 Oct 2023 06:55 )   PT: 14.1 sec;   INR: 1.26 ratio         PTT - ( 27 Oct 2023 06:55 )  PTT:30.8 sec      Urinalysis Basic - ( 28 Oct 2023 03:16 )    Color: x / Appearance: x / SG: x / pH: x  Gluc: 145 mg/dL / Ketone: x  / Bili: x / Urobili: x   Blood: x / Protein: x / Nitrite: x   Leuk Esterase: x / RBC: x / WBC x   Sq Epi: x / Non Sq Epi: x / Bacteria: x        Culture - Abscess with Gram Stain (collected 27 Oct 2023 09:21)  Source: .Abscess left foot  Gram Stain (27 Oct 2023 15:06):    No polymorphonuclear cells seen per low power field    No organisms seen per oil power field    Culture - Blood (collected 26 Oct 2023 18:47)  Source: .Blood None  Preliminary Report (28 Oct 2023 03:02):    No growth at 24 hours    Culture - Blood (collected 26 Oct 2023 18:47)  Source: .Blood None  Preliminary Report (28 Oct 2023 03:02):    No growth at 24 hours        RADIOLOGY & ADDITIONAL TESTS:  Results Reviewed:   Imaging Personally Reviewed:  Electrocardiogram Personally Reviewed:    COORDINATION OF CARE:  Care Discussed with Consultants/Other Providers [Y/N]:  Prior or Outpatient Records Reviewed [Y/N]:

## 2023-10-28 NOTE — CONSULT NOTE ADULT - NS ATTEST RISK PROBLEM GEN_ALL_CORE FT
83-year-old woman with history of CAD, pAF on Eliquis, severe AS pending TAVR, R internal carotid stenosis, HTN, HLD, hypothyroidism, anxiety and PAD. She has chronic L toe wounds s/p L ilioprofunda bypass with reverse GSV graft to peroneal 8/31/23 (Dr. Tavares) complicated by left groin abscess (17cm) requiring removal of infected bypass graft of lower extremity and groin washout 9/24/23. Peripheral angiogram is now planned. She appeared volume overloaded, requiring IV diuretic treatment

## 2023-10-28 NOTE — PROGRESS NOTE ADULT - ASSESSMENT
83F with PMHx of CAD, pAF on Eliquis, severe AS pending TAVR, R internal carotid stenosis, HTN, HLD, hypothyroidism, anxiety, PAD and chronic L toe wounds s/p L ilioprofunda bypass with reverse GSV graft to peroneal 8/31/23 (Dr. Tavares) c/b large L groin abscess (17cm) requiring removal of infected bypass graft of lower extremity and groin washout on 9/24/23. Wound care involved for R groin wound and R calf incision wound. Pt states she has been getting daily wound care, and today the rehab team was concerned about how cold the foot felt, recommending she come into the hospital. Pt is poor historian, but she says the wounds have been worse over the last 2 days.     PLAN  - Wound care following, wound vac placed on groin wound, dressings on medial leg  - Planning Angiogram next week  - F/u cardiology for optimization  - F/u medicine for optimization and diuretic  - Appreciate podiatry consult  - Dispo: pending    C Team Vascular Surgery  [id number]

## 2023-10-28 NOTE — PROGRESS NOTE ADULT - ASSESSMENT
83F with PMHx of CAD, pAF on Eliquis, severe AS pending TAVR, R internal carotid stenosis, HTN, HLD, hypothyroidism, anxiety, PAD and chronic L toe wounds s/p L ilioprofunda bypass with reverse GSV graft to peroneal 8/31/23 (Dr. Tavares) c/b large L groin abscess (17cm) requiring removal of infected bypass graft of lower extremity and groin washout on 9/24/23. Wound care involved for R groin wound and R calf incision wound. Pt states she has been getting daily wound care, and today the rehab team was concerned about how cold the foot felt, recommending she come into the hospital. Pt is poor historian, but she says the wounds have been worse over the last 2 days.     Cardiology consulted for optimization prior to peripheral angiogram.    PLAN:

## 2023-10-28 NOTE — CONSULT NOTE ADULT - SUBJECTIVE AND OBJECTIVE BOX
Date of Admission:    CHIEF COMPLAINT:    HISTORY OF PRESENT ILLNESS:      Allergies    latex (Unknown)  sulfa drugs (Unknown)    Intolerances    	    MEDICATIONS:  aMIOdarone    Tablet 100 milliGRAM(s) Oral daily  aspirin enteric coated 81 milliGRAM(s) Oral daily  ethacrynic acid 50 milliGRAM(s) Oral daily  metoprolol succinate ER 25 milliGRAM(s) Oral daily    piperacillin/tazobactam IVPB.. 3.375 Gram(s) IV Intermittent every 8 hours        pantoprazole    Tablet 40 milliGRAM(s) Oral before breakfast    atorvastatin 80 milliGRAM(s) Oral at bedtime  levothyroxine 50 MICROGram(s) Oral daily    silver nitrate Applicator 1 Application(s) Topical once      PAST MEDICAL & SURGICAL HISTORY:  HTN (hypertension)  HLD (hyperlipidemia)  Anxiety  CAD (coronary artery disease)  PAD (peripheral artery disease)  Hypothyroidism  AF (atrial fibrillation  Carotid artery stenosis  AS (aortic stenosis)  Status post closed fracture of right femur  H/O arterial bypass of lower limb    FAMILY HISTORY:  Family history of myocardial infarction (Father, Sibling)    REVIEW OF SYSTEMS:  CONSTITUTIONAL: endorses weakness  EYES/ENT: No visual changes;  No vertigo or throat pain   NECK: No pain or stiffness  RESPIRATORY: endorses shortness of breath  CARDIOVASCULAR: No chest pain or palpitations  GASTROINTESTINAL: No abdominal or epigastric pain. No nausea, vomiting, or hematemesis; No diarrhea or constipation. No melena or hematochezia.  GENITOURINARY: No dysuria, frequency or hematuria  NEUROLOGICAL: No numbness or weakness  EXTREMITIES: endorses swelling  SKIN: No itching, rashes    T(C): 36.9 (10-28-23 @ 14:16), Max: 37 (10-28-23 @ 02:00)  HR: 84 (10-28-23 @ 14:16) (82 - 96)  BP: 97/61 (10-28-23 @ 14:16) (90/71 - 128/84)  RR: 18 (10-28-23 @ 14:16) (17 - 20)  SpO2: 95% (10-28-23 @ 14:16) (86% - 100%)  Wt(kg): --  I&O's Summary    27 Oct 2023 07:01  -  28 Oct 2023 07:00  --------------------------------------------------------  IN: 685 mL / OUT: 400 mL / NET: 285 mL       Constitutional: NAD AOx3  	Eyes: PERRL EOMI  	Head: Normocephalic atraumatic  	Mouth: MMM  	Cardiac: regular rate and rhythm  	Resp: Lungs CTAB  	GI: Abd s/nd/nt  	Neuro: CN2-12 grossly intact, KHAN x 4  	MSK: Bilateral LEs with edema, same temperature. Left LE surgical scars on the toes, big toe is erythematous with sutures. No discharge or foul smell. Non-palpable pulse. Medial incision from calf to groin is erythematous. + PT pulse by doppler. LABS:	   	    CBC Full  -  ( 28 Oct 2023 03:16 )  WBC Count : 9.70 K/uL  Hemoglobin : 10.5 g/dL  Hematocrit : 31.9 %  Platelet Count - Automated : 223 K/uL  Mean Cell Volume : 94.9 fL  Mean Cell Hemoglobin : 31.3 pg  Mean Cell Hemoglobin Concentration : 32.9 gm/dL  Auto Neutrophil # : x  Auto Lymphocyte # : x  Auto Monocyte # : x  Auto Eosinophil # : x  Auto Basophil # : x  Auto Neutrophil % : x  Auto Lymphocyte % : x  Auto Monocyte % : x  Auto Eosinophil % : x  Auto Basophil % : x    10-28    133<L>  |  96<L>  |  16  ----------------------------<  145<H>  4.5   |  28  |  0.82  10-27    134<L>  |  97<L>  |  15  ----------------------------<  111<H>  4.0   |  24  |  0.69    Ca    8.7      28 Oct 2023 03:16  Ca    8.5      27 Oct 2023 06:55  Phos  2.9     10-28  Phos  2.9     10-27  Mg     1.90     10-28  Mg     1.90     10-27    TPro  5.9<L>  /  Alb  2.9<L>  /  TBili  0.6  /  DBili  x   /  AST  20  /  ALT  11  /  AlkPhos  106  10-27  TPro  6.7  /  Alb  2.5<L>  /  TBili  0.8  /  DBili  x   /  AST  14<L>  /  ALT  18  /  AlkPhos  126<H>  10-26      proBNP:   Lipid Profile:   HgA1c:   TSH:       CARDIAC MARKERS:            TELEMETRY: 	    ECG:  	  RADIOLOGY:  OTHER: 	    PREVIOUS DIAGNOSTIC TESTING:    [ ] Echocardiogram:  [ ]  Catheterization:  [ ] Stress Test:  	  	  ASSESSMENT/PLAN: 	    Tess Lorenzo NP [id number] Date of Admission:  10/27/23  CHIEF COMPLAINT:  vascular changes in the both feet, left cooler than right foot  HISTORY OF PRESENT ILLNESS:  83F with PMHx of CAD, pAF on Eliquis, severe AS pending TAVR, R internal carotid stenosis, HTN, HLD, hypothyroidism, anxiety, PAD and chronic L toe wounds s/p L ilioprofunda bypass with reverse GSV graft to peroneal 23 (Dr. Tavares) c/b large L groin abscess (17cm) requiring removal of infected bypass graft of lower extremity and groin washout on 23. Wound care involved for R groin wound and R calf incision wound. Pt states she has been getting daily wound care, and today the rehab team was concerned about how cold the foot felt, recommending she come into the hospital. Pt is poor historian, but she says the wounds have been worse over the last 2 days. Cardiology consulted for optimization prior to peripheral angiogram.    Allergies    latex (Unknown)  sulfa drugs (Unknown)    Intolerances      MEDICATIONS:  aMIOdarone    Tablet 100 milliGRAM(s) Oral daily  aspirin enteric coated 81 milliGRAM(s) Oral daily  ethacrynic acid 50 milliGRAM(s) Oral daily  metoprolol succinate ER 25 milliGRAM(s) Oral daily  piperacillin/tazobactam IVPB.. 3.375 Gram(s) IV Intermittent every 8 hours  pantoprazole    Tablet 40 milliGRAM(s) Oral before breakfast  atorvastatin 80 milliGRAM(s) Oral at bedtime  levothyroxine 50 MICROGram(s) Oral daily    silver nitrate Applicator 1 Application(s) Topical once      PAST MEDICAL & SURGICAL HISTORY:  HTN (hypertension)  HLD (hyperlipidemia)  Anxiety  CAD (coronary artery disease)  PAD (peripheral artery disease)  Hypothyroidism  AF (atrial fibrillation  Carotid artery stenosis  AS (aortic stenosis)  Status post closed fracture of right femur  H/O arterial bypass of lower limb    FAMILY HISTORY:  Family history of myocardial infarction (Father, Sibling)    REVIEW OF SYSTEMS:  CONSTITUTIONAL: endorses weakness  EYES/ENT: No visual changes;  No vertigo or throat pain   NECK: No pain or stiffness  RESPIRATORY: endorses shortness of breath  CARDIOVASCULAR: No chest pain or palpitations  GASTROINTESTINAL: No abdominal or epigastric pain. No nausea, vomiting, or hematemesis; No diarrhea or constipation. No melena or hematochezia.  GENITOURINARY: No dysuria, frequency or hematuria  NEUROLOGICAL: No numbness or weakness  EXTREMITIES: endorses swelling  SKIN: No itching, rashes    T(C): 36.9 (10-28-23 @ 14:16), Max: 37 (10-28-23 @ 02:00)  HR: 84 (10-28-23 @ 14:16) (82 - 96)  BP: 97/61 (10-28-23 @ 14:16) (90/71 - 128/84)  RR: 18 (10-28-23 @ 14:16) (17 - 20)  SpO2: 95% (10-28-23 @ 14:16) (86% - 100%)  Wt(kg): --  I&O's Summary    27 Oct 2023 07:01  -  28 Oct 2023 07:00  --------------------------------------------------------  IN: 685 mL / OUT: 400 mL / NET: 285 mL    Physical Exam:   Constitutional: NAD AOx3  	Eyes: PERRL EOMI  	Head: Normocephalic atraumatic  	Mouth: MMM  	Cardiac: regular rate and rhythm  	Resp: Lungs CTAB  	GI: Abd s/nd/nt  	Neuro: CN2-12 grossly intact, KHAN x 4  MSK: Bilateral LEs with edema, same temperature. Left LE surgical scars on the toes, big toe is erythematous with sutures. No discharge or foul smell. Non-palpable pulse. Medial incision from calf to groin is erythematous. + PT pulse by doppler. LABS:	   	    CBC Full  -  ( 28 Oct 2023 03:16 )  WBC Count : 9.70 K/uL  Hemoglobin : 10.5 g/dL  Hematocrit : 31.9 %  Platelet Count - Automated : 223 K/uL  Mean Cell Volume : 94.9 fL  Mean Cell Hemoglobin : 31.3 pg  Mean Cell Hemoglobin Concentration : 32.9 gm/dL  Auto Neutrophil # : x  Auto Lymphocyte # : x  Auto Monocyte # : x  Auto Eosinophil # : x  Auto Basophil # : x  Auto Neutrophil % : x  Auto Lymphocyte % : x  Auto Monocyte % : x  Auto Eosinophil % : x  Auto Basophil % : x    10-28    133<L>  |  96<L>  |  16  ----------------------------<  145<H>  4.5   |  28  |  0.82  10-27    134<L>  |  97<L>  |  15  ----------------------------<  111<H>  4.0   |  24  |  0.69    Ca    8.7      28 Oct 2023 03:16  Ca    8.5      27 Oct 2023 06:55  Phos  2.9     10-28  Phos  2.9     10-27  Mg     1.90     10-28  Mg     1.90     10-27    TPro  5.9<L>  /  Alb  2.9<L>  /  TBili  0.6  /  DBili  x   /  AST  20  /  ALT  11  /  AlkPhos  106  10-27  TPro  6.7  /  Alb  2.5<L>  /  TBili  0.8  /  DBili  x   /  AST  14<L>  /  ALT  18  /  AlkPhos  126<H>  10-26    < from: TTE W or WO Ultrasound Enhancing Agent (23 @ 13:43) >    TRANSTHORACIC ECHOCARDIOGRAM REPORT  ________________________________________________________________________________                                      _______       Pt. Name:       NANO GARCIA Study Date:    2023  MRN:            [id number]        YOB: 1940  Accession #:    [id number]        Age:           83 years  Account#:       [id number]         Gender:        F  Heart Rate:                      Height:        64.00 in (162.56 cm)  Rhythm:                          Weight:        130.00 lb (58.97 kg)  Blood Pressure: 109/61 mmHg      BSA/BMI:       1.63 m² / 22.31 kg/m²  ________________________________________________________________________________________  Referring Physician:    [id number] Enrique Kyle  Interpreting Physician: Joey Rojas M.D.  Primary Sonographer:    Kalen HURT    CPT:                ECHO TTE WO CON COMP W DOPP - 30917.m  Indication(s):      Unspecified atrial fibrillation - I48.91  Procedure:          Transthoracic echocardiogram with 2-D, M-mode and complete                      spectral and color flow Doppler.  Ordering Location:  8T  Contrast Injection: Verbal consent was obtained for injection of Ultrasonic                      Enhancing Agent following a discussionof risks and                      benefits.  UEA Reaction:       Patient had no adverse reaction after injection of                      Ultrasound Enhancing Agent.  Study Information:  Image quality for this study is technically difficult.    _______________________________________________________________________________________     CONCLUSIONS:      1. Technically difficult image quality.   2. Left ventricular systolic function is severely decreased with an ejection fraction visually estimated at 20 to 25 %. There is global left ventricular hypokinesis. No evidence of a thrombus in the left ventricle.   3. There is mild (grade 1) left ventricular diastolic dysfunction.   4. Right ventricular cavity is mildly enlarged in size and reduced systolic function.   5. Mild mitral regurgitation.   6. Aortic valve anatomy cannot be determined with reduced systolic excursion. severe calcification of the aortic valve leaflets. severe aortic stenosis.   7. Severe aortic stenosis (estimated aortic valve area ~ 0.5 cm2 (by the continuity equation).   8. Mild aortic regurgitation.   9. The left atrium is mildly dilated in size.  10. Left pleural effusion noted.  11. No prior echocardiogram is available for comparison.    ________________________________________________________________________________________  FINDINGS:     Left Ventricle:  Left ventricular systolic function is severely decreased with an ejection fraction visually estimated at 20 to 25%. There is global left ventricular hypokinesis. There is mild (grade 1) left ventricular diastolic dysfunction. There is no evidence of a thrombus in the left ventricle.     Right Ventricle:  The right ventricular cavity is mildly enlarged in size and reduced systolic function.     Left Atrium:  The left atrium is mildly dilated in size with an indexed volume of 34.34 ml/m².     Right Atrium:  The right atrium is normal in size with an indexed area of 5.51 cm²/m².     Aortic Valve:  The aortic valve anatomy cannot be determined with reduced systolic excursion. There is severe calcification of the aortic valve leaflets. There is severe aortic stenosis. The peak transaortic velocity is 3.83 m/s, peak transaortic gradient is 58.8 mmHg and mean transaortic gradient is 35.6 mmHg with an LVOT/aortic valve VTI ratio of 0.16. The aortic valve area is estimated at 0.52 cm² by the continuity equation. Severe aortic stenosis (estimated aortic valve area ~ 0.5 cm2 (by the continuity equation). There is mild aortic regurgitation.     Mitral Valve:  There is calcification of the mitral valve annulus. There is mild mitral regurgitation.     Tricuspid Valve:  Structurally normal tricuspid valve with normal leaflet excursion. There is trace tricuspid regurgitation.     Pericardium:  No pericardial effusion seen.     Pleura:  Left pleural effusion noted.     Systemic Veins:  The inferior vena cava is normal in size (normal <2.1cm) with abnormal inspiratory collapse (abnormal <50%) consistent with mildly elevated right atrial pressure (~8, range 5-10mmHg).  ____________________________________________________________________  Quantitative Data:  Left Ventricle Measurements: (Indexed to BSA)     Visualized LV EF%: 20 to 25%     MV E Vmax:    0.78 m/s  MV A Vmax:    0.90 m/s  MV E/A:       0.86  e' lateral:   7.58 cm/s  e' medial:    6.85 cm/s  E/e' lateral: 10.25  E/e' medial:  11.34  E/e' Average: 10.77  MV DT:        139 msec       Left Atrium Measurements: (Indexed to BSA)  LA Diam 2D: 3.41 cm    Right Ventricle Measurements:     TVAnn. S': 8.22 cm/s       LVOT / RVOT/ Qp/Qs Data: (Indexed to BSA)  LVOT Diameter: 2.03 cm  LVOT Vmax:     0.64 m/s  LVOT VTI:      14.20 cm  LVOT SV:       46.0 ml  28.26 ml/m²    Aortic Valve Measurements:  AV Vmax:           3.8 m/s  AV Peak Gradient:  58.8 mmHg  AV Mean Gradient:  35.6 mmHg  AV VTI:            88.8 cm  AV VTI Ratio:      0.16  AoV EOA, Contin:   0.52 cm²  AoV EOA, Contin i: 0.32 cm²/m²    Mitral Valve Measurements:     MV E Vmax: 0.8 m/s  MV A Vmax: 0.9 m/s  MV E/A:    0.9       Tricuspid Valve Measurements:     RA Pressure: 8 mmHg    ________________________________________________________________________________________  Electronically signed on 2023 at 3:44:49 PM by Joey Rojas M.D.         *** Final ***    < end of copied text >  < from: Cardiac Catheterization (23 @ 15:57) >      Study Date:     2023   Name:           NANO GARCIA   :            1940   (83 years)   Gender:         female   MR#:            [id number]   [facility]#:           [id number]   Patient Class:  Outpatient     Cath Lab Report    Diagnostic Cardiologist:       Kareem Donis MD   Fellow:                        Ivan Agosto MD   Referring Physician:           Nghia Serna MD     Procedures Performed   Procedures:              1.    Arterial Access - Right Radial     2.    Diagnostic CoronaryAngiography     Indications:               PreOp Evaluation   Suspected coronary artery disease     Lab Visit Indication:      pre-operative evaluation     Diagnostic Conclusions:     Moderate atherosclerosis in LM and LAD.  of dRCA with robust  collateral from LAD.  Recommendations:     Medical treatment of CAD and continue with TAVR eval with structural  heart team.    Acute complication:    No complications     Presentation:   84F with HTN, HLD, and severe AS pending TAVR eval.    Chief Compaint: Pre-TAVR evaluation      Procedure Narrative:   The risks and alternatives of the procedures and conscious sedation  were explained to the patient and informed consent was  obtained. The patient was brought to the cath lab and placed on the  exam table.  Access   Right radial artery:   The puncture site was infiltrated with 1% Lidocaine. Vascular access  was obtained using modified seldinger technique.    Coronary Angiography   Left Coronary System:   A EXPO FL3.5 was positioned into the vessel under fluoroscopic  guidance. Right Coronary System:  A EXPO FR4 was positioned into the vessel under fluoroscopic guidance.    Diagnostic Findings:     Coronary Angiography   The coronary circulation is right dominant. Cardiac catheterization  was performed electively.    Patient: NANO GARCIA            MRN: [id number]  Study Date: 2023   03:57 PM      Page 1 of 3          LM   Proximal left main: Angiography shows moderate atherosclerosis. There  is a 40 % stenosis.    LAD   Proximal left anterior descending: Angiography shows moderate  atherosclerosis. There is a 40 % stenosis. Distal left anterior  descending: Angiography shows moderate atherosclerosis. There is a 50  % stenosis.    CX   Circumflex: Angiography shows mild atherosclerosis.      RCA   Distal right coronary artery: Angiography shows complete occlusion.  There is a 100 % stenosis.    History and Risk Factors:   Hypertension:                                Yes   Dyslipidemia:                                Yes   Prior MI:                                    No   Prior PCI:                                   No   Family History of Premature CAD:             No   Cerebrovascular Disease:                     No   Peripheral Arterial Disease:                 No   Prior CABG:                                  No   Tobacco Use:                                 Never   Cardiac Arrest Out of Hospital:              No   Prior Heart Failure:                         No   Diabetes:                                    No  CSHA ([place] Study of Health and Aging)    4 - Vulnerable   Frailty Scale:       < end of copied text >

## 2023-10-28 NOTE — PROGRESS NOTE ADULT - SUBJECTIVE AND OBJECTIVE BOX
C Team Surgery Progress Note     S: Patient resting comfortably on morning rounds. Overnight patient desated to the 80s and was placed on 2L NC. Pain well-controlled currently. No other complaints at this time.      MEDICATIONS  (STANDING):  aMIOdarone    Tablet 100 milliGRAM(s) Oral daily  aspirin enteric coated 81 milliGRAM(s) Oral daily  atorvastatin 80 milliGRAM(s) Oral at bedtime  ethacrynic acid 50 milliGRAM(s) Oral daily  levothyroxine 50 MICROGram(s) Oral daily  metoprolol succinate ER 25 milliGRAM(s) Oral daily  pantoprazole    Tablet 40 milliGRAM(s) Oral before breakfast  piperacillin/tazobactam IVPB.. 3.375 Gram(s) IV Intermittent every 8 hours  silver nitrate Applicator 1 Application(s) Topical once    MEDICATIONS  (PRN):      T(C): 36.7 (10-28-23 @ 10:00), Max: 37 (10-28-23 @ 02:00)  HR: 82 (10-28-23 @ 10:00) (82 - 96)  BP: 128/84 (10-28-23 @ 10:00) (90/71 - 128/84)  RR: 18 (10-28-23 @ 10:00) (17 - 20)  SpO2: 97% (10-28-23 @ 10:00) (86% - 100%)        10-27-23 @ 07:01  -  10-28-23 @ 07:00  --------------------------------------------------------  IN: 685 mL / OUT: 400 mL / NET: 285 mL        Physical Exam:  General: NAD, AOx3  HEENT: NC/AT, EOMI, normal hearing, no oral lesions, no LAD, neck supple  Pulmonary: normal resp effort, patent airway  Abdominal: soft, ND/NT, no organomegaly  L groin: healing wound with packing, no active bleeding  Neuro: CNs II-XII grossly intact, normal sensation, no focal deficits  LLE: L foot cooler vs R, L delayed cap refill, s/p partial 1st toe resection, multiple tender wound across toes, decreased sensation, decreased motor  RLE: pitting edema  Pulses: (p=palpable, s=signal, x=absent)  - Right: femoral (p), popliteal (p), PT (s), DP (s)  - Left: femoral (deferred 2/2 wound), popliteal (s), PT (x), DP (x)     LABS:                        10.5   9.70  )-----------( 223      ( 28 Oct 2023 03:16 )             31.9     10-28    133<L>  |  96<L>  |  16  ----------------------------<  145<H>  4.5   |  28  |  0.82    Ca    8.7      28 Oct 2023 03:16  Phos  2.9     10-28  Mg     1.90     10-28    TPro  5.9<L>  /  Alb  2.9<L>  /  TBili  0.6  /  DBili  x   /  AST  20  /  ALT  11  /  AlkPhos  106  10-27

## 2023-10-28 NOTE — PROVIDER CONTACT NOTE (OTHER) - BACKGROUND
Admit dx pain of foot. wound vac on L groin area Hx of CAD PAF Htn, HLD, Hypothyroidism
Admit dx pain of foot. wound vac on l groin area Hx of CAD PAF Htn, HLD, Hypothyroidism

## 2023-10-28 NOTE — PROGRESS NOTE ADULT - ASSESSMENT
83F s/p left foot partial hallux amputation dehiscence and forefoot ischemic changes (DOS 9/6).  - Patient seen and evaluated.  - Afebrile, no leukocytosis   - s/p left foot partial hallux amputation (DOS 9/6): sutures intact, dehiscence, flaps cold. blistering to distal digits 2,3,4 with serous drainage, 4th distal lateral digit eschar intact, early ischemic changes to digits 1 through 5 extending to metatarsophalangeal joints, interdigital maceration to interspaces 1-4, Left foot digits 2-5 nailbed eschars with no acute signs of infection.   - L foot xray: no OM, no gas, no fracture   - L foot wound culture pending   - Pod plan local wound care vs TMA pending vascular recommendations   - Please document medical clearance for possible podiatric surgical intervention this admit  - Discussed with attending

## 2023-10-28 NOTE — PROGRESS NOTE ADULT - PROBLEM SELECTOR PLAN 3
- echo from 9/23 with EF of 20-25% and global left ventricular hypokinesis.  - Patient w/ signs of fluid overload   - Restart Toprol 25mg daily   - Check pro-BNP  - start diuresis w/ Ethacrynic acid d/t sulfa allergy   - Monitor I/O's, daily weights  - Cardiology consult

## 2023-10-28 NOTE — PROGRESS NOTE ADULT - PROBLEM SELECTOR PLAN 1
s/p left foot partial hallux amputation 9/6, now admitted w/ left foot partial hallux amputation dehiscence and forefoot ischemic changes  L foot xray: no OM, no gas, no fracture   L foot wound culture pending   Continue w/ empiric Zosyn   Wound care   Spoke w/ vascular surgery, plan for LLE angiogram next week   Podiatry plans local wound care vs TMA pending vascular w/up

## 2023-10-28 NOTE — CONSULT NOTE ADULT - ASSESSMENT
83F with PMHx of CAD, pAF on Eliquis, severe AS pending TAVR, R internal carotid stenosis, HTN, HLD, hypothyroidism, anxiety, PAD and chronic L toe wounds s/p L ilioprofunda bypass with reverse GSV graft to peroneal 8/31/23 (Dr. Tavares) c/b large L groin abscess (17cm) requiring removal of infected bypass graft of lower extremity and groin washout on 9/24/23. Wound care involved for R groin wound and R calf incision wound. Pt states she has been getting daily wound care, and today the rehab team was concerned about how cold the foot felt, recommending she come into the hospital. Pt is poor historian, but she says the wounds have been worse over the last 2 days. Cardiology consulted for optimization prior to peripheral angiogram.      PLAN:  Cardiac Optimization:  Volume Status:  -Patient seen and evaluated, appears with gross volume overload.  -Would administer bumex 1mg IV stat x1 and monitor response  -would benefit from bumex 1mg IV BID hold for sbp less than 85   -(spoke with pharmacy regarding sulfa allergy and the bumex is best option)  -keep net negative 1.5 liters in 24 hours   -monitor bmp every 12 hours while being diuresed  -BP is soft, unable to tolerate afterload reduction    Severe Aortic Stenosis, pending a TAVER when stable:  -Please place patient on telemedicine given her volume status and acute heart failure with hx of afib  -please obtain daily EKG  -Repeat Echo in am (had recent ECHO, but would repeat)  -TAVER work up [facility] done in May of 2023  -continue the BB    Paroxysmal Atrial fibrillation  -continue on eliquis   -continue on toprol xl 25    Patient needs further optimization prior to procedure. Cardiology will continue to follow.

## 2023-10-29 DIAGNOSIS — I50.23 ACUTE ON CHRONIC SYSTOLIC (CONGESTIVE) HEART FAILURE: ICD-10-CM

## 2023-10-29 LAB
-  AMPICILLIN: SIGNIFICANT CHANGE UP
-  AMPICILLIN: SIGNIFICANT CHANGE UP
-  DAPTOMYCIN: SIGNIFICANT CHANGE UP
-  DAPTOMYCIN: SIGNIFICANT CHANGE UP
-  LEVOFLOXACIN: SIGNIFICANT CHANGE UP
-  LEVOFLOXACIN: SIGNIFICANT CHANGE UP
-  LINEZOLID: SIGNIFICANT CHANGE UP
-  LINEZOLID: SIGNIFICANT CHANGE UP
-  TETRACYCLINE: SIGNIFICANT CHANGE UP
-  TETRACYCLINE: SIGNIFICANT CHANGE UP
-  VANCOMYCIN: SIGNIFICANT CHANGE UP
-  VANCOMYCIN: SIGNIFICANT CHANGE UP
METHOD TYPE: SIGNIFICANT CHANGE UP
METHOD TYPE: SIGNIFICANT CHANGE UP

## 2023-10-29 PROCEDURE — 99233 SBSQ HOSP IP/OBS HIGH 50: CPT

## 2023-10-29 PROCEDURE — 99232 SBSQ HOSP IP/OBS MODERATE 35: CPT | Mod: GC

## 2023-10-29 RX ORDER — VALSARTAN 80 MG/1
40 TABLET ORAL
Refills: 0 | Status: DISCONTINUED | OUTPATIENT
Start: 2023-10-29 | End: 2023-11-09

## 2023-10-29 RX ORDER — BUMETANIDE 0.25 MG/ML
1 INJECTION INTRAMUSCULAR; INTRAVENOUS EVERY 12 HOURS
Refills: 0 | Status: DISCONTINUED | OUTPATIENT
Start: 2023-10-29 | End: 2023-10-29

## 2023-10-29 RX ORDER — BUMETANIDE 0.25 MG/ML
1 INJECTION INTRAMUSCULAR; INTRAVENOUS
Refills: 0 | Status: DISCONTINUED | OUTPATIENT
Start: 2023-10-29 | End: 2023-10-31

## 2023-10-29 RX ORDER — VALSARTAN 80 MG/1
40 TABLET ORAL DAILY
Refills: 0 | Status: DISCONTINUED | OUTPATIENT
Start: 2023-10-29 | End: 2023-10-29

## 2023-10-29 RX ORDER — ETHACRYNIC ACID 25 MG/1
50 TABLET ORAL DAILY
Refills: 0 | Status: DISCONTINUED | OUTPATIENT
Start: 2023-10-29 | End: 2023-10-29

## 2023-10-29 RX ORDER — BUMETANIDE 0.25 MG/ML
1 INJECTION INTRAMUSCULAR; INTRAVENOUS ONCE
Refills: 0 | Status: DISCONTINUED | OUTPATIENT
Start: 2023-10-29 | End: 2023-10-29

## 2023-10-29 RX ADMIN — Medication 50 MICROGRAM(S): at 05:31

## 2023-10-29 RX ADMIN — Medication 81 MILLIGRAM(S): at 14:00

## 2023-10-29 RX ADMIN — PANTOPRAZOLE SODIUM 40 MILLIGRAM(S): 20 TABLET, DELAYED RELEASE ORAL at 05:31

## 2023-10-29 RX ADMIN — Medication 25 MILLIGRAM(S): at 05:30

## 2023-10-29 RX ADMIN — VALSARTAN 40 MILLIGRAM(S): 80 TABLET ORAL at 18:18

## 2023-10-29 RX ADMIN — PIPERACILLIN AND TAZOBACTAM 25 GRAM(S): 4; .5 INJECTION, POWDER, LYOPHILIZED, FOR SOLUTION INTRAVENOUS at 13:59

## 2023-10-29 RX ADMIN — PIPERACILLIN AND TAZOBACTAM 25 GRAM(S): 4; .5 INJECTION, POWDER, LYOPHILIZED, FOR SOLUTION INTRAVENOUS at 21:44

## 2023-10-29 RX ADMIN — AMIODARONE HYDROCHLORIDE 100 MILLIGRAM(S): 400 TABLET ORAL at 05:30

## 2023-10-29 RX ADMIN — BUMETANIDE 1 MILLIGRAM(S): 0.25 INJECTION INTRAMUSCULAR; INTRAVENOUS at 14:00

## 2023-10-29 RX ADMIN — PIPERACILLIN AND TAZOBACTAM 25 GRAM(S): 4; .5 INJECTION, POWDER, LYOPHILIZED, FOR SOLUTION INTRAVENOUS at 05:31

## 2023-10-29 NOTE — PROGRESS NOTE ADULT - SUBJECTIVE AND OBJECTIVE BOX
PROGRESS NOTE:     Patient is a 83y old  Female who presents with a chief complaint of Ruther 5 CLTI left toe wounds (29 Oct 2023 10:23)      SUBJECTIVE / OVERNIGHT EVENTS: No acute events.     ADDITIONAL REVIEW OF SYSTEMS:    MEDICATIONS  (STANDING):  aMIOdarone    Tablet 100 milliGRAM(s) Oral daily  aspirin enteric coated 81 milliGRAM(s) Oral daily  atorvastatin 80 milliGRAM(s) Oral at bedtime  buMETAnide 1 milliGRAM(s) Oral every 12 hours  levothyroxine 50 MICROGram(s) Oral daily  metoprolol succinate ER 25 milliGRAM(s) Oral daily  pantoprazole    Tablet 40 milliGRAM(s) Oral before breakfast  piperacillin/tazobactam IVPB.. 3.375 Gram(s) IV Intermittent every 8 hours  silver nitrate Applicator 1 Application(s) Topical once    MEDICATIONS  (PRN):      CAPILLARY BLOOD GLUCOSE        I&O's Summary    28 Oct 2023 07:01  -  29 Oct 2023 07:00  --------------------------------------------------------  IN: 100 mL / OUT: 1700 mL / NET: -1600 mL        PHYSICAL EXAM:  Vital Signs Last 24 Hrs  T(C): 36.5 (29 Oct 2023 12:39), Max: 37.2 (28 Oct 2023 22:44)  T(F): 97.7 (29 Oct 2023 12:39), Max: 98.9 (28 Oct 2023 22:44)  HR: 86 (29 Oct 2023 12:39) (74 - 95)  BP: 125/55 (29 Oct 2023 12:39) (97/61 - 125/62)  BP(mean): --  RR: 18 (29 Oct 2023 12:39) (16 - 18)  SpO2: 96% (29 Oct 2023 12:39) (94% - 96%)    Parameters below as of 29 Oct 2023 12:39  Patient On (Oxygen Delivery Method): room air      GENERAL: NAD, well-developed  EYES: EOMI, conjunctiva and sclera clear  NECK: Supple, No JVD  CHEST/LUNG: Clear to auscultation bilaterally; No wheeze  HEART: Regular rate and rhythm; S1S2  ABDOMEN: Soft, Nontender, Nondistended; Bowel sounds present  EXTREMITIES: L shin with open wound and drainage; L toes with wounds and drainage. +LE edema    PSYCH: AAOx 2 (self and place)  NEUROLOGY: non-focal      LABS:                        10.5   9.70  )-----------( 223      ( 28 Oct 2023 03:16 )             31.9     10-28    133<L>  |  96<L>  |  16  ----------------------------<  145<H>  4.5   |  28  |  0.82    Ca    8.7      28 Oct 2023 03:16  Phos  2.9     10-28  Mg     1.90     10-28            Urinalysis Basic - ( 28 Oct 2023 03:16 )    Color: x / Appearance: x / SG: x / pH: x  Gluc: 145 mg/dL / Ketone: x  / Bili: x / Urobili: x   Blood: x / Protein: x / Nitrite: x   Leuk Esterase: x / RBC: x / WBC x   Sq Epi: x / Non Sq Epi: x / Bacteria: x        Culture - Abscess with Gram Stain (collected 27 Oct 2023 09:21)  Source: .Abscess left foot  Gram Stain (28 Oct 2023 12:21):    No polymorphonuclear cells seen per low power field    No organisms seen per oil power field  Preliminary Report (29 Oct 2023 09:40):    Rare Enterococcus faecium (vancomycin resistant)    Rare Staphylococcus epidermidis "Susceptibilities not performed"  Organism: Enterococcus faecium (vancomycin resistant) (29 Oct 2023 09:39)  Organism: Enterococcus faecium (vancomycin resistant) (29 Oct 2023 09:39)    Culture - Blood (collected 26 Oct 2023 18:47)  Source: .Blood None  Preliminary Report (29 Oct 2023 03:01):    No growth at 48 Hours    Culture - Blood (collected 26 Oct 2023 18:47)  Source: .Blood None  Preliminary Report (29 Oct 2023 03:01):    No growth at 48 Hours        RADIOLOGY & ADDITIONAL TESTS:  Results Reviewed:   Imaging Personally Reviewed:  Electrocardiogram Personally Reviewed:    COORDINATION OF CARE:  Care Discussed with Consultants/Other Providers [Y/N]:  Prior or Outpatient Records Reviewed [Y/N]:

## 2023-10-29 NOTE — PROGRESS NOTE ADULT - PROBLEM SELECTOR PLAN 2
- echo from 9/23 with EF of 20-25% and global left ventricular hypokinesis.  - Patient w/ fluid overload   - pro-BNP 55792  - Continue Toprol 25mg daily   - s/p Bumex 1mg IV x 1 yesterday, continue diuresis w/ Bumex 1mg IV q12h   - Monitor I/O's, daily weights  - appreciate Cardiology input - echo from 9/23 with EF of 20-25% and global left ventricular hypokinesis.  - Patient w/ fluid overload   - pro-BNP 21607  - Continue Toprol 25mg daily   - s/p Bumex 1mg IV x 1 yesterday, continue diuresis w/ Bumex 1mg IV q12h   - start Valsartan 40mg PO daily   - Monitor I/O's, daily weights  - appreciate Cardiology input - echo from 9/23 with EF of 20-25% and global left ventricular hypokinesis.  - Patient w/ fluid overload   - pro-BNP 55690  - Continue Toprol 25mg daily   - s/p Bumex 1mg IV x 1 yesterday, continue diuresis w/ Bumex 1mg IV q12h   - start Valsartan 40mg PO BID  - Monitor I/O's, daily weights  - appreciate Cardiology input

## 2023-10-29 NOTE — PROGRESS NOTE ADULT - ASSESSMENT
83F with PMHx of CAD, pAF on Eliquis, severe AS pending TAVR, R internal carotid stenosis, HTN, HLD, hypothyroidism, anxiety, PAD and chronic L toe wounds s/p L ilioprofunda bypass with reverse GSV graft to peroneal 8/31/23 (Dr. Tavares) c/b large L groin abscess (17cm) requiring removal of infected bypass graft of lower extremity and groin washout on 9/24/23. Wound care involved for R groin wound and R calf incision wound. Pt states she has been getting daily wound care, and today the rehab team was concerned about how cold the foot felt, recommending she come into the hospital. Pt is poor historian, but she says the wounds have been worse over the last 2 days. Cardiology consulted for optimization prior to peripheral angiogram.    # Volume overload  - patient has TTE from 9/2023 that reports severe biventricular failure with severe AS.  - She has received a Bumex overnight per EMR (one time dose). She reports responding well with her LE swelling resolving. She has history of sulfa allergy, yet she does not know what type of reaction occurred as it happened during her childhood.   - Recommend continuing with IV Bumex 1mg BID for diuresis. Close monitoring for allergic reaction recommended.  - Strict I/O and daily standing weight  - monitor electrolytes for aggressive diuresis.    # HFrEF  - manage volume with diuresis as above  - recommend reintroducing ARB: she is on telmisartan at home. if not available, can do valsartan 40mg BID. Eventually will benefit from transitioning to ARNI.  - continue metoprolol succinate 25mg daily. If patient tolerates ARB, would recommend gradually increase the dose as tolerated.  - consider MRA and SGLT2 inhibitors as outpatient.  - monitoring BP - afterload control is important, though it can be complicated in patients with severe AS that causes fixed obstruction.    # severe AS  - currently Asymptomatic while she is on bed.  - pending TAVR per document.   - monitor on tele, monitor VS    # pAFib  - given [id number], patient will benefit from AC. Given her age of 83 and weight below 60kg, patient is reasonable to be on reduced dose of NOAC: eliquis 2.5mg BID.   - hold AC as needed by primary team/surgery.  - continue home amiodarone  - continue BB as above

## 2023-10-29 NOTE — PROGRESS NOTE ADULT - SUBJECTIVE AND OBJECTIVE BOX
SURGERY DAILY PROGRESS NOTE    SUBJECTIVE: Patient seen and examined at bedside on AM rounds. States she is feeling well and her pain is well-controlled. Denies chest pain, SOB, palpitations, HA, fever, chills, N/V.      OBJECTIVE:  Vital Signs Last 24 Hrs  T(C): 36.3 (29 Oct 2023 08:00), Max: 37.2 (28 Oct 2023 22:44)  T(F): 97.4 (29 Oct 2023 08:00), Max: 98.9 (28 Oct 2023 22:44)  HR: 81 (29 Oct 2023 08:00) (74 - 95)  BP: 112/55 (29 Oct 2023 08:00) (97/61 - 125/62)  BP(mean): --  RR: 16 (29 Oct 2023 08:00) (16 - 18)  SpO2: 94% (29 Oct 2023 08:00) (94% - 95%)    Parameters below as of 29 Oct 2023 08:00  Patient On (Oxygen Delivery Method): room air        I&O's Summary    28 Oct 2023 07:01  -  29 Oct 2023 07:00  --------------------------------------------------------  IN: 100 mL / OUT: 1700 mL / NET: -1600 mL        Physical Exam:  General Appearance: Appears well, NAD. Sitting on edge of bed.  Chest: Nonlabored breathing on RA  CV: Hemodynamically stable  Abdomen: Soft, nontender, nondistended  Extremities: L groin vac holding suction. Calf dressing C/D/I. Edema R>L, improved  Vascular: L DP/PT signals absent     LABS:                        10.5   9.70  )-----------( 223      ( 28 Oct 2023 03:16 )             31.9     10-28    133<L>  |  96<L>  |  16  ----------------------------<  145<H>  4.5   |  28  |  0.82    Ca    8.7      28 Oct 2023 03:16  Phos  2.9     10-28  Mg     1.90     10-28        Urinalysis Basic - ( 28 Oct 2023 03:16 )    Color: x / Appearance: x / SG: x / pH: x  Gluc: 145 mg/dL / Ketone: x  / Bili: x / Urobili: x   Blood: x / Protein: x / Nitrite: x   Leuk Esterase: x / RBC: x / WBC x   Sq Epi: x / Non Sq Epi: x / Bacteria: x        RADIOLOGY & ADDITIONAL STUDIES:

## 2023-10-29 NOTE — PROGRESS NOTE ADULT - ASSESSMENT
83F with PMHx of CAD, pAF on Eliquis, severe AS pending TAVR, R internal carotid stenosis, HTN, HLD, hypothyroidism, anxiety, PAD and chronic L toe wounds s/p L ilioprofunda bypass with reverse GSV graft to peroneal 8/31/23 (Dr. Tavares) c/b large L groin abscess (17cm) requiring removal of infected bypass graft of lower extremity and groin washout on 9/24/23. Wound care involved for R groin wound and R calf incision wound. Pt states she has been getting daily wound care, and today the rehab team was concerned about how cold the foot felt, recommending she come into the hospital. Pt is poor historian, but she says the wounds have been worse over the 2 days prior to presentation. Remains afebrile on Zosyn, planning for LLE angiogram.    PLAN  - Wound care following, wound vac placed on groin wound, dressings on medial leg  - Planning LLE angiogram this week pending clearances  - F/u cardiology for optimization, appreciate recs: Bumex, daily EKG, repeat TTE  - F/u medicine for optimization  - Diurese with Bumex 1mg BID, monitor for allergic reaction (unknown childhood sulfa allergy)  - Appreciate podiatry consult  - Dispo: pending    C Team Vascular Surgery  [id number]

## 2023-10-29 NOTE — PROGRESS NOTE ADULT - SUBJECTIVE AND OBJECTIVE BOX
Patient is a 83y old  Female who presents with a chief complaint of Ruther 5 CLTI left toe wounds (28 Oct 2023 14:43)      SUBJECTIVE / OVERNIGHT EVENTS:  No acute events overnight  Patient denies CP, palpitation, SOB, visual disturbance, dizziness, lightheadedness.  eating breakfast this AM.  Had diuretic overnight which she reports having good response without any allergic reaction.      MEDICATIONS  (STANDING):  aMIOdarone    Tablet 100 milliGRAM(s) Oral daily  aspirin enteric coated 81 milliGRAM(s) Oral daily  atorvastatin 80 milliGRAM(s) Oral at bedtime  levothyroxine 50 MICROGram(s) Oral daily  metoprolol succinate ER 25 milliGRAM(s) Oral daily  pantoprazole    Tablet 40 milliGRAM(s) Oral before breakfast  piperacillin/tazobactam IVPB.. 3.375 Gram(s) IV Intermittent every 8 hours  silver nitrate Applicator 1 Application(s) Topical once    MEDICATIONS  (PRN):      T(C): 36.9 (10-29-23 @ 05:00), Max: 37.2 (10-28-23 @ 22:44)  HR: 95 (10-29-23 @ 05:00) (74 - 95)  BP: 125/62 (10-29-23 @ 05:00) (97/61 - 128/84)  RR: 18 (10-29-23 @ 05:00) (18 - 18)  SpO2: 95% (10-29-23 @ 05:00) (94% - 97%)  CAPILLARY BLOOD GLUCOSE        I&O's Summary    28 Oct 2023 07:01  -  29 Oct 2023 07:00  --------------------------------------------------------  IN: 100 mL / OUT: 1700 mL / NET: -1600 mL        PHYSICAL EXAM:  Constitutional: NAD.   HEENT: AT/NC, EOMI, Supple neck;  Respiratory: no increase in WOB  Cardiovascular: RRR, S1, S2, 2+ distal pulses. +JVD w/ HJR, + b/l pitting LE edema.  Gastrointestinal: soft; NT/ND, +BS  Extremities: no cyanosis;   Neurological: Alert and respond to verbal questions.  Psychiatric: normal mood/affect.      LABS:                        10.5   9.70  )-----------( 223      ( 28 Oct 2023 03:16 )             31.9     10-28    133<L>  |  96<L>  |  16  ----------------------------<  145<H>  4.5   |  28  |  0.82    Ca    8.7      28 Oct 2023 03:16  Phos  2.9     10-28  Mg     1.90     10-28            Urinalysis Basic - ( 28 Oct 2023 03:16 )    Color: x / Appearance: x / SG: x / pH: x  Gluc: 145 mg/dL / Ketone: x  / Bili: x / Urobili: x   Blood: x / Protein: x / Nitrite: x   Leuk Esterase: x / RBC: x / WBC x   Sq Epi: x / Non Sq Epi: x / Bacteria: x        RADIOLOGY & ADDITIONAL TESTS:    Imaging Personally Reviewed: BRIDGER    Consultant(s) Notes Reviewed:  BRIDGER    Care Discussed with Consultants/Other Providers: BRIDGER

## 2023-10-29 NOTE — PROGRESS NOTE ADULT - NS ATTEST RISK PROBLEM GEN_ALL_CORE FT
Adina Kelley is an 83-year-old woman with history of CAD, pAF on Eliquis, severe AS pending TAVR, R internal carotid stenosis, HTN, HLD, hypothyroidism, anxiety and PAD. She has chronic L toe wounds s/p L ilioprofunda bypass with reverse GSV graft to peroneal 8/31/23 (Dr. Tavares) complicated by left groin abscess (17cm) requiring removal of infected bypass graft of lower extremity and groin washout 9/24/23. Peripheral angiogram is now planned. She appeared volume overloaded.  There is plan for TAVR for sever AS. Maintain apixaban (Eliquis) 5 mg oral twice daily and metoprolol succ ER (Toprol XL) 25 mg daily for pAFIB

## 2023-10-29 NOTE — PROGRESS NOTE ADULT - PROBLEM SELECTOR PLAN 1
s/p left foot partial hallux amputation 9/6, now admitted w/ left foot partial hallux amputation dehiscence and forefoot ischemic changes  L foot xray: no OM, no gas, no fracture   L foot wound culture pending   Continue w/ empiric Zosyn   Wound care   Spoke w/ vascular surgery, plan for LLE angiogram next week   Podiatry plans local wound care vs TMA pending vascular w/up s/p left foot partial hallux amputation 9/6, now admitted w/ left foot partial hallux amputation dehiscence and forefoot ischemic changes  L foot xray: no OM, no gas, no fracture   L foot wound growing VRE and staph epi, unclear significance   On empiric Zosyn as per primary team   Plan for LLE angiogram next week   Podiatry plans local wound care vs TMA pending vascular w/up

## 2023-10-30 LAB
ANION GAP SERPL CALC-SCNC: 11 MMOL/L — SIGNIFICANT CHANGE UP (ref 7–14)
ANION GAP SERPL CALC-SCNC: 11 MMOL/L — SIGNIFICANT CHANGE UP (ref 7–14)
BUN SERPL-MCNC: 15 MG/DL — SIGNIFICANT CHANGE UP (ref 7–23)
BUN SERPL-MCNC: 15 MG/DL — SIGNIFICANT CHANGE UP (ref 7–23)
CALCIUM SERPL-MCNC: 8.7 MG/DL — SIGNIFICANT CHANGE UP (ref 8.4–10.5)
CALCIUM SERPL-MCNC: 8.7 MG/DL — SIGNIFICANT CHANGE UP (ref 8.4–10.5)
CHLORIDE SERPL-SCNC: 94 MMOL/L — LOW (ref 98–107)
CHLORIDE SERPL-SCNC: 94 MMOL/L — LOW (ref 98–107)
CO2 SERPL-SCNC: 30 MMOL/L — SIGNIFICANT CHANGE UP (ref 22–31)
CO2 SERPL-SCNC: 30 MMOL/L — SIGNIFICANT CHANGE UP (ref 22–31)
CREAT SERPL-MCNC: 0.84 MG/DL — SIGNIFICANT CHANGE UP (ref 0.5–1.3)
CREAT SERPL-MCNC: 0.84 MG/DL — SIGNIFICANT CHANGE UP (ref 0.5–1.3)
EGFR: 69 ML/MIN/1.73M2 — SIGNIFICANT CHANGE UP
EGFR: 69 ML/MIN/1.73M2 — SIGNIFICANT CHANGE UP
GLUCOSE SERPL-MCNC: 147 MG/DL — HIGH (ref 70–99)
GLUCOSE SERPL-MCNC: 147 MG/DL — HIGH (ref 70–99)
MAGNESIUM SERPL-MCNC: 1.9 MG/DL — SIGNIFICANT CHANGE UP (ref 1.6–2.6)
MAGNESIUM SERPL-MCNC: 1.9 MG/DL — SIGNIFICANT CHANGE UP (ref 1.6–2.6)
PHOSPHATE SERPL-MCNC: 2.4 MG/DL — LOW (ref 2.5–4.5)
PHOSPHATE SERPL-MCNC: 2.4 MG/DL — LOW (ref 2.5–4.5)
POTASSIUM SERPL-MCNC: 4.2 MMOL/L — SIGNIFICANT CHANGE UP (ref 3.5–5.3)
POTASSIUM SERPL-MCNC: 4.2 MMOL/L — SIGNIFICANT CHANGE UP (ref 3.5–5.3)
POTASSIUM SERPL-SCNC: 4.2 MMOL/L — SIGNIFICANT CHANGE UP (ref 3.5–5.3)
POTASSIUM SERPL-SCNC: 4.2 MMOL/L — SIGNIFICANT CHANGE UP (ref 3.5–5.3)
SODIUM SERPL-SCNC: 135 MMOL/L — SIGNIFICANT CHANGE UP (ref 135–145)
SODIUM SERPL-SCNC: 135 MMOL/L — SIGNIFICANT CHANGE UP (ref 135–145)

## 2023-10-30 PROCEDURE — 99232 SBSQ HOSP IP/OBS MODERATE 35: CPT

## 2023-10-30 PROCEDURE — 99233 SBSQ HOSP IP/OBS HIGH 50: CPT

## 2023-10-30 PROCEDURE — 99222 1ST HOSP IP/OBS MODERATE 55: CPT

## 2023-10-30 PROCEDURE — 93306 TTE W/DOPPLER COMPLETE: CPT | Mod: 26

## 2023-10-30 RX ORDER — MAGNESIUM SULFATE 500 MG/ML
1 VIAL (ML) INJECTION ONCE
Refills: 0 | Status: COMPLETED | OUTPATIENT
Start: 2023-10-30 | End: 2023-10-31

## 2023-10-30 RX ADMIN — VALSARTAN 40 MILLIGRAM(S): 80 TABLET ORAL at 19:18

## 2023-10-30 RX ADMIN — Medication 50 MICROGRAM(S): at 05:21

## 2023-10-30 RX ADMIN — Medication 25 MILLIGRAM(S): at 06:31

## 2023-10-30 RX ADMIN — PIPERACILLIN AND TAZOBACTAM 25 GRAM(S): 4; .5 INJECTION, POWDER, LYOPHILIZED, FOR SOLUTION INTRAVENOUS at 14:15

## 2023-10-30 RX ADMIN — PANTOPRAZOLE SODIUM 40 MILLIGRAM(S): 20 TABLET, DELAYED RELEASE ORAL at 06:32

## 2023-10-30 RX ADMIN — PIPERACILLIN AND TAZOBACTAM 25 GRAM(S): 4; .5 INJECTION, POWDER, LYOPHILIZED, FOR SOLUTION INTRAVENOUS at 05:31

## 2023-10-30 RX ADMIN — Medication 81 MILLIGRAM(S): at 14:16

## 2023-10-30 RX ADMIN — BUMETANIDE 1 MILLIGRAM(S): 0.25 INJECTION INTRAMUSCULAR; INTRAVENOUS at 14:16

## 2023-10-30 RX ADMIN — VALSARTAN 40 MILLIGRAM(S): 80 TABLET ORAL at 06:31

## 2023-10-30 RX ADMIN — AMIODARONE HYDROCHLORIDE 100 MILLIGRAM(S): 400 TABLET ORAL at 06:31

## 2023-10-30 RX ADMIN — BUMETANIDE 1 MILLIGRAM(S): 0.25 INJECTION INTRAMUSCULAR; INTRAVENOUS at 05:29

## 2023-10-30 RX ADMIN — PIPERACILLIN AND TAZOBACTAM 25 GRAM(S): 4; .5 INJECTION, POWDER, LYOPHILIZED, FOR SOLUTION INTRAVENOUS at 21:19

## 2023-10-30 NOTE — PROGRESS NOTE ADULT - ASSESSMENT
83F with PMHx of CAD, pAF on Eliquis, severe AS pending TAVR, R internal carotid stenosis, HTN, HLD, hypothyroidism, anxiety, PAD and chronic L toe wounds s/p L ilioprofunda bypass with reverse GSV graft to peroneal 8/31/23 (Dr. Tavares) c/b large L groin abscess (17cm) requiring removal of infected bypass graft of lower extremity and groin washout on 9/24/23. Wound care involved for R groin wound and R calf incision wound. Pt states she has been getting daily wound care, and today the rehab team was concerned about how cold the foot felt, recommending she come into the hospital. Pt is poor historian, but she says the wounds have been worse over the 2 days prior to presentation. Remains afebrile on Zosyn, planning for LLE angiogram.    PLAN  - Wound care following, wound vac placed on groin wound, please also vac calf wound  - Planning LLE angiogram Friday pending clearances  - F/u cardiology for optimization, appreciate recs: Bumex, daily EKG, repeat TTE  - F/u medicine for optimization  - Diurese with Bumex 1mg BID, monitor for allergic reaction (unknown childhood sulfa allergy)  - Appreciate podiatry consult  - Dispo: pending    C Team Vascular Surgery  [id number]

## 2023-10-30 NOTE — PROGRESS NOTE ADULT - PROBLEM SELECTOR PLAN 2
- Echo from 9/23 with EF of 20-25% and global left ventricular hypokinesis.  - Patient w/ fluid overload ; LE edema   - pro-BNP [id number]  - Continue Toprol 25mg daily   - Bumex 1mg IV q12h   - start Valsartan 40mg PO BID  - Monitor I/O's, daily weights  - monitor Cr   - appreciate Cardiology input

## 2023-10-30 NOTE — PROVIDER CONTACT NOTE (OTHER) - ACTION/TREATMENT ORDERED:
Provider was made aware. No further orders at this time
Monitor, no new orders.
Provide made aware. ordered 02. O2 stat 95 with 2L NC.

## 2023-10-30 NOTE — PROGRESS NOTE ADULT - ASSESSMENT
83F s/p left foot partial hallux amputation dehiscence and forefoot ischemic changes (DOS 9/6).  - Patient seen and evaluated.  - Afebrile, no leukocytosis.   - Left foot distal hallux dehiscence, cool flaps, distal 2nd digit bullae with serous drainage, 4th distal lateral digit eschar intact, early ischemic changes to digits 1 -5 to MPJs 1-5, interdigital maceration to interspaces 1-4, digits 2-5 nailbed eschars, no acute signs of infection.  - L foot xray: no OM, no gas, no fracture.   - Vasc planning LLE angio on Fri 11/3, appreciated.  - L foot wound culture: Vancomycin resistant E. faecium, Staph epi (prelim).  - Pod Plan: Local wound care pending vasc recs and demarcation.   - Discussed with attending.

## 2023-10-30 NOTE — PROGRESS NOTE ADULT - ASSESSMENT
New ECG(s): Personally reviewed    Echo: 9/26/23   1. Technically difficult image quality.   2. Left ventricular systolic function is severely decreased with an ejection fraction visually estimated at 20 to 25 %. There is global left ventricular hypokinesis. No evidence of a thrombus in the left ventricle.   3. There is mild (grade 1) left ventricular diastolic dysfunction.   4. Right ventricular cavity is mildly enlarged in size and reduced systolic function.   5. Mild mitral regurgitation.   6. Aortic valve anatomy cannot be determined with reduced systolic excursion. severe calcification of the aortic valve leaflets. severe aortic stenosis.   7. Severe aortic stenosis (estimated aortic valve area ~ 0.5 cm2 (by the continuity equation).   8. Mild aortic regurgitation.   9. The left atrium is mildly dilated in size.  10. Left pleural effusion noted.  11. No prior echocardiogram is available for comparison.    Cath:  5/2023  Moderate atherosclerosis in LM and LAD.  of dRCA with robust collateral from LAD.  Imaging:    Interpretation of Telemetry: Sinus @ 80s    83F with PMHx of CAD, pAF on Eliquis, severe AS pending TAVR, R internal carotid stenosis, HTN, HLD, hypothyroidism, anxiety, PAD and chronic L toe wounds s/p L ilioprofunda bypass with reverse GSV graft to peroneal 8/31/23 (Dr. Tavares) c/b large L groin abscess (17cm) requiring removal of infected bypass graft of lower extremity and groin washout on 9/24/23. Admitted for vascular evaluation of LE. Cardiology consulted for optimization prior to peripheral angiogram. Continues to appear grossly overloaded on exam.    #Acute on Chronic HFrEF (25%) Exacerbation  - cont bumex 1mg IV BID  -- Close monitoring for allergic reaction recommended.  - BID Lytes while actively diuresing  - Strict I/O and daily standing weight  - cont valsartan 40mg BID. Eventually will benefit from transitioning to ARNI.  - cont metoprolol succinate 25mg daily  - consider MRA and SGLT2 inhibitors as outpatient.    #Severe AS  - currently Asymptomatic while she is on bed.  - pending TAVR per documentation  - monitor on tele, monitor VS    # pAFib  - given [id number], patient will benefit from AC. Given her age of 83 and weight below 60kg, patient is reasonable to be on reduced dose of NOAC: eliquis 2.5mg BID.   - hold AC as needed by primary team/surgery.  - continue home amiodarone  - continue BB as above

## 2023-10-30 NOTE — CONSULT NOTE ADULT - ASSESSMENT
83 year old female with PVD s/p left leg vascular graft.   Complicated by wound ifnection requiring graft removal.  Now presents with change in temperature to left foot.  She is afebrile with a normal white blood cell count.    1) limb ischemia  Left leg is cold below the knee  The foot is cold.  Redness noted to toes but not warm to touch.  Lateral ulcerated wound without purulence  I think the changes to the foot are related to ischemia not infection  I do not think antibiotics will benefit her foot  Wound culture likely represents colonization    Discussed with vascular team

## 2023-10-30 NOTE — PROGRESS NOTE ADULT - SUBJECTIVE AND OBJECTIVE BOX
Patient is a 83y old  Female who presents with a chief complaint of Ruther 5 CLTI left toe wounds (30 Oct 2023 08:08)       INTERVAL HPI/OVERNIGHT EVENTS:  Patient seen and evaluated at bedside.  Pt is resting comfortable in NAD. Denies N/V/F/C.    Allergies    latex (Unknown)  sulfa drugs (Unknown)    Intolerances        Vital Signs Last 24 Hrs  T(C): 36.4 (30 Oct 2023 04:18), Max: 36.8 (29 Oct 2023 20:08)  T(F): 97.5 (30 Oct 2023 04:18), Max: 98.2 (29 Oct 2023 20:08)  HR: 81 (30 Oct 2023 04:18) (80 - 97)  BP: 126/59 (30 Oct 2023 04:18) (109/41 - 126/59)  BP(mean): --  RR: 17 (30 Oct 2023 04:18) (17 - 18)  SpO2: 100% (30 Oct 2023 04:18) (96% - 100%)    Parameters below as of 30 Oct 2023 04:18  Patient On (Oxygen Delivery Method): nasal cannula        LABS:              CAPILLARY BLOOD GLUCOSE          Lower Extremity Physical Exam:  Vascular: DP/PT 0/4, B/L, CFT >3 seconds B/L, Temperature gradient warm to cold, B/L.   Neuro: Epicritic sensation intact to the level of digits, B/L.  Musculoskeletal/Ortho: 9/6/23 s/p left foot partial 1 digit amputation.  Skin: Left foot distal hallux dehiscence, cool flaps, distal 2nd digit bullae with serous drainage, 4th distal lateral digit eschar intact, early ischemic changes to digits 1 -5 to MPJs 1-5, interdigital maceration to interspaces 1-4, digits 2-5 nailbed eschars, no acute signs of infection.    RADIOLOGY & ADDITIONAL TESTS:

## 2023-10-30 NOTE — PROGRESS NOTE ADULT - SUBJECTIVE AND OBJECTIVE BOX
SURGERY DAILY PROGRESS NOTE    SUBJECTIVE: Patient seen and examined on AM rounds. States she is doing well, denies pain in the leg. Denies chest pain, SOB, palpitations, HA, fever, chills, N/V.      OBJECTIVE:  Vital Signs Last 24 Hrs  T(C): 36.4 (30 Oct 2023 04:18), Max: 36.8 (29 Oct 2023 20:08)  T(F): 97.5 (30 Oct 2023 04:18), Max: 98.2 (29 Oct 2023 20:08)  HR: 81 (30 Oct 2023 04:18) (80 - 97)  BP: 126/59 (30 Oct 2023 04:18) (109/41 - 126/59)  BP(mean): --  RR: 17 (30 Oct 2023 04:18) (17 - 18)  SpO2: 100% (30 Oct 2023 04:18) (96% - 100%)    Parameters below as of 30 Oct 2023 04:18  Patient On (Oxygen Delivery Method): nasal cannula        I&O's Summary    29 Oct 2023 07:01  -  30 Oct 2023 07:00  --------------------------------------------------------  IN: 440 mL / OUT: 2430 mL / NET: -1990 mL        Physical Exam:  General Appearance: Appears well, NAD. Sitting comfortably in chair  Chest: Nonlabored breathing on NC  CV: Hemodynamically stable  Abdomen: Soft, nontender, nondistended  Extremities: Grossly symmetric. Dressing to medial L calf C/D/I. Vac to L groin holding suction. 2+ pitting edema bilaterally  Vascular: L DP/PT absent, motor/sensory intact      LABS:        RADIOLOGY & ADDITIONAL STUDIES:

## 2023-10-30 NOTE — PROGRESS NOTE ADULT - SUBJECTIVE AND OBJECTIVE BOX
Overnight Events: NAEO    Review Of Systems: No chest pain, shortness of breath, or palpitations            Current Meds:  aMIOdarone    Tablet 100 milliGRAM(s) Oral daily  aspirin enteric coated 81 milliGRAM(s) Oral daily  atorvastatin 80 milliGRAM(s) Oral at bedtime  buMETAnide Injectable 1 milliGRAM(s) IV Push two times a day  levothyroxine 50 MICROGram(s) Oral daily  metoprolol succinate ER 25 milliGRAM(s) Oral daily  pantoprazole    Tablet 40 milliGRAM(s) Oral before breakfast  piperacillin/tazobactam IVPB.. 3.375 Gram(s) IV Intermittent every 8 hours  silver nitrate Applicator 1 Application(s) Topical once  valsartan 40 milliGRAM(s) Oral two times a day      Vitals:  T(F): 97.5 (10-30), Max: 98.2 (10-29)  HR: 81 (10-30) (80 - 97)  BP: 126/59 (10-30) (109/41 - 126/59)  RR: 17 (10-30)  SpO2: 100% (10-30)  I&O's Summary    29 Oct 2023 07:01  -  30 Oct 2023 07:00  --------------------------------------------------------  IN: 440 mL / OUT: 2430 mL / NET: -1990 mL        Physical Exam:  GEN: comfortable appearing, lying in bed in NAD  HEENT: NCAT, MMM  CV: Regular S1, S2, no m/r/g  RESP: CTAB  ABD: Soft, NTND, +BS  EXT: 3+ bl LE edema, WWP, pulses palpable throughout  NEURO: No focal deficits, AOx3  SKIN:  No rashes

## 2023-10-30 NOTE — PROVIDER CONTACT NOTE (OTHER) - ASSESSMENT
When I asked why is she refusing NC, she said ti doesn't feel good I said we can humidify the oxygen and she refused trying that. pt denies chest pain, denies SOB.
Pt o2 stat 86, Hr 93, RR 20,  Pt denies SOB. A/ox4.
Low BP 90/71  retake 99/51 Hr 89 retake 96. Pt denies chest pain, SOB and dizziness. A/ox4

## 2023-10-30 NOTE — PROGRESS NOTE ADULT - SUBJECTIVE AND OBJECTIVE BOX
[facility]  Garry Stahl MD  Pager (M-F, 8A-5P): [phone]  Other Times:  [phone]    Patient is a 83y old  Female who presents with a chief complaint of Ruther 5 CLTI left toe wounds (30 Oct 2023 10:43)      SUBJECTIVE / OVERNIGHT EVENTS: Pt in NAD no acute events ON; feels OK eating lunch; still with edema but states improved from prior       MEDICATIONS  (STANDING):  aMIOdarone    Tablet 100 milliGRAM(s) Oral daily  aspirin enteric coated 81 milliGRAM(s) Oral daily  atorvastatin 80 milliGRAM(s) Oral at bedtime  buMETAnide Injectable 1 milliGRAM(s) IV Push two times a day  levothyroxine 50 MICROGram(s) Oral daily  metoprolol succinate ER 25 milliGRAM(s) Oral daily  pantoprazole    Tablet 40 milliGRAM(s) Oral before breakfast  piperacillin/tazobactam IVPB.. 3.375 Gram(s) IV Intermittent every 8 hours  silver nitrate Applicator 1 Application(s) Topical once  valsartan 40 milliGRAM(s) Oral two times a day    MEDICATIONS  (PRN):      CAPILLARY BLOOD GLUCOSE        I&O's Summary    29 Oct 2023 07:01  -  30 Oct 2023 07:00  --------------------------------------------------------  IN: 440 mL / OUT: 2430 mL / NET: -1990 mL        PHYSICAL EXAM:  Vital Signs Last 24 Hrs  T(C): 36.6 (30 Oct 2023 11:59), Max: 36.8 (29 Oct 2023 20:08)  T(F): 97.9 (30 Oct 2023 11:59), Max: 98.2 (29 Oct 2023 20:08)  HR: 83 (30 Oct 2023 11:59) (80 - 97)  BP: 114/43 (30 Oct 2023 11:59) (109/41 - 126/59)  BP(mean): --  RR: 18 (30 Oct 2023 11:59) (17 - 18)  SpO2: 95% (30 Oct 2023 11:59) (95% - 100%)    Parameters below as of 30 Oct 2023 11:59  Patient On (Oxygen Delivery Method): room air    GENERAL: NAD, well-developed  EYES: EOMI, conjunctiva and sclera clear  NECK: Supple, No JVD  CHEST/LUNG: Clear to auscultation bilaterally; No wheeze  HEART: Regular rate and rhythm; S1S2; HUMA 3/6 radiating to carotid   ABDOMEN: Soft, Nontender, Nondistended; Bowel sounds present  EXTREMITIES: L foot bandaged. +LE edema  L<R; Lt groin vac   PSYCH: AAOx 2 (self and )  NEUROLOGY: non-focal        LABS:                      RADIOLOGY & ADDITIONAL TESTS:  Results Reviewed:   Imaging Personally Reviewed:  Electrocardiogram Personally Reviewed:    COORDINATION OF CARE:  Care Discussed with Consultants/Other Providers [Y/N]:  Prior or Outpatient Records Reviewed [Y/N]:   [facility]  Garry Stahl MD  Pager (M-F, 8A-5P): [phone]  Other Times:  [phone]    Patient is a 83y old  Female who presents with a chief complaint of Ruther 5 CLTI left toe wounds (30 Oct 2023 10:43)      SUBJECTIVE / OVERNIGHT EVENTS: Pt in NAD no acute events ON; feels OK eating lunch; still with edema but states improved from prior       MEDICATIONS  (STANDING):  aMIOdarone    Tablet 100 milliGRAM(s) Oral daily  aspirin enteric coated 81 milliGRAM(s) Oral daily  atorvastatin 80 milliGRAM(s) Oral at bedtime  buMETAnide Injectable 1 milliGRAM(s) IV Push two times a day  levothyroxine 50 MICROGram(s) Oral daily  metoprolol succinate ER 25 milliGRAM(s) Oral daily  pantoprazole    Tablet 40 milliGRAM(s) Oral before breakfast  piperacillin/tazobactam IVPB.. 3.375 Gram(s) IV Intermittent every 8 hours  silver nitrate Applicator 1 Application(s) Topical once  valsartan 40 milliGRAM(s) Oral two times a day    MEDICATIONS  (PRN):      CAPILLARY BLOOD GLUCOSE        I&O's Summary    29 Oct 2023 07:01  -  30 Oct 2023 07:00  --------------------------------------------------------  IN: 440 mL / OUT: 2430 mL / NET: -1990 mL        PHYSICAL EXAM:  Vital Signs Last 24 Hrs  T(C): 36.6 (30 Oct 2023 11:59), Max: 36.8 (29 Oct 2023 20:08)  T(F): 97.9 (30 Oct 2023 11:59), Max: 98.2 (29 Oct 2023 20:08)  HR: 83 (30 Oct 2023 11:59) (80 - 97)  BP: 114/43 (30 Oct 2023 11:59) (109/41 - 126/59)  BP(mean): --  RR: 18 (30 Oct 2023 11:59) (17 - 18)  SpO2: 95% (30 Oct 2023 11:59) (95% - 100%)    Parameters below as of 30 Oct 2023 11:59  Patient On (Oxygen Delivery Method): room air    GENERAL: NAD, well-developed  EYES: EOMI, conjunctiva and sclera clear  NECK: Supple, No JVD  CHEST/LUNG: Clear to auscultation bilaterally; No wheeze  HEART: Regular rate and rhythm; S1S2; HUMA 3/6 radiating to carotid   ABDOMEN: Soft, Nontender, Nondistended; Bowel sounds present  EXTREMITIES: L foot bandaged. +LE edema  R>L; Lt leg vac   PSYCH: AAOx 2 (self and )  NEUROLOGY: non-focal        LABS:                      RADIOLOGY & ADDITIONAL TESTS:  Results Reviewed:   Imaging Personally Reviewed:  Electrocardiogram Personally Reviewed:    COORDINATION OF CARE:  Care Discussed with Consultants/Other Providers [Y/N]:  Prior or Outpatient Records Reviewed [Y/N]:

## 2023-10-30 NOTE — PROGRESS NOTE ADULT - PROBLEM SELECTOR PLAN 1
- s/p left foot partial hallux amputation 9/6, now admitted w/ left foot partial hallux amputation dehiscence and forefoot ischemic changes  - L foot xray: no OM, no gas, no fracture   - L foot wound growing VRE and staph epi, unclear significance   - On empiric Zosyn as per primary team  - Plan for LLE angiogram friday no objection to angiogram if optimized from cardiology standpoint given severe AS and Acute HF   - Podiatry plans local wound care vs TMA pending vascular w/up

## 2023-10-31 LAB
ANION GAP SERPL CALC-SCNC: 13 MMOL/L — SIGNIFICANT CHANGE UP (ref 7–14)
ANION GAP SERPL CALC-SCNC: 13 MMOL/L — SIGNIFICANT CHANGE UP (ref 7–14)
BUN SERPL-MCNC: 13 MG/DL — SIGNIFICANT CHANGE UP (ref 7–23)
BUN SERPL-MCNC: 13 MG/DL — SIGNIFICANT CHANGE UP (ref 7–23)
CALCIUM SERPL-MCNC: 8.6 MG/DL — SIGNIFICANT CHANGE UP (ref 8.4–10.5)
CALCIUM SERPL-MCNC: 8.6 MG/DL — SIGNIFICANT CHANGE UP (ref 8.4–10.5)
CHLORIDE SERPL-SCNC: 93 MMOL/L — LOW (ref 98–107)
CHLORIDE SERPL-SCNC: 93 MMOL/L — LOW (ref 98–107)
CO2 SERPL-SCNC: 30 MMOL/L — SIGNIFICANT CHANGE UP (ref 22–31)
CO2 SERPL-SCNC: 30 MMOL/L — SIGNIFICANT CHANGE UP (ref 22–31)
CREAT SERPL-MCNC: 0.72 MG/DL — SIGNIFICANT CHANGE UP (ref 0.5–1.3)
CREAT SERPL-MCNC: 0.72 MG/DL — SIGNIFICANT CHANGE UP (ref 0.5–1.3)
EGFR: 83 ML/MIN/1.73M2 — SIGNIFICANT CHANGE UP
EGFR: 83 ML/MIN/1.73M2 — SIGNIFICANT CHANGE UP
GLUCOSE SERPL-MCNC: 165 MG/DL — HIGH (ref 70–99)
GLUCOSE SERPL-MCNC: 165 MG/DL — HIGH (ref 70–99)
HCT VFR BLD CALC: 35.1 % — SIGNIFICANT CHANGE UP (ref 34.5–45)
HCT VFR BLD CALC: 35.1 % — SIGNIFICANT CHANGE UP (ref 34.5–45)
HGB BLD-MCNC: 11.7 G/DL — SIGNIFICANT CHANGE UP (ref 11.5–15.5)
HGB BLD-MCNC: 11.7 G/DL — SIGNIFICANT CHANGE UP (ref 11.5–15.5)
MAGNESIUM SERPL-MCNC: 2 MG/DL — SIGNIFICANT CHANGE UP (ref 1.6–2.6)
MAGNESIUM SERPL-MCNC: 2 MG/DL — SIGNIFICANT CHANGE UP (ref 1.6–2.6)
MCHC RBC-ENTMCNC: 31.4 PG — SIGNIFICANT CHANGE UP (ref 27–34)
MCHC RBC-ENTMCNC: 31.4 PG — SIGNIFICANT CHANGE UP (ref 27–34)
MCHC RBC-ENTMCNC: 33.3 GM/DL — SIGNIFICANT CHANGE UP (ref 32–36)
MCHC RBC-ENTMCNC: 33.3 GM/DL — SIGNIFICANT CHANGE UP (ref 32–36)
MCV RBC AUTO: 94.1 FL — SIGNIFICANT CHANGE UP (ref 80–100)
MCV RBC AUTO: 94.1 FL — SIGNIFICANT CHANGE UP (ref 80–100)
NRBC # BLD: 0 /100 WBCS — SIGNIFICANT CHANGE UP (ref 0–0)
NRBC # BLD: 0 /100 WBCS — SIGNIFICANT CHANGE UP (ref 0–0)
NRBC # FLD: 0 K/UL — SIGNIFICANT CHANGE UP (ref 0–0)
NRBC # FLD: 0 K/UL — SIGNIFICANT CHANGE UP (ref 0–0)
NT-PROBNP SERPL-SCNC: HIGH PG/ML
NT-PROBNP SERPL-SCNC: HIGH PG/ML
PHOSPHATE SERPL-MCNC: 3.1 MG/DL — SIGNIFICANT CHANGE UP (ref 2.5–4.5)
PHOSPHATE SERPL-MCNC: 3.1 MG/DL — SIGNIFICANT CHANGE UP (ref 2.5–4.5)
PLATELET # BLD AUTO: 248 K/UL — SIGNIFICANT CHANGE UP (ref 150–400)
PLATELET # BLD AUTO: 248 K/UL — SIGNIFICANT CHANGE UP (ref 150–400)
POTASSIUM SERPL-MCNC: 3.2 MMOL/L — LOW (ref 3.5–5.3)
POTASSIUM SERPL-MCNC: 3.2 MMOL/L — LOW (ref 3.5–5.3)
POTASSIUM SERPL-SCNC: 3.2 MMOL/L — LOW (ref 3.5–5.3)
POTASSIUM SERPL-SCNC: 3.2 MMOL/L — LOW (ref 3.5–5.3)
RBC # BLD: 3.73 M/UL — LOW (ref 3.8–5.2)
RBC # BLD: 3.73 M/UL — LOW (ref 3.8–5.2)
RBC # FLD: 17.3 % — HIGH (ref 10.3–14.5)
RBC # FLD: 17.3 % — HIGH (ref 10.3–14.5)
SODIUM SERPL-SCNC: 136 MMOL/L — SIGNIFICANT CHANGE UP (ref 135–145)
SODIUM SERPL-SCNC: 136 MMOL/L — SIGNIFICANT CHANGE UP (ref 135–145)
WBC # BLD: 8.91 K/UL — SIGNIFICANT CHANGE UP (ref 3.8–10.5)
WBC # BLD: 8.91 K/UL — SIGNIFICANT CHANGE UP (ref 3.8–10.5)
WBC # FLD AUTO: 8.91 K/UL — SIGNIFICANT CHANGE UP (ref 3.8–10.5)
WBC # FLD AUTO: 8.91 K/UL — SIGNIFICANT CHANGE UP (ref 3.8–10.5)

## 2023-10-31 PROCEDURE — 99233 SBSQ HOSP IP/OBS HIGH 50: CPT

## 2023-10-31 PROCEDURE — 93970 EXTREMITY STUDY: CPT | Mod: 26

## 2023-10-31 RX ORDER — BUMETANIDE 0.25 MG/ML
2 INJECTION INTRAMUSCULAR; INTRAVENOUS
Refills: 0 | Status: DISCONTINUED | OUTPATIENT
Start: 2023-10-31 | End: 2023-11-09

## 2023-10-31 RX ORDER — POTASSIUM CHLORIDE 20 MEQ
40 PACKET (EA) ORAL ONCE
Refills: 0 | Status: COMPLETED | OUTPATIENT
Start: 2023-10-31 | End: 2023-10-31

## 2023-10-31 RX ADMIN — Medication 50 MICROGRAM(S): at 05:03

## 2023-10-31 RX ADMIN — Medication 100 GRAM(S): at 02:47

## 2023-10-31 RX ADMIN — BUMETANIDE 1 MILLIGRAM(S): 0.25 INJECTION INTRAMUSCULAR; INTRAVENOUS at 05:05

## 2023-10-31 RX ADMIN — Medication 63.75 MILLIMOLE(S): at 01:42

## 2023-10-31 RX ADMIN — VALSARTAN 40 MILLIGRAM(S): 80 TABLET ORAL at 06:03

## 2023-10-31 RX ADMIN — Medication 40 MILLIEQUIVALENT(S): at 09:31

## 2023-10-31 RX ADMIN — AMIODARONE HYDROCHLORIDE 100 MILLIGRAM(S): 400 TABLET ORAL at 06:02

## 2023-10-31 RX ADMIN — BUMETANIDE 2 MILLIGRAM(S): 0.25 INJECTION INTRAMUSCULAR; INTRAVENOUS at 14:45

## 2023-10-31 RX ADMIN — VALSARTAN 40 MILLIGRAM(S): 80 TABLET ORAL at 21:08

## 2023-10-31 RX ADMIN — PANTOPRAZOLE SODIUM 40 MILLIGRAM(S): 20 TABLET, DELAYED RELEASE ORAL at 06:02

## 2023-10-31 RX ADMIN — Medication 25 MILLIGRAM(S): at 06:02

## 2023-10-31 RX ADMIN — PIPERACILLIN AND TAZOBACTAM 25 GRAM(S): 4; .5 INJECTION, POWDER, LYOPHILIZED, FOR SOLUTION INTRAVENOUS at 21:07

## 2023-10-31 RX ADMIN — Medication 81 MILLIGRAM(S): at 14:44

## 2023-10-31 RX ADMIN — PIPERACILLIN AND TAZOBACTAM 25 GRAM(S): 4; .5 INJECTION, POWDER, LYOPHILIZED, FOR SOLUTION INTRAVENOUS at 05:05

## 2023-10-31 RX ADMIN — PIPERACILLIN AND TAZOBACTAM 25 GRAM(S): 4; .5 INJECTION, POWDER, LYOPHILIZED, FOR SOLUTION INTRAVENOUS at 14:45

## 2023-10-31 NOTE — PROGRESS NOTE ADULT - SUBJECTIVE AND OBJECTIVE BOX
[facility]  Garry Stahl MD  Pager (M-F, 8A-5P): [phone]  Other Times:  [phone]    Patient is a 83y old  Female who presents with a chief complaint of Ruther 5 CLTI left toe wounds (31 Oct 2023 08:56)      SUBJECTIVE / OVERNIGHT EVENTS: feeling well no acute events ON;       MEDICATIONS  (STANDING):  aMIOdarone    Tablet 100 milliGRAM(s) Oral daily  aspirin enteric coated 81 milliGRAM(s) Oral daily  atorvastatin 80 milliGRAM(s) Oral at bedtime  buMETAnide Injectable 1 milliGRAM(s) IV Push two times a day  levothyroxine 50 MICROGram(s) Oral daily  metoprolol succinate ER 25 milliGRAM(s) Oral daily  pantoprazole    Tablet 40 milliGRAM(s) Oral before breakfast  piperacillin/tazobactam IVPB.. 3.375 Gram(s) IV Intermittent every 8 hours  silver nitrate Applicator 1 Application(s) Topical once  valsartan 40 milliGRAM(s) Oral two times a day    MEDICATIONS  (PRN):      CAPILLARY BLOOD GLUCOSE        I&O's Summary    30 Oct 2023 07:  -  31 Oct 2023 07:00  --------------------------------------------------------  IN: 350 mL / OUT: 550 mL / NET: -200 mL    31 Oct 2023 07:01  -  31 Oct 2023 12:04  --------------------------------------------------------  IN: 0 mL / OUT: 200 mL / NET: -200 mL        PHYSICAL EXAM:  Vital Signs Last 24 Hrs  T(C): 36.9 (31 Oct 2023 08:47), Max: 37.1 (30 Oct 2023 16:44)  T(F): 98.4 (31 Oct 2023 08:47), Max: 98.8 (30 Oct 2023 16:44)  HR: 82 (31 Oct 2023 08:47) (82 - 91)  BP: 114/58 (31 Oct 2023 08:47) (110/54 - 126/55)  BP(mean): --  RR: 16 (31 Oct 2023 08:47) (16 - 18)  SpO2: 96% (31 Oct 2023 08:47) (92% - 100%)    Parameters below as of 31 Oct 2023 08:47  Patient On (Oxygen Delivery Method): room air    GENERAL: NAD, well-developed  EYES: EOMI, conjunctiva and sclera clear  NECK: Supple, No JVD  CHEST/LUNG: Clear to auscultation bilaterally; No wheeze  HEART: Regular rate and rhythm; S1S2; HUMA 3/6 radiating to carotid   ABDOMEN: Soft, Nontender, Nondistended; Bowel sounds present  EXTREMITIES: L foot bandaged. +LE edema  R>L; Lt leg vac   PSYCH: AAOx 2 (self and )  NEUROLOGY: non-focal      LABS:                        11.7   8.91  )-----------( 248      ( 31 Oct 2023 07:04 )             35.1     10-31    136  |  93<L>  |  13  ----------------------------<  165<H>  3.2<L>   |  30  |  0.72    Ca    8.6      31 Oct 2023 07:04  Phos  3.1     10-31  Mg     2.00     10-31            Urinalysis Basic - ( 31 Oct 2023 07:04 )    Color: x / Appearance: x / SG: x / pH: x  Gluc: 165 mg/dL / Ketone: x  / Bili: x / Urobili: x   Blood: x / Protein: x / Nitrite: x   Leuk Esterase: x / RBC: x / WBC x   Sq Epi: x / Non Sq Epi: x / Bacteria: x          RADIOLOGY & ADDITIONAL TESTS:  Results Reviewed: < from: TTE Limited W or WO Ultrasound Enhancing Agent (10.30.23 @ 14:53) >  CONCLUSIONS:      1. Technically difficult image quality.   2. Left ventricular cavity is normal. Left ventricular wall thickness is normal. Left ventricular systolic function is severely decreased with an ejection fraction of 23 % by Miguel's method of disks. Global left ventricular hypokinesis.   3. There is no evidence of a left ventricular thrombus.   4. There is mild (grade 1) left ventricular diastolic dysfunction.   5. The right ventricle is not well visualized. Mildly enlarged right ventricular cavity size and reduced systolic function.   6. Structurally normal mitral valve with normal leaflet excursion. There is calcification of the mitral valve annulus. There is mild mitral regurgitation.   7. The aortic valve anatomy cannot be determined. There is calcification of the aortic valve leaflets. There is severe aortic stenosis. The peak transaortic velocity is 3.85 m/s, peak transaortic gradient is 59.3 mmHg and mean transaortic gradient is 34.0 mmHg with an LVOT/aortic valve VTI ratio of 0.17. The aortic valve area is estimated at 0.52 cm² by the continuity equation. There is mild aortic regurgitation.   8. Mild left atrial dilatation.  9. Bilateral pleural effusion noted.  10. Compared to the transthoracic echocardiogram performed on 2023 there have been no significant interval changes.    < end of copied text >    Imaging Personally Reviewed:  Electrocardiogram Personally Reviewed:    COORDINATION OF CARE:  Care Discussed with Consultants/Other Providers [Y/N]:  Prior or Outpatient Records Reviewed [Y/N]:

## 2023-10-31 NOTE — PROGRESS NOTE ADULT - ASSESSMENT
83F with PMHx of CAD, pAF on Eliquis, severe AS pending TAVR, R internal carotid stenosis, HTN, HLD, hypothyroidism, anxiety, PAD and chronic L toe wounds s/p L ilioprofunda bypass with reverse GSV graft to peroneal 8/31/23 (Dr. Tavares) c/b large L groin abscess (17cm) requiring removal of infected bypass graft of lower extremity and groin washout on 9/24/23. Wound care involved for R groin wound and R calf incision wound. Patient represented from rehab concerns for "cold foot". Pt is poor historian, but she says the wounds have been worse over the 2 days prior to presentation. Remains afebrile on Zosyn, planning for LLE angiogram on Friday.     PLAN  - Diet: Regular   - Planning LLE angiogram Friday pending clearances  - F/u cardiology for optimization, appreciate recs: Bumex 1mg BID, daily EKG  - F/u medicine for optimization  - LE duplex to r/o DVT for LE edema   - wound care following - groin vac   - pods following   - Dispo: pending    C Team Vascular Surgery  [id number]

## 2023-10-31 NOTE — PROGRESS NOTE ADULT - PROBLEM SELECTOR PLAN 2
- Echo from 9/23 with EF of 20-25% and global left ventricular hypokinesis.  - Patient w/ fluid overload ; LE edema   - pro-BNP 32032  - Continue Toprol 25mg daily   - Bumex 1mg IV q12h   - Valsartan 40mg PO BID  - Monitor I/O's, daily weights  - monitor Cr   - appreciate cardiology input

## 2023-10-31 NOTE — PROGRESS NOTE ADULT - SUBJECTIVE AND OBJECTIVE BOX
Overnight Events: NAEO    Review Of Systems: No chest pain, shortness of breath, or palpitations            Current Meds:  aMIOdarone    Tablet 100 milliGRAM(s) Oral daily  aspirin enteric coated 81 milliGRAM(s) Oral daily  atorvastatin 80 milliGRAM(s) Oral at bedtime  buMETAnide Injectable 2 milliGRAM(s) IV Push two times a day  levothyroxine 50 MICROGram(s) Oral daily  metoprolol succinate ER 25 milliGRAM(s) Oral daily  pantoprazole    Tablet 40 milliGRAM(s) Oral before breakfast  piperacillin/tazobactam IVPB.. 3.375 Gram(s) IV Intermittent every 8 hours  silver nitrate Applicator 1 Application(s) Topical once  valsartan 40 milliGRAM(s) Oral two times a day      Vitals:  T(F): 98.3 (10-31), Max: 98.8 (10-30)  HR: 75 (10-31) (75 - 91)  BP: 97/42 (10-31) (97/42 - 126/55)  RR: 18 (10-31)  SpO2: 97% (10-31)  I&O's Summary    30 Oct 2023 07:01  -  31 Oct 2023 07:00  --------------------------------------------------------  IN: 350 mL / OUT: 550 mL / NET: -200 mL    31 Oct 2023 07:01  -  31 Oct 2023 14:04  --------------------------------------------------------  IN: 0 mL / OUT: 200 mL / NET: -200 mL        Physical Exam:  GEN: comfortable appearing, lying in bed in NAD  HEENT: NCAT, MMM  CV: Regular S1, S2, no m/r/g  RESP: CTAB  ABD: Soft, NTND, +BS  EXT: 2+ bl pitting LE edema, WWP, pulses palpable throughout  NEURO: No focal deficits, AOx3  SKIN:  No rashes                          11.7   8.91  )-----------( 248      ( 31 Oct 2023 07:04 )             35.1     10-31    136  |  93<L>  |  13  ----------------------------<  165<H>  3.2<L>   |  30  |  0.72    Ca    8.6      31 Oct 2023 07:04  Phos  3.1     10-31  Mg     2.00     10-31

## 2023-10-31 NOTE — PROGRESS NOTE ADULT - ASSESSMENT
New ECG(s): Personally reviewed    Echo: 9/26/23   1. Technically difficult image quality.   2. Left ventricular systolic function is severely decreased with an ejection fraction visually estimated at 20 to 25 %. There is global left ventricular hypokinesis. No evidence of a thrombus in the left ventricle.   3. There is mild (grade 1) left ventricular diastolic dysfunction.   4. Right ventricular cavity is mildly enlarged in size and reduced systolic function.   5. Mild mitral regurgitation.   6. Aortic valve anatomy cannot be determined with reduced systolic excursion. severe calcification of the aortic valve leaflets. severe aortic stenosis.   7. Severe aortic stenosis (estimated aortic valve area ~ 0.5 cm2 (by the continuity equation).   8. Mild aortic regurgitation.   9. The left atrium is mildly dilated in size.  10. Left pleural effusion noted.  11. No prior echocardiogram is available for comparison.    Cath:  5/2023  Moderate atherosclerosis in LM and LAD.  of dRCA with robust collateral from LAD.  Imaging:    Interpretation of Telemetry: Sinus @ 90s    83F with PMHx of CAD, pAF on Eliquis, severe AS pending TAVR, R internal carotid stenosis, HTN, HLD, hypothyroidism, anxiety, PAD and chronic L toe wounds s/p L ilioprofunda bypass with reverse GSV graft to peroneal 8/31/23 (Dr. Tavares) c/b large L groin abscess (17cm) requiring removal of infected bypass graft of lower extremity and groin washout on 9/24/23. Admitted for vascular evaluation of LE. Cardiology consulted for optimization prior to peripheral angiogram. Continues to appear grossly overloaded on exam.    #Acute on Chronic HFrEF (25%) Exacerbation  - increase bumex to 2mg IV BID  -- Close monitoring for allergic reaction recommended.  - BID Lytes while actively diuresing  - Strict I/O and daily standing weight  - cont valsartan 40mg BID  -- Eventually will benefit from transitioning to ARNI.  - cont metoprolol succinate 25mg daily  - consider MRA and SGLT2 inhibitors as outpatient.    #Severe AS  - currently Asymptomatic while she is on bed.  - pending TAVR per documentation  - monitor on tele, monitor VS    # pAFib  - given [id number], patient will benefit from AC. Given her age of 83 and weight below 60kg, patient is reasonable to be on reduced dose of NOAC: eliquis 2.5mg BID.   - hold AC as needed by primary team/surgery.  - continue home amiodarone  - continue BB as above      All recommendations pending attending attestation. We will continue to follow with you.     Tabby Zhang MD  PGY-4, Cardiology  Available on TEAMS    For all new consults  [email or web address]  Login: [email or web address]

## 2023-11-01 LAB
ANION GAP SERPL CALC-SCNC: 12 MMOL/L — SIGNIFICANT CHANGE UP (ref 7–14)
ANION GAP SERPL CALC-SCNC: 12 MMOL/L — SIGNIFICANT CHANGE UP (ref 7–14)
BUN SERPL-MCNC: 12 MG/DL — SIGNIFICANT CHANGE UP (ref 7–23)
BUN SERPL-MCNC: 12 MG/DL — SIGNIFICANT CHANGE UP (ref 7–23)
CALCIUM SERPL-MCNC: 8.7 MG/DL — SIGNIFICANT CHANGE UP (ref 8.4–10.5)
CALCIUM SERPL-MCNC: 8.7 MG/DL — SIGNIFICANT CHANGE UP (ref 8.4–10.5)
CHLORIDE SERPL-SCNC: 93 MMOL/L — LOW (ref 98–107)
CHLORIDE SERPL-SCNC: 93 MMOL/L — LOW (ref 98–107)
CO2 SERPL-SCNC: 30 MMOL/L — SIGNIFICANT CHANGE UP (ref 22–31)
CO2 SERPL-SCNC: 30 MMOL/L — SIGNIFICANT CHANGE UP (ref 22–31)
CREAT SERPL-MCNC: 0.72 MG/DL — SIGNIFICANT CHANGE UP (ref 0.5–1.3)
CREAT SERPL-MCNC: 0.72 MG/DL — SIGNIFICANT CHANGE UP (ref 0.5–1.3)
CULTURE RESULTS: ABNORMAL
CULTURE RESULTS: ABNORMAL
CULTURE RESULTS: SIGNIFICANT CHANGE UP
EGFR: 83 ML/MIN/1.73M2 — SIGNIFICANT CHANGE UP
EGFR: 83 ML/MIN/1.73M2 — SIGNIFICANT CHANGE UP
GLUCOSE SERPL-MCNC: 152 MG/DL — HIGH (ref 70–99)
GLUCOSE SERPL-MCNC: 152 MG/DL — HIGH (ref 70–99)
HCT VFR BLD CALC: 34.5 % — SIGNIFICANT CHANGE UP (ref 34.5–45)
HCT VFR BLD CALC: 34.5 % — SIGNIFICANT CHANGE UP (ref 34.5–45)
HGB BLD-MCNC: 11.4 G/DL — LOW (ref 11.5–15.5)
HGB BLD-MCNC: 11.4 G/DL — LOW (ref 11.5–15.5)
MAGNESIUM SERPL-MCNC: 1.9 MG/DL — SIGNIFICANT CHANGE UP (ref 1.6–2.6)
MAGNESIUM SERPL-MCNC: 1.9 MG/DL — SIGNIFICANT CHANGE UP (ref 1.6–2.6)
MCHC RBC-ENTMCNC: 31 PG — SIGNIFICANT CHANGE UP (ref 27–34)
MCHC RBC-ENTMCNC: 31 PG — SIGNIFICANT CHANGE UP (ref 27–34)
MCHC RBC-ENTMCNC: 33 GM/DL — SIGNIFICANT CHANGE UP (ref 32–36)
MCHC RBC-ENTMCNC: 33 GM/DL — SIGNIFICANT CHANGE UP (ref 32–36)
MCV RBC AUTO: 93.8 FL — SIGNIFICANT CHANGE UP (ref 80–100)
MCV RBC AUTO: 93.8 FL — SIGNIFICANT CHANGE UP (ref 80–100)
NRBC # BLD: 0 /100 WBCS — SIGNIFICANT CHANGE UP (ref 0–0)
NRBC # BLD: 0 /100 WBCS — SIGNIFICANT CHANGE UP (ref 0–0)
NRBC # FLD: 0 K/UL — SIGNIFICANT CHANGE UP (ref 0–0)
NRBC # FLD: 0 K/UL — SIGNIFICANT CHANGE UP (ref 0–0)
ORGANISM # SPEC MICROSCOPIC CNT: ABNORMAL
PHOSPHATE SERPL-MCNC: 2.5 MG/DL — SIGNIFICANT CHANGE UP (ref 2.5–4.5)
PHOSPHATE SERPL-MCNC: 2.5 MG/DL — SIGNIFICANT CHANGE UP (ref 2.5–4.5)
PLATELET # BLD AUTO: 245 K/UL — SIGNIFICANT CHANGE UP (ref 150–400)
PLATELET # BLD AUTO: 245 K/UL — SIGNIFICANT CHANGE UP (ref 150–400)
POTASSIUM SERPL-MCNC: 3.4 MMOL/L — LOW (ref 3.5–5.3)
POTASSIUM SERPL-MCNC: 3.4 MMOL/L — LOW (ref 3.5–5.3)
POTASSIUM SERPL-SCNC: 3.4 MMOL/L — LOW (ref 3.5–5.3)
POTASSIUM SERPL-SCNC: 3.4 MMOL/L — LOW (ref 3.5–5.3)
RBC # BLD: 3.68 M/UL — LOW (ref 3.8–5.2)
RBC # BLD: 3.68 M/UL — LOW (ref 3.8–5.2)
RBC # FLD: 17.5 % — HIGH (ref 10.3–14.5)
RBC # FLD: 17.5 % — HIGH (ref 10.3–14.5)
SODIUM SERPL-SCNC: 135 MMOL/L — SIGNIFICANT CHANGE UP (ref 135–145)
SODIUM SERPL-SCNC: 135 MMOL/L — SIGNIFICANT CHANGE UP (ref 135–145)
SPECIMEN SOURCE: SIGNIFICANT CHANGE UP
WBC # BLD: 9.19 K/UL — SIGNIFICANT CHANGE UP (ref 3.8–10.5)
WBC # BLD: 9.19 K/UL — SIGNIFICANT CHANGE UP (ref 3.8–10.5)
WBC # FLD AUTO: 9.19 K/UL — SIGNIFICANT CHANGE UP (ref 3.8–10.5)
WBC # FLD AUTO: 9.19 K/UL — SIGNIFICANT CHANGE UP (ref 3.8–10.5)

## 2023-11-01 PROCEDURE — 99232 SBSQ HOSP IP/OBS MODERATE 35: CPT

## 2023-11-01 PROCEDURE — 99233 SBSQ HOSP IP/OBS HIGH 50: CPT

## 2023-11-01 RX ADMIN — BUMETANIDE 2 MILLIGRAM(S): 0.25 INJECTION INTRAMUSCULAR; INTRAVENOUS at 14:41

## 2023-11-01 RX ADMIN — PIPERACILLIN AND TAZOBACTAM 25 GRAM(S): 4; .5 INJECTION, POWDER, LYOPHILIZED, FOR SOLUTION INTRAVENOUS at 05:26

## 2023-11-01 RX ADMIN — Medication 81 MILLIGRAM(S): at 14:40

## 2023-11-01 RX ADMIN — PIPERACILLIN AND TAZOBACTAM 25 GRAM(S): 4; .5 INJECTION, POWDER, LYOPHILIZED, FOR SOLUTION INTRAVENOUS at 22:01

## 2023-11-01 RX ADMIN — VALSARTAN 40 MILLIGRAM(S): 80 TABLET ORAL at 05:25

## 2023-11-01 RX ADMIN — Medication 25 MILLIGRAM(S): at 05:25

## 2023-11-01 RX ADMIN — PIPERACILLIN AND TAZOBACTAM 25 GRAM(S): 4; .5 INJECTION, POWDER, LYOPHILIZED, FOR SOLUTION INTRAVENOUS at 14:40

## 2023-11-01 RX ADMIN — PANTOPRAZOLE SODIUM 40 MILLIGRAM(S): 20 TABLET, DELAYED RELEASE ORAL at 05:25

## 2023-11-01 RX ADMIN — Medication 50 MICROGRAM(S): at 05:25

## 2023-11-01 RX ADMIN — AMIODARONE HYDROCHLORIDE 100 MILLIGRAM(S): 400 TABLET ORAL at 05:25

## 2023-11-01 RX ADMIN — BUMETANIDE 2 MILLIGRAM(S): 0.25 INJECTION INTRAMUSCULAR; INTRAVENOUS at 05:26

## 2023-11-01 NOTE — PROGRESS NOTE ADULT - SUBJECTIVE AND OBJECTIVE BOX
Vascular Surgery Daily Progress Note    SUBJECTIVE: Patient seen and examined at bedside on AM rounds. Patient endorses no complaints. Denies chest pain, SOB, fever, chills.    Vital Signs Last 24 Hrs  T(C): 36.8 (01 Nov 2023 09:44), Max: 36.9 (31 Oct 2023 16:14)  T(F): 98.3 (01 Nov 2023 09:44), Max: 98.4 (31 Oct 2023 16:14)  HR: 89 (01 Nov 2023 09:44) (75 - 95)  BP: 104/80 (01 Nov 2023 09:44) (97/42 - 123/40)  RR: 18 (01 Nov 2023 09:44) (18 - 19)  SpO2: 94% (01 Nov 2023 09:44) (93% - 97%)  Parameters below as of 01 Nov 2023 09:44  Patient On (Oxygen Delivery Method): room air    General Appearance: Appears well, NAD  Neck: Supple  Chest: Equal expansion bilaterally, equal breath sounds  CV: Pulse regular presently  Extremities: moving extremities. RLE edema worsen than LLE edema.   Vascular: Dressing to medial L calf C/D/I. Vac to L groin holding suction. L DP/PT absent, motor/sensory intact    I&O's Summary    31 Oct 2023 07:01  -  01 Nov 2023 07:00  --------------------------------------------------------  IN: 650 mL / OUT: 1980 mL / NET: -1330 mL      I&O's Detail    31 Oct 2023 07:01  -  01 Nov 2023 07:00  --------------------------------------------------------  IN:    Oral Fluid: 650 mL  Total IN: 650 mL    OUT:    VAC (Vacuum Assisted Closure) System (mL): 330 mL    Voided (mL): 1650 mL  Total OUT: 1980 mL    Total NET: -1330 mL          MEDICATIONS  (STANDING):  aMIOdarone    Tablet 100 milliGRAM(s) Oral daily  aspirin enteric coated 81 milliGRAM(s) Oral daily  atorvastatin 80 milliGRAM(s) Oral at bedtime  buMETAnide Injectable 2 milliGRAM(s) IV Push two times a day  levothyroxine 50 MICROGram(s) Oral daily  metoprolol succinate ER 25 milliGRAM(s) Oral daily  pantoprazole    Tablet 40 milliGRAM(s) Oral before breakfast  piperacillin/tazobactam IVPB.. 3.375 Gram(s) IV Intermittent every 8 hours  silver nitrate Applicator 1 Application(s) Topical once  valsartan 40 milliGRAM(s) Oral two times a day    MEDICATIONS  (PRN):      LABS:                        11.4   9.19  )-----------( 245      ( 01 Nov 2023 07:00 )             34.5     11-01    135  |  93<L>  |  12  ----------------------------<  152<H>  3.4<L>   |  30  |  0.72    Ca    8.7      01 Nov 2023 07:00  Phos  2.5     11-01  Mg     1.90     11-01        Urinalysis Basic - ( 01 Nov 2023 07:00 )    Color: x / Appearance: x / SG: x / pH: x  Gluc: 152 mg/dL / Ketone: x  / Bili: x / Urobili: x   Blood: x / Protein: x / Nitrite: x   Leuk Esterase: x / RBC: x / WBC x   Sq Epi: x / Non Sq Epi: x / Bacteria: x        RADIOLOGY & ADDITIONAL STUDIES:

## 2023-11-01 NOTE — PROGRESS NOTE ADULT - SUBJECTIVE AND OBJECTIVE BOX
[facility] Medicine  Garry Stahl MD  Pager (M-F, 8A-5P): [phone]  Other Times:  [id number]    Patient is a 83y old  Female who presents with a chief complaint of Ruther 5 CLTI left toe wounds (2023 11:33)      SUBJECTIVE / OVERNIGHT EVENTS: Pt in NAD no acute events ON. sitting up in chair; currently not requiring oxygen       MEDICATIONS  (STANDING):  aMIOdarone    Tablet 100 milliGRAM(s) Oral daily  aspirin enteric coated 81 milliGRAM(s) Oral daily  atorvastatin 80 milliGRAM(s) Oral at bedtime  buMETAnide Injectable 2 milliGRAM(s) IV Push two times a day  levothyroxine 50 MICROGram(s) Oral daily  metoprolol succinate ER 25 milliGRAM(s) Oral daily  pantoprazole    Tablet 40 milliGRAM(s) Oral before breakfast  piperacillin/tazobactam IVPB.. 3.375 Gram(s) IV Intermittent every 8 hours  silver nitrate Applicator 1 Application(s) Topical once  valsartan 40 milliGRAM(s) Oral two times a day    MEDICATIONS  (PRN):      CAPILLARY BLOOD GLUCOSE        I&O's Summary    31 Oct 2023 07:  -  2023 07:00  --------------------------------------------------------  IN: 650 mL / OUT: 1980 mL / NET: -1330 mL    2023 07:01  -  2023 13:20  --------------------------------------------------------  IN: 0 mL / OUT: 400 mL / NET: -400 mL        PHYSICAL EXAM:  Vital Signs Last 24 Hrs  T(C): 36.6 (2023 12:57), Max: 36.9 (31 Oct 2023 16:14)  T(F): 97.8 (2023 12:57), Max: 98.4 (31 Oct 2023 16:14)  HR: 79 (2023 12:57) (79 - 95)  BP: 101/49 (2023 12:57) (101/49 - 123/40)  BP(mean): --  RR: 18 (2023 12:57) (18 - 19)  SpO2: 97% (2023 12:57) (93% - 97%)    Parameters below as of 2023 12:57  Patient On (Oxygen Delivery Method): room air    GENERAL: NAD, well-developed  EYES: EOMI, conjunctiva and sclera clear  NECK: Supple, No JVD  CHEST/LUNG: Clear to auscultation bilaterally; No wheeze  HEART: Regular rate and rhythm; S1S2; HUMA 3/6 radiating to carotid   ABDOMEN: Soft, Nontender, Nondistended; Bowel sounds present  EXTREMITIES: L foot bandaged. +LE edema  R>L; Lt leg vac   PSYCH: AAOx 2 (self and )  NEUROLOGY: non-focal      LABS:                        11.4   9.19  )-----------( 245      ( 2023 07:00 )             34.5     11-01    135  |  93<L>  |  12  ----------------------------<  152<H>  3.4<L>   |  30  |  0.72    Ca    8.7      2023 07:00  Phos  2.5     11-  Mg     1.90     11-01            Urinalysis Basic - ( 2023 07:00 )    Color: x / Appearance: x / SG: x / pH: x  Gluc: 152 mg/dL / Ketone: x  / Bili: x / Urobili: x   Blood: x / Protein: x / Nitrite: x   Leuk Esterase: x / RBC: x / WBC x   Sq Epi: x / Non Sq Epi: x / Bacteria: x          RADIOLOGY & ADDITIONAL TESTS:  Results Reviewed: < from: VA Duplex Venous Lower Ext Complete, Bilateral (10.31.23 @ 09:52) >  --------------------------------------------------------------------------------  CONCLUSIONS:      1. No evidence of deep vein thrombosis noted within the visualized right and left lower extremities.   2. Subcutaneous edema noted throughout the bilateral lower extremities.    < end of copied text >    Imaging Personally Reviewed:  Electrocardiogram Personally Reviewed:    COORDINATION OF CARE:  Care Discussed with Consultants/Other Providers [Y/N]: MG dunbar  Prior or Outpatient Records Reviewed [Y/N]:   [facility] Division of Hospital Medicine  Garry Stahl MD  Pager (M-F, 8A-5P): [phone]  Other Times:  [id number]    Patient is a 83y old  Female who presents with a chief complaint of Ruther 5 CLTI left toe wounds (2023 11:33)      SUBJECTIVE / OVERNIGHT EVENTS: Pt in NAD no acute events ON. sitting up in chair; currently not requiring oxygen; neg fluid balance       MEDICATIONS  (STANDING):  aMIOdarone    Tablet 100 milliGRAM(s) Oral daily  aspirin enteric coated 81 milliGRAM(s) Oral daily  atorvastatin 80 milliGRAM(s) Oral at bedtime  buMETAnide Injectable 2 milliGRAM(s) IV Push two times a day  levothyroxine 50 MICROGram(s) Oral daily  metoprolol succinate ER 25 milliGRAM(s) Oral daily  pantoprazole    Tablet 40 milliGRAM(s) Oral before breakfast  piperacillin/tazobactam IVPB.. 3.375 Gram(s) IV Intermittent every 8 hours  silver nitrate Applicator 1 Application(s) Topical once  valsartan 40 milliGRAM(s) Oral two times a day    MEDICATIONS  (PRN):      CAPILLARY BLOOD GLUCOSE        I&O's Summary    31 Oct 2023 07:  -  2023 07:00  --------------------------------------------------------  IN: 650 mL / OUT: 1980 mL / NET: -1330 mL    2023 07:01  -  2023 13:20  --------------------------------------------------------  IN: 0 mL / OUT: 400 mL / NET: -400 mL        PHYSICAL EXAM:  Vital Signs Last 24 Hrs  T(C): 36.6 (2023 12:57), Max: 36.9 (31 Oct 2023 16:14)  T(F): 97.8 (2023 12:57), Max: 98.4 (31 Oct 2023 16:14)  HR: 79 (2023 12:57) (79 - 95)  BP: 101/49 (2023 12:57) (101/49 - 123/40)  BP(mean): --  RR: 18 (2023 12:57) (18 - 19)  SpO2: 97% (2023 12:57) (93% - 97%)    Parameters below as of 2023 12:57  Patient On (Oxygen Delivery Method): room air    GENERAL: NAD, well-developed  EYES: EOMI, conjunctiva and sclera clear  NECK: Supple, No JVD  CHEST/LUNG: Clear to auscultation bilaterally; No wheeze  HEART: Regular rate and rhythm; S1S2; HUMA 3/6 radiating to carotid   ABDOMEN: Soft, Nontender, Nondistended; Bowel sounds present  EXTREMITIES: L foot bandaged. +LE edema  R>L; Lt leg vac   PSYCH: AAOx 2 (self and )  NEUROLOGY: non-focal      LABS:                        11.4   9.19  )-----------( 245      ( 2023 07:00 )             34.5     11-01    135  |  93<L>  |  12  ----------------------------<  152<H>  3.4<L>   |  30  |  0.72    Ca    8.7      2023 07:00  Phos  2.5     11-  Mg     1.90     11-01            Urinalysis Basic - ( 2023 07:00 )    Color: x / Appearance: x / SG: x / pH: x  Gluc: 152 mg/dL / Ketone: x  / Bili: x / Urobili: x   Blood: x / Protein: x / Nitrite: x   Leuk Esterase: x / RBC: x / WBC x   Sq Epi: x / Non Sq Epi: x / Bacteria: x          RADIOLOGY & ADDITIONAL TESTS:  Results Reviewed: < from: VA Duplex Venous Lower Ext Complete, Bilateral (10.31.23 @ 09:52) >  --------------------------------------------------------------------------------  CONCLUSIONS:      1. No evidence of deep vein thrombosis noted within the visualized right and left lower extremities.   2. Subcutaneous edema noted throughout the bilateral lower extremities.    < end of copied text >    Imaging Personally Reviewed:  Electrocardiogram Personally Reviewed:    COORDINATION OF CARE:  Care Discussed with Consultants/Other Providers [Y/N]: MG dunbar  Prior or Outpatient Records Reviewed [Y/N]:

## 2023-11-01 NOTE — PROGRESS NOTE ADULT - ASSESSMENT
New ECG(s): Personally reviewed    Echo: 9/26/23   1. Technically difficult image quality.   2. Left ventricular systolic function is severely decreased with an ejection fraction visually estimated at 20 to 25 %. There is global left ventricular hypokinesis. No evidence of a thrombus in the left ventricle.   3. There is mild (grade 1) left ventricular diastolic dysfunction.   4. Right ventricular cavity is mildly enlarged in size and reduced systolic function.   5. Mild mitral regurgitation.   6. Aortic valve anatomy cannot be determined with reduced systolic excursion. severe calcification of the aortic valve leaflets. severe aortic stenosis.   7. Severe aortic stenosis (estimated aortic valve area ~ 0.5 cm2 (by the continuity equation).   8. Mild aortic regurgitation.   9. The left atrium is mildly dilated in size.  10. Left pleural effusion noted.  11. No prior echocardiogram is available for comparison.    Cath:  5/2023  Moderate atherosclerosis in LM and LAD.  of dRCA with robust collateral from LAD.  Imaging:    Interpretation of Telemetry: Sinus @ 90s    83F with PMHx of CAD, pAF on Eliquis, severe AS pending TAVR, R internal carotid stenosis, HTN, HLD, hypothyroidism, anxiety, PAD and chronic L toe wounds s/p L ilioprofunda bypass with reverse GSV graft to peroneal 8/31/23 (Dr. Tavares) c/b large L groin abscess (17cm) requiring removal of infected bypass graft of lower extremity and groin washout on 9/24/23. Admitted for vascular evaluation of LE. Cardiology consulted for optimization prior to peripheral angiogram. Continues to appear grossly overloaded on exam.    #Acute on Chronic HFrEF (25%) Exacerbation  - cont bumex to 2mg IV BID  -- Close monitoring for allergic reaction recommended.  - BID Lytes while actively diuresing  - Strict I/O and daily standing weight  - cont valsartan 40mg BID  -- Eventually will benefit from transitioning to ARNI.  - cont metoprolol succinate 25mg daily  - consider MRA and SGLT2 inhibitors as outpatient.    #Severe AS  - currently Asymptomatic while she is on bed.  - pending TAVR per documentation  - monitor on tele, monitor VS    # pAFib  - given [id number], patient will benefit from AC. Given her age of 83 and weight below 60kg, patient is reasonable to be on reduced dose of NOAC: eliquis 2.5mg BID.   - hold AC as needed by primary team/surgery.  - continue home amiodarone  - continue BB as above      All recommendations pending attending attestation. We will continue to follow with you.     Tabby Zhang MD  PGY-4, Cardiology  Available on TEAMS    For all new consults  [email or web address]  Login: [email or web address]   New ECG(s): Personally reviewed    Echo: 9/26/23   1. Technically difficult image quality.   2. Left ventricular systolic function is severely decreased with an ejection fraction visually estimated at 20 to 25 %. There is global left ventricular hypokinesis. No evidence of a thrombus in the left ventricle.   3. There is mild (grade 1) left ventricular diastolic dysfunction.   4. Right ventricular cavity is mildly enlarged in size and reduced systolic function.   5. Mild mitral regurgitation.   6. Aortic valve anatomy cannot be determined with reduced systolic excursion. severe calcification of the aortic valve leaflets. severe aortic stenosis.   7. Severe aortic stenosis (estimated aortic valve area ~ 0.5 cm2 (by the continuity equation).   8. Mild aortic regurgitation.   9. The left atrium is mildly dilated in size.  10. Left pleural effusion noted.  11. No prior echocardiogram is available for comparison.    Cath:  5/2023  Moderate atherosclerosis in LM and LAD.  of dRCA with robust collateral from LAD.  Imaging:    Interpretation of Telemetry: Sinus @ 90s    83F with PMHx of CAD, pAF on Eliquis, severe AS pending TAVR, R internal carotid stenosis, HTN, HLD, hypothyroidism, anxiety, PAD and chronic L toe wounds s/p L ilioprofunda bypass with reverse GSV graft to peroneal 8/31/23 (Dr. Tavares) c/b large L groin abscess (17cm) requiring removal of infected bypass graft of lower extremity and groin washout on 9/24/23. Admitted for vascular evaluation of LE. Cardiology consulted for optimization prior to peripheral angiogram. Continues to appear grossly overloaded on exam.    #Acute on Chronic HFrEF (25%) Exacerbation  - cont bumex to 2mg IV BID  -- Close monitoring for allergic reaction recommended.  - BID Lytes while actively diuresing  - Strict I/O and daily standing weight  - can stop trending proBNP  - cont valsartan 40mg BID  -- Eventually will benefit from transitioning to ARNI.  - cont metoprolol succinate 25mg daily  - consider MRA and SGLT2 inhibitors as outpatient.    #Severe AS  - currently Asymptomatic while she is on bed.  - pending TAVR per documentation  - monitor on tele, monitor VS    # pAFib  - given [id number], patient will benefit from AC. Given her age of 83 and weight below 60kg, patient is reasonable to be on reduced dose of NOAC: eliquis 2.5mg BID.   - hold AC as needed by primary team/surgery.  - continue home amiodarone  - continue BB as above      All recommendations pending attending attestation. We will continue to follow with you.     Tabby Zhang MD  PGY-4, Cardiology  Available on TEAMS    For all new consults  [email or web address]  Login: [email or web address]

## 2023-11-01 NOTE — PROVIDER CONTACT NOTE (OTHER) - REASON
Low BP
left uper midline
pt desats when sleeping, lowest 86 on , refusing nasal cannula for the rest of the night
pt 02 sat 86

## 2023-11-01 NOTE — ADVANCED PRACTICE NURSE CONSULT - RECOMMEDATIONS
Recommending dakins 1/4 strength for L groin and LLE wounds    Topical Recommendations    L groin, LLE: Cleanse with NS, pat dry. Apply Liquid barrier film to periwound skin (allow to dry). Pack wound with dakins 1/4 moistened kerlix, cover with abdominal pad and paper tape. Change daily and PRN if soiled.    Continue low air loss bed therapy, continue heel elevation, continue to turn & reposition per protocol, soft pillow between bony prominences, continue moisture management with barrier creams & single breathable pad, continue measures to decrease friction/shear/pressure. Continue with nutritional support as per dietary/orders.     Plan of care discussed with vascular Zach Haile.    Please contact Wound Care Service Line if we can be of further assistance (ext [phone]).

## 2023-11-01 NOTE — PROVIDER CONTACT NOTE (OTHER) - SITUATION
Low BP 90/71  retake 99/51 and pt refused lipitor
pt 02 sat 86
Pt has left upper arm midline present on admission- awaiting team to give ok to use
patient is monitored on , pt desats to middle 80s, when sleeping, fluctuates between 86-92. wore nasal cannula 2L 10/29. is refusing the NC to be worn tonight 10/30.

## 2023-11-01 NOTE — PROGRESS NOTE ADULT - PROBLEM SELECTOR PLAN 2
- Echo from 9/23 with EF of 20-25% and global left ventricular hypokinesis.  - Patient w/ fluid overload ; LE edema   - Continue Toprol 25mg daily   - Bumex 2mg IV q12h   - Valsartan 40mg PO BID  - Monitor I/O's, daily weights  - monitor Cr   - appreciate cardiology input

## 2023-11-01 NOTE — PROGRESS NOTE ADULT - ASSESSMENT
83F with PMHx of CAD, pAF on Eliquis, severe AS pending TAVR, R internal carotid stenosis, HTN, HLD, hypothyroidism, anxiety, PAD and chronic L toe wounds s/p L ilioprofunda bypass with reverse GSV graft to peroneal 8/31/23 (Dr. Tavares) c/b large L groin abscess (17cm) requiring removal of infected bypass graft of lower extremity and groin washout on 9/24/23. Wound care involved for R groin wound and R calf incision wound. Patient represented from rehab concerns for "cold foot". Pt is poor historian, but she says the wounds have been worse over the 2 days prior to presentation. Remains afebrile on Zosyn, planning for LLE angiogram on Friday.     PLAN  - Diet: Regular   - Planning LLE angiogram Friday pending clearances  - F/u cardiology for optimization, appreciate recs: Bumex 1mg BID, daily EKG  - F/u medicine for optimization  - LE duplex to r/o DVT for LE edema   - wound care following - groin vac   - pods following   - Dispo: pending    Vascular Surgery  [id number]

## 2023-11-01 NOTE — PROGRESS NOTE ADULT - PROBLEM SELECTOR PLAN 1
- s/p left foot partial hallux amputation 9/6, now admitted w/ left foot partial hallux amputation dehiscence and forefoot ischemic changes  - L foot xray: no OM, no gas, no fracture   - L foot wound growing VRE and staph epi; ID appreciated wound cx likely colonization   - antibiotics as per primary ( zosyn 10/27- )  - noted R>L asymmetric swelling prior US 8/23 was neg for DVT but with superficial thrombosis of the lesser saphenous vein in both calves. Diameters of the right and left greater and lesser saphenous veins as   tabulated above.; 10/31 LE duplex neg for DVT  - Plan for LLE angiogram friday no objection to angiogram if optimized from cardiology standpoint given severe AS and Acute HF   - Podiatry plans local wound care vs TMA pending vascular w/u - s/p left foot partial hallux amputation 9/6, now admitted w/ left foot partial hallux amputation dehiscence and forefoot ischemic changes  - L foot xray: no OM, no gas, no fracture   - L foot wound growing VRE and staph epi; ID appreciated wound cx likely colonization   - antibiotics as per primary ( zosyn 10/27- )  - noted R>L asymmetric swelling prior US 8/23 was neg for DVT but with superficial thrombosis of the lesser saphenous vein in both calves. Diameters of the right and left greater and lesser saphenous veins.; 10/31 LE duplex neg for DVT  - Plan for LLE angiogram friday no objection to angiogram if optimized from cardiology standpoint given severe AS and Acute HF   - Podiatry plans local wound care vs TMA pending vascular w/u

## 2023-11-01 NOTE — PROVIDER CONTACT NOTE (OTHER) - NAME OF MD/NP/PA/DO NOTIFIED:
Manolo Lopez
DR Zach Watson [facility] team
[facility] French - Vascular
MAIKOL Lopez

## 2023-11-01 NOTE — PROGRESS NOTE ADULT - SUBJECTIVE AND OBJECTIVE BOX
Overnight Events: NAEO    Review Of Systems: No chest pain, shortness of breath, or palpitations            Current Meds:  aMIOdarone    Tablet 100 milliGRAM(s) Oral daily  aspirin enteric coated 81 milliGRAM(s) Oral daily  atorvastatin 80 milliGRAM(s) Oral at bedtime  buMETAnide Injectable 2 milliGRAM(s) IV Push two times a day  levothyroxine 50 MICROGram(s) Oral daily  metoprolol succinate ER 25 milliGRAM(s) Oral daily  pantoprazole    Tablet 40 milliGRAM(s) Oral before breakfast  piperacillin/tazobactam IVPB.. 3.375 Gram(s) IV Intermittent every 8 hours  silver nitrate Applicator 1 Application(s) Topical once  valsartan 40 milliGRAM(s) Oral two times a day      Vitals:  T(F): 98.3 (11-01), Max: 98.4 (10-31)  HR: 89 (11-01) (75 - 95)  BP: 104/80 (11-01) (97/42 - 123/40)  RR: 18 (11-01)  SpO2: 94% (11-01)  I&O's Summary    31 Oct 2023 07:01  -  01 Nov 2023 07:00  --------------------------------------------------------  IN: 650 mL / OUT: 1980 mL / NET: -1330 mL        Physical Exam:  GEN: comfortable appearing, lying in bed in NAD  HEENT: NCAT, MMM  CV: Regular S1, S2, no m/r/g  RESP: CTAB  ABD: Soft, NTND, +BS  EXT: 2+ bl pitting LE edema, improving from prior, WWP, pulses palpable throughout  NEURO: No focal deficits, AOx3  SKIN:  No rashes                          11.4   9.19  )-----------( 245      ( 01 Nov 2023 07:00 )             34.5     11-01    135  |  93<L>  |  12  ----------------------------<  152<H>  3.4<L>   |  30  |  0.72    Ca    8.7      01 Nov 2023 07:00  Phos  2.5     11-01  Mg     1.90     11-01

## 2023-11-01 NOTE — PROGRESS NOTE ADULT - ASSESSMENT
83F w/ cad, paroxysmal Afib on Eliquis, severe AS pending TAVR, R internal carotid stenosis, HTN, HLD, hypothyroidism, anxiety, PAD and chronic L toe wounds s/p L ilioprofunda bypass with reverse GSV graft to peroneal 8/31/23 (Dr. Tavares) c/b large L groin abscess (17cm) requiring removal of infected bypass graft of lower extremity and groin washout on 9/24/23. Wound care involved for R groin wound and R calf incision wound.  Pt has been experiencing increased pain at the LLE and now concerning for limb ischemia. c/b HF

## 2023-11-02 LAB
ANION GAP SERPL CALC-SCNC: 10 MMOL/L — SIGNIFICANT CHANGE UP (ref 7–14)
ANION GAP SERPL CALC-SCNC: 8 MMOL/L — SIGNIFICANT CHANGE UP (ref 7–14)
ANION GAP SERPL CALC-SCNC: 8 MMOL/L — SIGNIFICANT CHANGE UP (ref 7–14)
BUN SERPL-MCNC: 13 MG/DL — SIGNIFICANT CHANGE UP (ref 7–23)
BUN SERPL-MCNC: 13 MG/DL — SIGNIFICANT CHANGE UP (ref 7–23)
BUN SERPL-MCNC: 14 MG/DL — SIGNIFICANT CHANGE UP (ref 7–23)
BUN SERPL-MCNC: 14 MG/DL — SIGNIFICANT CHANGE UP (ref 7–23)
BUN SERPL-MCNC: 16 MG/DL — SIGNIFICANT CHANGE UP (ref 7–23)
BUN SERPL-MCNC: 16 MG/DL — SIGNIFICANT CHANGE UP (ref 7–23)
CALCIUM SERPL-MCNC: 8.3 MG/DL — LOW (ref 8.4–10.5)
CALCIUM SERPL-MCNC: 8.3 MG/DL — LOW (ref 8.4–10.5)
CALCIUM SERPL-MCNC: 8.6 MG/DL — SIGNIFICANT CHANGE UP (ref 8.4–10.5)
CHLORIDE SERPL-SCNC: 90 MMOL/L — LOW (ref 98–107)
CHLORIDE SERPL-SCNC: 94 MMOL/L — LOW (ref 98–107)
CHLORIDE SERPL-SCNC: 94 MMOL/L — LOW (ref 98–107)
CO2 SERPL-SCNC: 31 MMOL/L — SIGNIFICANT CHANGE UP (ref 22–31)
CO2 SERPL-SCNC: 31 MMOL/L — SIGNIFICANT CHANGE UP (ref 22–31)
CO2 SERPL-SCNC: 32 MMOL/L — HIGH (ref 22–31)
CO2 SERPL-SCNC: 32 MMOL/L — HIGH (ref 22–31)
CO2 SERPL-SCNC: 34 MMOL/L — HIGH (ref 22–31)
CO2 SERPL-SCNC: 34 MMOL/L — HIGH (ref 22–31)
CREAT SERPL-MCNC: 0.82 MG/DL — SIGNIFICANT CHANGE UP (ref 0.5–1.3)
CREAT SERPL-MCNC: 0.82 MG/DL — SIGNIFICANT CHANGE UP (ref 0.5–1.3)
CREAT SERPL-MCNC: 0.86 MG/DL — SIGNIFICANT CHANGE UP (ref 0.5–1.3)
CREAT SERPL-MCNC: 0.86 MG/DL — SIGNIFICANT CHANGE UP (ref 0.5–1.3)
CREAT SERPL-MCNC: 0.91 MG/DL — SIGNIFICANT CHANGE UP (ref 0.5–1.3)
CREAT SERPL-MCNC: 0.91 MG/DL — SIGNIFICANT CHANGE UP (ref 0.5–1.3)
EGFR: 63 ML/MIN/1.73M2 — SIGNIFICANT CHANGE UP
EGFR: 63 ML/MIN/1.73M2 — SIGNIFICANT CHANGE UP
EGFR: 67 ML/MIN/1.73M2 — SIGNIFICANT CHANGE UP
EGFR: 67 ML/MIN/1.73M2 — SIGNIFICANT CHANGE UP
EGFR: 71 ML/MIN/1.73M2 — SIGNIFICANT CHANGE UP
EGFR: 71 ML/MIN/1.73M2 — SIGNIFICANT CHANGE UP
GLUCOSE SERPL-MCNC: 142 MG/DL — HIGH (ref 70–99)
GLUCOSE SERPL-MCNC: 142 MG/DL — HIGH (ref 70–99)
GLUCOSE SERPL-MCNC: 156 MG/DL — HIGH (ref 70–99)
GLUCOSE SERPL-MCNC: 156 MG/DL — HIGH (ref 70–99)
GLUCOSE SERPL-MCNC: 191 MG/DL — HIGH (ref 70–99)
GLUCOSE SERPL-MCNC: 191 MG/DL — HIGH (ref 70–99)
HCT VFR BLD CALC: 33.9 % — LOW (ref 34.5–45)
HCT VFR BLD CALC: 33.9 % — LOW (ref 34.5–45)
HGB BLD-MCNC: 11.1 G/DL — LOW (ref 11.5–15.5)
HGB BLD-MCNC: 11.1 G/DL — LOW (ref 11.5–15.5)
MAGNESIUM SERPL-MCNC: 1.7 MG/DL — SIGNIFICANT CHANGE UP (ref 1.6–2.6)
MAGNESIUM SERPL-MCNC: 1.7 MG/DL — SIGNIFICANT CHANGE UP (ref 1.6–2.6)
MAGNESIUM SERPL-MCNC: 1.8 MG/DL — SIGNIFICANT CHANGE UP (ref 1.6–2.6)
MAGNESIUM SERPL-MCNC: 1.8 MG/DL — SIGNIFICANT CHANGE UP (ref 1.6–2.6)
MAGNESIUM SERPL-MCNC: 2 MG/DL — SIGNIFICANT CHANGE UP (ref 1.6–2.6)
MAGNESIUM SERPL-MCNC: 2 MG/DL — SIGNIFICANT CHANGE UP (ref 1.6–2.6)
MCHC RBC-ENTMCNC: 30.6 PG — SIGNIFICANT CHANGE UP (ref 27–34)
MCHC RBC-ENTMCNC: 30.6 PG — SIGNIFICANT CHANGE UP (ref 27–34)
MCHC RBC-ENTMCNC: 32.7 GM/DL — SIGNIFICANT CHANGE UP (ref 32–36)
MCHC RBC-ENTMCNC: 32.7 GM/DL — SIGNIFICANT CHANGE UP (ref 32–36)
MCV RBC AUTO: 93.4 FL — SIGNIFICANT CHANGE UP (ref 80–100)
MCV RBC AUTO: 93.4 FL — SIGNIFICANT CHANGE UP (ref 80–100)
NRBC # BLD: 0 /100 WBCS — SIGNIFICANT CHANGE UP (ref 0–0)
NRBC # BLD: 0 /100 WBCS — SIGNIFICANT CHANGE UP (ref 0–0)
NRBC # FLD: 0 K/UL — SIGNIFICANT CHANGE UP (ref 0–0)
NRBC # FLD: 0 K/UL — SIGNIFICANT CHANGE UP (ref 0–0)
PHOSPHATE SERPL-MCNC: 2.6 MG/DL — SIGNIFICANT CHANGE UP (ref 2.5–4.5)
PHOSPHATE SERPL-MCNC: 2.6 MG/DL — SIGNIFICANT CHANGE UP (ref 2.5–4.5)
PHOSPHATE SERPL-MCNC: 2.7 MG/DL — SIGNIFICANT CHANGE UP (ref 2.5–4.5)
PHOSPHATE SERPL-MCNC: 2.7 MG/DL — SIGNIFICANT CHANGE UP (ref 2.5–4.5)
PHOSPHATE SERPL-MCNC: 3.1 MG/DL — SIGNIFICANT CHANGE UP (ref 2.5–4.5)
PHOSPHATE SERPL-MCNC: 3.1 MG/DL — SIGNIFICANT CHANGE UP (ref 2.5–4.5)
PLATELET # BLD AUTO: 247 K/UL — SIGNIFICANT CHANGE UP (ref 150–400)
PLATELET # BLD AUTO: 247 K/UL — SIGNIFICANT CHANGE UP (ref 150–400)
POTASSIUM SERPL-MCNC: 2.8 MMOL/L — CRITICAL LOW (ref 3.5–5.3)
POTASSIUM SERPL-MCNC: 2.8 MMOL/L — CRITICAL LOW (ref 3.5–5.3)
POTASSIUM SERPL-MCNC: 3.6 MMOL/L — SIGNIFICANT CHANGE UP (ref 3.5–5.3)
POTASSIUM SERPL-MCNC: 3.6 MMOL/L — SIGNIFICANT CHANGE UP (ref 3.5–5.3)
POTASSIUM SERPL-MCNC: 3.9 MMOL/L — SIGNIFICANT CHANGE UP (ref 3.5–5.3)
POTASSIUM SERPL-MCNC: 3.9 MMOL/L — SIGNIFICANT CHANGE UP (ref 3.5–5.3)
POTASSIUM SERPL-SCNC: 2.8 MMOL/L — CRITICAL LOW (ref 3.5–5.3)
POTASSIUM SERPL-SCNC: 2.8 MMOL/L — CRITICAL LOW (ref 3.5–5.3)
POTASSIUM SERPL-SCNC: 3.6 MMOL/L — SIGNIFICANT CHANGE UP (ref 3.5–5.3)
POTASSIUM SERPL-SCNC: 3.6 MMOL/L — SIGNIFICANT CHANGE UP (ref 3.5–5.3)
POTASSIUM SERPL-SCNC: 3.9 MMOL/L — SIGNIFICANT CHANGE UP (ref 3.5–5.3)
POTASSIUM SERPL-SCNC: 3.9 MMOL/L — SIGNIFICANT CHANGE UP (ref 3.5–5.3)
RBC # BLD: 3.63 M/UL — LOW (ref 3.8–5.2)
RBC # BLD: 3.63 M/UL — LOW (ref 3.8–5.2)
RBC # FLD: 17.6 % — HIGH (ref 10.3–14.5)
RBC # FLD: 17.6 % — HIGH (ref 10.3–14.5)
SODIUM SERPL-SCNC: 130 MMOL/L — LOW (ref 135–145)
SODIUM SERPL-SCNC: 130 MMOL/L — LOW (ref 135–145)
SODIUM SERPL-SCNC: 134 MMOL/L — LOW (ref 135–145)
SODIUM SERPL-SCNC: 134 MMOL/L — LOW (ref 135–145)
SODIUM SERPL-SCNC: 135 MMOL/L — SIGNIFICANT CHANGE UP (ref 135–145)
SODIUM SERPL-SCNC: 135 MMOL/L — SIGNIFICANT CHANGE UP (ref 135–145)
WBC # BLD: 9.67 K/UL — SIGNIFICANT CHANGE UP (ref 3.8–10.5)
WBC # BLD: 9.67 K/UL — SIGNIFICANT CHANGE UP (ref 3.8–10.5)
WBC # FLD AUTO: 9.67 K/UL — SIGNIFICANT CHANGE UP (ref 3.8–10.5)
WBC # FLD AUTO: 9.67 K/UL — SIGNIFICANT CHANGE UP (ref 3.8–10.5)

## 2023-11-02 PROCEDURE — 99233 SBSQ HOSP IP/OBS HIGH 50: CPT

## 2023-11-02 PROCEDURE — 99232 SBSQ HOSP IP/OBS MODERATE 35: CPT

## 2023-11-02 RX ORDER — MAGNESIUM SULFATE 500 MG/ML
1 VIAL (ML) INJECTION ONCE
Refills: 0 | Status: COMPLETED | OUTPATIENT
Start: 2023-11-02 | End: 2023-11-03

## 2023-11-02 RX ORDER — POTASSIUM PHOSPHATE, MONOBASIC POTASSIUM PHOSPHATE, DIBASIC 236; 224 MG/ML; MG/ML
15 INJECTION, SOLUTION INTRAVENOUS ONCE
Refills: 0 | Status: COMPLETED | OUTPATIENT
Start: 2023-11-02 | End: 2023-11-02

## 2023-11-02 RX ORDER — POTASSIUM CHLORIDE 20 MEQ
40 PACKET (EA) ORAL ONCE
Refills: 0 | Status: DISCONTINUED | OUTPATIENT
Start: 2023-11-02 | End: 2023-11-02

## 2023-11-02 RX ORDER — SODIUM,POTASSIUM PHOSPHATES 278-250MG
2 POWDER IN PACKET (EA) ORAL ONCE
Refills: 0 | Status: COMPLETED | OUTPATIENT
Start: 2023-11-02 | End: 2023-11-02

## 2023-11-02 RX ORDER — POTASSIUM CHLORIDE 20 MEQ
40 PACKET (EA) ORAL EVERY 4 HOURS
Refills: 0 | Status: DISCONTINUED | OUTPATIENT
Start: 2023-11-02 | End: 2023-11-02

## 2023-11-02 RX ORDER — POTASSIUM CHLORIDE 20 MEQ
60 PACKET (EA) ORAL
Refills: 0 | Status: DISCONTINUED | OUTPATIENT
Start: 2023-11-02 | End: 2023-11-02

## 2023-11-02 RX ORDER — POTASSIUM CHLORIDE 20 MEQ
40 PACKET (EA) ORAL EVERY 4 HOURS
Refills: 0 | Status: COMPLETED | OUTPATIENT
Start: 2023-11-02 | End: 2023-11-02

## 2023-11-02 RX ORDER — POTASSIUM CHLORIDE 20 MEQ
10 PACKET (EA) ORAL
Refills: 0 | Status: DISCONTINUED | OUTPATIENT
Start: 2023-11-02 | End: 2023-11-02

## 2023-11-02 RX ORDER — COLLAGENASE CLOSTRIDIUM HIST. 250 UNIT/G
1 OINTMENT (GRAM) TOPICAL DAILY
Refills: 0 | Status: DISCONTINUED | OUTPATIENT
Start: 2023-11-02 | End: 2023-11-18

## 2023-11-02 RX ADMIN — PIPERACILLIN AND TAZOBACTAM 25 GRAM(S): 4; .5 INJECTION, POWDER, LYOPHILIZED, FOR SOLUTION INTRAVENOUS at 14:37

## 2023-11-02 RX ADMIN — BUMETANIDE 2 MILLIGRAM(S): 0.25 INJECTION INTRAMUSCULAR; INTRAVENOUS at 14:28

## 2023-11-02 RX ADMIN — Medication 40 MILLIEQUIVALENT(S): at 10:13

## 2023-11-02 RX ADMIN — PIPERACILLIN AND TAZOBACTAM 25 GRAM(S): 4; .5 INJECTION, POWDER, LYOPHILIZED, FOR SOLUTION INTRAVENOUS at 23:53

## 2023-11-02 RX ADMIN — PANTOPRAZOLE SODIUM 40 MILLIGRAM(S): 20 TABLET, DELAYED RELEASE ORAL at 06:26

## 2023-11-02 RX ADMIN — Medication 25 MILLIGRAM(S): at 06:23

## 2023-11-02 RX ADMIN — Medication 81 MILLIGRAM(S): at 14:28

## 2023-11-02 RX ADMIN — VALSARTAN 40 MILLIGRAM(S): 80 TABLET ORAL at 17:51

## 2023-11-02 RX ADMIN — Medication 50 MICROGRAM(S): at 05:20

## 2023-11-02 RX ADMIN — Medication 2 TABLET(S): at 10:13

## 2023-11-02 RX ADMIN — VALSARTAN 40 MILLIGRAM(S): 80 TABLET ORAL at 06:24

## 2023-11-02 RX ADMIN — PIPERACILLIN AND TAZOBACTAM 25 GRAM(S): 4; .5 INJECTION, POWDER, LYOPHILIZED, FOR SOLUTION INTRAVENOUS at 05:11

## 2023-11-02 RX ADMIN — BUMETANIDE 2 MILLIGRAM(S): 0.25 INJECTION INTRAMUSCULAR; INTRAVENOUS at 06:25

## 2023-11-02 RX ADMIN — AMIODARONE HYDROCHLORIDE 100 MILLIGRAM(S): 400 TABLET ORAL at 06:23

## 2023-11-02 RX ADMIN — Medication 40 MILLIEQUIVALENT(S): at 17:51

## 2023-11-02 NOTE — PROGRESS NOTE ADULT - ASSESSMENT
New ECG(s): Personally reviewed    Echo: 9/26/23   1. Technically difficult image quality.   2. Left ventricular systolic function is severely decreased with an ejection fraction visually estimated at 20 to 25 %. There is global left ventricular hypokinesis. No evidence of a thrombus in the left ventricle.   3. There is mild (grade 1) left ventricular diastolic dysfunction.   4. Right ventricular cavity is mildly enlarged in size and reduced systolic function.   5. Mild mitral regurgitation.   6. Aortic valve anatomy cannot be determined with reduced systolic excursion. severe calcification of the aortic valve leaflets. severe aortic stenosis.   7. Severe aortic stenosis (estimated aortic valve area ~ 0.5 cm2 (by the continuity equation).   8. Mild aortic regurgitation.   9. The left atrium is mildly dilated in size.  10. Left pleural effusion noted.  11. No prior echocardiogram is available for comparison.    Cath:  5/2023  Moderate atherosclerosis in LM and LAD.  of dRCA with robust collateral from LAD.  Imaging:    Interpretation of Telemetry: Sinus @ 90s    83F with PMHx of CAD, pAF on Eliquis, severe AS pending TAVR, R internal carotid stenosis, HTN, HLD, hypothyroidism, anxiety, PAD and chronic L toe wounds s/p L ilioprofunda bypass with reverse GSV graft to peroneal 8/31/23 (Dr. Tavares) c/b large L groin abscess (17cm) requiring removal of infected bypass graft of lower extremity and groin washout on 9/24/23. Admitted for vascular evaluation of LE. Cardiology consulted for optimization prior to peripheral angiogram. Continues to appear grossly overloaded on exam.    #Acute on Chronic HFrEF (25%) Exacerbation  - cont bumex to 2mg IV BID  -- Close monitoring for allergic reaction recommended.  - BID Lytes while actively diuresing  -- goal K > 4, Mg > 2  - give KCl 40 mEq x3 today, f/u electrolytes  -- Strict I/O and daily standing weight  - cont valsartan 40mg BID  -- Eventually will benefit from transitioning to ARNI.  - cont metoprolol succinate 25mg daily  - consider MRA and SGLT2 inhibitors as outpatient.    #Severe AS  - currently Asymptomatic while she is on bed.  - pending TAVR per documentation  - monitor on tele, monitor VS    # pAFib  - given [id number], patient will benefit from AC. Given her age of 83 and weight below 60kg, patient is reasonable to be on reduced dose of NOAC: eliquis 2.5mg BID.   - hold AC as needed by primary team/surgery.  - continue home amiodarone  - continue BB as above      All recommendations pending attending attestation. We will continue to follow with you.     Tabby Zhang MD  PGY-4, Cardiology  Available on TEAMS    For all new consults  [email or web address]  Login: [email or web address]   New ECG(s): Personally reviewed    Echo: 9/26/23   1. Technically difficult image quality.   2. Left ventricular systolic function is severely decreased with an ejection fraction visually estimated at 20 to 25 %. There is global left ventricular hypokinesis. No evidence of a thrombus in the left ventricle.   3. There is mild (grade 1) left ventricular diastolic dysfunction.   4. Right ventricular cavity is mildly enlarged in size and reduced systolic function.   5. Mild mitral regurgitation.   6. Aortic valve anatomy cannot be determined with reduced systolic excursion. severe calcification of the aortic valve leaflets. severe aortic stenosis.   7. Severe aortic stenosis (estimated aortic valve area ~ 0.5 cm2 (by the continuity equation).   8. Mild aortic regurgitation.   9. The left atrium is mildly dilated in size.  10. Left pleural effusion noted.  11. No prior echocardiogram is available for comparison.    Cath:  5/2023  Moderate atherosclerosis in LM and LAD.  of dRCA with robust collateral from LAD.  Imaging:    Interpretation of Telemetry: Sinus @ 90s    83F with PMHx of CAD, pAF on Eliquis, severe AS pending TAVR, R internal carotid stenosis, HTN, HLD, hypothyroidism, anxiety, PAD and chronic L toe wounds s/p L ilioprofunda bypass with reverse GSV graft to peroneal 8/31/23 (Dr. Tavares) c/b large L groin abscess (17cm) requiring removal of infected bypass graft of lower extremity and groin washout on 9/24/23. Admitted for vascular evaluation of LE. Cardiology consulted for optimization prior to peripheral angiogram. Continues to appear grossly overloaded on exam.    #Acute on Chronic HFrEF (25%) Exacerbation  - cont bumex 2mg IV BID  -- Close monitoring for allergic reaction recommended.  - BID Lytes while actively diuresing  -- goal K > 4, Mg > 2  - give KCl 40 mEq x3 today, f/u electrolytes  -- Strict I/O and daily standing weight  - cont valsartan 40mg BID  -- Eventually will benefit from transitioning to ARNI.  - cont metoprolol succinate 25mg daily  - consider MRA and SGLT2 inhibitors as outpatient.    #Severe AS  - currently Asymptomatic while she is on bed.  - pending TAVR per documentation  - monitor on tele, monitor VS    # pAFib  - given [id number], patient will benefit from AC. Given her age of 83 and weight below 60kg, patient is reasonable to be on reduced dose of NOAC: eliquis 2.5mg BID.   - hold AC as needed by primary team/surgery.  - continue home amiodarone  - continue BB as above      All recommendations pending attending attestation. We will continue to follow with you.     Tabby Zhang MD  PGY-4, Cardiology  Available on TEAMS    For all new consults  [email or web address]  Login: [email or web address]

## 2023-11-02 NOTE — PROGRESS NOTE ADULT - SUBJECTIVE AND OBJECTIVE BOX
Vascular Surgery Daily Progress Note    SUBJECTIVE: Patient seen and examined at bedside on AM rounds. Patient endorses no complaints. Denies chest pain, SOB, fever, chills.    Vital Signs Last 24 Hrs  T(C): 36.8 (02 Nov 2023 06:20), Max: 37.1 (02 Nov 2023 00:32)  T(F): 98.2 (02 Nov 2023 06:20), Max: 98.7 (02 Nov 2023 00:32)  HR: 86 (02 Nov 2023 06:20) (79 - 89)  BP: 118/57 (02 Nov 2023 06:20) (101/49 - 118/57)  RR: 18 (02 Nov 2023 06:20) (18 - 18)  SpO2: 91% (02 Nov 2023 06:20) (91% - 98%)  Parameters below as of 02 Nov 2023 06:20  Patient On (Oxygen Delivery Method): room air      General Appearance: Appears well, NAD  Neck: Supple  Chest: Equal expansion bilaterally, equal breath sounds  CV: Pulse regular presently  Extremities: moving extremities. RLE edema worsen than LLE edema.   Vascular: Dressing to medial L calf C/D/I. Vac to L groin holding suction. L DP/PT absent, motor/sensory intact    I&O's Summary    01 Nov 2023 07:01  -  02 Nov 2023 07:00  --------------------------------------------------------  IN: 120 mL / OUT: 1500 mL / NET: -1380 mL      I&O's Detail    01 Nov 2023 07:01  -  02 Nov 2023 07:00  --------------------------------------------------------  IN:    Oral Fluid: 120 mL  Total IN: 120 mL    OUT:    VAC (Vacuum Assisted Closure) System (mL): 50 mL    Voided (mL): 1450 mL  Total OUT: 1500 mL    Total NET: -1380 mL          MEDICATIONS  (STANDING):  aMIOdarone    Tablet 100 milliGRAM(s) Oral daily  aspirin enteric coated 81 milliGRAM(s) Oral daily  atorvastatin 80 milliGRAM(s) Oral at bedtime  buMETAnide Injectable 2 milliGRAM(s) IV Push two times a day  levothyroxine 50 MICROGram(s) Oral daily  metoprolol succinate ER 25 milliGRAM(s) Oral daily  pantoprazole    Tablet 40 milliGRAM(s) Oral before breakfast  piperacillin/tazobactam IVPB.. 3.375 Gram(s) IV Intermittent every 8 hours  silver nitrate Applicator 1 Application(s) Topical once  valsartan 40 milliGRAM(s) Oral two times a day    MEDICATIONS  (PRN):      LABS:                        11.1   9.67  )-----------( 247      ( 02 Nov 2023 05:27 )             33.9     11-02    130<L>  |  90<L>  |  14  ----------------------------<  142<H>  2.8<LL>   |  32<H>  |  0.82    Ca    8.6      02 Nov 2023 05:27  Phos  2.6     11-02  Mg     2.00     11-02        Urinalysis Basic - ( 02 Nov 2023 05:27 )    Color: x / Appearance: x / SG: x / pH: x  Gluc: 142 mg/dL / Ketone: x  / Bili: x / Urobili: x   Blood: x / Protein: x / Nitrite: x   Leuk Esterase: x / RBC: x / WBC x   Sq Epi: x / Non Sq Epi: x / Bacteria: x        RADIOLOGY & ADDITIONAL STUDIES:

## 2023-11-02 NOTE — PROGRESS NOTE ADULT - ASSESSMENT
83F with PMHx of CAD, pAF on Eliquis, severe AS pending TAVR, R internal carotid stenosis, HTN, HLD, hypothyroidism, anxiety, PAD and chronic L toe wounds s/p L ilioprofunda bypass with reverse GSV graft to peroneal 8/31/23 (Dr. Tavares) c/b large L groin abscess (17cm) requiring removal of infected bypass graft of lower extremity and groin washout on 9/24/23. Wound care involved for R groin wound and R calf incision wound. Patient represented from rehab concerns for "cold foot". Pt is poor historian, but she says the wounds have been worse over the 2 days prior to presentation. Remains afebrile on Zosyn, planning for LLE angiogram on Friday.     PLAN  - Diet: Regular, NPO at midnight  - Planning LLE angiogram tomorrow  - F/u cardiology, appreciate recs: Bumex 1mg BID, daily EKG  - F/u medicine for optimization  - LE duplex to r/o DVT for LE edema   - wound care following - groin vac   - pods following   - Dispo: pending    Vascular Surgery  [id number]

## 2023-11-02 NOTE — PROGRESS NOTE ADULT - PROBLEM SELECTOR PLAN 1
- s/p left foot partial hallux amputation 9/6, now admitted w/ left foot partial hallux amputation dehiscence and forefoot ischemic changes  - L foot xray: no OM, no gas, no fracture   - L foot wound growing VRE and staph epi; ID appreciated wound cx likely colonization   - antibiotics as per primary ( zosyn 10/27- )  - noted R>L asymmetric swelling prior US 8/23 was neg for DVT but with superficial thrombosis of the lesser saphenous vein in both calves. Diameters of the right and left greater and lesser saphenous veins.; 10/31 LE duplex neg for DVT  - Plan for LLE angiogram 11/3 no objection to angiogram if optimized from cardiology standpoint given severe AS and Acute HF   - Podiatry plans local wound care vs TMA pending vascular w/u

## 2023-11-02 NOTE — ED PROVIDER NOTE - NSBENEFITOFTRANSFER_ED_A_ED
Physical Therapy    Patient not seen in therapy.     Attempted PT this PM, as soon as therapist stepped in the room and asked pt. If she wanted to participate in therapy, pt. Quickly responded: \"No.\" and continued to nod head. Therapist contacted pt's daughter (Lissy) in regards to goals and plans and to ask if family still wanted to pursue therapy. Lissy, mentioned to therapist importance of maintaining her comfortable and believes that mobility like sitting at EOB will cause discomfort due to current pressure injury. Per daughter \"hold off\" on therapy. PT will DC services at this time. Please re-consult if status changes. Discussed with HEATHER Vasquez.       OBJECTIVE                      Documented in the chart in the following areas: Assessment/Plan.        Therapy procedure time and total treatment time can be found documented on the Time Entry flowsheet   Obtain Level of Care/Service Not Available at this Facility/Continuity of Care at Other Facility

## 2023-11-02 NOTE — PROGRESS NOTE ADULT - SUBJECTIVE AND OBJECTIVE BOX
[facility] Division of Hospital Medicine  Garry Stahl MD  Pager (M-F, 8A-5P): [phone]  Other Times:  [phone]    Patient is a 83y old  Female who presents with a chief complaint of Ruther 5 CLTI left toe wounds (02 Nov 2023 08:30)      SUBJECTIVE / OVERNIGHT EVENTS: Nervous about procedure tomm otherwise states she feels well notes swelling slightly improved. Neg fluid balance       MEDICATIONS  (STANDING):  aMIOdarone    Tablet 100 milliGRAM(s) Oral daily  aspirin enteric coated 81 milliGRAM(s) Oral daily  atorvastatin 80 milliGRAM(s) Oral at bedtime  buMETAnide Injectable 2 milliGRAM(s) IV Push two times a day  levothyroxine 50 MICROGram(s) Oral daily  metoprolol succinate ER 25 milliGRAM(s) Oral daily  pantoprazole    Tablet 40 milliGRAM(s) Oral before breakfast  piperacillin/tazobactam IVPB.. 3.375 Gram(s) IV Intermittent every 8 hours  potassium chloride    Tablet ER 40 milliEquivalent(s) Oral every 4 hours  silver nitrate Applicator 1 Application(s) Topical once  valsartan 40 milliGRAM(s) Oral two times a day    MEDICATIONS  (PRN):      CAPILLARY BLOOD GLUCOSE        I&O's Summary    01 Nov 2023 07:01  -  02 Nov 2023 07:00  --------------------------------------------------------  IN: 120 mL / OUT: 1500 mL / NET: -1380 mL    02 Nov 2023 07:01  -  02 Nov 2023 10:17  --------------------------------------------------------  IN: 240 mL / OUT: 550 mL / NET: -310 mL        PHYSICAL EXAM:  Vital Signs Last 24 Hrs  T(C): 36.8 (02 Nov 2023 08:47), Max: 37.1 (02 Nov 2023 00:32)  T(F): 98.3 (02 Nov 2023 08:47), Max: 98.7 (02 Nov 2023 00:32)  HR: 76 (02 Nov 2023 08:47) (76 - 86)  BP: 102/42 (02 Nov 2023 08:47) (101/49 - 118/57)  BP(mean): --  RR: 18 (02 Nov 2023 08:47) (18 - 18)  SpO2: 92% (02 Nov 2023 08:47) (91% - 98%)    Parameters below as of 02 Nov 2023 08:47  Patient On (Oxygen Delivery Method): room air    GENERAL: NAD, well-developed  EYES: EOMI, conjunctiva and sclera clear  NECK: Supple, No JVD  CHEST/LUNG: Clear to auscultation bilaterally; No wheeze  HEART: Regular rate and rhythm; S1S2; HUMA 3/6 radiating to carotid   ABDOMEN: Soft, Nontender, Nondistended; Bowel sounds present  EXTREMITIES: L foot bandaged. +LE edema  R>L; Lt leg vac   PSYCH: awake alert answers questions appropriately        LABS:                        11.1   9.67  )-----------( 247      ( 02 Nov 2023 05:27 )             33.9     11-02    130<L>  |  90<L>  |  14  ----------------------------<  142<H>  2.8<LL>   |  32<H>  |  0.82    Ca    8.6      02 Nov 2023 05:27  Phos  2.6     11-02  Mg     2.00     11-02            Urinalysis Basic - ( 02 Nov 2023 05:27 )    Color: x / Appearance: x / SG: x / pH: x  Gluc: 142 mg/dL / Ketone: x  / Bili: x / Urobili: x   Blood: x / Protein: x / Nitrite: x   Leuk Esterase: x / RBC: x / WBC x   Sq Epi: x / Non Sq Epi: x / Bacteria: x          RADIOLOGY & ADDITIONAL TESTS:  Results Reviewed:   Imaging Personally Reviewed:  Electrocardiogram Personally Reviewed:    COORDINATION OF CARE:  Care Discussed with Consultants/Other Providers [Y/N]: Y vasx   Prior or Outpatient Records Reviewed [Y/N]:

## 2023-11-02 NOTE — PROGRESS NOTE ADULT - SUBJECTIVE AND OBJECTIVE BOX
Overnight Events:     Review Of Systems: No chest pain, shortness of breath, or palpitations            Current Meds:  aMIOdarone    Tablet 100 milliGRAM(s) Oral daily  aspirin enteric coated 81 milliGRAM(s) Oral daily  atorvastatin 80 milliGRAM(s) Oral at bedtime  buMETAnide Injectable 2 milliGRAM(s) IV Push two times a day  levothyroxine 50 MICROGram(s) Oral daily  metoprolol succinate ER 25 milliGRAM(s) Oral daily  pantoprazole    Tablet 40 milliGRAM(s) Oral before breakfast  piperacillin/tazobactam IVPB.. 3.375 Gram(s) IV Intermittent every 8 hours  potassium chloride   Powder 40 milliEquivalent(s) Oral every 4 hours  silver nitrate Applicator 1 Application(s) Topical once  valsartan 40 milliGRAM(s) Oral two times a day      Vitals:  T(F): 98.3 (11-02), Max: 98.7 (11-02)  HR: 76 (11-02) (76 - 86)  BP: 102/42 (11-02) (101/49 - 118/57)  RR: 18 (11-02)  SpO2: 92% (11-02)  I&O's Summary    01 Nov 2023 07:01  -  02 Nov 2023 07:00  --------------------------------------------------------  IN: 120 mL / OUT: 1500 mL / NET: -1380 mL    02 Nov 2023 07:01  -  02 Nov 2023 11:45  --------------------------------------------------------  IN: 240 mL / OUT: 550 mL / NET: -310 mL        Physical Exam:  GEN: comfortable appearing, lying in bed in NAD  HEENT: NCAT, MMM  CV: Regular S1, S2, no m/r/g  RESP: CTAB  ABD: Soft, NTND, +BS  EXT: No LE edema, WWP, pulses palpable throughout  NEURO: No focal deficits, AOx3  SKIN:  No rashes                          11.1   9.67  )-----------( 247      ( 02 Nov 2023 05:27 )             33.9     11-02    130<L>  |  90<L>  |  14  ----------------------------<  142<H>  2.8<LL>   |  32<H>  |  0.82    Ca    8.6      02 Nov 2023 05:27  Phos  2.6     11-02  Mg     2.00     11-02

## 2023-11-02 NOTE — PROGRESS NOTE ADULT - PROBLEM SELECTOR PLAN 2
- Echo from 9/23 with EF of 20-25% and global left ventricular hypokinesis.  - Patient w/ fluid overload ; LE edema   - Continue Toprol 25mg daily    - Valsartan 40mg PO BID  - Monitor I/O's, daily weights  - monitor Cr   - noted K 2.8 ; BMP BID and as replete K needed   - HCO3 rising consider decreasing diuresis given neg fluid balance last 2 days   - diuresis as per cards   - f/u cards recs

## 2023-11-03 LAB
ANION GAP SERPL CALC-SCNC: 11 MMOL/L — SIGNIFICANT CHANGE UP (ref 7–14)
ANION GAP SERPL CALC-SCNC: 11 MMOL/L — SIGNIFICANT CHANGE UP (ref 7–14)
APTT BLD: 29.5 SEC — SIGNIFICANT CHANGE UP (ref 24.5–35.6)
APTT BLD: 29.5 SEC — SIGNIFICANT CHANGE UP (ref 24.5–35.6)
BLD GP AB SCN SERPL QL: NEGATIVE — SIGNIFICANT CHANGE UP
BLD GP AB SCN SERPL QL: NEGATIVE — SIGNIFICANT CHANGE UP
BUN SERPL-MCNC: 14 MG/DL — SIGNIFICANT CHANGE UP (ref 7–23)
BUN SERPL-MCNC: 14 MG/DL — SIGNIFICANT CHANGE UP (ref 7–23)
CALCIUM SERPL-MCNC: 8.6 MG/DL — SIGNIFICANT CHANGE UP (ref 8.4–10.5)
CALCIUM SERPL-MCNC: 8.6 MG/DL — SIGNIFICANT CHANGE UP (ref 8.4–10.5)
CHLORIDE SERPL-SCNC: 94 MMOL/L — LOW (ref 98–107)
CHLORIDE SERPL-SCNC: 94 MMOL/L — LOW (ref 98–107)
CO2 SERPL-SCNC: 29 MMOL/L — SIGNIFICANT CHANGE UP (ref 22–31)
CO2 SERPL-SCNC: 29 MMOL/L — SIGNIFICANT CHANGE UP (ref 22–31)
CREAT SERPL-MCNC: 0.83 MG/DL — SIGNIFICANT CHANGE UP (ref 0.5–1.3)
CREAT SERPL-MCNC: 0.83 MG/DL — SIGNIFICANT CHANGE UP (ref 0.5–1.3)
EGFR: 70 ML/MIN/1.73M2 — SIGNIFICANT CHANGE UP
EGFR: 70 ML/MIN/1.73M2 — SIGNIFICANT CHANGE UP
GLUCOSE SERPL-MCNC: 161 MG/DL — HIGH (ref 70–99)
GLUCOSE SERPL-MCNC: 161 MG/DL — HIGH (ref 70–99)
HCT VFR BLD CALC: 34.8 % — SIGNIFICANT CHANGE UP (ref 34.5–45)
HCT VFR BLD CALC: 34.8 % — SIGNIFICANT CHANGE UP (ref 34.5–45)
HGB BLD-MCNC: 11.3 G/DL — LOW (ref 11.5–15.5)
HGB BLD-MCNC: 11.3 G/DL — LOW (ref 11.5–15.5)
INR BLD: 1.13 RATIO — SIGNIFICANT CHANGE UP (ref 0.85–1.18)
INR BLD: 1.13 RATIO — SIGNIFICANT CHANGE UP (ref 0.85–1.18)
MAGNESIUM SERPL-MCNC: 2.1 MG/DL — SIGNIFICANT CHANGE UP (ref 1.6–2.6)
MAGNESIUM SERPL-MCNC: 2.1 MG/DL — SIGNIFICANT CHANGE UP (ref 1.6–2.6)
MCHC RBC-ENTMCNC: 30.5 PG — SIGNIFICANT CHANGE UP (ref 27–34)
MCHC RBC-ENTMCNC: 30.5 PG — SIGNIFICANT CHANGE UP (ref 27–34)
MCHC RBC-ENTMCNC: 32.5 GM/DL — SIGNIFICANT CHANGE UP (ref 32–36)
MCHC RBC-ENTMCNC: 32.5 GM/DL — SIGNIFICANT CHANGE UP (ref 32–36)
MCV RBC AUTO: 93.8 FL — SIGNIFICANT CHANGE UP (ref 80–100)
MCV RBC AUTO: 93.8 FL — SIGNIFICANT CHANGE UP (ref 80–100)
NRBC # BLD: 0 /100 WBCS — SIGNIFICANT CHANGE UP (ref 0–0)
NRBC # BLD: 0 /100 WBCS — SIGNIFICANT CHANGE UP (ref 0–0)
NRBC # FLD: 0 K/UL — SIGNIFICANT CHANGE UP (ref 0–0)
NRBC # FLD: 0 K/UL — SIGNIFICANT CHANGE UP (ref 0–0)
PHOSPHATE SERPL-MCNC: 2.7 MG/DL — SIGNIFICANT CHANGE UP (ref 2.5–4.5)
PHOSPHATE SERPL-MCNC: 2.7 MG/DL — SIGNIFICANT CHANGE UP (ref 2.5–4.5)
PLATELET # BLD AUTO: 242 K/UL — SIGNIFICANT CHANGE UP (ref 150–400)
PLATELET # BLD AUTO: 242 K/UL — SIGNIFICANT CHANGE UP (ref 150–400)
POTASSIUM SERPL-MCNC: 3.6 MMOL/L — SIGNIFICANT CHANGE UP (ref 3.5–5.3)
POTASSIUM SERPL-MCNC: 3.6 MMOL/L — SIGNIFICANT CHANGE UP (ref 3.5–5.3)
POTASSIUM SERPL-SCNC: 3.6 MMOL/L — SIGNIFICANT CHANGE UP (ref 3.5–5.3)
POTASSIUM SERPL-SCNC: 3.6 MMOL/L — SIGNIFICANT CHANGE UP (ref 3.5–5.3)
PROTHROM AB SERPL-ACNC: 12.6 SEC — SIGNIFICANT CHANGE UP (ref 9.5–13)
PROTHROM AB SERPL-ACNC: 12.6 SEC — SIGNIFICANT CHANGE UP (ref 9.5–13)
RBC # BLD: 3.71 M/UL — LOW (ref 3.8–5.2)
RBC # BLD: 3.71 M/UL — LOW (ref 3.8–5.2)
RBC # FLD: 17.5 % — HIGH (ref 10.3–14.5)
RBC # FLD: 17.5 % — HIGH (ref 10.3–14.5)
RH IG SCN BLD-IMP: POSITIVE — SIGNIFICANT CHANGE UP
RH IG SCN BLD-IMP: POSITIVE — SIGNIFICANT CHANGE UP
SODIUM SERPL-SCNC: 134 MMOL/L — LOW (ref 135–145)
SODIUM SERPL-SCNC: 134 MMOL/L — LOW (ref 135–145)
WBC # BLD: 11.03 K/UL — HIGH (ref 3.8–10.5)
WBC # BLD: 11.03 K/UL — HIGH (ref 3.8–10.5)
WBC # FLD AUTO: 11.03 K/UL — HIGH (ref 3.8–10.5)
WBC # FLD AUTO: 11.03 K/UL — HIGH (ref 3.8–10.5)

## 2023-11-03 PROCEDURE — 99233 SBSQ HOSP IP/OBS HIGH 50: CPT

## 2023-11-03 PROCEDURE — 99232 SBSQ HOSP IP/OBS MODERATE 35: CPT

## 2023-11-03 PROCEDURE — 99152 MOD SED SAME PHYS/QHP 5/>YRS: CPT

## 2023-11-03 PROCEDURE — 75625 CONTRAST EXAM ABDOMINL AORTA: CPT | Mod: 26

## 2023-11-03 PROCEDURE — 75710 ARTERY X-RAYS ARM/LEG: CPT | Mod: 26,59,LT

## 2023-11-03 PROCEDURE — 75716 ARTERY X-RAYS ARMS/LEGS: CPT | Mod: 26

## 2023-11-03 PROCEDURE — 36200 PLACE CATHETER IN AORTA: CPT | Mod: 58

## 2023-11-03 RX ORDER — COLLAGENASE CLOSTRIDIUM HIST. 250 UNIT/G
1 OINTMENT (GRAM) TOPICAL ONCE
Refills: 0 | Status: COMPLETED | OUTPATIENT
Start: 2023-11-03 | End: 2023-11-11

## 2023-11-03 RX ADMIN — BUMETANIDE 2 MILLIGRAM(S): 0.25 INJECTION INTRAMUSCULAR; INTRAVENOUS at 06:59

## 2023-11-03 RX ADMIN — Medication 1 APPLICATION(S): at 12:14

## 2023-11-03 RX ADMIN — POTASSIUM PHOSPHATE, MONOBASIC POTASSIUM PHOSPHATE, DIBASIC 62.5 MILLIMOLE(S): 236; 224 INJECTION, SOLUTION INTRAVENOUS at 02:09

## 2023-11-03 RX ADMIN — PIPERACILLIN AND TAZOBACTAM 25 GRAM(S): 4; .5 INJECTION, POWDER, LYOPHILIZED, FOR SOLUTION INTRAVENOUS at 07:03

## 2023-11-03 RX ADMIN — Medication 81 MILLIGRAM(S): at 18:42

## 2023-11-03 RX ADMIN — VALSARTAN 40 MILLIGRAM(S): 80 TABLET ORAL at 18:43

## 2023-11-03 RX ADMIN — PIPERACILLIN AND TAZOBACTAM 25 GRAM(S): 4; .5 INJECTION, POWDER, LYOPHILIZED, FOR SOLUTION INTRAVENOUS at 14:37

## 2023-11-03 RX ADMIN — Medication 100 GRAM(S): at 03:28

## 2023-11-03 NOTE — DIETITIAN INITIAL EVALUATION ADULT - ORAL INTAKE PTA/DIET HISTORY
Pt off of unit during visit, unable to obtain nutrition hx. Per chart Pt admitted from [facility].

## 2023-11-03 NOTE — DIETITIAN INITIAL EVALUATION ADULT - PERTINENT LABORATORY DATA
11-03    134<L>  |  94<L>  |  14  ----------------------------<  161<H>  3.6   |  29  |  0.83    Ca    8.6      03 Nov 2023 05:22  Phos  2.7     11-03  Mg     2.10     11-03    A1C with Estimated Average Glucose Result: 5.4 % (09-25-23 @ 05:50)  A1C with Estimated Average Glucose Result: 5.6 % (09-04-23 @ 03:07)

## 2023-11-03 NOTE — PROGRESS NOTE ADULT - SUBJECTIVE AND OBJECTIVE BOX
Vascular Surgery Daily Resident Progress Note    OVERNIGHT:  No acute events.     SUBJECTIVE: Patient seen and examined at bedside on AM rounds. Patient endorses no complaints. Denies chest pain, SOB, fever, chills.      OBJECTIVE:  Vital Signs Last 24 Hrs  T(C): 36.8 (03 Nov 2023 05:24), Max: 37.2 (02 Nov 2023 16:07)  T(F): 98.2 (03 Nov 2023 05:24), Max: 98.9 (02 Nov 2023 16:07)  HR: 85 (03 Nov 2023 05:24) (76 - 89)  BP: 111/49 (03 Nov 2023 05:24) (99/45 - 113/48)  BP(mean): --  RR: 18 (03 Nov 2023 05:24) (18 - 18)  SpO2: 95% (03 Nov 2023 05:24) (92% - 99%)    Parameters below as of 03 Nov 2023 05:24  Patient On (Oxygen Delivery Method): room air        General Appearance: Appears well, NAD  Neck: Supple  Chest: Equal expansion bilaterally, equal breath sounds  CV: Pulse regular presently  Extremities: moving extremities. RLE edema worsen than LLE edema.   Vascular: Dressing to medial L calf C/D/I. Vac to L groin holding suction. L DP/PT absent, motor/sensory intact      Medications:  MEDICATIONS  (STANDING):  aMIOdarone    Tablet 100 milliGRAM(s) Oral daily  aspirin enteric coated 81 milliGRAM(s) Oral daily  atorvastatin 80 milliGRAM(s) Oral at bedtime  buMETAnide Injectable 2 milliGRAM(s) IV Push two times a day  collagenase Ointment 1 Application(s) Topical daily  collagenase Ointment 1 Application(s) Topical once  levothyroxine 50 MICROGram(s) Oral daily  metoprolol succinate ER 25 milliGRAM(s) Oral daily  pantoprazole    Tablet 40 milliGRAM(s) Oral before breakfast  piperacillin/tazobactam IVPB.. 3.375 Gram(s) IV Intermittent every 8 hours  silver nitrate Applicator 1 Application(s) Topical once  valsartan 40 milliGRAM(s) Oral two times a day    MEDICATIONS  (PRN):    Allergies:  latex (Unknown)  sulfa drugs (Unknown)      Labs:  11-03    134<L>  |  94<L>  |  14  ----------------------------<  161<H>  3.6   |  29  |  0.83    Ca    8.6      03 Nov 2023 05:22  Phos  2.7     11-03  Mg     2.10     11-03                            11.3   11.03 )-----------( 242      ( 03 Nov 2023 05:22 )             34.8     PT/INR - ( 03 Nov 2023 05:22 )   PT: 12.6 sec;   INR: 1.13 ratio         PTT - ( 03 Nov 2023 05:22 )  PTT:29.5 sec

## 2023-11-03 NOTE — PROGRESS NOTE ADULT - ASSESSMENT
New ECG(s): Personally reviewed    Echo: 9/26/23   1. Technically difficult image quality.   2. Left ventricular systolic function is severely decreased with an ejection fraction visually estimated at 20 to 25 %. There is global left ventricular hypokinesis. No evidence of a thrombus in the left ventricle.   3. There is mild (grade 1) left ventricular diastolic dysfunction.   4. Right ventricular cavity is mildly enlarged in size and reduced systolic function.   5. Mild mitral regurgitation.   6. Aortic valve anatomy cannot be determined with reduced systolic excursion. severe calcification of the aortic valve leaflets. severe aortic stenosis.   7. Severe aortic stenosis (estimated aortic valve area ~ 0.5 cm2 (by the continuity equation).   8. Mild aortic regurgitation.   9. The left atrium is mildly dilated in size.  10. Left pleural effusion noted.  11. No prior echocardiogram is available for comparison.    Cath:  5/2023  Moderate atherosclerosis in LM and LAD.  of dRCA with robust collateral from LAD.  Imaging:    Interpretation of Telemetry: Sinus @ 90s    83F with PMHx of CAD, pAF on Eliquis, severe AS pending TAVR, R internal carotid stenosis, HTN, HLD, hypothyroidism, anxiety, PAD and chronic L toe wounds s/p L ilioprofunda bypass with reverse GSV graft to peroneal 8/31/23 (Dr. Tavares) c/b large L groin abscess (17cm) requiring removal of infected bypass graft of lower extremity and groin washout on 9/24/23. Admitted for vascular evaluation of LE. Cardiology consulted for optimization prior to peripheral angiogram. Continues to appear grossly overloaded on exam.    #Acute on Chronic HFrEF (25%) Exacerbation  - cont bumex 2mg IV BID  -- cont spot diuresis as needed  -- Close monitoring for allergic reaction recommended.  - BID Lytes while actively diuresing  -- goal K > 4, Mg > 2  -- Strict I/O and daily standing weight  - cont valsartan 40mg BID  -- Eventually will benefit from transitioning to ARNI.  - cont metoprolol succinate 25mg daily  - consider MRA and SGLT2 inhibitors as outpatient.    #Severe AS  - currently Asymptomatic while she is on bed.  - pending TAVR per documentation  - monitor on tele, monitor VS    # pAFib  - given [id number], patient will benefit from AC. Given her age of 83 and weight below 60kg, patient is reasonable to be on reduced dose of NOAC: eliquis 2.5mg BID.   - hold AC as needed by primary team/surgery.  - continue home amiodarone  - continue BB as above      All recommendations pending attending attestation. We will sign off, please re-consult with any concerns    Tabby Zhang MD  PGY-4, Cardiology  Available on TEAMS    For all new consults  [email or web address]  Login: [email or web address]   New ECG(s): Personally reviewed    Echo: 9/26/23   1. Technically difficult image quality.   2. Left ventricular systolic function is severely decreased with an ejection fraction visually estimated at 20 to 25 %. There is global left ventricular hypokinesis. No evidence of a thrombus in the left ventricle.   3. There is mild (grade 1) left ventricular diastolic dysfunction.   4. Right ventricular cavity is mildly enlarged in size and reduced systolic function.   5. Mild mitral regurgitation.   6. Aortic valve anatomy cannot be determined with reduced systolic excursion. severe calcification of the aortic valve leaflets. severe aortic stenosis.   7. Severe aortic stenosis (estimated aortic valve area ~ 0.5 cm2 (by the continuity equation).   8. Mild aortic regurgitation.   9. The left atrium is mildly dilated in size.  10. Left pleural effusion noted.  11. No prior echocardiogram is available for comparison.    Cath:  5/2023  Moderate atherosclerosis in LM and LAD.  of dRCA with robust collateral from LAD.  Imaging:    Interpretation of Telemetry: Sinus @ 90s    83F with PMHx of CAD, pAF on Eliquis, severe AS pending TAVR, R internal carotid stenosis, HTN, HLD, hypothyroidism, anxiety, PAD and chronic L toe wounds s/p L ilioprofunda bypass with reverse GSV graft to peroneal 8/31/23 (Dr. Tavares) c/b large L groin abscess (17cm) requiring removal of infected bypass graft of lower extremity and groin washout on 9/24/23. Admitted for vascular evaluation of LE. Cardiology consulted for optimization prior to peripheral angiogram. Continues to appear grossly overloaded on exam.    #Acute on Chronic HFrEF (25%) Exacerbation  - cont bumex 2mg IV BID  -- Close monitoring for allergic reaction recommended.  - BID Lytes while actively diuresing  -- goal K > 4, Mg > 2  -- Strict I/O and daily standing weight  - cont valsartan 40mg BID  -- Eventually will benefit from transitioning to ARNI.  - cont metoprolol succinate 25mg daily  - consider MRA and SGLT2 inhibitors as outpatient.    #Severe AS  - currently Asymptomatic while she is on bed.  - pending TAVR per documentation  - monitor on tele, monitor VS    # pAFib  - given [id number], patient will benefit from AC. Given her age of 83 and weight below 60kg, patient is reasonable to be on reduced dose of NOAC: eliquis 2.5mg BID.   - hold AC as needed by primary team/surgery.  - continue home amiodarone  - continue BB as above      All recommendations pending attending attestation. We will sign off, please re-consult with any concerns    Tabby Zhang MD  PGY-4, Cardiology  Available on TEAMS    For all new consults  [email or web address]  Login: [email or web address]

## 2023-11-03 NOTE — PROGRESS NOTE ADULT - SUBJECTIVE AND OBJECTIVE BOX
[facility] Division of Hospital Medicine  Garry Stahl MD  Pager (M-F, 8A-5P): [phone]  Other Times:  [phone]    Patient is a 83y old  Female who presents with a chief complaint of Ruther 5 CLTI left toe wounds (03 Nov 2023 07:45)      SUBJECTIVE / OVERNIGHT EVENTS: vascular changing dressing of LT leg wound. neg fluid balance; denies any complaints       MEDICATIONS  (STANDING):  aMIOdarone    Tablet 100 milliGRAM(s) Oral daily  aspirin enteric coated 81 milliGRAM(s) Oral daily  atorvastatin 80 milliGRAM(s) Oral at bedtime  buMETAnide Injectable 2 milliGRAM(s) IV Push two times a day  collagenase Ointment 1 Application(s) Topical once  collagenase Ointment 1 Application(s) Topical daily  levothyroxine 50 MICROGram(s) Oral daily  metoprolol succinate ER 25 milliGRAM(s) Oral daily  pantoprazole    Tablet 40 milliGRAM(s) Oral before breakfast  piperacillin/tazobactam IVPB.. 3.375 Gram(s) IV Intermittent every 8 hours  silver nitrate Applicator 1 Application(s) Topical once  valsartan 40 milliGRAM(s) Oral two times a day    MEDICATIONS  (PRN):      CAPILLARY BLOOD GLUCOSE        I&O's Summary    02 Nov 2023 07:01  -  03 Nov 2023 07:00  --------------------------------------------------------  IN: 760 mL / OUT: 1650 mL / NET: -890 mL    03 Nov 2023 07:01  -  03 Nov 2023 10:58  --------------------------------------------------------  IN: 0 mL / OUT: 300 mL / NET: -300 mL        PHYSICAL EXAM:  Vital Signs Last 24 Hrs  T(C): 36.9 (03 Nov 2023 08:56), Max: 37.2 (02 Nov 2023 16:07)  T(F): 98.4 (03 Nov 2023 08:56), Max: 98.9 (02 Nov 2023 16:07)  HR: 78 (03 Nov 2023 08:56) (78 - 89)  BP: 100/50 (03 Nov 2023 08:56) (99/45 - 113/48)  BP(mean): --  RR: 18 (03 Nov 2023 08:56) (18 - 18)  SpO2: 96% (03 Nov 2023 08:56) (95% - 99%)    Parameters below as of 03 Nov 2023 08:56  Patient On (Oxygen Delivery Method): room air    GENERAL: NAD, well-developed  EYES: EOMI, conjunctiva and sclera clear  NECK: Supple, No JVD  CHEST/LUNG: Clear to auscultation bilaterally; No wheeze  HEART: Regular rate and rhythm; S1S2; HUMA 3/6 radiating to carotid   ABDOMEN: Soft, Nontender, Nondistended; Bowel sounds present  EXTREMITIES: L foot bandaged. +LE edema  R>L; Lt leg wound clean and dry   PSYCH: awake alert answers questions appropriately      LABS:                        11.3   11.03 )-----------( 242      ( 03 Nov 2023 05:22 )             34.8     11-03    134<L>  |  94<L>  |  14  ----------------------------<  161<H>  3.6   |  29  |  0.83    Ca    8.6      03 Nov 2023 05:22  Phos  2.7     11-03  Mg     2.10     11-03      PT/INR - ( 03 Nov 2023 05:22 )   PT: 12.6 sec;   INR: 1.13 ratio         PTT - ( 03 Nov 2023 05:22 )  PTT:29.5 sec      Urinalysis Basic - ( 03 Nov 2023 05:22 )    Color: x / Appearance: x / SG: x / pH: x  Gluc: 161 mg/dL / Ketone: x  / Bili: x / Urobili: x   Blood: x / Protein: x / Nitrite: x   Leuk Esterase: x / RBC: x / WBC x   Sq Epi: x / Non Sq Epi: x / Bacteria: x          RADIOLOGY & ADDITIONAL TESTS:  Results Reviewed:   Imaging Personally Reviewed:  Electrocardiogram Personally Reviewed:    COORDINATION OF CARE:  Care Discussed with Consultants/Other Providers [Y/N]:  Prior or Outpatient Records Reviewed [Y/N]:

## 2023-11-03 NOTE — PROGRESS NOTE ADULT - ASSESSMENT
83F with PMHx of CAD, pAF on Eliquis, severe AS pending TAVR, R internal carotid stenosis, HTN, HLD, hypothyroidism, anxiety, PAD and chronic L toe wounds s/p L ilioprofunda bypass with reverse GSV graft to peroneal 8/31/23 (Dr. Tavares) c/b large L groin abscess (17cm) requiring removal of infected bypass graft of lower extremity and groin washout on 9/24/23. Wound care involved for R groin wound and R calf incision wound. Patient represented from rehab concerns for "cold foot". Pt is poor historian, but she says the wounds have been worse over the 2 days prior to presentation. Remains afebrile on Zosyn, planning for LLE angiogram on Friday.     PLAN  - Diet: NPO  - OR today for LLE angiogram  - F/u cardiology, appreciate recs: Bumex 1mg BID, daily EKG  - F/u medicine for optimization  - LE duplex to r/o DVT for LE edema   - Wound care following - groin vac   - pods following   - Dispo: pending    Vascular Surgery  [id number]

## 2023-11-03 NOTE — PROGRESS NOTE ADULT - ASSESSMENT
83F w/ cad, paroxysmal Afib on Eliquis, severe AS pending TAVR, R internal carotid stenosis, HTN, HLD, hypothyroidism, anxiety, PAD and chronic L toe wounds s/p L ilioprofunda bypass with reverse GSV graft to peroneal 8/31/23 (Dr. Tavares) c/b large L groin abscess (17cm) requiring removal of infected bypass graft of lower extremity and groin washout on 9/24/23.  Pt has been experiencing increased pain at the LLE and now concerning for limb ischemia. c/b HF

## 2023-11-03 NOTE — PROGRESS NOTE ADULT - PROBLEM SELECTOR PLAN 2
- Echo from 9/23 with EF of 20-25% and global left ventricular hypokinesis.  - Patient w/ fluid overload ; LE edema   - Continue Toprol 25mg daily    - Valsartan 40mg PO BID  - Monitor I/O's, daily weights  - monitor Cr  - diuresis as per cards   - f/u cards recs

## 2023-11-03 NOTE — DIETITIAN INITIAL EVALUATION ADULT - OTHER INFO
Medical course: Per chart 83F w/ cad, paroxysmal Afib on Eliquis, severe AS pending TAVR, R internal carotid stenosis, HTN, HLD, hypothyroidism, anxiety, PAD and chronic L toe wounds s/p L ilioprofunda bypass with reverse GSV graft to peroneal 8/31/23 (Dr. Tavares) c/b large L groin abscess (17cm) requiring removal of infected bypass graft of lower extremity and groin washout on 9/24/23.  Pt has been experiencing increased pain at the LLE and now concerning for limb ischemia. c/b HF     Nutrition interview: Pt known to this RD from previous admission. Unable to conduct nutrition interview, Pt off of unit during visit. No reported episodes of nausea, vomiting, diarrhea or constipation, last BM noted on 11/2 per RN flowsheets. No known food allergies. Per HIE UBW 134lb >1 year. Current weight indicating weight loss. Food preferences explored and noted. Intake is 50-75% per RN flowsheets and per pt. Feeding skills: tray set up.   No hx of DM, A1c 5.4%. POCT slightly elevated 150-160mg/dL. Pt now NPO for procedure.

## 2023-11-03 NOTE — PROGRESS NOTE ADULT - SUBJECTIVE AND OBJECTIVE BOX
Overnight Events: NAEO    Review Of Systems: No chest pain, shortness of breath, or palpitations            Current Meds:  aMIOdarone    Tablet 100 milliGRAM(s) Oral daily  aspirin enteric coated 81 milliGRAM(s) Oral daily  atorvastatin 80 milliGRAM(s) Oral at bedtime  buMETAnide Injectable 2 milliGRAM(s) IV Push two times a day  collagenase Ointment 1 Application(s) Topical daily  collagenase Ointment 1 Application(s) Topical once  levothyroxine 50 MICROGram(s) Oral daily  metoprolol succinate ER 25 milliGRAM(s) Oral daily  pantoprazole    Tablet 40 milliGRAM(s) Oral before breakfast  piperacillin/tazobactam IVPB.. 3.375 Gram(s) IV Intermittent every 8 hours  silver nitrate Applicator 1 Application(s) Topical once  valsartan 40 milliGRAM(s) Oral two times a day      Vitals:  T(F): 98.1 (11-03), Max: 98.9 (11-02)  HR: 83 (11-03) (78 - 89)  BP: 109/50 (11-03) (99/45 - 113/48)  RR: 17 (11-03)  SpO2: 100% (11-03)  I&O's Summary    02 Nov 2023 07:01  -  03 Nov 2023 07:00  --------------------------------------------------------  IN: 760 mL / OUT: 1650 mL / NET: -890 mL    03 Nov 2023 07:01  -  03 Nov 2023 12:10  --------------------------------------------------------  IN: 0 mL / OUT: 700 mL / NET: -700 mL        Physical Exam:  GEN: comfortable appearing, lying in bed in NAD  HEENT: NCAT, MMM  CV: Regular S1, S2, no m/r/g  RESP: CTAB  ABD: Soft, NTND, +BS  EXT: 2+ bl pitting LE edema, WWP, pulses palpable throughout  NEURO: No focal deficits, AOx3  SKIN:  No rashes                          11.3   11.03 )-----------( 242      ( 03 Nov 2023 05:22 )             34.8     11-03    134<L>  |  94<L>  |  14  ----------------------------<  161<H>  3.6   |  29  |  0.83    Ca    8.6      03 Nov 2023 05:22  Phos  2.7     11-03  Mg     2.10     11-03      PT/INR - ( 03 Nov 2023 05:22 )   PT: 12.6 sec;   INR: 1.13 ratio         PTT - ( 03 Nov 2023 05:22 )  PTT:29.5 sec

## 2023-11-03 NOTE — DIETITIAN INITIAL EVALUATION ADULT - NS FNS ENTERAL CURRENT ORDER
Alert and oriented, no focal deficits, no motor or sensory deficits.
Current diet order meets estimated nutrient requirements

## 2023-11-03 NOTE — DIETITIAN INITIAL EVALUATION ADULT - ADD RECOMMEND
1) When PO diet initiated, recommend CSTCHO diet  2) Monitor weights, labs, BM's, skin integrity, p.o. intake.   3) Encourage PO intake and honor food preferences as able.

## 2023-11-03 NOTE — PROGRESS NOTE ADULT - PROBLEM SELECTOR PLAN 1
- s/p left foot partial hallux amputation 9/6, now admitted w/ left foot partial hallux amputation dehiscence and forefoot ischemic changes  - L foot xray: no OM, no gas, no fracture   - L foot wound growing VRE and staph epi; ID appreciated wound cx likely colonization   - antibiotics as per primary ( zosyn 10/27- )  - noted R>L asymmetric swelling prior US 8/23 was neg for DVT but with superficial thrombosis of the lesser saphenous vein in both calves. Diameters of the right and left greater and lesser saphenous veins.; 10/31 LE duplex neg for DVT  - Plan for LLE angiogram today no objection to angiogram if optimized from cardiology standpoint given severe AS and Acute HF   - Podiatry plans local wound care vs TMA pending vascular w/u

## 2023-11-03 NOTE — DIETITIAN INITIAL EVALUATION ADULT - NS FNS DIET ORDER
Diet, NPO after Midnight:      NPO Start Date: 02-Nov-2023,   NPO Start Time: 23:59 (11-02-23 @ 17:28)

## 2023-11-04 LAB
ANION GAP SERPL CALC-SCNC: 14 MMOL/L — SIGNIFICANT CHANGE UP (ref 7–14)
ANION GAP SERPL CALC-SCNC: 14 MMOL/L — SIGNIFICANT CHANGE UP (ref 7–14)
BUN SERPL-MCNC: 14 MG/DL — SIGNIFICANT CHANGE UP (ref 7–23)
BUN SERPL-MCNC: 14 MG/DL — SIGNIFICANT CHANGE UP (ref 7–23)
CALCIUM SERPL-MCNC: 8.9 MG/DL — SIGNIFICANT CHANGE UP (ref 8.4–10.5)
CALCIUM SERPL-MCNC: 8.9 MG/DL — SIGNIFICANT CHANGE UP (ref 8.4–10.5)
CHLORIDE SERPL-SCNC: 94 MMOL/L — LOW (ref 98–107)
CHLORIDE SERPL-SCNC: 94 MMOL/L — LOW (ref 98–107)
CO2 SERPL-SCNC: 28 MMOL/L — SIGNIFICANT CHANGE UP (ref 22–31)
CO2 SERPL-SCNC: 28 MMOL/L — SIGNIFICANT CHANGE UP (ref 22–31)
CREAT SERPL-MCNC: 0.74 MG/DL — SIGNIFICANT CHANGE UP (ref 0.5–1.3)
CREAT SERPL-MCNC: 0.74 MG/DL — SIGNIFICANT CHANGE UP (ref 0.5–1.3)
EGFR: 81 ML/MIN/1.73M2 — SIGNIFICANT CHANGE UP
EGFR: 81 ML/MIN/1.73M2 — SIGNIFICANT CHANGE UP
GLUCOSE SERPL-MCNC: 114 MG/DL — HIGH (ref 70–99)
GLUCOSE SERPL-MCNC: 114 MG/DL — HIGH (ref 70–99)
HCT VFR BLD CALC: 32.9 % — LOW (ref 34.5–45)
HCT VFR BLD CALC: 32.9 % — LOW (ref 34.5–45)
HGB BLD-MCNC: 11 G/DL — LOW (ref 11.5–15.5)
HGB BLD-MCNC: 11 G/DL — LOW (ref 11.5–15.5)
MAGNESIUM SERPL-MCNC: 2 MG/DL — SIGNIFICANT CHANGE UP (ref 1.6–2.6)
MAGNESIUM SERPL-MCNC: 2 MG/DL — SIGNIFICANT CHANGE UP (ref 1.6–2.6)
MCHC RBC-ENTMCNC: 31.3 PG — SIGNIFICANT CHANGE UP (ref 27–34)
MCHC RBC-ENTMCNC: 31.3 PG — SIGNIFICANT CHANGE UP (ref 27–34)
MCHC RBC-ENTMCNC: 33.4 GM/DL — SIGNIFICANT CHANGE UP (ref 32–36)
MCHC RBC-ENTMCNC: 33.4 GM/DL — SIGNIFICANT CHANGE UP (ref 32–36)
MCV RBC AUTO: 93.5 FL — SIGNIFICANT CHANGE UP (ref 80–100)
MCV RBC AUTO: 93.5 FL — SIGNIFICANT CHANGE UP (ref 80–100)
NRBC # BLD: 0 /100 WBCS — SIGNIFICANT CHANGE UP (ref 0–0)
NRBC # BLD: 0 /100 WBCS — SIGNIFICANT CHANGE UP (ref 0–0)
NRBC # FLD: 0 K/UL — SIGNIFICANT CHANGE UP (ref 0–0)
NRBC # FLD: 0 K/UL — SIGNIFICANT CHANGE UP (ref 0–0)
PHOSPHATE SERPL-MCNC: 2.6 MG/DL — SIGNIFICANT CHANGE UP (ref 2.5–4.5)
PHOSPHATE SERPL-MCNC: 2.6 MG/DL — SIGNIFICANT CHANGE UP (ref 2.5–4.5)
PLATELET # BLD AUTO: 256 K/UL — SIGNIFICANT CHANGE UP (ref 150–400)
PLATELET # BLD AUTO: 256 K/UL — SIGNIFICANT CHANGE UP (ref 150–400)
POTASSIUM SERPL-MCNC: 3.4 MMOL/L — LOW (ref 3.5–5.3)
POTASSIUM SERPL-MCNC: 3.4 MMOL/L — LOW (ref 3.5–5.3)
POTASSIUM SERPL-SCNC: 3.4 MMOL/L — LOW (ref 3.5–5.3)
POTASSIUM SERPL-SCNC: 3.4 MMOL/L — LOW (ref 3.5–5.3)
RBC # BLD: 3.52 M/UL — LOW (ref 3.8–5.2)
RBC # BLD: 3.52 M/UL — LOW (ref 3.8–5.2)
RBC # FLD: 17.5 % — HIGH (ref 10.3–14.5)
RBC # FLD: 17.5 % — HIGH (ref 10.3–14.5)
SODIUM SERPL-SCNC: 136 MMOL/L — SIGNIFICANT CHANGE UP (ref 135–145)
SODIUM SERPL-SCNC: 136 MMOL/L — SIGNIFICANT CHANGE UP (ref 135–145)
WBC # BLD: 13.18 K/UL — HIGH (ref 3.8–10.5)
WBC # BLD: 13.18 K/UL — HIGH (ref 3.8–10.5)
WBC # FLD AUTO: 13.18 K/UL — HIGH (ref 3.8–10.5)
WBC # FLD AUTO: 13.18 K/UL — HIGH (ref 3.8–10.5)

## 2023-11-04 PROCEDURE — 99232 SBSQ HOSP IP/OBS MODERATE 35: CPT

## 2023-11-04 RX ORDER — POTASSIUM CHLORIDE 20 MEQ
40 PACKET (EA) ORAL ONCE
Refills: 0 | Status: COMPLETED | OUTPATIENT
Start: 2023-11-04 | End: 2023-11-04

## 2023-11-04 RX ORDER — ONDANSETRON 8 MG/1
4 TABLET, FILM COATED ORAL ONCE
Refills: 0 | Status: COMPLETED | OUTPATIENT
Start: 2023-11-04 | End: 2023-11-12

## 2023-11-04 RX ORDER — HYDROMORPHONE HYDROCHLORIDE 2 MG/ML
0.5 INJECTION INTRAMUSCULAR; INTRAVENOUS; SUBCUTANEOUS ONCE
Refills: 0 | Status: DISCONTINUED | OUTPATIENT
Start: 2023-11-04 | End: 2023-11-04

## 2023-11-04 RX ORDER — SODIUM,POTASSIUM PHOSPHATES 278-250MG
1 POWDER IN PACKET (EA) ORAL ONCE
Refills: 0 | Status: COMPLETED | OUTPATIENT
Start: 2023-11-04 | End: 2023-11-04

## 2023-11-04 RX ORDER — APIXABAN 2.5 MG/1
2.5 TABLET, FILM COATED ORAL EVERY 12 HOURS
Refills: 0 | Status: DISCONTINUED | OUTPATIENT
Start: 2023-11-04 | End: 2023-11-06

## 2023-11-04 RX ADMIN — Medication 1 TABLET(S): at 21:42

## 2023-11-04 RX ADMIN — HYDROMORPHONE HYDROCHLORIDE 0.5 MILLIGRAM(S): 2 INJECTION INTRAMUSCULAR; INTRAVENOUS; SUBCUTANEOUS at 14:39

## 2023-11-04 RX ADMIN — AMIODARONE HYDROCHLORIDE 100 MILLIGRAM(S): 400 TABLET ORAL at 07:10

## 2023-11-04 RX ADMIN — APIXABAN 2.5 MILLIGRAM(S): 2.5 TABLET, FILM COATED ORAL at 17:41

## 2023-11-04 RX ADMIN — BUMETANIDE 2 MILLIGRAM(S): 0.25 INJECTION INTRAMUSCULAR; INTRAVENOUS at 13:07

## 2023-11-04 RX ADMIN — Medication 40 MILLIEQUIVALENT(S): at 21:43

## 2023-11-04 RX ADMIN — PANTOPRAZOLE SODIUM 40 MILLIGRAM(S): 20 TABLET, DELAYED RELEASE ORAL at 07:11

## 2023-11-04 RX ADMIN — VALSARTAN 40 MILLIGRAM(S): 80 TABLET ORAL at 07:11

## 2023-11-04 RX ADMIN — BUMETANIDE 2 MILLIGRAM(S): 0.25 INJECTION INTRAMUSCULAR; INTRAVENOUS at 06:41

## 2023-11-04 RX ADMIN — Medication 50 MICROGRAM(S): at 06:40

## 2023-11-04 NOTE — PROGRESS NOTE ADULT - ASSESSMENT
83F with PMHx of CAD, pAF on Eliquis, severe AS pending TAVR, R internal carotid stenosis, HTN, HLD, hypothyroidism, anxiety, PAD and chronic L toe wounds s/p L ilioprofunda bypass with reverse GSV graft to peroneal 8/31/23 (Dr. Tavares) c/b large L groin abscess (17cm) requiring removal of infected bypass graft of lower extremity and groin washout on 9/24/23. Wound care involved for R groin wound and R calf incision wound. Patient represented from rehab concerns for "cold foot". Pt is poor historian, but she says the wounds have been worse over the 2 days prior to presentation. She is now s/p diagnostic LLE angiogram on 11/3 notable for complete occlusion of the left external iliac artery. Remains afebrile and hemodynamically stable.    PLAN  - Diet: Regular  - Cardiology consulted, appreciate recs - continue Bumex 2mg BID  - Will reapply wound vac to groin and leg today  - Wound care following  - Dispo: pending    Vascular Surgery  [id number]

## 2023-11-04 NOTE — CHART NOTE - NSCHARTNOTEFT_GEN_A_CORE
Post Operative Note  Patient: NANO GARCIA 83y (1940) Female   MRN: [id number]  Location: [facility]8T 828 A  Visit: 10-27-23 Inpatient  Date: 11-04-23 @ 01:09    Procedure: S/P diagnostic LLE angiogram via R. groin access    Subjective: Patient seen and examined in chair post operatively. Reports no pain. Denies nausea, vomiting, fever, chills, chest pain, SOB, cough.      Objective:  Vitals: T(F): 98.5 (11-04-23 @ 00:12), Max: 98.6 (11-03-23 @ 01:42)  HR: 88 (11-04-23 @ 00:12)  BP: 115/53 (11-04-23 @ 00:12) (100/50 - 126/60)  RR: 17 (11-04-23 @ 00:12)  SpO2: 99% (11-04-23 @ 00:12)  Vent Settings:     In:   11-02-23 @ 07:01  -  11-03-23 @ 07:00  --------------------------------------------------------  IN: 760 mL    11-03-23 @ 07:01  -  11-04-23 @ 01:09  --------------------------------------------------------  IN: 150 mL      IV Fluids:     Out:   11-02-23 @ 07:01  -  11-03-23 @ 07:00  --------------------------------------------------------  OUT: 1650 mL    11-03-23 @ 07:01  -  11-04-23 @ 01:09  --------------------------------------------------------  OUT: 1000 mL      EBL:     Voided Urine:   11-02-23 @ 07:01  -  11-03-23 @ 07:00  --------------------------------------------------------  OUT: 1650 mL    11-03-23 @ 07:01  -  11-04-23 @ 01:09  --------------------------------------------------------  OUT: 1000 mL        Physical Examination:  General: NAD, resting comfortably in chair  HEENT: Normocephalic atraumatic  Respiratory: Nonlabored respirations, normal CW expansion.  Cardio:  regular rate and rhythm.  Abdomen: R. groin entry site c/d/i with no evidence of hematoma. L. groin dressing site c/d/i  Extremities: LLE distal pulses non palpable. LLE dressings c/d/i.       Imaging:  No post-op imaging studies    Assessment:  83yFemale patient S/P diagnostic LLE angiogram via R. groin access    Plan:  PLAN  - Diet: regular (DASH)  - F/u cardiology, appreciate recs: Bumex 1mg BID, daily EKG  - Wound care following - appreciate vac recs (currently no vac)  - pods following   - Dispo: pending    Vascular Surgery  [id number]    Date/Time: 11-04-23 @ 01:09

## 2023-11-04 NOTE — PROGRESS NOTE ADULT - SUBJECTIVE AND OBJECTIVE BOX
CHIEF COMPLAINT: f/u     SUBJECTIVE / OVERNIGHT EVENTS: Patient seen and examined. No acute events overnight. Pain well controlled and patient without any complaints.    MEDICATIONS  (STANDING):  aMIOdarone    Tablet 100 milliGRAM(s) Oral daily  apixaban 2.5 milliGRAM(s) Oral every 12 hours  aspirin enteric coated 81 milliGRAM(s) Oral daily  atorvastatin 80 milliGRAM(s) Oral at bedtime  buMETAnide Injectable 2 milliGRAM(s) IV Push two times a day  collagenase Ointment 1 Application(s) Topical once  collagenase Ointment 1 Application(s) Topical daily  levothyroxine 50 MICROGram(s) Oral daily  metoprolol succinate ER 25 milliGRAM(s) Oral daily  pantoprazole    Tablet 40 milliGRAM(s) Oral before breakfast  silver nitrate Applicator 1 Application(s) Topical once  valsartan 40 milliGRAM(s) Oral two times a day    MEDICATIONS  (PRN):  ondansetron Injectable 4 milliGRAM(s) IV Push once PRN Nausea and/or Vomiting      VITALS:  T(F): 98.5 (11-04-23 @ 16:52), Max: 98.5 (11-04-23 @ 00:12)  HR: 87 (11-04-23 @ 16:52) (79 - 100)  BP: 98/51 (11-04-23 @ 16:52) (98/51 - 126/60)  RR: 18 (11-04-23 @ 16:52) (16 - 18)  SpO2: 96% (11-04-23 @ 16:52)  Wt(kg): --      PHYSICAL EXAM:  GENERAL: NAD, sitting in chair comfortably   EYES: EOMI, conjunctiva and sclera clear  NECK: Supple, No JVD  CHEST/LUNG: Clear to auscultation bilaterally; No wheeze  HEART: Regular rate and rhythm; S1S2; HUMA 3/6 radiating to carotid   ABDOMEN: Soft, Nontender, Nondistended; Bowel sounds present  EXTREMITIES: L foot bandaged. +LE edema  R>L; Lt leg wound clean and dry   PSYCH: awake alert answers questions appropriately    LABS:              11.0                 136  | 28   | 14           13.18 >-----------< 256     ------------------------< 114                   32.9                 3.4  | 94   | 0.74                                         Ca 8.9   Mg 2.00  Ph 2.6          INR: 1.13 ratio;    PT: 12.6 sec;    PTT: 29.5 sec        Urinalysis Basic - ( 04 Nov 2023 07:12 )    Color: x / Appearance: x / SG: x / pH: x  Gluc: 114 mg/dL / Ketone: x  / Bili: x / Urobili: x   Blood: x / Protein: x / Nitrite: x   Leuk Esterase: x / RBC: x / WBC x   Sq Epi: x / Non Sq Epi: x / Bacteria: x            MICROBIOLOGY:        RADIOLOGY & ADDITIONAL TESTS:  Imaging Personally Reviewed: [ ] Yes    [ ] Consultant(s) Notes Reviewed:  [x] Care Discussed with Consultants/Other Providers: Surgical PA - discussed

## 2023-11-04 NOTE — PROGRESS NOTE ADULT - PROBLEM SELECTOR PLAN 1
- s/p left foot partial hallux amputation 9/6, now admitted w/ left foot partial hallux amputation dehiscence and forefoot ischemic changes  - L foot xray: no OM, no gas, no fracture   - L foot wound growing VRE and staph epi; ID appreciated wound cx likely colonization   - antibiotics as per primary ( zosyn 10/27- )  - noted R>L asymmetric swelling prior US 8/23 was neg for DVT but with superficial thrombosis of the lesser saphenous vein in both calves. Diameters of the right and left greater and lesser saphenous veins.; 10/31 LE duplex neg for DVT  - s/p LLE angiogram 11/3 no objection to angiogram if optimized from cardiology standpoint given severe AS and Acute HF   - Podiatry plans local wound care vs TMA pending vascular w/u

## 2023-11-04 NOTE — PROGRESS NOTE ADULT - SUBJECTIVE AND OBJECTIVE BOX
SURGERY DAILY PROGRESS NOTE    SUBJECTIVE: Patient seen and examined on AM rounds. Sitting in chair on arrival to room, offers no complaints. Denies chest pain, SOB, palpitations, HA, fever, chills, N/V.      OBJECTIVE:  Vital Signs Last 24 Hrs  T(C): 36.9 (05 Nov 2023 04:30), Max: 36.9 (04 Nov 2023 16:52)  T(F): 98.4 (05 Nov 2023 04:30), Max: 98.5 (04 Nov 2023 16:52)  HR: 93 (05 Nov 2023 05:55) (78 - 93)  BP: 109/67 (05 Nov 2023 05:55) (98/51 - 109/67)  BP(mean): --  RR: 18 (05 Nov 2023 04:30) (18 - 18)  SpO2: 99% (05 Nov 2023 04:30) (96% - 100%)    Parameters below as of 05 Nov 2023 04:30  Patient On (Oxygen Delivery Method): room air        I&O's Summary    04 Nov 2023 08:01  -  05 Nov 2023 07:00  --------------------------------------------------------  IN: 250 mL / OUT: 1525 mL / NET: -1275 mL        Physical Exam:  General Appearance: Appears well, NAD  Chest: Nonlabored breathing on RA  CV: HDS  Extremities: LLE>RLE edema improved   Vascular: Dressing to left foot C/D/I. Dressings to left groin and leg wound C/D/I with some fibrinous exudate noted in wound beds. L DP/PT absent    LABS:                        10.9   11.04 )-----------( 227      ( 05 Nov 2023 06:59 )             33.6     11-05    137  |  95<L>  |  17  ----------------------------<  154<H>  3.9   |  28  |  0.88    Ca    8.7      05 Nov 2023 06:59  Phos  2.9     11-05  Mg     1.90     11-05        Urinalysis Basic - ( 05 Nov 2023 06:59 )    Color: x / Appearance: x / SG: x / pH: x  Gluc: 154 mg/dL / Ketone: x  / Bili: x / Urobili: x   Blood: x / Protein: x / Nitrite: x   Leuk Esterase: x / RBC: x / WBC x   Sq Epi: x / Non Sq Epi: x / Bacteria: x

## 2023-11-04 NOTE — PROGRESS NOTE ADULT - PROBLEM SELECTOR PLAN 3
- pt on Eliquis for paroxysmal a fib, holding for procedure   - Continue Toprol and Amiodarone - pt on Eliquis for paroxysmal a fib  - Continue Toprol and Amiodarone

## 2023-11-05 LAB
ANION GAP SERPL CALC-SCNC: 14 MMOL/L — SIGNIFICANT CHANGE UP (ref 7–14)
ANION GAP SERPL CALC-SCNC: 14 MMOL/L — SIGNIFICANT CHANGE UP (ref 7–14)
BUN SERPL-MCNC: 17 MG/DL — SIGNIFICANT CHANGE UP (ref 7–23)
BUN SERPL-MCNC: 17 MG/DL — SIGNIFICANT CHANGE UP (ref 7–23)
CALCIUM SERPL-MCNC: 8.7 MG/DL — SIGNIFICANT CHANGE UP (ref 8.4–10.5)
CALCIUM SERPL-MCNC: 8.7 MG/DL — SIGNIFICANT CHANGE UP (ref 8.4–10.5)
CHLORIDE SERPL-SCNC: 95 MMOL/L — LOW (ref 98–107)
CHLORIDE SERPL-SCNC: 95 MMOL/L — LOW (ref 98–107)
CO2 SERPL-SCNC: 28 MMOL/L — SIGNIFICANT CHANGE UP (ref 22–31)
CO2 SERPL-SCNC: 28 MMOL/L — SIGNIFICANT CHANGE UP (ref 22–31)
CREAT SERPL-MCNC: 0.88 MG/DL — SIGNIFICANT CHANGE UP (ref 0.5–1.3)
CREAT SERPL-MCNC: 0.88 MG/DL — SIGNIFICANT CHANGE UP (ref 0.5–1.3)
EGFR: 66 ML/MIN/1.73M2 — SIGNIFICANT CHANGE UP
EGFR: 66 ML/MIN/1.73M2 — SIGNIFICANT CHANGE UP
GLUCOSE SERPL-MCNC: 154 MG/DL — HIGH (ref 70–99)
GLUCOSE SERPL-MCNC: 154 MG/DL — HIGH (ref 70–99)
HCT VFR BLD CALC: 33.6 % — LOW (ref 34.5–45)
HCT VFR BLD CALC: 33.6 % — LOW (ref 34.5–45)
HGB BLD-MCNC: 10.9 G/DL — LOW (ref 11.5–15.5)
HGB BLD-MCNC: 10.9 G/DL — LOW (ref 11.5–15.5)
MAGNESIUM SERPL-MCNC: 1.9 MG/DL — SIGNIFICANT CHANGE UP (ref 1.6–2.6)
MAGNESIUM SERPL-MCNC: 1.9 MG/DL — SIGNIFICANT CHANGE UP (ref 1.6–2.6)
MCHC RBC-ENTMCNC: 30.9 PG — SIGNIFICANT CHANGE UP (ref 27–34)
MCHC RBC-ENTMCNC: 30.9 PG — SIGNIFICANT CHANGE UP (ref 27–34)
MCHC RBC-ENTMCNC: 32.4 GM/DL — SIGNIFICANT CHANGE UP (ref 32–36)
MCHC RBC-ENTMCNC: 32.4 GM/DL — SIGNIFICANT CHANGE UP (ref 32–36)
MCV RBC AUTO: 95.2 FL — SIGNIFICANT CHANGE UP (ref 80–100)
MCV RBC AUTO: 95.2 FL — SIGNIFICANT CHANGE UP (ref 80–100)
NRBC # BLD: 0 /100 WBCS — SIGNIFICANT CHANGE UP (ref 0–0)
NRBC # BLD: 0 /100 WBCS — SIGNIFICANT CHANGE UP (ref 0–0)
NRBC # FLD: 0 K/UL — SIGNIFICANT CHANGE UP (ref 0–0)
NRBC # FLD: 0 K/UL — SIGNIFICANT CHANGE UP (ref 0–0)
PHOSPHATE SERPL-MCNC: 2.9 MG/DL — SIGNIFICANT CHANGE UP (ref 2.5–4.5)
PHOSPHATE SERPL-MCNC: 2.9 MG/DL — SIGNIFICANT CHANGE UP (ref 2.5–4.5)
PLATELET # BLD AUTO: 227 K/UL — SIGNIFICANT CHANGE UP (ref 150–400)
PLATELET # BLD AUTO: 227 K/UL — SIGNIFICANT CHANGE UP (ref 150–400)
POTASSIUM SERPL-MCNC: 3.9 MMOL/L — SIGNIFICANT CHANGE UP (ref 3.5–5.3)
POTASSIUM SERPL-MCNC: 3.9 MMOL/L — SIGNIFICANT CHANGE UP (ref 3.5–5.3)
POTASSIUM SERPL-SCNC: 3.9 MMOL/L — SIGNIFICANT CHANGE UP (ref 3.5–5.3)
POTASSIUM SERPL-SCNC: 3.9 MMOL/L — SIGNIFICANT CHANGE UP (ref 3.5–5.3)
RBC # BLD: 3.53 M/UL — LOW (ref 3.8–5.2)
RBC # BLD: 3.53 M/UL — LOW (ref 3.8–5.2)
RBC # FLD: 17.3 % — HIGH (ref 10.3–14.5)
RBC # FLD: 17.3 % — HIGH (ref 10.3–14.5)
SODIUM SERPL-SCNC: 137 MMOL/L — SIGNIFICANT CHANGE UP (ref 135–145)
SODIUM SERPL-SCNC: 137 MMOL/L — SIGNIFICANT CHANGE UP (ref 135–145)
WBC # BLD: 11.04 K/UL — HIGH (ref 3.8–10.5)
WBC # BLD: 11.04 K/UL — HIGH (ref 3.8–10.5)
WBC # FLD AUTO: 11.04 K/UL — HIGH (ref 3.8–10.5)
WBC # FLD AUTO: 11.04 K/UL — HIGH (ref 3.8–10.5)

## 2023-11-05 RX ADMIN — Medication 81 MILLIGRAM(S): at 13:03

## 2023-11-05 RX ADMIN — Medication 25 MILLIGRAM(S): at 06:46

## 2023-11-05 RX ADMIN — VALSARTAN 40 MILLIGRAM(S): 80 TABLET ORAL at 06:46

## 2023-11-05 RX ADMIN — APIXABAN 2.5 MILLIGRAM(S): 2.5 TABLET, FILM COATED ORAL at 06:54

## 2023-11-05 RX ADMIN — PANTOPRAZOLE SODIUM 40 MILLIGRAM(S): 20 TABLET, DELAYED RELEASE ORAL at 06:46

## 2023-11-05 RX ADMIN — APIXABAN 2.5 MILLIGRAM(S): 2.5 TABLET, FILM COATED ORAL at 17:19

## 2023-11-05 RX ADMIN — BUMETANIDE 2 MILLIGRAM(S): 0.25 INJECTION INTRAMUSCULAR; INTRAVENOUS at 05:50

## 2023-11-05 RX ADMIN — Medication 50 MICROGRAM(S): at 05:49

## 2023-11-05 RX ADMIN — AMIODARONE HYDROCHLORIDE 100 MILLIGRAM(S): 400 TABLET ORAL at 06:47

## 2023-11-05 RX ADMIN — BUMETANIDE 2 MILLIGRAM(S): 0.25 INJECTION INTRAMUSCULAR; INTRAVENOUS at 13:02

## 2023-11-05 NOTE — PROGRESS NOTE ADULT - SUBJECTIVE AND OBJECTIVE BOX
SURGERY DAILY PROGRESS NOTE    SUBJECTIVE: Patient seen and examined on AM rounds. Sleeping comfortably on arrival to bedside, offers no complaints. Denies chest pain, SOB, palpitations, HA, fever, chills, N/V.      OBJECTIVE:  Vital Signs Last 24 Hrs  T(C): 36.9 (05 Nov 2023 04:30), Max: 36.9 (04 Nov 2023 16:52)  T(F): 98.4 (05 Nov 2023 04:30), Max: 98.5 (04 Nov 2023 16:52)  HR: 93 (05 Nov 2023 05:55) (78 - 93)  BP: 109/67 (05 Nov 2023 05:55) (98/51 - 109/67)  BP(mean): --  RR: 18 (05 Nov 2023 04:30) (18 - 18)  SpO2: 99% (05 Nov 2023 04:30) (96% - 100%)    Parameters below as of 05 Nov 2023 04:30  Patient On (Oxygen Delivery Method): room air        I&O's Summary    04 Nov 2023 08:01  -  05 Nov 2023 07:00  --------------------------------------------------------  IN: 250 mL / OUT: 1525 mL / NET: -1275 mL        Physical Exam:  General Appearance: Appears well, NAD   Chest: Nonlabored breathing on RA  CV: Hemodynamically stable  Extremities: Dressing to left foot C/D/I. Wound vac to medial leg and groin holding suction  Vascular: Right leg WWP    LABS:                        10.9   11.04 )-----------( 227      ( 05 Nov 2023 06:59 )             33.6     11-05    137  |  95<L>  |  17  ----------------------------<  154<H>  3.9   |  28  |  0.88    Ca    8.7      05 Nov 2023 06:59  Phos  2.9     11-05  Mg     1.90     11-05        Urinalysis Basic - ( 05 Nov 2023 06:59 )    Color: x / Appearance: x / SG: x / pH: x  Gluc: 154 mg/dL / Ketone: x  / Bili: x / Urobili: x   Blood: x / Protein: x / Nitrite: x   Leuk Esterase: x / RBC: x / WBC x   Sq Epi: x / Non Sq Epi: x / Bacteria: x

## 2023-11-05 NOTE — PROGRESS NOTE ADULT - ASSESSMENT
83F with PMHx of CAD, pAF on Eliquis, severe AS pending TAVR, R internal carotid stenosis, HTN, HLD, hypothyroidism, anxiety, PAD and chronic L toe wounds s/p L ilioprofunda bypass with reverse GSV graft to peroneal 8/31/23 (Dr. Tavares) c/b large L groin abscess (17cm) requiring removal of infected bypass graft of lower extremity and groin washout on 9/24/23. Wound care involved for R groin wound and R calf incision wound. Patient represented from rehab concerns for "cold foot". Pt is poor historian, but she says the wounds have been worse over the 2 days prior to presentation. Remains afebrile and hemodynamically stable s/p LLE diagnostic angiogram on 11/3. Given ischemic foot and external iliac occlusion, patient may require AKA; will discuss with patient's HCP. Wound vac applied yesterday, tolerated well and vac holding suction.     PLAN  - Diet: Regular  - Cardiology consulted, appreciate recs: Bumex 1mg BID, daily EKG  - Wound care following - vac   - Will discuss possible AKA with healthcare proxy  - pods following   - Dispo: pending    Vascular Surgery  [id number] 83F with PMHx of CAD, pAF on Eliquis, severe AS pending TAVR, R internal carotid stenosis, HTN, HLD, hypothyroidism, anxiety, PAD and chronic L toe wounds s/p L ilioprofunda bypass with reverse GSV graft to peroneal 8/31/23 (Dr. Tavares) c/b large L groin abscess (17cm) requiring removal of infected bypass graft of lower extremity and groin washout on 9/24/23. Wound care involved for R groin wound and R calf incision wound. Patient represented from rehab concerns for "cold foot". Pt is poor historian, but she says the wounds have been worse over the 2 days prior to presentation. Remains afebrile and hemodynamically stable s/p LLE diagnostic angiogram on 11/3. Given ischemic foot and external iliac occlusion, patient may require AKA; will discuss with patient's HCP. Wound vac applied yesterday, tolerated well and vac holding suction.     PLAN  - Diet: Regular  - Cardiology consulted, appreciate recs: Bumex 2mg BID, daily EKG  - Wound care following - vac applied 11/4  - Will discuss possible AKA with healthcare proxy per patient request  - Dispo: pending    Vascular Surgery  [id number]

## 2023-11-06 LAB
ANION GAP SERPL CALC-SCNC: 10 MMOL/L — SIGNIFICANT CHANGE UP (ref 7–14)
ANION GAP SERPL CALC-SCNC: 10 MMOL/L — SIGNIFICANT CHANGE UP (ref 7–14)
ANION GAP SERPL CALC-SCNC: 11 MMOL/L — SIGNIFICANT CHANGE UP (ref 7–14)
ANION GAP SERPL CALC-SCNC: 11 MMOL/L — SIGNIFICANT CHANGE UP (ref 7–14)
BUN SERPL-MCNC: 20 MG/DL — SIGNIFICANT CHANGE UP (ref 7–23)
BUN SERPL-MCNC: 20 MG/DL — SIGNIFICANT CHANGE UP (ref 7–23)
BUN SERPL-MCNC: 25 MG/DL — HIGH (ref 7–23)
BUN SERPL-MCNC: 25 MG/DL — HIGH (ref 7–23)
CALCIUM SERPL-MCNC: 8.4 MG/DL — SIGNIFICANT CHANGE UP (ref 8.4–10.5)
CALCIUM SERPL-MCNC: 8.4 MG/DL — SIGNIFICANT CHANGE UP (ref 8.4–10.5)
CALCIUM SERPL-MCNC: 8.8 MG/DL — SIGNIFICANT CHANGE UP (ref 8.4–10.5)
CALCIUM SERPL-MCNC: 8.8 MG/DL — SIGNIFICANT CHANGE UP (ref 8.4–10.5)
CHLORIDE SERPL-SCNC: 92 MMOL/L — LOW (ref 98–107)
CHLORIDE SERPL-SCNC: 92 MMOL/L — LOW (ref 98–107)
CHLORIDE SERPL-SCNC: 94 MMOL/L — LOW (ref 98–107)
CHLORIDE SERPL-SCNC: 94 MMOL/L — LOW (ref 98–107)
CO2 SERPL-SCNC: 31 MMOL/L — SIGNIFICANT CHANGE UP (ref 22–31)
CREAT SERPL-MCNC: 0.82 MG/DL — SIGNIFICANT CHANGE UP (ref 0.5–1.3)
CREAT SERPL-MCNC: 0.82 MG/DL — SIGNIFICANT CHANGE UP (ref 0.5–1.3)
CREAT SERPL-MCNC: 1.25 MG/DL — SIGNIFICANT CHANGE UP (ref 0.5–1.3)
CREAT SERPL-MCNC: 1.25 MG/DL — SIGNIFICANT CHANGE UP (ref 0.5–1.3)
EGFR: 43 ML/MIN/1.73M2 — LOW
EGFR: 43 ML/MIN/1.73M2 — LOW
EGFR: 71 ML/MIN/1.73M2 — SIGNIFICANT CHANGE UP
EGFR: 71 ML/MIN/1.73M2 — SIGNIFICANT CHANGE UP
GLUCOSE SERPL-MCNC: 148 MG/DL — HIGH (ref 70–99)
GLUCOSE SERPL-MCNC: 148 MG/DL — HIGH (ref 70–99)
GLUCOSE SERPL-MCNC: 153 MG/DL — HIGH (ref 70–99)
GLUCOSE SERPL-MCNC: 153 MG/DL — HIGH (ref 70–99)
HCT VFR BLD CALC: 32.2 % — LOW (ref 34.5–45)
HCT VFR BLD CALC: 32.2 % — LOW (ref 34.5–45)
HGB BLD-MCNC: 10.6 G/DL — LOW (ref 11.5–15.5)
HGB BLD-MCNC: 10.6 G/DL — LOW (ref 11.5–15.5)
MAGNESIUM SERPL-MCNC: 1.8 MG/DL — SIGNIFICANT CHANGE UP (ref 1.6–2.6)
MAGNESIUM SERPL-MCNC: 1.8 MG/DL — SIGNIFICANT CHANGE UP (ref 1.6–2.6)
MAGNESIUM SERPL-MCNC: 2 MG/DL — SIGNIFICANT CHANGE UP (ref 1.6–2.6)
MAGNESIUM SERPL-MCNC: 2 MG/DL — SIGNIFICANT CHANGE UP (ref 1.6–2.6)
MCHC RBC-ENTMCNC: 30.8 PG — SIGNIFICANT CHANGE UP (ref 27–34)
MCHC RBC-ENTMCNC: 30.8 PG — SIGNIFICANT CHANGE UP (ref 27–34)
MCHC RBC-ENTMCNC: 32.9 GM/DL — SIGNIFICANT CHANGE UP (ref 32–36)
MCHC RBC-ENTMCNC: 32.9 GM/DL — SIGNIFICANT CHANGE UP (ref 32–36)
MCV RBC AUTO: 93.6 FL — SIGNIFICANT CHANGE UP (ref 80–100)
MCV RBC AUTO: 93.6 FL — SIGNIFICANT CHANGE UP (ref 80–100)
NRBC # BLD: 0 /100 WBCS — SIGNIFICANT CHANGE UP (ref 0–0)
NRBC # BLD: 0 /100 WBCS — SIGNIFICANT CHANGE UP (ref 0–0)
NRBC # FLD: 0 K/UL — SIGNIFICANT CHANGE UP (ref 0–0)
NRBC # FLD: 0 K/UL — SIGNIFICANT CHANGE UP (ref 0–0)
PHOSPHATE SERPL-MCNC: 2.7 MG/DL — SIGNIFICANT CHANGE UP (ref 2.5–4.5)
PHOSPHATE SERPL-MCNC: 2.7 MG/DL — SIGNIFICANT CHANGE UP (ref 2.5–4.5)
PHOSPHATE SERPL-MCNC: 3 MG/DL — SIGNIFICANT CHANGE UP (ref 2.5–4.5)
PHOSPHATE SERPL-MCNC: 3 MG/DL — SIGNIFICANT CHANGE UP (ref 2.5–4.5)
PLATELET # BLD AUTO: 221 K/UL — SIGNIFICANT CHANGE UP (ref 150–400)
PLATELET # BLD AUTO: 221 K/UL — SIGNIFICANT CHANGE UP (ref 150–400)
POTASSIUM SERPL-MCNC: 3.2 MMOL/L — LOW (ref 3.5–5.3)
POTASSIUM SERPL-MCNC: 3.2 MMOL/L — LOW (ref 3.5–5.3)
POTASSIUM SERPL-MCNC: 3.4 MMOL/L — LOW (ref 3.5–5.3)
POTASSIUM SERPL-MCNC: 3.4 MMOL/L — LOW (ref 3.5–5.3)
POTASSIUM SERPL-SCNC: 3.2 MMOL/L — LOW (ref 3.5–5.3)
POTASSIUM SERPL-SCNC: 3.2 MMOL/L — LOW (ref 3.5–5.3)
POTASSIUM SERPL-SCNC: 3.4 MMOL/L — LOW (ref 3.5–5.3)
POTASSIUM SERPL-SCNC: 3.4 MMOL/L — LOW (ref 3.5–5.3)
RBC # BLD: 3.44 M/UL — LOW (ref 3.8–5.2)
RBC # BLD: 3.44 M/UL — LOW (ref 3.8–5.2)
RBC # FLD: 17.1 % — HIGH (ref 10.3–14.5)
RBC # FLD: 17.1 % — HIGH (ref 10.3–14.5)
SODIUM SERPL-SCNC: 133 MMOL/L — LOW (ref 135–145)
SODIUM SERPL-SCNC: 133 MMOL/L — LOW (ref 135–145)
SODIUM SERPL-SCNC: 136 MMOL/L — SIGNIFICANT CHANGE UP (ref 135–145)
SODIUM SERPL-SCNC: 136 MMOL/L — SIGNIFICANT CHANGE UP (ref 135–145)
WBC # BLD: 10.45 K/UL — SIGNIFICANT CHANGE UP (ref 3.8–10.5)
WBC # BLD: 10.45 K/UL — SIGNIFICANT CHANGE UP (ref 3.8–10.5)
WBC # FLD AUTO: 10.45 K/UL — SIGNIFICANT CHANGE UP (ref 3.8–10.5)
WBC # FLD AUTO: 10.45 K/UL — SIGNIFICANT CHANGE UP (ref 3.8–10.5)

## 2023-11-06 PROCEDURE — 99233 SBSQ HOSP IP/OBS HIGH 50: CPT

## 2023-11-06 PROCEDURE — 99232 SBSQ HOSP IP/OBS MODERATE 35: CPT

## 2023-11-06 RX ORDER — HYDROCORTISONE 1 %
1 OINTMENT (GRAM) TOPICAL DAILY
Refills: 0 | Status: DISCONTINUED | OUTPATIENT
Start: 2023-11-06 | End: 2023-11-18

## 2023-11-06 RX ORDER — ACETAMINOPHEN 500 MG
650 TABLET ORAL EVERY 6 HOURS
Refills: 0 | Status: DISCONTINUED | OUTPATIENT
Start: 2023-11-06 | End: 2023-11-18

## 2023-11-06 RX ORDER — POTASSIUM CHLORIDE 20 MEQ
40 PACKET (EA) ORAL ONCE
Refills: 0 | Status: COMPLETED | OUTPATIENT
Start: 2023-11-06 | End: 2023-11-06

## 2023-11-06 RX ORDER — POTASSIUM CHLORIDE 20 MEQ
20 PACKET (EA) ORAL
Refills: 0 | Status: COMPLETED | OUTPATIENT
Start: 2023-11-06 | End: 2023-11-06

## 2023-11-06 RX ORDER — HEPARIN SODIUM 5000 [USP'U]/ML
1000 INJECTION INTRAVENOUS; SUBCUTANEOUS
Qty: 25000 | Refills: 0 | Status: DISCONTINUED | OUTPATIENT
Start: 2023-11-06 | End: 2023-11-08

## 2023-11-06 RX ORDER — SODIUM,POTASSIUM PHOSPHATES 278-250MG
1 POWDER IN PACKET (EA) ORAL ONCE
Refills: 0 | Status: COMPLETED | OUTPATIENT
Start: 2023-11-06 | End: 2023-11-06

## 2023-11-06 RX ADMIN — Medication 20 MILLIEQUIVALENT(S): at 22:11

## 2023-11-06 RX ADMIN — BUMETANIDE 2 MILLIGRAM(S): 0.25 INJECTION INTRAMUSCULAR; INTRAVENOUS at 13:17

## 2023-11-06 RX ADMIN — VALSARTAN 40 MILLIGRAM(S): 80 TABLET ORAL at 05:51

## 2023-11-06 RX ADMIN — HEPARIN SODIUM 10 UNIT(S)/HR: 5000 INJECTION INTRAVENOUS; SUBCUTANEOUS at 18:34

## 2023-11-06 RX ADMIN — Medication 650 MILLIGRAM(S): at 10:10

## 2023-11-06 RX ADMIN — Medication 50 MICROGRAM(S): at 05:51

## 2023-11-06 RX ADMIN — PANTOPRAZOLE SODIUM 40 MILLIGRAM(S): 20 TABLET, DELAYED RELEASE ORAL at 05:51

## 2023-11-06 RX ADMIN — Medication 1 TABLET(S): at 13:16

## 2023-11-06 RX ADMIN — Medication 25 MILLIGRAM(S): at 05:51

## 2023-11-06 RX ADMIN — Medication 81 MILLIGRAM(S): at 13:15

## 2023-11-06 RX ADMIN — BUMETANIDE 2 MILLIGRAM(S): 0.25 INJECTION INTRAMUSCULAR; INTRAVENOUS at 05:51

## 2023-11-06 RX ADMIN — HEPARIN SODIUM 10 UNIT(S)/HR: 5000 INJECTION INTRAVENOUS; SUBCUTANEOUS at 18:29

## 2023-11-06 RX ADMIN — VALSARTAN 40 MILLIGRAM(S): 80 TABLET ORAL at 18:30

## 2023-11-06 RX ADMIN — Medication 40 MILLIEQUIVALENT(S): at 13:16

## 2023-11-06 RX ADMIN — APIXABAN 2.5 MILLIGRAM(S): 2.5 TABLET, FILM COATED ORAL at 05:50

## 2023-11-06 RX ADMIN — Medication 1 APPLICATION(S): at 13:15

## 2023-11-06 RX ADMIN — Medication 650 MILLIGRAM(S): at 10:40

## 2023-11-06 RX ADMIN — Medication 20 MILLIEQUIVALENT(S): at 23:46

## 2023-11-06 RX ADMIN — Medication 650 MILLIGRAM(S): at 22:12

## 2023-11-06 RX ADMIN — AMIODARONE HYDROCHLORIDE 100 MILLIGRAM(S): 400 TABLET ORAL at 05:50

## 2023-11-06 RX ADMIN — Medication 1 APPLICATION(S): at 13:18

## 2023-11-06 NOTE — PROGRESS NOTE ADULT - SUBJECTIVE AND OBJECTIVE BOX
Overnight Events: NAEO    Review Of Systems: No chest pain, shortness of breath, or palpitations            Current Meds:  acetaminophen     Tablet .. 650 milliGRAM(s) Oral every 6 hours PRN  aMIOdarone    Tablet 100 milliGRAM(s) Oral daily  apixaban 2.5 milliGRAM(s) Oral every 12 hours  aspirin enteric coated 81 milliGRAM(s) Oral daily  atorvastatin 80 milliGRAM(s) Oral at bedtime  buMETAnide Injectable 2 milliGRAM(s) IV Push two times a day  collagenase Ointment 1 Application(s) Topical daily  collagenase Ointment 1 Application(s) Topical once  hydrocortisone 1% Cream 1 Application(s) Topical daily  levothyroxine 50 MICROGram(s) Oral daily  metoprolol succinate ER 25 milliGRAM(s) Oral daily  ondansetron Injectable 4 milliGRAM(s) IV Push once PRN  pantoprazole    Tablet 40 milliGRAM(s) Oral before breakfast  silver nitrate Applicator 1 Application(s) Topical once  valsartan 40 milliGRAM(s) Oral two times a day      Vitals:  T(F): 97.9 (11-06), Max: 99.4 (11-05)  HR: 80 (11-06) (80 - 97)  BP: 112/58 (11-06) (100/55 - 113/46)  RR: 18 (11-06)  SpO2: 95% (11-06)  I&O's Summary    05 Nov 2023 07:01  -  06 Nov 2023 07:00  --------------------------------------------------------  IN: 420 mL / OUT: 1550 mL / NET: -1130 mL    06 Nov 2023 07:01  -  06 Nov 2023 15:07  --------------------------------------------------------  IN: 480 mL / OUT: 0 mL / NET: 480 mL        Physical Exam:  GEN: comfortable appearing, lying in bed in NAD  HEENT: NCAT, MMM  CV: Regular S1, S2, no m/r/g  RESP: CTAB  ABD: Soft, NTND, +BS  EXT: 2+ bl pitting LE edema, WWP, pulses palpable throughout  NEURO: No focal deficits, AOx3  SKIN:  No rashes                          10.6   10.45 )-----------( 221      ( 06 Nov 2023 06:58 )             32.2     11-06    136  |  94<L>  |  20  ----------------------------<  148<H>  3.2<L>   |  31  |  0.82    Ca    8.8      06 Nov 2023 06:58  Phos  2.7     11-06  Mg     2.00     11-06

## 2023-11-06 NOTE — PROGRESS NOTE ADULT - ASSESSMENT
New ECG(s): Personally reviewed    Echo: 9/26/23   1. Technically difficult image quality.   2. Left ventricular systolic function is severely decreased with an ejection fraction visually estimated at 20 to 25 %. There is global left ventricular hypokinesis. No evidence of a thrombus in the left ventricle.   3. There is mild (grade 1) left ventricular diastolic dysfunction.   4. Right ventricular cavity is mildly enlarged in size and reduced systolic function.   5. Mild mitral regurgitation.   6. Aortic valve anatomy cannot be determined with reduced systolic excursion. severe calcification of the aortic valve leaflets. severe aortic stenosis.   7. Severe aortic stenosis (estimated aortic valve area ~ 0.5 cm2 (by the continuity equation).   8. Mild aortic regurgitation.   9. The left atrium is mildly dilated in size.  10. Left pleural effusion noted.  11. No prior echocardiogram is available for comparison.    Cath:  5/2023  Moderate atherosclerosis in LM and LAD.  of dRCA with robust collateral from LAD.  Imaging:    Interpretation of Telemetry: Sinus @ 90s    83F with PMHx of CAD, HFrEF (25%), pAF on Eliquis, severe AS pending TAVR, R internal carotid stenosis, HTN, HLD, hypothyroidism, anxiety, PAD and chronic L toe wounds s/p L ilioprofunda bypass with reverse GSV graft to peroneal 8/31/23 (Dr. Tavares) c/b large L groin abscess (17cm) requiring removal of infected bypass graft of lower extremity and groin washout on 9/24/23. Admitted for vascular evaluation of LE now s/p angiogram with plan for likely AKA. Continues to appear grossly overloaded on exam.    #Acute on Chronic HFrEF (25%) Exacerbation  - cont bumex 2mg IV BID  -- Close monitoring for allergic reaction recommended.  - BID Lytes while actively diuresing  -- goal K > 4, Mg > 2  -- Strict I/O and daily standing weight  - would continue diuresis as above to optimize CHF prior to surgery  - cont valsartan 40mg BID  -- Eventually will benefit from transitioning to ARNI.  - cont metoprolol succinate 25mg daily  - consider MRA and SGLT2 inhibitors as outpatient.    #Severe AS  - currently Asymptomatic while she is on bed.  - pending TAVR per documentation  - monitor on tele, monitor VS    # pAFib  - given [id number], patient will benefit from AC. Given her age of 83 and weight below 60kg, patient is reasonable to be on reduced dose of NOAC: eliquis 2.5mg BID.   - hold AC as needed by primary team/surgery.  - continue home amiodarone  - continue BB as above    #Pre-Operative Risk Assessment  Cardiovascular Risk Stratification - RCRI =   (0 predictors = 0.4%, 1 predictor = 0.9%, 2 predictors = 6.6%, =3 predictors = >11%)    History of ischemic heart disease: Y  History of congestive heart failure: Y  History of cerebrovascular disease (stroke or transient ischemic attack): N  History of diabetes requiring preoperative insulin use: N  Chronic kidney disease (creatinine > 2 mg/dL): N  Undergoing suprainguinal vascular, intraperitoneal, or intrathoracic surgery: N    Overall this patient is as 6.6% risk for cardiac death, nonfatal myocardial infarction, and nonfatal cardiac arrest perioperatively for this moderate risk above knee amputation. This likely underestimates patient risk for procedure given known severe aortic stenosis. As above would continue with diuresis to optimize prior to procedure.                     All recommendations pending attending attestation. We will sign off, please re-consult with any concerns    Tabby Zhang MD  PGY-4, Cardiology  Available on TEAMS    For all new consults  [email or web address]  Login: [email or web address]

## 2023-11-06 NOTE — PROVIDER CONTACT NOTE (CHANGE IN STATUS NOTIFICATION) - SITUATION
patient c/o of left foot pain, requesting tylenol for pain relief; surgical incision site clean dry & intact; right wrist rash present on patient, pt thinks its from the hospital bracelets, RN removed bracelets on right hand

## 2023-11-06 NOTE — PROGRESS NOTE ADULT - PROBLEM SELECTOR PLAN 2
- Echo from 9/23 with EF of 20-25% and global left ventricular hypokinesis.  - Patient w/ fluid overload ; LE edema   - Continue Toprol 25mg daily    - Valsartan 40mg PO BID  - Monitor I/O's, daily weights  - monitor Cr  - repeat TTE as below unchanged EF   - diuresis as per cards; Bum,ex 2mg IV BID   - f/u cards recs

## 2023-11-06 NOTE — PROGRESS NOTE ADULT - ASSESSMENT
ASSESSMENT  83F with PMHx of CAD, pAF on Eliquis, severe AS pending TAVR, R internal carotid stenosis, HTN, HLD, hypothyroidism, anxiety, PAD and chronic L toe wounds s/p L ilioprofunda bypass with reverse GSV graft to peroneal 8/31/23 (Dr. Tavares) c/b large L groin abscess (17cm) requiring removal of infected bypass graft of lower extremity and groin washout on 9/24/23. Wound care involved for R groin wound and R calf incision wound. Patient represented from rehab concerns for "cold foot". Pt is poor historian, but she says the wounds have been worse over the 2 days prior to presentation. Remains afebrile and hemodynamically stable s/p LLE diagnostic angiogram on 11/3. Given ischemic foot and external iliac occlusion, patient may require AKA; will discuss with patient's HCP. Wound vac applied yesterday, tolerated well and vac holding suction.     PLAN  - Diet: Regular  - Cardiology consulted, appreciate recs: Bumex 2mg BID, daily EKG  - Wound care following - vac applied 11/4  - Will discuss possible AKA with healthcare proxy per patient request  - Dispo: pending    Vascular Surgery  [id number] ASSESSMENT  83F with PMHx of CAD, pAF on Eliquis, severe AS pending TAVR, R internal carotid stenosis, HTN, HLD, hypothyroidism, anxiety, PAD and chronic L toe wounds s/p L ilioprofunda bypass with reverse GSV graft to peroneal 8/31/23 (Dr. Tavares) c/b large L groin abscess (17cm) requiring removal of infected bypass graft of lower extremity and groin washout on 9/24/23. Wound care involved for R groin wound and R calf incision wound. Patient represented from rehab concerns for "cold foot". Pt is poor historian, but she says the wounds have been worse over the 2 days prior to presentation. Remains afebrile and hemodynamically stable s/p LLE diagnostic angiogram on 11/3. Given ischemic foot and external iliac occlusion, patient may require AKA; will discuss with patient's HCP. Wound vac applied yesterday, tolerated well and vac holding suction.     PLAN  - Diet: Regular  - Cardiology consulted, appreciate recs: Bumex 2mg BID   - Wound care following - vac applied 11/4  - Will discuss possible AKA with healthcare proxy per patient request  - Dispo: pending    Vascular Surgery  [id number]

## 2023-11-06 NOTE — PROGRESS NOTE ADULT - ASSESSMENT
83F s/p left foot partial hallux amputation dehiscence and forefoot ischemic changes (DOS 9/6).  - Patient seen and evaluated.  - Afebrile, WBC 10.45   - Left foot distal hallux dehiscence, cool flaps, distal 2nd digit wound to dermis, 4th distal lateral digit eschar intact, early ischemic changes to digits 1 -5 to MPJs 1-5, interdigital maceration to interspaces 1-4, digits 2-5 nailbed eschars, no acute signs of infection.  - L foot xray: no OM, no gas, no fracture.   - Vasc recs s/p LLE diagnostic angiogram on 11/3. Given ischemic foot and external iliac occlusion, patient may require AKA, appreciated.   - L foot wound culture: VRE, staph epi.   - Pod plan local wound care pending [facility] recs, no surgical planning.  - Discussed with attending.

## 2023-11-06 NOTE — ADVANCED PRACTICE NURSE CONSULT - RECOMMEDATIONS
Topical Recommendations    L groin/LLE: NPWT/VAC to be managed by WOCN service line M, W, F. If dressing compromised for >2 hours, please call vascular team, turn off machine, remove dressing and replace with alternative: Cleanse with NS, pat dry. Apply Liquid barrier film to periwound skin (allow to dry). Apply collagenase (Santyl), nickel-coin thickness, to entire base of wound. Lightly pack wound with 0.125% Dakins moistened gauze, cover with ABD (combine pad) and secure with paper tape. Change twice a day and PRN if soiled.     Sacrum to BL buttocks: Cleanse with skin cleanser, pat dry. Apply Critic-aid moisture barrier cream twice a day and PRN with incontinent episodes.     Left foot: Currently being followed by Podiatry. Cleanse with NS, pat dry. Paint with Betadine and allow to dry. Cover with dry 4x4 gauze and secure with Kerlix. Change daily and PRN if soiled.     Continue low air loss bed therapy, continue heel elevation, continue to turn & reposition per protocol, soft pillow between bony prominences, continue moisture management with barrier creams & single breathable pad, continue measures to decrease friction/shear/pressure. Continue with nutritional support as per dietary/orders.     Plan of care discussed with Primary RN, vascular team Kimberly Skaggs and patient, all questions answered.    Please contact Wound Care Service Line if we can be of further assistance (ext [phone]).

## 2023-11-06 NOTE — PROGRESS NOTE ADULT - SUBJECTIVE AND OBJECTIVE BOX
[facility] Division of Hospital Medicine  Garry Stahl MD  Pager (M-F, 8A-5P): [phone]  Other Times:  [id number]    Patient is a 83y old  Female who presents with a chief complaint of Ruther 5 CLTI left toe wounds (06 Nov 2023 10:42)      SUBJECTIVE / OVERNIGHT EVENTS: Pt in NAD s/p angiogram external iliac occlusion: neg fluid balance       MEDICATIONS  (STANDING):  aMIOdarone    Tablet 100 milliGRAM(s) Oral daily  apixaban 2.5 milliGRAM(s) Oral every 12 hours  aspirin enteric coated 81 milliGRAM(s) Oral daily  atorvastatin 80 milliGRAM(s) Oral at bedtime  buMETAnide Injectable 2 milliGRAM(s) IV Push two times a day  collagenase Ointment 1 Application(s) Topical daily  collagenase Ointment 1 Application(s) Topical once  hydrocortisone 1% Cream 1 Application(s) Topical daily  levothyroxine 50 MICROGram(s) Oral daily  metoprolol succinate ER 25 milliGRAM(s) Oral daily  pantoprazole    Tablet 40 milliGRAM(s) Oral before breakfast  potassium chloride    Tablet ER 40 milliEquivalent(s) Oral once  potassium phosphate / sodium phosphate Tablet (K-PHOS No. 2) 1 Tablet(s) Oral once  silver nitrate Applicator 1 Application(s) Topical once  valsartan 40 milliGRAM(s) Oral two times a day    MEDICATIONS  (PRN):  acetaminophen     Tablet .. 650 milliGRAM(s) Oral every 6 hours PRN Temp greater or equal to 38C (100.4F), Mild Pain (1 - 3)  ondansetron Injectable 4 milliGRAM(s) IV Push once PRN Nausea and/or Vomiting      CAPILLARY BLOOD GLUCOSE        I&O's Summary    05 Nov 2023 07:01  -  06 Nov 2023 07:00  --------------------------------------------------------  IN: 420 mL / OUT: 1550 mL / NET: -1130 mL    06 Nov 2023 07:01  -  06 Nov 2023 10:52  --------------------------------------------------------  IN: 480 mL / OUT: 0 mL / NET: 480 mL        PHYSICAL EXAM:  Vital Signs Last 24 Hrs  T(C): 36.8 (06 Nov 2023 09:02), Max: 37.4 (05 Nov 2023 16:53)  T(F): 98.2 (06 Nov 2023 09:02), Max: 99.4 (05 Nov 2023 16:53)  HR: 97 (06 Nov 2023 09:02) (81 - 97)  BP: 100/61 (06 Nov 2023 09:02) (100/55 - 113/46)  BP(mean): --  RR: 18 (06 Nov 2023 09:02) (18 - 18)  SpO2: 93% (06 Nov 2023 09:02) (93% - 97%)    Parameters below as of 06 Nov 2023 09:02  Patient On (Oxygen Delivery Method): room air    GENERAL: NAD, sitting in chair comfortably   EYES: EOMI, conjunctiva and sclera clear  NECK: Supple, No JVD  CHEST/LUNG: Clear to auscultation bilaterally; No wheeze  HEART: Regular rate and rhythm; S1S2; HUMA 3/6 radiating to carotid   ABDOMEN: Soft, Nontender, Nondistended; Bowel sounds present  EXTREMITIES: L foot bandaged. +LE edema  R>L; Lt leg wound vac  PSYCH: awake alert answers questions appropriately    LABS:                        10.6   10.45 )-----------( 221      ( 06 Nov 2023 06:58 )             32.2     11-06    136  |  94<L>  |  20  ----------------------------<  148<H>  3.2<L>   |  31  |  0.82    Ca    8.8      06 Nov 2023 06:58  Phos  2.7     11-06  Mg     2.00     11-06            Urinalysis Basic - ( 06 Nov 2023 06:58 )    Color: x / Appearance: x / SG: x / pH: x  Gluc: 148 mg/dL / Ketone: x  / Bili: x / Urobili: x   Blood: x / Protein: x / Nitrite: x   Leuk Esterase: x / RBC: x / WBC x   Sq Epi: x / Non Sq Epi: x / Bacteria: x          RADIOLOGY & ADDITIONAL TESTS:  Results Reviewed:   Imaging Personally Reviewed:  Electrocardiogram Personally Reviewed:    COORDINATION OF CARE:  Care Discussed with Consultants/Other Providers [Y/N]: MG dunbar  Prior or Outpatient Records Reviewed [Y/N]:

## 2023-11-06 NOTE — PROGRESS NOTE ADULT - SUBJECTIVE AND OBJECTIVE BOX
Vascular Surgery Daily Progress Note  =====================================================    SUBJECTIVE: Patient seen and examined on AM rounds. Sitting comfortably in chair, offers no complaints. Denies chest pain, SOB, palpitations, HA, fever, chills, N/V.    ALLERGIES:  latex (Unknown)  sulfa drugs (Unknown)      --------------------------------------------------------------------------------------    MEDICATIONS:    Neurologic Medications  ondansetron Injectable 4 milliGRAM(s) IV Push once PRN Nausea and/or Vomiting    Respiratory Medications    Cardiovascular Medications  aMIOdarone    Tablet 100 milliGRAM(s) Oral daily  buMETAnide Injectable 2 milliGRAM(s) IV Push two times a day  metoprolol succinate ER 25 milliGRAM(s) Oral daily  valsartan 40 milliGRAM(s) Oral two times a day    Gastrointestinal Medications  pantoprazole    Tablet 40 milliGRAM(s) Oral before breakfast  potassium chloride    Tablet ER 40 milliEquivalent(s) Oral once  potassium phosphate / sodium phosphate Tablet (K-PHOS No. 2) 1 Tablet(s) Oral once    Genitourinary Medications    Hematologic/Oncologic Medications  apixaban 2.5 milliGRAM(s) Oral every 12 hours  aspirin enteric coated 81 milliGRAM(s) Oral daily    Antimicrobial/Immunologic Medications    Endocrine/Metabolic Medications  atorvastatin 80 milliGRAM(s) Oral at bedtime  levothyroxine 50 MICROGram(s) Oral daily    Topical/Other Medications  collagenase Ointment 1 Application(s) Topical daily  collagenase Ointment 1 Application(s) Topical once  silver nitrate Applicator 1 Application(s) Topical once    --------------------------------------------------------------------------------------    VITAL SIGNS:  T(C): 36.8 (11-06-23 @ 09:02), Max: 37.4 (11-05-23 @ 16:53)  HR: 97 (11-06-23 @ 09:02) (81 - 97)  BP: 100/61 (11-06-23 @ 09:02) (100/55 - 113/46)  RR: 18 (11-06-23 @ 09:02) (18 - 18)  SpO2: 93% (11-06-23 @ 09:02) (93% - 99%)  --------------------------------------------------------------------------------------    INS AND OUTS:    11-05-23 @ 07:01  -  11-06-23 @ 07:00  --------------------------------------------------------  IN: 420 mL / OUT: 1550 mL / NET: -1130 mL    11-06-23 @ 07:01  -  11-06-23 @ 09:25  --------------------------------------------------------  IN: 480 mL / OUT: 0 mL / NET: 480 mL      --------------------------------------------------------------------------------------      Physical Exam:  General Appearance: Appears well, NAD   Chest: Nonlabored breathing on RA  CV: Hemodynamically stable  Extremities: Dressing to left foot C/D/I. Wound vac to medial leg and groin holding suction  Vascular: Right leg WWP  --------------------------------------------------------------------------------------    LABS

## 2023-11-06 NOTE — PROGRESS NOTE ADULT - ASSESSMENT
83F w/ cad, paroxysmal Afib on Eliquis, severe AS pending TAVR, R internal carotid stenosis, HTN, HLD, hypothyroidism, anxiety, PAD and chronic L toe wounds s/p L ilioprofunda bypass with reverse GSV graft to peroneal 8/31/23 (Dr. Tavares) c/b large L groin abscess (17cm) requiring removal of infected bypass graft of lower extremity and groin washout on 9/24/23.  Pt has been experiencing increased pain at the LLE and now concerning for limb ischemia. c/b HF. S/p Angio with occluded external illiac

## 2023-11-06 NOTE — PROGRESS NOTE ADULT - SUBJECTIVE AND OBJECTIVE BOX
NANO GARCIA, MRN [id number], 83yfemale      Patient is a 83y old  Female who presents with a chief complaint of Ruther 5 CLTI left toe wounds (06 Nov 2023 09:25)       INTERVAL HPI/OVERNIGHT EVENTS:  Patient seen and evaluated at bedside.  Pt is resting comfortable in NAD. Denies N/V/F/C.    Allergies    latex (Unknown)  sulfa drugs (Unknown)    Intolerances        Vital Signs Last 24 Hrs  T(C): 36.8 (06 Nov 2023 09:02), Max: 37.4 (05 Nov 2023 16:53)  T(F): 98.2 (06 Nov 2023 09:02), Max: 99.4 (05 Nov 2023 16:53)  HR: 97 (06 Nov 2023 09:02) (81 - 97)  BP: 100/61 (06 Nov 2023 09:02) (100/55 - 113/46)  BP(mean): --  RR: 18 (06 Nov 2023 09:02) (18 - 18)  SpO2: 93% (06 Nov 2023 09:02) (93% - 97%)    Parameters below as of 06 Nov 2023 09:02  Patient On (Oxygen Delivery Method): room air        LABS:                        10.6   10.45 )-----------( 221      ( 06 Nov 2023 06:58 )             32.2     11-06    136  |  94<L>  |  20  ----------------------------<  148<H>  3.2<L>   |  31  |  0.82    Ca    8.8      06 Nov 2023 06:58  Phos  2.7     11-06  Mg     2.00     11-06        Urinalysis Basic - ( 06 Nov 2023 06:58 )    Color: x / Appearance: x / SG: x / pH: x  Gluc: 148 mg/dL / Ketone: x  / Bili: x / Urobili: x   Blood: x / Protein: x / Nitrite: x   Leuk Esterase: x / RBC: x / WBC x   Sq Epi: x / Non Sq Epi: x / Bacteria: x      CAPILLARY BLOOD GLUCOSE          Lower Extremity Physical Exam:  Vascular: DP/PT 0/4, B/L, CFT >3 seconds B/L, Temperature gradient cool to cool , B/L.   Neuro: Epicritic sensation intact to the level of digits, B/L.  Musculoskeletal/Ortho: 9/6/23 s/p left foot partial 1 digit amputation.  Skin: Left foot distal hallux dehiscence, cool flaps, distal 2nd digit wound to dermis, 4th distal lateral digit eschar intact, early ischemic changes to digits 1 -5 to MPJs 1-5, interdigital maceration to interspaces 1-4, digits 2-5 nailbed eschars, no acute signs of infection.      RADIOLOGY & ADDITIONAL TESTS:

## 2023-11-06 NOTE — PROGRESS NOTE ADULT - PROBLEM SELECTOR PLAN 1
- s/p left foot partial hallux amputation 9/6, now admitted w/ left foot partial hallux amputation dehiscence and forefoot ischemic changes  - L foot xray: no OM, no gas, no fracture   - L foot wound growing VRE and staph epi; ID appreciated wound cx likely colonization   - antibiotics as per primary ( zosyn 10/27- )  - noted R>L asymmetric swelling prior US 8/23 was neg for DVT but with superficial thrombosis of the lesser saphenous vein in both calves. Diameters of the right and left greater and lesser saphenous veins.; 10/31 LE duplex neg for DVT  -  s/p angiogram 11/3  external iliac occlusion; on going d/w primary and  HCP and pt in regards to poss AKA; no objection to AKA if optimized from cardiology standpoint given severe AS and Acute HF

## 2023-11-07 LAB
ANION GAP SERPL CALC-SCNC: 11 MMOL/L — SIGNIFICANT CHANGE UP (ref 7–14)
ANION GAP SERPL CALC-SCNC: 11 MMOL/L — SIGNIFICANT CHANGE UP (ref 7–14)
APTT BLD: 100.3 SEC — HIGH (ref 24.5–35.6)
APTT BLD: 100.3 SEC — HIGH (ref 24.5–35.6)
APTT BLD: 32.9 SEC — SIGNIFICANT CHANGE UP (ref 24.5–35.6)
APTT BLD: 32.9 SEC — SIGNIFICANT CHANGE UP (ref 24.5–35.6)
APTT BLD: 46.5 SEC — HIGH (ref 24.5–35.6)
APTT BLD: 46.5 SEC — HIGH (ref 24.5–35.6)
APTT BLD: 75.6 SEC — HIGH (ref 24.5–35.6)
APTT BLD: 75.6 SEC — HIGH (ref 24.5–35.6)
BUN SERPL-MCNC: 23 MG/DL — SIGNIFICANT CHANGE UP (ref 7–23)
BUN SERPL-MCNC: 23 MG/DL — SIGNIFICANT CHANGE UP (ref 7–23)
CALCIUM SERPL-MCNC: 8.7 MG/DL — SIGNIFICANT CHANGE UP (ref 8.4–10.5)
CALCIUM SERPL-MCNC: 8.7 MG/DL — SIGNIFICANT CHANGE UP (ref 8.4–10.5)
CHLORIDE SERPL-SCNC: 94 MMOL/L — LOW (ref 98–107)
CHLORIDE SERPL-SCNC: 94 MMOL/L — LOW (ref 98–107)
CO2 SERPL-SCNC: 30 MMOL/L — SIGNIFICANT CHANGE UP (ref 22–31)
CO2 SERPL-SCNC: 30 MMOL/L — SIGNIFICANT CHANGE UP (ref 22–31)
CREAT SERPL-MCNC: 1.08 MG/DL — SIGNIFICANT CHANGE UP (ref 0.5–1.3)
CREAT SERPL-MCNC: 1.08 MG/DL — SIGNIFICANT CHANGE UP (ref 0.5–1.3)
EGFR: 51 ML/MIN/1.73M2 — LOW
EGFR: 51 ML/MIN/1.73M2 — LOW
GLUCOSE SERPL-MCNC: 142 MG/DL — HIGH (ref 70–99)
GLUCOSE SERPL-MCNC: 142 MG/DL — HIGH (ref 70–99)
HCT VFR BLD CALC: 31.5 % — LOW (ref 34.5–45)
HCT VFR BLD CALC: 31.5 % — LOW (ref 34.5–45)
HGB BLD-MCNC: 10.5 G/DL — LOW (ref 11.5–15.5)
HGB BLD-MCNC: 10.5 G/DL — LOW (ref 11.5–15.5)
MAGNESIUM SERPL-MCNC: 1.8 MG/DL — SIGNIFICANT CHANGE UP (ref 1.6–2.6)
MAGNESIUM SERPL-MCNC: 1.8 MG/DL — SIGNIFICANT CHANGE UP (ref 1.6–2.6)
MCHC RBC-ENTMCNC: 31.2 PG — SIGNIFICANT CHANGE UP (ref 27–34)
MCHC RBC-ENTMCNC: 31.2 PG — SIGNIFICANT CHANGE UP (ref 27–34)
MCHC RBC-ENTMCNC: 33.3 GM/DL — SIGNIFICANT CHANGE UP (ref 32–36)
MCHC RBC-ENTMCNC: 33.3 GM/DL — SIGNIFICANT CHANGE UP (ref 32–36)
MCV RBC AUTO: 93.5 FL — SIGNIFICANT CHANGE UP (ref 80–100)
MCV RBC AUTO: 93.5 FL — SIGNIFICANT CHANGE UP (ref 80–100)
NRBC # BLD: 0 /100 WBCS — SIGNIFICANT CHANGE UP (ref 0–0)
NRBC # BLD: 0 /100 WBCS — SIGNIFICANT CHANGE UP (ref 0–0)
NRBC # FLD: 0 K/UL — SIGNIFICANT CHANGE UP (ref 0–0)
NRBC # FLD: 0 K/UL — SIGNIFICANT CHANGE UP (ref 0–0)
PHOSPHATE SERPL-MCNC: 2.8 MG/DL — SIGNIFICANT CHANGE UP (ref 2.5–4.5)
PHOSPHATE SERPL-MCNC: 2.8 MG/DL — SIGNIFICANT CHANGE UP (ref 2.5–4.5)
PLATELET # BLD AUTO: 220 K/UL — SIGNIFICANT CHANGE UP (ref 150–400)
PLATELET # BLD AUTO: 220 K/UL — SIGNIFICANT CHANGE UP (ref 150–400)
POTASSIUM SERPL-MCNC: 3.8 MMOL/L — SIGNIFICANT CHANGE UP (ref 3.5–5.3)
POTASSIUM SERPL-MCNC: 3.8 MMOL/L — SIGNIFICANT CHANGE UP (ref 3.5–5.3)
POTASSIUM SERPL-SCNC: 3.8 MMOL/L — SIGNIFICANT CHANGE UP (ref 3.5–5.3)
POTASSIUM SERPL-SCNC: 3.8 MMOL/L — SIGNIFICANT CHANGE UP (ref 3.5–5.3)
RBC # BLD: 3.37 M/UL — LOW (ref 3.8–5.2)
RBC # BLD: 3.37 M/UL — LOW (ref 3.8–5.2)
RBC # FLD: 17.1 % — HIGH (ref 10.3–14.5)
RBC # FLD: 17.1 % — HIGH (ref 10.3–14.5)
SODIUM SERPL-SCNC: 135 MMOL/L — SIGNIFICANT CHANGE UP (ref 135–145)
SODIUM SERPL-SCNC: 135 MMOL/L — SIGNIFICANT CHANGE UP (ref 135–145)
WBC # BLD: 9.21 K/UL — SIGNIFICANT CHANGE UP (ref 3.8–10.5)
WBC # BLD: 9.21 K/UL — SIGNIFICANT CHANGE UP (ref 3.8–10.5)
WBC # FLD AUTO: 9.21 K/UL — SIGNIFICANT CHANGE UP (ref 3.8–10.5)
WBC # FLD AUTO: 9.21 K/UL — SIGNIFICANT CHANGE UP (ref 3.8–10.5)

## 2023-11-07 PROCEDURE — 99232 SBSQ HOSP IP/OBS MODERATE 35: CPT

## 2023-11-07 PROCEDURE — 99233 SBSQ HOSP IP/OBS HIGH 50: CPT

## 2023-11-07 RX ORDER — SODIUM,POTASSIUM PHOSPHATES 278-250MG
1 POWDER IN PACKET (EA) ORAL ONCE
Refills: 0 | Status: COMPLETED | OUTPATIENT
Start: 2023-11-07 | End: 2023-11-07

## 2023-11-07 RX ORDER — MAGNESIUM SULFATE 500 MG/ML
1 VIAL (ML) INJECTION ONCE
Refills: 0 | Status: COMPLETED | OUTPATIENT
Start: 2023-11-07 | End: 2023-11-07

## 2023-11-07 RX ADMIN — Medication 1 APPLICATION(S): at 13:25

## 2023-11-07 RX ADMIN — Medication 650 MILLIGRAM(S): at 00:01

## 2023-11-07 RX ADMIN — HEPARIN SODIUM 12 UNIT(S)/HR: 5000 INJECTION INTRAVENOUS; SUBCUTANEOUS at 23:27

## 2023-11-07 RX ADMIN — VALSARTAN 40 MILLIGRAM(S): 80 TABLET ORAL at 18:39

## 2023-11-07 RX ADMIN — Medication 25 MILLIGRAM(S): at 05:53

## 2023-11-07 RX ADMIN — Medication 100 GRAM(S): at 13:21

## 2023-11-07 RX ADMIN — Medication 1 PACKET(S): at 13:22

## 2023-11-07 RX ADMIN — AMIODARONE HYDROCHLORIDE 100 MILLIGRAM(S): 400 TABLET ORAL at 05:53

## 2023-11-07 RX ADMIN — Medication 1 APPLICATION(S): at 13:24

## 2023-11-07 RX ADMIN — HEPARIN SODIUM 11 UNIT(S)/HR: 5000 INJECTION INTRAVENOUS; SUBCUTANEOUS at 02:16

## 2023-11-07 RX ADMIN — BUMETANIDE 2 MILLIGRAM(S): 0.25 INJECTION INTRAMUSCULAR; INTRAVENOUS at 05:53

## 2023-11-07 RX ADMIN — HEPARIN SODIUM 13 UNIT(S)/HR: 5000 INJECTION INTRAVENOUS; SUBCUTANEOUS at 14:02

## 2023-11-07 RX ADMIN — PANTOPRAZOLE SODIUM 40 MILLIGRAM(S): 20 TABLET, DELAYED RELEASE ORAL at 05:53

## 2023-11-07 RX ADMIN — Medication 50 MICROGRAM(S): at 04:58

## 2023-11-07 RX ADMIN — BUMETANIDE 2 MILLIGRAM(S): 0.25 INJECTION INTRAMUSCULAR; INTRAVENOUS at 13:21

## 2023-11-07 RX ADMIN — VALSARTAN 40 MILLIGRAM(S): 80 TABLET ORAL at 05:53

## 2023-11-07 RX ADMIN — Medication 81 MILLIGRAM(S): at 13:22

## 2023-11-07 NOTE — PROGRESS NOTE ADULT - ASSESSMENT
New ECG(s): Personally reviewed    Echo: 9/26/23   1. Technically difficult image quality.   2. Left ventricular systolic function is severely decreased with an ejection fraction visually estimated at 20 to 25 %. There is global left ventricular hypokinesis. No evidence of a thrombus in the left ventricle.   3. There is mild (grade 1) left ventricular diastolic dysfunction.   4. Right ventricular cavity is mildly enlarged in size and reduced systolic function.   5. Mild mitral regurgitation.   6. Aortic valve anatomy cannot be determined with reduced systolic excursion. severe calcification of the aortic valve leaflets. severe aortic stenosis.   7. Severe aortic stenosis (estimated aortic valve area ~ 0.5 cm2 (by the continuity equation).   8. Mild aortic regurgitation.   9. The left atrium is mildly dilated in size.  10. Left pleural effusion noted.  11. No prior echocardiogram is available for comparison.    Cath:  5/2023  Moderate atherosclerosis in LM and LAD.  of dRCA with robust collateral from LAD.  Imaging:    Interpretation of Telemetry: Sinus @ 90s    83F with PMHx of CAD, HFrEF (25%), pAF on Eliquis, severe AS pending TAVR, R internal carotid stenosis, HTN, HLD, hypothyroidism, anxiety, PAD and chronic L toe wounds s/p L ilioprofunda bypass with reverse GSV graft to peroneal 8/31/23 (Dr. Tavares) c/b large L groin abscess (17cm) requiring removal of infected bypass graft of lower extremity and groin washout on 9/24/23. Admitted for vascular evaluation of LE now s/p angiogram with plan for likely AKA. Continues to appear grossly overloaded on exam.    #Acute on Chronic HFrEF (25%) Exacerbation  - cont bumex 2mg IV BID  -- Close monitoring for allergic reaction recommended.  - BID Lytes while actively diuresing  -- goal K > 4, Mg > 2  -- Strict I/O and daily standing weight  - would continue diuresis as above to optimize CHF prior to surgery  - cont valsartan 40mg BID  -- Eventually will benefit from transitioning to ARNI.  - cont metoprolol succinate 25mg daily  - consider MRA and SGLT2 inhibitors as outpatient.    #Severe AS  - currently Asymptomatic while she is on bed.  - pending TAVR per documentation  - monitor on tele, monitor VS    # pAFib  - given [id number], patient will benefit from AC. Given her age of 83 and weight below 60kg, patient is reasonable to be on reduced dose of NOAC: eliquis 2.5mg BID.   - hold AC as needed by primary team/surgery.  - continue home amiodarone  - continue BB as above    #Pre-Operative Risk Assessment  Cardiovascular Risk Stratification - RCRI =   (0 predictors = 0.4%, 1 predictor = 0.9%, 2 predictors = 6.6%, =3 predictors = >11%)    History of ischemic heart disease: Y  History of congestive heart failure: Y  History of cerebrovascular disease (stroke or transient ischemic attack): N  History of diabetes requiring preoperative insulin use: N  Chronic kidney disease (creatinine > 2 mg/dL): N  Undergoing suprainguinal vascular, intraperitoneal, or intrathoracic surgery: N    Overall this patient is as 6.6% risk for cardiac death, nonfatal myocardial infarction, and nonfatal cardiac arrest perioperatively for this moderate risk above knee amputation. This likely underestimates patient risk for procedure given known severe aortic stenosis. As above would continue with diuresis to optimize prior to procedure.     All recommendations pending attending attestation. We will sign off, please re-consult with any concerns    Tabby Zhang MD  PGY-4, Cardiology  Available on TEAMS    For all new consults  [email or web address]  Login: [email or web address]

## 2023-11-07 NOTE — PROGRESS NOTE ADULT - SUBJECTIVE AND OBJECTIVE BOX
Vascular Surgery Daily Progress Note  =====================================================    SUBJECTIVE: Patient seen and examined on AM rounds. Lying comfortably in bed, offers no complaints. Denies chest pain, SOB, palpitations, HA, fever, chills, N/V.    ALLERGIES:  latex (Unknown)  sulfa drugs (Unknown)      --------------------------------------------------------------------------------------    MEDICATIONS:    Neurologic Medications  acetaminophen     Tablet .. 650 milliGRAM(s) Oral every 6 hours PRN Temp greater or equal to 38C (100.4F), Mild Pain (1 - 3)  ondansetron Injectable 4 milliGRAM(s) IV Push once PRN Nausea and/or Vomiting    Respiratory Medications    Cardiovascular Medications  aMIOdarone    Tablet 100 milliGRAM(s) Oral daily  buMETAnide Injectable 2 milliGRAM(s) IV Push two times a day  metoprolol succinate ER 25 milliGRAM(s) Oral daily  valsartan 40 milliGRAM(s) Oral two times a day    Gastrointestinal Medications  pantoprazole    Tablet 40 milliGRAM(s) Oral before breakfast    Genitourinary Medications    Hematologic/Oncologic Medications  aspirin enteric coated 81 milliGRAM(s) Oral daily  heparin  Infusion 1000 Unit(s)/Hr IV Continuous <Continuous>    Antimicrobial/Immunologic Medications    Endocrine/Metabolic Medications  atorvastatin 80 milliGRAM(s) Oral at bedtime  levothyroxine 50 MICROGram(s) Oral daily    Topical/Other Medications  collagenase Ointment 1 Application(s) Topical daily  collagenase Ointment 1 Application(s) Topical once  hydrocortisone 1% Cream 1 Application(s) Topical daily  silver nitrate Applicator 1 Application(s) Topical once    --------------------------------------------------------------------------------------    VITAL SIGNS:  T(C): 37.2 (11-07-23 @ 05:01), Max: 37.2 (11-07-23 @ 05:01)  HR: 87 (11-07-23 @ 05:01) (67 - 97)  BP: 110/54 (11-07-23 @ 05:01) (96/46 - 112/58)  RR: 18 (11-07-23 @ 05:01) (18 - 18)  SpO2: 94% (11-07-23 @ 05:01) (93% - 95%)  --------------------------------------------------------------------------------------    INS AND OUTS:    11-06-23 @ 07:01  -  11-07-23 @ 07:00  --------------------------------------------------------  IN: 595 mL / OUT: 400 mL / NET: 195 mL      --------------------------------------------------------------------------------------  Physical Exam:  General Appearance: Appears well, NAD   Chest: Nonlabored breathing on RA  CV: Hemodynamically stable  Extremities: Dressing to left foot C/D/I. Wound vac to medial leg and groin holding suction  Vascular: Right leg WWP    --------------------------------------------------------------------------------------    LABS

## 2023-11-07 NOTE — PROGRESS NOTE ADULT - ASSESSMENT
ASSESSMENT:  83F with PMHx of CAD, pAF on Eliquis, severe AS pending TAVR, R internal carotid stenosis, HTN, HLD, hypothyroidism, anxiety, PAD and chronic L toe wounds s/p L ilioprofunda bypass with reverse GSV graft to peroneal 8/31/23 (Dr. Tavares) c/b large L groin abscess (17cm) requiring removal of infected bypass graft of lower extremity and groin washout on 9/24/23. Wound care involved for R groin wound and R calf incision wound. Patient represented from rehab concerns for "cold foot". Pt is poor historian, but she says the wounds have been worse over the 2 days prior to presentation. Remains afebrile and hemodynamically stable s/p LLE diagnostic angiogram on 11/3. Given ischemic foot and external iliac occlusion, patient may require AKA    PLAN:  - Diet: Regular  - Cardiology consulted, appreciate recs: Bumex 2mg BID   - Wound care following - vac applied 11/4, last changed 11/6  - AKA planning discussed with patient and healthcare proxy per patient request  - Dispo: pending    Vascular Surgery  [id number]

## 2023-11-07 NOTE — PROGRESS NOTE ADULT - ASSESSMENT
83F w/ cad, paroxysmal Afib on Eliquis, severe AS pending TAVR, R internal carotid stenosis, HTN, HLD, hypothyroidism, anxiety, PAD and chronic L toe wounds s/p L ilioprofunda bypass with reverse GSV graft to peroneal 8/31/23 (Dr. Tavares) c/b large L groin abscess (17cm) requiring removal of infected bypass graft of lower extremity and groin washout on 9/24/23.  Pt has been experiencing increased pain at the LLE and now concerning for limb ischemia. c/b HF. S/p Angio with occluded external illiac. now plan for AKA.

## 2023-11-07 NOTE — CHART NOTE - NSCHARTNOTEFT_GEN_A_CORE
Nurse contacted resident about elevated aPPT, States PCA elijah it through the same line heparin was running in. Repeating now.

## 2023-11-07 NOTE — PROGRESS NOTE ADULT - SUBJECTIVE AND OBJECTIVE BOX
[facility]  Garry Stahl MD  Pager (M-F, 8A-5P): [phone]  Other Times:  [phone]    Patient is a 83y old  Female who presents with a chief complaint of Ruther 5 CLTI left toe wounds (07 Nov 2023 07:47)      SUBJECTIVE / OVERNIGHT EVENTS: Pt in NAD no acute events ON       MEDICATIONS  (STANDING):  aMIOdarone    Tablet 100 milliGRAM(s) Oral daily  aspirin enteric coated 81 milliGRAM(s) Oral daily  atorvastatin 80 milliGRAM(s) Oral at bedtime  buMETAnide Injectable 2 milliGRAM(s) IV Push two times a day  collagenase Ointment 1 Application(s) Topical daily  collagenase Ointment 1 Application(s) Topical once  heparin  Infusion 1000 Unit(s)/Hr (11 mL/Hr) IV Continuous <Continuous>  hydrocortisone 1% Cream 1 Application(s) Topical daily  levothyroxine 50 MICROGram(s) Oral daily  magnesium sulfate  IVPB 1 Gram(s) IV Intermittent once  metoprolol succinate ER 25 milliGRAM(s) Oral daily  pantoprazole    Tablet 40 milliGRAM(s) Oral before breakfast  potassium phosphate / sodium phosphate Powder (PHOS-NaK) 1 Packet(s) Oral once  silver nitrate Applicator 1 Application(s) Topical once  valsartan 40 milliGRAM(s) Oral two times a day    MEDICATIONS  (PRN):  acetaminophen     Tablet .. 650 milliGRAM(s) Oral every 6 hours PRN Temp greater or equal to 38C (100.4F), Mild Pain (1 - 3)  ondansetron Injectable 4 milliGRAM(s) IV Push once PRN Nausea and/or Vomiting      CAPILLARY BLOOD GLUCOSE        I&O's Summary    06 Nov 2023 07:01  -  07 Nov 2023 07:00  --------------------------------------------------------  IN: 595 mL / OUT: 400 mL / NET: 195 mL        PHYSICAL EXAM:  Vital Signs Last 24 Hrs  T(C): 37.2 (07 Nov 2023 05:01), Max: 37.2 (07 Nov 2023 05:01)  T(F): 98.9 (07 Nov 2023 05:01), Max: 98.9 (07 Nov 2023 05:01)  HR: 87 (07 Nov 2023 05:01) (67 - 87)  BP: 110/54 (07 Nov 2023 05:01) (96/46 - 112/58)  BP(mean): --  RR: 18 (07 Nov 2023 05:01) (18 - 18)  SpO2: 94% (07 Nov 2023 05:01) (93% - 95%)    Parameters below as of 07 Nov 2023 05:01  Patient On (Oxygen Delivery Method): room air    GENERAL: NAD, sitting in chair comfortably   EYES: EOMI, conjunctiva and sclera clear  NECK: Supple, No JVD  CHEST/LUNG: Clear to auscultation bilaterally; No wheeze  HEART: Regular rate and rhythm; S1S2; HUMA 3/6 radiating to carotid   ABDOMEN: Soft, Nontender, Nondistended; Bowel sounds present  EXTREMITIES: L foot bandaged. +LE edema  R>L; Lt leg wound vac  PSYCH: awake alert answers questions appropriately      LABS:                        10.5   9.21  )-----------( 220      ( 07 Nov 2023 05:35 )             31.5     11-07    135  |  94<L>  |  23  ----------------------------<  142<H>  3.8   |  30  |  1.08    Ca    8.7      07 Nov 2023 05:35  Phos  2.8     11-07  Mg     1.80     11-07      PTT - ( 07 Nov 2023 00:47 )  PTT:46.5 sec      Urinalysis Basic - ( 07 Nov 2023 05:35 )    Color: x / Appearance: x / SG: x / pH: x  Gluc: 142 mg/dL / Ketone: x  / Bili: x / Urobili: x   Blood: x / Protein: x / Nitrite: x   Leuk Esterase: x / RBC: x / WBC x   Sq Epi: x / Non Sq Epi: x / Bacteria: x          RADIOLOGY & ADDITIONAL TESTS:  Results Reviewed:   Imaging Personally Reviewed:  Electrocardiogram Personally Reviewed:    COORDINATION OF CARE:  Care Discussed with Consultants/Other Providers [Y/N]:  Prior or Outpatient Records Reviewed [Y/N]:

## 2023-11-07 NOTE — PROGRESS NOTE ADULT - SUBJECTIVE AND OBJECTIVE BOX
Overnight Events: NAEO    Review Of Systems: No chest pain, shortness of breath, or palpitations            Current Meds:  acetaminophen     Tablet .. 650 milliGRAM(s) Oral every 6 hours PRN  aMIOdarone    Tablet 100 milliGRAM(s) Oral daily  aspirin enteric coated 81 milliGRAM(s) Oral daily  atorvastatin 80 milliGRAM(s) Oral at bedtime  buMETAnide Injectable 2 milliGRAM(s) IV Push two times a day  collagenase Ointment 1 Application(s) Topical daily  collagenase Ointment 1 Application(s) Topical once  heparin  Infusion 1000 Unit(s)/Hr IV Continuous <Continuous>  hydrocortisone 1% Cream 1 Application(s) Topical daily  levothyroxine 50 MICROGram(s) Oral daily  magnesium sulfate  IVPB 1 Gram(s) IV Intermittent once  metoprolol succinate ER 25 milliGRAM(s) Oral daily  ondansetron Injectable 4 milliGRAM(s) IV Push once PRN  pantoprazole    Tablet 40 milliGRAM(s) Oral before breakfast  potassium phosphate / sodium phosphate Powder (PHOS-NaK) 1 Packet(s) Oral once  silver nitrate Applicator 1 Application(s) Topical once  valsartan 40 milliGRAM(s) Oral two times a day      Vitals:  T(F): 98.7 (11-07), Max: 98.9 (11-07)  HR: 78 (11-07) (67 - 87)  BP: 108/58 (11-07) (96/46 - 112/58)  RR: 18 (11-07)  SpO2: 94% (11-07)  I&O's Summary    06 Nov 2023 07:01  -  07 Nov 2023 07:00  --------------------------------------------------------  IN: 595 mL / OUT: 400 mL / NET: 195 mL        Physical Exam:  GEN: comfortable appearing, lying in bed in NAD  HEENT: NCAT, MMM  CV: Regular S1, S2, IV/VI crescendo late-peaking systolic murmur best heard at RUSB  RESP: CTAB  ABD: Soft, NTND, +BS  EXT: No LE edema, WWP, pulses palpable throughout  NEURO: No focal deficits, AOx3  SKIN:  No rashes                          10.5   9.21  )-----------( 220      ( 07 Nov 2023 05:35 )             31.5     11-07    135  |  94<L>  |  23  ----------------------------<  142<H>  3.8   |  30  |  1.08    Ca    8.7      07 Nov 2023 05:35  Phos  2.8     11-07  Mg     1.80     11-07      PTT - ( 07 Nov 2023 10:45 )  PTT:32.9 sec

## 2023-11-07 NOTE — CHART NOTE - NSCHARTNOTEFT_GEN_A_CORE
Psychiatry consult requested by vascular surgery pager [phone] to assess capacity to consent to AKA. Discussed case with ACP this afternoon. Patient is scheduled for AKA, has a supportive and involved HCP.   According to vascular surgery patient is consenting to AKA and HCP is supportive of decision. Advised team to discuss with patient the risks+ rationale of proposed surgery, alternative options if any and its risks vs benefits as well. Based on the above, primary team to assess if patient is able to make an informed choice.     Vascular surgery will reconsult ( x [phone] ) if a formal psychiatry consult is needed.

## 2023-11-07 NOTE — PROGRESS NOTE ADULT - PROBLEM SELECTOR PLAN 1
- s/p left foot partial hallux amputation 9/6, now admitted w/ left foot partial hallux amputation dehiscence and forefoot ischemic changes  - L foot xray: no OM, no gas, no fracture   - L foot wound growing VRE and staph epi; ID appreciated wound cx likely colonization   - antibiotics as per primary ( zosyn 10/27- )  - noted R>L asymmetric swelling prior US 8/23 was neg for DVT but with superficial thrombosis of the lesser saphenous vein in both calves. Diameters of the right and left greater and lesser saphenous veins.; 10/31 LE duplex neg for DVT  -  s/p angiogram 11/3  external iliac occlusion; no objection to AKA if optimized from cardiology standpoint given severe AS and Acute HF

## 2023-11-07 NOTE — PROGRESS NOTE ADULT - PROBLEM SELECTOR PLAN 2
- Echo from 9/23 with EF of 20-25% and global left ventricular hypokinesis.  - Patient w/ fluid overload ; LE edema   - Continue Toprol 25mg daily    - Valsartan 40mg PO BID  - Monitor I/O's, daily weights  - monitor Cr  - repeat TTE as below unchanged EF   - diuresis as per cards; Bumex 2mg IV BID   - f/u cards recs

## 2023-11-08 ENCOUNTER — APPOINTMENT (OUTPATIENT)
Dept: VASCULAR SURGERY | Facility: CLINIC | Age: 83
End: 2023-11-08

## 2023-11-08 LAB
ANION GAP SERPL CALC-SCNC: 14 MMOL/L — SIGNIFICANT CHANGE UP (ref 7–14)
ANION GAP SERPL CALC-SCNC: 14 MMOL/L — SIGNIFICANT CHANGE UP (ref 7–14)
APTT BLD: 135 SEC — SIGNIFICANT CHANGE UP (ref 24.5–35.6)
APTT BLD: 135 SEC — SIGNIFICANT CHANGE UP (ref 24.5–35.6)
BUN SERPL-MCNC: 22 MG/DL — SIGNIFICANT CHANGE UP (ref 7–23)
BUN SERPL-MCNC: 22 MG/DL — SIGNIFICANT CHANGE UP (ref 7–23)
CALCIUM SERPL-MCNC: 8.6 MG/DL — SIGNIFICANT CHANGE UP (ref 8.4–10.5)
CALCIUM SERPL-MCNC: 8.6 MG/DL — SIGNIFICANT CHANGE UP (ref 8.4–10.5)
CHLORIDE SERPL-SCNC: 93 MMOL/L — LOW (ref 98–107)
CHLORIDE SERPL-SCNC: 93 MMOL/L — LOW (ref 98–107)
CO2 SERPL-SCNC: 27 MMOL/L — SIGNIFICANT CHANGE UP (ref 22–31)
CO2 SERPL-SCNC: 27 MMOL/L — SIGNIFICANT CHANGE UP (ref 22–31)
CREAT SERPL-MCNC: 1.07 MG/DL — SIGNIFICANT CHANGE UP (ref 0.5–1.3)
CREAT SERPL-MCNC: 1.07 MG/DL — SIGNIFICANT CHANGE UP (ref 0.5–1.3)
EGFR: 52 ML/MIN/1.73M2 — LOW
EGFR: 52 ML/MIN/1.73M2 — LOW
GLUCOSE SERPL-MCNC: 161 MG/DL — HIGH (ref 70–99)
GLUCOSE SERPL-MCNC: 161 MG/DL — HIGH (ref 70–99)
HCT VFR BLD CALC: 33.1 % — LOW (ref 34.5–45)
HCT VFR BLD CALC: 33.1 % — LOW (ref 34.5–45)
HGB BLD-MCNC: 10.8 G/DL — LOW (ref 11.5–15.5)
HGB BLD-MCNC: 10.8 G/DL — LOW (ref 11.5–15.5)
MAGNESIUM SERPL-MCNC: 2 MG/DL — SIGNIFICANT CHANGE UP (ref 1.6–2.6)
MAGNESIUM SERPL-MCNC: 2 MG/DL — SIGNIFICANT CHANGE UP (ref 1.6–2.6)
MCHC RBC-ENTMCNC: 30.3 PG — SIGNIFICANT CHANGE UP (ref 27–34)
MCHC RBC-ENTMCNC: 30.3 PG — SIGNIFICANT CHANGE UP (ref 27–34)
MCHC RBC-ENTMCNC: 32.6 GM/DL — SIGNIFICANT CHANGE UP (ref 32–36)
MCHC RBC-ENTMCNC: 32.6 GM/DL — SIGNIFICANT CHANGE UP (ref 32–36)
MCV RBC AUTO: 93 FL — SIGNIFICANT CHANGE UP (ref 80–100)
MCV RBC AUTO: 93 FL — SIGNIFICANT CHANGE UP (ref 80–100)
NRBC # BLD: 0 /100 WBCS — SIGNIFICANT CHANGE UP (ref 0–0)
NRBC # BLD: 0 /100 WBCS — SIGNIFICANT CHANGE UP (ref 0–0)
NRBC # FLD: 0 K/UL — SIGNIFICANT CHANGE UP (ref 0–0)
NRBC # FLD: 0 K/UL — SIGNIFICANT CHANGE UP (ref 0–0)
PHOSPHATE SERPL-MCNC: 2.5 MG/DL — SIGNIFICANT CHANGE UP (ref 2.5–4.5)
PHOSPHATE SERPL-MCNC: 2.5 MG/DL — SIGNIFICANT CHANGE UP (ref 2.5–4.5)
PLATELET # BLD AUTO: 246 K/UL — SIGNIFICANT CHANGE UP (ref 150–400)
PLATELET # BLD AUTO: 246 K/UL — SIGNIFICANT CHANGE UP (ref 150–400)
POTASSIUM SERPL-MCNC: 3.4 MMOL/L — LOW (ref 3.5–5.3)
POTASSIUM SERPL-MCNC: 3.4 MMOL/L — LOW (ref 3.5–5.3)
POTASSIUM SERPL-SCNC: 3.4 MMOL/L — LOW (ref 3.5–5.3)
POTASSIUM SERPL-SCNC: 3.4 MMOL/L — LOW (ref 3.5–5.3)
RBC # BLD: 3.56 M/UL — LOW (ref 3.8–5.2)
RBC # BLD: 3.56 M/UL — LOW (ref 3.8–5.2)
RBC # FLD: 17.1 % — HIGH (ref 10.3–14.5)
RBC # FLD: 17.1 % — HIGH (ref 10.3–14.5)
SODIUM SERPL-SCNC: 134 MMOL/L — LOW (ref 135–145)
SODIUM SERPL-SCNC: 134 MMOL/L — LOW (ref 135–145)
WBC # BLD: 9.09 K/UL — SIGNIFICANT CHANGE UP (ref 3.8–10.5)
WBC # BLD: 9.09 K/UL — SIGNIFICANT CHANGE UP (ref 3.8–10.5)
WBC # FLD AUTO: 9.09 K/UL — SIGNIFICANT CHANGE UP (ref 3.8–10.5)
WBC # FLD AUTO: 9.09 K/UL — SIGNIFICANT CHANGE UP (ref 3.8–10.5)

## 2023-11-08 PROCEDURE — 99232 SBSQ HOSP IP/OBS MODERATE 35: CPT

## 2023-11-08 PROCEDURE — 99233 SBSQ HOSP IP/OBS HIGH 50: CPT

## 2023-11-08 PROCEDURE — 99232 SBSQ HOSP IP/OBS MODERATE 35: CPT | Mod: GC,24

## 2023-11-08 PROCEDURE — 90792 PSYCH DIAG EVAL W/MED SRVCS: CPT

## 2023-11-08 RX ORDER — POTASSIUM CHLORIDE 20 MEQ
40 PACKET (EA) ORAL ONCE
Refills: 0 | Status: COMPLETED | OUTPATIENT
Start: 2023-11-08 | End: 2023-11-08

## 2023-11-08 RX ORDER — HEPARIN SODIUM 5000 [USP'U]/ML
1000 INJECTION INTRAVENOUS; SUBCUTANEOUS
Qty: 25000 | Refills: 0 | Status: DISCONTINUED | OUTPATIENT
Start: 2023-11-08 | End: 2023-11-08

## 2023-11-08 RX ORDER — ENOXAPARIN SODIUM 100 MG/ML
55 INJECTION SUBCUTANEOUS EVERY 12 HOURS
Refills: 0 | Status: DISCONTINUED | OUTPATIENT
Start: 2023-11-08 | End: 2023-11-14

## 2023-11-08 RX ORDER — SODIUM,POTASSIUM PHOSPHATES 278-250MG
1 POWDER IN PACKET (EA) ORAL ONCE
Refills: 0 | Status: COMPLETED | OUTPATIENT
Start: 2023-11-08 | End: 2023-11-08

## 2023-11-08 RX ORDER — ENOXAPARIN SODIUM 100 MG/ML
40 INJECTION SUBCUTANEOUS EVERY 12 HOURS
Refills: 0 | Status: DISCONTINUED | OUTPATIENT
Start: 2023-11-08 | End: 2023-11-08

## 2023-11-08 RX ADMIN — Medication 1 APPLICATION(S): at 11:36

## 2023-11-08 RX ADMIN — PANTOPRAZOLE SODIUM 40 MILLIGRAM(S): 20 TABLET, DELAYED RELEASE ORAL at 06:24

## 2023-11-08 RX ADMIN — Medication 1 TABLET(S): at 09:43

## 2023-11-08 RX ADMIN — Medication 50 MICROGRAM(S): at 05:37

## 2023-11-08 RX ADMIN — VALSARTAN 40 MILLIGRAM(S): 80 TABLET ORAL at 06:24

## 2023-11-08 RX ADMIN — Medication 650 MILLIGRAM(S): at 21:43

## 2023-11-08 RX ADMIN — Medication 650 MILLIGRAM(S): at 22:43

## 2023-11-08 RX ADMIN — Medication 81 MILLIGRAM(S): at 11:36

## 2023-11-08 RX ADMIN — BUMETANIDE 2 MILLIGRAM(S): 0.25 INJECTION INTRAMUSCULAR; INTRAVENOUS at 05:38

## 2023-11-08 RX ADMIN — ENOXAPARIN SODIUM 55 MILLIGRAM(S): 100 INJECTION SUBCUTANEOUS at 18:18

## 2023-11-08 RX ADMIN — Medication 25 MILLIGRAM(S): at 06:24

## 2023-11-08 RX ADMIN — AMIODARONE HYDROCHLORIDE 100 MILLIGRAM(S): 400 TABLET ORAL at 06:24

## 2023-11-08 RX ADMIN — Medication 40 MILLIEQUIVALENT(S): at 09:44

## 2023-11-08 RX ADMIN — HEPARIN SODIUM 10 UNIT(S)/HR: 5000 INJECTION INTRAVENOUS; SUBCUTANEOUS at 09:45

## 2023-11-08 RX ADMIN — VALSARTAN 40 MILLIGRAM(S): 80 TABLET ORAL at 18:18

## 2023-11-08 NOTE — BH CONSULTATION LIAISON ASSESSMENT NOTE - NSBHCHARTREVIEWLAB_PSY_A_CORE FT
CAPILLARY BLOOD GLUCOSE                              10.8   9.09  )-----------( 246      ( 08 Nov 2023 07:10 )             33.1     11-08    134<L>  |  93<L>  |  22  ----------------------------<  161<H>  3.4<L>   |  27  |  1.07    Ca    8.6      08 Nov 2023 07:10  Phos  2.5     11-08  Mg     2.00     11-08      PTT - ( 08 Nov 2023 07:10 )  PTT:135.0 sec        Urinalysis Basic - ( 08 Nov 2023 07:10 )    Color: x / Appearance: x / SG: x / pH: x  Gluc: 161 mg/dL / Ketone: x  / Bili: x / Urobili: x   Blood: x / Protein: x / Nitrite: x   Leuk Esterase: x / RBC: x / WBC x   Sq Epi: x / Non Sq Epi: x / Bacteria: x

## 2023-11-08 NOTE — BH CONSULTATION LIAISON ASSESSMENT NOTE - SUMMARY
Patient is a 84y/o F, domiciled alone, has a friend and HCP, Roosevelt Bell, previously employed as , w/ no PPHx, no prior psych hospitalizations, no prior SA/NSSIB, denies substance use, PMH  CAD, pAF on Eliquis, severe AS pending TAVR, peripheral arterial disease requiring Left AKA. Psychiatry consulted for capacity to consent to AKA.     The patient is AOx4, able to understand the relevant information at hand, appreciates the consequences of her decision, and can manipulate the relevant information in a rational manner. The patient is able to verbalize the relevant indications/risks/and benefits of the procedure and alternative options. Patient currently unable to communicate a choice because she wants more time to think. At the present time, this patient has capacity to consent to AKA.    Plan  -Patient has capacity to consent to AKA - would like more time to think  -Patient requested surgical team to come back tomorrow to provide more education about the procedure  -Patient requested Roosevelt to be involved in the decision-making process Patient is a 82y/o F, domiciled alone, has a friend and HCP, Roosevelt Bell, previously employed as , w/ no PPHx, no prior psych hospitalizations, no prior SA/NSSIB, denies substance use, PMH  CAD, pAF on Eliquis, severe AS pending TAVR, peripheral arterial disease requiring Left AKA. Psychiatry consulted for capacity to consent to AKA.     The patient is AOx4, able to demonstrate understanding into the relevant information presented by vascular surgery during this assessment. The patient is able to verbalize understanding of the relevant indications/risks/and benefits of the procedure and alternative options.  She is keen on vascular surgery continuing to educate her, and coordinate care with her and her HCP. She finds her HCP to be very supportive, and will be leaning on him to help her make a choice.  At the present time based on the above, this patient has reasonable capacity to understand the information regarding the proposed treatment- AKA. The patient has articulated that she is not ready to make a choice at this time during the evaluation and wants more time to make a decision.    ** PLEASE NOTE THAT CAPACITY EVALUATION IS A CROSS SECTIONAL ASSESSMENT. A PATIENT CAN LOSE/GAIN CAPACITY BASED ON HIS/HER MENTATION AT A GIVEN POINT IN TIME**     Patient is a 84y/o F, domiciled alone, has a friend and HCP, Roosevelt Bell, previously employed as , w/ no PPHx, no prior psych hospitalizations, no prior SA/NSSIB, denies substance use, PMH  CAD, pAF on Eliquis, severe AS pending TAVR, peripheral arterial disease requiring Left AKA. Psychiatry consulted for capacity to consent to AKA.     The patient is AOx4, able to demonstrate understanding into the relevant information presented by vascular surgery during this assessment. The patient is able to verbalize understanding of the relevant indications/risks/and benefits of the procedure discussed by vascular surgery and alternative options. Please note that cardiac pre-operative risk assessment was not discussed by vascular surgery with patient today but did discuss that cardiology was working on optimizing her for the surgery. She is keen on vascular surgery continuing to educate her+hcp and coordinate care. She finds her HCP to be very supportive, and will be leaning on him to help her make a choice.   At the present time based on the above, this patient has reasonable capacity to understand the presented information regarding the proposed treatment- AKA. The patient has articulated that she is not ready to make a choice at this time during the evaluation and wants more time to make a decision.    ** PLEASE NOTE THAT CAPACITY EVALUATION IS A CROSS SECTIONAL ASSESSMENT. A PATIENT CAN LOSE/GAIN CAPACITY BASED ON HIS/HER MENTATION AT A GIVEN POINT IN TIME**

## 2023-11-08 NOTE — PROGRESS NOTE ADULT - ASSESSMENT
83F with PMHx of CAD, pAF on Eliquis, severe AS pending TAVR, R internal carotid stenosis, HTN, HLD, hypothyroidism, anxiety, PAD and chronic L toe wounds s/p L ilioprofunda bypass with reverse GSV graft to peroneal 8/31/23 (Dr. Tavares) c/b large L groin abscess (17cm) requiring removal of infected bypass graft of lower extremity and groin washout on 9/24/23. Wound care involved for R groin wound and R calf incision wound. Patient represented from rehab concerns for "cold foot". Pt is poor historian, but she says the wounds have been worse over the 2 days prior to presentation. Remains afebrile and hemodynamically stable s/p LLE diagnostic angiogram on 11/3. Given ischemic foot and external iliac occlusion, patient may require AKA.    PLAN:  - Diet: Regular  - Cardiology consulted, appreciate recs: Bumex 2mg BID   - Wound care following - vac applied 11/4, last changed 11/6  - AKA planning discussed with patient and healthcare proxy  - Psychiatry consulted regarding capacity, appreciate evaluation and recommendations  - Dispo: pending    Vascular Surgery  [id number]

## 2023-11-08 NOTE — BH CONSULTATION LIAISON ASSESSMENT NOTE - RISK ASSESSMENT
Acute risk factors include: single, acute psychosocial stressors, poor reactivity to stressors, difficulty expressing emotions    Chronic risk factors for suicide include: complex medical conditions    Protective factors include: denies SI/I/P, no hx of SA, no hx of NSSIB, no prior psychiatric admissions, no active substance use, no active psychosis, strong social supports    At this time, pt is NOT an acute risk of harm to self or others and does not meet criteria for involuntary admission.

## 2023-11-08 NOTE — BH CONSULTATION LIAISON ASSESSMENT NOTE - DESCRIPTION
domiciled alone, has a friend and HCP, Roosevelt Bell, previously employed as , reports supportive work family in [place]

## 2023-11-08 NOTE — BH CONSULTATION LIAISON ASSESSMENT NOTE - NSBHATTESTCOMMENTATTENDFT_PSY_A_CORE
met with the patient along with resident md, impression and plan discussed in detail and agreed upon

## 2023-11-08 NOTE — PROGRESS NOTE ADULT - ASSESSMENT
New ECG(s): Personally reviewed    Echo: 9/26/23   1. Technically difficult image quality.   2. Left ventricular systolic function is severely decreased with an ejection fraction visually estimated at 20 to 25 %. There is global left ventricular hypokinesis. No evidence of a thrombus in the left ventricle.   3. There is mild (grade 1) left ventricular diastolic dysfunction.   4. Right ventricular cavity is mildly enlarged in size and reduced systolic function.   5. Mild mitral regurgitation.   6. Aortic valve anatomy cannot be determined with reduced systolic excursion. severe calcification of the aortic valve leaflets. severe aortic stenosis.   7. Severe aortic stenosis (estimated aortic valve area ~ 0.5 cm2 (by the continuity equation).   8. Mild aortic regurgitation.   9. The left atrium is mildly dilated in size.  10. Left pleural effusion noted.  11. No prior echocardiogram is available for comparison.    Cath:  5/2023  Moderate atherosclerosis in LM and LAD.  of dRCA with robust collateral from LAD.  Imaging:    Interpretation of Telemetry: Sinus @ 90s    83F with PMHx of CAD, HFrEF (25%), pAF on Eliquis, severe AS pending TAVR, R internal carotid stenosis, HTN, HLD, hypothyroidism, anxiety, PAD and chronic L toe wounds s/p L ilioprofunda bypass with reverse GSV graft to peroneal 8/31/23 (Dr. Tavares) c/b large L groin abscess (17cm) requiring removal of infected bypass graft of lower extremity and groin washout on 9/24/23. Admitted for vascular evaluation of LE now s/p angiogram with plan for likely AKA. Continues to appear grossly overloaded on exam.    #Acute on Chronic HFrEF (25%) Exacerbation  - increase bumex to 3mg IV BID  -- Close monitoring for allergic reaction recommended.  - BID Lytes while actively diuresing  -- goal K > 4, Mg > 2  -- Strict I/O and daily standing weight  - would continue diuresis as above to optimize CHF prior to surgery  - cont valsartan 40mg BID  -- Eventually will benefit from transitioning to ARNI.  - cont metoprolol succinate 25mg daily  - consider MRA and SGLT2 inhibitors as outpatient.    #Severe AS  - currently Asymptomatic while she is on bed.  - pending TAVR per documentation  - monitor on tele, monitor VS    # pAFib  - given [id number], patient will benefit from AC. Given her age of 83 and weight below 60kg, patient is reasonable to be on reduced dose of NOAC: eliquis 2.5mg BID.   - hold AC as needed by primary team/surgery.  - continue home amiodarone  - continue BB as above    #Pre-Operative Risk Assessment  Cardiovascular Risk Stratification - RCRI =   (0 predictors = 0.4%, 1 predictor = 0.9%, 2 predictors = 6.6%, =3 predictors = >11%)    History of ischemic heart disease: Y  History of congestive heart failure: Y  History of cerebrovascular disease (stroke or transient ischemic attack): N  History of diabetes requiring preoperative insulin use: N  Chronic kidney disease (creatinine > 2 mg/dL): N  Undergoing suprainguinal vascular, intraperitoneal, or intrathoracic surgery: N    Overall this patient is as 6.6% risk for cardiac death, nonfatal myocardial infarction, and nonfatal cardiac arrest perioperatively for this moderate risk above knee amputation. This likely underestimates patient risk for procedure given known severe aortic stenosis. As above would continue with diuresis to optimize prior to procedure.     All recommendations pending attending attestation. We will sign off, please re-consult with any concerns    Tabby Zhang MD  PGY-4, Cardiology  Available on TEAMS    For all new consults  [email or web address]  Login: [email or web address]

## 2023-11-08 NOTE — BH CONSULTATION LIAISON ASSESSMENT NOTE - NSBHCHARTREVIEWVS_PSY_A_CORE FT
Vital Signs Last 24 Hrs  T(C): 37 (08 Nov 2023 09:29), Max: 37.2 (07 Nov 2023 12:45)  T(F): 98.6 (08 Nov 2023 09:29), Max: 98.9 (07 Nov 2023 12:45)  HR: 79 (08 Nov 2023 09:29) (76 - 84)  BP: 110/57 (08 Nov 2023 09:29) (106/68 - 127/58)  BP(mean): --  RR: 18 (08 Nov 2023 09:29) (18 - 18)  SpO2: 95% (08 Nov 2023 09:29) (94% - 99%)    Parameters below as of 08 Nov 2023 09:29  Patient On (Oxygen Delivery Method): room air

## 2023-11-08 NOTE — BH CONSULTATION LIAISON ASSESSMENT NOTE - CURRENT MEDICATION
MEDICATIONS  (STANDING):  aMIOdarone    Tablet 100 milliGRAM(s) Oral daily  aspirin enteric coated 81 milliGRAM(s) Oral daily  atorvastatin 80 milliGRAM(s) Oral at bedtime  buMETAnide Injectable 2 milliGRAM(s) IV Push two times a day  collagenase Ointment 1 Application(s) Topical daily  collagenase Ointment 1 Application(s) Topical once  enoxaparin Injectable 55 milliGRAM(s) SubCutaneous every 12 hours  hydrocortisone 1% Cream 1 Application(s) Topical daily  levothyroxine 50 MICROGram(s) Oral daily  metoprolol succinate ER 25 milliGRAM(s) Oral daily  pantoprazole    Tablet 40 milliGRAM(s) Oral before breakfast  valsartan 40 milliGRAM(s) Oral two times a day    MEDICATIONS  (PRN):  acetaminophen     Tablet .. 650 milliGRAM(s) Oral every 6 hours PRN Temp greater or equal to 38C (100.4F), Mild Pain (1 - 3)  ondansetron Injectable 4 milliGRAM(s) IV Push once PRN Nausea and/or Vomiting

## 2023-11-08 NOTE — PROGRESS NOTE ADULT - SUBJECTIVE AND OBJECTIVE BOX
Overnight Events: NAEO     Review Of Systems: No chest pain, shortness of breath, or palpitations            Current Meds:  acetaminophen     Tablet .. 650 milliGRAM(s) Oral every 6 hours PRN  aMIOdarone    Tablet 100 milliGRAM(s) Oral daily  aspirin enteric coated 81 milliGRAM(s) Oral daily  atorvastatin 80 milliGRAM(s) Oral at bedtime  buMETAnide Injectable 2 milliGRAM(s) IV Push two times a day  collagenase Ointment 1 Application(s) Topical once  collagenase Ointment 1 Application(s) Topical daily  enoxaparin Injectable 55 milliGRAM(s) SubCutaneous every 12 hours  hydrocortisone 1% Cream 1 Application(s) Topical daily  levothyroxine 50 MICROGram(s) Oral daily  metoprolol succinate ER 25 milliGRAM(s) Oral daily  ondansetron Injectable 4 milliGRAM(s) IV Push once PRN  pantoprazole    Tablet 40 milliGRAM(s) Oral before breakfast  valsartan 40 milliGRAM(s) Oral two times a day      Vitals:  T(F): 98.7 (11-08), Max: 98.8 (11-08)  HR: 89 (11-08) (76 - 89)  BP: 110/45 (11-08) (96/54 - 127/58)  RR: 18 (11-08)  SpO2: 96% (11-08)  I&O's Summary    07 Nov 2023 07:01  -  08 Nov 2023 07:00  --------------------------------------------------------  IN: 237 mL / OUT: 500 mL / NET: -263 mL    08 Nov 2023 07:01  -  08 Nov 2023 16:57  --------------------------------------------------------  IN: 0 mL / OUT: 250 mL / NET: -250 mL        Physical Exam:  GEN: comfortable appearing, lying in bed in NAD  HEENT: NCAT, MMM  CV: Regular S1, S2, no m/r/g  RESP: CTAB  ABD: Soft, NTND, +BS  EXT: 2+ bl LE pitting edema, WWP, pulses palpable throughout  NEURO: No focal deficits, AOx3  SKIN:  No rashes                          10.8   9.09  )-----------( 246      ( 08 Nov 2023 07:10 )             33.1     11-08    134<L>  |  93<L>  |  22  ----------------------------<  161<H>  3.4<L>   |  27  |  1.07    Ca    8.6      08 Nov 2023 07:10  Phos  2.5     11-08  Mg     2.00     11-08      PTT - ( 08 Nov 2023 07:10 )  PTT:135.0 sec              New ECG(s): Personally reviewed    Echo:    Stress Testing:     Cath:    Imaging:    Interpretation of Telemetry:

## 2023-11-08 NOTE — BH CONSULTATION LIAISON ASSESSMENT NOTE - HPI (INCLUDE ILLNESS QUALITY, SEVERITY, DURATION, TIMING, CONTEXT, MODIFYING FACTORS, ASSOCIATED SIGNS AND SYMPTOMS)
Patient is a 84y/o F, domiciled alone, has a friend and HCP, Roosevelt Bell, previously employed as , w/ no PPHx, no prior psych hospitalizations, no prior SA/NSSIB, denies substance use, PMH  CAD, pAF on Eliquis, severe AS pending TAVR, peripheral arterial disease requiring Left AKA. Psychiatry consulted for capacity to consent to AKA.     On assessment, patient is AOx4, able to understand the problem ('no blood flow in left foot'), the treatment ('above the knee amputation'), risks of the procedure ('infection, bleeding'), alternative options ('no treatment'), consequences of alternative options ('death'). Patient was unable to make a choice ('I need more time to decide. I just wish the amputation was lower at the foot. I know I'm going to have to do it'). Patient able to manipulate information in a rational manner and provide a reason for not making a decision at the moment ('It's so much information. I need time to think.'). Patient denies SI/HI/AVH. Denies depression, hopelessness. Reports eating and sleeping well. Denies joe. Denies psychosis. Denies substance. Denies anxiety. Denies prior psych hx and treatment. Denies prior SA. Reports she lives alone. Has an HCP, Roosevelt Bell, who is her best friend of over 30 years. Reports all of her family have passed, but she feels supported by Roosevelt and the rest of her work family in [place].      Writer spoke with HCP Roosevelt Bell ([phone]) - Updated Roosevelt on capacity assessment.     Patient is a 84y/o F, domiciled alone, has a friend and HCP, Roosevelt Bell, previously employed as , w/ no PPHx, no prior psych hospitalizations, no prior SA/NSSIB, denies substance use, PMH  CAD, pAF on Eliquis, severe AS pending TAVR, peripheral arterial disease requiring Left AKA. Psychiatry consulted for capacity to consent to AKA.     Vascular surgery resident present during this capacity evaluation and discusses with patient=  current vascular issues, proposed treatment- aka, risks, rationale, possible alternatives to surgery, and its risks.     On assessment, patient is AOx4, able to understand the problem ('no blood flow in left foot'), the treatment ('above the knee amputation'), risks of the procedure ('infection, bleeding'), alternative options ('no treatment'), consequences of alternative options (infection becoming worse/spreading, 'death'). Patient articulated that she needed more time to make a choice ('I need more time to decide. I just wish the amputation was lower at the foot. I know I'm going to have to do it'). Patient was able to manipulate above information in a rational manner and provide a reason for not making a decision at the moment ('It's so much information. I need time to think.'). She is alert and oriented x 3.   Patient denies SI/HI/AVH. Denies depression, hopelessness. Reports eating and sleeping well. Denies joe. Denies psychosis. Denies substance. Denies anxiety. Denies prior psych hx and treatment. Denies prior SA. Reports she lives alone. Has an HCP, Roosevelt Bell, who is her best friend of over 30 years. Reports all of her family have passed, but she feels supported by Roosevelt and the rest of her work family in [place].      Writer spoke with HCP Roosevelt Bell ([phone]) - Updated Roosevelt on capacity assessment.

## 2023-11-08 NOTE — PROGRESS NOTE ADULT - SUBJECTIVE AND OBJECTIVE BOX
[facility] Division of Hospital Medicine  Garry Stahl MD  Pager (M-F, 8A-5P): [phone]  Other Times:  [phone]    Patient is a 83y old  Female who presents with a chief complaint of Ruther 5 CLTI left toe wounds (08 Nov 2023 08:38)      SUBJECTIVE / OVERNIGHT EVENTS: no acute events ON       MEDICATIONS  (STANDING):  aMIOdarone    Tablet 100 milliGRAM(s) Oral daily  aspirin enteric coated 81 milliGRAM(s) Oral daily  atorvastatin 80 milliGRAM(s) Oral at bedtime  buMETAnide Injectable 2 milliGRAM(s) IV Push two times a day  collagenase Ointment 1 Application(s) Topical daily  collagenase Ointment 1 Application(s) Topical once  heparin  Infusion 1000 Unit(s)/Hr (10 mL/Hr) IV Continuous <Continuous>  hydrocortisone 1% Cream 1 Application(s) Topical daily  levothyroxine 50 MICROGram(s) Oral daily  metoprolol succinate ER 25 milliGRAM(s) Oral daily  pantoprazole    Tablet 40 milliGRAM(s) Oral before breakfast  potassium chloride   Powder 40 milliEquivalent(s) Oral once  potassium phosphate / sodium phosphate Tablet (K-PHOS No. 2) 1 Tablet(s) Oral once  valsartan 40 milliGRAM(s) Oral two times a day    MEDICATIONS  (PRN):  acetaminophen     Tablet .. 650 milliGRAM(s) Oral every 6 hours PRN Temp greater or equal to 38C (100.4F), Mild Pain (1 - 3)  ondansetron Injectable 4 milliGRAM(s) IV Push once PRN Nausea and/or Vomiting      CAPILLARY BLOOD GLUCOSE        I&O's Summary    07 Nov 2023 07:01  -  08 Nov 2023 07:00  --------------------------------------------------------  IN: 237 mL / OUT: 500 mL / NET: -263 mL    08 Nov 2023 07:01  -  08 Nov 2023 09:34  --------------------------------------------------------  IN: 0 mL / OUT: 250 mL / NET: -250 mL        PHYSICAL EXAM:  Vital Signs Last 24 Hrs  T(C): 37 (08 Nov 2023 09:29), Max: 37.2 (07 Nov 2023 12:45)  T(F): 98.6 (08 Nov 2023 09:29), Max: 98.9 (07 Nov 2023 12:45)  HR: 79 (08 Nov 2023 09:29) (76 - 84)  BP: 110/57 (08 Nov 2023 09:29) (106/68 - 127/58)  BP(mean): --  RR: 18 (08 Nov 2023 09:29) (18 - 18)  SpO2: 95% (08 Nov 2023 09:29) (94% - 99%)    Parameters below as of 08 Nov 2023 09:29  Patient On (Oxygen Delivery Method): room air    GENERAL: NAD, sitting in chair comfortably   EYES: EOMI, conjunctiva and sclera clear  NECK: Supple, No JVD  CHEST/LUNG: Clear to auscultation bilaterally; No wheeze  HEART: Regular rate and rhythm; S1S2; HUMA 3/6 radiating to carotid   ABDOMEN: Soft, Nontender, Nondistended; Bowel sounds present  EXTREMITIES: L foot bandaged. +LE edema  R>L; Lt leg wound vac  PSYCH: awake alert answers questions appropriately      LABS:                        10.8   9.09  )-----------( 246      ( 08 Nov 2023 07:10 )             33.1     11-08    134<L>  |  93<L>  |  22  ----------------------------<  161<H>  3.4<L>   |  27  |  1.07    Ca    8.6      08 Nov 2023 07:10  Phos  2.5     11-08  Mg     2.00     11-08      PTT - ( 08 Nov 2023 07:10 )  PTT:135.0 sec      Urinalysis Basic - ( 08 Nov 2023 07:10 )    Color: x / Appearance: x / SG: x / pH: x  Gluc: 161 mg/dL / Ketone: x  / Bili: x / Urobili: x   Blood: x / Protein: x / Nitrite: x   Leuk Esterase: x / RBC: x / WBC x   Sq Epi: x / Non Sq Epi: x / Bacteria: x          RADIOLOGY & ADDITIONAL TESTS:  Results Reviewed:   Imaging Personally Reviewed:  Electrocardiogram Personally Reviewed:    COORDINATION OF CARE:  Care Discussed with Consultants/Other Providers [Y/N]:  Prior or Outpatient Records Reviewed [Y/N]:

## 2023-11-08 NOTE — PROGRESS NOTE ADULT - PROBLEM SELECTOR PLAN 3
- pt on Eliquis for paroxysmal a fib  - Continue Toprol and Amiodarone  - eliquis on hold and currently on heparin drip

## 2023-11-08 NOTE — PROGRESS NOTE ADULT - SUBJECTIVE AND OBJECTIVE BOX
SURGERY DAILY PROGRESS NOTE    SUBJECTIVE: Patient seen and examined on AM rounds. Offers no complaints. Denies chest pain, SOB, palpitations, HA, fever, chills, N/V.      OBJECTIVE:  Vital Signs Last 24 Hrs  T(C): 36.8 (08 Nov 2023 04:45), Max: 37.2 (07 Nov 2023 12:45)  T(F): 98.3 (08 Nov 2023 04:45), Max: 98.9 (07 Nov 2023 12:45)  HR: 84 (08 Nov 2023 04:45) (76 - 84)  BP: 106/68 (08 Nov 2023 04:45) (106/68 - 127/58)  BP(mean): --  RR: 18 (08 Nov 2023 04:45) (18 - 18)  SpO2: 94% (08 Nov 2023 04:45) (94% - 99%)    Parameters below as of 08 Nov 2023 04:45  Patient On (Oxygen Delivery Method): room air        I&O's Summary    07 Nov 2023 07:01  -  08 Nov 2023 07:00  --------------------------------------------------------  IN: 237 mL / OUT: 500 mL / NET: -263 mL        Physical Exam:  General Appearance: Appears well, NAD   Chest: Nonlabored breathing on room air  CV: Hemodynamically stable  Extremities: Left leg wound vac holding suction. Left foot dressing C/D/I  Vascular: Bilateral lower extremity edema, improved    LABS:                        10.8   9.09  )-----------( 246      ( 08 Nov 2023 07:10 )             33.1     11-07    135  |  94<L>  |  23  ----------------------------<  142<H>  3.8   |  30  |  1.08    Ca    8.7      07 Nov 2023 05:35  Phos  2.8     11-07  Mg     1.80     11-07      PTT - ( 08 Nov 2023 07:10 )  PTT:135.0 sec  Urinalysis Basic - ( 07 Nov 2023 05:35 )    Color: x / Appearance: x / SG: x / pH: x  Gluc: 142 mg/dL / Ketone: x  / Bili: x / Urobili: x   Blood: x / Protein: x / Nitrite: x   Leuk Esterase: x / RBC: x / WBC x   Sq Epi: x / Non Sq Epi: x / Bacteria: x

## 2023-11-09 LAB
ANION GAP SERPL CALC-SCNC: 12 MMOL/L — SIGNIFICANT CHANGE UP (ref 7–14)
ANION GAP SERPL CALC-SCNC: 12 MMOL/L — SIGNIFICANT CHANGE UP (ref 7–14)
ANION GAP SERPL CALC-SCNC: 13 MMOL/L — SIGNIFICANT CHANGE UP (ref 7–14)
ANION GAP SERPL CALC-SCNC: 13 MMOL/L — SIGNIFICANT CHANGE UP (ref 7–14)
BUN SERPL-MCNC: 21 MG/DL — SIGNIFICANT CHANGE UP (ref 7–23)
BUN SERPL-MCNC: 21 MG/DL — SIGNIFICANT CHANGE UP (ref 7–23)
BUN SERPL-MCNC: 25 MG/DL — HIGH (ref 7–23)
BUN SERPL-MCNC: 25 MG/DL — HIGH (ref 7–23)
CALCIUM SERPL-MCNC: 8.8 MG/DL — SIGNIFICANT CHANGE UP (ref 8.4–10.5)
CALCIUM SERPL-MCNC: 8.8 MG/DL — SIGNIFICANT CHANGE UP (ref 8.4–10.5)
CALCIUM SERPL-MCNC: 9 MG/DL — SIGNIFICANT CHANGE UP (ref 8.4–10.5)
CALCIUM SERPL-MCNC: 9 MG/DL — SIGNIFICANT CHANGE UP (ref 8.4–10.5)
CHLORIDE SERPL-SCNC: 92 MMOL/L — LOW (ref 98–107)
CO2 SERPL-SCNC: 28 MMOL/L — SIGNIFICANT CHANGE UP (ref 22–31)
CO2 SERPL-SCNC: 28 MMOL/L — SIGNIFICANT CHANGE UP (ref 22–31)
CO2 SERPL-SCNC: 29 MMOL/L — SIGNIFICANT CHANGE UP (ref 22–31)
CO2 SERPL-SCNC: 29 MMOL/L — SIGNIFICANT CHANGE UP (ref 22–31)
CREAT SERPL-MCNC: 1.03 MG/DL — SIGNIFICANT CHANGE UP (ref 0.5–1.3)
CREAT SERPL-MCNC: 1.03 MG/DL — SIGNIFICANT CHANGE UP (ref 0.5–1.3)
CREAT SERPL-MCNC: 1.56 MG/DL — HIGH (ref 0.5–1.3)
CREAT SERPL-MCNC: 1.56 MG/DL — HIGH (ref 0.5–1.3)
EGFR: 33 ML/MIN/1.73M2 — LOW
EGFR: 33 ML/MIN/1.73M2 — LOW
EGFR: 54 ML/MIN/1.73M2 — LOW
EGFR: 54 ML/MIN/1.73M2 — LOW
GLUCOSE SERPL-MCNC: 136 MG/DL — HIGH (ref 70–99)
GLUCOSE SERPL-MCNC: 136 MG/DL — HIGH (ref 70–99)
GLUCOSE SERPL-MCNC: 146 MG/DL — HIGH (ref 70–99)
GLUCOSE SERPL-MCNC: 146 MG/DL — HIGH (ref 70–99)
HCT VFR BLD CALC: 31.6 % — LOW (ref 34.5–45)
HCT VFR BLD CALC: 31.6 % — LOW (ref 34.5–45)
HCT VFR BLD CALC: 31.9 % — LOW (ref 34.5–45)
HCT VFR BLD CALC: 31.9 % — LOW (ref 34.5–45)
HGB BLD-MCNC: 10.3 G/DL — LOW (ref 11.5–15.5)
HGB BLD-MCNC: 10.3 G/DL — LOW (ref 11.5–15.5)
HGB BLD-MCNC: 10.4 G/DL — LOW (ref 11.5–15.5)
HGB BLD-MCNC: 10.4 G/DL — LOW (ref 11.5–15.5)
LACTATE BLDV-MCNC: 2.6 MMOL/L — HIGH (ref 0.5–2)
LACTATE BLDV-MCNC: 2.6 MMOL/L — HIGH (ref 0.5–2)
MAGNESIUM SERPL-MCNC: 1.9 MG/DL — SIGNIFICANT CHANGE UP (ref 1.6–2.6)
MCHC RBC-ENTMCNC: 30.8 PG — SIGNIFICANT CHANGE UP (ref 27–34)
MCHC RBC-ENTMCNC: 30.8 PG — SIGNIFICANT CHANGE UP (ref 27–34)
MCHC RBC-ENTMCNC: 31.1 PG — SIGNIFICANT CHANGE UP (ref 27–34)
MCHC RBC-ENTMCNC: 31.1 PG — SIGNIFICANT CHANGE UP (ref 27–34)
MCHC RBC-ENTMCNC: 32.6 GM/DL — SIGNIFICANT CHANGE UP (ref 32–36)
MCV RBC AUTO: 94.4 FL — SIGNIFICANT CHANGE UP (ref 80–100)
MCV RBC AUTO: 94.4 FL — SIGNIFICANT CHANGE UP (ref 80–100)
MCV RBC AUTO: 95.5 FL — SIGNIFICANT CHANGE UP (ref 80–100)
MCV RBC AUTO: 95.5 FL — SIGNIFICANT CHANGE UP (ref 80–100)
NRBC # BLD: 0 /100 WBCS — SIGNIFICANT CHANGE UP (ref 0–0)
NRBC # FLD: 0 K/UL — SIGNIFICANT CHANGE UP (ref 0–0)
PHOSPHATE SERPL-MCNC: 2.6 MG/DL — SIGNIFICANT CHANGE UP (ref 2.5–4.5)
PHOSPHATE SERPL-MCNC: 2.6 MG/DL — SIGNIFICANT CHANGE UP (ref 2.5–4.5)
PHOSPHATE SERPL-MCNC: 3.8 MG/DL — SIGNIFICANT CHANGE UP (ref 2.5–4.5)
PHOSPHATE SERPL-MCNC: 3.8 MG/DL — SIGNIFICANT CHANGE UP (ref 2.5–4.5)
PLATELET # BLD AUTO: 240 K/UL — SIGNIFICANT CHANGE UP (ref 150–400)
PLATELET # BLD AUTO: 240 K/UL — SIGNIFICANT CHANGE UP (ref 150–400)
PLATELET # BLD AUTO: 266 K/UL — SIGNIFICANT CHANGE UP (ref 150–400)
PLATELET # BLD AUTO: 266 K/UL — SIGNIFICANT CHANGE UP (ref 150–400)
POTASSIUM SERPL-MCNC: 3.6 MMOL/L — SIGNIFICANT CHANGE UP (ref 3.5–5.3)
POTASSIUM SERPL-MCNC: 3.6 MMOL/L — SIGNIFICANT CHANGE UP (ref 3.5–5.3)
POTASSIUM SERPL-MCNC: 4.5 MMOL/L — SIGNIFICANT CHANGE UP (ref 3.5–5.3)
POTASSIUM SERPL-MCNC: 4.5 MMOL/L — SIGNIFICANT CHANGE UP (ref 3.5–5.3)
POTASSIUM SERPL-SCNC: 3.6 MMOL/L — SIGNIFICANT CHANGE UP (ref 3.5–5.3)
POTASSIUM SERPL-SCNC: 3.6 MMOL/L — SIGNIFICANT CHANGE UP (ref 3.5–5.3)
POTASSIUM SERPL-SCNC: 4.5 MMOL/L — SIGNIFICANT CHANGE UP (ref 3.5–5.3)
POTASSIUM SERPL-SCNC: 4.5 MMOL/L — SIGNIFICANT CHANGE UP (ref 3.5–5.3)
RBC # BLD: 3.31 M/UL — LOW (ref 3.8–5.2)
RBC # BLD: 3.31 M/UL — LOW (ref 3.8–5.2)
RBC # BLD: 3.38 M/UL — LOW (ref 3.8–5.2)
RBC # BLD: 3.38 M/UL — LOW (ref 3.8–5.2)
RBC # FLD: 16.9 % — HIGH (ref 10.3–14.5)
RBC # FLD: 16.9 % — HIGH (ref 10.3–14.5)
RBC # FLD: 17 % — HIGH (ref 10.3–14.5)
RBC # FLD: 17 % — HIGH (ref 10.3–14.5)
SODIUM SERPL-SCNC: 133 MMOL/L — LOW (ref 135–145)
WBC # BLD: 9.21 K/UL — SIGNIFICANT CHANGE UP (ref 3.8–10.5)
WBC # BLD: 9.21 K/UL — SIGNIFICANT CHANGE UP (ref 3.8–10.5)
WBC # BLD: 9.73 K/UL — SIGNIFICANT CHANGE UP (ref 3.8–10.5)
WBC # BLD: 9.73 K/UL — SIGNIFICANT CHANGE UP (ref 3.8–10.5)
WBC # FLD AUTO: 9.21 K/UL — SIGNIFICANT CHANGE UP (ref 3.8–10.5)
WBC # FLD AUTO: 9.21 K/UL — SIGNIFICANT CHANGE UP (ref 3.8–10.5)
WBC # FLD AUTO: 9.73 K/UL — SIGNIFICANT CHANGE UP (ref 3.8–10.5)
WBC # FLD AUTO: 9.73 K/UL — SIGNIFICANT CHANGE UP (ref 3.8–10.5)

## 2023-11-09 PROCEDURE — 93010 ELECTROCARDIOGRAM REPORT: CPT

## 2023-11-09 PROCEDURE — 99233 SBSQ HOSP IP/OBS HIGH 50: CPT

## 2023-11-09 PROCEDURE — 99232 SBSQ HOSP IP/OBS MODERATE 35: CPT

## 2023-11-09 RX ORDER — BUMETANIDE 0.25 MG/ML
3 INJECTION INTRAMUSCULAR; INTRAVENOUS
Refills: 0 | Status: DISCONTINUED | OUTPATIENT
Start: 2023-11-09 | End: 2023-11-09

## 2023-11-09 RX ORDER — SODIUM,POTASSIUM PHOSPHATES 278-250MG
1 POWDER IN PACKET (EA) ORAL ONCE
Refills: 0 | Status: COMPLETED | OUTPATIENT
Start: 2023-11-09 | End: 2023-11-09

## 2023-11-09 RX ORDER — ALBUMIN HUMAN 25 %
250 VIAL (ML) INTRAVENOUS ONCE
Refills: 0 | Status: COMPLETED | OUTPATIENT
Start: 2023-11-09 | End: 2023-11-09

## 2023-11-09 RX ORDER — SODIUM CHLORIDE 9 MG/ML
500 INJECTION, SOLUTION INTRAVENOUS ONCE
Refills: 0 | Status: COMPLETED | OUTPATIENT
Start: 2023-11-09 | End: 2023-11-09

## 2023-11-09 RX ORDER — POTASSIUM CHLORIDE 20 MEQ
40 PACKET (EA) ORAL ONCE
Refills: 0 | Status: COMPLETED | OUTPATIENT
Start: 2023-11-09 | End: 2023-11-09

## 2023-11-09 RX ADMIN — VALSARTAN 40 MILLIGRAM(S): 80 TABLET ORAL at 06:32

## 2023-11-09 RX ADMIN — Medication 40 MILLIEQUIVALENT(S): at 10:37

## 2023-11-09 RX ADMIN — Medication 50 MICROGRAM(S): at 06:32

## 2023-11-09 RX ADMIN — Medication 1 TABLET(S): at 10:38

## 2023-11-09 RX ADMIN — SODIUM CHLORIDE 500 MILLILITER(S): 9 INJECTION, SOLUTION INTRAVENOUS at 16:25

## 2023-11-09 RX ADMIN — Medication 1 APPLICATION(S): at 11:25

## 2023-11-09 RX ADMIN — Medication 125 MILLILITER(S): at 11:24

## 2023-11-09 RX ADMIN — PANTOPRAZOLE SODIUM 40 MILLIGRAM(S): 20 TABLET, DELAYED RELEASE ORAL at 06:41

## 2023-11-09 RX ADMIN — ATORVASTATIN CALCIUM 80 MILLIGRAM(S): 80 TABLET, FILM COATED ORAL at 21:42

## 2023-11-09 RX ADMIN — ENOXAPARIN SODIUM 55 MILLIGRAM(S): 100 INJECTION SUBCUTANEOUS at 17:32

## 2023-11-09 RX ADMIN — Medication 25 MILLIGRAM(S): at 06:32

## 2023-11-09 RX ADMIN — Medication 81 MILLIGRAM(S): at 11:23

## 2023-11-09 RX ADMIN — BUMETANIDE 2 MILLIGRAM(S): 0.25 INJECTION INTRAMUSCULAR; INTRAVENOUS at 06:32

## 2023-11-09 RX ADMIN — AMIODARONE HYDROCHLORIDE 100 MILLIGRAM(S): 400 TABLET ORAL at 06:32

## 2023-11-09 RX ADMIN — ENOXAPARIN SODIUM 55 MILLIGRAM(S): 100 INJECTION SUBCUTANEOUS at 06:32

## 2023-11-09 NOTE — PROGRESS NOTE ADULT - PROBLEM SELECTOR PLAN 3
- pt on Eliquis for paroxysmal a fib  - Continue Toprol and Amiodarone  - eliquis on hold and currently on lovenox

## 2023-11-09 NOTE — PROGRESS NOTE ADULT - SUBJECTIVE AND OBJECTIVE BOX
TEAM [ C ] Surgery Daily Progress Note  =====================================================    SUBJECTIVE: Patient seen and examined at bedside on AM rounds. Denies any acute complaints overnight. Tolerating diet, denies nausea, vomiting. OOB/Amublating as tolerated. Denies fever, chills.      ALLERGIES:  latex (Unknown)  sulfa drugs (Unknown)      --------------------------------------------------------------------------------------    MEDICATIONS:    Neurologic Medications  acetaminophen     Tablet .. 650 milliGRAM(s) Oral every 6 hours PRN Temp greater or equal to 38C (100.4F), Mild Pain (1 - 3)  ondansetron Injectable 4 milliGRAM(s) IV Push once PRN Nausea and/or Vomiting    Respiratory Medications    Cardiovascular Medications  aMIOdarone    Tablet 100 milliGRAM(s) Oral daily  metoprolol succinate ER 25 milliGRAM(s) Oral daily  valsartan 40 milliGRAM(s) Oral two times a day    Gastrointestinal Medications  pantoprazole    Tablet 40 milliGRAM(s) Oral before breakfast    Genitourinary Medications    Hematologic/Oncologic Medications  aspirin enteric coated 81 milliGRAM(s) Oral daily  enoxaparin Injectable 55 milliGRAM(s) SubCutaneous every 12 hours    Antimicrobial/Immunologic Medications    Endocrine/Metabolic Medications  atorvastatin 80 milliGRAM(s) Oral at bedtime  levothyroxine 50 MICROGram(s) Oral daily    Topical/Other Medications  collagenase Ointment 1 Application(s) Topical once  collagenase Ointment 1 Application(s) Topical daily  hydrocortisone 1% Cream 1 Application(s) Topical daily    --------------------------------------------------------------------------------------    VITAL SIGNS:  T(C): 37 (11-09-23 @ 04:58), Max: 37.1 (11-08-23 @ 16:45)  HR: 73 (11-09-23 @ 10:40) (73 - 95)  BP: 70/43 (11-09-23 @ 10:40) (70/43 - 117/54)  RR: 18 (11-09-23 @ 10:40) (18 - 18)  SpO2: 100% (11-09-23 @ 10:40) (94% - 100%)  --------------------------------------------------------------------------------------    EXAM    General: NAD, resting in bed comfortably.  Cardiac: regular rate, warm and well perfused  Respiratory: Nonlabored respirations, normal cw expansion.  Abdomen: soft, nontender, nondistended.   Extremities: Wound vac in place, dressings c/d/i    --------------------------------------------------------------------------------------    LABS                        10.4   9.21  )-----------( 266      ( 09 Nov 2023 06:46 )             31.9     --------------------------------------------------------------------------------------    11-09    133<L>  |  92<L>  |  21  ----------------------------<  136<H>  3.6   |  28  |  1.03    Ca    9.0      09 Nov 2023 06:46  Phos  2.6     11-09  Mg     1.90     11-09    INS AND OUTS:    11-08-23 @ 07:01  -  11-09-23 @ 07:00  --------------------------------------------------------  IN: 0 mL / OUT: 600 mL / NET: -600 mL    11-09-23 @ 07:01  -  11-09-23 @ 12:05  --------------------------------------------------------  IN: 200 mL / OUT: 0 mL / NET: 200 mL      --------------------------------------------------------------------------------------

## 2023-11-09 NOTE — PROGRESS NOTE ADULT - ASSESSMENT
New ECG(s): Personally reviewed    Echo: 9/26/23   1. Technically difficult image quality.   2. Left ventricular systolic function is severely decreased with an ejection fraction visually estimated at 20 to 25 %. There is global left ventricular hypokinesis. No evidence of a thrombus in the left ventricle.   3. There is mild (grade 1) left ventricular diastolic dysfunction.   4. Right ventricular cavity is mildly enlarged in size and reduced systolic function.   5. Mild mitral regurgitation.   6. Aortic valve anatomy cannot be determined with reduced systolic excursion. severe calcification of the aortic valve leaflets. severe aortic stenosis.   7. Severe aortic stenosis (estimated aortic valve area ~ 0.5 cm2 (by the continuity equation).   8. Mild aortic regurgitation.   9. The left atrium is mildly dilated in size.  10. Left pleural effusion noted.  11. No prior echocardiogram is available for comparison.    Cath:  5/2023  Moderate atherosclerosis in LM and LAD.  of dRCA with robust collateral from LAD.  Imaging:    Interpretation of Telemetry: Sinus @ 90s    83F with PMHx of CAD, HFrEF (25%), pAF on Eliquis, severe AS pending TAVR, R internal carotid stenosis, HTN, HLD, hypothyroidism, anxiety, PAD and chronic L toe wounds s/p L ilioprofunda bypass with reverse GSV graft to peroneal 8/31/23 (Dr. Tavares) c/b large L groin abscess (17cm) requiring removal of infected bypass graft of lower extremity and groin washout on 9/24/23. Admitted for vascular evaluation of LE now s/p angiogram with plan for likely AKA. Continues to appear grossly overloaded on exam, however hypotensive this afternoon possibly due to aggressive diuresis in setting of preload-dependent severe AS. Would fluid resuscitate as below    #Acute on Chronic HFrEF (25%) Exacerbation  - s/p albumin 250cc  - if BPs not improved would trial additional IVF bolus 500cc LR 1-2x pending response  - hold diuretics tonight  - send BMP, lactate to assess for presence of shock in setting of hypotension  - BID Lytes while actively diuresing  -- goal K > 4, Mg > 2  -- Strict I/O and daily standing weight  - hold valsartan 40mg BID if continues to be hypotensive  -- Eventually will benefit from transitioning to ARNI.  - cont metoprolol succinate 25mg daily  - consider MRA and SGLT2 inhibitors as outpatient.    #Severe AS  - currently Asymptomatic while she is on bed.  - pending TAVR per documentation  - monitor on tele, monitor VS    # pAFib  - given [id number], patient will benefit from AC. Given her age of 83 and weight below 60kg, patient is reasonable to be on reduced dose of NOAC: eliquis 2.5mg BID.   - hold AC as needed by primary team/surgery.  - continue home amiodarone  - continue BB as above    #Pre-Operative Risk Assessment  Cardiovascular Risk Stratification - RCRI =   (0 predictors = 0.4%, 1 predictor = 0.9%, 2 predictors = 6.6%, =3 predictors = >11%)    History of ischemic heart disease: Y  History of congestive heart failure: Y  History of cerebrovascular disease (stroke or transient ischemic attack): N  History of diabetes requiring preoperative insulin use: N  Chronic kidney disease (creatinine > 2 mg/dL): N  Undergoing suprainguinal vascular, intraperitoneal, or intrathoracic surgery: N    Overall this patient is as 6.6% risk for cardiac death, nonfatal myocardial infarction, and nonfatal cardiac arrest perioperatively for this moderate risk above knee amputation. This likely underestimates patient risk for procedure given known severe aortic stenosis. As above would optimize from CHF perspective prior to operation.     All recommendations pending attending attestation. We will sign off, please re-consult with any concerns    Tabby Zhang MD  PGY-4, Cardiology  Available on TEAMS    For all new consults  [email or web address]  Login: [email or web address]

## 2023-11-09 NOTE — PROGRESS NOTE ADULT - ASSESSMENT
83F with PMHx of CAD, pAF on Eliquis, severe AS pending TAVR, R internal carotid stenosis, HTN, HLD, hypothyroidism, anxiety, PAD and chronic L toe wounds s/p L ilioprofunda bypass with reverse GSV graft to peroneal 8/31/23 (Dr. Tavares) c/b large L groin abscess (17cm) requiring removal of infected bypass graft of lower extremity and groin washout on 9/24/23. Wound care involved for R groin wound and R calf incision wound. Patient represented from rehab concerns for "cold foot". Pt is poor historian, but she says the wounds have been worse over the 2 days prior to presentation. Remains afebrile and hemodynamically stable s/p LLE diagnostic angiogram on 11/3. Given ischemic foot and external iliac occlusion, patient may require AKA.    PLAN:  - Diet: Regular  - Cardiology consulted, appreciate recs: Bumex 2mg BID   - Wound care following - vac applied 11/4, last changed 11/6  - AKA planning discussed with patient and healthcare proxy  - Psychiatry consulted regarding capacity, appreciate evaluation and recommendations  - Dispo: pending    Vascular Surgery  [id number] 83F with PMHx of CAD, pAF on Eliquis, severe AS pending TAVR, R internal carotid stenosis, HTN, HLD, hypothyroidism, anxiety, PAD and chronic L toe wounds s/p L ilioprofunda bypass with reverse GSV graft to peroneal 8/31/23 (Dr. Tavares) c/b large L groin abscess (17cm) requiring removal of infected bypass graft of lower extremity and groin washout on 9/24/23. Wound care involved for R groin wound and R calf incision wound. Patient represented from rehab concerns for "cold foot". Pt is poor historian, but she says the wounds have been worse over the 2 days prior to presentation. Remains afebrile and hemodynamically stable s/p LLE diagnostic angiogram on 11/3. Given ischemic foot and external iliac occlusion, patient may require AKA.    PLAN:  - hypotensive after bumex, giving 250 cc of albumin  - Diet: Regular  - Cardiology consulted, appreciate recs  - Wound care following - vac applied 11/4, last changed 11/6  - AKA planning discussed with patient and healthcare proxy  - Psychiatry consulted regarding capacity, appreciate evaluation and recommendations  - Dispo: pending    Vascular Surgery  [id number]

## 2023-11-09 NOTE — PROGRESS NOTE ADULT - SUBJECTIVE AND OBJECTIVE BOX
Review Of Systems: No chest pain, shortness of breath, or palpitations            Interval Events: Following my assessment became hypotensive to 70s/50s, per team there was concern for hypovolemia and she received 250cc albumin IV with mild improvement in BP to 83/44.    Current Meds:  acetaminophen     Tablet .. 650 milliGRAM(s) Oral every 6 hours PRN  aMIOdarone    Tablet 100 milliGRAM(s) Oral daily  aspirin enteric coated 81 milliGRAM(s) Oral daily  atorvastatin 80 milliGRAM(s) Oral at bedtime  collagenase Ointment 1 Application(s) Topical daily  collagenase Ointment 1 Application(s) Topical once  enoxaparin Injectable 55 milliGRAM(s) SubCutaneous every 12 hours  hydrocortisone 1% Cream 1 Application(s) Topical daily  levothyroxine 50 MICROGram(s) Oral daily  metoprolol succinate ER 25 milliGRAM(s) Oral daily  ondansetron Injectable 4 milliGRAM(s) IV Push once PRN  pantoprazole    Tablet 40 milliGRAM(s) Oral before breakfast  valsartan 40 milliGRAM(s) Oral two times a day      Vitals:  T(F): 98.4 (11-09), Max: 98.7 (11-08)  HR: 69 (11-09) (59 - 95)  BP: 83/44 (11-09) (70/43 - 117/54)  RR: 18 (11-09)  SpO2: 100% (11-09)  I&O's Summary    08 Nov 2023 07:01  -  09 Nov 2023 07:00  --------------------------------------------------------  IN: 0 mL / OUT: 600 mL / NET: -600 mL    09 Nov 2023 07:01  -  09 Nov 2023 15:34  --------------------------------------------------------  IN: 300 mL / OUT: 0 mL / NET: 300 mL        Physical Exam:  GEN: comfortable appearing, lying in bed in NAD  HEENT: NCAT, MMM  CV: Regular S1, S2, III/VI late-peaking systolic murmur best heard at RUSB  RESP: CTAB  ABD: Soft, NTND, +BS  EXT: 2+ bl LE edema to thigh, WWP, pulses palpable throughout  NEURO: No focal deficits, AOx3  SKIN:  No rashes                          10.4   9.21  )-----------( 266      ( 09 Nov 2023 06:46 )             31.9     11-09    133<L>  |  92<L>  |  21  ----------------------------<  136<H>  3.6   |  28  |  1.03    Ca    9.0      09 Nov 2023 06:46  Phos  2.6     11-09  Mg     1.90     11-09      PTT - ( 08 Nov 2023 07:10 )  PTT:135.0 sec

## 2023-11-09 NOTE — PROGRESS NOTE ADULT - PROBLEM SELECTOR PLAN 2
- Echo from 9/23 with EF of 20-25% and global left ventricular hypokinesis.  - Patient w/ fluid overload ; LE edema   - Continue Toprol 25mg daily    - Valsartan 40mg PO BID  - Monitor I/O's, daily weights  - monitor Cr  - repeat TTE unchanged EF   - diuresis held due to hypotension   - f/u cards recs

## 2023-11-09 NOTE — PROGRESS NOTE ADULT - SUBJECTIVE AND OBJECTIVE BOX
[facility] Division of Hospital Medicine  Garry Stahl MD  Pager (M-F, 8A-5P): [phone]  Other Times:  [phone]    Patient is a 83y old  Female who presents with a chief complaint of Ruther 5 CLTI left toe wounds (09 Nov 2023 12:05)      SUBJECTIVE / OVERNIGHT EVENTS: pt in NAD sitting in chair, noted hypotension this AM after diuresis increased. s/p albumin. diuretic held        MEDICATIONS  (STANDING):  aMIOdarone    Tablet 100 milliGRAM(s) Oral daily  aspirin enteric coated 81 milliGRAM(s) Oral daily  atorvastatin 80 milliGRAM(s) Oral at bedtime  collagenase Ointment 1 Application(s) Topical daily  collagenase Ointment 1 Application(s) Topical once  enoxaparin Injectable 55 milliGRAM(s) SubCutaneous every 12 hours  hydrocortisone 1% Cream 1 Application(s) Topical daily  levothyroxine 50 MICROGram(s) Oral daily  metoprolol succinate ER 25 milliGRAM(s) Oral daily  pantoprazole    Tablet 40 milliGRAM(s) Oral before breakfast  valsartan 40 milliGRAM(s) Oral two times a day    MEDICATIONS  (PRN):  acetaminophen     Tablet .. 650 milliGRAM(s) Oral every 6 hours PRN Temp greater or equal to 38C (100.4F), Mild Pain (1 - 3)  ondansetron Injectable 4 milliGRAM(s) IV Push once PRN Nausea and/or Vomiting      CAPILLARY BLOOD GLUCOSE        I&O's Summary    08 Nov 2023 07:01  -  09 Nov 2023 07:00  --------------------------------------------------------  IN: 0 mL / OUT: 600 mL / NET: -600 mL    09 Nov 2023 07:01  -  09 Nov 2023 13:18  --------------------------------------------------------  IN: 200 mL / OUT: 0 mL / NET: 200 mL        PHYSICAL EXAM:  Vital Signs Last 24 Hrs  T(C): 36.9 (09 Nov 2023 12:33), Max: 37.1 (08 Nov 2023 16:45)  T(F): 98.4 (09 Nov 2023 12:33), Max: 98.7 (08 Nov 2023 16:45)  HR: 59 (09 Nov 2023 12:33) (59 - 95)  BP: 87/44 (09 Nov 2023 12:33) (70/43 - 117/54)  BP(mean): --  RR: 18 (09 Nov 2023 12:33) (18 - 18)  SpO2: 100% (09 Nov 2023 12:33) (94% - 100%)    Parameters below as of 09 Nov 2023 10:40  Patient On (Oxygen Delivery Method): room air    GENERAL: NAD, sitting in chair comfortably   EYES: EOMI, conjunctiva and sclera clear  NECK: Supple, No JVD  CHEST/LUNG: Clear to auscultation bilaterally; No wheeze  HEART: Regular rate and rhythm; S1S2; HUMA 3/6 radiating to carotid   ABDOMEN: Soft, Nontender, Nondistended; Bowel sounds present  EXTREMITIES: L foot bandaged. +LE edema  R>L; Lt leg wound vac  PSYCH: awake alert answers questions appropriately      LABS:                        10.4   9.21  )-----------( 266      ( 09 Nov 2023 06:46 )             31.9     11-09    133<L>  |  92<L>  |  21  ----------------------------<  136<H>  3.6   |  28  |  1.03    Ca    9.0      09 Nov 2023 06:46  Phos  2.6     11-09  Mg     1.90     11-09      PTT - ( 08 Nov 2023 07:10 )  PTT:135.0 sec      Urinalysis Basic - ( 09 Nov 2023 06:46 )    Color: x / Appearance: x / SG: x / pH: x  Gluc: 136 mg/dL / Ketone: x  / Bili: x / Urobili: x   Blood: x / Protein: x / Nitrite: x   Leuk Esterase: x / RBC: x / WBC x   Sq Epi: x / Non Sq Epi: x / Bacteria: x          RADIOLOGY & ADDITIONAL TESTS:  Results Reviewed:   Imaging Personally Reviewed:  Electrocardiogram Personally Reviewed:    COORDINATION OF CARE:  Care Discussed with Consultants/Other Providers [Y/N]:  Prior or Outpatient Records Reviewed [Y/N]:

## 2023-11-10 LAB
ANION GAP SERPL CALC-SCNC: 13 MMOL/L — SIGNIFICANT CHANGE UP (ref 7–14)
ANION GAP SERPL CALC-SCNC: 13 MMOL/L — SIGNIFICANT CHANGE UP (ref 7–14)
BUN SERPL-MCNC: 27 MG/DL — HIGH (ref 7–23)
BUN SERPL-MCNC: 27 MG/DL — HIGH (ref 7–23)
CALCIUM SERPL-MCNC: 8.8 MG/DL — SIGNIFICANT CHANGE UP (ref 8.4–10.5)
CALCIUM SERPL-MCNC: 8.8 MG/DL — SIGNIFICANT CHANGE UP (ref 8.4–10.5)
CHLORIDE SERPL-SCNC: 94 MMOL/L — LOW (ref 98–107)
CHLORIDE SERPL-SCNC: 94 MMOL/L — LOW (ref 98–107)
CO2 SERPL-SCNC: 26 MMOL/L — SIGNIFICANT CHANGE UP (ref 22–31)
CO2 SERPL-SCNC: 26 MMOL/L — SIGNIFICANT CHANGE UP (ref 22–31)
CREAT SERPL-MCNC: 1.24 MG/DL — SIGNIFICANT CHANGE UP (ref 0.5–1.3)
CREAT SERPL-MCNC: 1.24 MG/DL — SIGNIFICANT CHANGE UP (ref 0.5–1.3)
EGFR: 43 ML/MIN/1.73M2 — LOW
EGFR: 43 ML/MIN/1.73M2 — LOW
GLUCOSE SERPL-MCNC: 154 MG/DL — HIGH (ref 70–99)
GLUCOSE SERPL-MCNC: 154 MG/DL — HIGH (ref 70–99)
HCT VFR BLD CALC: 29.6 % — LOW (ref 34.5–45)
HCT VFR BLD CALC: 29.6 % — LOW (ref 34.5–45)
HGB BLD-MCNC: 9.8 G/DL — LOW (ref 11.5–15.5)
HGB BLD-MCNC: 9.8 G/DL — LOW (ref 11.5–15.5)
LACTATE BLDV-MCNC: 1.7 MMOL/L — SIGNIFICANT CHANGE UP (ref 0.5–2)
LACTATE BLDV-MCNC: 1.7 MMOL/L — SIGNIFICANT CHANGE UP (ref 0.5–2)
MAGNESIUM SERPL-MCNC: 1.8 MG/DL — SIGNIFICANT CHANGE UP (ref 1.6–2.6)
MAGNESIUM SERPL-MCNC: 1.8 MG/DL — SIGNIFICANT CHANGE UP (ref 1.6–2.6)
MCHC RBC-ENTMCNC: 31.1 PG — SIGNIFICANT CHANGE UP (ref 27–34)
MCHC RBC-ENTMCNC: 31.1 PG — SIGNIFICANT CHANGE UP (ref 27–34)
MCHC RBC-ENTMCNC: 33.1 GM/DL — SIGNIFICANT CHANGE UP (ref 32–36)
MCHC RBC-ENTMCNC: 33.1 GM/DL — SIGNIFICANT CHANGE UP (ref 32–36)
MCV RBC AUTO: 94 FL — SIGNIFICANT CHANGE UP (ref 80–100)
MCV RBC AUTO: 94 FL — SIGNIFICANT CHANGE UP (ref 80–100)
NRBC # BLD: 0 /100 WBCS — SIGNIFICANT CHANGE UP (ref 0–0)
NRBC # BLD: 0 /100 WBCS — SIGNIFICANT CHANGE UP (ref 0–0)
NRBC # FLD: 0 K/UL — SIGNIFICANT CHANGE UP (ref 0–0)
NRBC # FLD: 0 K/UL — SIGNIFICANT CHANGE UP (ref 0–0)
PHOSPHATE SERPL-MCNC: 3.5 MG/DL — SIGNIFICANT CHANGE UP (ref 2.5–4.5)
PHOSPHATE SERPL-MCNC: 3.5 MG/DL — SIGNIFICANT CHANGE UP (ref 2.5–4.5)
PLATELET # BLD AUTO: 250 K/UL — SIGNIFICANT CHANGE UP (ref 150–400)
PLATELET # BLD AUTO: 250 K/UL — SIGNIFICANT CHANGE UP (ref 150–400)
POTASSIUM SERPL-MCNC: 4.3 MMOL/L — SIGNIFICANT CHANGE UP (ref 3.5–5.3)
POTASSIUM SERPL-MCNC: 4.3 MMOL/L — SIGNIFICANT CHANGE UP (ref 3.5–5.3)
POTASSIUM SERPL-SCNC: 4.3 MMOL/L — SIGNIFICANT CHANGE UP (ref 3.5–5.3)
POTASSIUM SERPL-SCNC: 4.3 MMOL/L — SIGNIFICANT CHANGE UP (ref 3.5–5.3)
RBC # BLD: 3.15 M/UL — LOW (ref 3.8–5.2)
RBC # BLD: 3.15 M/UL — LOW (ref 3.8–5.2)
RBC # FLD: 16.9 % — HIGH (ref 10.3–14.5)
RBC # FLD: 16.9 % — HIGH (ref 10.3–14.5)
SODIUM SERPL-SCNC: 133 MMOL/L — LOW (ref 135–145)
SODIUM SERPL-SCNC: 133 MMOL/L — LOW (ref 135–145)
WBC # BLD: 9.62 K/UL — SIGNIFICANT CHANGE UP (ref 3.8–10.5)
WBC # BLD: 9.62 K/UL — SIGNIFICANT CHANGE UP (ref 3.8–10.5)
WBC # FLD AUTO: 9.62 K/UL — SIGNIFICANT CHANGE UP (ref 3.8–10.5)
WBC # FLD AUTO: 9.62 K/UL — SIGNIFICANT CHANGE UP (ref 3.8–10.5)

## 2023-11-10 PROCEDURE — 99232 SBSQ HOSP IP/OBS MODERATE 35: CPT

## 2023-11-10 RX ORDER — NYSTATIN CREAM 100000 [USP'U]/G
1 CREAM TOPICAL
Refills: 0 | Status: DISCONTINUED | OUTPATIENT
Start: 2023-11-10 | End: 2023-11-18

## 2023-11-10 RX ORDER — SODIUM HYPOCHLORITE 0.125 %
1 SOLUTION, NON-ORAL MISCELLANEOUS
Refills: 0 | Status: DISCONTINUED | OUTPATIENT
Start: 2023-11-10 | End: 2023-11-16

## 2023-11-10 RX ADMIN — ENOXAPARIN SODIUM 55 MILLIGRAM(S): 100 INJECTION SUBCUTANEOUS at 16:59

## 2023-11-10 RX ADMIN — Medication 50 MICROGRAM(S): at 05:08

## 2023-11-10 RX ADMIN — PANTOPRAZOLE SODIUM 40 MILLIGRAM(S): 20 TABLET, DELAYED RELEASE ORAL at 06:25

## 2023-11-10 RX ADMIN — ENOXAPARIN SODIUM 55 MILLIGRAM(S): 100 INJECTION SUBCUTANEOUS at 05:08

## 2023-11-10 RX ADMIN — Medication 1 APPLICATION(S): at 12:06

## 2023-11-10 RX ADMIN — Medication 1 APPLICATION(S): at 11:02

## 2023-11-10 RX ADMIN — Medication 81 MILLIGRAM(S): at 12:06

## 2023-11-10 RX ADMIN — Medication 1 APPLICATION(S): at 16:59

## 2023-11-10 RX ADMIN — AMIODARONE HYDROCHLORIDE 100 MILLIGRAM(S): 400 TABLET ORAL at 05:00

## 2023-11-10 RX ADMIN — Medication 25 MILLIGRAM(S): at 05:08

## 2023-11-10 RX ADMIN — NYSTATIN CREAM 1 APPLICATION(S): 100000 CREAM TOPICAL at 16:59

## 2023-11-10 NOTE — PROGRESS NOTE ADULT - ASSESSMENT
New ECG(s): Personally reviewed    Echo: 9/26/23   1. Technically difficult image quality.   2. Left ventricular systolic function is severely decreased with an ejection fraction visually estimated at 20 to 25 %. There is global left ventricular hypokinesis. No evidence of a thrombus in the left ventricle.   3. There is mild (grade 1) left ventricular diastolic dysfunction.   4. Right ventricular cavity is mildly enlarged in size and reduced systolic function.   5. Mild mitral regurgitation.   6. Aortic valve anatomy cannot be determined with reduced systolic excursion. severe calcification of the aortic valve leaflets. severe aortic stenosis.   7. Severe aortic stenosis (estimated aortic valve area ~ 0.5 cm2 (by the continuity equation).   8. Mild aortic regurgitation.   9. The left atrium is mildly dilated in size.  10. Left pleural effusion noted.  11. No prior echocardiogram is available for comparison.    Cath:  5/2023  Moderate atherosclerosis in LM and LAD.  of dRCA with robust collateral from LAD.  Imaging:    Interpretation of Telemetry: Sinus @ 90s    83F with PMHx of CAD, HFrEF (25%), pAF on Eliquis, severe AS pending TAVR, R internal carotid stenosis, HTN, HLD, hypothyroidism, anxiety, PAD and chronic L toe wounds s/p L ilioprofunda bypass with reverse GSV graft to peroneal 8/31/23 (Dr. Tavares) c/b large L groin abscess (17cm) requiring removal of infected bypass graft of lower extremity and groin washout on 9/24/23. Admitted for vascular evaluation of LE now s/p angiogram with plan for likely AKA. Continues to appear grossly overloaded on exam, however hypotensive on 11/9 likely in setting of this afternoon possibly due to aggressive diuresis in setting of preload-dependent severe AS. Still appears grossly overloaded, however may not tolerate further diuresis in setting of known AS.     #Acute on Chronic HFrEF (25%) Exacerbation  - continue to hold valsartan   -- Eventually will benefit from transitioning to ARNI.  - cont metoprolol succinate 25mg daily  - consider MRA and SGLT2 inhibitors as outpatient.    #Severe AS  - currently Asymptomatic while she is on bed.  - pending TAVR per documentation  - monitor on tele, monitor VS    # pAFib  - given [id number], patient will benefit from AC. Given her age of 83 and weight below 60kg, patient is reasonable to be on reduced dose of NOAC: eliquis 2.5mg BID.   - hold AC as needed by primary team/surgery.  - continue home amiodarone  - continue BB as above    #Pre-Operative Risk Assessment  Cardiovascular Risk Stratification - RCRI =   (0 predictors = 0.4%, 1 predictor = 0.9%, 2 predictors = 6.6%, =3 predictors = >11%)    History of ischemic heart disease: Y  History of congestive heart failure: Y  History of cerebrovascular disease (stroke or transient ischemic attack): N  History of diabetes requiring preoperative insulin use: N  Chronic kidney disease (creatinine > 2 mg/dL): N  Undergoing suprainguinal vascular, intraperitoneal, or intrathoracic surgery: N    Overall this patient is as 6.6% risk for cardiac death, nonfatal myocardial infarction, and nonfatal cardiac arrest perioperatively for this moderate risk above knee amputation. This likely underestimates patient risk for procedure given known severe aortic stenosis. If patient can lie flat, can proceed to procedure without further cardiac testing, likely as optimized as possible from CHF perspective prior to operation given intolerance of diuresis.     All recommendations pending attending attestation. We will sign off, please re-consult with any concerns    Tabby Zhang MD  PGY-4, Cardiology  Available on TEAMS    For all new consults  [email or web address]  Login: [email or web address]

## 2023-11-10 NOTE — PROGRESS NOTE ADULT - ASSESSMENT
83F with PMHx of CAD, pAF on Eliquis, severe AS pending TAVR, R internal carotid stenosis, HTN, HLD, hypothyroidism, anxiety, PAD and chronic L toe wounds s/p L ilioprofunda bypass with reverse GSV graft to peroneal 8/31/23 (Dr. Tavares) c/b large L groin abscess (17cm) requiring removal of infected bypass graft of lower extremity and groin washout on 9/24/23. Wound care involved for R groin wound and R calf incision wound. Patient represented from rehab concerns for "cold foot". Pt is poor historian, but she says the wounds have been worse over the 2 days prior to presentation. Remains afebrile and hemodynamically stable s/p LLE diagnostic angiogram on 11/3. Given ischemic foot and external iliac occlusion, patient may require AKA.    PLAN:  - holding diuresis and valsartan  - Diet: Regular  - Cardiology consulted, appreciate recs  - Wound care following - vac applied 11/4, will be changed today  - AKA planning discussed with patient and healthcare proxy  - Psychiatry consulted regarding capacity, appreciate evaluation and recommendations  - Dispo: pending    Vascular Surgery  [phone]

## 2023-11-10 NOTE — PROGRESS NOTE ADULT - SUBJECTIVE AND OBJECTIVE BOX
TEAM [ C ] Surgery Daily Progress Note  =====================================================    SUBJECTIVE: Patient seen and examined at bedside on AM rounds. Patient reports that they're feeling well. Tolerating diet, denies nausea, vomiting. OOB/Amublating as tolerated. Denies fever, chills.        ALLERGIES:  latex (Unknown)  sulfa drugs (Unknown)      --------------------------------------------------------------------------------------    MEDICATIONS:    Neurologic Medications  acetaminophen     Tablet .. 650 milliGRAM(s) Oral every 6 hours PRN Temp greater or equal to 38C (100.4F), Mild Pain (1 - 3)  ondansetron Injectable 4 milliGRAM(s) IV Push once PRN Nausea and/or Vomiting    Respiratory Medications    Cardiovascular Medications  aMIOdarone    Tablet 100 milliGRAM(s) Oral daily  metoprolol succinate ER 25 milliGRAM(s) Oral daily    Gastrointestinal Medications  pantoprazole    Tablet 40 milliGRAM(s) Oral before breakfast    Genitourinary Medications    Hematologic/Oncologic Medications  aspirin enteric coated 81 milliGRAM(s) Oral daily  enoxaparin Injectable 55 milliGRAM(s) SubCutaneous every 12 hours    Antimicrobial/Immunologic Medications    Endocrine/Metabolic Medications  atorvastatin 80 milliGRAM(s) Oral at bedtime  levothyroxine 50 MICROGram(s) Oral daily    Topical/Other Medications  collagenase Ointment 1 Application(s) Topical once  collagenase Ointment 1 Application(s) Topical daily  hydrocortisone 1% Cream 1 Application(s) Topical daily    --------------------------------------------------------------------------------------    VITAL SIGNS:  T(C): 37 (11-10-23 @ 04:57), Max: 37.2 (11-09-23 @ 19:55)  HR: 77 (11-10-23 @ 04:57) (59 - 77)  BP: 102/56 (11-10-23 @ 04:57) (70/43 - 106/48)  RR: 18 (11-10-23 @ 04:57) (18 - 20)  SpO2: 97% (11-10-23 @ 04:57) (96% - 100%)  --------------------------------------------------------------------------------------    EXAM    General: NAD, resting in bed comfortably.  Cardiac: regular rate, warm and well perfused  Respiratory: Nonlabored respirations, normal cw expansion.  Abdomen: soft, nontender, nondistended.   Extremities: edema noted in the LLE, wound vac in place to the right foot  --------------------------------------------------------------------------------------    LABS                        9.8    9.62  )-----------( 250      ( 10 Nov 2023 01:52 )             29.6     --------------------------------------------------------------------------------------  11-10    133<L>  |  94<L>  |  27<H>  ----------------------------<  154<H>  4.3   |  26  |  1.24    Ca    8.8      10 Nov 2023 01:52  Phos  3.5     11-10  Mg     1.80     11-10      INS AND OUTS:    11-09-23 @ 07:01  -  11-10-23 @ 07:00  --------------------------------------------------------  IN: 300 mL / OUT: 275 mL / NET: 25 mL      --------------------------------------------------------------------------------------

## 2023-11-10 NOTE — PROGRESS NOTE ADULT - PROBLEM SELECTOR PLAN 2
- Echo from 9/23 with EF of 20-25% and global left ventricular hypokinesis.  - Patient w/ fluid overload ; LE edema   - Continue Toprol 25mg daily    - Valsartan 40mg held due to hypotension   - Monitor I/O's, daily weights  - monitor Cr  - repeat TTE unchanged EF   - diuresis held due to hypotension   - f/u cards recs

## 2023-11-10 NOTE — ADVANCED PRACTICE NURSE CONSULT - RECOMMEDATIONS
Vascular team Charlee Sow made aware of findings. Recommend BID dakins 1/4 packing for LLE wound.     Topical recommendations:   ---Left lower medial leg: Cleanse with NS, pat dry. Apply Liquid barrier film to periwound skin (allow to dry). Apply collagenase (Santyl), nickel-coin thickness, to entire base of wound. Pack wound with 1/4 strength dakin's moistened gauze, cover with abdominal pad and secure with paper tape. Change two times a day and PRN if soiled.   ---Left groin: after bathing apply nystatin powder daily.   ---Continue low air loss bed therapy, continue heel elevation, continue to turn & reposition per protocol, soft pillow between bony prominences, continue moisture management with barrier creams & single breathable pad, continue measures to decrease friction/shear/pressure.     Plan discussed with patient, primary RN Petty Anglin, and provider Charlee Sow.   Will continue to follow for M/W/F NPWT VAC dressing changes.

## 2023-11-10 NOTE — PROGRESS NOTE ADULT - SUBJECTIVE AND OBJECTIVE BOX
[facility] Division of Hospital Medicine  Garry Stahl MD  Pager (M-F, 8A-5P): [phone]  Other Times:  [phone]    Patient is a 83y old  Female who presents with a chief complaint of Ruther 5 CLTI left toe wounds (10 Nov 2023 08:43)      SUBJECTIVE / OVERNIGHT EVENTS: noted BP low but asymptomatic family at bedside     MEDICATIONS  (STANDING):  aMIOdarone    Tablet 100 milliGRAM(s) Oral daily  aspirin enteric coated 81 milliGRAM(s) Oral daily  atorvastatin 80 milliGRAM(s) Oral at bedtime  collagenase Ointment 1 Application(s) Topical daily  collagenase Ointment 1 Application(s) Topical once  Dakins Solution - 1/4 Strength 1 Application(s) Topical two times a day  enoxaparin Injectable 55 milliGRAM(s) SubCutaneous every 12 hours  hydrocortisone 1% Cream 1 Application(s) Topical daily  levothyroxine 50 MICROGram(s) Oral daily  metoprolol succinate ER 25 milliGRAM(s) Oral daily  nystatin Powder 1 Application(s) Topical two times a day  pantoprazole    Tablet 40 milliGRAM(s) Oral before breakfast    MEDICATIONS  (PRN):  acetaminophen     Tablet .. 650 milliGRAM(s) Oral every 6 hours PRN Temp greater or equal to 38C (100.4F), Mild Pain (1 - 3)  ondansetron Injectable 4 milliGRAM(s) IV Push once PRN Nausea and/or Vomiting      CAPILLARY BLOOD GLUCOSE        I&O's Summary    09 Nov 2023 07:01  -  10 Nov 2023 07:00  --------------------------------------------------------  IN: 300 mL / OUT: 275 mL / NET: 25 mL        PHYSICAL EXAM:  Vital Signs Last 24 Hrs  T(C): 36.3 (10 Nov 2023 11:48), Max: 37.2 (09 Nov 2023 19:55)  T(F): 97.3 (10 Nov 2023 11:48), Max: 98.9 (09 Nov 2023 19:55)  HR: 71 (10 Nov 2023 11:48) (71 - 83)  BP: 106/63 (10 Nov 2023 11:48) (86/40 - 106/63)  BP(mean): --  RR: 18 (10 Nov 2023 11:48) (16 - 20)  SpO2: 100% (10 Nov 2023 11:48) (96% - 100%)    Parameters below as of 10 Nov 2023 11:48  Patient On (Oxygen Delivery Method): room air    GENERAL: NAD, sitting in chair comfortably   EYES: EOMI, conjunctiva and sclera clear  NECK: Supple, No JVD  CHEST/LUNG: Clear to auscultation bilaterally; No wheeze  HEART: Regular rate and rhythm; S1S2; HUMA 3/6 radiating to carotid   ABDOMEN: Soft, Nontender, Nondistended; Bowel sounds present  EXTREMITIES: L foot bandaged. +LE edema  R>L; Lt leg wound vac  PSYCH: awake alert answers questions appropriately      LABS:                        9.8    9.62  )-----------( 250      ( 10 Nov 2023 01:52 )             29.6     11-10    133<L>  |  94<L>  |  27<H>  ----------------------------<  154<H>  4.3   |  26  |  1.24    Ca    8.8      10 Nov 2023 01:52  Phos  3.5     11-10  Mg     1.80     11-10            Urinalysis Basic - ( 10 Nov 2023 01:52 )    Color: x / Appearance: x / SG: x / pH: x  Gluc: 154 mg/dL / Ketone: x  / Bili: x / Urobili: x   Blood: x / Protein: x / Nitrite: x   Leuk Esterase: x / RBC: x / WBC x   Sq Epi: x / Non Sq Epi: x / Bacteria: x          RADIOLOGY & ADDITIONAL TESTS:  Results Reviewed:   Imaging Personally Reviewed:  Electrocardiogram Personally Reviewed:    COORDINATION OF CARE:  Care Discussed with Consultants/Other Providers [Y/N]:  Prior or Outpatient Records Reviewed [Y/N]:

## 2023-11-10 NOTE — PROGRESS NOTE ADULT - SUBJECTIVE AND OBJECTIVE BOX
Interval Events: hypotensive yesterday responsive to IVF, likely in setting of aggressive diureis.     Review Of Systems: No chest pain, shortness of breath, or palpitations            Current Meds:  acetaminophen     Tablet .. 650 milliGRAM(s) Oral every 6 hours PRN  aMIOdarone    Tablet 100 milliGRAM(s) Oral daily  aspirin enteric coated 81 milliGRAM(s) Oral daily  atorvastatin 80 milliGRAM(s) Oral at bedtime  collagenase Ointment 1 Application(s) Topical daily  collagenase Ointment 1 Application(s) Topical once  Dakins Solution - 1/4 Strength 1 Application(s) Topical two times a day  enoxaparin Injectable 55 milliGRAM(s) SubCutaneous every 12 hours  hydrocortisone 1% Cream 1 Application(s) Topical daily  levothyroxine 50 MICROGram(s) Oral daily  metoprolol succinate ER 25 milliGRAM(s) Oral daily  nystatin Powder 1 Application(s) Topical two times a day  ondansetron Injectable 4 milliGRAM(s) IV Push once PRN  pantoprazole    Tablet 40 milliGRAM(s) Oral before breakfast      Vitals:  T(F): 97.3 (11-10), Max: 98.9 (11-09)  HR: 71 (11-10) (71 - 83)  BP: 106/63 (11-10) (98/45 - 106/63)  RR: 18 (11-10)  SpO2: 100% (11-10)  I&O's Summary    09 Nov 2023 07:01  -  10 Nov 2023 07:00  --------------------------------------------------------  IN: 300 mL / OUT: 275 mL / NET: 25 mL    10 Nov 2023 07:01  -  10 Nov 2023 16:28  --------------------------------------------------------  IN: 840 mL / OUT: 550 mL / NET: 290 mL        Physical Exam:  GEN: comfortable appearing, lying in bed in NAD  HEENT: NCAT, MMM  CV: Regular S1, S2, III/VI late-peaking systolic murmur best heard at RUSB  RESP: CTAB  ABD: Soft, NTND, +BS  EXT: 2+ bl LE edema to thigh, WWP, pulses palpable throughout  NEURO: No focal deficits, AOx3  SKIN:  No rashes                          9.8    9.62  )-----------( 250      ( 10 Nov 2023 01:52 )             29.6     11-10    133<L>  |  94<L>  |  27<H>  ----------------------------<  154<H>  4.3   |  26  |  1.24    Ca    8.8      10 Nov 2023 01:52  Phos  3.5     11-10  Mg     1.80     11-10

## 2023-11-11 LAB
ANION GAP SERPL CALC-SCNC: 11 MMOL/L — SIGNIFICANT CHANGE UP (ref 7–14)
ANION GAP SERPL CALC-SCNC: 11 MMOL/L — SIGNIFICANT CHANGE UP (ref 7–14)
BUN SERPL-MCNC: 23 MG/DL — SIGNIFICANT CHANGE UP (ref 7–23)
BUN SERPL-MCNC: 23 MG/DL — SIGNIFICANT CHANGE UP (ref 7–23)
CALCIUM SERPL-MCNC: 9 MG/DL — SIGNIFICANT CHANGE UP (ref 8.4–10.5)
CALCIUM SERPL-MCNC: 9 MG/DL — SIGNIFICANT CHANGE UP (ref 8.4–10.5)
CHLORIDE SERPL-SCNC: 97 MMOL/L — LOW (ref 98–107)
CHLORIDE SERPL-SCNC: 97 MMOL/L — LOW (ref 98–107)
CO2 SERPL-SCNC: 26 MMOL/L — SIGNIFICANT CHANGE UP (ref 22–31)
CO2 SERPL-SCNC: 26 MMOL/L — SIGNIFICANT CHANGE UP (ref 22–31)
CREAT SERPL-MCNC: 0.85 MG/DL — SIGNIFICANT CHANGE UP (ref 0.5–1.3)
CREAT SERPL-MCNC: 0.85 MG/DL — SIGNIFICANT CHANGE UP (ref 0.5–1.3)
CULTURE RESULTS: SIGNIFICANT CHANGE UP
EGFR: 68 ML/MIN/1.73M2 — SIGNIFICANT CHANGE UP
EGFR: 68 ML/MIN/1.73M2 — SIGNIFICANT CHANGE UP
GLUCOSE SERPL-MCNC: 144 MG/DL — HIGH (ref 70–99)
GLUCOSE SERPL-MCNC: 144 MG/DL — HIGH (ref 70–99)
HCT VFR BLD CALC: 32.8 % — LOW (ref 34.5–45)
HCT VFR BLD CALC: 32.8 % — LOW (ref 34.5–45)
HGB BLD-MCNC: 10.6 G/DL — LOW (ref 11.5–15.5)
HGB BLD-MCNC: 10.6 G/DL — LOW (ref 11.5–15.5)
MAGNESIUM SERPL-MCNC: 1.9 MG/DL — SIGNIFICANT CHANGE UP (ref 1.6–2.6)
MAGNESIUM SERPL-MCNC: 1.9 MG/DL — SIGNIFICANT CHANGE UP (ref 1.6–2.6)
MCHC RBC-ENTMCNC: 30.5 PG — SIGNIFICANT CHANGE UP (ref 27–34)
MCHC RBC-ENTMCNC: 30.5 PG — SIGNIFICANT CHANGE UP (ref 27–34)
MCHC RBC-ENTMCNC: 32.3 GM/DL — SIGNIFICANT CHANGE UP (ref 32–36)
MCHC RBC-ENTMCNC: 32.3 GM/DL — SIGNIFICANT CHANGE UP (ref 32–36)
MCV RBC AUTO: 94.3 FL — SIGNIFICANT CHANGE UP (ref 80–100)
MCV RBC AUTO: 94.3 FL — SIGNIFICANT CHANGE UP (ref 80–100)
NRBC # BLD: 0 /100 WBCS — SIGNIFICANT CHANGE UP (ref 0–0)
NRBC # BLD: 0 /100 WBCS — SIGNIFICANT CHANGE UP (ref 0–0)
NRBC # FLD: 0 K/UL — SIGNIFICANT CHANGE UP (ref 0–0)
NRBC # FLD: 0 K/UL — SIGNIFICANT CHANGE UP (ref 0–0)
PHOSPHATE SERPL-MCNC: 2.5 MG/DL — SIGNIFICANT CHANGE UP (ref 2.5–4.5)
PHOSPHATE SERPL-MCNC: 2.5 MG/DL — SIGNIFICANT CHANGE UP (ref 2.5–4.5)
PLATELET # BLD AUTO: 250 K/UL — SIGNIFICANT CHANGE UP (ref 150–400)
PLATELET # BLD AUTO: 250 K/UL — SIGNIFICANT CHANGE UP (ref 150–400)
POTASSIUM SERPL-MCNC: 4 MMOL/L — SIGNIFICANT CHANGE UP (ref 3.5–5.3)
POTASSIUM SERPL-MCNC: 4 MMOL/L — SIGNIFICANT CHANGE UP (ref 3.5–5.3)
POTASSIUM SERPL-SCNC: 4 MMOL/L — SIGNIFICANT CHANGE UP (ref 3.5–5.3)
POTASSIUM SERPL-SCNC: 4 MMOL/L — SIGNIFICANT CHANGE UP (ref 3.5–5.3)
RBC # BLD: 3.48 M/UL — LOW (ref 3.8–5.2)
RBC # BLD: 3.48 M/UL — LOW (ref 3.8–5.2)
RBC # FLD: 17 % — HIGH (ref 10.3–14.5)
RBC # FLD: 17 % — HIGH (ref 10.3–14.5)
SODIUM SERPL-SCNC: 134 MMOL/L — LOW (ref 135–145)
SODIUM SERPL-SCNC: 134 MMOL/L — LOW (ref 135–145)
SPECIMEN SOURCE: SIGNIFICANT CHANGE UP
WBC # BLD: 9.03 K/UL — SIGNIFICANT CHANGE UP (ref 3.8–10.5)
WBC # BLD: 9.03 K/UL — SIGNIFICANT CHANGE UP (ref 3.8–10.5)
WBC # FLD AUTO: 9.03 K/UL — SIGNIFICANT CHANGE UP (ref 3.8–10.5)
WBC # FLD AUTO: 9.03 K/UL — SIGNIFICANT CHANGE UP (ref 3.8–10.5)

## 2023-11-11 PROCEDURE — 99232 SBSQ HOSP IP/OBS MODERATE 35: CPT

## 2023-11-11 RX ORDER — FUROSEMIDE 40 MG
40 TABLET ORAL DAILY
Refills: 0 | Status: DISCONTINUED | OUTPATIENT
Start: 2023-11-11 | End: 2023-11-18

## 2023-11-11 RX ORDER — MAGNESIUM SULFATE 500 MG/ML
1 VIAL (ML) INJECTION ONCE
Refills: 0 | Status: COMPLETED | OUTPATIENT
Start: 2023-11-11 | End: 2023-11-11

## 2023-11-11 RX ADMIN — ENOXAPARIN SODIUM 55 MILLIGRAM(S): 100 INJECTION SUBCUTANEOUS at 17:03

## 2023-11-11 RX ADMIN — Medication 50 MICROGRAM(S): at 05:26

## 2023-11-11 RX ADMIN — ENOXAPARIN SODIUM 55 MILLIGRAM(S): 100 INJECTION SUBCUTANEOUS at 06:30

## 2023-11-11 RX ADMIN — Medication 1 APPLICATION(S): at 12:48

## 2023-11-11 RX ADMIN — Medication 100 GRAM(S): at 12:47

## 2023-11-11 RX ADMIN — NYSTATIN CREAM 1 APPLICATION(S): 100000 CREAM TOPICAL at 17:03

## 2023-11-11 RX ADMIN — Medication 1 APPLICATION(S): at 05:26

## 2023-11-11 RX ADMIN — Medication 25 MILLIGRAM(S): at 06:30

## 2023-11-11 RX ADMIN — Medication 40 MILLIGRAM(S): at 12:47

## 2023-11-11 RX ADMIN — NYSTATIN CREAM 1 APPLICATION(S): 100000 CREAM TOPICAL at 05:25

## 2023-11-11 RX ADMIN — Medication 1 APPLICATION(S): at 17:03

## 2023-11-11 RX ADMIN — AMIODARONE HYDROCHLORIDE 100 MILLIGRAM(S): 400 TABLET ORAL at 06:31

## 2023-11-11 RX ADMIN — PANTOPRAZOLE SODIUM 40 MILLIGRAM(S): 20 TABLET, DELAYED RELEASE ORAL at 06:31

## 2023-11-11 RX ADMIN — Medication 81 MILLIGRAM(S): at 12:48

## 2023-11-11 NOTE — PROGRESS NOTE ADULT - PROBLEM SELECTOR PLAN 2
- Echo from 9/23 with EF of 20-25% and global left ventricular hypokinesis.  - Patient w/ fluid overload ; LE edema   - Continue Toprol 25mg daily    - Valsartan 40mg held due to hypotension   - Monitor I/O's, daily weights  - monitor Cr  - repeat TTE unchanged EF   - valsartan and diuresis held due to hypotension, lungs clear, but has significant b/l leg edema, will restart lasix 40 mg qd  - f/u cards recs

## 2023-11-11 NOTE — PROGRESS NOTE ADULT - SUBJECTIVE AND OBJECTIVE BOX
Dr. Kerry Craven  Pager [phone]    PROGRESS NOTE:     Patient is a 83y old  Female who presents with a chief complaint of Ruther 5 CLTI left toe wounds (11 Nov 2023 09:14)      SUBJECTIVE / OVERNIGHT EVENTS: afebrile, c/o leg swelling,  denies chest pain/sob  ADDITIONAL REVIEW OF SYSTEMS: agreeable with amputation next week     MEDICATIONS  (STANDING):  aMIOdarone    Tablet 100 milliGRAM(s) Oral daily  aspirin enteric coated 81 milliGRAM(s) Oral daily  atorvastatin 80 milliGRAM(s) Oral at bedtime  collagenase Ointment 1 Application(s) Topical daily  Dakins Solution - 1/4 Strength 1 Application(s) Topical two times a day  enoxaparin Injectable 55 milliGRAM(s) SubCutaneous every 12 hours  furosemide    Tablet 40 milliGRAM(s) Oral daily  hydrocortisone 1% Cream 1 Application(s) Topical daily  levothyroxine 50 MICROGram(s) Oral daily  metoprolol succinate ER 25 milliGRAM(s) Oral daily  nystatin Powder 1 Application(s) Topical two times a day  pantoprazole    Tablet 40 milliGRAM(s) Oral before breakfast    MEDICATIONS  (PRN):  acetaminophen     Tablet .. 650 milliGRAM(s) Oral every 6 hours PRN Temp greater or equal to 38C (100.4F), Mild Pain (1 - 3)  ondansetron Injectable 4 milliGRAM(s) IV Push once PRN Nausea and/or Vomiting      CAPILLARY BLOOD GLUCOSE        I&O's Summary    10 Nov 2023 07:01  -  11 Nov 2023 07:00  --------------------------------------------------------  IN: 960 mL / OUT: 800 mL / NET: 160 mL    11 Nov 2023 07:01  -  11 Nov 2023 15:15  --------------------------------------------------------  IN: 300 mL / OUT: 550 mL / NET: -250 mL        PHYSICAL EXAM:  Vital Signs Last 24 Hrs  T(C): 36.7 (11 Nov 2023 12:24), Max: 36.8 (10 Nov 2023 16:43)  T(F): 98.1 (11 Nov 2023 12:24), Max: 98.3 (10 Nov 2023 16:43)  HR: 79 (11 Nov 2023 12:24) (78 - 84)  BP: 116/45 (11 Nov 2023 12:24) (104/49 - 123/61)  BP(mean): --  RR: 18 (11 Nov 2023 12:24) (18 - 18)  SpO2: 95% (11 Nov 2023 12:24) (92% - 96%)    Parameters below as of 11 Nov 2023 12:24  Patient On (Oxygen Delivery Method): room air        GENERAL: NAD, sitting in chair comfortably   EYES: EOMI, conjunctiva and sclera clear  NECK: Supple, No JVD  CHEST/LUNG: Clear to auscultation bilaterally; No wheeze  HEART: Regular rate and rhythm; S1S2; HUMA 3/6 radiating to carotid   ABDOMEN: Soft, Nontender, Nondistended; Bowel sounds present  EXTREMITIES: L foot bandaged. +3 LE edema  R>L; Left groin wound vac  PSYCH: awake alert answers questions appropriately      LABS:                        10.6   9.03  )-----------( 250      ( 11 Nov 2023 06:55 )             32.8     11-11    134<L>  |  97<L>  |  23  ----------------------------<  144<H>  4.0   |  26  |  0.85    Ca    9.0      11 Nov 2023 06:55  Phos  2.5     11-11  Mg     1.90     11-11            Urinalysis Basic - ( 11 Nov 2023 06:55 )    Color: x / Appearance: x / SG: x / pH: x  Gluc: 144 mg/dL / Ketone: x  / Bili: x / Urobili: x   Blood: x / Protein: x / Nitrite: x   Leuk Esterase: x / RBC: x / WBC x   Sq Epi: x / Non Sq Epi: x / Bacteria: x          RADIOLOGY & ADDITIONAL TESTS:  Results Reviewed:   Imaging Personally Reviewed:    Electrocardiogram Personally Reviewed:    COORDINATION OF CARE:  Care Discussed with Consultants/Other Providers [Y/N]:  Prior or Outpatient Records Reviewed [Y/N]:

## 2023-11-11 NOTE — PROGRESS NOTE ADULT - SUBJECTIVE AND OBJECTIVE BOX
C Team Surgery Progress Note     S: Patient resting comfortably in the chair on morning rounds. Pain well-controlled currently.  Patient seemed to be more amendable to the idea of surgery. Tolerating diet.      MEDICATIONS  (STANDING):  aMIOdarone    Tablet 100 milliGRAM(s) Oral daily  aspirin enteric coated 81 milliGRAM(s) Oral daily  atorvastatin 80 milliGRAM(s) Oral at bedtime  collagenase Ointment 1 Application(s) Topical daily  Dakins Solution - 1/4 Strength 1 Application(s) Topical two times a day  enoxaparin Injectable 55 milliGRAM(s) SubCutaneous every 12 hours  furosemide    Tablet 40 milliGRAM(s) Oral daily  hydrocortisone 1% Cream 1 Application(s) Topical daily  levothyroxine 50 MICROGram(s) Oral daily  magnesium sulfate  IVPB 1 Gram(s) IV Intermittent once  metoprolol succinate ER 25 milliGRAM(s) Oral daily  nystatin Powder 1 Application(s) Topical two times a day  pantoprazole    Tablet 40 milliGRAM(s) Oral before breakfast    MEDICATIONS  (PRN):  acetaminophen     Tablet .. 650 milliGRAM(s) Oral every 6 hours PRN Temp greater or equal to 38C (100.4F), Mild Pain (1 - 3)  ondansetron Injectable 4 milliGRAM(s) IV Push once PRN Nausea and/or Vomiting      T(C): 36.7 (11-11-23 @ 08:36), Max: 36.8 (11-10-23 @ 16:43)  HR: 84 (11-11-23 @ 08:36) (78 - 84)  BP: 123/61 (11-11-23 @ 08:36) (104/49 - 123/61)  RR: 18 (11-11-23 @ 08:36) (18 - 18)  SpO2: 92% (11-11-23 @ 08:36) (92% - 96%)        11-10-23 @ 07:01  -  11-11-23 @ 07:00  --------------------------------------------------------  IN: 960 mL / OUT: 800 mL / NET: 160 mL        Physical Exam:  General: NAD, resting in bed comfortably.  Cardiac: regular rate, warm and well perfused  Respiratory: Nonlabored respirations, normal cw expansion.  Abdomen: soft, nontender, nondistended.   Extremities: L foot dressings c/d/i    LABS:                        10.6   9.03  )-----------( 250      ( 11 Nov 2023 06:55 )             32.8     11-11    134<L>  |  97<L>  |  23  ----------------------------<  144<H>  4.0   |  26  |  0.85    Ca    9.0      11 Nov 2023 06:55  Phos  2.5     11-11  Mg     1.90     11-11

## 2023-11-11 NOTE — PROGRESS NOTE ADULT - PROBLEM SELECTOR PLAN 1
- s/p left foot partial hallux amputation 9/6, now admitted w/ left foot partial hallux amputation dehiscence and forefoot ischemic changes  - L foot xray: no OM, no gas, no fracture   - L foot wound growing VRE and staph epi; ID appreciated wound cx likely colonization   - antibiotics as per primary ( zosyn 10/27- )  - noted R>L asymmetric swelling prior US 8/23 was neg for DVT but with superficial thrombosis of the lesser saphenous vein in both calves. Diameters of the right and left greater and lesser saphenous veins.; 10/31 LE duplex neg for DVT  -  s/p angiogram 11/3  external iliac occlusion; no objection to left AKA if optimized from cardiology standpoint given severe AS and Acute HF - s/p left foot partial hallux amputation 9/6, now admitted w/ left foot partial hallux amputation dehiscence and forefoot ischemic changes  - L foot xray: no OM, no gas, no fracture   - L foot wound growing VRE and staph epi; ID appreciated wound cx likely colonization   - s/p antibiotics as per primary ( zosyn 10/27- 11/3)  - noted R>L asymmetric swelling prior US 8/23 was neg for DVT but with superficial thrombosis of the lesser saphenous vein in both calves. Diameters of the right and left greater and lesser saphenous veins.; 10/31 LE duplex neg for DVT  -  s/p angiogram 11/3  external iliac occlusion; no objection to left AKA if optimized from cardiology standpoint given severe AS and Acute HF

## 2023-11-11 NOTE — PROGRESS NOTE ADULT - ASSESSMENT
83F with PMHx of CAD, pAF on Eliquis, severe AS pending TAVR, R internal carotid stenosis, HTN, HLD, hypothyroidism, anxiety, PAD and chronic L toe wounds s/p L ilioprofunda bypass with reverse GSV graft to peroneal 8/31/23 (Dr. Tavares) c/b large L groin abscess (17cm) requiring removal of infected bypass graft of lower extremity and groin washout on 9/24/23. Wound care involved for R groin wound and R calf incision wound. Patient represented from rehab concerns for "cold foot". Pt is poor historian, but she says the wounds have been worse over the 2 days prior to presentation. Remains afebrile and hemodynamically stable s/p LLE diagnostic angiogram on 11/3. Given ischemic foot and external iliac occlusion, patient may require AKA.    PLAN:  - holding diuresis and valsartan  - Diet: Regular  - Cardiology consulted, appreciate recs  - Wound care following - vac applied 11/4, will be changed today  - AKA planning discussed with patient and healthcare proxy  - Psychiatry consulted - patient has capacity  - Dispo: pending    Vascular Surgery  [id number]

## 2023-11-12 ENCOUNTER — TRANSCRIPTION ENCOUNTER (OUTPATIENT)
Age: 83
End: 2023-11-12

## 2023-11-12 LAB
ANION GAP SERPL CALC-SCNC: 11 MMOL/L — SIGNIFICANT CHANGE UP (ref 7–14)
ANION GAP SERPL CALC-SCNC: 11 MMOL/L — SIGNIFICANT CHANGE UP (ref 7–14)
BUN SERPL-MCNC: 20 MG/DL — SIGNIFICANT CHANGE UP (ref 7–23)
BUN SERPL-MCNC: 20 MG/DL — SIGNIFICANT CHANGE UP (ref 7–23)
CALCIUM SERPL-MCNC: 8.8 MG/DL — SIGNIFICANT CHANGE UP (ref 8.4–10.5)
CALCIUM SERPL-MCNC: 8.8 MG/DL — SIGNIFICANT CHANGE UP (ref 8.4–10.5)
CHLORIDE SERPL-SCNC: 94 MMOL/L — LOW (ref 98–107)
CHLORIDE SERPL-SCNC: 94 MMOL/L — LOW (ref 98–107)
CO2 SERPL-SCNC: 27 MMOL/L — SIGNIFICANT CHANGE UP (ref 22–31)
CO2 SERPL-SCNC: 27 MMOL/L — SIGNIFICANT CHANGE UP (ref 22–31)
CREAT SERPL-MCNC: 0.93 MG/DL — SIGNIFICANT CHANGE UP (ref 0.5–1.3)
CREAT SERPL-MCNC: 0.93 MG/DL — SIGNIFICANT CHANGE UP (ref 0.5–1.3)
EGFR: 61 ML/MIN/1.73M2 — SIGNIFICANT CHANGE UP
EGFR: 61 ML/MIN/1.73M2 — SIGNIFICANT CHANGE UP
GLUCOSE SERPL-MCNC: 144 MG/DL — HIGH (ref 70–99)
GLUCOSE SERPL-MCNC: 144 MG/DL — HIGH (ref 70–99)
HCT VFR BLD CALC: 31.1 % — LOW (ref 34.5–45)
HCT VFR BLD CALC: 31.1 % — LOW (ref 34.5–45)
HGB BLD-MCNC: 10.1 G/DL — LOW (ref 11.5–15.5)
HGB BLD-MCNC: 10.1 G/DL — LOW (ref 11.5–15.5)
MAGNESIUM SERPL-MCNC: 2 MG/DL — SIGNIFICANT CHANGE UP (ref 1.6–2.6)
MAGNESIUM SERPL-MCNC: 2 MG/DL — SIGNIFICANT CHANGE UP (ref 1.6–2.6)
MCHC RBC-ENTMCNC: 30.4 PG — SIGNIFICANT CHANGE UP (ref 27–34)
MCHC RBC-ENTMCNC: 30.4 PG — SIGNIFICANT CHANGE UP (ref 27–34)
MCHC RBC-ENTMCNC: 32.5 GM/DL — SIGNIFICANT CHANGE UP (ref 32–36)
MCHC RBC-ENTMCNC: 32.5 GM/DL — SIGNIFICANT CHANGE UP (ref 32–36)
MCV RBC AUTO: 93.7 FL — SIGNIFICANT CHANGE UP (ref 80–100)
MCV RBC AUTO: 93.7 FL — SIGNIFICANT CHANGE UP (ref 80–100)
NRBC # BLD: 0 /100 WBCS — SIGNIFICANT CHANGE UP (ref 0–0)
NRBC # BLD: 0 /100 WBCS — SIGNIFICANT CHANGE UP (ref 0–0)
NRBC # FLD: 0 K/UL — SIGNIFICANT CHANGE UP (ref 0–0)
NRBC # FLD: 0 K/UL — SIGNIFICANT CHANGE UP (ref 0–0)
PHOSPHATE SERPL-MCNC: 2.5 MG/DL — SIGNIFICANT CHANGE UP (ref 2.5–4.5)
PHOSPHATE SERPL-MCNC: 2.5 MG/DL — SIGNIFICANT CHANGE UP (ref 2.5–4.5)
PLATELET # BLD AUTO: 260 K/UL — SIGNIFICANT CHANGE UP (ref 150–400)
PLATELET # BLD AUTO: 260 K/UL — SIGNIFICANT CHANGE UP (ref 150–400)
POTASSIUM SERPL-MCNC: 3.7 MMOL/L — SIGNIFICANT CHANGE UP (ref 3.5–5.3)
POTASSIUM SERPL-MCNC: 3.7 MMOL/L — SIGNIFICANT CHANGE UP (ref 3.5–5.3)
POTASSIUM SERPL-SCNC: 3.7 MMOL/L — SIGNIFICANT CHANGE UP (ref 3.5–5.3)
POTASSIUM SERPL-SCNC: 3.7 MMOL/L — SIGNIFICANT CHANGE UP (ref 3.5–5.3)
RBC # BLD: 3.32 M/UL — LOW (ref 3.8–5.2)
RBC # BLD: 3.32 M/UL — LOW (ref 3.8–5.2)
RBC # FLD: 16.9 % — HIGH (ref 10.3–14.5)
RBC # FLD: 16.9 % — HIGH (ref 10.3–14.5)
SODIUM SERPL-SCNC: 132 MMOL/L — LOW (ref 135–145)
SODIUM SERPL-SCNC: 132 MMOL/L — LOW (ref 135–145)
WBC # BLD: 9.21 K/UL — SIGNIFICANT CHANGE UP (ref 3.8–10.5)
WBC # BLD: 9.21 K/UL — SIGNIFICANT CHANGE UP (ref 3.8–10.5)
WBC # FLD AUTO: 9.21 K/UL — SIGNIFICANT CHANGE UP (ref 3.8–10.5)
WBC # FLD AUTO: 9.21 K/UL — SIGNIFICANT CHANGE UP (ref 3.8–10.5)

## 2023-11-12 PROCEDURE — 99232 SBSQ HOSP IP/OBS MODERATE 35: CPT

## 2023-11-12 RX ORDER — SPIRONOLACTONE 25 MG/1
25 TABLET, FILM COATED ORAL DAILY
Refills: 0 | Status: DISCONTINUED | OUTPATIENT
Start: 2023-11-12 | End: 2023-11-18

## 2023-11-12 RX ADMIN — Medication 1 APPLICATION(S): at 13:51

## 2023-11-12 RX ADMIN — AMIODARONE HYDROCHLORIDE 100 MILLIGRAM(S): 400 TABLET ORAL at 06:10

## 2023-11-12 RX ADMIN — Medication 81 MILLIGRAM(S): at 13:51

## 2023-11-12 RX ADMIN — Medication 50 MICROGRAM(S): at 05:04

## 2023-11-12 RX ADMIN — Medication 40 MILLIGRAM(S): at 06:13

## 2023-11-12 RX ADMIN — ENOXAPARIN SODIUM 55 MILLIGRAM(S): 100 INJECTION SUBCUTANEOUS at 06:10

## 2023-11-12 RX ADMIN — Medication 1 APPLICATION(S): at 05:17

## 2023-11-12 RX ADMIN — Medication 650 MILLIGRAM(S): at 23:31

## 2023-11-12 RX ADMIN — PANTOPRAZOLE SODIUM 40 MILLIGRAM(S): 20 TABLET, DELAYED RELEASE ORAL at 06:10

## 2023-11-12 RX ADMIN — ONDANSETRON 4 MILLIGRAM(S): 8 TABLET, FILM COATED ORAL at 12:07

## 2023-11-12 RX ADMIN — ENOXAPARIN SODIUM 55 MILLIGRAM(S): 100 INJECTION SUBCUTANEOUS at 19:00

## 2023-11-12 RX ADMIN — Medication 25 MILLIGRAM(S): at 06:10

## 2023-11-12 RX ADMIN — Medication 650 MILLIGRAM(S): at 21:57

## 2023-11-12 RX ADMIN — NYSTATIN CREAM 1 APPLICATION(S): 100000 CREAM TOPICAL at 05:17

## 2023-11-12 NOTE — PROGRESS NOTE ADULT - SUBJECTIVE AND OBJECTIVE BOX
Dr. Kerry Craven  Pager [phone]    PROGRESS NOTE:     Patient is a 83y old  Female who presents with a chief complaint of Ruther 5 CLTI left toe wounds (12 Nov 2023 11:23)      SUBJECTIVE / OVERNIGHT EVENTS: still legs swollen, no chest pain/sob  ADDITIONAL REVIEW OF SYSTEMS: afebrile     MEDICATIONS  (STANDING):  aMIOdarone    Tablet 100 milliGRAM(s) Oral daily  aspirin enteric coated 81 milliGRAM(s) Oral daily  atorvastatin 80 milliGRAM(s) Oral at bedtime  collagenase Ointment 1 Application(s) Topical daily  Dakins Solution - 1/4 Strength 1 Application(s) Topical two times a day  enoxaparin Injectable 55 milliGRAM(s) SubCutaneous every 12 hours  furosemide    Tablet 40 milliGRAM(s) Oral daily  hydrocortisone 1% Cream 1 Application(s) Topical daily  levothyroxine 50 MICROGram(s) Oral daily  metoprolol succinate ER 25 milliGRAM(s) Oral daily  nystatin Powder 1 Application(s) Topical two times a day  pantoprazole    Tablet 40 milliGRAM(s) Oral before breakfast  spironolactone 25 milliGRAM(s) Oral daily    MEDICATIONS  (PRN):  acetaminophen     Tablet .. 650 milliGRAM(s) Oral every 6 hours PRN Temp greater or equal to 38C (100.4F), Mild Pain (1 - 3)      CAPILLARY BLOOD GLUCOSE        I&O's Summary    11 Nov 2023 07:01  -  12 Nov 2023 07:00  --------------------------------------------------------  IN: 560 mL / OUT: 1450 mL / NET: -890 mL    12 Nov 2023 07:01  -  12 Nov 2023 13:27  --------------------------------------------------------  IN: 200 mL / OUT: 200 mL / NET: 0 mL        PHYSICAL EXAM:  Vital Signs Last 24 Hrs  T(C): 36.6 (12 Nov 2023 09:01), Max: 37 (12 Nov 2023 00:19)  T(F): 97.8 (12 Nov 2023 09:01), Max: 98.6 (12 Nov 2023 00:19)  HR: 79 (12 Nov 2023 09:01) (76 - 89)  BP: 111/62 (12 Nov 2023 09:01) (104/40 - 126/67)  BP(mean): --  RR: 18 (12 Nov 2023 09:01) (18 - 18)  SpO2: 96% (12 Nov 2023 09:01) (95% - 96%)    Parameters below as of 12 Nov 2023 09:01  Patient On (Oxygen Delivery Method): room air          GENERAL: NAD, sitting in chair comfortably   EYES: EOMI, conjunctiva and sclera clear  NECK: Supple, No JVD  CHEST/LUNG: Clear to auscultation bilaterally; No wheeze  HEART: Regular rate and rhythm; S1S2; HUMA 3/6 radiating to carotid   ABDOMEN: Soft, Nontender, Nondistended; Bowel sounds present  EXTREMITIES: L foot bandaged. +3 LE edema  R>L; Left groin wound vac  PSYCH: awake alert answers questions appropriately        LABS:                        10.1   9.21  )-----------( 260      ( 12 Nov 2023 05:45 )             31.1     11-12    132<L>  |  94<L>  |  20  ----------------------------<  144<H>  3.7   |  27  |  0.93    Ca    8.8      12 Nov 2023 05:45  Phos  2.5     11-12  Mg     2.00     11-12            Urinalysis Basic - ( 12 Nov 2023 05:45 )    Color: x / Appearance: x / SG: x / pH: x  Gluc: 144 mg/dL / Ketone: x  / Bili: x / Urobili: x   Blood: x / Protein: x / Nitrite: x   Leuk Esterase: x / RBC: x / WBC x   Sq Epi: x / Non Sq Epi: x / Bacteria: x          RADIOLOGY & ADDITIONAL TESTS:  Results Reviewed:   Imaging Personally Reviewed:  Electrocardiogram Personally Reviewed:    COORDINATION OF CARE:  Care Discussed with Consultants/Other Providers [Y/N]:  Prior or Outpatient Records Reviewed [Y/N]:

## 2023-11-12 NOTE — DISCHARGE NOTE PROVIDER - DISCHARGE DATE
Discharge Summary     Ashley Santos  : 1991  MRN: [id number]  [facility]: [id number]    Date of Admission: 2018   Date of Discharge:  2018   Delivering Physician: Torsten Rodriguez        Admission Diagnosis: 1. Normal labor [O80, Z37.9]   Discharge Diagnosis: 1. Pregnancy at 38w2d - delivered       Procedures: 2018  - Vaginal, Spontaneous Delivery       Hospital Course  Patient is a 26 y.o.  who at 38w2d had a vaginal birth.  Her postpartum course was without complications.  On PPD #2 she was ready for discharge.  She had normal lochia and pain well controlled with oral medications.    Infant  male  fetus weighing 4130 g (9 lb 1.7 oz)   Apgars -  6  @ 1 minute /  9  @ 5 minutes.    Discharge labs  Lab Results   Component Value Date    WBC 13.62 (H) 2018    HGB 9.7 (L) 2018    HCT 28.5 (L) 2018     2018       Discharge Medications     Discharge Medications      Continue These Medications      Instructions Start Date   ferrous sulfate 325 (65 FE) MG tablet   325 mg, Oral, Daily With Breakfast      PRENATAL VITAMIN PO   1 tablet/day, Oral             Discharge Disposition Home or Self Care   Condition on Discharge: good   Follow-up: 6 weeks with Jennifer Morin MD  2018    
17-Nov-2023

## 2023-11-12 NOTE — PROGRESS NOTE ADULT - PROBLEM SELECTOR PLAN 2
- Echo from 9/23 with EF of 20-25% and global left ventricular hypokinesis.  - Patient w/ fluid overload ; LE edema   - Continue Toprol 25mg daily    - Valsartan 40mg held due to hypotension   - Monitor I/O's, daily weights  - monitor Cr  - repeat TTE unchanged EF   - valsartan and diuresis held due to hypotension, lungs clear, but has significant b/l leg edema, restarted lasix 40 qd, add aldactone 25 qd  - f/u cards recs

## 2023-11-12 NOTE — DISCHARGE NOTE PROVIDER - NSDCFUSCHEDAPPT_GEN_ALL_CORE_FT
Francisco Deleon  [facility] Physician [facility]  CARDIOLOGY [address]  Scheduled Appointment: 12/14/2023

## 2023-11-12 NOTE — DISCHARGE NOTE PROVIDER - HOSPITAL COURSE
83F with PMHx of CAD, pAF on Eliquis, severe AS pending TAVR, R internal carotid stenosis, HTN, HLD, hypothyroidism, anxiety, PAD and chronic L toe wounds s/p L ilioprofunda bypass with reverse GSV graft to peroneal 8/31/23 (Dr. Tavares) c/b large L groin abscess (17cm) requiring removal of infected bypass graft of lower extremity and groin washout on 9/24/23. Wound care involved for R groin wound and R calf incision wound. Pt states she has been getting daily wound care, and today the rehab team was concerned about how cold the foot felt, recommending she come into the hospital.    10/28- Medicine and cardiology consulted for optimization for angiogram. Wound vac placed to right groin by wound care.  10/30- ID consulted, recommended to monitor off antibiotics.  11/3- Patient went for LLE angiogram with R groin access. Tolerated procedure well, and was transferred back to the surgical floor.  11/7- Psych consulted to determine patients capacity to make medical decisions. Determined that patient had reasonable capacity to make medical decisions.  11/9- Patient became hypotensive after receiving morning dose of bumex, received 250 albumin, 500 LR bolus. Cards recommended to hold home valsartan dose and diuresis.  11/14- Patient underwent L aka with . Patient tolerated procedure well and was transferred from PACU to the floor. Started on therapeutic lovenox.  11/15- PMR recommended ACOSTA  **pending    At the time of discharge, the patient was hemodynamically stable, was tolerating PO diet, was voiding urine and passing stool.  Patient was ambulating and was comfortable with adequate pain control.  The patient was instructed to follow up with Dr. Tavares within 2 weeks after discharge from the hospital.  The patient / family felt comfortable with discharge.  The patient had no other issues.    Per attending, patient deemed medically stable and ready for discharge at this time.        83F with PMHx of CAD, pAF on Eliquis, severe AS pending TAVR, R internal carotid stenosis, HTN, HLD, hypothyroidism, anxiety, PAD and chronic L toe wounds s/p L ilioprofunda bypass with reverse GSV graft to peroneal 8/31/23 (Dr. Tavares) c/b large L groin abscess (17cm) requiring removal of infected bypass graft of lower extremity and groin washout on 9/24/23. Wound care involved for R groin wound and R calf incision wound. Pt states she has been getting daily wound care, and today the rehab team was concerned about how cold the foot felt, recommending she come into the hospital.    10/28- Medicine and cardiology consulted for optimization for angiogram. Wound vac placed to right groin by wound care.  10/30- ID consulted, recommended to monitor off antibiotics.  11/3- Patient went for LLE angiogram with R groin access. Tolerated procedure well, and was transferred back to the surgical floor.  11/7- Psych consulted to determine patients capacity to make medical decisions. Determined that patient had reasonable capacity to make medical decisions.  11/9- Patient became hypotensive after receiving morning dose of bumex, received 250 albumin, 500 LR bolus. Cards recommended to hold home valsartan dose and diuresis.  11/14- Patient underwent L aka with . Patient tolerated procedure well and was transferred from PACU to the floor. Started on therapeutic lovenox.  11/15- PMR recommended ACOSTA.  11/17- Patient transitioned from T. Lovenox to eliquis.    At the time of discharge, the patient was hemodynamically stable, was tolerating PO diet, was voiding urine and passing stool.  Patient was ambulating and was comfortable with adequate pain control.  The patient was instructed to follow up with Dr. Tavares within 2 weeks after discharge from the hospital.  The patient / family felt comfortable with discharge.  The patient had no other issues.    Per attending, patient deemed medically stable and ready for discharge to rehab at this time.

## 2023-11-12 NOTE — DISCHARGE NOTE PROVIDER - DETAILS OF MALNUTRITION DIAGNOSIS/DIAGNOSES
This patient has been assessed with a concern for Malnutrition and was treated during this hospitalization for the following Nutrition diagnosis/diagnoses:     -  11/15/2023: Severe protein-calorie malnutrition

## 2023-11-12 NOTE — PROGRESS NOTE ADULT - SUBJECTIVE AND OBJECTIVE BOX
SURGERY DAILY PROGRESS NOTE      SUBJECTIVE: Patient seen and examined on AM rounds. States she is doing okay. Denies chest pain, SOB, palpitations, HA, fever, chills, N/V.      OBJECTIVE:  Vital Signs Last 24 Hrs  T(C): 36.6 (12 Nov 2023 09:01), Max: 37 (12 Nov 2023 00:19)  T(F): 97.8 (12 Nov 2023 09:01), Max: 98.6 (12 Nov 2023 00:19)  HR: 79 (12 Nov 2023 09:01) (76 - 89)  BP: 111/62 (12 Nov 2023 09:01) (104/40 - 126/67)  BP(mean): --  RR: 18 (12 Nov 2023 09:01) (18 - 18)  SpO2: 96% (12 Nov 2023 09:01) (95% - 96%)    Parameters below as of 12 Nov 2023 09:01  Patient On (Oxygen Delivery Method): room air        I&O's Summary    11 Nov 2023 07:01  -  12 Nov 2023 07:00  --------------------------------------------------------  IN: 560 mL / OUT: 1450 mL / NET: -890 mL        Physical Exam:  General Appearance: Appears well, NAD, A& O x 3. Sitting comfortably in chair  Chest: Nonlabored breathing on RA  CV: Hemodynamically stable  Extremities: Grossly symmetric, left foot dressing changed this morning C/D/I. Left leg wound vac holding suction  Vascular: RLE pitting edema, improved      LABS:                        10.1   9.21  )-----------( 260      ( 12 Nov 2023 05:45 )             31.1     11-12    132<L>  |  94<L>  |  20  ----------------------------<  144<H>  3.7   |  27  |  0.93    Ca    8.8      12 Nov 2023 05:45  Phos  2.5     11-12  Mg     2.00     11-12        Urinalysis Basic - ( 12 Nov 2023 05:45 )    Color: x / Appearance: x / SG: x / pH: x  Gluc: 144 mg/dL / Ketone: x  / Bili: x / Urobili: x   Blood: x / Protein: x / Nitrite: x   Leuk Esterase: x / RBC: x / WBC x   Sq Epi: x / Non Sq Epi: x / Bacteria: x

## 2023-11-12 NOTE — PROGRESS NOTE ADULT - ASSESSMENT
83F with PMHx of CAD, pAF on Eliquis, severe AS pending TAVR, R internal carotid stenosis, HTN, HLD, hypothyroidism, anxiety, PAD and chronic L toe wounds s/p L ilioprofunda bypass with reverse GSV graft to peroneal 8/31/23 (Dr. Tavares) c/b large L groin abscess (17cm) requiring removal of infected bypass graft of lower extremity and groin washout on 9/24/23. Wound care involved for R groin wound and R calf incision wound. Patient represented from rehab concerns for "cold foot". Pt is poor historian, but she says the wounds have been worse over the 2 days prior to presentation. Remains afebrile and hemodynamically stable s/p LLE diagnostic angiogram on 11/3. Given ischemic foot and external iliac occlusion, patient requires AKA. Planning for Tuesday.    PLAN:  - holding diuresis and valsartan, furosemide restarted  - Diet: Regular  - Cardiology consulted, appreciate recs - cleared if patient able to lie flat per notes  - Med consulted, appreciate recs - no further medical optimization needed  - Wound care following - vac changes M/W/F  - AKA planning discussed with patient and healthcare proxy, possibly Tuesday  - Psychiatry consulted - patient has capacity  - Dispo: pending    Vascular Surgery  [id number]

## 2023-11-12 NOTE — DISCHARGE NOTE PROVIDER - NSDCFUADDINST_GEN_ALL_CORE_FT
WOUND VAC: You have had negative pressure wound therapy (wound vac) applied to your wound during your hospital stay. Upon discharge from the hospital, your wound vac was removed and replaced with saline soaked gauze, covered with dry gauze, and secured with tape for your trip home. You have had a wound vac delivered to your home and a wound care nurse has been arranged to visit your home to reapply this wound vac as follows:  -The wound vac contains black granulofoam and should be set to 125mmHg at all times.  - ***********  - Please ensure the wound vac does not get wet or soiled.   - When showering or sponge bathing, cover/secure wound vac site with plastic wrap to avoid getting wet.   - If the wound vac malfunctions, dislodges, or loosens and falls off, please dress the area with saline-soaked gauze, cover with dry gauze, and an abdominal pad and secure with tape. Call your home care nursing agency and/or your surgeon's office to notify them if the wound vac comes off.  WOUND VAC: You have had negative pressure wound therapy (wound vac) applied to your wound during your hospital stay. Upon discharge from the hospital, your wound vac was removed and replaced with saline soaked gauze, covered with dry gauze, and secured with tape for your trip home. You have had a wound vac delivered to your home and a wound care nurse has been arranged to visit your home to reapply this wound vac as follows:  -The wound vac contains black granulofoam and should be set to 125mmHg at all times.  - Please ensure the wound vac does not get wet or soiled.   - When showering or sponge bathing, cover/secure wound vac site with plastic wrap to avoid getting wet.   - If the wound vac malfunctions, dislodges, or loosens and falls off, please dress the area with saline-soaked gauze, cover with dry gauze, and an abdominal pad and secure with tape. Call your home care nursing agency and/or your surgeon's office to notify them if the wound vac comes off.

## 2023-11-12 NOTE — DISCHARGE NOTE PROVIDER - NSDCCPCAREPLAN_GEN_ALL_CORE_FT
PRINCIPAL DISCHARGE DIAGNOSIS  Diagnosis: Acute lower limb ischemia  Assessment and Plan of Treatment: WOUND CARE:  Please keep incisions clean and dry. Please do not Scrub or rub incisions. Do not use lotion or powder on incisions.   BATHING: You may shower and/or sponge bathe. You may use warm soapy water in the shower and rinse, pat dry.  ACTIVITY: No heavy lifting or straining. Otherwise, you may return to your usual level of physical activity. If you are taking narcotic pain medication DO NOT drive a car, operate machinery or make important decisions.  DIET: Return to your usual diet.  NOTIFY YOUR SURGEON IF YOU HAVE: any bleeding that does not stop, any pus draining from your wound(s), any fever (over 100.4 F) persistent nausea/vomiting, or if your pain is not controlled on your discharge pain medications, unable to urinate.  FOLLOW UP:  1. Please follow up with your primary care physician in one week regarding your hospitalization, bring copies of your discharge paperwork.  2. Please follow up with your surgeon, Dr. Tavares, in 2 weeks following discharge. Please call () to make an appointment.     PRINCIPAL DISCHARGE DIAGNOSIS  Diagnosis: Acute lower limb ischemia  Assessment and Plan of Treatment: WOUND CARE:  Please keep incisions clean and dry. Please do not Scrub or rub incisions. Do not use lotion or powder on incisions.   BATHING: You may shower and/or sponge bathe. You may use warm soapy water in the shower and rinse, pat dry.  ACTIVITY: No heavy lifting or straining. Otherwise, you may return to your usual level of physical activity. If you are taking narcotic pain medication DO NOT drive a car, operate machinery or make important decisions.  DIET: Return to your usual diet.  NOTIFY YOUR SURGEON IF YOU HAVE: any bleeding that does not stop, any pus draining from your wound(s), any fever (over 100.4 F) persistent nausea/vomiting, or if your pain is not controlled on your discharge pain medications, unable to urinate.  FOLLOW UP:  1. Please follow up with your primary care physician in one week regarding your hospitalization, bring copies of your discharge paperwork.  2. Please follow up with your surgeon, Dr. Tavares, in 2 weeks following discharge. Please call [phone] to make an appointment.  3. Please follow up with your cardiologist after discharge.     PRINCIPAL DISCHARGE DIAGNOSIS  Diagnosis: Acute lower limb ischemia  Assessment and Plan of Treatment: WOUND CARE:  Please keep incisions clean and dry. Please do not Scrub or rub incisions. Do not use lotion or powder on incisions.   BATHING: You may shower and/or sponge bathe. You may use warm soapy water in the shower and rinse, pat dry.  ACTIVITY: No heavy lifting or straining. Otherwise, you may return to your usual level of physical activity. If you are taking narcotic pain medication DO NOT drive a car, operate machinery or make important decisions.  DIET: Return to your usual diet.  NOTIFY YOUR SURGEON IF YOU HAVE: any bleeding that does not stop, any pus draining from your wound(s), any fever (over 100.4 F) persistent nausea/vomiting, or if your pain is not controlled on your discharge pain medications, unable to urinate.  FOLLOW UP:  1. Please follow up with your primary care physician in one week regarding your hospitalization, bring copies of your discharge paperwork.  2. Please follow up with your surgeon, Dr. Tavares, in 2 weeks following discharge. Please call [phone] to make an appointment.  3. Please follow up with your cardiologist, Dr. Deleon after discharge.     PRINCIPAL DISCHARGE DIAGNOSIS  Diagnosis: Acute lower limb ischemia  Assessment and Plan of Treatment: WOUND CARE:  Please keep incisions clean and dry. Please do not Scrub or rub incisions. Do not use lotion or powder on incisions. Please change the dressing in the L AKA site daily with kerlix, abdominal pad, ace bandage, and stockinette.  BATHING: You may shower and/or sponge bathe. You may use warm soapy water in the shower and rinse, pat dry.  ACTIVITY: No heavy lifting or straining. Otherwise, you may return to your usual level of physical activity. If you are taking narcotic pain medication DO NOT drive a car, operate machinery or make important decisions.  DIET: Return to your usual diet.  NOTIFY YOUR SURGEON IF YOU HAVE: any bleeding that does not stop, any pus draining from your wound(s), any fever (over 100.4 F) persistent nausea/vomiting, or if your pain is not controlled on your discharge pain medications, unable to urinate.  FOLLOW UP:  1. Please follow up with your primary care physician in one week regarding your hospitalization, bring copies of your discharge paperwork.  2. Please follow up with your surgeon, Dr. Tavares, in 2 weeks following discharge. Please call [phone] to make an appointment.  3. Please follow up with your cardiologist, Dr. Deleon after discharge.

## 2023-11-12 NOTE — DISCHARGE NOTE PROVIDER - CARE PROVIDER_API CALL
Patient coming in with chronic back pain.  When he went to the bathroom the pain was so bad he was having trouble walking and called EMS.  No meds given en route and vitally stable with EMS  
Laura Tavares  Vascular Surgery  [address] [facility], [address]  [place] [address]  Phone: [phone]  Fax: [phone]  Follow Up Time: 2 weeks

## 2023-11-12 NOTE — PROGRESS NOTE ADULT - PROBLEM SELECTOR PLAN 1
- s/p left foot partial hallux amputation 9/6, now admitted w/ left foot partial hallux amputation dehiscence and forefoot ischemic changes  - L foot xray: no OM, no gas, no fracture   - L foot wound growing VRE and staph epi; ID appreciated wound cx likely colonization   - s/p antibiotics as per primary ( zosyn 10/27- 11/3)  - noted R>L asymmetric swelling prior US 8/23 was neg for DVT but with superficial thrombosis of the lesser saphenous vein in both calves. Diameters of the right and left greater and lesser saphenous veins.; 10/31 LE duplex neg for DVT  -  s/p angiogram 11/3  external iliac occlusion; no objection to left AKA if optimized from cardiology standpoint given severe AS and Acute HF

## 2023-11-12 NOTE — DISCHARGE NOTE PROVIDER - NSDCMRMEDTOKEN_GEN_ALL_CORE_FT
acetaminophen 325 mg oral tablet: 3 tab(s) orally every 6 hours As needed Mild Pain (1 - 3)  amiodarone 200 mg oral tablet: 0.5 tab(s) orally once a day  aspirin 81 mg oral delayed release tablet: 1 tab(s) orally once a day (at bedtime)  atorvastatin 80 mg oral tablet: 1 tab(s) orally once a day (at bedtime)  calcium carbonate 500 mg (200 mg elemental calcium) oral tablet, chewable: 2 tab(s) orally 2 times a day As needed Gas  Eliquis 2.5 mg oral tablet: 1 tab(s) orally 2 times a day  levothyroxine 50 mcg (0.05 mg) oral tablet: 1 tab(s) orally once a day  metoprolol succinate 25 mg oral tablet, extended release: 1 tab(s) orally once a day  nystatin 100,000 units/g topical powder: 1 Apply topically to affected area 2 times a day  pantoprazole 40 mg oral delayed release tablet: 1 tab(s) orally once a day (before a meal)  telmisartan 40 mg oral tablet: 1 tab(s) orally once a day   acetaminophen 325 mg oral tablet: 3 tab(s) orally every 6 hours As needed Mild Pain (1 - 3)  amiodarone 200 mg oral tablet: 0.5 tab(s) orally once a day  aspirin 81 mg oral delayed release tablet: 1 tab(s) orally once a day (at bedtime)  atorvastatin 80 mg oral tablet: 1 tab(s) orally once a day (at bedtime)  calcium carbonate 500 mg (200 mg elemental calcium) oral tablet, chewable: 2 tab(s) orally 2 times a day As needed Gas  collagenase 250 units/g topical ointment: 1 Apply topically to affected area once a day  Eliquis 2.5 mg oral tablet: 1 tab(s) orally 2 times a day  furosemide 40 mg oral tablet: 1 tab(s) orally once a day  hydrocortisone 1% topical cream: 1 Apply topically to affected area once a day  levothyroxine 50 mcg (0.05 mg) oral tablet: 1 tab(s) orally once a day  metoprolol succinate 25 mg oral tablet, extended release: 1 tab(s) orally once a day  nystatin 100,000 units/g topical powder: 1 Apply topically to affected area 2 times a day  pantoprazole 40 mg oral delayed release tablet: 1 tab(s) orally once a day (before a meal)  spironolactone 25 mg oral tablet: 1 tab(s) orally once a day

## 2023-11-13 ENCOUNTER — TRANSCRIPTION ENCOUNTER (OUTPATIENT)
Age: 83
End: 2023-11-13

## 2023-11-13 DIAGNOSIS — Z29.9 ENCOUNTER FOR PROPHYLACTIC MEASURES, UNSPECIFIED: ICD-10-CM

## 2023-11-13 LAB
ANION GAP SERPL CALC-SCNC: 11 MMOL/L — SIGNIFICANT CHANGE UP (ref 7–14)
ANION GAP SERPL CALC-SCNC: 11 MMOL/L — SIGNIFICANT CHANGE UP (ref 7–14)
BUN SERPL-MCNC: 20 MG/DL — SIGNIFICANT CHANGE UP (ref 7–23)
BUN SERPL-MCNC: 20 MG/DL — SIGNIFICANT CHANGE UP (ref 7–23)
CALCIUM SERPL-MCNC: 8.9 MG/DL — SIGNIFICANT CHANGE UP (ref 8.4–10.5)
CALCIUM SERPL-MCNC: 8.9 MG/DL — SIGNIFICANT CHANGE UP (ref 8.4–10.5)
CHLORIDE SERPL-SCNC: 94 MMOL/L — LOW (ref 98–107)
CHLORIDE SERPL-SCNC: 94 MMOL/L — LOW (ref 98–107)
CO2 SERPL-SCNC: 29 MMOL/L — SIGNIFICANT CHANGE UP (ref 22–31)
CO2 SERPL-SCNC: 29 MMOL/L — SIGNIFICANT CHANGE UP (ref 22–31)
CREAT SERPL-MCNC: 0.98 MG/DL — SIGNIFICANT CHANGE UP (ref 0.5–1.3)
CREAT SERPL-MCNC: 0.98 MG/DL — SIGNIFICANT CHANGE UP (ref 0.5–1.3)
EGFR: 58 ML/MIN/1.73M2 — LOW
EGFR: 58 ML/MIN/1.73M2 — LOW
GLUCOSE SERPL-MCNC: 125 MG/DL — HIGH (ref 70–99)
GLUCOSE SERPL-MCNC: 125 MG/DL — HIGH (ref 70–99)
HCT VFR BLD CALC: 31.6 % — LOW (ref 34.5–45)
HCT VFR BLD CALC: 31.6 % — LOW (ref 34.5–45)
HGB BLD-MCNC: 10.1 G/DL — LOW (ref 11.5–15.5)
HGB BLD-MCNC: 10.1 G/DL — LOW (ref 11.5–15.5)
MAGNESIUM SERPL-MCNC: 2 MG/DL — SIGNIFICANT CHANGE UP (ref 1.6–2.6)
MAGNESIUM SERPL-MCNC: 2 MG/DL — SIGNIFICANT CHANGE UP (ref 1.6–2.6)
MCHC RBC-ENTMCNC: 30.5 PG — SIGNIFICANT CHANGE UP (ref 27–34)
MCHC RBC-ENTMCNC: 30.5 PG — SIGNIFICANT CHANGE UP (ref 27–34)
MCHC RBC-ENTMCNC: 32 GM/DL — SIGNIFICANT CHANGE UP (ref 32–36)
MCHC RBC-ENTMCNC: 32 GM/DL — SIGNIFICANT CHANGE UP (ref 32–36)
MCV RBC AUTO: 95.5 FL — SIGNIFICANT CHANGE UP (ref 80–100)
MCV RBC AUTO: 95.5 FL — SIGNIFICANT CHANGE UP (ref 80–100)
NRBC # BLD: 0 /100 WBCS — SIGNIFICANT CHANGE UP (ref 0–0)
NRBC # BLD: 0 /100 WBCS — SIGNIFICANT CHANGE UP (ref 0–0)
NRBC # FLD: 0 K/UL — SIGNIFICANT CHANGE UP (ref 0–0)
NRBC # FLD: 0 K/UL — SIGNIFICANT CHANGE UP (ref 0–0)
PHOSPHATE SERPL-MCNC: 3 MG/DL — SIGNIFICANT CHANGE UP (ref 2.5–4.5)
PHOSPHATE SERPL-MCNC: 3 MG/DL — SIGNIFICANT CHANGE UP (ref 2.5–4.5)
PLATELET # BLD AUTO: 268 K/UL — SIGNIFICANT CHANGE UP (ref 150–400)
PLATELET # BLD AUTO: 268 K/UL — SIGNIFICANT CHANGE UP (ref 150–400)
POTASSIUM SERPL-MCNC: 4 MMOL/L — SIGNIFICANT CHANGE UP (ref 3.5–5.3)
POTASSIUM SERPL-MCNC: 4 MMOL/L — SIGNIFICANT CHANGE UP (ref 3.5–5.3)
POTASSIUM SERPL-SCNC: 4 MMOL/L — SIGNIFICANT CHANGE UP (ref 3.5–5.3)
POTASSIUM SERPL-SCNC: 4 MMOL/L — SIGNIFICANT CHANGE UP (ref 3.5–5.3)
RBC # BLD: 3.31 M/UL — LOW (ref 3.8–5.2)
RBC # BLD: 3.31 M/UL — LOW (ref 3.8–5.2)
RBC # FLD: 17 % — HIGH (ref 10.3–14.5)
RBC # FLD: 17 % — HIGH (ref 10.3–14.5)
SODIUM SERPL-SCNC: 134 MMOL/L — LOW (ref 135–145)
SODIUM SERPL-SCNC: 134 MMOL/L — LOW (ref 135–145)
WBC # BLD: 9.6 K/UL — SIGNIFICANT CHANGE UP (ref 3.8–10.5)
WBC # BLD: 9.6 K/UL — SIGNIFICANT CHANGE UP (ref 3.8–10.5)
WBC # FLD AUTO: 9.6 K/UL — SIGNIFICANT CHANGE UP (ref 3.8–10.5)
WBC # FLD AUTO: 9.6 K/UL — SIGNIFICANT CHANGE UP (ref 3.8–10.5)

## 2023-11-13 PROCEDURE — 85027 COMPLETE CBC AUTOMATED: CPT

## 2023-11-13 PROCEDURE — 86900 BLOOD TYPING SEROLOGIC ABO: CPT

## 2023-11-13 PROCEDURE — 87070 CULTURE OTHR SPECIMN AEROBIC: CPT

## 2023-11-13 PROCEDURE — 86901 BLOOD TYPING SEROLOGIC RH(D): CPT

## 2023-11-13 PROCEDURE — 85652 RBC SED RATE AUTOMATED: CPT

## 2023-11-13 PROCEDURE — 97535 SELF CARE MNGMENT TRAINING: CPT

## 2023-11-13 PROCEDURE — 88311 DECALCIFY TISSUE: CPT

## 2023-11-13 PROCEDURE — 84132 ASSAY OF SERUM POTASSIUM: CPT

## 2023-11-13 PROCEDURE — P9100: CPT

## 2023-11-13 PROCEDURE — C1769: CPT

## 2023-11-13 PROCEDURE — P9037: CPT

## 2023-11-13 PROCEDURE — P9016: CPT

## 2023-11-13 PROCEDURE — 97110 THERAPEUTIC EXERCISES: CPT

## 2023-11-13 PROCEDURE — 97162 PT EVAL MOD COMPLEX 30 MIN: CPT

## 2023-11-13 PROCEDURE — 80048 BASIC METABOLIC PNL TOTAL CA: CPT

## 2023-11-13 PROCEDURE — 85025 COMPLETE CBC W/AUTO DIFF WBC: CPT

## 2023-11-13 PROCEDURE — 94002 VENT MGMT INPAT INIT DAY: CPT

## 2023-11-13 PROCEDURE — 36430 TRANSFUSION BLD/BLD COMPNT: CPT

## 2023-11-13 PROCEDURE — 73630 X-RAY EXAM OF FOOT: CPT

## 2023-11-13 PROCEDURE — 82565 ASSAY OF CREATININE: CPT

## 2023-11-13 PROCEDURE — 82962 GLUCOSE BLOOD TEST: CPT

## 2023-11-13 PROCEDURE — 93005 ELECTROCARDIOGRAM TRACING: CPT

## 2023-11-13 PROCEDURE — C1768: CPT

## 2023-11-13 PROCEDURE — 85014 HEMATOCRIT: CPT

## 2023-11-13 PROCEDURE — 84100 ASSAY OF PHOSPHORUS: CPT

## 2023-11-13 PROCEDURE — 83735 ASSAY OF MAGNESIUM: CPT

## 2023-11-13 PROCEDURE — 80202 ASSAY OF VANCOMYCIN: CPT

## 2023-11-13 PROCEDURE — 86923 COMPATIBILITY TEST ELECTRIC: CPT

## 2023-11-13 PROCEDURE — 83605 ASSAY OF LACTIC ACID: CPT

## 2023-11-13 PROCEDURE — 90662 IIV NO PRSV INCREASED AG IM: CPT

## 2023-11-13 PROCEDURE — 82435 ASSAY OF BLOOD CHLORIDE: CPT

## 2023-11-13 PROCEDURE — 97116 GAIT TRAINING THERAPY: CPT

## 2023-11-13 PROCEDURE — 99233 SBSQ HOSP IP/OBS HIGH 50: CPT

## 2023-11-13 PROCEDURE — 86140 C-REACTIVE PROTEIN: CPT

## 2023-11-13 PROCEDURE — 85610 PROTHROMBIN TIME: CPT

## 2023-11-13 PROCEDURE — 97530 THERAPEUTIC ACTIVITIES: CPT

## 2023-11-13 PROCEDURE — C1889: CPT

## 2023-11-13 PROCEDURE — 83036 HEMOGLOBIN GLYCOSYLATED A1C: CPT

## 2023-11-13 PROCEDURE — P9059: CPT

## 2023-11-13 PROCEDURE — 82803 BLOOD GASES ANY COMBINATION: CPT

## 2023-11-13 PROCEDURE — 85730 THROMBOPLASTIN TIME PARTIAL: CPT

## 2023-11-13 PROCEDURE — 93970 EXTREMITY STUDY: CPT

## 2023-11-13 PROCEDURE — 99232 SBSQ HOSP IP/OBS MODERATE 35: CPT

## 2023-11-13 PROCEDURE — 76000 FLUOROSCOPY <1 HR PHYS/QHP: CPT

## 2023-11-13 PROCEDURE — 84295 ASSAY OF SERUM SODIUM: CPT

## 2023-11-13 PROCEDURE — 97166 OT EVAL MOD COMPLEX 45 MIN: CPT

## 2023-11-13 PROCEDURE — 80053 COMPREHEN METABOLIC PANEL: CPT

## 2023-11-13 PROCEDURE — 85018 HEMOGLOBIN: CPT

## 2023-11-13 PROCEDURE — 87075 CULTR BACTERIA EXCEPT BLOOD: CPT

## 2023-11-13 PROCEDURE — 86850 RBC ANTIBODY SCREEN: CPT

## 2023-11-13 PROCEDURE — 36415 COLL VENOUS BLD VENIPUNCTURE: CPT

## 2023-11-13 PROCEDURE — 71045 X-RAY EXAM CHEST 1 VIEW: CPT

## 2023-11-13 PROCEDURE — 82330 ASSAY OF CALCIUM: CPT

## 2023-11-13 PROCEDURE — 88305 TISSUE EXAM BY PATHOLOGIST: CPT

## 2023-11-13 PROCEDURE — 82947 ASSAY GLUCOSE BLOOD QUANT: CPT

## 2023-11-13 RX ORDER — BUMETANIDE 0.25 MG/ML
2 INJECTION INTRAMUSCULAR; INTRAVENOUS ONCE
Refills: 0 | Status: DISCONTINUED | OUTPATIENT
Start: 2023-11-13 | End: 2023-11-15

## 2023-11-13 RX ADMIN — ENOXAPARIN SODIUM 55 MILLIGRAM(S): 100 INJECTION SUBCUTANEOUS at 18:38

## 2023-11-13 RX ADMIN — PANTOPRAZOLE SODIUM 40 MILLIGRAM(S): 20 TABLET, DELAYED RELEASE ORAL at 08:39

## 2023-11-13 RX ADMIN — Medication 1 APPLICATION(S): at 10:05

## 2023-11-13 RX ADMIN — Medication 1 APPLICATION(S): at 11:44

## 2023-11-13 RX ADMIN — Medication 81 MILLIGRAM(S): at 11:44

## 2023-11-13 RX ADMIN — AMIODARONE HYDROCHLORIDE 100 MILLIGRAM(S): 400 TABLET ORAL at 05:00

## 2023-11-13 RX ADMIN — NYSTATIN CREAM 1 APPLICATION(S): 100000 CREAM TOPICAL at 18:37

## 2023-11-13 RX ADMIN — Medication 25 MILLIGRAM(S): at 05:03

## 2023-11-13 RX ADMIN — NYSTATIN CREAM 1 APPLICATION(S): 100000 CREAM TOPICAL at 05:03

## 2023-11-13 RX ADMIN — Medication 1 APPLICATION(S): at 05:02

## 2023-11-13 RX ADMIN — Medication 50 MICROGRAM(S): at 05:03

## 2023-11-13 RX ADMIN — ENOXAPARIN SODIUM 55 MILLIGRAM(S): 100 INJECTION SUBCUTANEOUS at 05:03

## 2023-11-13 RX ADMIN — Medication 40 MILLIGRAM(S): at 05:03

## 2023-11-13 RX ADMIN — Medication 1 APPLICATION(S): at 18:37

## 2023-11-13 RX ADMIN — SPIRONOLACTONE 25 MILLIGRAM(S): 25 TABLET, FILM COATED ORAL at 05:03

## 2023-11-13 NOTE — PROGRESS NOTE ADULT - SUBJECTIVE AND OBJECTIVE BOX
Overnight Events: NAEO    Review Of Systems: No chest pain, shortness of breath, or palpitations            Current Meds:  acetaminophen     Tablet .. 650 milliGRAM(s) Oral every 6 hours PRN  aMIOdarone    Tablet 100 milliGRAM(s) Oral daily  aspirin enteric coated 81 milliGRAM(s) Oral daily  atorvastatin 80 milliGRAM(s) Oral at bedtime  collagenase Ointment 1 Application(s) Topical daily  Dakins Solution - 1/4 Strength 1 Application(s) Topical two times a day  enoxaparin Injectable 55 milliGRAM(s) SubCutaneous every 12 hours  furosemide    Tablet 40 milliGRAM(s) Oral daily  hydrocortisone 1% Cream 1 Application(s) Topical daily  levothyroxine 50 MICROGram(s) Oral daily  metoprolol succinate ER 25 milliGRAM(s) Oral daily  nystatin Powder 1 Application(s) Topical two times a day  pantoprazole    Tablet 40 milliGRAM(s) Oral before breakfast  spironolactone 25 milliGRAM(s) Oral daily      Vitals:  T(F): 98.6 (11-13), Max: 98.7 (11-12)  HR: 70 (11-13) (70 - 93)  BP: 120/60 (11-13) (106/58 - 133/83)  RR: 18 (11-13)  SpO2: 95% (11-13)  I&O's Summary    12 Nov 2023 07:01  -  13 Nov 2023 07:00  --------------------------------------------------------  IN: 360 mL / OUT: 600 mL / NET: -240 mL    13 Nov 2023 07:01  -  13 Nov 2023 11:50  --------------------------------------------------------  IN: 0 mL / OUT: 200 mL / NET: -200 mL        Physical Exam:  GEN: comfortable appearing, lying in bed in NAD  HEENT: NCAT, MMM  CV: Regular S1, S2, no m/r/g  RESP: bibasilar crackles  ABD: Soft, NTND, +BS  EXT: 2+ bl pitting LE edema, WWP, pulses palpable throughout  NEURO: No focal deficits, AOx3  SKIN:  No rashes                          10.1   9.60  )-----------( 268      ( 13 Nov 2023 07:19 )             31.6     11-13    134<L>  |  94<L>  |  20  ----------------------------<  125<H>  4.0   |  29  |  0.98    Ca    8.9      13 Nov 2023 07:19  Phos  3.0     11-13  Mg     2.00     11-13

## 2023-11-13 NOTE — PROGRESS NOTE ADULT - ASSESSMENT
83F anxiety, hypothyroidism, HTN, HLD, CAD, PAF on Eliquis, severe AS pending TAVR, R internal carotid stenosis, PAD and chronic L toe wounds s/p L ilioprofunda bypass with reverse GSV graft to peroneal 8/31/23 (Dr. Tavares) c/b large L groin abscess (17cm) requiring removal of infected bypass graft of lower extremity and groin washout on 9/24/23.  Pt has been experiencing increased pain at the LLE and now concerning for limb ischemia s/p angiogram that shows occluded external illiac. now plan for AKA.

## 2023-11-13 NOTE — PROGRESS NOTE ADULT - SUBJECTIVE AND OBJECTIVE BOX
Subjective:  No acute overnight events.  Patient seen and examined at bedside with surgical team during morning rounds.      Physical Exam:  General Appearance: Appears well, NAD, A& O x 3. Sitting comfortably in chair  Chest: Nonlabored breathing on RA  CV: Hemodynamically stable  Extremities: Grossly symmetric, left foot dressing changed this morning C/D/I. Left leg wound vac holding suction  Vascular: RLE pitting edema, improved        T(C): 37 (11-13-23 @ 08:30), Max: 37.1 (11-12-23 @ 20:39)  HR: 70 (11-13-23 @ 08:30) (70 - 93)  BP: 120/60 (11-13-23 @ 08:30) (106/58 - 133/83)  RR: 18 (11-13-23 @ 08:30) (17 - 18)  SpO2: 95% (11-13-23 @ 08:30) (91% - 100%)      11-12-23 @ 07:01  -  11-13-23 @ 07:00  --------------------------------------------------------  IN: 360 mL / OUT: 600 mL / NET: -240 mL    11-13-23 @ 07:01  -  11-13-23 @ 09:40  --------------------------------------------------------  IN: 0 mL / OUT: 200 mL / NET: -200 mL        LABS:  cret                        10.1   9.60  )-----------( 268      ( 13 Nov 2023 07:19 )             31.6     11-13    134<L>  |  94<L>  |  20  ----------------------------<  125<H>  4.0   |  29  |  0.98    Ca    8.9      13 Nov 2023 07:19  Phos  3.0     11-13  Mg     2.00     11-13            acetaminophen     Tablet .. 650 milliGRAM(s) Oral every 6 hours PRN  aMIOdarone    Tablet 100 milliGRAM(s) Oral daily  aspirin enteric coated 81 milliGRAM(s) Oral daily  atorvastatin 80 milliGRAM(s) Oral at bedtime  collagenase Ointment 1 Application(s) Topical daily  Dakins Solution - 1/4 Strength 1 Application(s) Topical two times a day  enoxaparin Injectable 55 milliGRAM(s) SubCutaneous every 12 hours  furosemide    Tablet 40 milliGRAM(s) Oral daily  hydrocortisone 1% Cream 1 Application(s) Topical daily  levothyroxine 50 MICROGram(s) Oral daily  metoprolol succinate ER 25 milliGRAM(s) Oral daily  nystatin Powder 1 Application(s) Topical two times a day  pantoprazole    Tablet 40 milliGRAM(s) Oral before breakfast  spironolactone 25 milliGRAM(s) Oral daily

## 2023-11-13 NOTE — PROGRESS NOTE ADULT - ASSESSMENT
83F with PMHx of CAD, pAF on Eliquis, severe AS pending TAVR, R internal carotid stenosis, HTN, HLD, hypothyroidism, anxiety, PAD and chronic L toe wounds s/p L ilioprofunda bypass with reverse GSV graft to peroneal 8/31/23 (Dr. Tavares) c/b large L groin abscess (17cm) requiring removal of infected bypass graft of lower extremity and groin washout on 9/24/23. Wound care involved for R groin wound and R calf incision wound. Patient represented from rehab concerns for "cold foot". Pt is poor historian, but she says the wounds have been worse over the 2 days prior to presentation. Remains afebrile and hemodynamically stable s/p LLE diagnostic angiogram on 11/3. Given ischemic foot and external iliac occlusion, patient requires AKA. Planning for Tuesday.    PLAN:  - Diet: Regular  - Cardiology consulted, appreciate recs - cleared if patient able to lie flat per notes  - holding valsartan  - Med consulted, appreciate recs - no further medical optimization needed  - Wound care following - vac changes M/W/F  - AKA planning discussed with patient and healthcare proxy, possibly Tuesday  - Psychiatry consulted - patient has capacity  - Dispo: pending    Vascular Surgery  [id number]

## 2023-11-13 NOTE — PROGRESS NOTE ADULT - SUBJECTIVE AND OBJECTIVE BOX
Patient is a 83y old  Female who presents with a chief complaint of Ruther 5 CLTI left toe wounds (12 Nov 2023 13:27)       INTERVAL HPI/OVERNIGHT EVENTS:  Patient seen and evaluated at bedside.  Pt is resting comfortable in NAD. Denies N/V/F/C.    Allergies    latex (Unknown)  sulfa drugs (Unknown)    Intolerances        Vital Signs Last 24 Hrs  T(C): 37 (13 Nov 2023 08:30), Max: 37.1 (12 Nov 2023 20:39)  T(F): 98.6 (13 Nov 2023 08:30), Max: 98.7 (12 Nov 2023 20:39)  HR: 70 (13 Nov 2023 08:30) (70 - 93)  BP: 120/60 (13 Nov 2023 08:30) (106/58 - 133/83)  BP(mean): --  RR: 18 (13 Nov 2023 08:30) (17 - 18)  SpO2: 95% (13 Nov 2023 08:30) (91% - 100%)    Parameters below as of 13 Nov 2023 08:30  Patient On (Oxygen Delivery Method): room air        LABS:                        10.1   9.60  )-----------( 268      ( 13 Nov 2023 07:19 )             31.6     11-13    134<L>  |  94<L>  |  20  ----------------------------<  125<H>  4.0   |  29  |  0.98    Ca    8.9      13 Nov 2023 07:19  Phos  3.0     11-13  Mg     2.00     11-13        Urinalysis Basic - ( 13 Nov 2023 07:19 )    Color: x / Appearance: x / SG: x / pH: x  Gluc: 125 mg/dL / Ketone: x  / Bili: x / Urobili: x   Blood: x / Protein: x / Nitrite: x   Leuk Esterase: x / RBC: x / WBC x   Sq Epi: x / Non Sq Epi: x / Bacteria: x      CAPILLARY BLOOD GLUCOSE          Lower Extremity Physical Exam:  Vascular: DP/PT 0/4, B/L, CFT >3 seconds B/L, Temperature gradient cool to cool , B/L.   Neuro: Epicritic sensation intact to the level of digits, B/L.  Musculoskeletal/Ortho: 9/6/23 s/p left foot partial 1 digit amputation.  Skin: Left foot distal hallux dehiscence, cool flaps, distal 2nd digit wound to dermis, 4th distal lateral digit eschar intact, early ischemic changes to digits 1-5 to MPJs 1-5, interdigital maceration to interspaces 1-4, digits 2-5 nailbed eschars, no acute signs of infection.    RADIOLOGY & ADDITIONAL TESTS:

## 2023-11-13 NOTE — PROGRESS NOTE ADULT - ASSESSMENT
New ECG(s): Personally reviewed    Echo: 9/26/23   1. Technically difficult image quality.   2. Left ventricular systolic function is severely decreased with an ejection fraction visually estimated at 20 to 25 %. There is global left ventricular hypokinesis. No evidence of a thrombus in the left ventricle.   3. There is mild (grade 1) left ventricular diastolic dysfunction.   4. Right ventricular cavity is mildly enlarged in size and reduced systolic function.   5. Mild mitral regurgitation.   6. Aortic valve anatomy cannot be determined with reduced systolic excursion. severe calcification of the aortic valve leaflets. severe aortic stenosis.   7. Severe aortic stenosis (estimated aortic valve area ~ 0.5 cm2 (by the continuity equation).   8. Mild aortic regurgitation.   9. The left atrium is mildly dilated in size.  10. Left pleural effusion noted.  11. No prior echocardiogram is available for comparison.    Cath:  5/2023  Moderate atherosclerosis in LM and LAD.  of dRCA with robust collateral from LAD.  Imaging:    Interpretation of Telemetry: Sinus @ 90s    83F with PMHx of CAD, HFrEF (25%), pAF on Eliquis, severe AS pending TAVR, R internal carotid stenosis, HTN, HLD, hypothyroidism, anxiety, PAD and chronic L toe wounds s/p L ilioprofunda bypass with reverse GSV graft to peroneal 8/31/23 (Dr. Tavares) c/b large L groin abscess (17cm) requiring removal of infected bypass graft of lower extremity and groin washout on 9/24/23. Admitted for vascular evaluation of LE now s/p angiogram with plan for likely AKA. Continues to appear grossly overloaded on exam, however hypotensive on 11/9 likely in setting of this afternoon possibly due to aggressive diuresis in setting of preload-dependent severe AS. Still appears grossly overloaded, however may not tolerate aggressive diuresis to complete euvolemia in setting of known AS.     #Acute on Chronic HFrEF (25%) Exacerbation  - give bumex 2mg IV x1 today if not hypotensive for likely pulmonary edema  - postoperatively would restart valsartan vs transition to Entresto 24-26mg BID  - cont metoprolol succinate 25mg daily  - consider MRA and SGLT2 inhibitors as outpatient.    #Severe AS  - currently Asymptomatic while she is on bed.  - pending TAVR per documentation  - monitor on tele, monitor VS    # pAFib  - given [id number], patient will benefit from AC. Given her age of 83 and weight below 60kg, patient is reasonable to be on reduced dose of NOAC: eliquis 2.5mg BID.   - hold AC as needed by primary team/surgery.  - continue home amiodarone  - continue BB as above    #Pre-Operative Risk Assessment  Cardiovascular Risk Stratification - RCRI =   (0 predictors = 0.4%, 1 predictor = 0.9%, 2 predictors = 6.6%, =3 predictors = >11%)    History of ischemic heart disease: Y  History of congestive heart failure: Y  History of cerebrovascular disease (stroke or transient ischemic attack): N  History of diabetes requiring preoperative insulin use: N  Chronic kidney disease (creatinine > 2 mg/dL): N  Undergoing suprainguinal vascular, intraperitoneal, or intrathoracic surgery: N    Overall this patient is as 6.6% risk for cardiac death, nonfatal myocardial infarction, and nonfatal cardiac arrest perioperatively for this moderate risk above knee amputation. This likely underestimates patient risk for procedure given known severe aortic stenosis. If patient can lie flat, can proceed to procedure without further cardiac testing, likely as optimized as possible from CHF perspective prior to operation given intolerance of diuresis.     All recommendations pending attending attestation. We will sign off, please re-consult with any concerns    Tabby Zhang MD  PGY-4, Cardiology  Available on TEAMS    For all new consults  [email or web address]  Login: [facility]

## 2023-11-13 NOTE — PROGRESS NOTE ADULT - SUBJECTIVE AND OBJECTIVE BOX
Patient is a 83y old  Female who presents with a chief complaint of Ruther 5 CLTI left toe wounds (13 Nov 2023 11:46)    SUBJECTIVE / OVERNIGHT EVENTS:    sitting in chair, states no CP, SOB, f/c/n/v  reports her R leg still swollen, states when they give too much diuretic her BP goes low  does not eat much, "I am not doing anything here"  states her surgery is tomorrow     MEDICATIONS  (STANDING):  aMIOdarone    Tablet 100 milliGRAM(s) Oral daily  aspirin enteric coated 81 milliGRAM(s) Oral daily  atorvastatin 80 milliGRAM(s) Oral at bedtime  buMETAnide Injectable 2 milliGRAM(s) IV Push once  collagenase Ointment 1 Application(s) Topical daily  Dakins Solution - 1/4 Strength 1 Application(s) Topical two times a day  enoxaparin Injectable 55 milliGRAM(s) SubCutaneous every 12 hours  furosemide    Tablet 40 milliGRAM(s) Oral daily  hydrocortisone 1% Cream 1 Application(s) Topical daily  levothyroxine 50 MICROGram(s) Oral daily  metoprolol succinate ER 25 milliGRAM(s) Oral daily  nystatin Powder 1 Application(s) Topical two times a day  pantoprazole    Tablet 40 milliGRAM(s) Oral before breakfast  spironolactone 25 milliGRAM(s) Oral daily    MEDICATIONS  (PRN):  acetaminophen     Tablet .. 650 milliGRAM(s) Oral every 6 hours PRN Temp greater or equal to 38C (100.4F), Mild Pain (1 - 3)    T(C): 36.6 (11-13-23 @ 12:31), Max: 37.1 (11-12-23 @ 20:39)  HR: 72 (11-13-23 @ 12:31) (70 - 93)  BP: 100/41 (11-13-23 @ 12:31) (100/41 - 133/83)  RR: 18 (11-13-23 @ 12:31) (17 - 18)  SpO2: 96% (11-13-23 @ 12:31) (91% - 100%)    I&O's Summary    12 Nov 2023 07:01  -  13 Nov 2023 07:00  --------------------------------------------------------  IN: 360 mL / OUT: 600 mL / NET: -240 mL    13 Nov 2023 07:01  -  13 Nov 2023 14:41  --------------------------------------------------------  IN: 0 mL / OUT: 200 mL / NET: -200 mL    PHYSICAL EXAM:  GENERAL: NAD   CHEST/LUNG: Clear to auscultation bilaterally; No wheeze  HEART: Regular rate and rhythm; No murmurs, rubs, or gallops  ABDOMEN: Soft, Nontender, Nondistended; Bowel sounds present  EXTREMITIES:   R>L edema, L cooler  PSYCH: AAOx3  NEUROLOGY: non-focal     LABS:                        10.1   9.60  )-----------( 268      ( 13 Nov 2023 07:19 )             31.6     11-13    134<L>  |  94<L>  |  20  ----------------------------<  125<H>  4.0   |  29  |  0.98    Ca    8.9      13 Nov 2023 07:19  Phos  3.0     11-13  Mg     2.00     11-13    Care Discussed with Consultants/Other Providers:

## 2023-11-13 NOTE — PROGRESS NOTE ADULT - PROBLEM SELECTOR PLAN 1
- s/p left foot partial hallux amputation 9/6, now admitted w/ left foot partial hallux amputation dehiscence and forefoot ischemic changes  - on asa 81 mg and atorvastatin 80 mg qhs  - L foot xray: no OM, no gas, no fracture   - L foot wound growing VRE and staph epi; ID appreciated wound cx likely colonization   - s/p antibiotics as per primary ( zosyn 10/27- 11/3)  - noted R>L asymmetric swelling prior US 8/23 was neg for DVT but with superficial thrombosis of the lesser saphenous vein in both calves. Diameters of the right and left greater and lesser saphenous veins.; 10/31 LE duplex neg for DVT  -  s/p angiogram 11/3  external iliac occlusion; no objection to left AKA if optimized from cardiology standpoint

## 2023-11-13 NOTE — PROGRESS NOTE ADULT - PROBLEM SELECTOR PLAN 3
on Eliquis for paroxysmal a fib  - c/w Toprol and Amiodarone  - Eliquis on hold and currently on Lovenox

## 2023-11-13 NOTE — PROGRESS NOTE ADULT - ASSESSMENT
83F s/p left foot partial hallux amputation dehiscence and forefoot ischemic changes (DOS 9/6).  - Patient seen and evaluated.  - Afebrile, no leukocytosis.  - Left foot distal hallux dehiscence, cool flaps, distal 2nd digit wound to dermis, 4th distal lateral digit eschar intact, early ischemic changes to digits 1-5 to MPJs 1-5, interdigital maceration to interspaces 1-4, digits 2-5 nailbed eschars, no acute signs of infection.  - Left foot xray: no OM, no gas, no fracture.   - Vasc recs s/p LLE diagnostic angiogram on 11/3. Given ischemic foot and external iliac occlusion, likely LLE AKA tomorrow, appreciated.   - Left foot wound culture: VRE, Staph epi.   - Pod Plan: Local wound care pending LLE AKA.  - Followup information listed in discharge provider note.  - Discussed with attending.

## 2023-11-13 NOTE — PROGRESS NOTE ADULT - PROBLEM SELECTOR PLAN 2
TTE 9/23 with EF of 20-25% and global left ventricular hypokinesis.  - c/w Toprol 25mg daily    - Valsartan 40mg held due to hypotension   - Monitor I/O's, daily weights  - repeat TTE unchanged EF   - c/w lasix 40 qd, per cards 2 mg IV bumex today  - c/w aldactone 25 qd  - f/u cards recs

## 2023-11-14 ENCOUNTER — RESULT REVIEW (OUTPATIENT)
Age: 83
End: 2023-11-14

## 2023-11-14 LAB
ANION GAP SERPL CALC-SCNC: 9 MMOL/L — SIGNIFICANT CHANGE UP (ref 7–14)
ANION GAP SERPL CALC-SCNC: 9 MMOL/L — SIGNIFICANT CHANGE UP (ref 7–14)
APTT BLD: 29.9 SEC — SIGNIFICANT CHANGE UP (ref 24.5–35.6)
APTT BLD: 29.9 SEC — SIGNIFICANT CHANGE UP (ref 24.5–35.6)
BLD GP AB SCN SERPL QL: NEGATIVE — SIGNIFICANT CHANGE UP
BLD GP AB SCN SERPL QL: NEGATIVE — SIGNIFICANT CHANGE UP
BUN SERPL-MCNC: 21 MG/DL — SIGNIFICANT CHANGE UP (ref 7–23)
BUN SERPL-MCNC: 21 MG/DL — SIGNIFICANT CHANGE UP (ref 7–23)
CALCIUM SERPL-MCNC: 8.7 MG/DL — SIGNIFICANT CHANGE UP (ref 8.4–10.5)
CALCIUM SERPL-MCNC: 8.7 MG/DL — SIGNIFICANT CHANGE UP (ref 8.4–10.5)
CHLORIDE SERPL-SCNC: 95 MMOL/L — LOW (ref 98–107)
CHLORIDE SERPL-SCNC: 95 MMOL/L — LOW (ref 98–107)
CO2 SERPL-SCNC: 31 MMOL/L — SIGNIFICANT CHANGE UP (ref 22–31)
CO2 SERPL-SCNC: 31 MMOL/L — SIGNIFICANT CHANGE UP (ref 22–31)
CREAT SERPL-MCNC: 1.07 MG/DL — SIGNIFICANT CHANGE UP (ref 0.5–1.3)
CREAT SERPL-MCNC: 1.07 MG/DL — SIGNIFICANT CHANGE UP (ref 0.5–1.3)
EGFR: 52 ML/MIN/1.73M2 — LOW
EGFR: 52 ML/MIN/1.73M2 — LOW
GLUCOSE SERPL-MCNC: 129 MG/DL — HIGH (ref 70–99)
GLUCOSE SERPL-MCNC: 129 MG/DL — HIGH (ref 70–99)
HCT VFR BLD CALC: 30.8 % — LOW (ref 34.5–45)
HCT VFR BLD CALC: 30.8 % — LOW (ref 34.5–45)
HGB BLD-MCNC: 9.9 G/DL — LOW (ref 11.5–15.5)
HGB BLD-MCNC: 9.9 G/DL — LOW (ref 11.5–15.5)
INR BLD: 1.05 RATIO — SIGNIFICANT CHANGE UP (ref 0.85–1.18)
INR BLD: 1.05 RATIO — SIGNIFICANT CHANGE UP (ref 0.85–1.18)
MAGNESIUM SERPL-MCNC: 1.9 MG/DL — SIGNIFICANT CHANGE UP (ref 1.6–2.6)
MAGNESIUM SERPL-MCNC: 1.9 MG/DL — SIGNIFICANT CHANGE UP (ref 1.6–2.6)
MCHC RBC-ENTMCNC: 30.8 PG — SIGNIFICANT CHANGE UP (ref 27–34)
MCHC RBC-ENTMCNC: 30.8 PG — SIGNIFICANT CHANGE UP (ref 27–34)
MCHC RBC-ENTMCNC: 32.1 GM/DL — SIGNIFICANT CHANGE UP (ref 32–36)
MCHC RBC-ENTMCNC: 32.1 GM/DL — SIGNIFICANT CHANGE UP (ref 32–36)
MCV RBC AUTO: 96 FL — SIGNIFICANT CHANGE UP (ref 80–100)
MCV RBC AUTO: 96 FL — SIGNIFICANT CHANGE UP (ref 80–100)
NRBC # BLD: 0 /100 WBCS — SIGNIFICANT CHANGE UP (ref 0–0)
NRBC # BLD: 0 /100 WBCS — SIGNIFICANT CHANGE UP (ref 0–0)
NRBC # FLD: 0 K/UL — SIGNIFICANT CHANGE UP (ref 0–0)
NRBC # FLD: 0 K/UL — SIGNIFICANT CHANGE UP (ref 0–0)
PHOSPHATE SERPL-MCNC: 2.5 MG/DL — SIGNIFICANT CHANGE UP (ref 2.5–4.5)
PHOSPHATE SERPL-MCNC: 2.5 MG/DL — SIGNIFICANT CHANGE UP (ref 2.5–4.5)
PLATELET # BLD AUTO: 206 K/UL — SIGNIFICANT CHANGE UP (ref 150–400)
PLATELET # BLD AUTO: 206 K/UL — SIGNIFICANT CHANGE UP (ref 150–400)
POTASSIUM SERPL-MCNC: 3.9 MMOL/L — SIGNIFICANT CHANGE UP (ref 3.5–5.3)
POTASSIUM SERPL-MCNC: 3.9 MMOL/L — SIGNIFICANT CHANGE UP (ref 3.5–5.3)
POTASSIUM SERPL-SCNC: 3.9 MMOL/L — SIGNIFICANT CHANGE UP (ref 3.5–5.3)
POTASSIUM SERPL-SCNC: 3.9 MMOL/L — SIGNIFICANT CHANGE UP (ref 3.5–5.3)
PROTHROM AB SERPL-ACNC: 11.9 SEC — SIGNIFICANT CHANGE UP (ref 9.5–13)
PROTHROM AB SERPL-ACNC: 11.9 SEC — SIGNIFICANT CHANGE UP (ref 9.5–13)
RBC # BLD: 3.21 M/UL — LOW (ref 3.8–5.2)
RBC # BLD: 3.21 M/UL — LOW (ref 3.8–5.2)
RBC # FLD: 17 % — HIGH (ref 10.3–14.5)
RBC # FLD: 17 % — HIGH (ref 10.3–14.5)
RH IG SCN BLD-IMP: POSITIVE — SIGNIFICANT CHANGE UP
RH IG SCN BLD-IMP: POSITIVE — SIGNIFICANT CHANGE UP
SODIUM SERPL-SCNC: 135 MMOL/L — SIGNIFICANT CHANGE UP (ref 135–145)
SODIUM SERPL-SCNC: 135 MMOL/L — SIGNIFICANT CHANGE UP (ref 135–145)
WBC # BLD: 8.55 K/UL — SIGNIFICANT CHANGE UP (ref 3.8–10.5)
WBC # BLD: 8.55 K/UL — SIGNIFICANT CHANGE UP (ref 3.8–10.5)
WBC # FLD AUTO: 8.55 K/UL — SIGNIFICANT CHANGE UP (ref 3.8–10.5)
WBC # FLD AUTO: 8.55 K/UL — SIGNIFICANT CHANGE UP (ref 3.8–10.5)

## 2023-11-14 PROCEDURE — 99233 SBSQ HOSP IP/OBS HIGH 50: CPT

## 2023-11-14 PROCEDURE — 27590 AMPUTATE LEG AT THIGH: CPT | Mod: LT,79

## 2023-11-14 PROCEDURE — 88311 DECALCIFY TISSUE: CPT | Mod: 26

## 2023-11-14 PROCEDURE — 88307 TISSUE EXAM BY PATHOLOGIST: CPT | Mod: 26

## 2023-11-14 DEVICE — KIT A-LINE 1LUM 20G X 12CM SAFE KIT: Type: IMPLANTABLE DEVICE | Site: LEFT | Status: FUNCTIONAL

## 2023-11-14 DEVICE — MICRO-INTRO 4F 0.018X40CM NITINOL TUNG TIP 7CM ECHOGENIC: Type: IMPLANTABLE DEVICE | Site: LEFT | Status: FUNCTIONAL

## 2023-11-14 DEVICE — LIGATING CLIPS WECK HORIZON LARGE (ORANGE) 24: Type: IMPLANTABLE DEVICE | Site: LEFT | Status: FUNCTIONAL

## 2023-11-14 DEVICE — LIGATING CLIPS WECK HORIZON MEDIUM (BLUE) 24: Type: IMPLANTABLE DEVICE | Site: LEFT | Status: FUNCTIONAL

## 2023-11-14 RX ORDER — SODIUM CHLORIDE 9 MG/ML
1000 INJECTION, SOLUTION INTRAVENOUS
Refills: 0 | Status: DISCONTINUED | OUTPATIENT
Start: 2023-11-14 | End: 2023-11-15

## 2023-11-14 RX ORDER — POTASSIUM PHOSPHATE, MONOBASIC POTASSIUM PHOSPHATE, DIBASIC 236; 224 MG/ML; MG/ML
15 INJECTION, SOLUTION INTRAVENOUS ONCE
Refills: 0 | Status: COMPLETED | OUTPATIENT
Start: 2023-11-14 | End: 2023-11-15

## 2023-11-14 RX ORDER — FENTANYL CITRATE 50 UG/ML
25 INJECTION INTRAVENOUS
Refills: 0 | Status: DISCONTINUED | OUTPATIENT
Start: 2023-11-14 | End: 2023-11-15

## 2023-11-14 RX ORDER — ONDANSETRON 8 MG/1
4 TABLET, FILM COATED ORAL ONCE
Refills: 0 | Status: DISCONTINUED | OUTPATIENT
Start: 2023-11-14 | End: 2023-11-14

## 2023-11-14 RX ORDER — HYDROMORPHONE HYDROCHLORIDE 2 MG/ML
0.25 INJECTION INTRAMUSCULAR; INTRAVENOUS; SUBCUTANEOUS
Refills: 0 | Status: DISCONTINUED | OUTPATIENT
Start: 2023-11-14 | End: 2023-11-14

## 2023-11-14 RX ORDER — ALBUMIN HUMAN 25 %
250 VIAL (ML) INTRAVENOUS ONCE
Refills: 0 | Status: COMPLETED | OUTPATIENT
Start: 2023-11-14 | End: 2023-11-14

## 2023-11-14 RX ORDER — PHENYLEPHRINE HYDROCHLORIDE 10 MG/ML
0.5 INJECTION INTRAVENOUS
Qty: 40 | Refills: 0 | Status: DISCONTINUED | OUTPATIENT
Start: 2023-11-14 | End: 2023-11-14

## 2023-11-14 RX ORDER — FENTANYL CITRATE 50 UG/ML
50 INJECTION INTRAVENOUS
Refills: 0 | Status: DISCONTINUED | OUTPATIENT
Start: 2023-11-14 | End: 2023-11-15

## 2023-11-14 RX ORDER — MAGNESIUM SULFATE 500 MG/ML
1 VIAL (ML) INJECTION ONCE
Refills: 0 | Status: COMPLETED | OUTPATIENT
Start: 2023-11-14 | End: 2023-11-15

## 2023-11-14 RX ADMIN — Medication 50 MICROGRAM(S): at 06:38

## 2023-11-14 RX ADMIN — Medication 1 APPLICATION(S): at 06:38

## 2023-11-14 RX ADMIN — PANTOPRAZOLE SODIUM 40 MILLIGRAM(S): 20 TABLET, DELAYED RELEASE ORAL at 06:38

## 2023-11-14 RX ADMIN — AMIODARONE HYDROCHLORIDE 100 MILLIGRAM(S): 400 TABLET ORAL at 06:40

## 2023-11-14 RX ADMIN — Medication 125 MILLILITER(S): at 15:53

## 2023-11-14 RX ADMIN — NYSTATIN CREAM 1 APPLICATION(S): 100000 CREAM TOPICAL at 06:37

## 2023-11-14 NOTE — PROGRESS NOTE ADULT - SUBJECTIVE AND OBJECTIVE BOX
Overnight Events: NAEO    Review Of Systems: No chest pain, shortness of breath, or palpitations            Current Meds:  acetaminophen     Tablet .. 650 milliGRAM(s) Oral every 6 hours PRN  aMIOdarone    Tablet 100 milliGRAM(s) Oral daily  aspirin enteric coated 81 milliGRAM(s) Oral daily  atorvastatin 80 milliGRAM(s) Oral at bedtime  buMETAnide Injectable 2 milliGRAM(s) IV Push once  collagenase Ointment 1 Application(s) Topical daily  Dakins Solution - 1/4 Strength 1 Application(s) Topical two times a day  dextrose 5% + sodium chloride 0.9% 1000 milliLiter(s) IV Continuous <Continuous>  furosemide    Tablet 40 milliGRAM(s) Oral daily  hydrocortisone 1% Cream 1 Application(s) Topical daily  levothyroxine 50 MICROGram(s) Oral daily  magnesium sulfate  IVPB 1 Gram(s) IV Intermittent once  metoprolol succinate ER 25 milliGRAM(s) Oral daily  nystatin Powder 1 Application(s) Topical two times a day  pantoprazole    Tablet 40 milliGRAM(s) Oral before breakfast  potassium phosphate IVPB 15 milliMole(s) IV Intermittent once  spironolactone 25 milliGRAM(s) Oral daily      Vitals:  T(F): 98.5 (11-14), Max: 98.5 (11-13)  HR: 75 (11-14) (72 - 85)  BP: 109/52 (11-14) (93/53 - 110/62)  RR: 17 (11-14)  SpO2: 100% (11-14)  I&O's Summary    13 Nov 2023 07:01  -  14 Nov 2023 07:00  --------------------------------------------------------  IN: 0 mL / OUT: 200 mL / NET: -200 mL        Physical Exam:  GEN: comfortable appearing, lying in bed in NAD  HEENT: NCAT, MMM  CV: Regular S1, S2, no m/r/g  RESP: CTAB  ABD: Soft, NTND, +BS  EXT: 2+ bl pitting LE edema, WWP, pulses palpable throughout  NEURO: No focal deficits, AOx3  SKIN:  No rashes                          9.9    8.55  )-----------( 206      ( 14 Nov 2023 05:11 )             30.8     11-14    135  |  95<L>  |  21  ----------------------------<  129<H>  3.9   |  31  |  1.07    Ca    8.7      14 Nov 2023 05:11  Phos  2.5     11-14  Mg     1.90     11-14      PT/INR - ( 14 Nov 2023 05:11 )   PT: 11.9 sec;   INR: 1.05 ratio         PTT - ( 14 Nov 2023 05:11 )  PTT:29.9 sec

## 2023-11-14 NOTE — CONSULT NOTE ADULT - SUBJECTIVE AND OBJECTIVE BOX
SICU Consultation Note  =====================================================    HPI on admit:  "83F with PMHx of CAD, pAF on Eliquis, severe AS pending TAVR, R internal carotid stenosis, HTN, HLD, hypothyroidism, anxiety, PAD and chronic L toe wounds s/p L ilioprofunda bypass with reverse GSV graft to peroneal 8/31/23 (Dr. Tavares) c/b large L groin abscess (17cm) requiring removal of infected bypass graft of lower extremity and groin washout on 9/24/23. Wound care involved for R groin wound and R calf incision wound. Pt states she has been getting daily wound care, and today the rehab team was concerned about how cold the foot felt, recommending she come into the hospital. Pt is poor historian, but she says the wounds have been worse over the last 2 days.  (27 Oct 2023 09:01)    s/p LLE diagnostic angiogram on 11/3 and taken back to OR for amputation given ischemic foot and external iliac occlusion. Now s/p Left AKA on 11/14.     SICU consulted post op for hemodynamic monitoring.       PAST MEDICAL & SURGICAL HISTORY:  HTN (hypertension)      HLD (hyperlipidemia)      Anxiety      CAD (coronary artery disease)      PAD (peripheral artery disease)      Hypothyroidism      AF (atrial fibrillation)      Carotid artery stenosis      AS (aortic stenosis)      Status post closed fracture of right femur      H/O arterial bypass of lower limb        Home Meds:   Allergies: latex (Unknown)  sulfa drugs (Unknown)    Soc:   Advanced Directives: Presumed Full Code     ROS:  unable to obtain 2/2 mental status following anesthesia    CURRENT MEDICATIONS:   --------------------------------------------------------------------------------------  Neurologic Medications  acetaminophen     Tablet .. 650 milliGRAM(s) Oral every 6 hours PRN Temp greater or equal to 38C (100.4F), Mild Pain (1 - 3)  fentaNYL    Injectable 50 MICROGram(s) IV Push every 10 minutes PRN Severe Pain (7 - 10)  fentaNYL    Injectable 25 MICROGram(s) IV Push every 10 minutes PRN Moderate Pain (4 - 6)  ondansetron Injectable 4 milliGRAM(s) IV Push once PRN Nausea and/or Vomiting    Respiratory Medications    Cardiovascular Medications  aMIOdarone    Tablet 100 milliGRAM(s) Oral daily  buMETAnide Injectable 2 milliGRAM(s) IV Push once  furosemide    Tablet 40 milliGRAM(s) Oral daily  metoprolol succinate ER 25 milliGRAM(s) Oral daily  phenylephrine    Infusion 0.5 MICROgram(s)/kG/Min IV Continuous <Continuous>  spironolactone 25 milliGRAM(s) Oral daily    Gastrointestinal Medications  dextrose 5% + sodium chloride 0.9% 1000 milliLiter(s) IV Continuous <Continuous>  magnesium sulfate  IVPB 1 Gram(s) IV Intermittent once  pantoprazole    Tablet 40 milliGRAM(s) Oral before breakfast  potassium phosphate IVPB 15 milliMole(s) IV Intermittent once    Genitourinary Medications    Hematologic/Oncologic Medications  aspirin enteric coated 81 milliGRAM(s) Oral daily    Antimicrobial/Immunologic Medications    Endocrine/Metabolic Medications  atorvastatin 80 milliGRAM(s) Oral at bedtime  levothyroxine 50 MICROGram(s) Oral daily    Topical/Other Medications  collagenase Ointment 1 Application(s) Topical daily  Dakins Solution - 1/4 Strength 1 Application(s) Topical two times a day  hydrocortisone 1% Cream 1 Application(s) Topical daily  nystatin Powder 1 Application(s) Topical two times a day    --------------------------------------------------------------------------------------    VITAL SIGNS, INS/OUTS (last 24 hours):  --------------------------------------------------------------------------------------  T(C): 36.4 (11-14-23 @ 18:00), Max: 36.9 (11-13-23 @ 20:06)  HR: 67 (11-14-23 @ 18:45) (65 - 89)  BP: 94/44 (11-14-23 @ 18:45) (89/34 - 136/67)  BP(mean): 56 (11-14-23 @ 18:45) (47 - 88)  ABP: 101/35 (11-14-23 @ 18:45) (97/41 - 121/44)  ABP(mean): 59 (11-14-23 @ 18:45) (58 - 74)  RR: 14 (11-14-23 @ 18:45) (13 - 21)  SpO2: 94% (11-14-23 @ 18:45) (92% - 100%)  Wt(kg): --  CVP(mm Hg): --  CI: --  CAPILLARY BLOOD GLUCOSE       N/A      11-13 @ 07:01  -  11-14 @ 07:00  --------------------------------------------------------  IN:  Total IN: 0 mL    OUT:    Voided (mL): 200 mL  Total OUT: 200 mL    Total NET: -200 mL      11-14 @ 07:01  -  11-14 @ 19:04  --------------------------------------------------------  IN:    dextrose 5% + sodium chloride 0.9% w/ Additives: 60 mL    IV PiggyBack: 250 mL  Total IN: 310 mL    OUT:    Indwelling Catheter - Urethral (mL): 80 mL    Phenylephrine: 0 mL  Total OUT: 80 mL    Total NET: 230 mL        --------------------------------------------------------------------------------------    EXAM:  General/Neuro  Exam: arousable  Respiratory  Exam: no increased work of breathing   Cardiovascular  Exam: S1, S2.  Regular rate and rhythm.  Peripheral edema  2+  GI  Exam: Abdomen soft, Non-tender, Non-distended.  Extremities  Exam: Extremities warm, pink, well-perfused.  Left AKA dressing clean, dry.  Derm:  Exam: 2+ edema, no skin breakdown.    :   Exam: Alas catheter in place.     --------------------------------------------------------------------------------------  LABS:                        9.9    8.55  )-----------( 206      ( 14 Nov 2023 05:11 )             30.8     11-14    135  |  95<L>  |  21  ----------------------------<  129<H>  3.9   |  31  |  1.07    Ca    8.7      14 Nov 2023 05:11  Phos  2.5     11-14  Mg     1.90     11-14      PT/INR - ( 14 Nov 2023 05:11 )   PT: 11.9 sec;   INR: 1.05 ratio         PTT - ( 14 Nov 2023 05:11 )  PTT:29.9 sec  Urinalysis Basic - ( 14 Nov 2023 05:11 )    Color: x / Appearance: x / SG: x / pH: x  Gluc: 129 mg/dL / Ketone: x  / Bili: x / Urobili: x   Blood: x / Protein: x / Nitrite: x   Leuk Esterase: x / RBC: x / WBC x   Sq Epi: x / Non Sq Epi: x / Bacteria: x        RADIOLOGY & ADDITIONAL STUDIES:  no recent

## 2023-11-14 NOTE — BRIEF OPERATIVE NOTE - NSICDXBRIEFPREOP_GEN_ALL_CORE_FT
PRE-OP DIAGNOSIS:  Peripheral arterial disease 14-Nov-2023 15:07:31  Daniel Ding  
PRE-OP DIAGNOSIS:  Peripheral arterial disease 14-Nov-2023 15:07:31  Daniel Ding

## 2023-11-14 NOTE — CHART NOTE - NSCHARTNOTEFT_GEN_A_CORE
- L AKA with vasc, appreciated.  - No further podiatric intervention needed.  - Followup information listed in discharge note provider.  - Discussed with attending. - L AKA with vasc, appreciated.  - No further podiatric intervention needed. Please re-consult as necessary.  - Followup information listed in discharge note provider.  - Discussed with attending.

## 2023-11-14 NOTE — ASU PREOP CHECKLIST - 3.
Skin: Bilateral upper extremities ecchymosis, small pin hole scabs dried blood... L lower extremity bypass scar, wound vac dressing in place C/D/I..... Left great toe amputation dressing intact.... Left groin wound..... MAD/IAD..... Stage II Sacrum ...

## 2023-11-14 NOTE — PROGRESS NOTE ADULT - ATTENDING SUPERVISION STATEMENT
Fellow
Resident
Fellow
Resident
Resident
Fellow

## 2023-11-14 NOTE — PROGRESS NOTE ADULT - ATTENDING COMMENTS
angio shows no blood flow bellow the knee. At this point there are no safe options for limb salvage  Ce been discussing AKA with the pt and HCP  all the risks and benefits were discussed and they will think about it
personally saw and examined patient  labs and vital reviewed  agree with above assessment and plan  83F with PMHx of CAD, HFrEF (25%), pAF on Eliquis, severe AS pending TAVR, R internal carotid stenosis, HTN, HLD, hypothyroidism, anxiety, PAD and chronic L toe wounds s/p L ilioprofunda bypass with reverse GSV graft to peroneal 8/31/23 (Dr. Tavares) c/b large L groin abscess (17cm) requiring removal of infected bypass graft of lower extremity and groin washout on 9/24/23. initially admitted for vascular evaluation of LE now s/p angiogram with plan for likely AKA.   pt continues to be overloaded on exam, however has been hypotensive at times throughout admission   plan for aka, pt with known severe AS, will be high risk for any procedure, no plans to intervene on her aortic valve at this time, or prior to her AKA  when pt is optimized from a volume perspective, this will be ideal time to proceed to OR  would have pre-procedure eval with anesthesia to consider need for advanced hemodynamic monitoring throughout procedure.   will cont to closely follow with you
personally saw and examined patient  labs and vitals reviewed  agree with above assessment and plan
personally saw and examined patient  labs and vitals reviewed  agree with above assessment and plan  remains overloaded  cont diuresis
probably overdiuresed. so long as pt can lay flat can go for planned procedure. will need low dose diuretic moving forward.
pt remains volume overloaded  would continue diuresis to optimize prior to the planned procedure
pt remains volume overloaded  would continue diuresis to optimize prior to the planned procedure
pt seen and examined  labs and vitals reviewed  agree with above assessment and plan  plan for OR today  pt without complaints, denies resp distress  will follow with you
wounds are stable, L forefoot is ischemic  no  signs of active infection   will cont wound care with vac  will plan for angio
Pt is stable  still has not decided if she want to proceed with amputation   will cont to discuss it with her and her family
personally saw and examined patient  labs and vitals reviewed  agree with above assessment and plan
personally saw and examined patient  labs and vitals reviewed  agree with above assessment and plan  comfortable appearing, no acute distress, denies sob  TTE reviewed, LV EF 20% global  significant LE edema noted  cont diuresis  known AS  will cont to follow with you
relative hypotension  hold diuretics for now  hold valsartan  ?intravascular volume depletion with component of third spacing due to hypoalbuminemia
remains volume overloaded  increase diuretics as above
personally saw and examined patient  labs and vitals reviewed  agree with above assessment and plan  persistent edema, although improving  cont diuresis  replete lytes as required  would do compression wrap on RLE  will cont to follow
Adina Kelley is an 83-year-old woman with history of CAD, pAF on Eliquis, severe AS pending TAVR, R internal carotid stenosis, HTN, HLD, hypothyroidism, anxiety and PAD. She has chronic L toe wounds s/p L ilioprofunda bypass with reverse GSV graft to peroneal 8/31/23 (Dr. Tavares) complicated by left groin abscess (17cm) requiring removal of infected bypass graft of lower extremity and groin washout 9/24/23. Peripheral angiogram is now planned. She appeared volume overloaded. Start bumetanide (Bumex) 1 mg IV twice daily, holding for SBP < 85 mm Hg and aiming for net negative fluid balance 1.5 L/day. Maintain serum K > 4.0 mmol/L and serum Mg > 2.0 mg/dL. For 10/29/23, plan to reintroduce ARB: She is on telmisartan at home, but may use hospital formulary, valsartan 40mg oral twice daily. Eventually will benefit from transitioning to valsartan / sacubitril (Entresto). TTE planned for reassessment of LV systolic function and status of aortic valve. There is plan for TAVR for sever AS. Maintain apixaban (Eliquis) 5 mg oral twice daily and metoprolol succ ER (Toprol XL) 25 mg daily for pAFIB

## 2023-11-14 NOTE — BRIEF OPERATIVE NOTE - OPERATION/FINDINGS
Viable left thigh muscles and tissue
Above the left knee amputation. Fascia closed with sutures. Skin closed with staples.

## 2023-11-14 NOTE — PROGRESS NOTE ADULT - SUBJECTIVE AND OBJECTIVE BOX
Vascular Surgery Daily Progress Note  =====================================================    SUBJECTIVE: Patient seen and examined at bedside on AM rounds. Patient with no complaints    ALLERGIES:  latex (Unknown)  sulfa drugs (Unknown)      --------------------------------------------------------------------------------------    MEDICATIONS:    Neurologic Medications  acetaminophen     Tablet .. 650 milliGRAM(s) Oral every 6 hours PRN Temp greater or equal to 38C (100.4F), Mild Pain (1 - 3)    Respiratory Medications    Cardiovascular Medications  aMIOdarone    Tablet 100 milliGRAM(s) Oral daily  buMETAnide Injectable 2 milliGRAM(s) IV Push once  furosemide    Tablet 40 milliGRAM(s) Oral daily  metoprolol succinate ER 25 milliGRAM(s) Oral daily  spironolactone 25 milliGRAM(s) Oral daily    Gastrointestinal Medications  dextrose 5% + sodium chloride 0.9% 1000 milliLiter(s) IV Continuous <Continuous>  magnesium sulfate  IVPB 1 Gram(s) IV Intermittent once  pantoprazole    Tablet 40 milliGRAM(s) Oral before breakfast  potassium phosphate IVPB 15 milliMole(s) IV Intermittent once    Genitourinary Medications    Hematologic/Oncologic Medications  aspirin enteric coated 81 milliGRAM(s) Oral daily    Antimicrobial/Immunologic Medications    Endocrine/Metabolic Medications  atorvastatin 80 milliGRAM(s) Oral at bedtime  levothyroxine 50 MICROGram(s) Oral daily    Topical/Other Medications  collagenase Ointment 1 Application(s) Topical daily  Dakins Solution - 1/4 Strength 1 Application(s) Topical two times a day  hydrocortisone 1% Cream 1 Application(s) Topical daily  nystatin Powder 1 Application(s) Topical two times a day    --------------------------------------------------------------------------------------    VITAL SIGNS:  T(C): 36.9 (11-14-23 @ 05:55), Max: 36.9 (11-13-23 @ 20:06)  HR: 75 (11-14-23 @ 05:55) (72 - 85)  BP: 109/52 (11-14-23 @ 05:55) (93/53 - 110/62)  RR: 17 (11-14-23 @ 05:55) (17 - 18)  SpO2: 100% (11-14-23 @ 05:55) (96% - 100%)  --------------------------------------------------------------------------------------    INS AND OUTS:    11-13-23 @ 07:01  -  11-14-23 @ 07:00  --------------------------------------------------------  IN: 0 mL / OUT: 200 mL / NET: -200 mL      --------------------------------------------------------------------------------------      Physical Exam:  General Appearance: Appears well, NAD, A& O x 3. Sitting comfortably in chair  Chest: Nonlabored breathing on RA  CV: Hemodynamically stable  Extremities: Grossly symmetric, left foot dressing changed this morning C/D/I. Left leg wound vac holding suction  Vascular: RLE pitting edema, improved  --------------------------------------------------------------------------------------    LABS

## 2023-11-14 NOTE — CHART NOTE - NSCHARTNOTEFT_GEN_A_CORE
incomplete Post Operative Note  Patient: NANO GARCIA 83y (1940) Female   MRN: [id number]  Location: [address]  Visit: 10-27-23 Inpatient  Date: 11-15-23 @ 03:12    Procedure: S/P left AKA    Subjective: Patient seen lying comfortably in bed and examined post operatively. Reports pain as well controlled. Denies nausea, vomiting, fever, chills, chest pain, SOB, cough.      Objective:  Vitals: T(F): 97.5 (11-15-23 @ 00:19), Max: 98.5 (11-14-23 @ 05:55)  HR: 77 (11-15-23 @ 00:19)  BP: 122/60 (11-15-23 @ 00:19) (89/34 - 136/67)  RR: 18 (11-15-23 @ 00:19)  SpO2: 98% (11-15-23 @ 00:19)  Vent Settings:     In:   11-13-23 @ 07:01  -  11-14-23 @ 07:00  --------------------------------------------------------  IN: 0 mL    11-14-23 @ 07:01  -  11-15-23 @ 03:12  --------------------------------------------------------  IN: 310 mL      IV Fluids: dextrose 5% + sodium chloride 0.9% 1000 milliLiter(s) (30 mL/Hr) IV Continuous <Continuous>  magnesium sulfate  IVPB 1 Gram(s) IV Intermittent once  potassium phosphate IVPB 15 milliMole(s) IV Intermittent once      Out:   11-13-23 @ 07:01  -  11-14-23 @ 07:00  --------------------------------------------------------  OUT: 200 mL    11-14-23 @ 07:01  -  11-15-23 @ 03:12  --------------------------------------------------------  OUT: 505 mL      EBL:     Voided Urine:   11-13-23 @ 07:01  -  11-14-23 @ 07:00  --------------------------------------------------------  OUT: 200 mL    11-14-23 @ 07:01  -  11-15-23 @ 03:12  --------------------------------------------------------  OUT: 505 mL      Physical Examination:  General: NAD, resting comfortably in bed  HEENT: Normocephalic atraumatic  Respiratory: Nonlabored respirations, normal CW expansion.  Cardio: S1S2, regular rate and rhythm.  Abdomen: soft, non-distended  Vascular: extremities are warm and well perfused. LLE s/p AKA. Stump wrapped with gauze and ace wrap, no strikethrough, soft. Incisions are c/d/i.     Assessment:  83yFemale patient S/P L AKA for PAD and chronic L toe wounds after removal of infected GSV graft from ilioprofunda bypass. Patient tolerated the procedure well and is recovering comfortably on the floor.     Plan:  - DASH diet  - Telemetry for HF  - Maintain ellis  - ASA  - Continue home meds   - PRN oxy tylenol    Date/Time: 11-15-23 @ 03:12

## 2023-11-14 NOTE — PROGRESS NOTE ADULT - ASSESSMENT
New ECG(s): Personally reviewed    Echo: 9/26/23   1. Technically difficult image quality.   2. Left ventricular systolic function is severely decreased with an ejection fraction visually estimated at 20 to 25 %. There is global left ventricular hypokinesis. No evidence of a thrombus in the left ventricle.   3. There is mild (grade 1) left ventricular diastolic dysfunction.   4. Right ventricular cavity is mildly enlarged in size and reduced systolic function.   5. Mild mitral regurgitation.   6. Aortic valve anatomy cannot be determined with reduced systolic excursion. severe calcification of the aortic valve leaflets. severe aortic stenosis.   7. Severe aortic stenosis (estimated aortic valve area ~ 0.5 cm2 (by the continuity equation).   8. Mild aortic regurgitation.   9. The left atrium is mildly dilated in size.  10. Left pleural effusion noted.  11. No prior echocardiogram is available for comparison.    Cath:  5/2023  Moderate atherosclerosis in LM and LAD.  of dRCA with robust collateral from LAD.  Imaging:    Interpretation of Telemetry: Sinus @ 90s    83F with PMHx of CAD, HFrEF (25%), pAF on Eliquis, severe AS pending TAVR, R internal carotid stenosis, HTN, HLD, hypothyroidism, anxiety, PAD and chronic L toe wounds s/p L ilioprofunda bypass with reverse GSV graft to peroneal 8/31/23 (Dr. Tavares) c/b large L groin abscess (17cm) requiring removal of infected bypass graft of lower extremity and groin washout on 9/24/23. Admitted for vascular evaluation of LE now s/p angiogram with plan for likely AKA. Continues to appear grossly overloaded on exam, however hypotensive on 11/9 likely in setting of this afternoon possibly due to aggressive diuresis in setting of preload-dependent severe AS. Still appears grossly overloaded, however may not tolerate aggressive diuresis to complete euvolemia in setting of known AS.     #Acute on Chronic HFrEF (25%) Exacerbation  - continue lasix 40mg PO QDay  - postoperatively would restart valsartan vs transition to Entresto 24-26mg BID  - cont metoprolol succinate 25mg daily  - consider MRA and SGLT2 inhibitors as outpatient.    #Severe AS  - currently Asymptomatic while she is on bed.  - pending TAVR per documentation  - monitor on tele, monitor VS    # pAFib  - given [id number], patient will benefit from AC. Given her age of 83 and weight below 60kg, patient is reasonable to be on reduced dose of NOAC: eliquis 2.5mg BID.   - hold AC as needed by primary team/surgery.  - continue home amiodarone  - continue BB as above    #Pre-Operative Risk Assessment  Cardiovascular Risk Stratification - RCRI =   (0 predictors = 0.4%, 1 predictor = 0.9%, 2 predictors = 6.6%, =3 predictors = >11%)    History of ischemic heart disease: Y  History of congestive heart failure: Y  History of cerebrovascular disease (stroke or transient ischemic attack): N  History of diabetes requiring preoperative insulin use: N  Chronic kidney disease (creatinine > 2 mg/dL): N  Undergoing suprainguinal vascular, intraperitoneal, or intrathoracic surgery: N    Overall this patient is as 6.6% risk for cardiac death, nonfatal myocardial infarction, and nonfatal cardiac arrest perioperatively for this moderate risk above knee amputation. This likely underestimates patient risk for procedure given known severe aortic stenosis. If patient can lie flat, can proceed to procedure without further cardiac testing, likely as optimized as possible from CHF perspective prior to operation given intolerance of diuresis.     All recommendations pending attending attestation. We will sign off, please re-consult with any concerns    Tabby Zhang MD  PGY-4, Cardiology  Available on TEAMS    For all new consults  [email or web address]  Login: [employer]

## 2023-11-14 NOTE — PROGRESS NOTE ADULT - ASSESSMENT
Assessment:  83F with PMHx of CAD, pAF on Eliquis, severe AS pending TAVR, R internal carotid stenosis, HTN, HLD, hypothyroidism, anxiety, PAD and chronic L toe wounds s/p L ilioprofunda bypass with reverse GSV graft to peroneal 8/31/23 (Dr. Tavares) c/b large L groin abscess (17cm) requiring removal of infected bypass graft of lower extremity and groin washout on 9/24/23. Wound care involved for R groin wound and R calf incision wound. Patient represented from rehab concerns for "cold foot". Pt is poor historian, but she says the wounds have been worse over the 2 days prior to presentation. Remains afebrile and hemodynamically stable s/p LLE diagnostic angiogram on 11/3. Given ischemic foot and external iliac occlusion, patient requires AKA. Planning for Tuesday.    PLAN:  - Diet: Regular, NPO pmn  - Cardiology consulted, appreciate recs - cleared if patient able to lie flat per notes  - holding valsartan  - Med consulted, appreciate recs - no further medical optimization needed  - Wound care following - vac changes M/W/F  - AKA planning discussed with patient and healthcare proxy, OR today  - Psychiatry consulted - patient has capacity  - Dispo: pending    Vascular Surgery  [id number]

## 2023-11-14 NOTE — BRIEF OPERATIVE NOTE - NSICDXBRIEFPROCEDURE_GEN_ALL_CORE_FT
PROCEDURES:  Left above knee amputation 14-Nov-2023 15:07:20  Daniel Ding  
PROCEDURES:  Left above knee amputation 14-Nov-2023 15:07:20  Daniel Ding

## 2023-11-14 NOTE — CONSULT NOTE ADULT - ASSESSMENT
83F with PMHx of CAD, pAF on Eliquis, severe AS pending TAVR, R internal carotid stenosis, HTN, HLD, hypothyroidism, anxiety, PAD and chronic L toe wounds s/p recent LLE bypass with wound complications, admitted 10/27, now s/p Left AKA on 11/14. SICU consulted post op for hemodynamic monitoring.     PLAN:  Neuro:  - Multimodal pain control w/ tylenol, dilaudid prn  - Zofran prn    Resp:  - on room air  - incentive spirometry to prevent atelectasis    CV:  - goal MAP > 65 mmHg; support with IVF and start pressors if necessary  - Continue pre-op CV medications listed above    GI:   - regular diet    Renal:  - Monitor I&Os and electrolytes w/ repletions as necessary  - Alas    Heme:  - Monitor CBC and coags  - VTE prophylaxis per vascular team    ID:   - Monitor for clinical evidence of active infection  - Monitor WBC, temperature    Endo:   - Monitor glucose      Disposition:  SICU will continue to monitor overnight while patient in PACU    [id number]

## 2023-11-14 NOTE — PACU DISCHARGE NOTE - COMMENTS
No anesthesia complication. Patient will be discharged for telemetry monitoring. No anesthesia complication.

## 2023-11-14 NOTE — BRIEF OPERATIVE NOTE - ASSISTANT(S)
Dr. Daniel Ding, Dr. Robert Nickerson, Dr. Vinay Velazquez
Daniel Ding, Robert Nickerson, Vinay Velazquez, Ashley Alexis

## 2023-11-14 NOTE — BRIEF OPERATIVE NOTE - NSICDXBRIEFPOSTOP_GEN_ALL_CORE_FT
POST-OP DIAGNOSIS:  Peripheral arterial disease 14-Nov-2023 15:07:39  Daniel Ding  
POST-OP DIAGNOSIS:  Peripheral arterial disease 14-Nov-2023 15:07:39  Daniel Ding

## 2023-11-15 DIAGNOSIS — J96.01 ACUTE RESPIRATORY FAILURE WITH HYPOXIA: ICD-10-CM

## 2023-11-15 LAB
ANION GAP SERPL CALC-SCNC: 12 MMOL/L — SIGNIFICANT CHANGE UP (ref 7–14)
ANION GAP SERPL CALC-SCNC: 12 MMOL/L — SIGNIFICANT CHANGE UP (ref 7–14)
BUN SERPL-MCNC: 17 MG/DL — SIGNIFICANT CHANGE UP (ref 7–23)
BUN SERPL-MCNC: 17 MG/DL — SIGNIFICANT CHANGE UP (ref 7–23)
CALCIUM SERPL-MCNC: 8.7 MG/DL — SIGNIFICANT CHANGE UP (ref 8.4–10.5)
CALCIUM SERPL-MCNC: 8.7 MG/DL — SIGNIFICANT CHANGE UP (ref 8.4–10.5)
CHLORIDE SERPL-SCNC: 97 MMOL/L — LOW (ref 98–107)
CHLORIDE SERPL-SCNC: 97 MMOL/L — LOW (ref 98–107)
CO2 SERPL-SCNC: 27 MMOL/L — SIGNIFICANT CHANGE UP (ref 22–31)
CO2 SERPL-SCNC: 27 MMOL/L — SIGNIFICANT CHANGE UP (ref 22–31)
CREAT SERPL-MCNC: 0.65 MG/DL — SIGNIFICANT CHANGE UP (ref 0.5–1.3)
CREAT SERPL-MCNC: 0.65 MG/DL — SIGNIFICANT CHANGE UP (ref 0.5–1.3)
EGFR: 88 ML/MIN/1.73M2 — SIGNIFICANT CHANGE UP
EGFR: 88 ML/MIN/1.73M2 — SIGNIFICANT CHANGE UP
GLUCOSE SERPL-MCNC: 133 MG/DL — HIGH (ref 70–99)
GLUCOSE SERPL-MCNC: 133 MG/DL — HIGH (ref 70–99)
HCT VFR BLD CALC: 28.7 % — LOW (ref 34.5–45)
HCT VFR BLD CALC: 28.7 % — LOW (ref 34.5–45)
HGB BLD-MCNC: 9.4 G/DL — LOW (ref 11.5–15.5)
HGB BLD-MCNC: 9.4 G/DL — LOW (ref 11.5–15.5)
MAGNESIUM SERPL-MCNC: 2 MG/DL — SIGNIFICANT CHANGE UP (ref 1.6–2.6)
MAGNESIUM SERPL-MCNC: 2 MG/DL — SIGNIFICANT CHANGE UP (ref 1.6–2.6)
MCHC RBC-ENTMCNC: 31.5 PG — SIGNIFICANT CHANGE UP (ref 27–34)
MCHC RBC-ENTMCNC: 31.5 PG — SIGNIFICANT CHANGE UP (ref 27–34)
MCHC RBC-ENTMCNC: 32.8 GM/DL — SIGNIFICANT CHANGE UP (ref 32–36)
MCHC RBC-ENTMCNC: 32.8 GM/DL — SIGNIFICANT CHANGE UP (ref 32–36)
MCV RBC AUTO: 96.3 FL — SIGNIFICANT CHANGE UP (ref 80–100)
MCV RBC AUTO: 96.3 FL — SIGNIFICANT CHANGE UP (ref 80–100)
NRBC # BLD: 0 /100 WBCS — SIGNIFICANT CHANGE UP (ref 0–0)
NRBC # BLD: 0 /100 WBCS — SIGNIFICANT CHANGE UP (ref 0–0)
NRBC # FLD: 0 K/UL — SIGNIFICANT CHANGE UP (ref 0–0)
NRBC # FLD: 0 K/UL — SIGNIFICANT CHANGE UP (ref 0–0)
PHOSPHATE SERPL-MCNC: 3.3 MG/DL — SIGNIFICANT CHANGE UP (ref 2.5–4.5)
PHOSPHATE SERPL-MCNC: 3.3 MG/DL — SIGNIFICANT CHANGE UP (ref 2.5–4.5)
PLATELET # BLD AUTO: 267 K/UL — SIGNIFICANT CHANGE UP (ref 150–400)
PLATELET # BLD AUTO: 267 K/UL — SIGNIFICANT CHANGE UP (ref 150–400)
POTASSIUM SERPL-MCNC: 4 MMOL/L — SIGNIFICANT CHANGE UP (ref 3.5–5.3)
POTASSIUM SERPL-MCNC: 4 MMOL/L — SIGNIFICANT CHANGE UP (ref 3.5–5.3)
POTASSIUM SERPL-SCNC: 4 MMOL/L — SIGNIFICANT CHANGE UP (ref 3.5–5.3)
POTASSIUM SERPL-SCNC: 4 MMOL/L — SIGNIFICANT CHANGE UP (ref 3.5–5.3)
RBC # BLD: 2.98 M/UL — LOW (ref 3.8–5.2)
RBC # BLD: 2.98 M/UL — LOW (ref 3.8–5.2)
RBC # FLD: 17.2 % — HIGH (ref 10.3–14.5)
RBC # FLD: 17.2 % — HIGH (ref 10.3–14.5)
SODIUM SERPL-SCNC: 136 MMOL/L — SIGNIFICANT CHANGE UP (ref 135–145)
SODIUM SERPL-SCNC: 136 MMOL/L — SIGNIFICANT CHANGE UP (ref 135–145)
WBC # BLD: 11.69 K/UL — HIGH (ref 3.8–10.5)
WBC # BLD: 11.69 K/UL — HIGH (ref 3.8–10.5)
WBC # FLD AUTO: 11.69 K/UL — HIGH (ref 3.8–10.5)
WBC # FLD AUTO: 11.69 K/UL — HIGH (ref 3.8–10.5)

## 2023-11-15 PROCEDURE — 99232 SBSQ HOSP IP/OBS MODERATE 35: CPT

## 2023-11-15 PROCEDURE — 99222 1ST HOSP IP/OBS MODERATE 55: CPT

## 2023-11-15 PROCEDURE — 93010 ELECTROCARDIOGRAM REPORT: CPT

## 2023-11-15 RX ORDER — ENOXAPARIN SODIUM 100 MG/ML
55 INJECTION SUBCUTANEOUS EVERY 12 HOURS
Refills: 0 | Status: DISCONTINUED | OUTPATIENT
Start: 2023-11-15 | End: 2023-11-17

## 2023-11-15 RX ORDER — OXYCODONE HYDROCHLORIDE 5 MG/1
2.5 TABLET ORAL EVERY 6 HOURS
Refills: 0 | Status: DISCONTINUED | OUTPATIENT
Start: 2023-11-15 | End: 2023-11-18

## 2023-11-15 RX ORDER — BUMETANIDE 0.25 MG/ML
2 INJECTION INTRAMUSCULAR; INTRAVENOUS ONCE
Refills: 0 | Status: COMPLETED | OUTPATIENT
Start: 2023-11-15 | End: 2023-11-15

## 2023-11-15 RX ORDER — ONDANSETRON 8 MG/1
4 TABLET, FILM COATED ORAL ONCE
Refills: 0 | Status: COMPLETED | OUTPATIENT
Start: 2023-11-15 | End: 2023-11-15

## 2023-11-15 RX ADMIN — BUMETANIDE 2 MILLIGRAM(S): 0.25 INJECTION INTRAMUSCULAR; INTRAVENOUS at 15:35

## 2023-11-15 RX ADMIN — Medication 1 APPLICATION(S): at 13:12

## 2023-11-15 RX ADMIN — SPIRONOLACTONE 25 MILLIGRAM(S): 25 TABLET, FILM COATED ORAL at 05:30

## 2023-11-15 RX ADMIN — Medication 81 MILLIGRAM(S): at 13:12

## 2023-11-15 RX ADMIN — PANTOPRAZOLE SODIUM 40 MILLIGRAM(S): 20 TABLET, DELAYED RELEASE ORAL at 05:38

## 2023-11-15 RX ADMIN — Medication 1 APPLICATION(S): at 18:07

## 2023-11-15 RX ADMIN — Medication 40 MILLIGRAM(S): at 13:13

## 2023-11-15 RX ADMIN — NYSTATIN CREAM 1 APPLICATION(S): 100000 CREAM TOPICAL at 18:07

## 2023-11-15 RX ADMIN — Medication 100 GRAM(S): at 14:11

## 2023-11-15 RX ADMIN — OXYCODONE HYDROCHLORIDE 2.5 MILLIGRAM(S): 5 TABLET ORAL at 15:08

## 2023-11-15 RX ADMIN — NYSTATIN CREAM 1 APPLICATION(S): 100000 CREAM TOPICAL at 05:32

## 2023-11-15 RX ADMIN — ONDANSETRON 4 MILLIGRAM(S): 8 TABLET, FILM COATED ORAL at 06:47

## 2023-11-15 RX ADMIN — AMIODARONE HYDROCHLORIDE 100 MILLIGRAM(S): 400 TABLET ORAL at 05:29

## 2023-11-15 RX ADMIN — ENOXAPARIN SODIUM 55 MILLIGRAM(S): 100 INJECTION SUBCUTANEOUS at 20:16

## 2023-11-15 RX ADMIN — OXYCODONE HYDROCHLORIDE 2.5 MILLIGRAM(S): 5 TABLET ORAL at 14:08

## 2023-11-15 RX ADMIN — Medication 50 MICROGRAM(S): at 05:29

## 2023-11-15 RX ADMIN — Medication 25 MILLIGRAM(S): at 05:29

## 2023-11-15 RX ADMIN — Medication 1 APPLICATION(S): at 13:14

## 2023-11-15 RX ADMIN — POTASSIUM PHOSPHATE, MONOBASIC POTASSIUM PHOSPHATE, DIBASIC 62.5 MILLIMOLE(S): 236; 224 INJECTION, SOLUTION INTRAVENOUS at 14:52

## 2023-11-15 NOTE — PROGRESS NOTE ADULT - ASSESSMENT
83F anxiety, hypothyroidism, HTN, HLD, CAD, PAF on Eliquis, severe AS pending TAVR, R internal carotid stenosis, PAD and chronic L toe wounds s/p L ilioprofunda bypass with reverse GSV graft to peroneal 8/31/23 (Dr. Tavares) c/b large L groin abscess (17cm) requiring removal of infected bypass graft of lower extremity and groin washout on 9/24/23.  Pt has been experiencing increased pain at the LLE and now concerning for limb ischemia s/p angiogram that shows occluded external illiac no s/p L AKA on 11/14.

## 2023-11-15 NOTE — CHART NOTE - NSCHARTNOTEFT_GEN_A_CORE
Pt seen for nutrition follow up     Medical Course: Per chart 83 year old Female anxiety, hypothyroidism, HTN, HLD, CAD, PAF on Eliquis, severe AS pending TAVR, R internal carotid stenosis, PAD and chronic L toe wounds s/p L ilioprofunda bypass with reverse GSV graft to peroneal 8/31/23 (Dr. Tavares) c/b large L groin abscess (17cm) requiring removal of infected bypass graft of lower extremity and groin washout on 9/24/23.  Pt has been experiencing increased pain at the LLE and now concerning for limb ischemia s/p angiogram that shows occluded external illiac no s/p L AKA on 11/14    Nutrition Course:    Diet Prescription: Diet, DASH/TLC:   Sodium & Cholesterol Restricted (11-14-23 @ 15:37)    Pertinent Medications: MEDICATIONS  (STANDING):  aMIOdarone    Tablet 100 milliGRAM(s) Oral daily  aspirin enteric coated 81 milliGRAM(s) Oral daily  atorvastatin 80 milliGRAM(s) Oral at bedtime  buMETAnide Injectable 2 milliGRAM(s) IV Push once  collagenase Ointment 1 Application(s) Topical daily  Dakins Solution - 1/4 Strength 1 Application(s) Topical two times a day  furosemide    Tablet 40 milliGRAM(s) Oral daily  hydrocortisone 1% Cream 1 Application(s) Topical daily  levothyroxine 50 MICROGram(s) Oral daily  metoprolol succinate ER 25 milliGRAM(s) Oral daily  nystatin Powder 1 Application(s) Topical two times a day  pantoprazole    Tablet 40 milliGRAM(s) Oral before breakfast  spironolactone 25 milliGRAM(s) Oral daily    MEDICATIONS  (PRN):  acetaminophen     Tablet .. 650 milliGRAM(s) Oral every 6 hours PRN Temp greater or equal to 38C (100.4F), Mild Pain (1 - 3)  oxyCODONE    IR 2.5 milliGRAM(s) Oral every 6 hours PRN Moderate Pain (4 - 6)    Pertinent Labs: 11-15 Na136 mmol/L Glu 133 mg/dL<H> K+ 4.0 mmol/L Cr  0.65 mg/dL BUN 17 mg/dL 11-15 Phos 3.3 mg/dL     CAPILLARY BLOOD GLUCOSE          Weight: Height (cm): 162.6 (11-14 @ 11:50), 162.6 (10-26 @ 17:57), 162.6 (09-22 @ 14:16), 162.6 (09-06 @ 10:10), 162.6 (08-24 @ 09:13)  Weight (kg): 54.3 (11-14 @ 11:50), 59 (09-06 @ 10:10), 61.2 (08-24 @ 09:13)  BMI (kg/m2): 20.5 (11-14 @ 11:50), 22.3 (10-26 @ 17:57), 22.3 (09-22 @ 14:16), 22.3 (09-06 @ 10:10), 23.1 (08-24 @ 09:13)  Weight Assessment:      Physical Assessment, per flowsheets:  Edema:  Skin:      Estimated Needs:   [X] No change since previous assessment, based on dosing weight  lbs / kg   kcal daily @ kcal/kg,  gm protein daily @ gm/kg   [ ] recalculated, with consideration for, based on weight    Previous Nutrition Diagnosis:   [ ] Inadequate Energy Intake [ ]Inadequate Oral Intake [ ] Excessive Energy Intake   [ ] Underweight [ ] Increased Nutrient Needs [ ] Overweight/Obesity   [ ] Altered GI Function [ ] Unintended Weight Loss [ ] Food & Nutrition Related Knowledge Deficit [ ] Malnutrition   Nutrition Diagnosis is [ ] ongoing  [ ] resolved [ ] not applicable   New Nutrition Diagnosis: [ ] not applicable     Education:  [  ] Given today             Type of Education Provided:   [  ] Given on previous assessment by RD   [  ] Not applicable 2/2 cognitive deficit   [  ] Pt refusal of education offered   [  ] Not applicable 2/2 current prognosis  [  ] Not warranted at present    Interventions:   1)   2)  3)     Monitor & Evaluate:  PO intake, tolerance to diet/supplement, nutrition related lab values, weight trends, BMs/GI distress, hydration status, skin integrity.    [place]| [id number] (Also available on TEAMS) Pt seen for nutrition follow up     Medical Course: Per chart 83 year old Female anxiety, hypothyroidism, HTN, HLD, CAD, PAF on Eliquis, severe AS pending TAVR, R internal carotid stenosis, PAD and chronic L toe wounds s/p L ilioprofunda bypass with reverse GSV graft to peroneal 8/31/23 (Dr. Tavares) c/b large L groin abscess (17cm) requiring removal of infected bypass graft of lower extremity and groin washout on 9/24/23.  Pt has been experiencing increased pain at the LLE and now concerning for limb ischemia s/p angiogram that shows occluded external illiac no s/p L AKA on 11/14    Nutrition Course: Pt A&Ox4, sleeping during visit. Obtained collateral from RN at bedside. S/p L AKA. Reports Pt ate well for breakfast, poorly for lunch. Per RN flowsheets intake fluctuates %. Pt with increased needs 2/2 stage 2 sacral wound. Pt not present on initial RD assessment, noted with visible moderate muscle wasting temporal and clavicle region, 11% weight loss x3 months therefore meets criteria for malnutrition. Will provide Pt with nutrition supplement in setting of increased needs, severe malnutrition.     Adjusted IBW for AKA: 106lb +/- 10%     Diet Prescription: Diet, DASH/TLC:   Sodium & Cholesterol Restricted (11-14-23 @ 15:37)    Pertinent Medications: MEDICATIONS  (STANDING):  aMIOdarone    Tablet 100 milliGRAM(s) Oral daily  aspirin enteric coated 81 milliGRAM(s) Oral daily  atorvastatin 80 milliGRAM(s) Oral at bedtime  buMETAnide Injectable 2 milliGRAM(s) IV Push once  collagenase Ointment 1 Application(s) Topical daily  Dakins Solution - 1/4 Strength 1 Application(s) Topical two times a day  furosemide    Tablet 40 milliGRAM(s) Oral daily  hydrocortisone 1% Cream 1 Application(s) Topical daily  levothyroxine 50 MICROGram(s) Oral daily  metoprolol succinate ER 25 milliGRAM(s) Oral daily  nystatin Powder 1 Application(s) Topical two times a day  pantoprazole    Tablet 40 milliGRAM(s) Oral before breakfast  spironolactone 25 milliGRAM(s) Oral daily    MEDICATIONS  (PRN):  acetaminophen     Tablet .. 650 milliGRAM(s) Oral every 6 hours PRN Temp greater or equal to 38C (100.4F), Mild Pain (1 - 3)  oxyCODONE    IR 2.5 milliGRAM(s) Oral every 6 hours PRN Moderate Pain (4 - 6)    Pertinent Labs: 11-15 Na136 mmol/L Glu 133 mg/dL<H> K+ 4.0 mmol/L Cr  0.65 mg/dL BUN 17 mg/dL 11-15 Phos 3.3 mg/dL    Weight Assessment: 11/3: 54.2kg, 11/14: 54.3kg (weight trend stable x11 days)       Physical Assessment, per flowsheets:  Edema: No edema noted per RN flowsheets   Skin: Stage 2 sacral wound      Estimated Needs:   [X] No change since previous assessment, based on dosing weight  119 lbs / 53.9 kg  28-32 kcal/kg (9574-9625 kcal)  1.2-1.4 g/kg (64-75 g pro)    Previous Nutrition Diagnosis: [ x] Unintended weight loss   New Nutrition Diagnosis: severe protein calorie malnutrition related to acute illness as evidence by moderate muscle wasting, >7.5% weight loss x3 months.     Education:  [x  ] Not warranted at present: Pt very tired, did not want to participate in interview.    Interventions:   1) Continue DASH/TLC diet  2)  to provide Orgain 2x/day (440 kcal, 32 gm pro)   3) Consider multivitamin + vitamin C to aid in wound healing   4) Encourage PO intake and honor food preferences as able.   5) Obtain weekly weights   6) RD available prn     Monitor & Evaluate:  PO intake, tolerance to diet/supplement, nutrition related lab values, weight trends, BMs/GI distress, hydration status, skin integrity.    [place]| [id number] (Also available on TEAMS)

## 2023-11-15 NOTE — CONSULT NOTE ADULT - SUBJECTIVE AND OBJECTIVE BOX
HPI:  83F with PMHx of CAD, pAF on Eliquis, severe AS pending TAVR, R internal carotid stenosis, HTN, HLD, hypothyroidism, anxiety, PAD and chronic L toe wounds s/p L ilioprofunda bypass with reverse GSV graft to peroneal 8/31/23 (Dr. Tavares) c/b large L groin abscess (17cm) requiring removal of infected bypass graft of lower extremity and groin washout on 9/24/23. Wound care involved for R groin wound and R calf incision wound. Pt states she has been getting daily wound care, and today the rehab team was concerned about how cold the foot felt, recommending she come into the hospital. Pt is poor historian, but she says the wounds have been worse over the last 2 days.  (27 Oct 2023 09:01). s/p LLE diagnostic angiogram on 11/3. Given ischemic foot and external iliac occlusion, patient requires AKA. s/p left AKA (11/14/23)      REVIEW OF SYSTEMS/Subjective: Patient in bed in NAD, no SOB, no n/v, pain controlled.  Constitutional - No fever, No fatigue  HEENT - No neck pain  Respiratory - No cough, No shortness of breath  Cardiovascular - No chest pain  Gastrointestinal - No abdominal pain  Genitourinary - No dysuria  Neurological - No headaches, No loss of strength, No numbness  Musculoskeletal - No joint pain  Psychiatric - No depression, No anxiety  All other review of systems negative    PAST MEDICAL & SURGICAL HISTORY  Pain of foot    Family history of myocardial infarction (Father, Sibling)    Handoff    MEWS Score    HTN (hypertension)    HLD (hyperlipidemia)    Anxiety    CAD (coronary artery disease)    Chronic atrial fibrillation    PAD (peripheral artery disease)    Hypothyroidism    Stenosis of right internal carotid artery    AF (atrial fibrillation)    Carotid artery stenosis    AS (aortic stenosis)    HTN (hypertension)    HLD (hyperlipidemia)    Peripheral arterial disease    Peripheral arterial disease    Adjustment disorder    Foot pain    Acute lower limb ischemia    Paroxysmal atrial fibrillation    Severe aortic stenosis    Hypertension    Hypothyroidism    Hyperlipidemia    Chronic HFrEF (heart failure with reduced ejection fraction)    Acute on chronic HFrEF (heart failure with reduced ejection fraction)    Prophylactic measure    Left above knee amputation    No significant past surgical history    Status post closed fracture of right femur    H/O arterial bypass of lower limb    TRANSFER NOLAN    0    [email or web address]        SOCIAL HISTORY    Smoking - Denied  EtOH - Denied   Drugs - Denied    FUNCTIONAL HISTORY  Patient lives alone in a pvt house with steps to negotiate. (+)HHA  Independent in ambulation with RW, ADL's, transfers prior to hospitalization      FAMILY HISTORY   Reviewed and non-contributory    ALLERGIES  latex (Unknown)  sulfa drugs (Unknown)    VITALS  T(C): 36.6 (11-15-23 @ 04:18)  T(F): 97.8 (11-15-23 @ 04:18), Max: 97.8 (11-15-23 @ 04:18)  HR: 85 (11-15-23 @ 04:18) (65 - 89)  BP: 122/80 (11-15-23 @ 04:18) (89/34 - 136/67)  RR:  (13 - 22)  SpO2:  (92% - 100%)  Wt(kg): --    PHYSICAL EXAM  Constitutional - NAD, Comfortable  HEENT - NCAT, MMM  Neck - Supple  Chest - CTA bilaterally  Cardiovascular - RRR, S1S2  Abdomen - BS+, Soft, NTND  Extremities - (+)left AKA, (+) VAC dressing in left groin  Neurologic Exam -                    Cognitive - Awake, Alert, Oriented to self, place, date, year, situation     Communication - Fluent, No dysarthria     Cranial Nerves - EOMI, tongue midline, no facial asymmetry     Motor -                     LEFT    UE - ShAB 5/5, EF 5/5, EE 5/5, WE 5/5,  5/5                    RIGHT UE - ShAB 5/5, EF 5/5, EE 5/5, WE 5/5,  5/5                    LEFT    LE - HF 4/5                    RIGHT LE - HF 4/5, KE 5/5, DF 5/5, PF 5/5        Sensory - Intact to light touch diffusely     Reflexes - DTR intact and symmetrical     Coordination - Finger-to-nose intact bilaterally   Psychiatric - Affect WNL    CURRENT FUNCTIONAL STATUS  Bed mobility - (A)  Transfers - deferred      RECENT LABS/IMAGING                        9.4    11.69 )-----------( 267      ( 15 Nov 2023 06:53 )             28.7     11-15    136  |  97<L>  |  17  ----------------------------<  133<H>  4.0   |  27  |  0.65    Ca    8.7      15 Nov 2023 06:53  Phos  3.3     11-15  Mg     2.00     11-15      PT/INR - ( 14 Nov 2023 05:11 )   PT: 11.9 sec;   INR: 1.05 ratio         PTT - ( 14 Nov 2023 05:11 )  PTT:29.9 sec  Urinalysis Basic - ( 15 Nov 2023 06:53 )    Color: x / Appearance: x / SG: x / pH: x  Gluc: 133 mg/dL / Ketone: x  / Bili: x / Urobili: x   Blood: x / Protein: x / Nitrite: x   Leuk Esterase: x / RBC: x / WBC x   Sq Epi: x / Non Sq Epi: x / Bacteria: x      MEDICATIONS   MEDICATIONS  (STANDING):  aMIOdarone    Tablet 100 milliGRAM(s) Oral daily  aspirin enteric coated 81 milliGRAM(s) Oral daily  atorvastatin 80 milliGRAM(s) Oral at bedtime  buMETAnide Injectable 2 milliGRAM(s) IV Push once  collagenase Ointment 1 Application(s) Topical daily  Dakins Solution - 1/4 Strength 1 Application(s) Topical two times a day  furosemide    Tablet 40 milliGRAM(s) Oral daily  hydrocortisone 1% Cream 1 Application(s) Topical daily  levothyroxine 50 MICROGram(s) Oral daily  magnesium sulfate  IVPB 1 Gram(s) IV Intermittent once  metoprolol succinate ER 25 milliGRAM(s) Oral daily  nystatin Powder 1 Application(s) Topical two times a day  pantoprazole    Tablet 40 milliGRAM(s) Oral before breakfast  potassium phosphate IVPB 15 milliMole(s) IV Intermittent once  spironolactone 25 milliGRAM(s) Oral daily    MEDICATIONS  (PRN):  acetaminophen     Tablet .. 650 milliGRAM(s) Oral every 6 hours PRN Temp greater or equal to 38C (100.4F), Mild Pain (1 - 3)  oxyCODONE    IR 2.5 milliGRAM(s) Oral every 6 hours PRN Moderate Pain (4 - 6)      ASSESSMENT/PLAN  The patient is a 84y/o female PMHx of CAD, HFrEF (25%), pAF on Eliquis, severe AS pending TAVR, R internal carotid stenosis, HTN, HLD, hypothyroidism, anxiety, PAD and chronic L toe wounds s/p L ilioprofunda bypass with reverse GSV graft to peroneal 8/31/23 (Dr. Etkin) c/b large L groin abscess (17cm) requiring removal of infected bypass graft of lower extremity and groin washout on 9/24/23. s/p LLE ischemia, s/p left AKA (11/14/23)/VAC left groin with functional, gait, ADL impairments.    Disposition - Patient is a candidate for restorative inpatient rehab, subacute unit.  PT - ROM, Bed mobility, Transfers  OT - ADLs, ROM

## 2023-11-15 NOTE — CONSULT NOTE ADULT - CONSULT REQUESTED DATE/TIME
27-Oct-2023 07:00
14-Nov-2023 19:01
27-Oct-2023 13:23
30-Oct-2023 15:08
27-Oct-2023 05:59
28-Oct-2023 14:45
15-Nov-2023 10:28

## 2023-11-15 NOTE — PROGRESS NOTE ADULT - ASSESSMENT
Assessment:  83F with PMHx of CAD, pAF on Eliquis, severe AS pending TAVR, R internal carotid stenosis, HTN, HLD, hypothyroidism, anxiety, PAD and chronic L toe wounds s/p L ilioprofunda bypass with reverse GSV graft to peroneal 8/31/23 (Dr. Tavares) c/b large L groin abscess (17cm) requiring removal of infected bypass graft of lower extremity and groin washout on 9/24/23. Wound care involved for R groin wound and R calf incision wound. Patient represented from rehab concerns for "cold foot". Pt is poor historian, but she says the wounds have been worse over the 2 days prior to presentation. Remains afebrile and hemodynamically stable s/p LLE diagnostic angiogram on 11/3. Given ischemic foot and external iliac occlusion, patient requires AKA. Patient currently s/p L AKA (11/14)    PLAN:  - Diet: Regular  - Cardiology consulted, appreciate recs - cleared if patient able to lie flat per notes  - holding valsartan  - Med consulted, appreciate recs - no further medical optimization needed  - Wound care following - vac changes M/W/F on groin  - Psychiatry consulted - patient has capacity  - Dispo: pending    Vascular Surgery  [id number]

## 2023-11-15 NOTE — CONSULT NOTE ADULT - REASON FOR ADMISSION
Ruther 5 CLTI left toe wounds
left toe wounds

## 2023-11-15 NOTE — PROGRESS NOTE ADULT - SUBJECTIVE AND OBJECTIVE BOX
ANESTHESIA POSTOP CHECK    83y Female POSTOP DAY 1     Vital Signs Last 24 Hrs  T(C): 36.9 (15 Nov 2023 11:57), Max: 36.9 (15 Nov 2023 11:57)  T(F): 98.4 (15 Nov 2023 11:57), Max: 98.4 (15 Nov 2023 11:57)  HR: 89 (15 Nov 2023 11:57) (65 - 89)  BP: 120/50 (15 Nov 2023 11:57) (89/34 - 127/47)  BP(mean): 61 (14 Nov 2023 19:30) (47 - 73)  RR: 18 (15 Nov 2023 11:57) (13 - 22)  SpO2: 99% (15 Nov 2023 11:57) (92% - 100%)    Parameters below as of 15 Nov 2023 11:57  Patient On (Oxygen Delivery Method): nasal cannula      I&O's Summary    14 Nov 2023 07:01  -  15 Nov 2023 07:00  --------------------------------------------------------  IN: 550 mL / OUT: 605 mL / NET: -55 mL        [X ] NO APPARENT ANESTHESIA COMPLICATIONS      Comments:

## 2023-11-15 NOTE — PROGRESS NOTE ADULT - PROBLEM SELECTOR PLAN 2
Suspect related to volume overload s/p OR; no fevers or cough  - bumex 2 mg IV pending  - encourage incentive spirometry

## 2023-11-15 NOTE — PROGRESS NOTE ADULT - SUBJECTIVE AND OBJECTIVE BOX
Patient is a 83y old  Female who presents with a chief complaint of Ruther 5 CLTI left toe wounds     SUBJECTIVE / OVERNIGHT EVENTS:    seen in her room, reports she is in pain, cannot specify not leg, just all over, denies CP  denies overt SOB, but appears winded, no nausea/vomiting diarrhea, states not sure why she has a ellis  took off her O2, per RN placed this am s/p desat during wound care  sat on RA 86%, advised pt she must not dc her O2      MEDICATIONS  (STANDING):  aMIOdarone    Tablet 100 milliGRAM(s) Oral daily  aspirin enteric coated 81 milliGRAM(s) Oral daily  atorvastatin 80 milliGRAM(s) Oral at bedtime  buMETAnide Injectable 2 milliGRAM(s) IV Push once  collagenase Ointment 1 Application(s) Topical daily  Dakins Solution - 1/4 Strength 1 Application(s) Topical two times a day  furosemide    Tablet 40 milliGRAM(s) Oral daily  hydrocortisone 1% Cream 1 Application(s) Topical daily  levothyroxine 50 MICROGram(s) Oral daily  metoprolol succinate ER 25 milliGRAM(s) Oral daily  nystatin Powder 1 Application(s) Topical two times a day  pantoprazole    Tablet 40 milliGRAM(s) Oral before breakfast  potassium phosphate IVPB 15 milliMole(s) IV Intermittent once  spironolactone 25 milliGRAM(s) Oral daily    MEDICATIONS  (PRN):  acetaminophen     Tablet .. 650 milliGRAM(s) Oral every 6 hours PRN Temp greater or equal to 38C (100.4F), Mild Pain (1 - 3)  oxyCODONE    IR 2.5 milliGRAM(s) Oral every 6 hours PRN Moderate Pain (4 - 6)    T(C): 36.9 (11-15-23 @ 11:57), Max: 36.9 (11-15-23 @ 11:57)  HR: 89 (11-15-23 @ 11:57) (65 - 89)  BP: 120/50 (11-15-23 @ 11:57) (89/34 - 127/47)  RR: 18 (11-15-23 @ 11:57) (13 - 22)  SpO2: 99% (11-15-23 @ 11:57) (92% - 100%)    I&O's Summary    14 Nov 2023 07:01  -  15 Nov 2023 07:00  --------------------------------------------------------  IN: 550 mL / OUT: 605 mL / NET: -55 mL    PHYSICAL EXAM:  GENERAL: NAD   CHEST/LUNG: Clear to auscultation bilaterally; No wheeze  HEART: Regular rate and rhythm; No murmurs, rubs, or gallops  ABDOMEN: Soft, Nontender, Nondistended; Bowel sounds present  EXTREMITIES:  R edema, L AKA wrapped  PSYCH: AAOx3  NEUROLOGY: non-focal  +ellis     LABS:                        9.4    11.69 )-----------( 267      ( 15 Nov 2023 06:53 )             28.7     11-15    136  |  97<L>  |  17  ----------------------------<  133<H>  4.0   |  27  |  0.65    Ca    8.7      15 Nov 2023 06:53  Phos  3.3     11-15  Mg     2.00     11-15    PT/INR - ( 14 Nov 2023 05:11 )   PT: 11.9 sec;   INR: 1.05 ratio    PTT - ( 14 Nov 2023 05:11 )  PTT:29.9 sec    Care Discussed with Consultants/Other Providers:  vascular PA

## 2023-11-15 NOTE — DIETITIAN NUTRITION RISK NOTIFICATION - ETIOLOGY-BASIS
How Severe Is Your Psoriasis?: moderate
Is This A New Presentation, Or A Follow-Up?: Psoriasis
Acute illness or injury

## 2023-11-15 NOTE — CONSULT NOTE ADULT - CONSULT REASON
Hemodynamic monitoring
Left foot wounds
cardiac optimization
L foot infection
Rehabilitation evaluation
wound culture
comanagement

## 2023-11-15 NOTE — PROGRESS NOTE ADULT - SUBJECTIVE AND OBJECTIVE BOX
C Team Surgery Progress Note     S: Patient resting comfortably on morning rounds. Pain well-controlled currently. Reports mild nausea, no vomiting. NAEO.    MEDICATIONS  (STANDING):  aMIOdarone    Tablet 100 milliGRAM(s) Oral daily  aspirin enteric coated 81 milliGRAM(s) Oral daily  atorvastatin 80 milliGRAM(s) Oral at bedtime  buMETAnide Injectable 2 milliGRAM(s) IV Push once  collagenase Ointment 1 Application(s) Topical daily  Dakins Solution - 1/4 Strength 1 Application(s) Topical two times a day  furosemide    Tablet 40 milliGRAM(s) Oral daily  hydrocortisone 1% Cream 1 Application(s) Topical daily  levothyroxine 50 MICROGram(s) Oral daily  magnesium sulfate  IVPB 1 Gram(s) IV Intermittent once  metoprolol succinate ER 25 milliGRAM(s) Oral daily  nystatin Powder 1 Application(s) Topical two times a day  pantoprazole    Tablet 40 milliGRAM(s) Oral before breakfast  potassium phosphate IVPB 15 milliMole(s) IV Intermittent once  spironolactone 25 milliGRAM(s) Oral daily    MEDICATIONS  (PRN):  acetaminophen     Tablet .. 650 milliGRAM(s) Oral every 6 hours PRN Temp greater or equal to 38C (100.4F), Mild Pain (1 - 3)  oxyCODONE    IR 2.5 milliGRAM(s) Oral every 6 hours PRN Moderate Pain (4 - 6)      T(C): 36.9 (11-15-23 @ 11:57), Max: 36.9 (11-15-23 @ 11:57)  HR: 89 (11-15-23 @ 11:57) (65 - 89)  BP: 120/50 (11-15-23 @ 11:57) (89/34 - 127/47)  RR: 18 (11-15-23 @ 11:57) (13 - 22)  SpO2: 99% (11-15-23 @ 11:57) (92% - 100%)        11-14-23 @ 07:01  -  11-15-23 @ 07:00  --------------------------------------------------------  IN: 550 mL / OUT: 605 mL / NET: -55 mL        Physical Exam:  General: NAD, AOx3  Respiratory: No labored breathing  CV: pulse regularly present  MSK: L stump dressings c/d/i      LABS:                        9.4    11.69 )-----------( 267      ( 15 Nov 2023 06:53 )             28.7     11-15    136  |  97<L>  |  17  ----------------------------<  133<H>  4.0   |  27  |  0.65    Ca    8.7      15 Nov 2023 06:53  Phos  3.3     11-15  Mg     2.00     11-15

## 2023-11-15 NOTE — PROGRESS NOTE ADULT - PROBLEM SELECTOR PLAN 3
TTE 9/23 with EF of 20-25% and global left ventricular hypokinesis.  - c/w Toprol 25mg daily & spironolactone 25 mg daily  - Valsartan 40mg held due to hypotension   - Monitor I/O's, daily weights  - repeat TTE unchanged EF   - c/w lasix 40 qd, dosed bumex 2 mg x 1 again today appears to be in HD  - f/u cards recs

## 2023-11-16 LAB
ANION GAP SERPL CALC-SCNC: 8 MMOL/L — SIGNIFICANT CHANGE UP (ref 7–14)
ANION GAP SERPL CALC-SCNC: 8 MMOL/L — SIGNIFICANT CHANGE UP (ref 7–14)
BUN SERPL-MCNC: 20 MG/DL — SIGNIFICANT CHANGE UP (ref 7–23)
BUN SERPL-MCNC: 20 MG/DL — SIGNIFICANT CHANGE UP (ref 7–23)
CALCIUM SERPL-MCNC: 8.5 MG/DL — SIGNIFICANT CHANGE UP (ref 8.4–10.5)
CALCIUM SERPL-MCNC: 8.5 MG/DL — SIGNIFICANT CHANGE UP (ref 8.4–10.5)
CHLORIDE SERPL-SCNC: 95 MMOL/L — LOW (ref 98–107)
CHLORIDE SERPL-SCNC: 95 MMOL/L — LOW (ref 98–107)
CO2 SERPL-SCNC: 33 MMOL/L — HIGH (ref 22–31)
CO2 SERPL-SCNC: 33 MMOL/L — HIGH (ref 22–31)
CREAT SERPL-MCNC: 0.9 MG/DL — SIGNIFICANT CHANGE UP (ref 0.5–1.3)
CREAT SERPL-MCNC: 0.9 MG/DL — SIGNIFICANT CHANGE UP (ref 0.5–1.3)
EGFR: 64 ML/MIN/1.73M2 — SIGNIFICANT CHANGE UP
EGFR: 64 ML/MIN/1.73M2 — SIGNIFICANT CHANGE UP
GLUCOSE SERPL-MCNC: 137 MG/DL — HIGH (ref 70–99)
GLUCOSE SERPL-MCNC: 137 MG/DL — HIGH (ref 70–99)
HCT VFR BLD CALC: 27.8 % — LOW (ref 34.5–45)
HCT VFR BLD CALC: 27.8 % — LOW (ref 34.5–45)
HGB BLD-MCNC: 8.8 G/DL — LOW (ref 11.5–15.5)
HGB BLD-MCNC: 8.8 G/DL — LOW (ref 11.5–15.5)
MAGNESIUM SERPL-MCNC: 2.1 MG/DL — SIGNIFICANT CHANGE UP (ref 1.6–2.6)
MAGNESIUM SERPL-MCNC: 2.1 MG/DL — SIGNIFICANT CHANGE UP (ref 1.6–2.6)
MCHC RBC-ENTMCNC: 31 PG — SIGNIFICANT CHANGE UP (ref 27–34)
MCHC RBC-ENTMCNC: 31 PG — SIGNIFICANT CHANGE UP (ref 27–34)
MCHC RBC-ENTMCNC: 31.7 GM/DL — LOW (ref 32–36)
MCHC RBC-ENTMCNC: 31.7 GM/DL — LOW (ref 32–36)
MCV RBC AUTO: 97.9 FL — SIGNIFICANT CHANGE UP (ref 80–100)
MCV RBC AUTO: 97.9 FL — SIGNIFICANT CHANGE UP (ref 80–100)
NRBC # BLD: 0 /100 WBCS — SIGNIFICANT CHANGE UP (ref 0–0)
NRBC # BLD: 0 /100 WBCS — SIGNIFICANT CHANGE UP (ref 0–0)
NRBC # FLD: 0 K/UL — SIGNIFICANT CHANGE UP (ref 0–0)
NRBC # FLD: 0 K/UL — SIGNIFICANT CHANGE UP (ref 0–0)
PHOSPHATE SERPL-MCNC: 3.1 MG/DL — SIGNIFICANT CHANGE UP (ref 2.5–4.5)
PHOSPHATE SERPL-MCNC: 3.1 MG/DL — SIGNIFICANT CHANGE UP (ref 2.5–4.5)
PLATELET # BLD AUTO: 263 K/UL — SIGNIFICANT CHANGE UP (ref 150–400)
PLATELET # BLD AUTO: 263 K/UL — SIGNIFICANT CHANGE UP (ref 150–400)
POTASSIUM SERPL-MCNC: 4.1 MMOL/L — SIGNIFICANT CHANGE UP (ref 3.5–5.3)
POTASSIUM SERPL-MCNC: 4.1 MMOL/L — SIGNIFICANT CHANGE UP (ref 3.5–5.3)
POTASSIUM SERPL-SCNC: 4.1 MMOL/L — SIGNIFICANT CHANGE UP (ref 3.5–5.3)
POTASSIUM SERPL-SCNC: 4.1 MMOL/L — SIGNIFICANT CHANGE UP (ref 3.5–5.3)
RBC # BLD: 2.84 M/UL — LOW (ref 3.8–5.2)
RBC # BLD: 2.84 M/UL — LOW (ref 3.8–5.2)
RBC # FLD: 17.3 % — HIGH (ref 10.3–14.5)
RBC # FLD: 17.3 % — HIGH (ref 10.3–14.5)
SODIUM SERPL-SCNC: 136 MMOL/L — SIGNIFICANT CHANGE UP (ref 135–145)
SODIUM SERPL-SCNC: 136 MMOL/L — SIGNIFICANT CHANGE UP (ref 135–145)
WBC # BLD: 10.44 K/UL — SIGNIFICANT CHANGE UP (ref 3.8–10.5)
WBC # BLD: 10.44 K/UL — SIGNIFICANT CHANGE UP (ref 3.8–10.5)
WBC # FLD AUTO: 10.44 K/UL — SIGNIFICANT CHANGE UP (ref 3.8–10.5)
WBC # FLD AUTO: 10.44 K/UL — SIGNIFICANT CHANGE UP (ref 3.8–10.5)

## 2023-11-16 PROCEDURE — 99232 SBSQ HOSP IP/OBS MODERATE 35: CPT

## 2023-11-16 RX ORDER — BUMETANIDE 0.25 MG/ML
1 INJECTION INTRAMUSCULAR; INTRAVENOUS ONCE
Refills: 0 | Status: COMPLETED | OUTPATIENT
Start: 2023-11-16 | End: 2023-11-16

## 2023-11-16 RX ADMIN — Medication 50 MICROGRAM(S): at 05:20

## 2023-11-16 RX ADMIN — NYSTATIN CREAM 1 APPLICATION(S): 100000 CREAM TOPICAL at 05:19

## 2023-11-16 RX ADMIN — AMIODARONE HYDROCHLORIDE 100 MILLIGRAM(S): 400 TABLET ORAL at 05:20

## 2023-11-16 RX ADMIN — SPIRONOLACTONE 25 MILLIGRAM(S): 25 TABLET, FILM COATED ORAL at 05:21

## 2023-11-16 RX ADMIN — BUMETANIDE 1 MILLIGRAM(S): 0.25 INJECTION INTRAMUSCULAR; INTRAVENOUS at 17:42

## 2023-11-16 RX ADMIN — PANTOPRAZOLE SODIUM 40 MILLIGRAM(S): 20 TABLET, DELAYED RELEASE ORAL at 05:19

## 2023-11-16 RX ADMIN — Medication 40 MILLIGRAM(S): at 05:22

## 2023-11-16 RX ADMIN — Medication 25 MILLIGRAM(S): at 05:19

## 2023-11-16 RX ADMIN — NYSTATIN CREAM 1 APPLICATION(S): 100000 CREAM TOPICAL at 17:50

## 2023-11-16 RX ADMIN — ENOXAPARIN SODIUM 55 MILLIGRAM(S): 100 INJECTION SUBCUTANEOUS at 17:39

## 2023-11-16 RX ADMIN — Medication 1 APPLICATION(S): at 05:21

## 2023-11-16 RX ADMIN — Medication 81 MILLIGRAM(S): at 12:38

## 2023-11-16 RX ADMIN — ENOXAPARIN SODIUM 55 MILLIGRAM(S): 100 INJECTION SUBCUTANEOUS at 08:00

## 2023-11-16 NOTE — PROGRESS NOTE ADULT - SUBJECTIVE AND OBJECTIVE BOX
Patient is a 83y old  Female who presents with a chief complaint of Ruther 5 CLTI left toe wounds    SUBJECTIVE / OVERNIGHT EVENTS:    No CP, SOB, f/c/n/v  not eating much, OOB to chair w/ assistance  ellis out, has not voided     MEDICATIONS  (STANDING):  aMIOdarone    Tablet 100 milliGRAM(s) Oral daily  aspirin enteric coated 81 milliGRAM(s) Oral daily  atorvastatin 80 milliGRAM(s) Oral at bedtime  collagenase Ointment 1 Application(s) Topical daily  enoxaparin Injectable 55 milliGRAM(s) SubCutaneous every 12 hours  furosemide    Tablet 40 milliGRAM(s) Oral daily  hydrocortisone 1% Cream 1 Application(s) Topical daily  levothyroxine 50 MICROGram(s) Oral daily  metoprolol succinate ER 25 milliGRAM(s) Oral daily  nystatin Powder 1 Application(s) Topical two times a day  pantoprazole    Tablet 40 milliGRAM(s) Oral before breakfast  spironolactone 25 milliGRAM(s) Oral daily    MEDICATIONS  (PRN):  acetaminophen     Tablet .. 650 milliGRAM(s) Oral every 6 hours PRN Temp greater or equal to 38C (100.4F), Mild Pain (1 - 3)  oxyCODONE    IR 2.5 milliGRAM(s) Oral every 6 hours PRN Moderate Pain (4 - 6)    T(C): 36.9 (11-16-23 @ 12:11), Max: 36.9 (11-15-23 @ 21:34)  HR: 82 (11-16-23 @ 12:11) (82 - 95)  BP: 110/54 (11-16-23 @ 12:11) (104/53 - 110/54)  RR: 18 (11-16-23 @ 12:11) (18 - 19)  SpO2: 99% (11-16-23 @ 12:11) (89% - 100%)    I&O's Summary    15 Nov 2023 07:01  -  16 Nov 2023 07:00  --------------------------------------------------------  IN: 400 mL / OUT: 1600 mL / NET: -1200 mL    16 Nov 2023 07:01  -  16 Nov 2023 16:03  --------------------------------------------------------  IN: 0 mL / OUT: 450 mL / NET: -450 mL    PHYSICAL EXAM:  GENERAL: NAD   HEAD:  Atraumatic, Normocephalic  EYES: EOMI, PERRLA, conjunctiva and sclera clear  NECK: Supple, No JVD  CHEST/LUNG: Clear to auscultation bilaterally; No wheeze  HEART: Regular rate and rhythm; No murmurs, rubs, or gallops  ABDOMEN: Soft, Nontender, Nondistended; Bowel sounds present  EXTREMITIES:   warm and well perfused, No clubbing, cyanosis, or edema  PSYCH: AAOx   NEUROLOGY: non-focal  SKIN: No rashes or lesions    LABS:                        8.8    10.44 )-----------( 263      ( 16 Nov 2023 05:30 )             27.8     11-16    136  |  95<L>  |  20  ----------------------------<  137<H>  4.1   |  33<H>  |  0.90    Ca    8.5      16 Nov 2023 05:30  Phos  3.1     11-16  Mg     2.10     11-16    Care Discussed with Consultants/Other Providers:  vascular PA   Patient is a 83y old  Female who presents with a chief complaint of Ruther 5 CLTI left toe wounds    SUBJECTIVE / OVERNIGHT EVENTS:    No CP, SOB, f/c/n/v  not eating much, OOB to chair w/ assistance; never smoker  ellis out, has not voided     MEDICATIONS  (STANDING):  aMIOdarone    Tablet 100 milliGRAM(s) Oral daily  aspirin enteric coated 81 milliGRAM(s) Oral daily  atorvastatin 80 milliGRAM(s) Oral at bedtime  collagenase Ointment 1 Application(s) Topical daily  enoxaparin Injectable 55 milliGRAM(s) SubCutaneous every 12 hours  furosemide    Tablet 40 milliGRAM(s) Oral daily  hydrocortisone 1% Cream 1 Application(s) Topical daily  levothyroxine 50 MICROGram(s) Oral daily  metoprolol succinate ER 25 milliGRAM(s) Oral daily  nystatin Powder 1 Application(s) Topical two times a day  pantoprazole    Tablet 40 milliGRAM(s) Oral before breakfast  spironolactone 25 milliGRAM(s) Oral daily    MEDICATIONS  (PRN):  acetaminophen     Tablet .. 650 milliGRAM(s) Oral every 6 hours PRN Temp greater or equal to 38C (100.4F), Mild Pain (1 - 3)  oxyCODONE    IR 2.5 milliGRAM(s) Oral every 6 hours PRN Moderate Pain (4 - 6)    T(C): 36.9 (11-16-23 @ 12:11), Max: 36.9 (11-15-23 @ 21:34)  HR: 82 (11-16-23 @ 12:11) (82 - 95)  BP: 110/54 (11-16-23 @ 12:11) (104/53 - 110/54)  RR: 18 (11-16-23 @ 12:11) (18 - 19)  SpO2: 99% (11-16-23 @ 12:11) (89% - 100%)    I&O's Summary    15 Nov 2023 07:01  -  16 Nov 2023 07:00  --------------------------------------------------------  IN: 400 mL / OUT: 1600 mL / NET: -1200 mL    16 Nov 2023 07:01  -  16 Nov 2023 16:03  --------------------------------------------------------  IN: 0 mL / OUT: 450 mL / NET: -450 mL    vPHYSICAL EXAM:  GENERAL: NAD   CHEST/LUNG: poor inspiratory effort, decreased BS; No wheeze  HEART: Regular rate and rhythm; No murmurs, rubs, or gallops  ABDOMEN: Soft, Nontender, Nondistended; Bowel sounds present  EXTREMITIES:  R edema, L AKA wrapped  PSYCH: AAOx3  NEUROLOGY: non-focal    LABS:                        8.8    10.44 )-----------( 263      ( 16 Nov 2023 05:30 )             27.8     11-16    136  |  95<L>  |  20  ----------------------------<  137<H>  4.1   |  33<H>  |  0.90    Ca    8.5      16 Nov 2023 05:30  Phos  3.1     11-16  Mg     2.10     11-16    Care Discussed with Consultants/Other Providers:  vascular PA

## 2023-11-16 NOTE — PROGRESS NOTE ADULT - PROBLEM SELECTOR PLAN 1
s/p left foot partial hallux amputation 9/6, now admitted w/ left foot partial hallux amputation dehiscence and forefoot ischemic changes  - L foot xray: no OM, no gas, no fracture   - L foot wound growing VRE and staph epi; ID appreciated wound cx likely colonization   - s/p antibiotics as per primary ( zosyn 10/27- 11/3)  - c/w asa 81 mg and atorvastatin 80 mg qhs  - 10/31 LE duplex neg for DVT  - s/p angiogram 11/3  external iliac occlusion  - s/p L AKA on 11/14  - c/w pain control, ppx and wound care per primary team

## 2023-11-16 NOTE — PHYSICAL THERAPY INITIAL EVALUATION ADULT - ACTIVE RANGE OF MOTION EXAMINATION, REHAB EVAL
left hip flexion 0-90 degrees/bilateral upper extremity Active ROM was WFL (within functional limits)/Right LE Active ROM was WFL (within functional limits)

## 2023-11-16 NOTE — PROGRESS NOTE ADULT - ASSESSMENT
Assessment:  83F with PMHx of CAD, pAF on Eliquis, severe AS pending TAVR, R internal carotid stenosis, HTN, HLD, hypothyroidism, anxiety, PAD and chronic L toe wounds s/p L ilioprofunda bypass with reverse GSV graft to peroneal 8/31/23 (Dr. Tavares) c/b large L groin abscess (17cm) requiring removal of infected bypass graft of lower extremity and groin washout on 9/24/23. Wound care involved for R groin wound and R calf incision wound. Patient represented from rehab concerns for "cold foot". Pt is poor historian, but she says the wounds have been worse over the 2 days prior to presentation. Remains afebrile and hemodynamically stable s/p LLE diagnostic angiogram on 11/3. Given ischemic foot and external iliac occlusion, patient requires AKA. Patient currently s/p L AKA (11/14)    PLAN:  - Diet: Regular  - Cardiology consulted, appreciate recs - cleared if patient able to lie flat per notes  - holding valsartan  - Med consulted, appreciate recs - no further medical optimization needed  - Wound care following - vac changes M/W/F on groin  - Psychiatry consulted - patient has capacity  - Dispo: PMR recs inpatient rehab    Vascular Surgery  [id number]

## 2023-11-16 NOTE — PHYSICAL THERAPY INITIAL EVALUATION ADULT - PERTINENT HX OF CURRENT PROBLEM, REHAB EVAL
Patient is 83 year old female, history of CAD, PAFIB on Eliquis, severe AS pending TAVR, R internal carotid stenosis, HTN, HLD, hypothyroidism, anxiety, PAD and chronic left toe wounds s/p L ilioprofunda bypass with reverse GSV graft to peroneal 8/31/23 (Dr. Tavares) c/b large left groin abscess (17cm) requiring removal of infected bypass graft of lower extremity and groin washout on 9/24/23. Wound care involved for right groin wound and right calf incision wound. Patient represented from rehab concerns for "cold foot". Pt is poor historian, but she says the wounds have been worse over the 2 days prior to presentation. Remains afebrile and hemodynamically stable s/p left LE diagnostic angiogram on 11/3. Given ischemic foot and external iliac occlusion, patient requires AKA. Patient currently s/p L AKA (11/14)

## 2023-11-16 NOTE — PROGRESS NOTE ADULT - PROBLEM SELECTOR PLAN 2
Suspect related to volume overload s/p OR; no fevers or cough  - still low today off O2 going to 80s, dose 1 mg bumex again this PM (bicarb rising)  - encourage incentive spirometry

## 2023-11-16 NOTE — PROGRESS NOTE ADULT - SUBJECTIVE AND OBJECTIVE BOX
TEAM [ C ] Surgery Daily Progress Note  =====================================================    SUBJECTIVE: Patient seen and examined at bedside on AM rounds. Patient denies any acute complaints overnight. Tolerating diet, denies nausea, vomiting. OOB/Amublating as tolerated. Denies fever, chills.      ALLERGIES:  latex (Unknown)  sulfa drugs (Unknown)      --------------------------------------------------------------------------------------    MEDICATIONS:    Neurologic Medications  acetaminophen     Tablet .. 650 milliGRAM(s) Oral every 6 hours PRN Temp greater or equal to 38C (100.4F), Mild Pain (1 - 3)  oxyCODONE    IR 2.5 milliGRAM(s) Oral every 6 hours PRN Moderate Pain (4 - 6)    Respiratory Medications    Cardiovascular Medications  aMIOdarone    Tablet 100 milliGRAM(s) Oral daily  furosemide    Tablet 40 milliGRAM(s) Oral daily  metoprolol succinate ER 25 milliGRAM(s) Oral daily  spironolactone 25 milliGRAM(s) Oral daily    Gastrointestinal Medications  pantoprazole    Tablet 40 milliGRAM(s) Oral before breakfast    Genitourinary Medications    Hematologic/Oncologic Medications  aspirin enteric coated 81 milliGRAM(s) Oral daily  enoxaparin Injectable 55 milliGRAM(s) SubCutaneous every 12 hours    Antimicrobial/Immunologic Medications    Endocrine/Metabolic Medications  atorvastatin 80 milliGRAM(s) Oral at bedtime  levothyroxine 50 MICROGram(s) Oral daily    Topical/Other Medications  collagenase Ointment 1 Application(s) Topical daily  Dakins Solution - 1/4 Strength 1 Application(s) Topical two times a day  hydrocortisone 1% Cream 1 Application(s) Topical daily  nystatin Powder 1 Application(s) Topical two times a day    --------------------------------------------------------------------------------------    VITAL SIGNS:  T(C): 36.8 (11-16-23 @ 00:13), Max: 37 (11-15-23 @ 15:32)  HR: 95 (11-16-23 @ 00:13) (86 - 105)  BP: 108/61 (11-16-23 @ 00:13) (106/46 - 120/50)  RR: 19 (11-16-23 @ 00:13) (17 - 19)  SpO2: 100% (11-16-23 @ 00:13) (99% - 100%)  --------------------------------------------------------------------------------------    EXAM    General: NAD, resting in bed comfortably.  Cardiac: regular rate, warm and well perfused  Respiratory: Nonlabored respirations, normal cw expansion.  Abdomen: soft, nontender, nondistended. Wound vac in place to right groin  Extremities: L AKA dressing c/d/i    --------------------------------------------------------------------------------------    LABS                        8.8    10.44 )-----------( 263      ( 16 Nov 2023 05:30 )             27.8     --------------------------------------------------------------------------------------    11-15    136  |  97<L>  |  17  ----------------------------<  133<H>  4.0   |  27  |  0.65    Ca    8.7      15 Nov 2023 06:53  Phos  3.3     11-15  Mg     2.00     11-15    INS AND OUTS:    11-15-23 @ 07:01  -  11-16-23 @ 07:00  --------------------------------------------------------  IN: 400 mL / OUT: 1600 mL / NET: -1200 mL      --------------------------------------------------------------------------------------

## 2023-11-17 ENCOUNTER — TRANSCRIPTION ENCOUNTER (OUTPATIENT)
Age: 83
End: 2023-11-17

## 2023-11-17 LAB
ANION GAP SERPL CALC-SCNC: 11 MMOL/L — SIGNIFICANT CHANGE UP (ref 7–14)
ANION GAP SERPL CALC-SCNC: 11 MMOL/L — SIGNIFICANT CHANGE UP (ref 7–14)
BUN SERPL-MCNC: 18 MG/DL — SIGNIFICANT CHANGE UP (ref 7–23)
BUN SERPL-MCNC: 18 MG/DL — SIGNIFICANT CHANGE UP (ref 7–23)
CALCIUM SERPL-MCNC: 8.2 MG/DL — LOW (ref 8.4–10.5)
CALCIUM SERPL-MCNC: 8.2 MG/DL — LOW (ref 8.4–10.5)
CHLORIDE SERPL-SCNC: 95 MMOL/L — LOW (ref 98–107)
CHLORIDE SERPL-SCNC: 95 MMOL/L — LOW (ref 98–107)
CO2 SERPL-SCNC: 33 MMOL/L — HIGH (ref 22–31)
CO2 SERPL-SCNC: 33 MMOL/L — HIGH (ref 22–31)
CREAT SERPL-MCNC: 0.63 MG/DL — SIGNIFICANT CHANGE UP (ref 0.5–1.3)
CREAT SERPL-MCNC: 0.63 MG/DL — SIGNIFICANT CHANGE UP (ref 0.5–1.3)
EGFR: 89 ML/MIN/1.73M2 — SIGNIFICANT CHANGE UP
EGFR: 89 ML/MIN/1.73M2 — SIGNIFICANT CHANGE UP
GLUCOSE SERPL-MCNC: 140 MG/DL — HIGH (ref 70–99)
GLUCOSE SERPL-MCNC: 140 MG/DL — HIGH (ref 70–99)
HCT VFR BLD CALC: 29.4 % — LOW (ref 34.5–45)
HCT VFR BLD CALC: 29.4 % — LOW (ref 34.5–45)
HGB BLD-MCNC: 9.4 G/DL — LOW (ref 11.5–15.5)
HGB BLD-MCNC: 9.4 G/DL — LOW (ref 11.5–15.5)
MAGNESIUM SERPL-MCNC: 2 MG/DL — SIGNIFICANT CHANGE UP (ref 1.6–2.6)
MAGNESIUM SERPL-MCNC: 2 MG/DL — SIGNIFICANT CHANGE UP (ref 1.6–2.6)
MCHC RBC-ENTMCNC: 31.2 PG — SIGNIFICANT CHANGE UP (ref 27–34)
MCHC RBC-ENTMCNC: 31.2 PG — SIGNIFICANT CHANGE UP (ref 27–34)
MCHC RBC-ENTMCNC: 32 GM/DL — SIGNIFICANT CHANGE UP (ref 32–36)
MCHC RBC-ENTMCNC: 32 GM/DL — SIGNIFICANT CHANGE UP (ref 32–36)
MCV RBC AUTO: 97.7 FL — SIGNIFICANT CHANGE UP (ref 80–100)
MCV RBC AUTO: 97.7 FL — SIGNIFICANT CHANGE UP (ref 80–100)
NRBC # BLD: 0 /100 WBCS — SIGNIFICANT CHANGE UP (ref 0–0)
NRBC # BLD: 0 /100 WBCS — SIGNIFICANT CHANGE UP (ref 0–0)
NRBC # FLD: 0 K/UL — SIGNIFICANT CHANGE UP (ref 0–0)
NRBC # FLD: 0 K/UL — SIGNIFICANT CHANGE UP (ref 0–0)
PHOSPHATE SERPL-MCNC: 2 MG/DL — LOW (ref 2.5–4.5)
PHOSPHATE SERPL-MCNC: 2 MG/DL — LOW (ref 2.5–4.5)
PLATELET # BLD AUTO: 257 K/UL — SIGNIFICANT CHANGE UP (ref 150–400)
PLATELET # BLD AUTO: 257 K/UL — SIGNIFICANT CHANGE UP (ref 150–400)
POTASSIUM SERPL-MCNC: 3.9 MMOL/L — SIGNIFICANT CHANGE UP (ref 3.5–5.3)
POTASSIUM SERPL-MCNC: 3.9 MMOL/L — SIGNIFICANT CHANGE UP (ref 3.5–5.3)
POTASSIUM SERPL-SCNC: 3.9 MMOL/L — SIGNIFICANT CHANGE UP (ref 3.5–5.3)
POTASSIUM SERPL-SCNC: 3.9 MMOL/L — SIGNIFICANT CHANGE UP (ref 3.5–5.3)
RBC # BLD: 3.01 M/UL — LOW (ref 3.8–5.2)
RBC # BLD: 3.01 M/UL — LOW (ref 3.8–5.2)
RBC # FLD: 17.2 % — HIGH (ref 10.3–14.5)
RBC # FLD: 17.2 % — HIGH (ref 10.3–14.5)
SODIUM SERPL-SCNC: 139 MMOL/L — SIGNIFICANT CHANGE UP (ref 135–145)
SODIUM SERPL-SCNC: 139 MMOL/L — SIGNIFICANT CHANGE UP (ref 135–145)
WBC # BLD: 9.19 K/UL — SIGNIFICANT CHANGE UP (ref 3.8–10.5)
WBC # BLD: 9.19 K/UL — SIGNIFICANT CHANGE UP (ref 3.8–10.5)
WBC # FLD AUTO: 9.19 K/UL — SIGNIFICANT CHANGE UP (ref 3.8–10.5)
WBC # FLD AUTO: 9.19 K/UL — SIGNIFICANT CHANGE UP (ref 3.8–10.5)

## 2023-11-17 PROCEDURE — 99233 SBSQ HOSP IP/OBS HIGH 50: CPT

## 2023-11-17 RX ORDER — FUROSEMIDE 40 MG
1 TABLET ORAL
Qty: 0 | Refills: 0 | DISCHARGE
Start: 2023-11-17

## 2023-11-17 RX ORDER — TELMISARTAN 20 MG/1
1 TABLET ORAL
Refills: 0 | DISCHARGE

## 2023-11-17 RX ORDER — HYDROCORTISONE 1 %
1 OINTMENT (GRAM) TOPICAL
Qty: 0 | Refills: 0 | DISCHARGE
Start: 2023-11-17

## 2023-11-17 RX ORDER — SPIRONOLACTONE 25 MG/1
1 TABLET, FILM COATED ORAL
Qty: 0 | Refills: 0 | DISCHARGE
Start: 2023-11-17

## 2023-11-17 RX ORDER — SODIUM,POTASSIUM PHOSPHATES 278-250MG
2 POWDER IN PACKET (EA) ORAL ONCE
Refills: 0 | Status: COMPLETED | OUTPATIENT
Start: 2023-11-17 | End: 2023-11-17

## 2023-11-17 RX ORDER — COLLAGENASE CLOSTRIDIUM HIST. 250 UNIT/G
1 OINTMENT (GRAM) TOPICAL
Qty: 0 | Refills: 0 | DISCHARGE
Start: 2023-11-17

## 2023-11-17 RX ORDER — APIXABAN 2.5 MG/1
2.5 TABLET, FILM COATED ORAL EVERY 12 HOURS
Refills: 0 | Status: DISCONTINUED | OUTPATIENT
Start: 2023-11-17 | End: 2023-11-18

## 2023-11-17 RX ADMIN — NYSTATIN CREAM 1 APPLICATION(S): 100000 CREAM TOPICAL at 17:09

## 2023-11-17 RX ADMIN — APIXABAN 2.5 MILLIGRAM(S): 2.5 TABLET, FILM COATED ORAL at 17:08

## 2023-11-17 RX ADMIN — Medication 81 MILLIGRAM(S): at 12:24

## 2023-11-17 RX ADMIN — AMIODARONE HYDROCHLORIDE 100 MILLIGRAM(S): 400 TABLET ORAL at 05:43

## 2023-11-17 RX ADMIN — Medication 1 APPLICATION(S): at 10:35

## 2023-11-17 RX ADMIN — ENOXAPARIN SODIUM 55 MILLIGRAM(S): 100 INJECTION SUBCUTANEOUS at 05:44

## 2023-11-17 RX ADMIN — PANTOPRAZOLE SODIUM 40 MILLIGRAM(S): 20 TABLET, DELAYED RELEASE ORAL at 05:44

## 2023-11-17 RX ADMIN — Medication 2 TABLET(S): at 17:07

## 2023-11-17 RX ADMIN — Medication 50 MICROGRAM(S): at 05:43

## 2023-11-17 RX ADMIN — SPIRONOLACTONE 25 MILLIGRAM(S): 25 TABLET, FILM COATED ORAL at 05:43

## 2023-11-17 RX ADMIN — Medication 25 MILLIGRAM(S): at 05:43

## 2023-11-17 RX ADMIN — Medication 40 MILLIGRAM(S): at 05:43

## 2023-11-17 NOTE — PROGRESS NOTE ADULT - PROBLEM SELECTOR PLAN 5
- Continue Toprol   - on valsartan   - monitor BP
- Continue Toprol and valsartan with hold parameters   - monitor BP
- Continue Toprol with hold parameters   - valsartan held due to low BP   - monitor BP
- pt states she was told she needs a valve surgery but is unsure when that will take place  - OP cards f/u
- Continue Toprol   - on valsartan   - monitor BP
- pt states she was told she needs a valve surgery but is unsure when that will take place  - OP cards f/u
- Restart Toprol   - holding Telmisartan   - monitor BP
- Continue Toprol   - on valsartan   - monitor BP
- Continue Toprol with hold parameters   - valsartan held due to low BP   - monitor BP
- Continue Toprol   - holding Telmisartan   - monitor BP
- c/w Toprol with hold parameters   - valsartan held due to low BP
- Continue Toprol   - holding Telmisartan   - monitor BP
- Continue Toprol   - on valsartan   - monitor BP
- Continue Toprol   - on valsartan   - monitor BP
- Continue Toprol with hold parameters   - valsartan held due to low BP   - monitor BP
- pt states she was told she needs a valve surgery but is unsure when that will take place  - OP cards f/u

## 2023-11-17 NOTE — PROGRESS NOTE ADULT - PROBLEM SELECTOR PROBLEM 2
Paroxysmal atrial fibrillation
Acute on chronic HFrEF (heart failure with reduced ejection fraction)
Acute hypoxic respiratory failure
Acute on chronic HFrEF (heart failure with reduced ejection fraction)
Acute hypoxic respiratory failure
Acute on chronic HFrEF (heart failure with reduced ejection fraction)
Acute hypoxic respiratory failure

## 2023-11-17 NOTE — PROGRESS NOTE ADULT - SUBJECTIVE AND OBJECTIVE BOX
TEAM [ C ] Surgery Daily Progress Note  =====================================================    SUBJECTIVE: Patient seen and examined at bedside on AM rounds. Denies any acute complaints. Tolerating diet, denies nausea, vomiting. OOB/Amublating as tolerated. Denies fever, chills.      ALLERGIES:  latex (Unknown)  sulfa drugs (Unknown)      --------------------------------------------------------------------------------------    MEDICATIONS:    Neurologic Medications  acetaminophen     Tablet .. 650 milliGRAM(s) Oral every 6 hours PRN Temp greater or equal to 38C (100.4F), Mild Pain (1 - 3)  oxyCODONE    IR 2.5 milliGRAM(s) Oral every 6 hours PRN Moderate Pain (4 - 6)    Respiratory Medications    Cardiovascular Medications  aMIOdarone    Tablet 100 milliGRAM(s) Oral daily  furosemide    Tablet 40 milliGRAM(s) Oral daily  metoprolol succinate ER 25 milliGRAM(s) Oral daily  spironolactone 25 milliGRAM(s) Oral daily    Gastrointestinal Medications  pantoprazole    Tablet 40 milliGRAM(s) Oral before breakfast  potassium phosphate / sodium phosphate Tablet (K-PHOS No. 2) 2 Tablet(s) Oral once    Genitourinary Medications    Hematologic/Oncologic Medications  apixaban 2.5 milliGRAM(s) Oral every 12 hours  aspirin enteric coated 81 milliGRAM(s) Oral daily    Antimicrobial/Immunologic Medications    Endocrine/Metabolic Medications  atorvastatin 80 milliGRAM(s) Oral at bedtime  levothyroxine 50 MICROGram(s) Oral daily    Topical/Other Medications  collagenase Ointment 1 Application(s) Topical daily  hydrocortisone 1% Cream 1 Application(s) Topical daily  nystatin Powder 1 Application(s) Topical two times a day    --------------------------------------------------------------------------------------    VITAL SIGNS:  T(C): 36.6 (11-17-23 @ 04:16), Max: 36.9 (11-16-23 @ 12:11)  HR: 86 (11-17-23 @ 04:16) (79 - 86)  BP: 125/66 (11-17-23 @ 04:16) (107/53 - 125/66)  RR: 17 (11-17-23 @ 04:16) (17 - 18)  SpO2: 98% (11-17-23 @ 04:16) (98% - 100%)  --------------------------------------------------------------------------------------    EXAM    General: NAD, resting in bed comfortably.  Cardiac: regular rate, warm and well perfused  Respiratory: Nonlabored respirations, normal cw expansion.  Abdomen: soft, nontender, nondistended.   Extremities: Dressing to L AKA c/d/i, right groin wound vac in place to suction    --------------------------------------------------------------------------------------    LABS                        9.4    9.19  )-----------( 257      ( 17 Nov 2023 06:42 )             29.4   11-17    139  |  95<L>  |  18  ----------------------------<  140<H>  3.9   |  33<H>  |  0.63    Ca    8.2<L>      17 Nov 2023 06:42  Phos  2.0     11-17  Mg     2.00     11-17      --------------------------------------------------------------------------------------    INS AND OUTS:    11-16-23 @ 07:01  -  11-17-23 @ 07:00  --------------------------------------------------------  IN: 540 mL / OUT: 1000 mL / NET: -460 mL      --------------------------------------------------------------------------------------

## 2023-11-17 NOTE — PROGRESS NOTE ADULT - ASSESSMENT
Assessment:  83F with PMHx of CAD, pAF on Eliquis, severe AS pending TAVR, R internal carotid stenosis, HTN, HLD, hypothyroidism, anxiety, PAD and chronic L toe wounds s/p L ilioprofunda bypass with reverse GSV graft to peroneal 8/31/23 (Dr. Tavares) c/b large L groin abscess (17cm) requiring removal of infected bypass graft of lower extremity and groin washout on 9/24/23. Wound care involved for R groin wound and R calf incision wound. Patient represented from rehab concerns for "cold foot". Pt is poor historian, but she says the wounds have been worse over the 2 days prior to presentation. Remains afebrile and hemodynamically stable s/p LLE diagnostic angiogram on 11/3. Given ischemic foot and external iliac occlusion, patient requires AKA. Patient currently s/p L AKA (11/14)    PLAN:  - Start eliquis today, d/c t. lovenox  - Diet: Regular  - Cardiology consulted, appreciate recs   - holding valsartan  - Med consulted, appreciate recs - no further medical optimization needed  - Wound care following - vac changes M/W/F on groin  - Psychiatry consulted - patient has capacity  - Dispo: PMR recs inpatient rehab    Vascular Surgery

## 2023-11-17 NOTE — PROGRESS NOTE ADULT - PROBLEM SELECTOR PROBLEM 4
Severe aortic stenosis
Paroxysmal atrial fibrillation
Severe aortic stenosis
Paroxysmal atrial fibrillation
Severe aortic stenosis
Paroxysmal atrial fibrillation
Severe aortic stenosis

## 2023-11-17 NOTE — PROGRESS NOTE ADULT - PROBLEM SELECTOR PROBLEM 1
Acute lower limb ischemia

## 2023-11-17 NOTE — PROGRESS NOTE ADULT - PROBLEM SELECTOR PROBLEM 5
Hypertension
Severe aortic stenosis
Hypertension
Severe aortic stenosis
Hypertension
Severe aortic stenosis

## 2023-11-17 NOTE — PROGRESS NOTE ADULT - PROBLEM SELECTOR PROBLEM 3
Acute on chronic HFrEF (heart failure with reduced ejection fraction)
Paroxysmal atrial fibrillation
Chronic HFrEF (heart failure with reduced ejection fraction)
Paroxysmal atrial fibrillation
Acute on chronic HFrEF (heart failure with reduced ejection fraction)
Paroxysmal atrial fibrillation
Paroxysmal atrial fibrillation
Acute on chronic HFrEF (heart failure with reduced ejection fraction)
Paroxysmal atrial fibrillation

## 2023-11-17 NOTE — ADVANCED PRACTICE NURSE CONSULT - RECOMMEDATIONS
Topical recommendations:   ---L groin: NPWT/VAC to be changed every M, W, F. If dressing compromised for >2 hours, please call vascular team, turn off machine, remove dressing and replace with alternative: Cleanse with NS, pat dry. Apply Liquid barrier film to periwound skin (allow to dry). Apply collagenase ointment to base of wound, apply hydrofiber (aquacel) to wound bed, cover with silicone foam with borders. Change daily and PRN if soiled. Once dressings are secured, apply nystatin powder daily to intertriginous folds.   ---Sacrum to BL buttocks: Cleanse with NS, pat dry. Apply Liquid barrier film to periwound skin (allow to dry). Apply hydrocolloid to base of wound. Change every other day and PRN if soiled.  ---Continue low air loss bed therapy, continue heel elevation, continue to turn & reposition per protocol, soft pillow between bony prominences, continue moisture management with barrier creams & single breathable pad, continue measures to decrease friction/shear/pressure.    Please contact Wound/Ostomy Care Service Line if we can be of further assistance (ext [phone]). Please reconsult if changes to tissue type noted.  Topical recommendations:   ---L groin: NPWT/VAC to be changed every M, W, F with collagenase ointment and black granufoam at 125 mmHg. If dressing compromised for >2 hours, please call vascular team, turn off machine, remove dressing and replace with alternative: Cleanse with NS, pat dry. Apply Liquid barrier film to periwound skin (allow to dry). Apply collagenase ointment to base of wound, apply hydrofiber (aquacel) to wound bed, cover with silicone foam with borders. Change daily and PRN if soiled. Once dressings are secured, apply nystatin powder daily to intertriginous folds.   ---Sacrum to BL buttocks: Cleanse with NS, pat dry. Apply Liquid barrier film to periwound skin (allow to dry). Apply hydrocolloid to base of wound. Change every other day and PRN if soiled.  ---Continue low air loss bed therapy, continue heel elevation, continue to turn & reposition per protocol, soft pillow between bony prominences, continue moisture management with barrier creams & single breathable pad, continue measures to decrease friction/shear/pressure.    Please contact Wound/Ostomy Care Service Line if we can be of further assistance (ext [phone]). Please reconsult if changes to tissue type noted.

## 2023-11-17 NOTE — PROGRESS NOTE ADULT - SUBJECTIVE AND OBJECTIVE BOX
PROGRESS NOTE:     Patient is a 83y old  Female who presents with a chief complaint of Ruther 5 CLTI left toe wounds (17 Nov 2023 08:48)      SUBJECTIVE / OVERNIGHT EVENTS: reports feeling fine. Reports leg swelling has improved. Eating fine. No complaints at this time.    ADDITIONAL REVIEW OF SYSTEMS:    MEDICATIONS  (STANDING):  aMIOdarone    Tablet 100 milliGRAM(s) Oral daily  apixaban 2.5 milliGRAM(s) Oral every 12 hours  aspirin enteric coated 81 milliGRAM(s) Oral daily  atorvastatin 80 milliGRAM(s) Oral at bedtime  collagenase Ointment 1 Application(s) Topical daily  furosemide    Tablet 40 milliGRAM(s) Oral daily  hydrocortisone 1% Cream 1 Application(s) Topical daily  levothyroxine 50 MICROGram(s) Oral daily  metoprolol succinate ER 25 milliGRAM(s) Oral daily  nystatin Powder 1 Application(s) Topical two times a day  pantoprazole    Tablet 40 milliGRAM(s) Oral before breakfast  spironolactone 25 milliGRAM(s) Oral daily    MEDICATIONS  (PRN):  acetaminophen     Tablet .. 650 milliGRAM(s) Oral every 6 hours PRN Temp greater or equal to 38C (100.4F), Mild Pain (1 - 3)  oxyCODONE    IR 2.5 milliGRAM(s) Oral every 6 hours PRN Moderate Pain (4 - 6)      CAPILLARY BLOOD GLUCOSE        I&O's Summary    16 Nov 2023 07:01  -  17 Nov 2023 07:00  --------------------------------------------------------  IN: 540 mL / OUT: 1000 mL / NET: -460 mL    17 Nov 2023 07:01  -  17 Nov 2023 19:25  --------------------------------------------------------  IN: 960 mL / OUT: 700 mL / NET: 260 mL        PHYSICAL EXAM:  Vital Signs Last 24 Hrs  T(C): 36.9 (17 Nov 2023 17:01), Max: 36.9 (16 Nov 2023 21:13)  T(F): 98.4 (17 Nov 2023 17:01), Max: 98.5 (16 Nov 2023 21:13)  HR: 80 (17 Nov 2023 17:01) (76 - 104)  BP: 105/59 (17 Nov 2023 17:01) (105/37 - 125/66)  BP(mean): --  RR: 18 (17 Nov 2023 17:01) (17 - 18)  SpO2: 100% (17 Nov 2023 17:01) (90% - 100%)    Parameters below as of 17 Nov 2023 17:01  Patient On (Oxygen Delivery Method): room air        CONSTITUTIONAL: NAD, sitting up in chair comfortably  RESPIRATORY: Normal respiratory effort; lungs are clear to auscultation bilaterally  CARDIOVASCULAR: Regular rate and rhythm, normal S1 and S2, no murmur/rub/gallop; 1+ edema on the RLE  ABDOMEN: Nontender to palpation, normoactive bowel sounds, no rebound/guarding  MUSCLOSKELETAL: L AKA  SKIN: no rash, erythema, lesion  PSYCH: A+O to person, place, and time; affect appropriate    LABS:                        9.4    9.19  )-----------( 257      ( 17 Nov 2023 06:42 )             29.4     11-17    139  |  95<L>  |  18  ----------------------------<  140<H>  3.9   |  33<H>  |  0.63    Ca    8.2<L>      17 Nov 2023 06:42  Phos  2.0     11-17  Mg     2.00     11-17            Urinalysis Basic - ( 17 Nov 2023 06:42 )    Color: x / Appearance: x / SG: x / pH: x  Gluc: 140 mg/dL / Ketone: x  / Bili: x / Urobili: x   Blood: x / Protein: x / Nitrite: x   Leuk Esterase: x / RBC: x / WBC x   Sq Epi: x / Non Sq Epi: x / Bacteria: x          RADIOLOGY & ADDITIONAL TESTS:  Results Reviewed:   Imaging Personally Reviewed:  Electrocardiogram Personally Reviewed:    COORDINATION OF CARE:  Care Discussed with Consultants/Other Providers [Y/N]:  Prior or Outpatient Records Reviewed [Y/N]:

## 2023-11-17 NOTE — PROGRESS NOTE ADULT - PROBLEM SELECTOR PLAN 7
- c/w statin
- c/w levothyroxine 50 mcg daily on empty stomach 30 min prior to meals
- c/w statin
- c/w levothyroxine 50 mcg daily on empty stomach 30 min prior to meals
- c/w statin
- c/w levothyroxine 50 mcg daily on empty stomach 30 min prior to meals
- c/w statin

## 2023-11-17 NOTE — PROGRESS NOTE ADULT - PROBLEM SELECTOR PLAN 4
- echo from 9/23 reviewed - pt with EF of 20-25% along with severe aortic stenosis   - pt states she was told she needs a valve surgery but is unsure when that will take place  - repeat TTE 10/30 EF 23%; no LV thrombus w/ unchanged severe AS
on Eliquis for paroxysmal a fib  - c/w Toprol and Amiodarone 100 mg daily  - Eliquis on hold --> Lovenox
on Eliquis for paroxysmal a fib  - c/w Toprol and Amiodarone 100 mg daily  - Eliquis on hold --> Lovenox pre-OR, now off, resume when cleared by vascular
- echo from 9/23 reviewed - pt with EF of 20-25% along with severe aortic stenosis   - pt states she was told she needs a valve surgery but is unsure when that will take place  - repeat TTE 10/30 EF 23%; no LV thrombus w/ unchanged severe AS
- echo from 9/23 reviewed - pt with EF of 20-25% along with severe aortic stenosis   - pt states she was told she needs a valve surgery but is unsure when that will take place  - repeat TTE 10/30 EF 23%; no LV thrombus w/ unchanged severe AS
- echo from 9/23 reviewed - pt with EF of 20-25% along with severe aortic stenosis   - pt states she was told she needs a valve surgery but is unsure when that will take place  - would recommend cardiology consult
- echo from 9/23 reviewed - pt with EF of 20-25% along with severe aortic stenosis   - pt states she was told she needs a valve surgery but is unsure when that will take place  - repeat TTE 10/30 EF 23%; no LV thrombus w/ unchanged severe AS
- echo from 9/23 reviewed - pt with EF of 20-25% along with severe aortic stenosis   - pt states she was told she needs a valve surgery but is unsure when that will take place  - repeat TTE 10/30 EF 23%; no LV thrombus w/ unchanged severe AS
- echo from 9/23 reviewed - pt with EF of 20-25% along with severe aortic stenosis   - pt states she was told she needs a valve surgery but is unsure when that will take place  - awaiting repeat TTE
- echo from 9/23 reviewed - pt with EF of 20-25% along with severe aortic stenosis   - pt states she was told she needs a valve surgery but is unsure when that will take place  - repeat TTE 10/30 EF 23%; no LV thrombus w/ unchanged severe AS
- echo from 9/23 reviewed - pt with EF of 20-25% along with severe aortic stenosis   - pt states she was told she needs a valve surgery but is unsure when that will take place  - awaiting repeat TTE
- pt states she was told she needs a valve surgery but is unsure when that will take place  - OP cards f/u
on Eliquis for paroxysmal a fib  - c/w Toprol and Amiodarone 100 mg daily  - resumed Eliquis

## 2023-11-17 NOTE — PROGRESS NOTE ADULT - PROBLEM SELECTOR PLAN 6
- continue with current levothyroxine.
- c/w Toprol with hold parameters   - valsartan held due to low BP
- continue with current levothyroxine.
- c/w Toprol with hold parameters   - valsartan held due to low BP, resume as tolerated
- continue with current levothyroxine.
- c/w Toprol with hold parameters   - valsartan held due to low BP
- c/w levothyroxine
- continue with current levothyroxine.

## 2023-11-17 NOTE — PROGRESS NOTE ADULT - PROBLEM SELECTOR PROBLEM 6
Hypertension
Hypothyroidism
Hypertension
Hypothyroidism
Hypertension

## 2023-11-17 NOTE — DISCHARGE NOTE NURSING/CASE MANAGEMENT/SOCIAL WORK - NSDCVIVACCINE_GEN_ALL_CORE_FT
influenza, high-dose, quadrivalent; 08-Sep-2023 12:49; Amy Gaona); [employer]; [id number] (Exp. Date: 06-Sep-2024); IntraMuscular; Deltoid Left.; 0.7 milliLiter(s); VIS (VIS Published: 06-Aug-2021, VIS Presented: 08-Sep-2023);

## 2023-11-17 NOTE — DISCHARGE NOTE NURSING/CASE MANAGEMENT/SOCIAL WORK - NSDCFUADDAPPT_GEN_ALL_CORE_FT
Podiatry Discharge Instructions:  Follow up: Please follow up with Dr. Guzmán within 1 week of discharge from the hospital, please call [phone] for appointment and discuss that you recently were seen in the hospital.  Wound Care: NA  Antibiotics: Please continue as instructed.

## 2023-11-17 NOTE — PROGRESS NOTE ADULT - PROBLEM SELECTOR PROBLEM 7
Hyperlipidemia
Hypothyroidism
Hyperlipidemia
Hypothyroidism
Hypothyroidism

## 2023-11-17 NOTE — ADVANCED PRACTICE NURSE CONSULT - ASSESSMENT
General: A&Ox3, sitting in chair, able to be turned and ambulate with 1x assistance, continent of urine and stool at times. Skin warm, dry with increased moisture in intertriginous folds, scattered areas of hyperpigmentation and hypopigmentation, scattered areas of ecchymosis on bilateral upper extremities. Left foot currently managed by Podiatry team; dressing clean dry and intact. Moisture associated dermatitis, noted to abdominal pannus and b/l groin. No suspected candidiasis. Right wrist noted to have blanchable erythema and scattered dried, serosanguinous scabs. Patient reports that she previously had an ID bracelet at that location that was causing itching and the ID band was previously removed.     L medial LE: Surgical wound measuring [id number] with 40% yellow dry fixed slough and 60% granular/muscular tissue. Scant serous drainage noted, no odor. Periwound hyperpigmentation and hypopigmentation. No increased warmth, no fluctuance, no erythema, no induration, no edema, no overt signs of infection noted at this time. Goals of care: monitor for tissue type changes, enzymatic debridement, antimicrobial support.     L groin: Surgical wound measuring [id number] with 70% pale agranular mixture and 30% yellow moist firmly attached slough. Periwound intact with epibole. Scant serous drainage with no odor. No induration, no crepitus, no fluctuance, no erythema, no edema, no temperature changes, no overt signs of infection noted. Goals of care: monitor for tissue type changes, antimicrobial support, enzymatic debridement.     Sacrum to L buttocks: Healing Stage 2 pressure injury as evidenced by wound located over shameka prominence in natural anatomical laying position measuring 0.5cmx0.5cmx0.2cm with 100% thin film of firmly attached fibrin. Periwound blanchable erythema. No induration, no crepitus, no fluctuance, no candidiasis, no drainage, no edema, no temperature changes, no overt signs of infection noted. Goals of care: prevent from moisture, friction, shearing, excess pressure. 
General: A&Ox4, OOB with assist to chair, continent of urine and stool. Skin warm, dry with increased moisture in intertriginous folds, adequate skin turgor, scattered areas of hyperpigmentation and hypopigmentation. L AKA surgical incision intact with staples.     L groin surgical wound 6cmx0.7cmx1.3cm, 85% red moist granular tissue, 15% white moist slough, small serosanguinous drainage, no odor. Periwound with increased moisture, previously noted suspected fungal rash now improving with mild erythema, no erythema, no increased warmth, no edema, no induration, no fluctuance no signs or symptoms of overt skin infection. Goals: Continue NPWT therapy.    Sacrum - stage 2 pressure injury 0.7cmx0.5cmx0.2cm, 100% pink dermis with macerated wound edges, small serosanguinous drainage no odor. Periwound with no erythema, no increased warmth, no edema, no induration, no fluctuance no signs or symptoms of overt skin infection. Goals of care: offload pressure, protect from friction and shear, monitor for tissue type changes. 
General: A&Ox3, somnolent, able to be turned with 1x assistance, incontinent of urine and stool. Skin warm, dry with increased moisture in intertriginous folds, scattered areas of hyperpigmentation and hypopigmentation, scattered areas of eccyhmosis on bilateral upper extremities. Blanchable erythema on bilateral heels.     Vascular: L LE with ischemic changes noted from distal tips of toes to mid ankle. R foot with blanchable erythema and callous to R heel. Thin, shiny, taught skin. Thickened-yellow hyperpigmented overgrown toenails. Increased coolness from midfoot to distal toes. +2 pitting Edema noted to bilateral hips. Capillary refill <3 seconds. Unable to palpate or retrieve doppler DP/PT pulses to BL LEs.     L medial LE: Surgical wound measuring 69ods3xxx4.2cm with 50% yellow dry fixed slough and 50% granular/muscular tissue. Scant serous drainage noted to inferior aspect of wound. Periwound with induration and blanchable erythema extending >2cm, concern for local infection. Unable to palpate temperature change, but this could be contributed to patient's overall ischemic compromise to L LE. At this time, NPWT/VAC change NOT applied, but alternative dressing.    L groin: Surgical wound measuring 9cmx2.7cmx2.8cm with 80% muscular structure/agranular mixture and 20% yellow fixed fibrin. Periwound intact with epibole. Scant serous drainage with no odor. No induration, no crepitus, no fluctuance, no erythema, no edema, no temperature changes, no overt signs of infection noted. NPWT/VAC applied at bedside.     Sacrum to L buttocks: Stage 2 pressure injury as evidenced by wound located over shameka prominence in natural anatomical laying position measuring 0.8cmx0.5cmx0.2cm with 100% thin film of firmly attached fibrin. Periwound macerated circumferentially. No induration, no crepitus, no fluctuance, no candidiasis, no erythema, no edema, no temperature changes, no overt signs of infection noted. Goals of care: prevent from moisture, friction, shearing, excess pressure.   
General: A&Ox3,OOB with assist, patient sitting in recliner and wants to stay in recliner at this time. NPWT VAC applied to left groin only, See A&I flowsheet for full assessment details. Left abdominal pannus and groin with moisture Associated Dermatitis complicated by suspected candidiasis as evidence by pinpoint satellite lesions visible at the periphery, with erythema more intense in the center.     L medial leg surgical wound 17.5cmx2.8cmx0.7cm, 50% pink granular tissue, 50% yellow-green slough wet moist, firmly attached, small yellow-green fibrinous drainage, no odor. Periwound with 2cm blanchable erythema circumferentially, no induration, no crepitus. Goals of care: antimicrobial support, enzymatic debridement. 
General: A&Ox4, OOB w/ standby assistance, continent of urine and stool. Skin warm, dry with increased moisture in intertriginous folds, scattered areas of hyperpigmentation and hypopigmentation, scattered areas of ecchymosis on bilateral upper extremities with no hematomas. L foot with clean, dry intact dressing per podiatry in place.     L groin: Surgical wound measuring 8svt2vq2.7cm 75% slough and 30% pink agranular/granular tissue. Small to moderate serous drainage, no odor. Periwound is indurated at 12-4 o'clock extending 2cm, and epibole circumferentially. No edema, no temperature changes noted. Goals of care: Chemical debridement, atraumatic dressing removal.     LLE: Surgical wound measuring [id number] 40% slough and 60% pink agranular/granular tissue. Small to moderate serous drainage, no odor. Periwound is indurated and epibole circumferentially. No edema, no temperature changes noted. Goals of care: Chemical debridement, atraumatic dressing removal.

## 2023-11-17 NOTE — PROGRESS NOTE ADULT - PROBLEM SELECTOR PLAN 3
TTE 9/23 with EF of 20-25% and global left ventricular hypokinesis.  - c/w Toprol 25mg daily & spironolactone 25 mg daily  - Valsartan 40mg held due to hypotension, resume as tolerated  - Monitor I/O's, daily weights  - repeat TTE unchanged EF   - c/w Lasix 40 qd  - cards signed off

## 2023-11-17 NOTE — DISCHARGE NOTE NURSING/CASE MANAGEMENT/SOCIAL WORK - PATIENT PORTAL LINK FT
You can access the FollowMyHealth Patient Portal offered by [facility] by registering at the following website: [email or web address]. By joining [employer]’s FollowMyHealth portal, you will also be able to view your health information using other applications (apps) compatible with our system.

## 2023-11-17 NOTE — PROGRESS NOTE ADULT - PROBLEM SELECTOR PLAN 9
therapeutic Lovenox   Severe protein-calorie malnutrition  PT  [facility]
therapeutic Lovenox   Severe protein-calorie malnutrition  PT  [facility]
off ppx/theraputic ac resume per primary  PT eval pending, likely [facility]

## 2023-11-17 NOTE — ADVANCED PRACTICE NURSE CONSULT - REASON FOR CONSULT
Patient known to [facility] service line last seen on 10/30/23 for NPWT/VAC, seen during skin care rounds for NPWT/VAC initiation. On assessment, wound noted to be mostly slough at base of wound with clear serous drainage with no sanguinous color noted. Discussed with vascular team Zach Haile to DC NPWT/VAC and switch to dakins packing to help debride slough at wound base. 
Patient seen for NPWT VAC dressing change. 
Patient seen on skin care rounds after wound care referral received for NPWT VAC placement to LLE and left groin. As per H&P, patient is a 83F with PMHx of CAD, pAF on Eliquis, severe AS pending TAVR, R internal carotid stenosis, HTN, HLD, hypothyroidism, anxiety, PAD and chronic L toe wounds s/p L ilioprofunda bypass with reverse GSV graft to peroneal 8/31/23 (Dr. Tavares) c/b large L groin abscess (17cm) requiring removal of infected bypass graft of lower extremity and groin washout on 9/24/23. Patient has LLE angiogram on 11/3/23. Patient has NPWT VAC placed by Vascular Team on 11/4. Chart reviewed: WBC 10.45, H/H 10.6/32.2, Platelets 221, INR 1.13, Serum albumin 2.9, A1C 5.4, BMI 20.5, Wes 16. 
Patient seen for NPWT VAC dressing change and wound followup. 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
Patient known to [facility] service line last seen on previous admission for NPWT/VAC management, patient seen on skin care rounds after wound care referral received for assessment of skin impairment and recommendations of topical management and NPWT/VAC initiation to L groin and L medial LE. As per H&P, patient is a 83F with PMHx of CAD, pAF on Eliquis, severe AS pending TAVR, R internal carotid stenosis, HTN, HLD, hypothyroidism, anxiety, PAD and chronic L toe wounds s/p L ilioprofunda bypass with reverse GSV graft to peroneal 8/31/23 (Dr. Tavares) c/b large L groin abscess (17cm) requiring removal of infected bypass graft of lower extremity and groin washout on 9/24/23. Wound care involved for R groin wound and R calf incision wound. Pt states she has been getting daily wound care, and today the rehab team was concerned about how cold the foot felt, recommending she come into the hospital. Pt is poor historian, but she says the wounds have been worse over the last 2 days.     Chart reviewed: WBC: 9.26, H&H: 11.2/34.3, Platelets: 224, INR: 1.26, A1C: 5.4, BMI: 20.8, Wes 15, patient on contact 9/24/23 left leg nader SHAH

## 2023-11-17 NOTE — PROGRESS NOTE ADULT - TIME BILLING
Time spent includes direct patient care  (interview and examination of patient), discussion with other providers, support staff and/or patient's family members, review of medical records, ordering diagnostic tests and analyzing results, and documentation.
Adina Kelley is an 83-year-old woman with history of CAD, pAF on Eliquis, severe AS pending TAVR, R internal carotid stenosis, HTN, HLD, hypothyroidism, anxiety and PAD. She has chronic L toe wounds s/p L ilioprofunda bypass with reverse GSV graft to peroneal 8/31/23 (Dr. Tavares) complicated by left groin abscess (17cm) requiring removal of infected bypass graft of lower extremity and groin washout 9/24/23. Peripheral angiogram is now planned. She appeared volume overloaded.  There is plan for TAVR for sever AS. Maintain apixaban (Eliquis) 5 mg oral twice daily and metoprolol succ ER (Toprol XL) 25 mg daily for pAFIB
Reviewed lab data, radiology results, consultants' recommendations, documentation in [place], discussed with family, ACP, interdisciplinary staff and/or intervention were performed.
Reviewed lab data, radiology results, consultants' recommendations, documentation in [place], discussed with family, ACP, interdisciplinary staff and/or intervention were performed.
Time spent includes direct patient care  (interview and examination of patient), discussion with other providers, support staff and/or patient's family members, review of medical records, ordering diagnostic tests and analyzing results, and documentation.
Face to face encounter. Reviewed labs, vitals, imaging. Reviewed consultant recs. Discussed plan of care with patient. Documentation in [place].
Time spent includes direct patient care  (interview and examination of patient), discussion with other providers, support staff and/or patient's family members, review of medical records, ordering diagnostic tests and analyzing results, and documentation.

## 2023-11-18 VITALS
TEMPERATURE: 98 F | SYSTOLIC BLOOD PRESSURE: 129 MMHG | OXYGEN SATURATION: 96 % | HEART RATE: 74 BPM | RESPIRATION RATE: 17 BRPM | DIASTOLIC BLOOD PRESSURE: 61 MMHG

## 2023-11-18 LAB
ANION GAP SERPL CALC-SCNC: 8 MMOL/L — SIGNIFICANT CHANGE UP (ref 7–14)
ANION GAP SERPL CALC-SCNC: 8 MMOL/L — SIGNIFICANT CHANGE UP (ref 7–14)
BUN SERPL-MCNC: 19 MG/DL — SIGNIFICANT CHANGE UP (ref 7–23)
BUN SERPL-MCNC: 19 MG/DL — SIGNIFICANT CHANGE UP (ref 7–23)
CALCIUM SERPL-MCNC: 8.6 MG/DL — SIGNIFICANT CHANGE UP (ref 8.4–10.5)
CALCIUM SERPL-MCNC: 8.6 MG/DL — SIGNIFICANT CHANGE UP (ref 8.4–10.5)
CHLORIDE SERPL-SCNC: 94 MMOL/L — LOW (ref 98–107)
CHLORIDE SERPL-SCNC: 94 MMOL/L — LOW (ref 98–107)
CO2 SERPL-SCNC: 33 MMOL/L — HIGH (ref 22–31)
CO2 SERPL-SCNC: 33 MMOL/L — HIGH (ref 22–31)
CREAT SERPL-MCNC: 0.58 MG/DL — SIGNIFICANT CHANGE UP (ref 0.5–1.3)
CREAT SERPL-MCNC: 0.58 MG/DL — SIGNIFICANT CHANGE UP (ref 0.5–1.3)
EGFR: 90 ML/MIN/1.73M2 — SIGNIFICANT CHANGE UP
EGFR: 90 ML/MIN/1.73M2 — SIGNIFICANT CHANGE UP
GLUCOSE SERPL-MCNC: 148 MG/DL — HIGH (ref 70–99)
GLUCOSE SERPL-MCNC: 148 MG/DL — HIGH (ref 70–99)
HCT VFR BLD CALC: 29.6 % — LOW (ref 34.5–45)
HCT VFR BLD CALC: 29.6 % — LOW (ref 34.5–45)
HGB BLD-MCNC: 9.5 G/DL — LOW (ref 11.5–15.5)
HGB BLD-MCNC: 9.5 G/DL — LOW (ref 11.5–15.5)
MAGNESIUM SERPL-MCNC: 2 MG/DL — SIGNIFICANT CHANGE UP (ref 1.6–2.6)
MAGNESIUM SERPL-MCNC: 2 MG/DL — SIGNIFICANT CHANGE UP (ref 1.6–2.6)
MCHC RBC-ENTMCNC: 30.9 PG — SIGNIFICANT CHANGE UP (ref 27–34)
MCHC RBC-ENTMCNC: 30.9 PG — SIGNIFICANT CHANGE UP (ref 27–34)
MCHC RBC-ENTMCNC: 32.1 GM/DL — SIGNIFICANT CHANGE UP (ref 32–36)
MCHC RBC-ENTMCNC: 32.1 GM/DL — SIGNIFICANT CHANGE UP (ref 32–36)
MCV RBC AUTO: 96.4 FL — SIGNIFICANT CHANGE UP (ref 80–100)
MCV RBC AUTO: 96.4 FL — SIGNIFICANT CHANGE UP (ref 80–100)
NRBC # BLD: 0 /100 WBCS — SIGNIFICANT CHANGE UP (ref 0–0)
NRBC # BLD: 0 /100 WBCS — SIGNIFICANT CHANGE UP (ref 0–0)
NRBC # FLD: 0 K/UL — SIGNIFICANT CHANGE UP (ref 0–0)
NRBC # FLD: 0 K/UL — SIGNIFICANT CHANGE UP (ref 0–0)
PHOSPHATE SERPL-MCNC: 2.2 MG/DL — LOW (ref 2.5–4.5)
PHOSPHATE SERPL-MCNC: 2.2 MG/DL — LOW (ref 2.5–4.5)
PLATELET # BLD AUTO: 273 K/UL — SIGNIFICANT CHANGE UP (ref 150–400)
PLATELET # BLD AUTO: 273 K/UL — SIGNIFICANT CHANGE UP (ref 150–400)
POTASSIUM SERPL-MCNC: 3.7 MMOL/L — SIGNIFICANT CHANGE UP (ref 3.5–5.3)
POTASSIUM SERPL-MCNC: 3.7 MMOL/L — SIGNIFICANT CHANGE UP (ref 3.5–5.3)
POTASSIUM SERPL-SCNC: 3.7 MMOL/L — SIGNIFICANT CHANGE UP (ref 3.5–5.3)
POTASSIUM SERPL-SCNC: 3.7 MMOL/L — SIGNIFICANT CHANGE UP (ref 3.5–5.3)
RBC # BLD: 3.07 M/UL — LOW (ref 3.8–5.2)
RBC # BLD: 3.07 M/UL — LOW (ref 3.8–5.2)
RBC # FLD: 17 % — HIGH (ref 10.3–14.5)
RBC # FLD: 17 % — HIGH (ref 10.3–14.5)
SODIUM SERPL-SCNC: 135 MMOL/L — SIGNIFICANT CHANGE UP (ref 135–145)
SODIUM SERPL-SCNC: 135 MMOL/L — SIGNIFICANT CHANGE UP (ref 135–145)
WBC # BLD: 7.97 K/UL — SIGNIFICANT CHANGE UP (ref 3.8–10.5)
WBC # BLD: 7.97 K/UL — SIGNIFICANT CHANGE UP (ref 3.8–10.5)
WBC # FLD AUTO: 7.97 K/UL — SIGNIFICANT CHANGE UP (ref 3.8–10.5)
WBC # FLD AUTO: 7.97 K/UL — SIGNIFICANT CHANGE UP (ref 3.8–10.5)

## 2023-11-18 RX ORDER — SODIUM,POTASSIUM PHOSPHATES 278-250MG
1 POWDER IN PACKET (EA) ORAL ONCE
Refills: 0 | Status: COMPLETED | OUTPATIENT
Start: 2023-11-18 | End: 2023-11-18

## 2023-11-18 RX ORDER — TELMISARTAN 20 MG/1
1 TABLET ORAL
Qty: 0 | Refills: 0 | DISCHARGE
Start: 2023-11-18

## 2023-11-18 RX ORDER — VALSARTAN 80 MG/1
1 TABLET ORAL
Qty: 30 | Refills: 0
Start: 2023-11-18

## 2023-11-18 RX ORDER — VALSARTAN 80 MG/1
40 TABLET ORAL DAILY
Refills: 0 | Status: DISCONTINUED | OUTPATIENT
Start: 2023-11-18 | End: 2023-11-18

## 2023-11-18 RX ADMIN — Medication 81 MILLIGRAM(S): at 12:24

## 2023-11-18 RX ADMIN — NYSTATIN CREAM 1 APPLICATION(S): 100000 CREAM TOPICAL at 05:34

## 2023-11-18 RX ADMIN — APIXABAN 2.5 MILLIGRAM(S): 2.5 TABLET, FILM COATED ORAL at 05:34

## 2023-11-18 RX ADMIN — PANTOPRAZOLE SODIUM 40 MILLIGRAM(S): 20 TABLET, DELAYED RELEASE ORAL at 05:35

## 2023-11-18 RX ADMIN — Medication 25 MILLIGRAM(S): at 05:34

## 2023-11-18 RX ADMIN — Medication 1 TABLET(S): at 12:25

## 2023-11-18 RX ADMIN — Medication 50 MICROGRAM(S): at 05:34

## 2023-11-18 RX ADMIN — SPIRONOLACTONE 25 MILLIGRAM(S): 25 TABLET, FILM COATED ORAL at 05:35

## 2023-11-18 RX ADMIN — Medication 40 MILLIGRAM(S): at 05:35

## 2023-11-18 RX ADMIN — Medication 650 MILLIGRAM(S): at 00:19

## 2023-11-18 RX ADMIN — AMIODARONE HYDROCHLORIDE 100 MILLIGRAM(S): 400 TABLET ORAL at 05:35

## 2023-11-18 RX ADMIN — Medication 650 MILLIGRAM(S): at 01:00

## 2023-11-18 NOTE — CHART NOTE - NSCHARTNOTEFT_GEN_A_CORE
Patient left prior to being seen. Per primary team and nursing, patient was doing well, comfortable on room air. Vitals and labs reviewed. Recommend to resume ARBs today.

## 2023-11-18 NOTE — CHART NOTE - NSCHARTNOTESELECT_GEN_ALL_CORE
Event Note
Event Note
Nutrition Services
PSYCHIATRY NOTE/Event Note
Podiatry/Event Note
Post Op Check/Event Note
Post op check/Event Note

## 2023-11-18 NOTE — PROGRESS NOTE ADULT - ASSESSMENT
83F with PMHx of CAD, pAF on Eliquis, severe AS pending TAVR, R internal carotid stenosis, HTN, HLD, hypothyroidism, anxiety, PAD and chronic L toe wounds s/p L ilioprofunda bypass with reverse GSV graft to peroneal 8/31/23 (Dr. Tavares) c/b large L groin abscess (17cm) requiring removal of infected bypass graft of lower extremity and groin washout on 9/24/23. Wound care involved for R groin wound and R calf incision wound. Patient represented from rehab concerns for "cold foot". Pt is poor historian, but she says the wounds have been worse over the 2 days prior to presentation. Remains afebrile and hemodynamically stable s/p LLE diagnostic angiogram on 11/3. Given ischemic foot and external iliac occlusion, patient underwent L AKA 11/14, recovering well.    PLAN:  - Discharge to rehab today  - Continue wound vac to groin, paperwork completed  - Continue Eliquis  - Diet: Regular  - Cardiology consulted, appreciate recs   - Med consulted, appreciate recs - resume valsartan today    Vascular Surgery  [id number]

## 2023-11-18 NOTE — PROGRESS NOTE ADULT - REASON FOR ADMISSION
[facility] CLTI left toe wounds

## 2023-11-18 NOTE — PROGRESS NOTE ADULT - SUBJECTIVE AND OBJECTIVE BOX
SURGERY DAILY PROGRESS NOTE    SUBJECTIVE: Patient seen and examined at bedside. Reports she is doing well, pain is well-controlled. Denies CP, SOB, fevers/chills, N/V.      OBJECTIVE:  Vital Signs Last 24 Hrs  T(C): 36.6 (18 Nov 2023 08:38), Max: 36.9 (17 Nov 2023 17:01)  T(F): 97.9 (18 Nov 2023 08:38), Max: 98.4 (17 Nov 2023 17:01)  HR: 74 (18 Nov 2023 08:38) (74 - 91)  BP: 129/61 (18 Nov 2023 08:38) (105/59 - 129/61)  BP(mean): --  RR: 17 (18 Nov 2023 08:38) (17 - 18)  SpO2: 96% (18 Nov 2023 08:38) (90% - 100%)    Parameters below as of 18 Nov 2023 08:38  Patient On (Oxygen Delivery Method): room air        I&O's Summary    17 Nov 2023 07:01  -  18 Nov 2023 07:00  --------------------------------------------------------  IN: 1410 mL / OUT: 950 mL / NET: 460 mL    18 Nov 2023 07:01  -  18 Nov 2023 12:28  --------------------------------------------------------  IN: 100 mL / OUT: 300 mL / NET: -200 mL        Physical Exam:  General Appearance: Appears well, NAD, A& O x 3  Neck: Supple  Chest: Equal expansion bilaterally, equal breath sounds  CV: Pulse regular presently  Extremities: s/p left AKA. Wound vac to left groin holding suction  Vascular: AKA incision C/D/I with staples, no active bleeding, erythema, purulence, dehiscence. New dressing applied    LABS:                        9.5    7.97  )-----------( 273      ( 18 Nov 2023 06:44 )             29.6     11-18    135  |  94<L>  |  19  ----------------------------<  148<H>  3.7   |  33<H>  |  0.58    Ca    8.6      18 Nov 2023 06:44  Phos  2.2     11-18  Mg     2.00     11-18        Urinalysis Basic - ( 18 Nov 2023 06:44 )    Color: x / Appearance: x / SG: x / pH: x  Gluc: 148 mg/dL / Ketone: x  / Bili: x / Urobili: x   Blood: x / Protein: x / Nitrite: x   Leuk Esterase: x / RBC: x / WBC x   Sq Epi: x / Non Sq Epi: x / Bacteria: x        RADIOLOGY & ADDITIONAL STUDIES:

## 2023-11-21 ENCOUNTER — NON-APPOINTMENT (OUTPATIENT)
Age: 83
End: 2023-11-21

## 2023-11-22 LAB
SURGICAL PATHOLOGY STUDY: SIGNIFICANT CHANGE UP
SURGICAL PATHOLOGY STUDY: SIGNIFICANT CHANGE UP

## 2023-12-13 ENCOUNTER — APPOINTMENT (OUTPATIENT)
Dept: VASCULAR SURGERY | Facility: CLINIC | Age: 83
End: 2023-12-13
Payer: MEDICARE

## 2023-12-13 VITALS
HEIGHT: 64 IN | SYSTOLIC BLOOD PRESSURE: 89 MMHG | TEMPERATURE: 97.5 F | BODY MASS INDEX: 22.2 KG/M2 | WEIGHT: 130 LBS | HEART RATE: 70 BPM | DIASTOLIC BLOOD PRESSURE: 44 MMHG

## 2023-12-13 PROCEDURE — 93922 UPR/L XTREMITY ART 2 LEVELS: CPT | Mod: TC

## 2023-12-13 PROCEDURE — 99212 OFFICE O/P EST SF 10 MIN: CPT | Mod: 24

## 2023-12-13 PROCEDURE — 93922 UPR/L XTREMITY ART 2 LEVELS: CPT

## 2023-12-14 ENCOUNTER — APPOINTMENT (OUTPATIENT)
Dept: CARDIOLOGY | Facility: CLINIC | Age: 83
End: 2023-12-14
Payer: MEDICARE

## 2023-12-14 VITALS
HEIGHT: 64 IN | SYSTOLIC BLOOD PRESSURE: 84 MMHG | OXYGEN SATURATION: 100 % | DIASTOLIC BLOOD PRESSURE: 40 MMHG | HEART RATE: 76 BPM

## 2023-12-14 DIAGNOSIS — R09.82 POSTNASAL DRIP: ICD-10-CM

## 2023-12-14 DIAGNOSIS — I73.9 PERIPHERAL VASCULAR DISEASE, UNSPECIFIED: ICD-10-CM

## 2023-12-14 PROCEDURE — 99214 OFFICE O/P EST MOD 30 MIN: CPT

## 2023-12-14 PROCEDURE — 93000 ELECTROCARDIOGRAM COMPLETE: CPT

## 2023-12-14 NOTE — HISTORY OF PRESENT ILLNESS
[FreeTextEntry1] : 84 yo female presents for evaluation post hospitalization for left leg vascular surgery with graft failure and abscess formation of her graft. S/P DAIN. Pt is in rehab. She denies chest pain or shortness of breath. She is doing PT 2x day. Medications were reviewed. Pt is complaining of congested nose and ears, impairing her hearing.

## 2023-12-14 NOTE — DISCUSSION/SUMMARY
[Peripheral Vascular Disease] : peripheral vascular disease [Patient] : the patient [EKG obtained to assist in diagnosis and management of assessed problem(s)] : EKG obtained to assist in diagnosis and management of assessed problem(s) [FreeTextEntry1] : Pt will follow up. If her blood pressure remains borderline ( pt reports intermittent dizziness) spironolactone can be discontinue. Pt should have ENT evaluation and hearing exam with Dr. Dawson. Pt will follow up in 4 months.

## 2023-12-14 NOTE — PHYSICAL EXAM

## 2023-12-14 NOTE — HISTORY OF PRESENT ILLNESS
[FreeTextEntry1] : 82 yo female presents for evaluation post hospitalization for left leg vascular surgery with graft failure and abscess formation of her graft. S/P DAIN. Pt is in rehab. She denies chest pain or shortness of breath. She is doing PT 2x day. Medications were reviewed. Pt is complaining of congested nose and ears, impairing her hearing.

## 2023-12-14 NOTE — PHYSICAL EXAM

## 2023-12-15 NOTE — DISCUSSION/SUMMARY
[FreeTextEntry1] : 84 y/o F  s/p LLE EIA to profunda femoral artery bypass with dacron graft and jump graft from ileo profunda bypass to peroneal artery using reverse saphenous vein on 8/31/2023 for nonhealing left toe #1 ulceration. Her post operative course was complicated by a surgical site infection requiring explant of the graft, thigh debridement, and eventual above knee amputation.   Her amputation site is healed, She needs to continue with local wound care to the area. Her right heel does not have full thickness injury to it. She needs appropriate offloading. We will see her in 3 weeks for follow up.   She is ready for prosthetic will contact the company to start the process

## 2023-12-15 NOTE — REASON FOR VISIT
[de-identified] : 84 y/o F  s/p LLE EIA to profunda femoral artery bypass with dacron graft and jump graft from ileo profunda bypass to peroneal artery using reverse saphenous vein on 8/31/2023 for nonhealing left toe #1 ulceration. Her post operative course was complicated by a surgical site infection requiring explant of the graft, thigh debridements, and eventual above knee amputation.   She is still at rehab facility. She is engaging in physcial therapy twice a week. She has developed pressure wound on her right heel.

## 2023-12-15 NOTE — PHYSICAL EXAM
[Normal Rate and Rhythm] : normal rate and rhythm [No Rash or Lesion] : No rash or lesion [Ankle Swelling (On Exam)] : not present [Varicose Veins Of Lower Extremities] : not present [] : not present [de-identified] : No acute distress [de-identified] : Left groin wound healed. Small punctate opening in mid wound. No drainage.  Left above knee amputation site clean and dry and intact. Staples removed.

## 2023-12-20 ENCOUNTER — APPOINTMENT (OUTPATIENT)
Dept: VASCULAR SURGERY | Facility: CLINIC | Age: 83
End: 2023-12-20

## 2024-01-01 NOTE — DISCHARGE NOTE NURSING/CASE MANAGEMENT/SOCIAL WORK - PATIENT PORTAL LINK FT
You can access the FollowMyHealth Patient Portal offered by [facility] by registering at the following website: [email or web address]. By joining [employer]’s FollowMyHealth portal, you will also be able to view your health information using other applications (apps) compatible with our system. 8

## 2024-01-10 ENCOUNTER — APPOINTMENT (OUTPATIENT)
Dept: VASCULAR SURGERY | Facility: CLINIC | Age: 84
End: 2024-01-10

## 2024-01-11 ENCOUNTER — APPOINTMENT (OUTPATIENT)
Dept: OTOLARYNGOLOGY | Facility: CLINIC | Age: 84
End: 2024-01-11
Payer: COMMERCIAL

## 2024-01-11 VITALS — BODY MASS INDEX: 23.04 KG/M2 | HEIGHT: 63 IN | WEIGHT: 130 LBS

## 2024-01-11 DIAGNOSIS — J31.0 CHRONIC RHINITIS: ICD-10-CM

## 2024-01-11 DIAGNOSIS — H69.93 UNSPECIFIED EUSTACHIAN TUBE DISORDER, BILATERAL: ICD-10-CM

## 2024-01-11 DIAGNOSIS — H90.3 SENSORINEURAL HEARING LOSS, BILATERAL: ICD-10-CM

## 2024-01-11 PROCEDURE — 92504 EAR MICROSCOPY EXAMINATION: CPT

## 2024-01-11 PROCEDURE — 99203 OFFICE O/P NEW LOW 30 MIN: CPT | Mod: 25

## 2024-01-11 PROCEDURE — 92557 COMPREHENSIVE HEARING TEST: CPT

## 2024-01-11 PROCEDURE — 92567 TYMPANOMETRY: CPT

## 2024-01-11 NOTE — PROCEDURE
[Cerumen Impaction] : Cerumen Impaction [FreeTextEntry6] : Indication: ear plugging and discomfort Large amount cerumen cleared left and right ear instrumentation with curettes, forceps and suction. Ear canals and tympanic membranes  unremarkable.

## 2024-01-11 NOTE — DATA REVIEWED
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

## 2024-01-11 NOTE — ASSESSMENT
[FreeTextEntry1] : cerumen cleared au audio profound loss ad as moderate loss rhinitis eust tube dys fluticasone spray pred 10 #15

## 2024-01-11 NOTE — HISTORY OF PRESENT ILLNESS
[de-identified] : co ear plugging and echo sensation past few weeks no uri co nasal pressure longstanding ad loss

## 2024-02-13 NOTE — PROVIDER CONTACT NOTE (CRITICAL VALUE NOTIFICATION) - TEST AND RESULT REPORTED:
Detail Level: Simple
Instructions: This plan will send the code FBSE to the PM system.  DO NOT or CHANGE the price.
Potassium 2.8
Price (Do Not Change): 0.00
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
## 2024-02-29 ENCOUNTER — APPOINTMENT (OUTPATIENT)
Dept: CARDIOLOGY | Facility: CLINIC | Age: 84
End: 2024-02-29
Payer: MEDICARE

## 2024-02-29 ENCOUNTER — NON-APPOINTMENT (OUTPATIENT)
Age: 84
End: 2024-02-29

## 2024-02-29 VITALS
DIASTOLIC BLOOD PRESSURE: 74 MMHG | HEART RATE: 68 BPM | HEIGHT: 63 IN | OXYGEN SATURATION: 100 % | SYSTOLIC BLOOD PRESSURE: 150 MMHG

## 2024-02-29 DIAGNOSIS — I35.0 NONRHEUMATIC AORTIC (VALVE) STENOSIS: ICD-10-CM

## 2024-02-29 DIAGNOSIS — I48.0 PAROXYSMAL ATRIAL FIBRILLATION: ICD-10-CM

## 2024-02-29 PROCEDURE — 99214 OFFICE O/P EST MOD 30 MIN: CPT

## 2024-02-29 PROCEDURE — 93000 ELECTROCARDIOGRAM COMPLETE: CPT

## 2024-02-29 RX ORDER — NYSTATIN 100000 [USP'U]/G
100000 CREAM TOPICAL 3 TIMES DAILY
Qty: 15 | Refills: 0 | Status: DISCONTINUED | COMMUNITY
Start: 2021-09-16 | End: 2024-02-29

## 2024-02-29 RX ORDER — TELMISARTAN 40 MG/1
40 TABLET ORAL DAILY
Qty: 90 | Refills: 2 | Status: DISCONTINUED | COMMUNITY
Start: 2021-04-29 | End: 2024-02-29

## 2024-02-29 RX ORDER — TELMISARTAN 20 MG/1
20 TABLET ORAL DAILY
Refills: 0 | Status: ACTIVE | COMMUNITY

## 2024-02-29 RX ORDER — PREDNISONE 10 MG/1
10 TABLET ORAL TWICE DAILY
Qty: 15 | Refills: 2 | Status: DISCONTINUED | COMMUNITY
Start: 2024-01-11 | End: 2024-02-29

## 2024-02-29 RX ORDER — FLUTICASONE PROPIONATE 50 UG/1
50 SPRAY, METERED NASAL
Qty: 16 | Refills: 6 | Status: DISCONTINUED | COMMUNITY
Start: 2024-01-11 | End: 2024-02-29

## 2024-02-29 RX ORDER — ROSUVASTATIN CALCIUM 5 MG/1
5 TABLET, FILM COATED ORAL
Qty: 30 | Refills: 0 | Status: ACTIVE | COMMUNITY

## 2024-02-29 RX ORDER — METOPROLOL SUCCINATE 50 MG/1
50 TABLET, EXTENDED RELEASE ORAL
Qty: 90 | Refills: 3 | Status: ACTIVE | COMMUNITY
Start: 2020-02-06

## 2024-02-29 RX ORDER — FUROSEMIDE 20 MG/1
20 TABLET ORAL DAILY
Qty: 1 | Refills: 0 | Status: DISCONTINUED | COMMUNITY
Start: 2023-08-01 | End: 2024-02-29

## 2024-02-29 NOTE — HISTORY OF PRESENT ILLNESS
[FreeTextEntry1] : 83 yo female PMH: PAF on oral AC, HLD, HTN, CAD S/P CC 2015 for which medical therapy was recommended ( of the RCA and LCX, severe distal LAD disease, and normal LV function, left leg vascular surgery with graft failure and abscess formation of her graft. S/P LAKA awaiting prosthesis, AS had consult with Dr Serna  Here in routine cardiac follow up

## 2024-02-29 NOTE — DISCUSSION/SUMMARY
[EKG obtained to assist in diagnosis and management of assessed problem(s)] : EKG obtained to assist in diagnosis and management of assessed problem(s) [FreeTextEntry1] : Here today in routine cardiac follow up with the above hx,  Severe AS; Had previoulsy seen Dr Serna which I have advised further follow up now Echo 12/2022 severe AS with NL LV FX  Repeat Echo now   Vascular disease s/p surgery with graft failure and abscess formation of her graft. S/P LAKA continued care with Vascular surgeon, awaiting prosthesis   CAD: No active cardiac complaints   HTN: Controlled  AF: Currently SR rate controlled CW oral AC  AF: CW Amiodarone I have recommended Pulmonary consult, TSH/LFT's NL  Labs ordered   OV 2 months   Plan DW patient

## 2024-02-29 NOTE — PHYSICAL EXAM
[Normal S1, S2] : normal S1, S2 [Soft] : abdomen soft [Non Tender] : non-tender [Normal] : moves all extremities, no focal deficits, normal speech [No Edema] : no edema [Alert and Oriented] : alert and oriented [de-identified] : + SYS Murmur  [de-identified] : No RLE Edema  [de-identified] : wheelchair DAIN

## 2024-03-01 LAB
24R-OH-CALCIDIOL SERPL-MCNC: 35.3 PG/ML
ALBUMIN SERPL ELPH-MCNC: 4.2 G/DL
ALP BLD-CCNC: 132 U/L
ALT SERPL-CCNC: 8 U/L
ANION GAP SERPL CALC-SCNC: 10 MMOL/L
AST SERPL-CCNC: 12 U/L
BILIRUB SERPL-MCNC: 0.5 MG/DL
BUN SERPL-MCNC: 19 MG/DL
CALCIUM SERPL-MCNC: 9.5 MG/DL
CHLORIDE SERPL-SCNC: 99 MMOL/L
CHOLEST SERPL-MCNC: 145 MG/DL
CO2 SERPL-SCNC: 27 MMOL/L
CREAT SERPL-MCNC: 0.74 MG/DL
EGFR: 80 ML/MIN/1.73M2
GLUCOSE SERPL-MCNC: 102 MG/DL
HCT VFR BLD CALC: 35.5 %
HDLC SERPL-MCNC: 51 MG/DL
HGB BLD-MCNC: 11.7 G/DL
LDLC SERPL CALC-MCNC: 77 MG/DL
MCHC RBC-ENTMCNC: 33 GM/DL
MCHC RBC-ENTMCNC: 33.1 PG
MCV RBC AUTO: 100.3 FL
NONHDLC SERPL-MCNC: 94 MG/DL
PLATELET # BLD AUTO: 240 K/UL
POTASSIUM SERPL-SCNC: 4.3 MMOL/L
PROT SERPL-MCNC: 7.1 G/DL
RBC # BLD: 3.54 M/UL
RBC # FLD: 14.6 %
SODIUM SERPL-SCNC: 137 MMOL/L
TRIGL SERPL-MCNC: 87 MG/DL
TSH SERPL-ACNC: 4.19 UIU/ML
WBC # FLD AUTO: 8.6 K/UL

## 2024-03-03 ENCOUNTER — RX RENEWAL (OUTPATIENT)
Age: 84
End: 2024-03-03

## 2024-03-03 RX ORDER — LEVOTHYROXINE SODIUM 0.05 MG/1
50 TABLET ORAL
Qty: 90 | Refills: 0 | Status: ACTIVE | COMMUNITY
Start: 2022-04-12 | End: 1900-01-01

## 2024-03-07 ENCOUNTER — APPOINTMENT (OUTPATIENT)
Dept: CARDIOLOGY | Facility: CLINIC | Age: 84
End: 2024-03-07

## 2024-03-13 ENCOUNTER — APPOINTMENT (OUTPATIENT)
Dept: INTERNAL MEDICINE | Facility: CLINIC | Age: 84
End: 2024-03-13
Payer: MEDICARE

## 2024-03-13 VITALS
BODY MASS INDEX: 20.38 KG/M2 | WEIGHT: 115 LBS | TEMPERATURE: 98.2 F | OXYGEN SATURATION: 99 % | SYSTOLIC BLOOD PRESSURE: 108 MMHG | DIASTOLIC BLOOD PRESSURE: 70 MMHG | HEIGHT: 63 IN | HEART RATE: 71 BPM

## 2024-03-13 DIAGNOSIS — S78.119A COMPLETE TRAUMATIC AMPUTATION AT LVL BETWEEN UNSPECIFIED HIP AND KNEE, INITIAL ENCOUNTER: ICD-10-CM

## 2024-03-13 DIAGNOSIS — I48.91 UNSPECIFIED ATRIAL FIBRILLATION: ICD-10-CM

## 2024-03-13 DIAGNOSIS — R21 RASH AND OTHER NONSPECIFIC SKIN ERUPTION: ICD-10-CM

## 2024-03-13 DIAGNOSIS — I65.29 OCCLUSION AND STENOSIS OF UNSPECIFIED CAROTID ARTERY: ICD-10-CM

## 2024-03-13 DIAGNOSIS — I10 ESSENTIAL (PRIMARY) HYPERTENSION: ICD-10-CM

## 2024-03-13 DIAGNOSIS — E03.9 HYPOTHYROIDISM, UNSPECIFIED: ICD-10-CM

## 2024-03-13 PROCEDURE — G2211 COMPLEX E/M VISIT ADD ON: CPT

## 2024-03-13 PROCEDURE — 99214 OFFICE O/P EST MOD 30 MIN: CPT

## 2024-03-13 NOTE — PHYSICAL EXAM
[Normal Rate] : normal rate  [Regular Rhythm] : with a regular rhythm [Normal S1, S2] : normal S1 and S2 [Normal] : affect was normal and insight and judgment were intact [de-identified] : LAKA, RLE no edema. [de-identified] : Maculopapular rash noted to bilateral forearms and chest area.

## 2024-03-13 NOTE — HISTORY OF PRESENT ILLNESS
[FreeTextEntry1] : Follow up. [de-identified] : 84 year female presents to the office for follow up on HTN, HLD, AFib, CAD, vascular disease s/p surgery with graft failure, s/p LAKA. Has appt next Tuesday for prosthesis fitting. Recent labs done by Cardiology reviewed, alk phos 132, repeat in 1 month. Afib: on Eliquis 2.5mg daily and Amiodarone 200mg daily. Seen by Cardiology 2/24. HTN: on Telmisartan 20mg daily and Metoprolol 25mg daily. Hypothyroid: On Levothyroxine 50mcg daily. HLD: On Crestor 5mg daily. Patient reports rash on bilateral arms and chest since returning home from rehab, using a new dove soap. Denies chest pain, palpitations, SOB, fever, chills, headache, blurry vision, lightheadedness, dizziness, abdominal pain, nausea, vomiting, constipation, diarrhea.

## 2024-03-13 NOTE — ASSESSMENT
[FreeTextEntry1] : Afib: Continue Eliquis 2.5mg daily and Amiodarone 200mg daily.  HTN: Continue Telmisartan 20mg daily and Metoprolol 25mg daily. Low salt diet, stay hydrated.  Hypothyroid: Continue Levothyroxine 50mcg daily.  HLD: Continue Crestor 5mg daily. Low fat/low cholesterol diet.  Rash: Advised to use hydrocortisone, avoid itching, stop using new soap, see derm if rash persists.

## 2024-04-11 NOTE — OCCUPATIONAL THERAPY INITIAL EVALUATION ADULT - SHORT TERM MEMORY, REHAB EVAL
Problem: PHYSICAL THERAPY ADULT  Goal: Performs mobility at highest level of function for planned discharge setting.  See evaluation for individualized goals.  Description: Treatment/Interventions: Functional transfer training, LE strengthening/ROM, Elevations, Therapeutic exercise, Equipment eval/education, Bed mobility, Gait training, Compensatory technique education, Spoke to MD, Spoke to nursing, OT, Spoke to case management  Equipment Recommended: Walker (pt owns)       See flowsheet documentation for full assessment, interventions and recommendations.  Outcome: Adequate for Discharge  Note: Prognosis: Good  Problem List: Decreased strength, Decreased endurance, Impaired balance, Decreased mobility, Decreased safety awareness, Impaired judgement, Decreased skin integrity, Pain  Assessment: Pt is a 69 y.o. male y/o presenting [facility]  on 4/11/2024 with Primary dx: Primary osteoarthritis of one hip, right.  POD0 s/p R anterior WOLFGANG. WBS: WBAT RLE. Significant pmhx per chart: HLD, DM2, CKD3, c/s spondylosis, OA R hip, chronic pain syndrome, Acute respiratory failure with hypoxia, COPD, BETHANY, h/o L hip replacement, c/s disc disorder with radiculopathy. PT consulted to assess strength/functional mobility, activity tolerance and d/c needs. Active PT orders and activity orders for Activity as tolerated. PTA, pt reports functioning at mod I with SPC inside home vs. RW in community, independent ADLs, IADLs, has to assist wife, (+) .      Pt greeted supine in bed. Pt amenable to PT session. During PT IE, pt presenting with above (see flowsheet) outlined functional impairments including dec strength, balance, gait, and deficits that limit functional mobility and activity tolerance relative to baseline. Pt currently requires supervision assistance x1 for bed mobility, supervision assistance x1 for transfers, supervision assistance x1 for ambulation with Rolling Walker, and  supervision assistance x1 for elevations. Pt currently demonstrating inc fall risk 2/2 hx of falls, impulsivity, impaired balance, use of ambulatory aid, WBS, impaired judgement, decreased safety awareness, multiple co-morbidities, and acuity of medical illness.  Fall risk education provided with good understanding. Asymptomatic drop in BP noted pre vs. Post session. Denied additional sxs throughout session. Recommend continued mobilization of pt with nsg staff as tolerated to prevent further decline in function. Pt will benefit from continued PT services to progress mobility independence necessary for return to PLOF. Based on pt presentation and impairments, pt would most appropriately benefit from d/c to home with level III (min) rehab intensity resources , . Pt demonstrated ability to complete functional mobility required to for safe d/c to home at supervision level. Pt expresses no c/f d/c to home at this time, except expresses desire to have therapy services but reports barriers to access.   Additional tx session below.  Barriers to Discharge: None     Rehab Resource Intensity Level, PT: III (Minimum Resource Intensity) (May benefit from mobile outpatient 2/2 barriers to access care)    See flowsheet documentation for full assessment.         A+Ox4/intact

## 2024-05-02 ENCOUNTER — APPOINTMENT (OUTPATIENT)
Dept: CARDIOLOGY | Facility: CLINIC | Age: 84
End: 2024-05-02

## 2024-07-22 ENCOUNTER — APPOINTMENT (OUTPATIENT)
Dept: INTERNAL MEDICINE | Facility: CLINIC | Age: 84
End: 2024-07-22
Payer: MEDICARE

## 2024-07-22 VITALS
DIASTOLIC BLOOD PRESSURE: 72 MMHG | BODY MASS INDEX: 22.86 KG/M2 | OXYGEN SATURATION: 96 % | HEIGHT: 63 IN | SYSTOLIC BLOOD PRESSURE: 128 MMHG | HEART RATE: 71 BPM | WEIGHT: 129 LBS | TEMPERATURE: 98.4 F

## 2024-07-22 DIAGNOSIS — I73.9 PERIPHERAL VASCULAR DISEASE, UNSPECIFIED: ICD-10-CM

## 2024-07-22 DIAGNOSIS — I77.1 STRICTURE OF ARTERY: ICD-10-CM

## 2024-07-22 DIAGNOSIS — I65.29 OCCLUSION AND STENOSIS OF UNSPECIFIED CAROTID ARTERY: ICD-10-CM

## 2024-07-22 DIAGNOSIS — I25.10 ATHEROSCLEROTIC HEART DISEASE OF NATIVE CORONARY ARTERY W/OUT ANGINA PECTORIS: ICD-10-CM

## 2024-07-22 DIAGNOSIS — I10 ESSENTIAL (PRIMARY) HYPERTENSION: ICD-10-CM

## 2024-07-22 DIAGNOSIS — I35.0 NONRHEUMATIC AORTIC (VALVE) STENOSIS: ICD-10-CM

## 2024-07-22 DIAGNOSIS — L98.499 STRICTURE OF ARTERY: ICD-10-CM

## 2024-07-22 DIAGNOSIS — S78.119A COMPLETE TRAUMATIC AMPUTATION AT LVL BETWEEN UNSPECIFIED HIP AND KNEE, INITIAL ENCOUNTER: ICD-10-CM

## 2024-07-22 DIAGNOSIS — I48.0 PAROXYSMAL ATRIAL FIBRILLATION: ICD-10-CM

## 2024-07-22 DIAGNOSIS — R79.89 OTHER SPECIFIED ABNORMAL FINDINGS OF BLOOD CHEMISTRY: ICD-10-CM

## 2024-07-22 DIAGNOSIS — I48.91 UNSPECIFIED ATRIAL FIBRILLATION: ICD-10-CM

## 2024-07-22 PROCEDURE — G2211 COMPLEX E/M VISIT ADD ON: CPT

## 2024-07-22 PROCEDURE — 36415 COLL VENOUS BLD VENIPUNCTURE: CPT

## 2024-07-22 PROCEDURE — 99214 OFFICE O/P EST MOD 30 MIN: CPT

## 2024-07-22 NOTE — HISTORY OF PRESENT ILLNESS
[FreeTextEntry1] : Pt is here for follow up of multiple medical problems including  afib, AS, PVD, amputation

## 2024-07-22 NOTE — PHYSICAL EXAM
[Normal] : no acute distress, well nourished, well developed and well-appearing [de-identified] : L leg prosthetic

## 2024-07-23 LAB
ALBUMIN SERPL ELPH-MCNC: 4.2 G/DL
ALP BLD-CCNC: 88 U/L
ALT SERPL-CCNC: 13 U/L
ANION GAP SERPL CALC-SCNC: 15 MMOL/L
AST SERPL-CCNC: 18 U/L
BASOPHILS # BLD AUTO: 0.06 K/UL
BASOPHILS NFR BLD AUTO: 0.7 %
BILIRUB SERPL-MCNC: 0.5 MG/DL
BUN SERPL-MCNC: 21 MG/DL
CALCIUM SERPL-MCNC: 9.2 MG/DL
CHLORIDE SERPL-SCNC: 98 MMOL/L
CHOLEST SERPL-MCNC: 137 MG/DL
CO2 SERPL-SCNC: 23 MMOL/L
CREAT SERPL-MCNC: 0.83 MG/DL
EGFR: 70 ML/MIN/1.73M2
EOSINOPHIL # BLD AUTO: 0.47 K/UL
EOSINOPHIL NFR BLD AUTO: 5.5 %
ESTIMATED AVERAGE GLUCOSE: 126 MG/DL
FERRITIN SERPL-MCNC: 338 NG/ML
GLUCOSE SERPL-MCNC: 93 MG/DL
HBA1C MFR BLD HPLC: 6 %
HCT VFR BLD CALC: 37.5 %
HDLC SERPL-MCNC: 53 MG/DL
HGB BLD-MCNC: 12.4 G/DL
IMM GRANULOCYTES NFR BLD AUTO: 0.2 %
IRON SERPL-MCNC: 136 UG/DL
LDLC SERPL CALC-MCNC: 71 MG/DL
LYMPHOCYTES # BLD AUTO: 1.59 K/UL
LYMPHOCYTES NFR BLD AUTO: 18.8 %
MAN DIFF?: NORMAL
MCHC RBC-ENTMCNC: 33.1 GM/DL
MCHC RBC-ENTMCNC: 33.2 PG
MCV RBC AUTO: 100.5 FL
MONOCYTES # BLD AUTO: 0.74 K/UL
MONOCYTES NFR BLD AUTO: 8.7 %
NEUTROPHILS # BLD AUTO: 5.59 K/UL
NEUTROPHILS NFR BLD AUTO: 66.1 %
NONHDLC SERPL-MCNC: 84 MG/DL
PLATELET # BLD AUTO: 209 K/UL
POTASSIUM SERPL-SCNC: 4.4 MMOL/L
PROT SERPL-MCNC: 6.9 G/DL
RBC # BLD: 3.73 M/UL
RBC # FLD: 13.2 %
SODIUM SERPL-SCNC: 136 MMOL/L
T4 SERPL-MCNC: 10.2 UG/DL
TRIGL SERPL-MCNC: 63 MG/DL
TSH SERPL-ACNC: 2.45 UIU/ML
WBC # FLD AUTO: 8.47 K/UL

## 2024-10-03 NOTE — PHYSICAL THERAPY INITIAL EVALUATION ADULT - LEVEL OF INDEPENDENCE: GAIT, REHAB EVAL
stand-by assist
How Severe Are Your Spot(S)?: moderate
What Type Of Note Output Would You Prefer (Optional)?: Standard Output
What Is The Reason For Today's Visit?: Full Body Skin Examination
What Is The Reason For Today's Visit? (Being Monitored For X): concerning skin lesions on an annual basis
Additional History: Patient reports a concern for a lesion located on her left temple and left eye. Patient denies the lesions being itchy or painful.

## 2024-10-07 ENCOUNTER — RX RENEWAL (OUTPATIENT)
Age: 84
End: 2024-10-07

## 2024-10-14 ENCOUNTER — RX RENEWAL (OUTPATIENT)
Age: 84
End: 2024-10-14

## 2024-10-21 ENCOUNTER — RX RENEWAL (OUTPATIENT)
Age: 84
End: 2024-10-21

## 2024-10-22 ENCOUNTER — APPOINTMENT (OUTPATIENT)
Dept: INTERNAL MEDICINE | Facility: CLINIC | Age: 84
End: 2024-10-22

## 2024-10-29 ENCOUNTER — RX RENEWAL (OUTPATIENT)
Age: 84
End: 2024-10-29

## 2024-11-04 ENCOUNTER — APPOINTMENT (OUTPATIENT)
Dept: CARDIOLOGY | Facility: CLINIC | Age: 84
End: 2024-11-04

## 2025-01-07 ENCOUNTER — RESULT REVIEW (OUTPATIENT)
Age: 85
End: 2025-01-07

## 2025-01-07 ENCOUNTER — NON-APPOINTMENT (OUTPATIENT)
Age: 85
End: 2025-01-07

## 2025-01-07 ENCOUNTER — APPOINTMENT (OUTPATIENT)
Dept: INTERNAL MEDICINE | Facility: CLINIC | Age: 85
End: 2025-01-07
Payer: MEDICARE

## 2025-01-07 DIAGNOSIS — E04.1 NONTOXIC SINGLE THYROID NODULE: ICD-10-CM

## 2025-01-07 DIAGNOSIS — R73.03 PREDIABETES.: ICD-10-CM

## 2025-01-07 DIAGNOSIS — I77.1 STRICTURE OF ARTERY: ICD-10-CM

## 2025-01-07 DIAGNOSIS — I10 ESSENTIAL (PRIMARY) HYPERTENSION: ICD-10-CM

## 2025-01-07 DIAGNOSIS — Z79.01 LONG TERM (CURRENT) USE OF ANTICOAGULANTS: ICD-10-CM

## 2025-01-07 DIAGNOSIS — I48.91 UNSPECIFIED ATRIAL FIBRILLATION: ICD-10-CM

## 2025-01-07 DIAGNOSIS — I73.9 PERIPHERAL VASCULAR DISEASE, UNSPECIFIED: ICD-10-CM

## 2025-01-07 DIAGNOSIS — I35.0 NONRHEUMATIC AORTIC (VALVE) STENOSIS: ICD-10-CM

## 2025-01-07 DIAGNOSIS — I25.10 ATHEROSCLEROTIC HEART DISEASE OF NATIVE CORONARY ARTERY W/OUT ANGINA PECTORIS: ICD-10-CM

## 2025-01-07 DIAGNOSIS — I65.29 OCCLUSION AND STENOSIS OF UNSPECIFIED CAROTID ARTERY: ICD-10-CM

## 2025-01-07 DIAGNOSIS — L98.499 STRICTURE OF ARTERY: ICD-10-CM

## 2025-01-07 DIAGNOSIS — S78.119A COMPLETE TRAUMATIC AMPUTATION AT LVL BETWEEN UNSPECIFIED HIP AND KNEE, INITIAL ENCOUNTER: ICD-10-CM

## 2025-01-07 DIAGNOSIS — E03.9 HYPOTHYROIDISM, UNSPECIFIED: ICD-10-CM

## 2025-01-07 DIAGNOSIS — Z00.00 ENCOUNTER FOR GENERAL ADULT MEDICAL EXAMINATION W/OUT ABNORMAL FINDINGS: ICD-10-CM

## 2025-01-07 DIAGNOSIS — J06.9 ACUTE UPPER RESPIRATORY INFECTION, UNSPECIFIED: ICD-10-CM

## 2025-01-07 DIAGNOSIS — R79.89 OTHER SPECIFIED ABNORMAL FINDINGS OF BLOOD CHEMISTRY: ICD-10-CM

## 2025-01-07 DIAGNOSIS — F41.9 ANXIETY DISORDER, UNSPECIFIED: ICD-10-CM

## 2025-01-07 PROCEDURE — 93000 ELECTROCARDIOGRAM COMPLETE: CPT

## 2025-01-07 PROCEDURE — 36415 COLL VENOUS BLD VENIPUNCTURE: CPT

## 2025-01-07 PROCEDURE — G0439: CPT

## 2025-01-08 LAB
25(OH)D3 SERPL-MCNC: 40.8 NG/ML
ALBUMIN SERPL ELPH-MCNC: 4 G/DL
ALP BLD-CCNC: 133 U/L
ALT SERPL-CCNC: 57 U/L
ANION GAP SERPL CALC-SCNC: 14 MMOL/L
AST SERPL-CCNC: 35 U/L
BASOPHILS # BLD AUTO: 0.05 K/UL
BASOPHILS NFR BLD AUTO: 0.5 %
BILIRUB DIRECT SERPL-MCNC: 0.3 MG/DL
BILIRUB SERPL-MCNC: 0.7 MG/DL
BUN SERPL-MCNC: 29 MG/DL
CALCIUM SERPL-MCNC: 9.1 MG/DL
CHLORIDE SERPL-SCNC: 98 MMOL/L
CHOLEST SERPL-MCNC: 119 MG/DL
CO2 SERPL-SCNC: 24 MMOL/L
CREAT SERPL-MCNC: 0.97 MG/DL
EGFR: 58 ML/MIN/1.73M2
EOSINOPHIL # BLD AUTO: 0.05 K/UL
EOSINOPHIL NFR BLD AUTO: 0.5 %
ESTIMATED AVERAGE GLUCOSE: 137 MG/DL
FERRITIN SERPL-MCNC: 361 NG/ML
GLUCOSE SERPL-MCNC: 154 MG/DL
HBA1C MFR BLD HPLC: 6.4 %
HCT VFR BLD CALC: 37.9 %
HCV AB SER QL: NONREACTIVE
HCV S/CO RATIO: 0.09 S/CO
HDLC SERPL-MCNC: 46 MG/DL
HGB BLD-MCNC: 12.8 G/DL
IMM GRANULOCYTES NFR BLD AUTO: 0.4 %
IRON SERPL-MCNC: 115 UG/DL
LDLC SERPL CALC-MCNC: 58 MG/DL
LYMPHOCYTES # BLD AUTO: 0.62 K/UL
LYMPHOCYTES NFR BLD AUTO: 6.6 %
MAN DIFF?: NORMAL
MCHC RBC-ENTMCNC: 33.8 G/DL
MCHC RBC-ENTMCNC: 34.7 PG
MCV RBC AUTO: 102.7 FL
MONOCYTES # BLD AUTO: 0.67 K/UL
MONOCYTES NFR BLD AUTO: 7.1 %
NEUTROPHILS # BLD AUTO: 7.98 K/UL
NEUTROPHILS NFR BLD AUTO: 84.9 %
NONHDLC SERPL-MCNC: 73 MG/DL
PLATELET # BLD AUTO: 225 K/UL
POTASSIUM SERPL-SCNC: 4.7 MMOL/L
PROT SERPL-MCNC: 6.7 G/DL
RBC # BLD: 3.69 M/UL
RBC # FLD: 13.8 %
SODIUM SERPL-SCNC: 137 MMOL/L
T4 SERPL-MCNC: 11 UG/DL
TRIGL SERPL-MCNC: 70 MG/DL
TSH SERPL-ACNC: 2.98 UIU/ML
WBC # FLD AUTO: 9.41 K/UL

## 2025-01-09 ENCOUNTER — APPOINTMENT (OUTPATIENT)
Dept: ULTRASOUND IMAGING | Facility: CLINIC | Age: 85
End: 2025-01-09
Payer: MEDICARE

## 2025-01-09 ENCOUNTER — NON-APPOINTMENT (OUTPATIENT)
Age: 85
End: 2025-01-09

## 2025-01-09 ENCOUNTER — APPOINTMENT (OUTPATIENT)
Dept: MAMMOGRAPHY | Facility: CLINIC | Age: 85
End: 2025-01-09
Payer: MEDICARE

## 2025-01-09 ENCOUNTER — APPOINTMENT (OUTPATIENT)
Dept: RADIOLOGY | Facility: CLINIC | Age: 85
End: 2025-01-09
Payer: MEDICARE

## 2025-01-09 ENCOUNTER — RESULT REVIEW (OUTPATIENT)
Age: 85
End: 2025-01-09

## 2025-01-09 DIAGNOSIS — M79.89 OTHER SPECIFIED SOFT TISSUE DISORDERS: ICD-10-CM

## 2025-01-09 PROCEDURE — 76536 US EXAM OF HEAD AND NECK: CPT

## 2025-01-09 PROCEDURE — 93971 EXTREMITY STUDY: CPT | Mod: RT

## 2025-01-09 PROCEDURE — 77067 SCR MAMMO BI INCL CAD: CPT

## 2025-01-09 PROCEDURE — 71046 X-RAY EXAM CHEST 2 VIEWS: CPT

## 2025-01-09 PROCEDURE — 77063 BREAST TOMOSYNTHESIS BI: CPT

## 2025-01-13 DIAGNOSIS — I50.9 HEART FAILURE, UNSPECIFIED: ICD-10-CM

## 2025-01-16 ENCOUNTER — APPOINTMENT (OUTPATIENT)
Dept: CARDIOLOGY | Facility: CLINIC | Age: 85
End: 2025-01-16
Payer: MEDICARE

## 2025-01-16 VITALS
HEART RATE: 73 BPM | HEIGHT: 63 IN | SYSTOLIC BLOOD PRESSURE: 134 MMHG | OXYGEN SATURATION: 97 % | BODY MASS INDEX: 23.04 KG/M2 | WEIGHT: 130 LBS | DIASTOLIC BLOOD PRESSURE: 80 MMHG

## 2025-01-16 DIAGNOSIS — I48.0 PAROXYSMAL ATRIAL FIBRILLATION: ICD-10-CM

## 2025-01-16 LAB — NT-PROBNP SERPL-MCNC: ABNORMAL PG/ML

## 2025-01-16 PROCEDURE — 99214 OFFICE O/P EST MOD 30 MIN: CPT

## 2025-01-16 PROCEDURE — 93306 TTE W/DOPPLER COMPLETE: CPT

## 2025-01-16 PROCEDURE — G2211 COMPLEX E/M VISIT ADD ON: CPT

## 2025-01-16 RX ORDER — AZITHROMYCIN 250 MG/1
250 TABLET, FILM COATED ORAL
Qty: 1 | Refills: 0 | Status: DISCONTINUED | COMMUNITY
Start: 2025-01-07 | End: 2025-01-16

## 2025-01-16 RX ORDER — FUROSEMIDE 20 MG/1
20 TABLET ORAL DAILY
Qty: 5 | Refills: 0 | Status: ACTIVE | COMMUNITY
Start: 2025-01-13 | End: 1900-01-01

## 2025-01-17 ENCOUNTER — APPOINTMENT (OUTPATIENT)
Dept: INTERNAL MEDICINE | Facility: CLINIC | Age: 85
End: 2025-01-17

## 2025-01-17 VITALS
DIASTOLIC BLOOD PRESSURE: 78 MMHG | HEART RATE: 81 BPM | OXYGEN SATURATION: 94 % | TEMPERATURE: 97.8 F | SYSTOLIC BLOOD PRESSURE: 98 MMHG | WEIGHT: 130 LBS | HEIGHT: 63 IN | BODY MASS INDEX: 23.04 KG/M2

## 2025-01-17 PROBLEM — L03.119 CELLULITIS OF LOWER EXTREMITY, UNSPECIFIED LATERALITY: Status: ACTIVE | Noted: 2025-01-17

## 2025-01-17 PROCEDURE — 99213 OFFICE O/P EST LOW 20 MIN: CPT

## 2025-01-17 RX ORDER — DOXYCYCLINE HYCLATE 100 MG/1
100 TABLET ORAL TWICE DAILY
Qty: 14 | Refills: 0 | Status: ACTIVE | COMMUNITY
Start: 2025-01-17 | End: 1900-01-01

## 2025-01-17 RX ORDER — DOXYCYCLINE HYCLATE 100 MG/1
0 CAPSULE ORAL
Qty: 0 | Refills: 0 | DISCHARGE
Start: 2025-01-17

## 2025-01-22 ENCOUNTER — APPOINTMENT (OUTPATIENT)
Dept: INTERNAL MEDICINE | Facility: CLINIC | Age: 85
End: 2025-01-22
Payer: MEDICARE

## 2025-01-22 ENCOUNTER — INPATIENT (INPATIENT)
Facility: HOSPITAL | Age: 85
LOS: 4 days | Discharge: HOME | DRG: 300 | End: 2025-01-27
Attending: INTERNAL MEDICINE | Admitting: STUDENT IN AN ORGANIZED HEALTH CARE EDUCATION/TRAINING PROGRAM
Payer: MEDICARE

## 2025-01-22 VITALS
OXYGEN SATURATION: 97 % | HEIGHT: 63 IN | TEMPERATURE: 99 F | WEIGHT: 130 LBS | BODY MASS INDEX: 23.04 KG/M2 | DIASTOLIC BLOOD PRESSURE: 72 MMHG | SYSTOLIC BLOOD PRESSURE: 104 MMHG | HEART RATE: 72 BPM

## 2025-01-22 VITALS — WEIGHT: 130.07 LBS

## 2025-01-22 DIAGNOSIS — Z79.84 LONG TERM (CURRENT) USE OF ORAL HYPOGLYCEMIC DRUGS: ICD-10-CM

## 2025-01-22 DIAGNOSIS — I70.238 ATHEROSCLEROSIS OF NATIVE ARTERIES OF RIGHT LEG WITH ULCERATION OF OTHER PART OF LOWER LEG: ICD-10-CM

## 2025-01-22 DIAGNOSIS — E44.0 MODERATE PROTEIN-CALORIE MALNUTRITION: ICD-10-CM

## 2025-01-22 DIAGNOSIS — I11.0 HYPERTENSIVE HEART DISEASE WITH HEART FAILURE: ICD-10-CM

## 2025-01-22 DIAGNOSIS — Z79.82 LONG TERM (CURRENT) USE OF ASPIRIN: ICD-10-CM

## 2025-01-22 DIAGNOSIS — L03.119 CELLULITIS OF UNSPECIFIED PART OF LIMB: ICD-10-CM

## 2025-01-22 DIAGNOSIS — L03.115 CELLULITIS OF RIGHT LOWER LIMB: ICD-10-CM

## 2025-01-22 DIAGNOSIS — I65.21 OCCLUSION AND STENOSIS OF RIGHT CAROTID ARTERY: ICD-10-CM

## 2025-01-22 DIAGNOSIS — B95.62 METHICILLIN RESISTANT STAPHYLOCOCCUS AUREUS INFECTION AS THE CAUSE OF DISEASES CLASSIFIED ELSEWHERE: ICD-10-CM

## 2025-01-22 DIAGNOSIS — E78.00 PURE HYPERCHOLESTEROLEMIA, UNSPECIFIED: ICD-10-CM

## 2025-01-22 DIAGNOSIS — E11.51 TYPE 2 DIABETES MELLITUS WITH DIABETIC PERIPHERAL ANGIOPATHY WITHOUT GANGRENE: ICD-10-CM

## 2025-01-22 DIAGNOSIS — I25.10 ATHEROSCLEROTIC HEART DISEASE OF NATIVE CORONARY ARTERY WITHOUT ANGINA PECTORIS: ICD-10-CM

## 2025-01-22 DIAGNOSIS — I35.0 NONRHEUMATIC AORTIC (VALVE) STENOSIS: ICD-10-CM

## 2025-01-22 DIAGNOSIS — E03.9 HYPOTHYROIDISM, UNSPECIFIED: ICD-10-CM

## 2025-01-22 DIAGNOSIS — F41.9 ANXIETY DISORDER, UNSPECIFIED: ICD-10-CM

## 2025-01-22 DIAGNOSIS — I50.22 CHRONIC SYSTOLIC (CONGESTIVE) HEART FAILURE: ICD-10-CM

## 2025-01-22 DIAGNOSIS — Z79.899 OTHER LONG TERM (CURRENT) DRUG THERAPY: ICD-10-CM

## 2025-01-22 DIAGNOSIS — Z95.828 PRESENCE OF OTHER VASCULAR IMPLANTS AND GRAFTS: Chronic | ICD-10-CM

## 2025-01-22 DIAGNOSIS — L97.819 NON-PRESSURE CHRONIC ULCER OF OTHER PART OF RIGHT LOWER LEG WITH UNSPECIFIED SEVERITY: ICD-10-CM

## 2025-01-22 DIAGNOSIS — Z87.81 PERSONAL HISTORY OF (HEALED) TRAUMATIC FRACTURE: Chronic | ICD-10-CM

## 2025-01-22 DIAGNOSIS — Z91.040 LATEX ALLERGY STATUS: ICD-10-CM

## 2025-01-22 DIAGNOSIS — Z79.890 HORMONE REPLACEMENT THERAPY: ICD-10-CM

## 2025-01-22 DIAGNOSIS — Z79.01 LONG TERM (CURRENT) USE OF ANTICOAGULANTS: ICD-10-CM

## 2025-01-22 DIAGNOSIS — D75.89 OTHER SPECIFIED DISEASES OF BLOOD AND BLOOD-FORMING ORGANS: ICD-10-CM

## 2025-01-22 DIAGNOSIS — I87.8 OTHER SPECIFIED DISORDERS OF VEINS: ICD-10-CM

## 2025-01-22 DIAGNOSIS — I48.0 PAROXYSMAL ATRIAL FIBRILLATION: ICD-10-CM

## 2025-01-22 DIAGNOSIS — Z89.612 ACQUIRED ABSENCE OF LEFT LEG ABOVE KNEE: ICD-10-CM

## 2025-01-22 DIAGNOSIS — R79.89 OTHER SPECIFIED ABNORMAL FINDINGS OF BLOOD CHEMISTRY: ICD-10-CM

## 2025-01-22 DIAGNOSIS — K86.2 CYST OF PANCREAS: ICD-10-CM

## 2025-01-22 DIAGNOSIS — Z88.2 ALLERGY STATUS TO SULFONAMIDES: ICD-10-CM

## 2025-01-22 LAB
ALBUMIN SERPL ELPH-MCNC: 3.8 G/DL — SIGNIFICANT CHANGE UP (ref 3.3–5)
ALP SERPL-CCNC: 141 U/L — HIGH (ref 40–120)
ALT FLD-CCNC: 81 U/L — HIGH (ref 12–78)
ANION GAP SERPL CALC-SCNC: 6 MMOL/L — SIGNIFICANT CHANGE UP (ref 5–17)
AST SERPL-CCNC: 39 U/L — HIGH (ref 15–37)
BASOPHILS # BLD AUTO: 0.04 K/UL — SIGNIFICANT CHANGE UP (ref 0–0.2)
BASOPHILS NFR BLD AUTO: 0.4 % — SIGNIFICANT CHANGE UP (ref 0–2)
BILIRUB SERPL-MCNC: 0.7 MG/DL — SIGNIFICANT CHANGE UP (ref 0.2–1.2)
BUN SERPL-MCNC: 37 MG/DL — HIGH (ref 7–23)
CALCIUM SERPL-MCNC: 9.6 MG/DL — SIGNIFICANT CHANGE UP (ref 8.5–10.1)
CHLORIDE SERPL-SCNC: 103 MMOL/L — SIGNIFICANT CHANGE UP (ref 96–108)
CO2 SERPL-SCNC: 28 MMOL/L — SIGNIFICANT CHANGE UP (ref 22–31)
CREAT SERPL-MCNC: 1.21 MG/DL — SIGNIFICANT CHANGE UP (ref 0.5–1.3)
EGFR: 44 ML/MIN/1.73M2 — LOW
EOSINOPHIL # BLD AUTO: 0.15 K/UL — SIGNIFICANT CHANGE UP (ref 0–0.5)
EOSINOPHIL NFR BLD AUTO: 1.6 % — SIGNIFICANT CHANGE UP (ref 0–6)
GLUCOSE SERPL-MCNC: 150 MG/DL — HIGH (ref 70–99)
HCT VFR BLD CALC: 42.3 % — SIGNIFICANT CHANGE UP (ref 34.5–45)
HGB BLD-MCNC: 14 G/DL — SIGNIFICANT CHANGE UP (ref 11.5–15.5)
IMM GRANULOCYTES NFR BLD AUTO: 0.3 % — SIGNIFICANT CHANGE UP (ref 0–0.9)
LACTATE SERPL-SCNC: 2 MMOL/L — SIGNIFICANT CHANGE UP (ref 0.7–2)
LYMPHOCYTES # BLD AUTO: 1.25 K/UL — SIGNIFICANT CHANGE UP (ref 1–3.3)
LYMPHOCYTES # BLD AUTO: 13.5 % — SIGNIFICANT CHANGE UP (ref 13–44)
MCHC RBC-ENTMCNC: 33.1 G/DL — SIGNIFICANT CHANGE UP (ref 32–36)
MCHC RBC-ENTMCNC: 34.1 PG — HIGH (ref 27–34)
MCV RBC AUTO: 102.9 FL — HIGH (ref 80–100)
MONOCYTES # BLD AUTO: 0.79 K/UL — SIGNIFICANT CHANGE UP (ref 0–0.9)
MONOCYTES NFR BLD AUTO: 8.5 % — SIGNIFICANT CHANGE UP (ref 2–14)
NEUTROPHILS # BLD AUTO: 7.02 K/UL — SIGNIFICANT CHANGE UP (ref 1.8–7.4)
NEUTROPHILS NFR BLD AUTO: 75.7 % — SIGNIFICANT CHANGE UP (ref 43–77)
PLATELET # BLD AUTO: 167 K/UL — SIGNIFICANT CHANGE UP (ref 150–400)
POTASSIUM SERPL-MCNC: 3.7 MMOL/L — SIGNIFICANT CHANGE UP (ref 3.5–5.3)
POTASSIUM SERPL-SCNC: 3.7 MMOL/L — SIGNIFICANT CHANGE UP (ref 3.5–5.3)
PROT SERPL-MCNC: 7.5 GM/DL — SIGNIFICANT CHANGE UP (ref 6–8.3)
RBC # BLD: 4.11 M/UL — SIGNIFICANT CHANGE UP (ref 3.8–5.2)
RBC # FLD: 13.6 % — SIGNIFICANT CHANGE UP (ref 10.3–14.5)
SODIUM SERPL-SCNC: 137 MMOL/L — SIGNIFICANT CHANGE UP (ref 135–145)
WBC # BLD: 9.28 K/UL — SIGNIFICANT CHANGE UP (ref 3.8–10.5)
WBC # FLD AUTO: 9.28 K/UL — SIGNIFICANT CHANGE UP (ref 3.8–10.5)

## 2025-01-22 PROCEDURE — 99213 OFFICE O/P EST LOW 20 MIN: CPT

## 2025-01-22 PROCEDURE — 73590 X-RAY EXAM OF LOWER LEG: CPT | Mod: 26,RT

## 2025-01-22 PROCEDURE — 99285 EMERGENCY DEPT VISIT HI MDM: CPT

## 2025-01-22 PROCEDURE — 93971 EXTREMITY STUDY: CPT | Mod: 26,RT

## 2025-01-22 PROCEDURE — 93926 LOWER EXTREMITY STUDY: CPT | Mod: 26,RT

## 2025-01-22 RX ORDER — VANCOMYCIN HYDROCHLORIDE 50 MG/ML
1000 KIT ORAL ONCE
Refills: 0 | Status: COMPLETED | OUTPATIENT
Start: 2025-01-22 | End: 2025-01-22

## 2025-01-22 RX ORDER — CEFTRIAXONE 250 MG/1
1000 INJECTION, POWDER, FOR SOLUTION INTRAMUSCULAR; INTRAVENOUS ONCE
Refills: 0 | Status: COMPLETED | OUTPATIENT
Start: 2025-01-22 | End: 2025-01-22

## 2025-01-22 RX ADMIN — VANCOMYCIN HYDROCHLORIDE 250 MILLIGRAM(S): KIT at 23:08

## 2025-01-22 RX ADMIN — CEFTRIAXONE 1000 MILLIGRAM(S): 250 INJECTION, POWDER, FOR SOLUTION INTRAMUSCULAR; INTRAVENOUS at 23:08

## 2025-01-22 NOTE — ED PROVIDER NOTE - CARE PLAN
Principal Discharge DX:	Non-healing wound of lower extremity, initial encounter  Secondary Diagnosis:	Peripheral vascular disease   1

## 2025-01-22 NOTE — ED ADULT NURSE REASSESSMENT NOTE - NS ED NURSE REASSESS COMMENT FT1
Handoff report received from LEIGH White. Pt is sitting comfortably in the stretcher with family at the bedside. No acute distress is noted at this time.

## 2025-01-22 NOTE — ED PROVIDER NOTE - CLINICAL SUMMARY MEDICAL DECISION MAKING FREE TEXT BOX
84F with PMHx of CAD, pAF on Eliquis, severe AS, R internal carotid stenosis, HTN, HLD, hypothyroidism, anxiety, PAD s/p left AKA p/w right leg pain and redness x 1 week without improvement with lasix and abx.

## 2025-01-22 NOTE — ED ADULT NURSE NOTE - NSFALLRISKINTERV_ED_ALL_ED
Assistance OOB with selected safe patient handling equipment if applicable/Assistance with ambulation/Communicate fall risk and risk factors to all staff, patient, and family/Monitor gait and stability/Provide visual cue: yellow wristband, yellow gown, etc/Reinforce activity limits and safety measures with patient and family/Call bell, personal items and telephone in reach/Instruct patient to call for assistance before getting out of bed/chair/stretcher/Non-slip footwear applied when patient is off stretcher/[place] to call system/Physically safe environment - no spills, clutter or unnecessary equipment/Purposeful Proactive Rounding/Room/bathroom lighting operational, light cord in reach

## 2025-01-22 NOTE — CONSULT NOTE ADULT - ATTENDING COMMENTS
s/p L ilioprofunda bypass with reverse GSV graft to peroneal 8/31/23 (Dr. Tavares) c/b large L groin abscess (17cm) requiring removal of infected bypass graft of lower extremity and groin washout on 9/24/23, L AKA 11/14 admitted for non-healing wound of RLE.  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

## 2025-01-22 NOTE — ED ADULT NURSE NOTE - OBJECTIVE STATEMENT
pt presents to the ED to the ED for R calf pain and tenderness related to wound. sent from MD office. pt denies recent injury. family at bedside no other complaints or discomforts reported at this time. pt has recently received antibiotics without improvement

## 2025-01-22 NOTE — ED ADULT NURSE NOTE - CAS EDP DISCH DISPOSITION ADMI
Alert-The patient is alert, awake and responds to voice. The patient is oriented to time, place, and person. The triage nurse is able to obtain subjective information.
[facility]

## 2025-01-22 NOTE — ED PROVIDER NOTE - PROGRESS NOTE DETAILS
ANILG: Received signout from Dr. Medina to admit patient after podiatry consult.  Podiatry recommends giving IV antibiotics as they think with poor wound healing she will fail oral antibiotics.  Will follow on medicine.  Recommend arterial US of LE.  Podiatry ordered US venous duplex negative for DVT.  Also ordered arterial duplex without any focal occlusion, but dampening of flow diffusely.  Inpatient consult placed for vascular surgery.  This was discussed with hospitalist and patient admitted to med/surg.

## 2025-01-22 NOTE — CONSULT NOTE ADULT - ASSESSMENT
A: 84-year-old Female seen for the followin. Full-thickness Non-healing wound to Right LE, stable  2. Peripheral vascular disease      P:   Chart reviewed and Patient evaluated;  Discussed diagnosis and treatment with patient.   X-rays reviewed : on wet read showing no soft tissue emphysema.  Wound flush with normal saline  Wound cx taken  Applied silver alginate with dry sterile dressing  WBAT  to Right Lower extremity  Continue antibiotics as per ID  All additional care per Med appreciated  Patient demonstrated verbal understanding of all interventions and tolerated interventions well without any complications.   Podiatry will follow while in house      Case D/W attending Dr. Calvillo       A: 84-year-old Female seen for the followin. Full-thickness Non-healing wound to Right LE, stable  2. Venous Stasis      P:   Chart reviewed and Patient evaluated;  Discussed diagnosis and treatment with patient.   X-rays reviewed : on wet read showing no soft tissue emphysema.  On evaluation, pt has Yellowish lesion to Right LE, wound base Fibrogranular , + edema, + hima-wound erythema, + active discharge and blister. Pt is afefrerile, has no leukocytosis, WBC 9  Wound flush with normal saline  Wound cx taken --> will follow up results  Applied silver alginate with dry sterile dressing  WBAT  to Right Lower extremity  Reccommends offloading boot to prevent soft tissue breakdown to the RLE  Continue antibiotics as per ID  All additional care per Med appreciated  Patient demonstrated verbal understanding of all interventions and tolerated interventions well without any complications.   Podiatry will follow while in house      Case D/W attending Dr. Calvillo       A: 84-year-old Female seen for the followin. Full-thickness Non-healing wound to Right LE, stable  2. Venous Stasis      P:   Chart reviewed and Patient evaluated;  Discussed diagnosis and treatment with patient.   X-rays reviewed : on wet read showing no soft tissue emphysema.  On evaluation, pt has Yellowish lesion to Right LE, wound base Fibrogranular , + edema, + hima-wound erythema, + active discharge and blister. Pt is afefrerile, has no leukocytosis, WBC 9  Wound flush with normal saline  Wound cx taken --> will follow up results  Applied silver alginate with dry sterile dressing  WBAT  to Right Lower extremity  Reccomends offloading boot to prevent soft tissue breakdown to the RLE  Continue antibiotics as per ID  All additional care per Med appreciated  Patient demonstrated verbal understanding of all interventions and tolerated interventions well without any complications.   Podiatry will follow while in house      Case D/W attending Dr. Calvillo       A: 84-year-old Female seen for the followin. Full-thickness Non-healing wound to Right LE, stable  2. Venous Stasis to RLE      P:   Chart reviewed and Patient evaluated;  Discussed diagnosis and treatment with patient.   X-rays reviewed : on wet read showing no soft tissue emphysema.  On evaluation, pt has Yellowish lesion to Right LE, wound base Fibrogranular , + edema, + hima-wound erythema, + active discharge and blister. Pt is afefrerile, has no leukocytosis, WBC 9  Wound flush with normal saline  Wound cx taken --> will follow up results  Applied silver alginate with dry sterile dressing  WBAT  to Right Lower extremity  Reccomends offloading boot to prevent soft tissue breakdown to the RLE  Continue antibiotics as per ID  All additional care per Med appreciated  Patient demonstrated verbal understanding of all interventions and tolerated interventions well without any complications.   Podiatry will follow while in house      Case D/W attending Dr. Calvillo

## 2025-01-22 NOTE — CONSULT NOTE ADULT - SUBJECTIVE AND OBJECTIVE BOX
Date of consult: 1/22/25    HPI: 84-year-old Female with PMHx of HTN, HBP, Hypercholestrolenemia, Hx of AKA was sent by his doc to the ED for evaluation and treatment of right leg redness and weeping. Pt  has been on abx and lasix for a week with little to no improvement. Podiatry was consulted for Right LE wound. Patient reported no pain. Patient's relative bedside states the wound started 4 months ago and has been following the primary care doctor. Pt has been on antibiotics for the past three weeks. Patient denies nausea, shortness of breath, chills and fever but admist to nausea to antibiotics.        PMH: HTN (hypertension)    HLD (hyperlipidemia)    Anxiety    CAD (coronary artery disease)    Chronic atrial fibrillation    PAD (peripheral artery disease)    Hypothyroidism    Stenosis of right internal carotid artery    AF (atrial fibrillation)    Carotid artery stenosis    AS (aortic stenosis)      PSH:No significant past surgical history    Status post closed fracture of right femur    H/O arterial bypass of lower limb        Allergies: latex (Unknown)  sulfa drugs (Unknown)      Labs:                          14.0   9.28  )-----------( 167      ( 22 Jan 2025 18:34 )             42.3     WBC Trend  9.28 Date (01-22 @ 18:34)      Chem  01-22    137  |  103  |  37[H]  ----------------------------<  150[H]  3.7   |  28  |  1.21    Ca    9.6      22 Jan 2025 18:34    TPro  7.5  /  Alb  3.8  /  TBili  0.7  /  DBili  x   /  AST  39[H]  /  ALT  81[H]  /  AlkPhos  141[H]  01-22          T(F): 98 (01-22-25 @ 20:35), Max: 98 (01-22-25 @ 16:37)  HR: 71 (01-22-25 @ 20:35) (71 - 77)  BP: 122/78 (01-22-25 @ 20:35) (122/55 - 122/78)  RR: 16 (01-22-25 @ 20:35) (16 - 19)  SpO2: 98% (01-22-25 @ 20:35) (98% - 99%)  Wt(kg): --    REVIEW OF SYSTEMS:    CONSTITUTIONAL: No weakness, fevers or chills  EYES: No visual changes  RESPIRATORY: No cough, wheezing; No shortness of breath  CARDIOVASCULAR: No chest pain or palpitations  GASTROINTESTINAL: No abdominal or epigastric pain. No nausea, vomiting; No diarrhea or constipation.   GENITOURINARY: No dysuria, frequency or hematuria  NEUROLOGICAL: No numbness or weakness  SKIN: See physical examination.  All other review of systems is negative unless indicated above    Physical Exam:   Constitutional: NAD, alert;  Lower Extremity Focus  Derm:  Skin warm, dry and supple bilateral.     Right: Yellowish lesion to Right LE, wound base Fibrogranular , + edema, + hima-wound erythema, + active discharge, no malodor, - purulence, - probe to bone.   Vascular: Dorsalis Pedis and Posterior Tibial pulses non-palpable but monophasic on doppler.  Extremity is cold to touch  Neuro: Protective sensation intact  to the level of the digits bilateral.  MSK: Muscle strength 5/5 all major muscle groups bilateral.       Date of consult: 1/22/25    HPI: 84-year-old Female with PMHx of HTN, HBP, Hypercholestrolenemia, Hx of AKA was sent by his doc to the ED for evaluation and treatment of right leg redness and weeping. Pt  has been on abx and lasix for a week with little to no improvement. Podiatry was consulted for Right LE wound. Patient reported no pain. Patient's relative bedside states the wound started 4 months ago and has been following the primary care doctor. Pt has been on antibiotics for the past three weeks. Patient denies nausea, shortness of breath, chills and fever but admits to nausea to antibiotics.        PMH: HTN (hypertension)    HLD (hyperlipidemia)    Anxiety    CAD (coronary artery disease)    Chronic atrial fibrillation    PAD (peripheral artery disease)    Hypothyroidism    Stenosis of right internal carotid artery    AF (atrial fibrillation)    Carotid artery stenosis    AS (aortic stenosis)      PSH:No significant past surgical history    Status post closed fracture of right femur    H/O arterial bypass of lower limb        Allergies: latex (Unknown)  sulfa drugs (Unknown)      Labs:                          14.0   9.28  )-----------( 167      ( 22 Jan 2025 18:34 )             42.3     WBC Trend  9.28 Date (01-22 @ 18:34)      Chem  01-22    137  |  103  |  37[H]  ----------------------------<  150[H]  3.7   |  28  |  1.21    Ca    9.6      22 Jan 2025 18:34    TPro  7.5  /  Alb  3.8  /  TBili  0.7  /  DBili  x   /  AST  39[H]  /  ALT  81[H]  /  AlkPhos  141[H]  01-22          T(F): 98 (01-22-25 @ 20:35), Max: 98 (01-22-25 @ 16:37)  HR: 71 (01-22-25 @ 20:35) (71 - 77)  BP: 122/78 (01-22-25 @ 20:35) (122/55 - 122/78)  RR: 16 (01-22-25 @ 20:35) (16 - 19)  SpO2: 98% (01-22-25 @ 20:35) (98% - 99%)  Wt(kg): --    REVIEW OF SYSTEMS:    CONSTITUTIONAL: No weakness, fevers or chills  EYES: No visual changes  RESPIRATORY: No cough, wheezing; No shortness of breath  CARDIOVASCULAR: No chest pain or palpitations  GASTROINTESTINAL: No abdominal or epigastric pain. No nausea, vomiting; No diarrhea or constipation.   GENITOURINARY: No dysuria, frequency or hematuria  NEUROLOGICAL: No numbness or weakness  SKIN: See physical examination.  All other review of systems is negative unless indicated above    Physical Exam:   Constitutional: NAD, alert;  Lower Extremity Focus  Derm:  Skin warm, dry and supple bilateral.     Right: Yellowish lesion to Right LE, wound base Fibrogranular , + edema, + hima-wound erythema, + active discharge, no malodor, - purulence, - probe to bone. Of note is a blister at lower RE  Vascular: Dorsalis Pedis and Posterior Tibial pulses non-palpable but monophasic on doppler.  Extremity is cold to touch  Neuro: Protective sensation intact  to the level of the digits bilateral.  MSK: Muscle strength 5/5 all major muscle groups bilateral.

## 2025-01-22 NOTE — ED ADULT TRIAGE NOTE - CHIEF COMPLAINT QUOTE
Pt sent to the ED for evaluation and treatment of right leg redness and weeping. Pt  has been on abx and lasix for a week with little to no improvement. Pt with hx left AKA.

## 2025-01-23 ENCOUNTER — APPOINTMENT (OUTPATIENT)
Dept: CARDIOLOGY | Facility: CLINIC | Age: 85
End: 2025-01-23

## 2025-01-23 DIAGNOSIS — L03.90 CELLULITIS, UNSPECIFIED: ICD-10-CM

## 2025-01-23 DIAGNOSIS — S81.809A UNSPECIFIED OPEN WOUND, UNSPECIFIED LOWER LEG, INITIAL ENCOUNTER: ICD-10-CM

## 2025-01-23 DIAGNOSIS — I50.22 CHRONIC SYSTOLIC (CONGESTIVE) HEART FAILURE: ICD-10-CM

## 2025-01-23 DIAGNOSIS — D75.89 OTHER SPECIFIED DISEASES OF BLOOD AND BLOOD-FORMING ORGANS: ICD-10-CM

## 2025-01-23 DIAGNOSIS — I73.9 PERIPHERAL VASCULAR DISEASE, UNSPECIFIED: ICD-10-CM

## 2025-01-23 DIAGNOSIS — R79.89 OTHER SPECIFIED ABNORMAL FINDINGS OF BLOOD CHEMISTRY: ICD-10-CM

## 2025-01-23 DIAGNOSIS — T87.44 INFECTION OF AMPUTATION STUMP, LEFT LOWER EXTREMITY: Chronic | ICD-10-CM

## 2025-01-23 LAB
GRAM STN FLD: ABNORMAL
HCV AB S/CO SERPL IA: 0.09 S/CO — SIGNIFICANT CHANGE UP (ref 0–0.99)
HCV AB SERPL-IMP: SIGNIFICANT CHANGE UP
SPECIMEN SOURCE: SIGNIFICANT CHANGE UP

## 2025-01-23 PROCEDURE — 85027 COMPLETE CBC AUTOMATED: CPT

## 2025-01-23 PROCEDURE — 75635 CT ANGIO ABDOMINAL ARTERIES: CPT | Mod: 26

## 2025-01-23 PROCEDURE — 99497 ADVNCD CARE PLAN 30 MIN: CPT | Mod: 25

## 2025-01-23 PROCEDURE — 75635 CT ANGIO ABDOMINAL ARTERIES: CPT | Mod: MC

## 2025-01-23 PROCEDURE — 86803 HEPATITIS C AB TEST: CPT

## 2025-01-23 PROCEDURE — 83036 HEMOGLOBIN GLYCOSYLATED A1C: CPT

## 2025-01-23 PROCEDURE — 76705 ECHO EXAM OF ABDOMEN: CPT | Mod: 26

## 2025-01-23 PROCEDURE — 36415 COLL VENOUS BLD VENIPUNCTURE: CPT

## 2025-01-23 PROCEDURE — 76705 ECHO EXAM OF ABDOMEN: CPT

## 2025-01-23 PROCEDURE — 99223 1ST HOSP IP/OBS HIGH 75: CPT

## 2025-01-23 PROCEDURE — 93922 UPR/L XTREMITY ART 2 LEVELS: CPT | Mod: 26

## 2025-01-23 PROCEDURE — 93922 UPR/L XTREMITY ART 2 LEVELS: CPT

## 2025-01-23 PROCEDURE — 80053 COMPREHEN METABOLIC PANEL: CPT

## 2025-01-23 PROCEDURE — 99222 1ST HOSP IP/OBS MODERATE 55: CPT

## 2025-01-23 PROCEDURE — 80048 BASIC METABOLIC PNL TOTAL CA: CPT

## 2025-01-23 PROCEDURE — 82607 VITAMIN B-12: CPT

## 2025-01-23 RX ORDER — PIPERACILLIN SODIUM AND TAZOBACTAM SODIUM 2; 250 G/50ML; MG/50ML
3.38 INJECTION, POWDER, FOR SOLUTION INTRAVENOUS ONCE
Refills: 0 | Status: COMPLETED | OUTPATIENT
Start: 2025-01-23 | End: 2025-01-23

## 2025-01-23 RX ORDER — APIXABAN 5 MG/1
2.5 TABLET, FILM COATED ORAL EVERY 12 HOURS
Refills: 0 | Status: DISCONTINUED | OUTPATIENT
Start: 2025-01-23 | End: 2025-01-27

## 2025-01-23 RX ORDER — ROSUVASTATIN CALCIUM 10 MG/1
5 TABLET, FILM COATED ORAL AT BEDTIME
Refills: 0 | Status: DISCONTINUED | OUTPATIENT
Start: 2025-01-23 | End: 2025-01-27

## 2025-01-23 RX ORDER — PIPERACILLIN SODIUM AND TAZOBACTAM SODIUM 2; 250 G/50ML; MG/50ML
3.38 INJECTION, POWDER, FOR SOLUTION INTRAVENOUS EVERY 8 HOURS
Refills: 0 | Status: DISCONTINUED | OUTPATIENT
Start: 2025-01-23 | End: 2025-01-25

## 2025-01-23 RX ORDER — DAPAGLIFLOZIN 5 MG/1
5 TABLET, FILM COATED ORAL DAILY
Refills: 0 | Status: DISCONTINUED | OUTPATIENT
Start: 2025-01-23 | End: 2025-01-27

## 2025-01-23 RX ORDER — BACTERIOSTATIC SODIUM CHLORIDE 0.9 %
1000 VIAL (ML) INJECTION
Refills: 0 | Status: DISCONTINUED | OUTPATIENT
Start: 2025-01-23 | End: 2025-01-24

## 2025-01-23 RX ORDER — LOSARTAN POTASSIUM 100 MG
25 TABLET ORAL DAILY
Refills: 0 | Status: DISCONTINUED | OUTPATIENT
Start: 2025-01-23 | End: 2025-01-27

## 2025-01-23 RX ORDER — ASPIRIN 81 MG/1
81 TABLET, COATED ORAL DAILY
Refills: 0 | Status: DISCONTINUED | OUTPATIENT
Start: 2025-01-23 | End: 2025-01-27

## 2025-01-23 RX ORDER — METOPROLOL SUCCINATE 25 MG
50 TABLET, EXTENDED RELEASE 24 HR ORAL DAILY
Refills: 0 | Status: DISCONTINUED | OUTPATIENT
Start: 2025-01-23 | End: 2025-01-27

## 2025-01-23 RX ORDER — ACETAMINOPHEN 160 MG/5ML
650 SUSPENSION ORAL EVERY 6 HOURS
Refills: 0 | Status: DISCONTINUED | OUTPATIENT
Start: 2025-01-23 | End: 2025-01-27

## 2025-01-23 RX ORDER — AMIODARONE HYDROCHLORIDE 50 MG/ML
100 INJECTION, SOLUTION INTRAVENOUS DAILY
Refills: 0 | Status: DISCONTINUED | OUTPATIENT
Start: 2025-01-23 | End: 2025-01-27

## 2025-01-23 RX ORDER — LEVOTHYROXINE SODIUM 25 UG/1
50 TABLET ORAL DAILY
Refills: 0 | Status: DISCONTINUED | OUTPATIENT
Start: 2025-01-23 | End: 2025-01-27

## 2025-01-23 RX ORDER — ONDANSETRON 4 MG/1
4 TABLET, ORALLY DISINTEGRATING ORAL EVERY 6 HOURS
Refills: 0 | Status: DISCONTINUED | OUTPATIENT
Start: 2025-01-23 | End: 2025-01-27

## 2025-01-23 RX ADMIN — Medication 25 MILLIGRAM(S): at 12:29

## 2025-01-23 RX ADMIN — PIPERACILLIN SODIUM AND TAZOBACTAM SODIUM 25 GRAM(S): 2; 250 INJECTION, POWDER, FOR SOLUTION INTRAVENOUS at 15:40

## 2025-01-23 RX ADMIN — PIPERACILLIN SODIUM AND TAZOBACTAM SODIUM 25 GRAM(S): 2; 250 INJECTION, POWDER, FOR SOLUTION INTRAVENOUS at 21:08

## 2025-01-23 RX ADMIN — PIPERACILLIN SODIUM AND TAZOBACTAM SODIUM 200 GRAM(S): 2; 250 INJECTION, POWDER, FOR SOLUTION INTRAVENOUS at 12:31

## 2025-01-23 RX ADMIN — Medication 80 MILLILITER(S): at 02:56

## 2025-01-23 RX ADMIN — APIXABAN 2.5 MILLIGRAM(S): 5 TABLET, FILM COATED ORAL at 21:10

## 2025-01-23 RX ADMIN — ONDANSETRON 4 MILLIGRAM(S): 4 TABLET, ORALLY DISINTEGRATING ORAL at 11:20

## 2025-01-23 RX ADMIN — Medication 50 MILLIGRAM(S): at 12:28

## 2025-01-23 RX ADMIN — ROSUVASTATIN CALCIUM 5 MILLIGRAM(S): 10 TABLET, FILM COATED ORAL at 21:08

## 2025-01-23 RX ADMIN — Medication 20 MILLIGRAM(S): at 12:32

## 2025-01-23 RX ADMIN — ASPIRIN 81 MILLIGRAM(S): 81 TABLET, COATED ORAL at 12:28

## 2025-01-23 RX ADMIN — LEVOTHYROXINE SODIUM 50 MICROGRAM(S): 25 TABLET ORAL at 12:29

## 2025-01-23 NOTE — CONSULT NOTE ADULT - SUBJECTIVE AND OBJECTIVE BOX
83 yo F with PMHx of CAD, pAF on Eliquis, severe AS pending TAVR, R internal carotid stenosis, HTN, HLD, hypothyroidism, anxiety, PAD and chronic L toe wounds s/p L ilioprofunda bypass with reverse GSV graft to peroneal 8/31/23 (Dr. Tavares) c/b large L groin abscess (17cm) requiring removal of infected bypass graft of lower extremity and groin washout on 9/24/23, L AKA 11/14 admitted for non-healing wound of RLE.  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 1000 milliLiter(s) (80 mL/Hr) IV Continuous <Continuous>    MEDICATIONS  (PRN):  acetaminophen     Tablet .. 650 milliGRAM(s) Oral every 6 hours PRN Mild Pain (1 - 3)  ondansetron Injectable 4 milliGRAM(s) IV Push every 6 hours PRN Nausea and/or Vomiting      Allergies    latex (Unknown)  No Known Drug Allergies    Intolerances        SOCIAL HISTORY:    FAMILY HISTORY:  Family history of myocardial infarction (Father, Sibling)            Physical Exam:  General: NAD, resting comfortably  Pulmonary: normal resp   Abdominal: soft, ND/NT  RLE: Yellowish lesion to Right LE, wound base Fibrogranular , + edema, + hima-wound erythema, + active discharge, no malodor, - purulence, - probe to bone  Vascular: dopplerable DP/PT; faintly palpable RLE fem      Vital Signs Last 24 Hrs  T(C): 36.7 (23 Jan 2025 07:50), Max: 36.7 (22 Jan 2025 16:37)  T(F): 98 (23 Jan 2025 07:50), Max: 98.1 (23 Jan 2025 00:00)  HR: 86 (23 Jan 2025 07:50) (71 - 90)  BP: 120/75 (23 Jan 2025 07:50) (120/75 - 132/70)  BP(mean): 83 (23 Jan 2025 04:05) (75 - 102)  RR: 18 (23 Jan 2025 07:50) (16 - 19)  SpO2: 95% (23 Jan 2025 07:50) (95% - 99%)    Parameters below as of 23 Jan 2025 07:50  Patient On (Oxygen Delivery Method): room air        I&O's Summary          LABS:                        14.0   9.28  )-----------( 167      ( 22 Jan 2025 18:34 )             42.3     01-22    137  |  103  |  37[H]  ----------------------------<  150[H]  3.7   |  28  |  1.21    Ca    9.6      22 Jan 2025 18:34    TPro  7.5  /  Alb  3.8  /  TBili  0.7  /  DBili  x   /  AST  39[H]  /  ALT  81[H]  /  AlkPhos  141[H]  01-22      Urinalysis Basic - ( 22 Jan 2025 18:34 )    Color: x / Appearance: x / SG: x / pH: x  Gluc: 150 mg/dL / Ketone: x  / Bili: x / Urobili: x   Blood: x / Protein: x / Nitrite: x   Leuk Esterase: x / RBC: x / WBC x   Sq Epi: x / Non Sq Epi: x / Bacteria: x        LIVER FUNCTIONS - ( 22 Jan 2025 18:34 )  Alb: 3.8 g/dL / Pro: 7.5 gm/dL / ALK PHOS: 141 U/L / ALT: 81 U/L / AST: 39 U/L / GGT: x             Cultures:

## 2025-01-23 NOTE — DIETITIAN INITIAL EVALUATION ADULT - ETIOLOGY
r/t decreased ability to meet increased nutrient needs 2/2 open wound, new developed PI, persistent nausea x 1 wk

## 2025-01-23 NOTE — DIETITIAN INITIAL EVALUATION ADULT - ORAL INTAKE PTA/DIET HISTORY
Endorses decreased appetite x 1 week PTA 2/2 persistent nausea (states likely d/t post nasal drip). Normally has a "fair" appetite. Food preferences obtained: likes a variety of fruits and vegetables, chicken, avoids red meat and dislikes fish. Per diet recall pt is meeting <75% ENN.

## 2025-01-23 NOTE — DIETITIAN INITIAL EVALUATION ADULT - ADD RECOMMEND
1) Liberalize diet to regular to maximize caloric and nutrient intake. Encourage protein-rich foods, maximize food preferences   2) Will add Diurnal protein shakes 1x daily to optimize nutritional needs/ promote wound healing (provides 325 kcal, 16 g protein/ shake)   3) Monitor bowel movements, if no BM for >3 days, consider implementing bowel regimen.  4) Recommend to add MVI w/minerals, Vit C 500 mg BID, add Zinc Sulfate 220 mg x 10 days to promote wound healing.  5) In v/o malnutrition consider adding 100mg thiamine daily.  6) Obtain weekly wt to track/trend changes  7) Confirm goals of care regarding nutrition support  RD will continue to monitor PO intake, labs, hydration, and wt prn.

## 2025-01-23 NOTE — H&P ADULT - ASSESSMENT
83 yo woman with PAD, severe AS, HFrEF p/w non-healing ulcer right tibial region with surrounding cellulitis, failed outpatient therapy

## 2025-01-23 NOTE — H&P ADULT - NSHPLABSRESULTS_GEN_ALL_CORE
Medication/Dose: Omeprazole 40 mg   Patient last seen by PCP: 5-  Next office visit with PCP: none scheduled     Above information requires contacting patient: No    Prescription does not require PDMP check    Sent to provider to approve or deny         Alk phos/ALT/AST slightly elevated  arterial study of LE indicate PAD and a wave form c/w severe aortic stenosis

## 2025-01-23 NOTE — PROGRESS NOTE ADULT - ASSESSMENT
A: 84-year-old Female seen for the followin. Full-thickness Non-healing wound to Right LE, stable  2. Venous Stasis      P:   Chart reviewed and Patient evaluated;  Discussed diagnosis and treatment with patient.   X-rays reviewed : on wet read showing no soft tissue emphysema.  On evaluation, pt has Yellowish lesion to Right LE, wound base Fibrogranular , + edema, + hima-wound erythema, + active discharge and blister. Pt is afebrile, has no leukocytosis, WBC 9  Wound cx taken --> will follow up results  Applied silver alginate with dry sterile dressing  Arterial and venous duplex reviewed and findings noted above  WBAT to Right Lower extremity  Reccomends offloading boot to prevent soft tissue breakdown to the RLE  No acute podiatric intervention warranted at this time. Patient is stable from podiatry standpoint. Patient to follow Dr Calvillo as outpatient within one week after discharge  Continue antibiotics as per ID  All additional care per Med appreciated  Patient demonstrated verbal understanding of all interventions and tolerated interventions well without any complications.         Case D/W attending Dr. Calvillo       A: 84-year-old Female seen for the followin. Full-thickness Non-healing wound to Right LE, stable  2. Venous Stasis to RLE      P:   Chart reviewed and Patient evaluated;  Discussed diagnosis and treatment with patient.   X-rays reviewed :on wet read showing no soft tissue emphysema.  On evaluation, pt has Yellowish lesion to Right LE, wound base Fibrogranular , + edema, + hima-wound erythema, + active discharge and blister. Pt is afebrile, has no leukocytosis, WBC 9  Wound cx taken --> will follow up results  Applied silver alginate with dry sterile dressing  Arterial and venous duplex reviewed and findings noted above  WBAT to Right Lower extremity  Reccomends offloading boot to prevent soft tissue breakdown to the RLE  No acute podiatric intervention warranted at this time. Patient is stable from podiatry standpoint. Patient to follow Dr Calvillo as outpatient within one week after discharge  Continue antibiotics as per ID  All additional care per Med appreciated  Patient demonstrated verbal understanding of all interventions and tolerated interventions well without any complications.         Case D/W attending Dr. Calvillo

## 2025-01-23 NOTE — DIETITIAN INITIAL EVALUATION ADULT - PERTINENT LABORATORY DATA
01-22    137  |  103  |  37[H]  ----------------------------<  150[H]  3.7   |  28  |  1.21    Ca    9.6      22 Jan 2025 18:34    TPro  7.5  /  Alb  3.8  /  TBili  0.7  /  DBili  x   /  AST  39[H]  /  ALT  81[H]  /  AlkPhos  141[H]  01-22

## 2025-01-23 NOTE — CONSULT NOTE ADULT - ASSESSMENT
83 yo F with PMHx of CAD, pAF on Eliquis, severe AS pending TAVR, R internal carotid stenosis, HTN, HLD, hypothyroidism, anxiety, PAD and chronic L toe wounds s/p L ilioprofunda bypass with reverse GSV graft to peroneal 8/31/23 (Dr. Tavares) c/b large L groin abscess (17cm) requiring removal of infected bypass graft of lower extremity and groin washout on 9/24/23, L AKA 11/14 admitted for non-healing RLE wound  Adup done showing significant right femoropopliteal disease with dampened runoff of the trifurcation arteries.      - Obtain CTA aorta with runoffs (order in)  - Obtain MONTY/PVR  - c/w eliquis and bASA  - wound care per pods  - vascular surgery will conitnue to follow

## 2025-01-23 NOTE — H&P ADULT - NSHPPHYSICALEXAM_GEN_ALL_CORE
pleasant woman sitting up in bed  afeb, VSS  Skin- dry  HEENT- throat clear  neck- no JVD- ?bruit vs. radiation of SM  CVS- SM 3/6 at base with diminished S2  lungs- clear  abd- nl BS's, NT, no masses  extr- left AKA. Right- anteriro tibial ulcer !1cm, oozing, erythema spreading 10 cm above and below this wound, warm, no crepitus. DP and PT pulses not palpable though were obtained by doppler per podiatry  neuro- sens intact LE's albeit slightly diminished to light touch

## 2025-01-23 NOTE — PATIENT PROFILE ADULT - FUNCTIONAL ASSESSMENT - BASIC MOBILITY 6.
2-calculated by average/Not able to assess (calculate score using [facility] averaging method)

## 2025-01-23 NOTE — H&P ADULT - PROBLEM SELECTOR PLAN 3
wound care  leg elevation  pip-tazo to cover gram + and -'s  vascular surg eval though she would be a high risk surgical candidate thus less invasive approaches preferred  nutritional eval

## 2025-01-23 NOTE — DIETITIAN INITIAL EVALUATION ADULT - OTHER INFO
84-year-old Female with PMHx of HTN, HBP, Hypercholesterolemia, Hx of AKA was sent by doc to the ED for evaluation and treatment of right leg redness and weeping. Pt  has been on abx and lasix for a week with little to no improvement. Podiatry was consulted for Right LE wound - no podiatry interventions warranted at this time. Wound culture taken, pending results.     Visited pt at bed side this morning. States she is feeling hungry, RD assisted w/ ordering breakfast tray (currently on DASH/TLC diet). Endorses some nausea upon assessment this morning. UBW stated at 130#, denies recent wt changes; states she regularly checks her weight, last checked ~1 wk ago. No recent wt hx to review in chart. Bed scale wt of 126# taken by RD on 1/23/25. Wt loss of 4#/ 3.07% x ~1 week - severe and clinically significant. NFPE reveals moderate upper body muscle/fat wasting. Liberalize diet to regular to maximize caloric and nutrient intake. Pt willing to trial [employer] shakes 1x daily to optimize nutritional needs/ promote wound healing (provides 325 kcal, 16 g protein/ shake). See other recs below.  84-year-old Female with PMHx of HTN, HBP, Hypercholesterolemia, Hx of AKA was sent by doc to the ED for evaluation and treatment of right leg redness and weeping. Pt  has been on abx and lasix for a week with little to no improvement. Podiatry was consulted for Right LE wound - no podiatry interventions warranted at this time. Wound culture taken, pending results.     Visited pt at bed side this morning. States she is feeling hungry, RD assisted w/ ordering breakfast tray (currently on DASH/TLC diet). Endorses some nausea upon assessment this morning. UBW stated at 130#, denies recent wt changes; states she regularly checks her weight, last checked ~1 wk ago. No recent wt hx to review in chart. Bed scale wt of 127# taken by RD on 1/23/25. Wt loss of 3#/ 2.31% x ~1 week - clinically significant. NFPE reveals moderate upper body muscle/fat wasting. Liberalize diet to regular to maximize caloric and nutrient intake. Pt willing to trial [employer] shakes 1x daily to optimize nutritional needs/ promote wound healing (provides 325 kcal, 16 g protein/ shake). See other recs below.

## 2025-01-23 NOTE — PROGRESS NOTE ADULT - SUBJECTIVE AND OBJECTIVE BOX
Cardiology 1/22/25: Patient was seen by the podiatry team with the attending present. Pt was resting bedside. No acute distress. No new complaints    PMH: HTN (hypertension)    HLD (hyperlipidemia)    Anxiety    CAD (coronary artery disease)    Chronic atrial fibrillation    PAD (peripheral artery disease)    Hypothyroidism    Stenosis of right internal carotid artery    AF (atrial fibrillation)    Carotid artery stenosis    AS (aortic stenosis)      PSH:No significant past surgical history    Status post closed fracture of right femur    H/O arterial bypass of lower limb        Allergies: latex (Unknown)  sulfa drugs (Unknown)      Labs:                          14.0   9.28  )-----------( 167      ( 22 Jan 2025 18:34 )             42.3     WBC Trend  9.28 Date (01-22 @ 18:34)      Chem  01-22    137  |  103  |  37[H]  ----------------------------<  150[H]  3.7   |  28  |  1.21    Ca    9.6      22 Jan 2025 18:34    TPro  7.5  /  Alb  3.8  /  TBili  0.7  /  DBili  x   /  AST  39[H]  /  ALT  81[H]  /  AlkPhos  141[H]  01-22      Vital Signs Last 24 Hrs  T(C): 36.4 (23 Jan 2025 04:05), Max: 36.7 (22 Jan 2025 16:37)  T(F): 97.5 (23 Jan 2025 04:05), Max: 98.1 (23 Jan 2025 00:00)  HR: 90 (23 Jan 2025 04:05) (71 - 90)  BP: 132/70 (23 Jan 2025 04:05) (122/55 - 132/70)  BP(mean): 83 (23 Jan 2025 04:05) (75 - 102)  RR: 18 (23 Jan 2025 04:05) (16 - 19)  SpO2: 97% (23 Jan 2025 04:05) (97% - 99%)    Parameters below as of 23 Jan 2025 04:05  Patient On (Oxygen Delivery Method): room air        REVIEW OF SYSTEMS:    CONSTITUTIONAL: No weakness, fevers or chills  EYES: No visual changes  RESPIRATORY: No cough, wheezing; No shortness of breath  CARDIOVASCULAR: No chest pain or palpitations  GASTROINTESTINAL: No abdominal or epigastric pain. No nausea, vomiting; No diarrhea or constipation.   GENITOURINARY: No dysuria, frequency or hematuria  NEUROLOGICAL: No numbness or weakness  SKIN: See physical examination.  All other review of systems is negative unless indicated above    Physical Exam:   Constitutional: NAD, alert;  Lower Extremity Focus  Derm:  Skin warm, dry and supple bilateral.     Right: Yellowish lesion to Right LE, wound base Fibrogranular , + edema, + hima-wound erythema, + active discharge, no malodor, - purulence, - probe to bone. Of note is a blister at lower RE  Vascular: Dorsalis Pedis and Posterior Tibial pulses non-palpable but monophasic on doppler.    Neuro: Protective sensation intact  to the level of the digits bilateral.  MSK: Muscle strength 5/5 all major muscle groups bilateral.    < from: US Duplex Venous Lower Ext Ltd, Right (01.22.25 @ 22:23) >    INTERPRETATION:  CLINICAL INFORMATION: Right lower extremity swelling.    COMPARISON: Right lower extremity duplex ultrasound from 1/9/2025.    TECHNIQUE: Duplex sonography of the RIGHT LOWER extremity veins with   color and spectral Doppler, with and without compression.    FINDINGS:    There is normal compressibility of the right common femoral, femoral and   popliteal veins.  The contralateral common femoral vein is patent.  Doppler examination shows normal spontaneous and phasic flow.    Right calf veins not visualized.    Right calf subcutaneous edema.    IMPRESSION:  No evidence of right lower extremity deep venous thrombosis.   Nonvisualization of the right calf veins.      < end of copied text >  < from: US Duplex Arterial Lower Ext Ltd, Right (01.22.25 @ 22:26) >  PROCEDURE INFORMATION:  Exam: US Duplex Right Lower Extremity Arteries Or Arterial Bypass Grafts  Exam date and time: 1/22/2025 9:44 PM  Age: 84 years old  Clinical indication: Righ le non-healing wound    TECHNIQUE:  Imaging protocol: Right Real-time duplex scan of the arteries or arterial  bypass grafts of the right lower extremity with 2-D gray scale, color   Doppler  flow and spectral waveform analysis. Images documented and saved.    COMPARISON:  US LOWER EXTREMITY ARTERIAL DUPLEX COMPLETE BILATERAL 11/18/2022 10:39 AM    FINDINGS:  Right common femoral artery: No occlusion or significant stenosis. Normal  waveform. No pseudoaneurysm in the inguinal region.  Right superficial femoral artery: No occlusion or significant stenosis.   Normal  waveform.  Right popliteal artery: No occlusion or significant stenosis. Normal   waveform.  Right calf/foot arteries: No occlusion or significant stenosis in the  visualized arteries. Normal waveforms. Dorsalis pedis artery is patent.    Soft tissues: No hematoma or collection.  Other findings: Doppler signal is dampened throughout the entire leg with  nonvisualization of the distal SFA. Flow identified in the dorsalis pedis  artery.    IMPRESSION:  1.   Doppler signal is dampened throughout the entire leg with   nonvisualization  of the distal SFA.  2.   Flow identified in the dorsalis pedis artery.        ******PRELIMINARY REPORT******      < end of copied text >     Structural Heart

## 2025-01-23 NOTE — H&P ADULT - HISTORY OF PRESENT ILLNESS
pleasant 83 yo woman with h/o CAD, PAD-> s/p failed bypass of left lef (course c/b groin abscess, infected graft with subsequent remoavl and finally an AKA on the left), carotid disease, HFrEF (23% last echo), severe AS (evaluated for TAVR but not done due to PAD complications), Afib on AC, hypothyroid, HTN, SHe injured her right leg over 2 months ago and wound is not healing. she was given oral antibiotics by her pcp over a week ago and saw podiatry -they noticed wound still not healing and recommeded coming to the hospital.  she denies fever/chills/night sweats. No pain in the leg.

## 2025-01-23 NOTE — H&P ADULT - NSICDXPASTSURGICALHX_GEN_ALL_CORE_FT
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

## 2025-01-23 NOTE — H&P ADULT - NSICDXPASTMEDICALHX_GEN_ALL_CORE_FT
PAST MEDICAL HISTORY:  AF (atrial fibrillation)     Anxiety     AS (aortic stenosis)     CAD (coronary artery disease)     Carotid artery stenosis     Chronic HFrEF (heart failure with reduced ejection fraction)     HLD (hyperlipidemia)     HTN (hypertension)     Hypothyroidism     PAD (peripheral artery disease)

## 2025-01-23 NOTE — H&P ADULT - NSHPADDITIONALINFOADULT_GEN_ALL_CORE
goals of care- I discussed with her wishes in the event of a cardiopulmonary arrest. she was clear she would want CPR performed.

## 2025-01-23 NOTE — DIETITIAN NUTRITION RISK NOTIFICATION - ADDITIONAL COMMENTS/DIETITIAN RECOMMENDATIONS
1) Liberalize diet to regular to maximize caloric and nutrient intake. Encourage protein-rich foods, maximize food preferences   2) Will add [employer] protein shakes 1x daily to optimize nutritional needs/ promote wound healing (provides 325 kcal, 16 g protein/ shake)   3) Monitor bowel movements, if no BM for >3 days, consider implementing bowel regimen.  4) Recommend to add MVI w/minerals, Vit C 500 mg BID, add Zinc Sulfate 220 mg x 10 days to promote wound healing.  5) In v/o malnutrition consider adding 100mg thiamine daily.  6) Obtain weekly wt to track/trend changes  7) Confirm goals of care regarding nutrition support  RD will continue to monitor PO intake, labs, hydration, and wt prn.

## 2025-01-23 NOTE — DIETITIAN INITIAL EVALUATION ADULT - PERTINENT MEDS FT
MEDICATIONS  (STANDING):  sodium chloride 0.9%. 1000 milliLiter(s) (80 mL/Hr) IV Continuous <Continuous>    MEDICATIONS  (PRN):  acetaminophen     Tablet .. 650 milliGRAM(s) Oral every 6 hours PRN Mild Pain (1 - 3)  ondansetron Injectable 4 milliGRAM(s) IV Push every 6 hours PRN Nausea and/or Vomiting

## 2025-01-23 NOTE — DIETITIAN INITIAL EVALUATION ADULT - MALNUTRITION
Pt meets criteria for severe protein-calorie malnutrition in context of acute disease  Pt meets criteria for moderate protein-calorie malnutrition in context of acute disease

## 2025-01-23 NOTE — DIETITIAN INITIAL EVALUATION ADULT - SIGNS/SYMPTOMS
AEB moderate muscle/fat wasting, 3.07% wt loss x 1 wk AEB moderate muscle/fat wasting, 2.31% wt loss x 1 wk AEB moderate muscle/fat wasting, 2.31% wt loss x 1 wk, <75% ENN x 1 wk

## 2025-01-23 NOTE — PATIENT PROFILE ADULT - FALL HARM RISK - HARM RISK INTERVENTIONS

## 2025-01-23 NOTE — PHARMACOTHERAPY INTERVENTION NOTE - COMMENTS
Med history complete. Reviewed medications at bedside with patient and confirmed medication list with [facility] med profile. All medication related questions answered.

## 2025-01-24 LAB
ADD ON TEST-SPECIMEN IN LAB: SIGNIFICANT CHANGE UP
ALBUMIN SERPL ELPH-MCNC: 3.1 G/DL — LOW (ref 3.3–5)
ALP SERPL-CCNC: 128 U/L — HIGH (ref 40–120)
ALT FLD-CCNC: 81 U/L — HIGH (ref 12–78)
ANION GAP SERPL CALC-SCNC: 8 MMOL/L — SIGNIFICANT CHANGE UP (ref 5–17)
AST SERPL-CCNC: 39 U/L — HIGH (ref 15–37)
BILIRUB SERPL-MCNC: 0.5 MG/DL — SIGNIFICANT CHANGE UP (ref 0.2–1.2)
BUN SERPL-MCNC: 38 MG/DL — HIGH (ref 7–23)
CALCIUM SERPL-MCNC: 9 MG/DL — SIGNIFICANT CHANGE UP (ref 8.5–10.1)
CHLORIDE SERPL-SCNC: 100 MMOL/L — SIGNIFICANT CHANGE UP (ref 96–108)
CO2 SERPL-SCNC: 29 MMOL/L — SIGNIFICANT CHANGE UP (ref 22–31)
CREAT SERPL-MCNC: 1.42 MG/DL — HIGH (ref 0.5–1.3)
EGFR: 37 ML/MIN/1.73M2 — LOW
GLUCOSE SERPL-MCNC: 217 MG/DL — HIGH (ref 70–99)
HCT VFR BLD CALC: 38.6 % — SIGNIFICANT CHANGE UP (ref 34.5–45)
HGB BLD-MCNC: 12.6 G/DL — SIGNIFICANT CHANGE UP (ref 11.5–15.5)
MCHC RBC-ENTMCNC: 32.6 G/DL — SIGNIFICANT CHANGE UP (ref 32–36)
MCHC RBC-ENTMCNC: 33.8 PG — SIGNIFICANT CHANGE UP (ref 27–34)
MCV RBC AUTO: 103.5 FL — HIGH (ref 80–100)
PLATELET # BLD AUTO: 158 K/UL — SIGNIFICANT CHANGE UP (ref 150–400)
POTASSIUM SERPL-MCNC: 3.5 MMOL/L — SIGNIFICANT CHANGE UP (ref 3.5–5.3)
POTASSIUM SERPL-SCNC: 3.5 MMOL/L — SIGNIFICANT CHANGE UP (ref 3.5–5.3)
PROT SERPL-MCNC: 6.4 GM/DL — SIGNIFICANT CHANGE UP (ref 6–8.3)
RBC # BLD: 3.73 M/UL — LOW (ref 3.8–5.2)
RBC # FLD: 13.7 % — SIGNIFICANT CHANGE UP (ref 10.3–14.5)
SODIUM SERPL-SCNC: 137 MMOL/L — SIGNIFICANT CHANGE UP (ref 135–145)
VIT B12 SERPL-MCNC: 879 PG/ML — SIGNIFICANT CHANGE UP (ref 232–1245)
WBC # BLD: 9.95 K/UL — SIGNIFICANT CHANGE UP (ref 3.8–10.5)
WBC # FLD AUTO: 9.95 K/UL — SIGNIFICANT CHANGE UP (ref 3.8–10.5)

## 2025-01-24 PROCEDURE — 99233 SBSQ HOSP IP/OBS HIGH 50: CPT

## 2025-01-24 RX ORDER — BACTERIOSTATIC SODIUM CHLORIDE 0.9 %
250 VIAL (ML) INJECTION
Refills: 0 | Status: DISCONTINUED | OUTPATIENT
Start: 2025-01-24 | End: 2025-01-27

## 2025-01-24 RX ADMIN — Medication 60 MILLILITER(S): at 15:33

## 2025-01-24 RX ADMIN — ASPIRIN 81 MILLIGRAM(S): 81 TABLET, COATED ORAL at 10:11

## 2025-01-24 RX ADMIN — PIPERACILLIN SODIUM AND TAZOBACTAM SODIUM 25 GRAM(S): 2; 250 INJECTION, POWDER, FOR SOLUTION INTRAVENOUS at 13:43

## 2025-01-24 RX ADMIN — Medication 25 MILLIGRAM(S): at 10:11

## 2025-01-24 RX ADMIN — Medication 20 MILLIGRAM(S): at 05:13

## 2025-01-24 RX ADMIN — ROSUVASTATIN CALCIUM 5 MILLIGRAM(S): 10 TABLET, FILM COATED ORAL at 21:11

## 2025-01-24 RX ADMIN — APIXABAN 2.5 MILLIGRAM(S): 5 TABLET, FILM COATED ORAL at 21:11

## 2025-01-24 RX ADMIN — PIPERACILLIN SODIUM AND TAZOBACTAM SODIUM 25 GRAM(S): 2; 250 INJECTION, POWDER, FOR SOLUTION INTRAVENOUS at 05:13

## 2025-01-24 RX ADMIN — DAPAGLIFLOZIN 5 MILLIGRAM(S): 5 TABLET, FILM COATED ORAL at 10:11

## 2025-01-24 RX ADMIN — APIXABAN 2.5 MILLIGRAM(S): 5 TABLET, FILM COATED ORAL at 10:11

## 2025-01-24 RX ADMIN — LEVOTHYROXINE SODIUM 50 MICROGRAM(S): 25 TABLET ORAL at 05:13

## 2025-01-24 RX ADMIN — ONDANSETRON 4 MILLIGRAM(S): 4 TABLET, ORALLY DISINTEGRATING ORAL at 21:11

## 2025-01-24 RX ADMIN — Medication 50 MILLIGRAM(S): at 10:12

## 2025-01-24 RX ADMIN — PIPERACILLIN SODIUM AND TAZOBACTAM SODIUM 25 GRAM(S): 2; 250 INJECTION, POWDER, FOR SOLUTION INTRAVENOUS at 21:11

## 2025-01-24 RX ADMIN — AMIODARONE HYDROCHLORIDE 100 MILLIGRAM(S): 50 INJECTION, SOLUTION INTRAVENOUS at 10:11

## 2025-01-24 NOTE — PROGRESS NOTE ADULT - TIME-BASED
54 Topical Ketoconazole Counseling: Patient counseled that this medication may cause skin irritation or allergic reactions.  In the event of skin irritation, the patient was advised to reduce the amount of the drug applied or use it less frequently.   The patient verbalized understanding of the proper use and possible adverse effects of ketoconazole.  All of the patient's questions and concerns were addressed.

## 2025-01-24 NOTE — PROGRESS NOTE ADULT - TIME BILLING
Time spent coordinating the patient's care. This includes reviewing documentation pertinent to this admission, results and imaging. Further tests, medications, and procedures have been ordered as indicated. Laboratory results and the plan of care were communicated to the patient and family.  Discussed care plan with nursing and will discuss plan and care with appropriate consultant(s). Supporting documentation was completed and added to the patient's chart incuding updated MOLST.

## 2025-01-24 NOTE — PROGRESS NOTE ADULT - SUBJECTIVE AND OBJECTIVE BOX
CC: Patient is a 84y old  Female who presents with a chief complaint of wound/cellulitis right leg (23 Jan 2025 10:49)      INTERVAL EVENTS: ADRIÁN    SUBJECTIVE / INTERVAL HPI: Patient seen and examined at bedside. patient says her swelling is much improved    ROS: negative unless otherwise stated above.    VITAL SIGNS:  Vital Signs Last 24 Hrs  T(C): 36.8 (24 Jan 2025 08:19), Max: 36.9 (23 Jan 2025 23:38)  T(F): 98.2 (24 Jan 2025 08:19), Max: 98.4 (23 Jan 2025 23:38)  HR: 73 (24 Jan 2025 08:19) (73 - 78)  BP: 111/71 (24 Jan 2025 08:19) (108/70 - 123/67)  BP(mean): 80 (23 Jan 2025 23:38) (80 - 80)  RR: 18 (24 Jan 2025 08:19) (16 - 18)  SpO2: 91% (24 Jan 2025 08:19) (91% - 100%)    Parameters below as of 24 Jan 2025 08:19  Patient On (Oxygen Delivery Method): room air            PHYSICAL EXAM:    General: NAD  HEENT: MMM  Neck: supple  Cardiovascular: +S1/S2; RRR  Respiratory: CTA B/L; no W/R/R  Gastrointestinal: soft, NT/ND  Extremities: L BKA R side improving edema   Vascular: 2+ radial, DP/PT pulses B/L  Neurological: AAOx3; no focal deficits    MEDICATIONS:  MEDICATIONS  (STANDING):  aMIOdarone    Tablet 100 milliGRAM(s) Oral daily  apixaban 2.5 milliGRAM(s) Oral every 12 hours  aspirin enteric coated 81 milliGRAM(s) Oral daily  dapagliflozin 5 milliGRAM(s) Oral daily  furosemide    Tablet 20 milliGRAM(s) Oral daily  levothyroxine 50 MICROGram(s) Oral daily  losartan 25 milliGRAM(s) Oral daily  metoprolol succinate ER 50 milliGRAM(s) Oral daily  piperacillin/tazobactam IVPB.. 3.375 Gram(s) IV Intermittent every 8 hours  rosuvastatin 5 milliGRAM(s) Oral at bedtime    MEDICATIONS  (PRN):  acetaminophen     Tablet .. 650 milliGRAM(s) Oral every 6 hours PRN Mild Pain (1 - 3)  ondansetron Injectable 4 milliGRAM(s) IV Push every 6 hours PRN Nausea and/or Vomiting      ALLERGIES:  Allergies    latex (Unknown)  No Known Drug Allergies    Intolerances        LABS:                        14.0   9.28  )-----------( 167      ( 22 Jan 2025 18:34 )             42.3     01-22    137  |  103  |  37[H]  ----------------------------<  150[H]  3.7   |  28  |  1.21    Ca    9.6      22 Jan 2025 18:34    TPro  7.5  /  Alb  3.8  /  TBili  0.7  /  DBili  x   /  AST  39[H]  /  ALT  81[H]  /  AlkPhos  141[H]  01-22      Urinalysis Basic - ( 22 Jan 2025 18:34 )    Color: x / Appearance: x / SG: x / pH: x  Gluc: 150 mg/dL / Ketone: x  / Bili: x / Urobili: x   Blood: x / Protein: x / Nitrite: x   Leuk Esterase: x / RBC: x / WBC x   Sq Epi: x / Non Sq Epi: x / Bacteria: x      CAPILLARY BLOOD GLUCOSE          RADIOLOGY & ADDITIONAL TESTS: Reviewed. CC: Patient is a 84y old  Female who presents with a chief complaint of wound/cellulitis right leg (23 Jan 2025 10:49)      INTERVAL EVENTS: ADRIÁN    SUBJECTIVE / INTERVAL HPI: Patient seen and examined at bedside. patient says her swelling is much improved    ROS: negative unless otherwise stated above.    VITAL SIGNS:  Vital Signs Last 24 Hrs  T(C): 36.8 (24 Jan 2025 08:19), Max: 36.9 (23 Jan 2025 23:38)  T(F): 98.2 (24 Jan 2025 08:19), Max: 98.4 (23 Jan 2025 23:38)  HR: 73 (24 Jan 2025 08:19) (73 - 78)  BP: 111/71 (24 Jan 2025 08:19) (108/70 - 123/67)  BP(mean): 80 (23 Jan 2025 23:38) (80 - 80)  RR: 18 (24 Jan 2025 08:19) (16 - 18)  SpO2: 91% (24 Jan 2025 08:19) (91% - 100%)    Parameters below as of 24 Jan 2025 08:19  Patient On (Oxygen Delivery Method): room air            PHYSICAL EXAM:    General: NAD  HEENT: MMM  Neck: supple  Cardiovascular: +S1/S2; RRR  Respiratory: CTA B/L; no W/R/R  Gastrointestinal: soft, NT/ND  Extremities: L BKA R side improving erythema  Vascular: 2+ radial, DP/PT pulses B/L  Neurological: AAOx3; no focal deficits    MEDICATIONS:  MEDICATIONS  (STANDING):  aMIOdarone    Tablet 100 milliGRAM(s) Oral daily  apixaban 2.5 milliGRAM(s) Oral every 12 hours  aspirin enteric coated 81 milliGRAM(s) Oral daily  dapagliflozin 5 milliGRAM(s) Oral daily  furosemide    Tablet 20 milliGRAM(s) Oral daily  levothyroxine 50 MICROGram(s) Oral daily  losartan 25 milliGRAM(s) Oral daily  metoprolol succinate ER 50 milliGRAM(s) Oral daily  piperacillin/tazobactam IVPB.. 3.375 Gram(s) IV Intermittent every 8 hours  rosuvastatin 5 milliGRAM(s) Oral at bedtime    MEDICATIONS  (PRN):  acetaminophen     Tablet .. 650 milliGRAM(s) Oral every 6 hours PRN Mild Pain (1 - 3)  ondansetron Injectable 4 milliGRAM(s) IV Push every 6 hours PRN Nausea and/or Vomiting      ALLERGIES:  Allergies    latex (Unknown)  No Known Drug Allergies    Intolerances        LABS:                        14.0   9.28  )-----------( 167      ( 22 Jan 2025 18:34 )             42.3     01-22    137  |  103  |  37[H]  ----------------------------<  150[H]  3.7   |  28  |  1.21    Ca    9.6      22 Jan 2025 18:34    TPro  7.5  /  Alb  3.8  /  TBili  0.7  /  DBili  x   /  AST  39[H]  /  ALT  81[H]  /  AlkPhos  141[H]  01-22      Urinalysis Basic - ( 22 Jan 2025 18:34 )    Color: x / Appearance: x / SG: x / pH: x  Gluc: 150 mg/dL / Ketone: x  / Bili: x / Urobili: x   Blood: x / Protein: x / Nitrite: x   Leuk Esterase: x / RBC: x / WBC x   Sq Epi: x / Non Sq Epi: x / Bacteria: x      CAPILLARY BLOOD GLUCOSE          RADIOLOGY & ADDITIONAL TESTS: Reviewed.

## 2025-01-24 NOTE — PROGRESS NOTE ADULT - ASSESSMENT
85 yo woman with PAD, severe AS, HFrEF p/w non-healing ulcer right tibial region with surrounding cellulitis, failed outpatient therapy     #Cellulitis c/b PAD   wound care  leg elevation  pip-tazo to cover gram + and -'s  vascular following  monty's and ct angio complete -->   Right lower extremity:  No significant inflow disease  Moderate stenosis of the right common femoral artery.  Severe diffuse disease throughout the superficial femoral artery.  Occluded popliteal artery.  Reconstitution of all 3 infrapopliteal arteries which are patent into the   foot.  Right MONTY is 0.68, moderately abnormal. Right toe brachial index of 0.67   is mildly abnormal. Right MONTY and TBI may be affected by calcified   vasculature.  -c/w secondary prophylaxis (statin, etc).  -f/u vasc recs podiatry recs  -c/w zosyn --> seeing improvement already     # Macrocytosis without anemia.   patient with macrocytosis  no anemia  no signs of bleeding  likely nutritional deficiencies   -f/u b12 level added on      #Elevated LFTs.   trend LFTs   consider "nutmeg liver" as possibility.  order hepatitis panel if not downtrending     #Chronic HFrEF (heart failure with reduced ejection fraction).   continue ARB and BB  -discuss with her pcp and cards to transition to entresto and add SGLT2i on dc    #Paroxysmal atrial fibrillation.   on Eliquis for paroxysmal a fib  amio & toprol  -c/w home meds    #Hypertension.   ·  Plan: - c/w Toprol & valsartan     #Hypothyroidism.    - c/w levothyroxine 50 mcg daily on empty stomach 30 min prior to meals.    #prophylactic measure  F: none  E: replete as needed  N: eliquis  DVT ppx: regular

## 2025-01-25 LAB
-  CLINDAMYCIN: SIGNIFICANT CHANGE UP
-  DAPTOMYCIN: SIGNIFICANT CHANGE UP
-  ERYTHROMYCIN: SIGNIFICANT CHANGE UP
-  GENTAMICIN: SIGNIFICANT CHANGE UP
-  LINEZOLID: SIGNIFICANT CHANGE UP
-  OXACILLIN: SIGNIFICANT CHANGE UP
-  PENICILLIN: SIGNIFICANT CHANGE UP
-  RIFAMPIN: SIGNIFICANT CHANGE UP
-  TETRACYCLINE: SIGNIFICANT CHANGE UP
-  TRIMETHOPRIM/SULFAMETHOXAZOLE: SIGNIFICANT CHANGE UP
-  VANCOMYCIN: SIGNIFICANT CHANGE UP
ANION GAP SERPL CALC-SCNC: 4 MMOL/L — LOW (ref 5–17)
BUN SERPL-MCNC: 33 MG/DL — HIGH (ref 7–23)
CALCIUM SERPL-MCNC: 9 MG/DL — SIGNIFICANT CHANGE UP (ref 8.5–10.1)
CHLORIDE SERPL-SCNC: 100 MMOL/L — SIGNIFICANT CHANGE UP (ref 96–108)
CO2 SERPL-SCNC: 31 MMOL/L — SIGNIFICANT CHANGE UP (ref 22–31)
CREAT SERPL-MCNC: 1.33 MG/DL — HIGH (ref 0.5–1.3)
EGFR: 40 ML/MIN/1.73M2 — LOW
GLUCOSE SERPL-MCNC: 155 MG/DL — HIGH (ref 70–99)
HCT VFR BLD CALC: 39.2 % — SIGNIFICANT CHANGE UP (ref 34.5–45)
HGB BLD-MCNC: 12.9 G/DL — SIGNIFICANT CHANGE UP (ref 11.5–15.5)
MCHC RBC-ENTMCNC: 32.9 G/DL — SIGNIFICANT CHANGE UP (ref 32–36)
MCHC RBC-ENTMCNC: 34.1 PG — HIGH (ref 27–34)
MCV RBC AUTO: 103.7 FL — HIGH (ref 80–100)
METHOD TYPE: SIGNIFICANT CHANGE UP
PLATELET # BLD AUTO: 151 K/UL — SIGNIFICANT CHANGE UP (ref 150–400)
POTASSIUM SERPL-MCNC: 3.2 MMOL/L — LOW (ref 3.5–5.3)
POTASSIUM SERPL-SCNC: 3.2 MMOL/L — LOW (ref 3.5–5.3)
RBC # BLD: 3.78 M/UL — LOW (ref 3.8–5.2)
RBC # FLD: 13.3 % — SIGNIFICANT CHANGE UP (ref 10.3–14.5)
SODIUM SERPL-SCNC: 135 MMOL/L — SIGNIFICANT CHANGE UP (ref 135–145)
WBC # BLD: 7.87 K/UL — SIGNIFICANT CHANGE UP (ref 3.8–10.5)
WBC # FLD AUTO: 7.87 K/UL — SIGNIFICANT CHANGE UP (ref 3.8–10.5)

## 2025-01-25 PROCEDURE — 99233 SBSQ HOSP IP/OBS HIGH 50: CPT

## 2025-01-25 RX ORDER — DOXYCYCLINE HYCLATE 100 MG/1
100 CAPSULE ORAL EVERY 12 HOURS
Refills: 0 | Status: DISCONTINUED | OUTPATIENT
Start: 2025-01-25 | End: 2025-01-27

## 2025-01-25 RX ORDER — POTASSIUM CHLORIDE 750 MG/1
40 TABLET, EXTENDED RELEASE ORAL ONCE
Refills: 0 | Status: COMPLETED | OUTPATIENT
Start: 2025-01-25 | End: 2025-01-25

## 2025-01-25 RX ADMIN — LEVOTHYROXINE SODIUM 50 MICROGRAM(S): 25 TABLET ORAL at 05:26

## 2025-01-25 RX ADMIN — AMIODARONE HYDROCHLORIDE 100 MILLIGRAM(S): 50 INJECTION, SOLUTION INTRAVENOUS at 10:36

## 2025-01-25 RX ADMIN — DAPAGLIFLOZIN 5 MILLIGRAM(S): 5 TABLET, FILM COATED ORAL at 10:36

## 2025-01-25 RX ADMIN — ASPIRIN 81 MILLIGRAM(S): 81 TABLET, COATED ORAL at 10:36

## 2025-01-25 RX ADMIN — POTASSIUM CHLORIDE 40 MILLIEQUIVALENT(S): 750 TABLET, EXTENDED RELEASE ORAL at 10:36

## 2025-01-25 RX ADMIN — Medication 20 MILLIGRAM(S): at 05:26

## 2025-01-25 RX ADMIN — APIXABAN 2.5 MILLIGRAM(S): 5 TABLET, FILM COATED ORAL at 21:04

## 2025-01-25 RX ADMIN — APIXABAN 2.5 MILLIGRAM(S): 5 TABLET, FILM COATED ORAL at 10:37

## 2025-01-25 RX ADMIN — ROSUVASTATIN CALCIUM 5 MILLIGRAM(S): 10 TABLET, FILM COATED ORAL at 21:04

## 2025-01-25 RX ADMIN — DOXYCYCLINE HYCLATE 100 MILLIGRAM(S): 100 CAPSULE ORAL at 21:04

## 2025-01-25 RX ADMIN — Medication 25 MILLIGRAM(S): at 10:36

## 2025-01-25 RX ADMIN — ONDANSETRON 4 MILLIGRAM(S): 4 TABLET, ORALLY DISINTEGRATING ORAL at 16:24

## 2025-01-25 RX ADMIN — PIPERACILLIN SODIUM AND TAZOBACTAM SODIUM 25 GRAM(S): 2; 250 INJECTION, POWDER, FOR SOLUTION INTRAVENOUS at 05:26

## 2025-01-25 RX ADMIN — Medication 50 MILLIGRAM(S): at 10:36

## 2025-01-25 NOTE — PROGRESS NOTE ADULT - SUBJECTIVE AND OBJECTIVE BOX
1/25/25: Patient was seen by the podiatry team. Pt was resting bedside. No acute distress. No new complaints    PMH: HTN (hypertension)    HLD (hyperlipidemia)    Anxiety    CAD (coronary artery disease)    Chronic atrial fibrillation    PAD (peripheral artery disease)    Hypothyroidism    Stenosis of right internal carotid artery    AF (atrial fibrillation)    Carotid artery stenosis    AS (aortic stenosis)      PSH:No significant past surgical history    Status post closed fracture of right femur    H/O arterial bypass of lower limb        Allergies: latex (Unknown)  sulfa drugs (Unknown)      Labs:                            12.9   7.87  )-----------( 151      ( 25 Jan 2025 08:33 )             39.2        01-25    135  |  100  |  33[H]  ----------------------------<  155[H]  3.2[L]   |  31  |  1.33[H]    Ca    9.0      25 Jan 2025 08:33    TPro  6.4  /  Alb  3.1[L]  /  TBili  0.5  /  DBili  x   /  AST  39[H]  /  ALT  81[H]  /  AlkPhos  128[H]  01-24    Vital Signs Last 24 Hrs  T(C): 36.3 (25 Jan 2025 08:33), Max: 36.7 (24 Jan 2025 23:36)  T(F): 97.4 (25 Jan 2025 08:33), Max: 98.1 (24 Jan 2025 23:36)  HR: 72 (25 Jan 2025 08:33) (63 - 72)  BP: 112/56 (25 Jan 2025 08:33) (101/57 - 112/56)  BP(mean): --  RR: 18 (25 Jan 2025 08:33) (18 - 18)  SpO2: 95% (25 Jan 2025 08:33) (95% - 97%)    Parameters below as of 25 Jan 2025 08:33  Patient On (Oxygen Delivery Method): room air  O2 Flow (L/min): 10      REVIEW OF SYSTEMS:    CONSTITUTIONAL: No weakness, fevers or chills  EYES: No visual changes  RESPIRATORY: No cough, wheezing; No shortness of breath  CARDIOVASCULAR: No chest pain or palpitations  GASTROINTESTINAL: No abdominal or epigastric pain. No nausea, vomiting; No diarrhea or constipation.   GENITOURINARY: No dysuria, frequency or hematuria  NEUROLOGICAL: No numbness or weakness  SKIN: See physical examination.  All other review of systems is negative unless indicated above    Physical Exam:   Constitutional: NAD, alert;  Lower Extremity Focus  Derm:  Skin warm, dry and supple bilateral.     Right: Yellowish lesion to Right LE, wound base Fibrogranular , + edema (improving), + hima-wound erythema (improving), + active discharge (improved), no malodor, - purulence, - probe to bone. Of note is a blister at lower RE  Vascular: Dorsalis Pedis and Posterior Tibial pulses non-palpable but monophasic on doppler.    Neuro: Protective sensation intact  to the level of the digits bilateral.  MSK: Muscle strength 5/5 all major muscle groups bilateral.    < from: US Duplex Venous Lower Ext Ltd, Right (01.22.25 @ 22:23) >    INTERPRETATION:  CLINICAL INFORMATION: Right lower extremity swelling.    COMPARISON: Right lower extremity duplex ultrasound from 1/9/2025.    TECHNIQUE: Duplex sonography of the RIGHT LOWER extremity veins with   color and spectral Doppler, with and without compression.    FINDINGS:    There is normal compressibility of the right common femoral, femoral and   popliteal veins.  The contralateral common femoral vein is patent.  Doppler examination shows normal spontaneous and phasic flow.    Right calf veins not visualized.    Right calf subcutaneous edema.    IMPRESSION:  No evidence of right lower extremity deep venous thrombosis.   Nonvisualization of the right calf veins.      < end of copied text >  < from: US Duplex Arterial Lower Ext Ltd, Right (01.22.25 @ 22:26) >  PROCEDURE INFORMATION:  Exam: US Duplex Right Lower Extremity Arteries Or Arterial Bypass Grafts  Exam date and time: 1/22/2025 9:44 PM  Age: 84 years old  Clinical indication: Righ le non-healing wound    TECHNIQUE:  Imaging protocol: Right Real-time duplex scan of the arteries or arterial  bypass grafts of the right lower extremity with 2-D gray scale, color   Doppler  flow and spectral waveform analysis. Images documented and saved.    COMPARISON:  US LOWER EXTREMITY ARTERIAL DUPLEX COMPLETE BILATERAL 11/18/2022 10:39 AM    FINDINGS:  Right common femoral artery: No occlusion or significant stenosis. Normal  waveform. No pseudoaneurysm in the inguinal region.  Right superficial femoral artery: No occlusion or significant stenosis.   Normal  waveform.  Right popliteal artery: No occlusion or significant stenosis. Normal   waveform.  Right calf/foot arteries: No occlusion or significant stenosis in the  visualized arteries. Normal waveforms. Dorsalis pedis artery is patent.    Soft tissues: No hematoma or collection.  Other findings: Doppler signal is dampened throughout the entire leg with  nonvisualization of the distal SFA. Flow identified in the dorsalis pedis  artery.    IMPRESSION:  1.   Doppler signal is dampened throughout the entire leg with   nonvisualization  of the distal SFA.  2.   Flow identified in the dorsalis pedis artery.        ******PRELIMINARY REPORT******      < end of copied text >    Culture Results:   Moderate Staphylococcus aureus (01.22.25 @ 21:50)       Historical Values  Culture Results:   Moderate Staphylococcus aureus (01.22.25 @ 21:50)

## 2025-01-25 NOTE — PROGRESS NOTE ADULT - ASSESSMENT
A: 84-year-old Female seen for the followin. Full-thickness Non-healing wound to Right LE, stable  2. Venous Stasis to RLE      P:   Chart reviewed and Patient evaluated;  Discussed diagnosis and treatment with patient.   X-rays reviewed :on wet read showing no soft tissue emphysema.  On evaluation, pt has Yellowish lesion to Right LE, wound base Fibrogranular , + edema, + hima-wound erythema, + active discharge and blister. Pt is afebrile, has no leukocytosis, WBC 9  Wound cx taken reviewed and noted above -->Staph aureus  Applied silver alginate with dry sterile dressing  Arterial and venous duplex reviewed and findings noted above  WBAT to Right Lower extremity  Recommends offloading boot to prevent soft tissue breakdown to the RLE  Vascular Recc appreciated  No acute podiatric intervention warranted at this time. Patient is stable from podiatry standpoint. Patient to follow Dr Calvillo as outpatient within one week after discharge  Continue antibiotics as per ID  All additional care per Med appreciated  Patient demonstrated verbal understanding of all interventions and tolerated interventions well without any complications.         Case D/W attending Dr. Calvillo       A: 84-year-old Female seen for the followin. Full-thickness Non-healing wound to Right LE, stable  2. Venous Stasis to RLE      P:   Chart reviewed and Patient evaluated;  Discussed diagnosis and treatment with patient.   X-rays reviewed :on wet read showing no soft tissue emphysema.  On evaluation, pt has Yellowish lesion to Right LE, wound base Fibrogranular , + edema, + hima-wound erythema, + active discharge and blister. Pt is afebrile, has no leukocytosis, WBC 9  Wound cx taken reviewed and noted above -->Staph aureus  Applied silver alginate with dry sterile dressing  Arterial and venous duplex reviewed and findings noted above  WBAT to Right Lower extremity  Recommends offloading boot to prevent soft tissue breakdown to the RLE  Vascular Recc appreciated  No acute podiatric intervention warranted at this time. Patient is stable from podiatry standpoint. Patient to follow Dr Calvillo as outpatient within one week after discharge  Continue antibiotics as per ID  All additional care per Med appreciated  Patient demonstrated verbal understanding of all interventions and tolerated interventions well without any complications.         Case D/W attending Dr. Calvillo      Wound Care Orders for Right LE wound to be changed Every Other Day  1. Gently remove the old dressing  2. Apply silver alginate to the wounds, cover with clean 4x4 gauze  3. Wrap with Kerlix and secure with tape.  Thank you

## 2025-01-25 NOTE — PROGRESS NOTE ADULT - ASSESSMENT
85 yo with PAD     MONTY/PVR and CTA report reviewed       PLAN  Cont optimal medical care  Local wound care   Vascular surgery will updated primary team of any intervention   Rest of care per primary team

## 2025-01-25 NOTE — PROGRESS NOTE ADULT - SUBJECTIVE AND OBJECTIVE BOX
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 she was given oral antibiotics by her pcp over a week ago and saw podiatry -they noticed wound still not healing and recommeded coming to the hospital.  she denies fever/chills/night sweats. No pain in the leg. (23 Jan 2025 10:49)      SUBJECTIVE & OBJECTIVE: Pt seen and examined at bedside.     ICU Vital Signs Last 24 Hrs  T(C): 36.3 (25 Jan 2025 08:33), Max: 36.7 (24 Jan 2025 23:36)  T(F): 97.4 (25 Jan 2025 08:33), Max: 98.1 (24 Jan 2025 23:36)  HR: 72 (25 Jan 2025 08:33) (63 - 72)  BP: 112/56 (25 Jan 2025 08:33) (101/57 - 112/56)  BP(mean): --  ABP: --  ABP(mean): --  RR: 18 (25 Jan 2025 08:33) (18 - 18)  SpO2: 95% (25 Jan 2025 08:33) (95% - 97%)    O2 Parameters below as of 25 Jan 2025 08:33  Patient On (Oxygen Delivery Method): room air  O2 Flow (L/min): 10            PHYSICAL EXAM:      NERVOUS SYSTEM:  Alert & Oriented X3, Motor Strength 5/5 B/L upper and lower extremities; DTRs 2+ intact and symmetric  CHEST/LUNG: Clear to auscultation bilaterally; No rales, rhonchi, wheezing, or rubs  HEART: Regular rate and rhythm; No murmurs, rubs, or gallops  ABDOMEN: Soft, Nontender, Nondistended; Bowel sounds present  EXTREMITIES:  2+ Peripheral Pulses, No clubbing, cyanosis, or edema    LABS:                        12.6   9.95  )-----------( 158      ( 24 Jan 2025 14:08 )             38.6     01-24    137  |  100  |  38[H]  ----------------------------<  217[H]  3.5   |  29  |  1.42[H]    Ca    9.0      24 Jan 2025 14:08    TPro  6.4  /  Alb  3.1[L]  /  TBili  0.5  /  DBili  x   /  AST  39[H]  /  ALT  81[H]  /  AlkPhos  128[H]  01-24          RADIOLOGY & ADDITIONAL TESTS: personally reviewed    monty's and ct angio complete -->   Right lower extremity:  No significant inflow disease  Moderate stenosis of the right common femoral artery.  Severe diffuse disease throughout the superficial femoral artery.  Occluded popliteal artery.  Reconstitution of all 3 infrapopliteal arteries which are patent into the   foot.  Right MONTY is 0.68, moderately abnormal. Right toe brachial index of 0.67   is mildly abnormal. Right MONTY and TBI may be affected by calcified   vasculature.      85 yo woman with PAD, severe AS, HFrEF p/w non-healing ulcer right tibial region with surrounding cellulitis, failed outpatient therapy     #Cellulitis c/b PAD   pip-tazo to cover gram + and -'s  vascular following  -c/w secondary prophylaxis (statin, etc).  -f/u [facility] recs podiatry recs  -c/w zosyn --> seeing improvement already       US  IMPRESSION:  Unremarkable appearance of the liver  Small pancreatic cysts most likely representing intraductal papillary   mucinous tumors.  Right pleural effusion is appreciated.      CT:  ADDITIONAL FINDINGS: Cardiomegaly. Moderate to large bilateral pleural   effusions. Colonic diverticulosis. Right hip open reduction/internal     A/P:  85 yo woman with PAD, severe AS, HFrEF p/w non-healing ulcer right tibial region with surrounding cellulitis, failed outpatient therapy       #Cellulitis c/b PAD   wound care  leg elevation  pip-tazo to cover gram + and -'s  vascular following  monty's and ct angio complete -->       #Chronic HFrEF (heart failure with reduced ejection fraction).   left pl eff seems old; PO Lasix    #Paroxysmal atrial fibrillation.   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 she was given oral antibiotics by her pcp over a week ago and saw podiatry -they noticed wound still not healing and recommeded coming to the hospital.  she denies fever/chills/night sweats. No pain in the leg. (23 Jan 2025 10:49)    no c/o    ICU Vital Signs Last 24 Hrs  T(C): 36.3 (25 Jan 2025 08:33), Max: 36.7 (24 Jan 2025 23:36)  T(F): 97.4 (25 Jan 2025 08:33), Max: 98.1 (24 Jan 2025 23:36)  HR: 72 (25 Jan 2025 08:33) (63 - 72)  BP: 112/56 (25 Jan 2025 08:33) (101/57 - 112/56)  BP(mean): --  ABP: --  ABP(mean): --  RR: 18 (25 Jan 2025 08:33) (18 - 18)  SpO2: 95% (25 Jan 2025 08:33) (95% - 97%)    O2 Parameters below as of 25 Jan 2025 08:33  Patient On (Oxygen Delivery Method): room air  O2 Flow (L/min): 10            PHYSICAL EXAM:      NERVOUS SYSTEM:  Alert & Oriented X3, Motor Strength 5/5 B/L upper and lower extremities; DTRs 2+ intact and symmetric  CHEST/LUNG: Clear to auscultation bilaterally; No rales, rhonchi, wheezing, or rubs  HEART: Regular rate and rhythm; No murmurs, rubs, or gallops  ABDOMEN: Soft, Nontender, Nondistended; Bowel sounds present  EXTREMITIES:  left AKA, right: redness but no increased local temp; minimal wound upper third calf, tibial area no dc bleeds easily    LABS:                        12.6   9.95  )-----------( 158      ( 24 Jan 2025 14:08 )             38.6     01-24    137  |  100  |  38[H]  ----------------------------<  217[H]  3.5   |  29  |  1.42[H]    Ca    9.0      24 Jan 2025 14:08    TPro  6.4  /  Alb  3.1[L]  /  TBili  0.5  /  DBili  x   /  AST  39[H]  /  ALT  81[H]  /  AlkPhos  128[H]  01-24          RADIOLOGY & ADDITIONAL TESTS: personally reviewed    monty's and ct angio complete -->   Right lower extremity:  No significant inflow disease  Moderate stenosis of the right common femoral artery.  Severe diffuse disease throughout the superficial femoral artery.  Occluded popliteal artery.  Reconstitution of all 3 infrapopliteal arteries which are patent into the   foot.  Right MONTY is 0.68, moderately abnormal. Right toe brachial index of 0.67   is mildly abnormal. Right MONTY and TBI may be affected by calcified   vasculature.      83 yo woman with PAD, severe AS, HFrEF p/w non-healing ulcer right tibial region with surrounding cellulitis, failed outpatient therapy     #Cellulitis c/b PAD   change to doxy  vascular following  -c/w secondary prophylaxis (statin, etc).  -f/u vas recs podiatry recs        US  IMPRESSION:  Unremarkable appearance of the liver  Small pancreatic cysts most likely representing intraductal papillary   mucinous tumors.  Right pleural effusion is appreciated.      CT:  ADDITIONAL FINDINGS: Cardiomegaly. Moderate to large bilateral pleural   effusions. Colonic diverticulosis. Right hip open reduction/internal     A/P:  83 yo woman with PAD, severe AS, HFrEF p/w non-healing ulcer right tibial region with surrounding cellulitis, failed outpatient therapy     #Cellulitis c/b PAD   change to doxy  vascular following  -c/w secondary prophylaxis (statin, etc).  -f/u vas recs podiatry recs      #Chronic HFrEF (heart failure with reduced ejection fraction).   left pl effusion seems old when compared to prev cxr; PO Lasix    #Paroxysmal atrial fibrillation.   on Eliquis for paroxysmal a fib  amio & toprol    # Postprandial 217  check a1c    # Pancreas incidental finding to fup as OP    to d/w son home dc seems to be unsafe dc, not sure what was set up for home- lives alone    time spent: 50min

## 2025-01-25 NOTE — PROGRESS NOTE ADULT - SUBJECTIVE AND OBJECTIVE BOX
SURGERY DAILY PROGRESS NOTE:     Subjective:  Patient seen and examined at bedside during am rounds. AVSS. Denies any fevers, chills, n/v/d, chest pain or shortness of breath    Objective:    MEDICATIONS  (STANDING):  aMIOdarone    Tablet 100 milliGRAM(s) Oral daily  apixaban 2.5 milliGRAM(s) Oral every 12 hours  aspirin enteric coated 81 milliGRAM(s) Oral daily  dapagliflozin 5 milliGRAM(s) Oral daily  furosemide    Tablet 20 milliGRAM(s) Oral daily  levothyroxine 50 MICROGram(s) Oral daily  losartan 25 milliGRAM(s) Oral daily  metoprolol succinate ER 50 milliGRAM(s) Oral daily  piperacillin/tazobactam IVPB.. 3.375 Gram(s) IV Intermittent every 8 hours  rosuvastatin 5 milliGRAM(s) Oral at bedtime  sodium chloride 0.9%. 250 milliLiter(s) (60 mL/Hr) IV Continuous <Continuous>    MEDICATIONS  (PRN):  acetaminophen     Tablet .. 650 milliGRAM(s) Oral every 6 hours PRN Mild Pain (1 - 3)  ondansetron Injectable 4 milliGRAM(s) IV Push every 6 hours PRN Nausea and/or Vomiting      Vital Signs Last 24 Hrs  T(C): 36.7 (24 Jan 2025 23:36), Max: 36.8 (24 Jan 2025 08:19)  T(F): 98.1 (24 Jan 2025 23:36), Max: 98.2 (24 Jan 2025 08:19)  HR: 69 (24 Jan 2025 23:36) (69 - 73)  BP: 105/52 (24 Jan 2025 23:36) (101/57 - 111/71)  BP(mean): --  RR: 18 (24 Jan 2025 23:36) (18 - 18)  SpO2: 95% (24 Jan 2025 23:36) (91% - 97%)    Parameters below as of 24 Jan 2025 15:33  Patient On (Oxygen Delivery Method): room air          General: NAD, resting comfortably  Pulmonary: normal resp   Abdominal: soft, ND/NT  RLE: Yellowish lesion to Right LE, wound base Fibrogranular , + edema, + hima-wound erythema, + active discharge, no malodor, - purulence, - probe to bone. L AKA stump  Vascular: dopplerable DP/PT; faintly palpable RLE fem      I&O's Detail      Daily     Daily     LABS:                        12.6   9.95  )-----------( 158      ( 24 Jan 2025 14:08 )             38.6     01-24    137  |  100  |  38[H]  ----------------------------<  217[H]  3.5   |  29  |  1.42[H]    Ca    9.0      24 Jan 2025 14:08    TPro  6.4  /  Alb  3.1[L]  /  TBili  0.5  /  DBili  x   /  AST  39[H]  /  ALT  81[H]  /  AlkPhos  128[H]  01-24      Urinalysis Basic - ( 24 Jan 2025 14:08 )    Color: x / Appearance: x / SG: x / pH: x  Gluc: 217 mg/dL / Ketone: x  / Bili: x / Urobili: x   Blood: x / Protein: x / Nitrite: x   Leuk Esterase: x / RBC: x / WBC x   Sq Epi: x / Non Sq Epi: x / Bacteria: x

## 2025-01-26 LAB
A1C WITH ESTIMATED AVERAGE GLUCOSE RESULT: 6.9 % — HIGH (ref 4–5.6)
ESTIMATED AVERAGE GLUCOSE: 151 MG/DL — HIGH (ref 68–114)

## 2025-01-26 PROCEDURE — 99232 SBSQ HOSP IP/OBS MODERATE 35: CPT

## 2025-01-26 RX ADMIN — AMIODARONE HYDROCHLORIDE 100 MILLIGRAM(S): 50 INJECTION, SOLUTION INTRAVENOUS at 08:53

## 2025-01-26 RX ADMIN — DAPAGLIFLOZIN 5 MILLIGRAM(S): 5 TABLET, FILM COATED ORAL at 08:52

## 2025-01-26 RX ADMIN — DOXYCYCLINE HYCLATE 100 MILLIGRAM(S): 100 CAPSULE ORAL at 08:52

## 2025-01-26 RX ADMIN — LEVOTHYROXINE SODIUM 50 MICROGRAM(S): 25 TABLET ORAL at 05:28

## 2025-01-26 RX ADMIN — APIXABAN 2.5 MILLIGRAM(S): 5 TABLET, FILM COATED ORAL at 21:31

## 2025-01-26 RX ADMIN — ONDANSETRON 4 MILLIGRAM(S): 4 TABLET, ORALLY DISINTEGRATING ORAL at 20:11

## 2025-01-26 RX ADMIN — ASPIRIN 81 MILLIGRAM(S): 81 TABLET, COATED ORAL at 08:52

## 2025-01-26 RX ADMIN — ONDANSETRON 4 MILLIGRAM(S): 4 TABLET, ORALLY DISINTEGRATING ORAL at 14:58

## 2025-01-26 RX ADMIN — ROSUVASTATIN CALCIUM 5 MILLIGRAM(S): 10 TABLET, FILM COATED ORAL at 21:31

## 2025-01-26 RX ADMIN — APIXABAN 2.5 MILLIGRAM(S): 5 TABLET, FILM COATED ORAL at 08:53

## 2025-01-26 RX ADMIN — DOXYCYCLINE HYCLATE 100 MILLIGRAM(S): 100 CAPSULE ORAL at 21:31

## 2025-01-26 RX ADMIN — Medication 20 MILLIGRAM(S): at 05:29

## 2025-01-26 RX ADMIN — Medication 50 MILLIGRAM(S): at 08:53

## 2025-01-26 RX ADMIN — Medication 25 MILLIGRAM(S): at 08:53

## 2025-01-26 NOTE — PROGRESS NOTE ADULT - NUTRITIONAL ASSESSMENT
This patient has been assessed with a concern for Malnutrition and has been determined to have a diagnosis/diagnoses of Moderate protein-calorie malnutrition.    This patient is being managed with:   Diet Regular-  Entered: Jan 23 2025 11:29AM  

## 2025-01-27 ENCOUNTER — TRANSCRIPTION ENCOUNTER (OUTPATIENT)
Age: 85
End: 2025-01-27

## 2025-01-27 VITALS — TEMPERATURE: 98 F | HEART RATE: 72 BPM | DIASTOLIC BLOOD PRESSURE: 47 MMHG | SYSTOLIC BLOOD PRESSURE: 112 MMHG

## 2025-01-27 PROCEDURE — 99232 SBSQ HOSP IP/OBS MODERATE 35: CPT

## 2025-01-27 PROCEDURE — 99233 SBSQ HOSP IP/OBS HIGH 50: CPT

## 2025-01-27 RX ORDER — POTASSIUM CHLORIDE 750 MG/1
1 TABLET, EXTENDED RELEASE ORAL
Qty: 30 | Refills: 0
Start: 2025-01-27

## 2025-01-27 RX ORDER — DOXYCYCLINE HYCLATE 100 MG/1
1 CAPSULE ORAL
Qty: 14 | Refills: 0
Start: 2025-01-27

## 2025-01-27 RX ORDER — METFORMIN HYDROCHLORIDE 1000 MG/1
1 TABLET, COATED ORAL
Qty: 30 | Refills: 0
Start: 2025-01-27

## 2025-01-27 RX ADMIN — Medication 50 MILLIGRAM(S): at 09:26

## 2025-01-27 RX ADMIN — AMIODARONE HYDROCHLORIDE 100 MILLIGRAM(S): 50 INJECTION, SOLUTION INTRAVENOUS at 09:27

## 2025-01-27 RX ADMIN — APIXABAN 2.5 MILLIGRAM(S): 5 TABLET, FILM COATED ORAL at 09:26

## 2025-01-27 RX ADMIN — ASPIRIN 81 MILLIGRAM(S): 81 TABLET, COATED ORAL at 09:26

## 2025-01-27 RX ADMIN — DOXYCYCLINE HYCLATE 100 MILLIGRAM(S): 100 CAPSULE ORAL at 09:25

## 2025-01-27 RX ADMIN — Medication 25 MILLIGRAM(S): at 09:26

## 2025-01-27 NOTE — DISCHARGE NOTE PROVIDER - NSDCCPCAREPLAN_GEN_ALL_CORE_FT
PRINCIPAL DISCHARGE DIAGNOSIS  Diagnosis: Non-healing wound of lower extremity, initial encounter  Assessment and Plan of Treatment:       SECONDARY DISCHARGE DIAGNOSES  Diagnosis: Chronic HFrEF (heart failure with reduced ejection fraction)  Assessment and Plan of Treatment:     Diagnosis: Peripheral vascular disease  Assessment and Plan of Treatment:     Diagnosis: Diabetes type 2  Assessment and Plan of Treatment: start Metformin, follow up with your PCP

## 2025-01-27 NOTE — CHART NOTE - NSCHARTNOTEFT_GEN_A_CORE
Consult was given for patient on 1/27 for Assessment and Education, prior to discharge  Initial Assessment completed on 1/23    RD provided Education for CHF - pt reports she never uses salt and does not eat out.  Education also given for Diabetes, pt has HgbA1c of 6.9.   Pt reports being compliant with a diet for diabetes.  Pt has AKA, and right LE wound.      Will be available for support, will continue to monitor labs, meds and weight.

## 2025-01-27 NOTE — DISCHARGE NOTE PROVIDER - HOSPITAL COURSE
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 she was given oral antibiotics by her pcp over a week ago and saw podiatry -they noticed wound still not healing and recommended coming to the hospital.  she denies fever/chills/night sweats. No pain in the leg. (23 Jan 2025 10:49)2/56 (25 Jan 2025 08:33) (101/57 - 112/56)    1.26: no distress, no cp, no sob    Vital Signs Last 24 Hrs  T(C): 36.7 (26 Jan 2025 07:54), Max: 36.7 (26 Jan 2025 05:28)  T(F): 98.1 (26 Jan 2025 07:54), Max: 98.1 (26 Jan 2025 05:28)  HR: 70 (26 Jan 2025 07:54) (67 - 73)  BP: 117/53 (26 Jan 2025 07:54) (108/67 - 121/63)  BP(mean): --  RR: 17 (26 Jan 2025 07:54) (16 - 18)  SpO2: 98% (26 Jan 2025 07:54) (94% - 98%)    Parameters below as of 26 Jan 2025 07:54  Patient On (Oxygen Delivery Method): room air      PHYSICAL EXAM:    PHYSICAL EXAM:    GENERAL: NAD, Well nourished  HEENT:  NC/AT, EOMI, PERRLA, No scleral icterus, Moist mucous membranes  NECK: Supple, No JVD  CNS:  Alert & Oriented X3, Motor Strength 5/5 B/L upper and lower extremities; DTRs 2+ intact   LUNG: Normal Breath sounds, Clear to auscultation bilaterally, No rales, No rhonchi, No wheezing  HEART: RRR; No murmurs, No rubs  ABDOMEN: +BS, ST/ND/NT  GENITOURINARY- Voiding, Bladder not distended  MUSCULOSKELTAL: Joints normal ROM, No joint tenderness, No muscle tenderness  VAGINAL: deferred  RECTAL: deferred, not indicated  BREAST: deferred  EXTREMITIES:  left AKA, ;    Rt leg: small ulceration on the anterior tibial area , no discharges, +redness but no increased local temp      A/P:  83 yo woman with PAD, severe AS, HFrEF p/w non-healing ulcer right tibial region with surrounding cellulitis, failed outpatient therapy    1. Cellulitis with MRSA, S to Doxycycline   h/o PAD   change to doxycycline x 7days at home     Local wound care  Wound Care Orders for Right LE wound to be changed Mon, Wed and Friday:  1. Gently remove the old dressing  2. Apply adaptic to the wounds, cover with clean 4x4 gauze  3. Wrap with Kerlix and secure with tape.  Thank you    #Chronic HFrEF (heart failure with reduced ejection fraction).   left pl effusion seems old when compared to prev cxr;   c/w PO Lasix    #Paroxysmal atrial fibrillation.   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 she was given oral antibiotics by her pcp over a week ago and saw podiatry -they noticed wound still not healing and recommended coming to the hospital.  she denies fever/chills/night sweats. No pain in the leg. (23 Jan 2025 10:49)2/56 (25 Jan 2025 08:33) (101/57 - 112/56)    1.26: no distress, no cp, no sob    Vital Signs Last 24 Hrs  T(C): 36.7 (26 Jan 2025 07:54), Max: 36.7 (26 Jan 2025 05:28)  T(F): 98.1 (26 Jan 2025 07:54), Max: 98.1 (26 Jan 2025 05:28)  HR: 70 (26 Jan 2025 07:54) (67 - 73)  BP: 117/53 (26 Jan 2025 07:54) (108/67 - 121/63)  BP(mean): --  RR: 17 (26 Jan 2025 07:54) (16 - 18)  SpO2: 98% (26 Jan 2025 07:54) (94% - 98%)    Parameters below as of 26 Jan 2025 07:54  Patient On (Oxygen Delivery Method): room air      PHYSICAL EXAM:    PHYSICAL EXAM:    GENERAL: NAD, Well nourished  HEENT:  NC/AT, EOMI, PERRLA, No scleral icterus, Moist mucous membranes  NECK: Supple, No JVD  CNS:  Alert & Oriented X3, Motor Strength 5/5 B/L upper and lower extremities; DTRs 2+ intact   LUNG: Normal Breath sounds, Clear to auscultation bilaterally, No rales, No rhonchi, No wheezing  HEART: RRR; No murmurs, No rubs  ABDOMEN: +BS, ST/ND/NT  GENITOURINARY- Voiding, Bladder not distended  MUSCULOSKELTAL: Joints normal ROM, No joint tenderness, No muscle tenderness  VAGINAL: deferred  RECTAL: deferred, not indicated  BREAST: deferred  EXTREMITIES:  left AKA, ;    Rt leg: small ulceration on the anterior tibial area , no discharges, +redness but no increased local temp      A/P:  83 yo woman with PAD, severe AS, HFrEF p/w non-healing ulcer right tibial region with surrounding cellulitis, failed outpatient therapy    1. Cellulitis with MRSA, S to Doxycycline   h/o PAD   change to doxycycline x 7days at home     Local wound care  Wound Care Orders for Right LE wound to be changed Mon, Wed and Friday:  1. Gently remove the old dressing  2. Apply adaptic to the wounds, cover with clean 4x4 gauze  3. Wrap with Kerlix and secure with tape.  Thank you    #Chronic HFrEF (heart failure with reduced ejection fraction).   left pl effusion seems old when compared to prev cxr;   start oral Lasix   c/w ARB, BB  patient refused Farxiga     #Paroxysmal atrial fibrillation.   on Eliquis for paroxysmal a fib  c/w amiodarone  & toprol      # Small Pancreatic cyst:  outpatient f/u with PCP    #DM: new onset  ADA  start Metformin

## 2025-01-27 NOTE — PROGRESS NOTE ADULT - ASSESSMENT
A: 84-year-old Female seen for the followin. Full-thickness Non-healing wound to Right LE, stable  2. Venous Stasis to RLE      P:   Chart reviewed and Patient evaluated;  Discussed diagnosis and treatment with patient.   X-rays reviewed :on wet read showing no soft tissue emphysema, no acute radiographic findings  On evaluation, pt has non healing wound to the RLE, without any acute gross SOI such as drainage, periwound edema or erythema, malodor  Wound cx RLE taken bedside on  growing Staph aureus  WC: Adaptic with dry sterile dressing to RLE  Vascular studies reviewed, vascular reccs appreciated  WBAT to Right Lower extremity  No acute podiatric surgical intervention warranted at this time. Patient is stable from podiatry standpoint. Patient to follow Dr Calvillo as outpatient within one week after discharge. Podiatry signing with wound care orders  All additional care per Med appreciated  Patient demonstrated verbal understanding of all interventions and tolerated interventions well without any complications.     Case D/W attending Dr. Calvillo      Wound Care Orders for Right LE wound to be changed Mon, Wed and Friday:  1. Gently remove the old dressing  2. Apply adaptic to the wounds, cover with clean 4x4 gauze  3. Wrap with Kerlix and secure with tape.  Thank you

## 2025-01-27 NOTE — DISCHARGE NOTE NURSING/CASE MANAGEMENT/SOCIAL WORK - PATIENT PORTAL LINK FT
You can access the FollowMyHealth Patient Portal offered by [facility] by registering at the following website: [email or web address]. By joining [employer]’s FollowMyHealth portal, you will also be able to view your health information using other applications (apps) compatible with our system.

## 2025-01-27 NOTE — PROGRESS NOTE ADULT - ATTENDING COMMENTS
Clinically improving continue soft tissue course po abx  continue dressing changes with light compression  decubitus precautions  on doxy  appreciate vascular reccs  stable for discharge from podiatry standpoint  can benefit from outpatient follow up with me or wound care

## 2025-01-27 NOTE — DISCHARGE NOTE PROVIDER - NSDCFUSCHEDAPPT_GEN_ALL_CORE_FT
[facility]ician [facility]  VASCULAR [address]  Scheduled Appointment: 02/12/2025    [facility]  VASCULAR [address]  Scheduled Appointment: 02/12/2025    Diana Mcniell  [facility]  INTMED [address]  Scheduled Appointment: 04/08/2025

## 2025-01-27 NOTE — DISCHARGE NOTE NURSING/CASE MANAGEMENT/SOCIAL WORK - NSDCVIVACCINE_GEN_ALL_CORE_FT
influenza, high-dose, quadrivalent; 08-Sep-2023 12:49; Amy Gaona); [employer]; [id number] (Exp. Date: 06-Sep-2024); IntraMuscular; Deltoid Left.; 0.7 milliLiter(s); VIS (VIS Published: 06-Aug-2021, VIS Presented: 08-Sep-2023);

## 2025-01-27 NOTE — DISCHARGE NOTE PROVIDER - DETAILS OF MALNUTRITION DIAGNOSIS/DIAGNOSES
This patient has been assessed with a concern for Malnutrition and was treated during this hospitalization for the following Nutrition diagnosis/diagnoses:     -  01/23/2025: Moderate protein-calorie malnutrition

## 2025-01-27 NOTE — DISCHARGE NOTE PROVIDER - NSDCMRMEDTOKEN_GEN_ALL_CORE_FT
acetaminophen 325 mg oral tablet: 2 tab(s) orally every 8 hours as needed for Mild Pain (1 - 3)  amiodarone 200 mg oral tablet: 0.5 tab(s) orally once a day  aspirin 81 mg oral delayed release tablet: 1 tab(s) orally once a day  cholecalciferol 125 mcg (5000 intl units) oral tablet: 1 tab(s) orally once a day  doxycycline hyclate 100 mg oral capsule: 1 cap(s) orally 2 times a day  Eliquis 2.5 mg oral tablet: 1 tab(s) orally 2 times a day  furosemide 20 mg oral tablet: 1 tab(s) orally once a day  levothyroxine 50 mcg (0.05 mg) oral tablet: 1 tab(s) orally once a day  metFORMIN 500 mg oral tablet: 1 tab(s) orally once a day  metoprolol succinate 50 mg oral tablet, extended release: 1 tab(s) orally once a day  rosuvastatin 5 mg oral tablet: 1 tab(s) orally once a day (at bedtime)  telmisartan 20 mg oral tablet: 1 tab(s) orally once a day   acetaminophen 325 mg oral tablet: 2 tab(s) orally every 8 hours as needed for Mild Pain (1 - 3)  amiodarone 200 mg oral tablet: 0.5 tab(s) orally once a day  aspirin 81 mg oral delayed release tablet: 1 tab(s) orally once a day  cholecalciferol 125 mcg (5000 intl units) oral tablet: 1 tab(s) orally once a day  doxycycline hyclate 100 mg oral capsule: 1 cap(s) orally 2 times a day  Eliquis 2.5 mg oral tablet: 1 tab(s) orally 2 times a day  furosemide 20 mg oral tablet: 1 tab(s) orally once a day  levothyroxine 50 mcg (0.05 mg) oral tablet: 1 tab(s) orally once a day  metFORMIN 500 mg oral tablet: 1 tab(s) orally once a day  metoprolol succinate 50 mg oral tablet, extended release: 1 tab(s) orally once a day  potassium chloride 20 mEq oral tablet, extended release: 1 tab(s) orally once a day  rosuvastatin 5 mg oral tablet: 1 tab(s) orally once a day (at bedtime)  telmisartan 20 mg oral tablet: 1 tab(s) orally once a day

## 2025-01-27 NOTE — PROGRESS NOTE ADULT - SUBJECTIVE AND OBJECTIVE BOX
1/27/25: Patient was seen by the podiatry team with attending present, pt resting bedside, NAD, no overnight events.    PMH: HTN (hypertension)    HLD (hyperlipidemia)    Anxiety    CAD (coronary artery disease)    Chronic atrial fibrillation    PAD (peripheral artery disease)    Hypothyroidism    Stenosis of right internal carotid artery    AF (atrial fibrillation)    Carotid artery stenosis    AS (aortic stenosis)      PSH:No significant past surgical history    Status post closed fracture of right femur    H/O arterial bypass of lower limb        Allergies: latex (Unknown)  sulfa drugs (Unknown)      Labs:                        12.9   7.87  )-----------( 151      ( 25 Jan 2025 08:33 )             39.2   01-25    135  |  100  |  33[H]  ----------------------------<  155[H]  3.2[L]   |  31  |  1.33[H]    Ca    9.0      25 Jan 2025 08:33    Vital Signs Last 24 Hrs  T(C): 36.9 (27 Jan 2025 08:16), Max: 36.9 (27 Jan 2025 08:16)  T(F): 98.5 (27 Jan 2025 08:16), Max: 98.5 (27 Jan 2025 08:16)  HR: 72 (27 Jan 2025 08:16) (72 - 75)  BP: 112/47 (27 Jan 2025 08:16) (112/47 - 128/72)  BP(mean): --  RR: 18 (27 Jan 2025 05:18) (18 - 18)  SpO2: 94% (27 Jan 2025 05:18) (94% - 94%)    Parameters below as of 27 Jan 2025 05:18  Patient On (Oxygen Delivery Method): room air      REVIEW OF SYSTEMS:    CONSTITUTIONAL: No weakness, fevers or chills  EYES: No visual changes  RESPIRATORY: No cough, wheezing; No shortness of breath  CARDIOVASCULAR: No chest pain or palpitations  GASTROINTESTINAL: No abdominal or epigastric pain. No nausea, vomiting; No diarrhea or constipation.   GENITOURINARY: No dysuria, frequency or hematuria  NEUROLOGICAL: No numbness or weakness  SKIN: See physical examination.  All other review of systems is negative unless indicated above    Physical Exam:   Constitutional: NAD, alert;  Lower Extremity Focus  Derm:  Skin warm, dry and supple bilateral.     Right: Yellowish lesion to Right LE, wound base Fibrogranular , + edema (improving), + hima-wound erythema (improving), + active discharge (improved), no malodor, - purulence, - probe to bone. Of note is a blister at lower RE  Vascular: Dorsalis Pedis and Posterior Tibial pulses non-palpable but monophasic on doppler.    Neuro: Protective sensation intact  to the level of the digits bilateral.  MSK: Muscle strength 5/5 all major muscle groups bilateral.    < from: US Duplex Venous Lower Ext Ltd, Right (01.22.25 @ 22:23) >    INTERPRETATION:  CLINICAL INFORMATION: Right lower extremity swelling.    COMPARISON: Right lower extremity duplex ultrasound from 1/9/2025.    TECHNIQUE: Duplex sonography of the RIGHT LOWER extremity veins with   color and spectral Doppler, with and without compression.    FINDINGS:    There is normal compressibility of the right common femoral, femoral and   popliteal veins.  The contralateral common femoral vein is patent.  Doppler examination shows normal spontaneous and phasic flow.    Right calf veins not visualized.    Right calf subcutaneous edema.    IMPRESSION:  No evidence of right lower extremity deep venous thrombosis.   Nonvisualization of the right calf veins.      < end of copied text >  < from: US Duplex Arterial Lower Ext Ltd, Right (01.22.25 @ 22:26) >  PROCEDURE INFORMATION:  Exam: US Duplex Right Lower Extremity Arteries Or Arterial Bypass Grafts  Exam date and time: 1/22/2025 9:44 PM  Age: 84 years old  Clinical indication: Righ le non-healing wound    TECHNIQUE:  Imaging protocol: Right Real-time duplex scan of the arteries or arterial  bypass grafts of the right lower extremity with 2-D gray scale, color   Doppler  flow and spectral waveform analysis. Images documented and saved.    COMPARISON:  US LOWER EXTREMITY ARTERIAL DUPLEX COMPLETE BILATERAL 11/18/2022 10:39 AM    FINDINGS:  Right common femoral artery: No occlusion or significant stenosis. Normal  waveform. No pseudoaneurysm in the inguinal region.  Right superficial femoral artery: No occlusion or significant stenosis.   Normal  waveform.  Right popliteal artery: No occlusion or significant stenosis. Normal   waveform.  Right calf/foot arteries: No occlusion or significant stenosis in the  visualized arteries. Normal waveforms. Dorsalis pedis artery is patent.    Soft tissues: No hematoma or collection.  Other findings: Doppler signal is dampened throughout the entire leg with  nonvisualization of the distal SFA. Flow identified in the dorsalis pedis  artery.    IMPRESSION:  1.   Doppler signal is dampened throughout the entire leg with   nonvisualization  of the distal SFA.  2.   Flow identified in the dorsalis pedis artery.        ******PRELIMINARY REPORT******      < end of copied text >    Culture Results:   Moderate Staphylococcus aureus (01.22.25 @ 21:50)       Historical Values  Culture Results:   Moderate Staphylococcus aureus (01.22.25 @ 21:50)

## 2025-01-27 NOTE — PROGRESS NOTE ADULT - REASON FOR ADMISSION
wound/cellulitis right leg

## 2025-01-27 NOTE — DISCHARGE NOTE NURSING/CASE MANAGEMENT/SOCIAL WORK - FINANCIAL ASSISTANCE
[facility] provides services at a reduced cost to those who are determined to be eligible through [facility]’s financial assistance program. Information regarding [facility]’s financial assistance program can be found by going to [email or web address] or by calling [phone].

## 2025-01-28 ENCOUNTER — NON-APPOINTMENT (OUTPATIENT)
Age: 85
End: 2025-01-28

## 2025-01-28 PROBLEM — I50.22 CHRONIC SYSTOLIC (CONGESTIVE) HEART FAILURE: Chronic | Status: ACTIVE | Noted: 2025-01-23

## 2025-01-28 LAB
CULTURE RESULTS: ABNORMAL
CULTURE RESULTS: SIGNIFICANT CHANGE UP
CULTURE RESULTS: SIGNIFICANT CHANGE UP
ORGANISM # SPEC MICROSCOPIC CNT: ABNORMAL
ORGANISM # SPEC MICROSCOPIC CNT: SIGNIFICANT CHANGE UP
SPECIMEN SOURCE: SIGNIFICANT CHANGE UP

## 2025-01-30 NOTE — PHYSICAL THERAPY INITIAL EVALUATION ADULT - NS ASR WT BEARING DETAIL LLE
General Sunscreen Counseling: I recommended a broad spectrum sunscreen with a SPF of 30 or higher.  I explained that SPF 30 sunscreens block approximately 97 percent of the sun's harmful rays.  Sunscreens should be applied at least 15 minutes prior to expected sun exposure and then every 2 hours after that as long as sun exposure continues. If swimming or exercising sunscreen should be reapplied every 45 minutes to an hour after getting wet or sweating.  One ounce, or the equivalent of a shot glass full of sunscreen, is adequate to protect the skin not covered by a bathing suit. I also recommended a lip balm with a sunscreen as well. Sun protective clothing can be used in lieu of sunscreen but must be worn the entire time you are exposed to the sun's rays. Recommend sunscreen with zinc oxide and titanium.
Detail Level: Zone
Products Recommended: Patient educated to wear sun screen that contains zinc and titanium ingredients.
nonweight-bearing

## 2025-01-31 ENCOUNTER — NON-APPOINTMENT (OUTPATIENT)
Age: 85
End: 2025-01-31

## 2025-01-31 ENCOUNTER — APPOINTMENT (OUTPATIENT)
Dept: PODIATRY | Facility: CLINIC | Age: 85
End: 2025-01-31
Payer: MEDICARE

## 2025-01-31 VITALS — BODY MASS INDEX: 23.04 KG/M2 | WEIGHT: 130 LBS | HEIGHT: 63 IN

## 2025-01-31 DIAGNOSIS — L97.811 VENOUS INSUFFICIENCY (CHRONIC) (PERIPHERAL): ICD-10-CM

## 2025-01-31 DIAGNOSIS — M79.89 OTHER SPECIFIED SOFT TISSUE DISORDERS: ICD-10-CM

## 2025-01-31 DIAGNOSIS — I73.9 PERIPHERAL VASCULAR DISEASE, UNSPECIFIED: ICD-10-CM

## 2025-01-31 DIAGNOSIS — S78.119A COMPLETE TRAUMATIC AMPUTATION AT LVL BETWEEN UNSPECIFIED HIP AND KNEE, INITIAL ENCOUNTER: ICD-10-CM

## 2025-01-31 DIAGNOSIS — I87.2 VENOUS INSUFFICIENCY (CHRONIC) (PERIPHERAL): ICD-10-CM

## 2025-01-31 PROCEDURE — 99214 OFFICE O/P EST MOD 30 MIN: CPT | Mod: 25

## 2025-01-31 PROCEDURE — 97597 DBRDMT OPN WND 1ST 20 CM/<: CPT | Mod: RT

## 2025-02-03 ENCOUNTER — NON-APPOINTMENT (OUTPATIENT)
Age: 85
End: 2025-02-03

## 2025-02-06 ENCOUNTER — INPATIENT (INPATIENT)
Facility: HOSPITAL | Age: 85
LOS: 6 days | Discharge: HOME HEALTH | DRG: 293 | End: 2025-02-13
Attending: HOSPITALIST | Admitting: STUDENT IN AN ORGANIZED HEALTH CARE EDUCATION/TRAINING PROGRAM
Payer: MEDICARE

## 2025-02-06 VITALS
SYSTOLIC BLOOD PRESSURE: 108 MMHG | RESPIRATION RATE: 18 BRPM | DIASTOLIC BLOOD PRESSURE: 63 MMHG | OXYGEN SATURATION: 98 % | HEART RATE: 71 BPM | TEMPERATURE: 98 F | HEIGHT: 63 IN | WEIGHT: 130.07 LBS

## 2025-02-06 DIAGNOSIS — I25.10 ATHEROSCLEROTIC HEART DISEASE OF NATIVE CORONARY ARTERY WITHOUT ANGINA PECTORIS: ICD-10-CM

## 2025-02-06 DIAGNOSIS — I73.9 PERIPHERAL VASCULAR DISEASE, UNSPECIFIED: ICD-10-CM

## 2025-02-06 DIAGNOSIS — I35.0 NONRHEUMATIC AORTIC (VALVE) STENOSIS: ICD-10-CM

## 2025-02-06 DIAGNOSIS — I10 ESSENTIAL (PRIMARY) HYPERTENSION: ICD-10-CM

## 2025-02-06 DIAGNOSIS — E03.9 HYPOTHYROIDISM, UNSPECIFIED: ICD-10-CM

## 2025-02-06 DIAGNOSIS — I50.22 CHRONIC SYSTOLIC (CONGESTIVE) HEART FAILURE: ICD-10-CM

## 2025-02-06 DIAGNOSIS — E11.9 TYPE 2 DIABETES MELLITUS WITHOUT COMPLICATIONS: ICD-10-CM

## 2025-02-06 DIAGNOSIS — I50.9 HEART FAILURE, UNSPECIFIED: ICD-10-CM

## 2025-02-06 DIAGNOSIS — I48.20 CHRONIC ATRIAL FIBRILLATION, UNSPECIFIED: ICD-10-CM

## 2025-02-06 DIAGNOSIS — Z95.828 PRESENCE OF OTHER VASCULAR IMPLANTS AND GRAFTS: Chronic | ICD-10-CM

## 2025-02-06 DIAGNOSIS — I48.91 UNSPECIFIED ATRIAL FIBRILLATION: ICD-10-CM

## 2025-02-06 DIAGNOSIS — Z87.81 PERSONAL HISTORY OF (HEALED) TRAUMATIC FRACTURE: Chronic | ICD-10-CM

## 2025-02-06 DIAGNOSIS — I50.23 ACUTE ON CHRONIC SYSTOLIC (CONGESTIVE) HEART FAILURE: ICD-10-CM

## 2025-02-06 DIAGNOSIS — T87.44 INFECTION OF AMPUTATION STUMP, LEFT LOWER EXTREMITY: Chronic | ICD-10-CM

## 2025-02-06 LAB
ANION GAP SERPL CALC-SCNC: 13 MMOL/L — SIGNIFICANT CHANGE UP (ref 5–17)
APTT BLD: 30.8 SEC — SIGNIFICANT CHANGE UP (ref 24.5–35.6)
BUN SERPL-MCNC: 29 MG/DL — HIGH (ref 7–23)
CALCIUM SERPL-MCNC: 9.2 MG/DL — SIGNIFICANT CHANGE UP (ref 8.4–10.5)
CHLORIDE SERPL-SCNC: 96 MMOL/L — SIGNIFICANT CHANGE UP (ref 96–108)
CO2 SERPL-SCNC: 26 MMOL/L — SIGNIFICANT CHANGE UP (ref 22–31)
CREAT SERPL-MCNC: 1.19 MG/DL — SIGNIFICANT CHANGE UP (ref 0.5–1.3)
EGFR: 45 ML/MIN/1.73M2 — LOW
GLUCOSE BLDC GLUCOMTR-MCNC: 108 MG/DL — HIGH (ref 70–99)
GLUCOSE BLDC GLUCOMTR-MCNC: 124 MG/DL — HIGH (ref 70–99)
GLUCOSE BLDC GLUCOMTR-MCNC: 127 MG/DL — HIGH (ref 70–99)
GLUCOSE BLDC GLUCOMTR-MCNC: 196 MG/DL — HIGH (ref 70–99)
GLUCOSE SERPL-MCNC: 125 MG/DL — HIGH (ref 70–99)
HCT VFR BLD CALC: 40.9 % — SIGNIFICANT CHANGE UP (ref 34.5–45)
HGB BLD-MCNC: 13.5 G/DL — SIGNIFICANT CHANGE UP (ref 11.5–15.5)
HGB FLD-MCNC: 12.7 G/DL — SIGNIFICANT CHANGE UP (ref 11.7–16.5)
HGB FLD-MCNC: 12.7 G/DL — SIGNIFICANT CHANGE UP (ref 11.7–16.5)
MCHC RBC-ENTMCNC: 33 G/DL — SIGNIFICANT CHANGE UP (ref 32–36)
MCHC RBC-ENTMCNC: 33.8 PG — SIGNIFICANT CHANGE UP (ref 27–34)
MCV RBC AUTO: 102.3 FL — HIGH (ref 80–100)
NRBC # BLD: 0 /100 WBCS — SIGNIFICANT CHANGE UP (ref 0–0)
NRBC BLD-RTO: 0 /100 WBCS — SIGNIFICANT CHANGE UP (ref 0–0)
OXYHGB MFR BLDMV: 63.8 % — LOW (ref 90–95)
OXYHGB MFR BLDMV: 96.5 % — HIGH (ref 90–95)
PLATELET # BLD AUTO: 192 K/UL — SIGNIFICANT CHANGE UP (ref 150–400)
POTASSIUM SERPL-MCNC: 4.2 MMOL/L — SIGNIFICANT CHANGE UP (ref 3.5–5.3)
POTASSIUM SERPL-SCNC: 4.2 MMOL/L — SIGNIFICANT CHANGE UP (ref 3.5–5.3)
RBC # BLD: 4 M/UL — SIGNIFICANT CHANGE UP (ref 3.8–5.2)
RBC # FLD: 13.2 % — SIGNIFICANT CHANGE UP (ref 10.3–14.5)
SAO2 % BLD: 65.2 % — SIGNIFICANT CHANGE UP (ref 60–90)
SAO2 % BLD: 99.2 % — HIGH (ref 60–90)
SODIUM SERPL-SCNC: 135 MMOL/L — SIGNIFICANT CHANGE UP (ref 135–145)
WBC # BLD: 8.17 K/UL — SIGNIFICANT CHANGE UP (ref 3.8–10.5)
WBC # FLD AUTO: 8.17 K/UL — SIGNIFICANT CHANGE UP (ref 3.8–10.5)

## 2025-02-06 PROCEDURE — 99233 SBSQ HOSP IP/OBS HIGH 50: CPT

## 2025-02-06 PROCEDURE — 99152 MOD SED SAME PHYS/QHP 5/>YRS: CPT

## 2025-02-06 PROCEDURE — 93456 R HRT CORONARY ARTERY ANGIO: CPT | Mod: 26

## 2025-02-06 PROCEDURE — 99235 HOSP IP/OBS SAME DATE MOD 70: CPT

## 2025-02-06 RX ORDER — DM/PSEUDOEPHED/ACETAMINOPHEN 10-30-250
25 CAPSULE ORAL ONCE
Refills: 0 | Status: DISCONTINUED | OUTPATIENT
Start: 2025-02-06 | End: 2025-02-13

## 2025-02-06 RX ORDER — INSULIN LISPRO 100/ML
VIAL (ML) SUBCUTANEOUS
Refills: 0 | Status: DISCONTINUED | OUTPATIENT
Start: 2025-02-06 | End: 2025-02-13

## 2025-02-06 RX ORDER — METOPROLOL SUCCINATE 25 MG
1 TABLET, EXTENDED RELEASE 24 HR ORAL
Refills: 0 | DISCHARGE

## 2025-02-06 RX ORDER — ROSUVASTATIN CALCIUM 10 MG/1
1 TABLET, FILM COATED ORAL
Refills: 0 | DISCHARGE

## 2025-02-06 RX ORDER — POTASSIUM CHLORIDE 750 MG/1
20 TABLET, EXTENDED RELEASE ORAL DAILY
Refills: 0 | Status: DISCONTINUED | OUTPATIENT
Start: 2025-02-06 | End: 2025-02-07

## 2025-02-06 RX ORDER — GLUCAGON 3 MG/1
1 POWDER NASAL ONCE
Refills: 0 | Status: DISCONTINUED | OUTPATIENT
Start: 2025-02-06 | End: 2025-02-13

## 2025-02-06 RX ORDER — HEPARIN SODIUM,PORCINE 10000/ML
2000 VIAL (ML) INJECTION EVERY 6 HOURS
Refills: 0 | Status: DISCONTINUED | OUTPATIENT
Start: 2025-02-06 | End: 2025-02-09

## 2025-02-06 RX ORDER — LOSARTAN POTASSIUM 100 MG
25 TABLET ORAL DAILY
Refills: 0 | Status: DISCONTINUED | OUTPATIENT
Start: 2025-02-06 | End: 2025-02-08

## 2025-02-06 RX ORDER — DM/PSEUDOEPHED/ACETAMINOPHEN 10-30-250
12.5 CAPSULE ORAL ONCE
Refills: 0 | Status: DISCONTINUED | OUTPATIENT
Start: 2025-02-06 | End: 2025-02-13

## 2025-02-06 RX ORDER — HEPARIN SODIUM,PORCINE 10000/ML
VIAL (ML) INJECTION
Qty: 25000 | Refills: 0 | Status: DISCONTINUED | OUTPATIENT
Start: 2025-02-06 | End: 2025-02-09

## 2025-02-06 RX ORDER — ASPIRIN 81 MG/1
81 TABLET, COATED ORAL DAILY
Refills: 0 | Status: DISCONTINUED | OUTPATIENT
Start: 2025-02-06 | End: 2025-02-13

## 2025-02-06 RX ORDER — DM/PSEUDOEPHED/ACETAMINOPHEN 10-30-250
15 CAPSULE ORAL ONCE
Refills: 0 | Status: DISCONTINUED | OUTPATIENT
Start: 2025-02-06 | End: 2025-02-13

## 2025-02-06 RX ORDER — SODIUM CHLORIDE 9 G/ML
1000 INJECTION, SOLUTION INTRAVENOUS
Refills: 0 | Status: DISCONTINUED | OUTPATIENT
Start: 2025-02-06 | End: 2025-02-13

## 2025-02-06 RX ORDER — BUMETANIDE 2 MG/1
1 TABLET ORAL DAILY
Refills: 0 | Status: DISCONTINUED | OUTPATIENT
Start: 2025-02-06 | End: 2025-02-11

## 2025-02-06 RX ORDER — AMIODARONE HYDROCHLORIDE 50 MG/ML
100 INJECTION, SOLUTION INTRAVENOUS DAILY
Refills: 0 | Status: DISCONTINUED | OUTPATIENT
Start: 2025-02-06 | End: 2025-02-13

## 2025-02-06 RX ORDER — HEPARIN SODIUM,PORCINE 10000/ML
4500 VIAL (ML) INJECTION EVERY 6 HOURS
Refills: 0 | Status: DISCONTINUED | OUTPATIENT
Start: 2025-02-06 | End: 2025-02-09

## 2025-02-06 RX ORDER — METOPROLOL SUCCINATE 25 MG
50 TABLET, EXTENDED RELEASE 24 HR ORAL DAILY
Refills: 0 | Status: DISCONTINUED | OUTPATIENT
Start: 2025-02-06 | End: 2025-02-13

## 2025-02-06 RX ORDER — INSULIN LISPRO 100/ML
VIAL (ML) SUBCUTANEOUS AT BEDTIME
Refills: 0 | Status: DISCONTINUED | OUTPATIENT
Start: 2025-02-06 | End: 2025-02-13

## 2025-02-06 RX ORDER — LEVOTHYROXINE SODIUM 25 UG/1
50 TABLET ORAL DAILY
Refills: 0 | Status: DISCONTINUED | OUTPATIENT
Start: 2025-02-06 | End: 2025-02-13

## 2025-02-06 RX ORDER — ROSUVASTATIN CALCIUM 10 MG/1
5 TABLET, FILM COATED ORAL AT BEDTIME
Refills: 0 | Status: DISCONTINUED | OUTPATIENT
Start: 2025-02-06 | End: 2025-02-13

## 2025-02-06 RX ADMIN — ASPIRIN 81 MILLIGRAM(S): 81 TABLET, COATED ORAL at 14:46

## 2025-02-06 RX ADMIN — ROSUVASTATIN CALCIUM 5 MILLIGRAM(S): 10 TABLET, FILM COATED ORAL at 22:20

## 2025-02-06 RX ADMIN — POTASSIUM CHLORIDE 20 MILLIEQUIVALENT(S): 750 TABLET, EXTENDED RELEASE ORAL at 14:46

## 2025-02-06 RX ADMIN — Medication 40 MILLIGRAM(S): at 14:55

## 2025-02-06 RX ADMIN — Medication 1100 UNIT(S)/HR: at 18:02

## 2025-02-06 NOTE — CONSULT NOTE ADULT - ATTENDING COMMENTS
86 y/o F with unrevascularized CAD, severe AS, HFrEF (EF 24%), PAD s/p L AKA, DM2, AFib, admitted for TAVR evaluation. RHC with elevated filling pressures. Given worsening of EF since 2023, would favor proceeding with TAVR as soon as possible. She is mildly volume overloaded, and reports intolerance to PO Lasix, so can start with Bumex 1mg IV daily and monitor UOP. After TAVR, will optimize GDMT including transitioning from ARB to ARNI and adding additional agents as tolerated.

## 2025-02-06 NOTE — CONSULT NOTE ADULT - PROBLEM SELECTOR RECOMMENDATION 9
Peak Aortic Velocity 3.9, Peak Gradient 63 with mean of 40, AOrtic Valve area 0.67    GDMT:  Losartan 25 qd  Toprol 50 XL qd  No home SGLT2i  No Home Aldactone  Lasix 20 PO    RHC (2/6/2025): CVP: 11, PAP: 61/29/42, PCWP: 30; CO 3.66, CI 2.27

## 2025-02-06 NOTE — CONSULT NOTE ADULT - ASSESSMENT
84F with CAD ( RCA and LCx, severedistal LAD (2015), PAD s/p failed bypass of left (course c/b groin abscess, infected graft with subsequent removal and AKA on theleft), carotid disease, T2DM, HFrEF (23%) severe AS, AFib on Eliquis, hypothyroidism, HTN, HLD, nonhealing RLE wound presents for evaluation of severe aortic stenosis and a TAVR evaluation HF consulted for HF optimization     Home Meds  Amio 100 qd  Eliquis 2.5 BID (last dose 2/4/25 in the PM)  Telmisartan 20 qd  Crestor 5 qd  Toprol 50 qd  Synthroid 50 qd  Lasix 20 qd  ASA 81 qd    Meds  Amio, Losartan, crestor, toprol, synthroid, ASA    TTE (1/16/2025): LV Normal; Wall Mildly increased EF 24% G2DD, Normal RVSF TAPSE 1.9; LA Dlated RA Normal, Mild MR, Mild TR, Mild NE, Mod AR with severe AS, Mod L PLEF LVIDd 5.1cm; Peak Aortic Velocity 3.9, Peak Gradient 63 with mean of 40, AOrtic Valve area 0.67  LHC (2/6/2025) LM prox 30%, LAD Mid 40% 1st Diag 70%, Prox Cx 100%, RCA ostial 100%,  RHC (2/6/2025): CVP: 11, PAP: 61/29/42, PCWP: 30; CO 3.66, CI 2.27  84F with CAD ( RCA and LCx, severedistal LAD (2015), PAD s/p failed bypass of left (course c/b groin abscess, infected graft with subsequent removal and AKA on theleft), carotid disease, T2DM, HFrEF (23%) severe AS, AFib on Eliquis, hypothyroidism, HTN, HLD, nonhealing RLE wound presents for evaluation of severe aortic stenosis and a TAVR evaluation HF consulted for reduced EF in the setting of severe AS.  Home Meds  Amio 100 qd  Eliquis 2.5 BID (last dose 2/4/25 in the PM)  Telmisartan 20 qd  Crestor 5 qd  Toprol 50 qd  Synthroid 50 qd  Lasix 20 qd  ASA 81 qd    Meds  Amio, Losartan, crestor, toprol, synthroid, ASA    TTE (1/16/2025): LV Normal; Wall Mildly increased EF 24% G2DD, Normal RVSF TAPSE 1.9; LA Dlated RA Normal, Mild MR, Mild TR, Mild CO, Mod AR with severe AS, Mod L PLEF LVIDd 5.1cm; Peak Aortic Velocity 3.9, Peak Gradient 63 with mean of 40, AOrtic Valve area 0.67  TTE (9/26/23): EF 20-25% w/ Severe Aortic Stenosis  TTE (04/11/23): EF 57% W/ severe aortic stenosis  LHC (2/6/2025) LM prox 30%, LAD Mid 40% 1st Diag 70%, Prox Cx 100%, RCA ostial 100%,  RHC (2/6/2025): CVP: 11, PAP: 61/29/42, PCWP: 30; CO 3.66, CI 2.27

## 2025-02-06 NOTE — CONSULT NOTE ADULT - PROBLEM SELECTOR RECOMMENDATION 3
Known Multivessel disease with plan for medical management  [facility] (2/6/2025) LM prox 30%, LAD Mid 40% 1st Diag 70%, Prox Cx 100%, RCA ostial 100%,    Plan:  No active chest pain  C/W ASA 81 qd
Known Multivessel disease with plan for medical management  [facility] (2/6/2025) LM prox 30%, LAD Mid 40% 1st Diag 70%, Prox Cx 100%, RCA ostial 100%,    Plan:  No active chest pain  C/W ASA 81 qd

## 2025-02-06 NOTE — PROGRESS NOTE ADULT - PROBLEM SELECTOR PLAN 2
EF of 24% on 1/16/25   CARdiac becky with report of elevated pressure   Will cont Lasix 40mg BID as per HF team and will monitor the Electrolytes   will cont with Toprol / ARB and will await on HF team for further GDMT initiation EF of 24% on 1/16/25   CARdiac cath with report of elevated pressure   HF team recommended Bumex 1 mg IVP daily as  Lasix makes her nauseous will monitor the Electrolytes twice a day   will cont with Toprol / Losartan and Crestor   I have discussed with the HF team via TEAMS  Monitor intake and oupt and cont with tele monitor

## 2025-02-06 NOTE — PROGRESS NOTE ADULT - ASSESSMENT
84F w/ HFrEF (EF 23%) Severe AS, CAD,  PAD s/p failed bypass of left leg (course c/b groin abscess, infected graft with subsequent removal and finally an AKA on the left), severe Aortic Stenosis, carotid disease, DM2, Afib on Eliquis, Hypothyroidism, HTN,  HLD, non healing wound RLE who was admitted to [facility]( 1/23/25-1/27/25) for cellulitis and heart failure and was DC on Doxycycline which she completed.  Pt is now admitted to the hosp for further evaluation and cardiac cath.    84F w/ HFrEF (EF 23%) Severe AS, CAD,  PAD s/p failed bypass of left leg (course c/b groin abscess, infected graft with subsequent removal and finally an AKA on the left), severe Aortic Stenosis, carotid disease, DM2, Afib on Eliquis, Hypothyroidism, HTN,  HLD, non healing wound RLE who was admitted to [facility]( 1/23/25-1/27/25) for cellulitis and heart failure and was DC on Doxycycline which she completed.  Pt is now admitted to the hosp for further evaluation and cardiac cath which was done on 2/6/25 with  TAVR recommended.

## 2025-02-06 NOTE — CONSULT NOTE ADULT - ASSESSMENT
84F with CAD ( RCA and LCx, severedistal LAD (2015), PAD s/p failed bypass of left (course c/b groin abscess, infected graft with subsequent removal and AKA on theleft), carotid disease, T2DM, HFrEF (23%) severe AS, AFib on Eliquis, hypothyroidism, HTN, HLD, nonhealing RLE wound presents for evaluation of severe aortic stenosis and a TAVR evaluation HF consulted for HF optimization     Home Meds  Amio 100 qd  Eliquis 2.5 BID (last dose 2/4/25 in the PM)  Telmisartan 20 qd  Crestor 5 qd  Toprol 50 qd  Synthroid 50 qd  Lasix 20 qd  ASA 81 qd    Meds  Amio, Losartan, crestor, toprol, synthroid, ASA    TTE (1/16/2025): LV Normal; Wall Mildly increased EF 24% G2DD, Normal RVSF TAPSE 1.9; LA Dlated RA Normal, Mild MR, Mild TR, Mild NH, Mod AR with severe AS, Mod L PLEF LVIDd 5.1cm; Peak Aortic Velocity 3.9, Peak Gradient 63 with mean of 40, AOrtic Valve area 0.67  LHC (2/6/2025) LM prox 30%, LAD Mid 40% 1st Diag 70%, Prox Cx 100%, RCA ostial 100%,  RHC (2/6/2025): CVP: 11, PAP: 61/29/42, PCWP: 30; CO 3.66, CI 2.27

## 2025-02-06 NOTE — PROGRESS NOTE ADULT - PROBLEM SELECTOR PLAN 3
PAD s/p failed bypass of left leg (course c/b groin abscess, infected graft with subsequent removal and finally an AKA on the left)  Vascular surgery eval PAD s/p failed bypass of left leg (course c/b groin abscess, infected graft with subsequent removal and finally an AKA on the left)  Left stump is clean   might need Vascular surgery eval, will hold off for now     Rt leg chronic wound ( follows with oupt wound care clinic) --> wound consult is placed

## 2025-02-06 NOTE — PROGRESS NOTE ADULT - PROBLEM SELECTOR PLAN 1
Pt had Cardiac cath done on 2/6/25 with % obstruction  and Cx 100% obstruction with well developed colalterals. Moderate mid LAD ( 40% obstruction) and moderate to severe diagonal  disease ( 70%) ; Elevated filling pressures with marginal CI  Cardiologist recommended : Medical management for CAD, HF evaluation for optimization  , Strucutral heart following for expedited TAVR evaluation    Awaitaing on structural heart team evaluation Pt had Cardiac cath done on 2/6/25 with % obstruction  and Cx 100% obstruction with well developed colalterals. Moderate mid LAD ( 40% obstruction) and moderate to severe diagonal  disease ( 70%) ; Elevated filling pressures with marginal CI  Cardiologist recommended : Medical management for CAD, HF evaluation for optimization    Strucutral heart following for expedited TAVR evaluation  due to severe Aortic Stenosis with  peak gradient 63 mean gradient 40, EFRAIN estimated 0.67cm2.    Awaiting on structural heart team evaluation ( I have reached out to Nghia Fair via TEAMS )

## 2025-02-06 NOTE — CONSULT NOTE ADULT - PROBLEM SELECTOR RECOMMENDATION 2
Peak Aortic Velocity 3.9  Peak Gradient 63 with mean of 40  Aortic Valve area 0.67    Plan  Structural Evaluation for TAVR TTE 1/16/25: Peak Aortic Velocity 3.9, Peak Gradient 63 with mean of 40, Aortic Valve area 0.67  First TTE with aortic stenosis noted in 04/23, but with a normal EF, then had reduced EF 09/23    Plan  Please Consult Structural Cardiology if not already consulted for evaluation for TAVR  Would recommend TAVR on this admission if possible given worsening heart failure

## 2025-02-06 NOTE — CONSULT NOTE ADULT - PROBLEM SELECTOR RECOMMENDATION 2
Peak Aortic Velocity 3.9  Peak Gradient 63 with mean of 40  Aortic Valve area 0.67    Plan  Structural Evaluation for TAVR

## 2025-02-06 NOTE — PROGRESS NOTE ADULT - NSPROGADDITIONALINFOA_GEN_ALL_CORE
Senait Espana   Hospitalist   available on TEAMS Discussed with ACP , HF team         Senait Espana   Hospitalist   available on TEAMS

## 2025-02-06 NOTE — H&P CARDIOLOGY - HISTORY OF PRESENT ILLNESS
This is an  83 yo female with PMH CAD ( RCA and LCx, severe distal LAD disease 2015), PAD-> s/p failed bypass of left left (course c/b groin abscess, infected graft with subsequent removal and AKA on the left), carotid disease, DM2, HFrEF (23% last echo), severe AS (evaluated for TAVR but not done due to PAD complications) seen by Dr. Serna, Afib on Eliquis last dose 2/4/25 PM dose, hypothyroid, HTN,  HLD, non healing wound RLE who was admitted to  1/23/25 for cellulitis and heart failure and completed course of Doxycycline. She is seen by wound care Dr. Calvillo. TTE 1/31/25 EF 24%. LA mildly dilated, mild MR, mild TR, mild NC, mod AR, severe AS peak gradient 63 mean gradient 40, EFRAIN estimated 0.67cm2.  No implanted monitoring devices.  Pt. presents for further evaluation and cardiac cath.

## 2025-02-06 NOTE — PROGRESS NOTE ADULT - PROBLEM SELECTOR PLAN 4
Pt had Cardiac cath done on 2/6/25 with % obstruction  and Cx 100% obstruction with well developed colalterals. Moderate mid LAD ( 40% obstruction) and moderate to severe diagonal  disease ( 70%) ; Elevated filling pressures with marginal CI  Cardiologist recommended : Medical management for CAD, HF evaluation for optimization  , Strucutral heart following for expedited TAVR evaluation     Pt is on Crestor 5 mg --> will discuss with cardio about increasing to high intensity dose   Pt is ASA , Toprol and Telmirsartan , will discuss about further GDMT with HF team Pt had Cardiac cath done on 2/6/25 with % obstruction  and Cx 100% obstruction with well developed colalterals. Moderate mid LAD ( 40% obstruction) and moderate to severe diagonal  disease ( 70%) ; Elevated filling pressures with marginal CI  Cardiologist recommended : Medical management for CAD, HF evaluation for optimization  , Structural heart following for expedited TAVR evaluation     Pt is on Crestor 5 mg  which will cont for now as per HF team and might optimize dose   Pt is ASA , Toprol and Telmisartan --> will cont current meds as per discussion with HF team

## 2025-02-06 NOTE — CONSULT NOTE ADULT - PROBLEM SELECTOR RECOMMENDATION 9
Peak Aortic Velocity 3.9, Peak Gradient 63 with mean of 40, AOrtic Valve area 0.67    GDMT:  Losartan 25 qd  Toprol 50 XL qd  No home SGLT2i  No Home Aldactone  Lasix 20 PO    RHC (2/6/2025): CVP: 11, PAP: 61/29/42, PCWP: 30; CO 3.66, CI 2.27 Peak Aortic Velocity 3.9, Peak Gradient 63 with mean of 40, AOrtic Valve area 0.67    GDMT:  Losartan 25 qd  Toprol 50 XL qd  No home SGLT2i  No Home Aldactone  Lasix 20 PO    RHC (2/6/2025): CVP: 11, PAP: 61/29/42, PCWP: 30; CO 3.66, CI 2.27    Recommendations;  - Would Recommend 40 IV Lasix BID Peak Aortic Velocity 3.9, Peak Gradient 63 with mean of 40, AOrtic Valve area 0.67    GDMT:  Losartan 25 qd  Toprol 50 XL qd  No home SGLT2i  No Home Aldactone  Lasix 20 PO    RHC (2/6/2025): CVP: 11, PAP: 61/29/42, PCWP: 30; CO 3.66, CI 2.27    Recommendations;  - Patient with elevated filling pressures (patient reports Lasix makes her feel nauseous/dizzy)  - Recommend Bumex 1mg IV qd  - Keep GDMT at current doses, will uptitrate once TAVR evaluation finished

## 2025-02-06 NOTE — CONSULT NOTE ADULT - SUBJECTIVE AND OBJECTIVE BOX
Patient is a 85y old  Female who presents with a chief complaint of     HPI:  84F w/ HFrEF (EF 23%) Severe AS, CAD w/ MVD w/o PCI, PAD s/p L AKA, carotid disease, DM2, Afib on Eliquis, hypothyroid, HTN,  HLD, non healing wound RLE who was admitted to  1/23/25 for cellulitis and heart failure and completed course of Doxycycline. She is seen by wound care Dr. Calvillo. TTE 1/31/25 EF 24%. LA mildly dilated, mild MR, mild TR, mild TN, mod AR, severe AS peak gradient 63 mean gradient 40, EFRAIN estimated 0.67cm2.  No implanted monitoring devices.  Pt. presents for further evaluation and cardiac cath.      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 1000 milliLiter(s) (50 mL/Hr) IV Continuous <Continuous>  dextrose 5%. 1000 milliLiter(s) (100 mL/Hr) IV Continuous <Continuous>  dextrose 50% Injectable 25 Gram(s) IV Push once  dextrose 50% Injectable 12.5 Gram(s) IV Push once  dextrose 50% Injectable 25 Gram(s) IV Push once  furosemide    Tablet 20 milliGRAM(s) Oral daily  glucagon  Injectable 1 milliGRAM(s) IntraMuscular once  insulin lispro (ADMELOG) corrective regimen sliding scale   SubCutaneous three times a day before meals  insulin lispro (ADMELOG) corrective regimen sliding scale   SubCutaneous at bedtime  levothyroxine 50 MICROGram(s) Oral daily  losartan 25 milliGRAM(s) Oral daily  metoprolol succinate ER 50 milliGRAM(s) Oral daily  potassium chloride    Tablet ER 20 milliEquivalent(s) Oral daily  rosuvastatin 5 milliGRAM(s) Oral at bedtime    MEDICATIONS  (PRN):  dextrose Oral Gel 15 Gram(s) Oral once PRN Blood Glucose LESS THAN 70 milliGRAM(s)/deciliter      FAMILY HISTORY:  Family history of myocardial infarction (Father, Sibling)    REVIEW OF SYSTEMS:  CONSTITUTIONAL: No weakness, fevers or chills  EYES/ENT: No visual changes;  No vertigo or throat pain   NECK: No pain or stiffness  RESPIRATORY: No cough, wheezing, hemoptysis; No shortness of breath  CARDIOVASCULAR: No chest pain or palpitations  GASTROINTESTINAL: No abdominal or epigastric pain. No nausea, vomiting, or hematemesis; No diarrhea or constipation. No melena or hematochezia.  GENITOURINARY: No dysuria, frequency or hematuria  NEUROLOGICAL: No numbness or weakness  SKIN: No itching, rashes      SOCIAL HISTORY:    CIGARETTES:    ALCOHOL:  Vital Signs Last 24 Hrs  T(C): 36.5 (06 Feb 2025 11:13), Max: 36.7 (06 Feb 2025 09:10)  T(F): 97.7 (06 Feb 2025 11:13), Max: 98 (06 Feb 2025 09:10)  HR: 70 (06 Feb 2025 11:13) (65 - 74)  BP: 123/75 (06 Feb 2025 11:13) (108/63 - 123/68)  BP(mean): 66 (06 Feb 2025 09:55) (66 - 78)  RR: 18 (06 Feb 2025 11:13) (15 - 18)  SpO2: 97% (06 Feb 2025 11:13) (95% - 100%)    Parameters below as of 06 Feb 2025 11:13  Patient On (Oxygen Delivery Method): room air    T(C): 36.5 (02-06-25 @ 11:13), Max: 36.7 (02-06-25 @ 09:10)  HR: 70 (02-06-25 @ 11:13) (65 - 74)  BP: 123/75 (02-06-25 @ 11:13) (108/63 - 123/68)  RR: 18 (02-06-25 @ 11:13) (15 - 18)  SpO2: 97% (02-06-25 @ 11:13) (95% - 100%)    CONSTITUTIONAL: Well groomed, no apparent distress  EYES: PERRLA and symmetric, EOMI, No conjunctival or scleral injection, non-icteric  ENMT: Oral mucosa with moist membranes. Normal dentition; no pharyngeal injection or exudates             NECK: Supple, symmetric and without tracheal deviation   RESP: No respiratory distress, no use of accessory muscles; CTA b/l, no WRR  CV: RRR, +S1S2, no MRG; no JVD; no peripheral edema  GI: Soft, NT, ND, no rebound, no guarding; no palpable masses; no hepatosplenomegaly; no hernia palpated  LYMPH: No cervical LAD or tenderness; no axillary LAD or tenderness; no inguinal LAD or tenderness  MSK: Normal gait; No digital clubbing or cyanosis; examination of the (head/neck/spine/ribs/pelvis, RUE, LUE, RLE, LLE) without misalignment,            Normal ROM without pain, no spinal tenderness, normal muscle strength/tone  SKIN: No rashes or ulcers noted; no subcutaneous nodules or induration palpable  NEURO: CN II-XII intact; normal reflexes in upper and lower extremities, sensation intact in upper and lower extremities b/l to light touch   PSYCH: Appropriate insight/judgment; A+O x 3, mood and affect appropriate, recent/remote memory intact      ECG:    I&O's Detail      LABS:                        13.5   8.17  )-----------( 192      ( 06 Feb 2025 08:08 )             40.9     02-06    135  |  96  |  29[H]  ----------------------------<  125[H]  4.2   |  26  |  1.19    Ca    9.2      06 Feb 2025 08:08            Urinalysis Basic - ( 06 Feb 2025 08:08 )    Color: x / Appearance: x / SG: x / pH: x  Gluc: 125 mg/dL / Ketone: x  / Bili: x / Urobili: x   Blood: x / Protein: x / Nitrite: x   Leuk Esterase: x / RBC: x / WBC x   Sq Epi: x / Non Sq Epi: x / Bacteria: x      I&O's Summary    BNP  RADIOLOGY & ADDITIONAL STUDIES: Patient is a 85y old  Female who presents with a chief complaint of     HPI:  84F w/ HFrEF (EF 23%) Severe AS, CAD w/ MVD w/o PCI, PAD s/p L AKA, carotid disease, DM2, Afib on Eliquis, hypothyroid, HTN,  HLD, non healing wound RLE who was admitted to  1/23/25 for cellulitis and heart failure and completed course of Doxycycline. She is seen by wound care Dr. Calvillo. TTE 1/31/25 EF 24%. LA mildly dilated, mild MR, mild TR, mild WI, mod AR, severe AS peak gradient 63 mean gradient 40, EFRAIN estimated 0.67cm2.  No implanted monitoring devices.  Pt. presents for further evaluation and cardiac cath.     Patient was recently admitted to [facility] for 2 month history of worsening RLE wound/infection. 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 1000 milliLiter(s) (50 mL/Hr) IV Continuous <Continuous>  dextrose 5%. 1000 milliLiter(s) (100 mL/Hr) IV Continuous <Continuous>  dextrose 50% Injectable 25 Gram(s) IV Push once  dextrose 50% Injectable 12.5 Gram(s) IV Push once  dextrose 50% Injectable 25 Gram(s) IV Push once  furosemide    Tablet 20 milliGRAM(s) Oral daily  glucagon  Injectable 1 milliGRAM(s) IntraMuscular once  insulin lispro (ADMELOG) corrective regimen sliding scale   SubCutaneous three times a day before meals  insulin lispro (ADMELOG) corrective regimen sliding scale   SubCutaneous at bedtime  levothyroxine 50 MICROGram(s) Oral daily  losartan 25 milliGRAM(s) Oral daily  metoprolol succinate ER 50 milliGRAM(s) Oral daily  potassium chloride    Tablet ER 20 milliEquivalent(s) Oral daily  rosuvastatin 5 milliGRAM(s) Oral at bedtime    MEDICATIONS  (PRN):  dextrose Oral Gel 15 Gram(s) Oral once PRN Blood Glucose LESS THAN 70 milliGRAM(s)/deciliter      FAMILY HISTORY:  Family history of myocardial infarction (Father, Sibling)    REVIEW OF SYSTEMS:  CONSTITUTIONAL: No weakness, fevers or chills  EYES/ENT: No visual changes;  No vertigo or throat pain   NECK: No pain or stiffness  RESPIRATORY: No cough, wheezing, hemoptysis; No shortness of breath  CARDIOVASCULAR: No chest pain or palpitations  GASTROINTESTINAL: No abdominal or epigastric pain. No nausea, vomiting, or hematemesis; No diarrhea or constipation. No melena or hematochezia.  GENITOURINARY: No dysuria, frequency or hematuria  NEUROLOGICAL: No numbness or weakness  SKIN: No itching, rashes      SOCIAL HISTORY:    CIGARETTES:    ALCOHOL:  Vital Signs Last 24 Hrs  T(C): 36.5 (06 Feb 2025 11:13), Max: 36.7 (06 Feb 2025 09:10)  T(F): 97.7 (06 Feb 2025 11:13), Max: 98 (06 Feb 2025 09:10)  HR: 70 (06 Feb 2025 11:13) (65 - 74)  BP: 123/75 (06 Feb 2025 11:13) (108/63 - 123/68)  BP(mean): 66 (06 Feb 2025 09:55) (66 - 78)  RR: 18 (06 Feb 2025 11:13) (15 - 18)  SpO2: 97% (06 Feb 2025 11:13) (95% - 100%)    Parameters below as of 06 Feb 2025 11:13  Patient On (Oxygen Delivery Method): room air    T(C): 36.5 (02-06-25 @ 11:13), Max: 36.7 (02-06-25 @ 09:10)  HR: 70 (02-06-25 @ 11:13) (65 - 74)  BP: 123/75 (02-06-25 @ 11:13) (108/63 - 123/68)  RR: 18 (02-06-25 @ 11:13) (15 - 18)  SpO2: 97% (02-06-25 @ 11:13) (95% - 100%)    CONSTITUTIONAL: Well groomed, no apparent distress  EYES: PERRLA and symmetric, EOMI, No conjunctival or scleral injection, non-icteric  ENMT: Oral mucosa with moist membranes. Normal dentition; no pharyngeal injection or exudates             NECK: Supple, symmetric and without tracheal deviation   RESP: No respiratory distress, no use of accessory muscles; CTA b/l, no WRR  CV: RRR, +S1S2, no MRG; no JVD; no peripheral edema  GI: Soft, NT, ND, no rebound, no guarding; no palpable masses; no hepatosplenomegaly; no hernia palpated  LYMPH: No cervical LAD or tenderness; no axillary LAD or tenderness; no inguinal LAD or tenderness  MSK: Normal gait; No digital clubbing or cyanosis; examination of the (head/neck/spine/ribs/pelvis, RUE, LUE, RLE, LLE) without misalignment,            Normal ROM without pain, no spinal tenderness, normal muscle strength/tone  SKIN: No rashes or ulcers noted; no subcutaneous nodules or induration palpable  NEURO: CN II-XII intact; normal reflexes in upper and lower extremities, sensation intact in upper and lower extremities b/l to light touch   PSYCH: Appropriate insight/judgment; A+O x 3, mood and affect appropriate, recent/remote memory intact      ECG:    I&O's Detail      LABS:                        13.5   8.17  )-----------( 192      ( 06 Feb 2025 08:08 )             40.9     02-06    135  |  96  |  29[H]  ----------------------------<  125[H]  4.2   |  26  |  1.19    Ca    9.2      06 Feb 2025 08:08            Urinalysis Basic - ( 06 Feb 2025 08:08 )    Color: x / Appearance: x / SG: x / pH: x  Gluc: 125 mg/dL / Ketone: x  / Bili: x / Urobili: x   Blood: x / Protein: x / Nitrite: x   Leuk Esterase: x / RBC: x / WBC x   Sq Epi: x / Non Sq Epi: x / Bacteria: x      I&O's Summary    BNP  RADIOLOGY & ADDITIONAL STUDIES: Patient is a 85y old  Female who presents with a chief complaint of     HPI:  84F w/ HFrEF (EF 23%) Severe AS, CAD w/ MVD w/o PCI, PAD s/p L AKA, carotid disease, DM2, Afib on Eliquis, hypothyroid, HTN,  HLD, non healing wound RLE who was admitted to  1/23/25 for cellulitis and heart failure and completed course of Doxycycline. She is seen by wound care Dr. Calvillo. TTE 1/31/25 EF 24%. LA mildly dilated, mild MR, mild TR, mild PA, mod AR, severe AS peak gradient 63 mean gradient 40, EFRAIN estimated 0.67cm2.  No implanted monitoring devices.  Pt. presents for further evaluation and cardiac cath.     Patient was recently admitted to [facility] for 2 month history of worsening RLE wound/infection. 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 1000 milliLiter(s) (50 mL/Hr) IV Continuous <Continuous>  dextrose 5%. 1000 milliLiter(s) (100 mL/Hr) IV Continuous <Continuous>  dextrose 50% Injectable 25 Gram(s) IV Push once  dextrose 50% Injectable 12.5 Gram(s) IV Push once  dextrose 50% Injectable 25 Gram(s) IV Push once  furosemide    Tablet 20 milliGRAM(s) Oral daily  glucagon  Injectable 1 milliGRAM(s) IntraMuscular once  insulin lispro (ADMELOG) corrective regimen sliding scale   SubCutaneous three times a day before meals  insulin lispro (ADMELOG) corrective regimen sliding scale   SubCutaneous at bedtime  levothyroxine 50 MICROGram(s) Oral daily  losartan 25 milliGRAM(s) Oral daily  metoprolol succinate ER 50 milliGRAM(s) Oral daily  potassium chloride    Tablet ER 20 milliEquivalent(s) Oral daily  rosuvastatin 5 milliGRAM(s) Oral at bedtime    MEDICATIONS  (PRN):  dextrose Oral Gel 15 Gram(s) Oral once PRN Blood Glucose LESS THAN 70 milliGRAM(s)/deciliter      FAMILY HISTORY:  Family history of myocardial infarction (Father, Sibling)    REVIEW OF SYSTEMS:  CONSTITUTIONAL: No weakness, fevers or chills  EYES/ENT: No visual changes;  No vertigo or throat pain   NECK: No pain or stiffness  RESPIRATORY: No cough, wheezing, hemoptysis; No shortness of breath  CARDIOVASCULAR: No chest pain or palpitations  GASTROINTESTINAL: No abdominal or epigastric pain. No nausea, vomiting, or hematemesis; No diarrhea or constipation. No melena or hematochezia.  GENITOURINARY: No dysuria, frequency or hematuria  NEUROLOGICAL: No numbness or weakness  SKIN: No itching, rashes      SOCIAL HISTORY:    CIGARETTES:    ALCOHOL:  Vital Signs Last 24 Hrs  T(C): 36.5 (06 Feb 2025 11:13), Max: 36.7 (06 Feb 2025 09:10)  T(F): 97.7 (06 Feb 2025 11:13), Max: 98 (06 Feb 2025 09:10)  HR: 70 (06 Feb 2025 11:13) (65 - 74)  BP: 123/75 (06 Feb 2025 11:13) (108/63 - 123/68)  BP(mean): 66 (06 Feb 2025 09:55) (66 - 78)  RR: 18 (06 Feb 2025 11:13) (15 - 18)  SpO2: 97% (06 Feb 2025 11:13) (95% - 100%)    Parameters below as of 06 Feb 2025 11:13  Patient On (Oxygen Delivery Method): room air    T(C): 36.5 (02-06-25 @ 11:13), Max: 36.7 (02-06-25 @ 09:10)  HR: 70 (02-06-25 @ 11:13) (65 - 74)  BP: 123/75 (02-06-25 @ 11:13) (108/63 - 123/68)  RR: 18 (02-06-25 @ 11:13) (15 - 18)  SpO2: 97% (02-06-25 @ 11:13) (95% - 100%)    CONSTITUTIONAL: Well groomed, no apparent distress  EYES: PERRLA and symmetric, EOMI, No conjunctival or scleral injection, non-icteric  ENMT: Oral mucosa with moist membranes. Normal dentition; no pharyngeal injection or exudates             NECK: Supple, symmetric and without tracheal deviation   RESP: No respiratory distress, no use of accessory muscles; CTA b/l, no WRR  CV: RRR, +S1S2, no MRG; no JVD; no peripheral edema  GI: Soft, NT, ND, no rebound, no guarding; no palpable masses; no hepatosplenomegaly; no hernia palpated  LYMPH: No cervical LAD or tenderness; no axillary LAD or tenderness; no inguinal LAD or tenderness  MSK: Normal gait; No digital clubbing or cyanosis; examination of the (head/neck/spine/ribs/pelvis, RUE, LUE, RLE, LLE) without misalignment,            Normal ROM without pain, no spinal tenderness, normal muscle strength/tone  SKIN: No rashes or ulcers noted; no subcutaneous nodules or induration palpable  NEURO: CN II-XII intact; normal reflexes in upper and lower extremities, sensation intact in upper and lower extremities b/l to light touch   PSYCH: Appropriate insight/judgment; A+O x 3, mood and affect appropriate, recent/remote memory intact      ECG:    I&O's Detail      LABS:                        13.5   8.17  )-----------( 192      ( 06 Feb 2025 08:08 )             40.9     02-06    135  |  96  |  29[H]  ----------------------------<  125[H]  4.2   |  26  |  1.19    Ca    9.2      06 Feb 2025 08:08            Urinalysis Basic - ( 06 Feb 2025 08:08 )    Color: x / Appearance: x / SG: x / pH: x  Gluc: 125 mg/dL / Ketone: x  / Bili: x / Urobili: x   Blood: x / Protein: x / Nitrite: x   Leuk Esterase: x / RBC: x / WBC x   Sq Epi: x / Non Sq Epi: x / Bacteria: x      I&O's Summary    BNP  RADIOLOGY & ADDITIONAL STUDIES:

## 2025-02-06 NOTE — PROGRESS NOTE ADULT - SUBJECTIVE AND OBJECTIVE BOX
Patient is a 85y old  Female who presents for further evaluation of HFrEF and cardiac cath    SUBJECTIVE / OVERNIGHT EVENTS:    84F w/ HFrEF (EF 23%) Severe AS, CAD,  PAD s/p failed bypass of left leg (course c/b groin abscess, infected graft with subsequent removal and finally an AKA on the left), severe Aortic Stenosis, carotid disease, DM2, Afib on Eliquis, Hypothyroidism, HTN,  HLD, non healing wound RLE who was admitted to [facility]( 1/23/25-1/27/25) for cellulitis and heart failure and was DC on Doxycycline which she completed.  Pt is now admitted to the [facility] for further evaluation and cardiac cath.   Patient was seen by the HF team         ADDITIONAL REVIEW OF SYSTEMS: Negative except for above    MEDICATIONS  (STANDING):  aMIOdarone    Tablet 100 milliGRAM(s) Oral daily  aspirin enteric coated 81 milliGRAM(s) Oral daily  dextrose 5%. 1000 milliLiter(s) (50 mL/Hr) IV Continuous <Continuous>  dextrose 5%. 1000 milliLiter(s) (100 mL/Hr) IV Continuous <Continuous>  dextrose 50% Injectable 25 Gram(s) IV Push once  dextrose 50% Injectable 12.5 Gram(s) IV Push once  dextrose 50% Injectable 25 Gram(s) IV Push once  furosemide   Injectable 40 milliGRAM(s) IV Push two times a day  glucagon  Injectable 1 milliGRAM(s) IntraMuscular once  insulin lispro (ADMELOG) corrective regimen sliding scale   SubCutaneous three times a day before meals  insulin lispro (ADMELOG) corrective regimen sliding scale   SubCutaneous at bedtime  levothyroxine 50 MICROGram(s) Oral daily  losartan 25 milliGRAM(s) Oral daily  metoprolol succinate ER 50 milliGRAM(s) Oral daily  potassium chloride    Tablet ER 20 milliEquivalent(s) Oral daily  rosuvastatin 5 milliGRAM(s) Oral at bedtime    MEDICATIONS  (PRN):  dextrose Oral Gel 15 Gram(s) Oral once PRN Blood Glucose LESS THAN 70 milliGRAM(s)/deciliter      CAPILLARY BLOOD GLUCOSE      POCT Blood Glucose.: 108 mg/dL (06 Feb 2025 12:11)  POCT Blood Glucose.: 124 mg/dL (06 Feb 2025 07:49)    I&O's Summary      PHYSICAL EXAM:  Vital Signs Last 24 Hrs  T(C): 36.5 (06 Feb 2025 12:00), Max: 36.7 (06 Feb 2025 09:10)  T(F): 97.7 (06 Feb 2025 12:00), Max: 98 (06 Feb 2025 09:10)  HR: 72 (06 Feb 2025 12:00) (65 - 74)  BP: 121/71 (06 Feb 2025 12:00) (108/63 - 123/68)  BP(mean): 66 (06 Feb 2025 09:55) (66 - 78)  RR: 18 (06 Feb 2025 12:00) (15 - 18)  SpO2: 97% (06 Feb 2025 12:00) (95% - 100%)    Parameters below as of 06 Feb 2025 12:00  Patient On (Oxygen Delivery Method): room air        PHYSICAL EXAM:  GENERAL: NAD, well-developed  HEAD:  Atraumatic, Normocephalic  EYES:  conjunctiva and sclera clear  NECK: Supple, No JVD  CHEST/LUNG: Clear to auscultation bilaterally; No wheeze  HEART: Regular rate and rhythm; No murmurs, rubs, or gallops  ABDOMEN: Soft, Nontender, Nondistended; Bowel sounds present  EXTREMITIES:  2+ Peripheral Pulses, No clubbing, cyanosis, or edema  PSYCH: AAOx3      LABS:                        13.5   8.17  )-----------( 192      ( 06 Feb 2025 08:08 )             40.9     02-06    135  |  96  |  29[H]  ----------------------------<  125[H]  4.2   |  26  |  1.19    Ca    9.2      06 Feb 2025 08:08            Urinalysis Basic - ( 06 Feb 2025 08:08 )    Color: x / Appearance: x / SG: x / pH: x  Gluc: 125 mg/dL / Ketone: x  / Bili: x / Urobili: x   Blood: x / Protein: x / Nitrite: x   Leuk Esterase: x / RBC: x / WBC x   Sq Epi: x / Non Sq Epi: x / Bacteria: x          RADIOLOGY & ADDITIONAL TESTS:    Imaging Personally Reviewed:    Electrocardiogram Personally Reviewed:    COORDINATION OF CARE:  Care Discussed with Consultants/Other Providers [Y/N]:  Prior or Outpatient Records Reviewed [Y/N]:     Patient is a 85y old  Female who presents for further evaluation of HFrEF and cardiac cath    SUBJECTIVE / OVERNIGHT EVENTS:    84F w/ HFrEF (EF 23%) Severe AS, CAD,  PAD s/p failed bypass of left leg (course c/b groin abscess, infected graft with subsequent removal and finally an AKA on the left), severe Aortic Stenosis, carotid disease, DM2, Afib on Eliquis, Hypothyroidism, HTN,  HLD, non healing wound RLE who was admitted to [facility]( 1/23/25-1/27/25) for cellulitis and heart failure and was DC on Doxycycline which she completed.  Pt is now admitted to the [facility] for further evaluation and cardiac cath.   Patient was seen by the HF team .  Patient offers no complaints when seen .          ADDITIONAL REVIEW OF SYSTEMS: Negative except for above    MEDICATIONS  (STANDING):  aMIOdarone    Tablet 100 milliGRAM(s) Oral daily  aspirin enteric coated 81 milliGRAM(s) Oral daily  dextrose 5%. 1000 milliLiter(s) (50 mL/Hr) IV Continuous <Continuous>  dextrose 5%. 1000 milliLiter(s) (100 mL/Hr) IV Continuous <Continuous>  dextrose 50% Injectable 25 Gram(s) IV Push once  dextrose 50% Injectable 12.5 Gram(s) IV Push once  dextrose 50% Injectable 25 Gram(s) IV Push once  furosemide   Injectable 40 milliGRAM(s) IV Push two times a day  glucagon  Injectable 1 milliGRAM(s) IntraMuscular once  insulin lispro (ADMELOG) corrective regimen sliding scale   SubCutaneous three times a day before meals  insulin lispro (ADMELOG) corrective regimen sliding scale   SubCutaneous at bedtime  levothyroxine 50 MICROGram(s) Oral daily  losartan 25 milliGRAM(s) Oral daily  metoprolol succinate ER 50 milliGRAM(s) Oral daily  potassium chloride    Tablet ER 20 milliEquivalent(s) Oral daily  rosuvastatin 5 milliGRAM(s) Oral at bedtime    MEDICATIONS  (PRN):  dextrose Oral Gel 15 Gram(s) Oral once PRN Blood Glucose LESS THAN 70 milliGRAM(s)/deciliter      CAPILLARY BLOOD GLUCOSE      POCT Blood Glucose.: 108 mg/dL (06 Feb 2025 12:11)  POCT Blood Glucose.: 124 mg/dL (06 Feb 2025 07:49)    I&O's Summary      PHYSICAL EXAM:  Vital Signs Last 24 Hrs  T(C): 36.5 (06 Feb 2025 12:00), Max: 36.7 (06 Feb 2025 09:10)  T(F): 97.7 (06 Feb 2025 12:00), Max: 98 (06 Feb 2025 09:10)  HR: 72 (06 Feb 2025 12:00) (65 - 74)  BP: 121/71 (06 Feb 2025 12:00) (108/63 - 123/68)  BP(mean): 66 (06 Feb 2025 09:55) (66 - 78)  RR: 18 (06 Feb 2025 12:00) (15 - 18)  SpO2: 97% (06 Feb 2025 12:00) (95% - 100%)    Parameters below as of 06 Feb 2025 12:00  Patient On (Oxygen Delivery Method): room air        PHYSICAL EXAM:  GENERAL: NAD, well-developed  HEAD:  Atraumatic, Normocephalic  EYES:  conjunctiva and sclera clear  NECK: Supple, No JVD  CHEST/LUNG: Clear to auscultation bilaterally; No wheeze  HEART: Irregular rate and rhythm; POs loud systolic  murmurs  ABDOMEN: Soft, Nontender, Nondistended; Bowel sounds present  EXTREMITIES:   Left stump is clear and well healed with no erythema or tenderness . Rt Leg in dressing ( not removed)   PSYCH: AAOx3      LABS:                        13.5   8.17  )-----------( 192      ( 06 Feb 2025 08:08 )             40.9     02-06    135  |  96  |  29[H]  ----------------------------<  125[H]  4.2   |  26  |  1.19    Ca    9.2      06 Feb 2025 08:08            Urinalysis Basic - ( 06 Feb 2025 08:08 )    Color: x / Appearance: x / SG: x / pH: x  Gluc: 125 mg/dL / Ketone: x  / Bili: x / Urobili: x   Blood: x / Protein: x / Nitrite: x   Leuk Esterase: x / RBC: x / WBC x   Sq Epi: x / Non Sq Epi: x / Bacteria: x          RADIOLOGY & ADDITIONAL TESTS:    Imaging Personally Reviewed:    Electrocardiogram Personally Reviewed:    COORDINATION OF CARE:  Care Discussed with Consultants/Other Providers [Y/N]:  Prior or Outpatient Records Reviewed [Y/N]:

## 2025-02-06 NOTE — CONSULT NOTE ADULT - SUBJECTIVE AND OBJECTIVE BOX
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 Serna, Kevin on Eliquis last dose 2/4/25 PM dose, hypothyroid, HTN,  HLD, non healing wound RLE who was admitted to  1/23/25 for cellulitis and heart failure and completed course of Doxycycline. She is seen by wound care Dr. Calvillo. TTE 1/31/25 EF 24%. LA mildly dilated, mild MR, mild TR, mild OR, mod AR, severe AS peak gradient 63 mean gradient 40, EFRAIN estimated 0.67cm2.  No implanted monitoring devices.  Pt. presents for further evaluation and cardiac cath.      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 1000 milliLiter(s) (50 mL/Hr) IV Continuous <Continuous>  dextrose 5%. 1000 milliLiter(s) (100 mL/Hr) IV Continuous <Continuous>  dextrose 50% Injectable 25 Gram(s) IV Push once  dextrose 50% Injectable 12.5 Gram(s) IV Push once  dextrose 50% Injectable 25 Gram(s) IV Push once  furosemide    Tablet 20 milliGRAM(s) Oral daily  glucagon  Injectable 1 milliGRAM(s) IntraMuscular once  insulin lispro (ADMELOG) corrective regimen sliding scale   SubCutaneous three times a day before meals  insulin lispro (ADMELOG) corrective regimen sliding scale   SubCutaneous at bedtime  levothyroxine 50 MICROGram(s) Oral daily  losartan 25 milliGRAM(s) Oral daily  metoprolol succinate ER 50 milliGRAM(s) Oral daily  potassium chloride    Tablet ER 20 milliEquivalent(s) Oral daily  rosuvastatin 5 milliGRAM(s) Oral at bedtime    MEDICATIONS  (PRN):  dextrose Oral Gel 15 Gram(s) Oral once PRN Blood Glucose LESS THAN 70 milliGRAM(s)/deciliter      FAMILY HISTORY:  Family history of myocardial infarction (Father, Sibling)      REVIEW OF SYSTEMS:  CONSTITUTIONAL: No weakness, fevers or chills  EYES/ENT: No visual changes;  No vertigo or throat pain   NECK: No pain or stiffness  RESPIRATORY: No cough, wheezing, hemoptysis; No shortness of breath  CARDIOVASCULAR: No chest pain or palpitations  GASTROINTESTINAL: No abdominal or epigastric pain. No nausea, vomiting, or hematemesis; No diarrhea or constipation. No melena or hematochezia.  GENITOURINARY: No dysuria, frequency or hematuria  NEUROLOGICAL: No numbness or weakness  SKIN: No itching, rashes    SOCIAL HISTORY:    CIGARETTES:    ALCOHOL:  Vital Signs Last 24 Hrs  T(C): 36.5 (06 Feb 2025 11:13), Max: 36.7 (06 Feb 2025 09:10)  T(F): 97.7 (06 Feb 2025 11:13), Max: 98 (06 Feb 2025 09:10)  HR: 70 (06 Feb 2025 11:13) (65 - 74)  BP: 123/75 (06 Feb 2025 11:13) (108/63 - 123/68)  BP(mean): 66 (06 Feb 2025 09:55) (66 - 78)  RR: 18 (06 Feb 2025 11:13) (15 - 18)  SpO2: 97% (06 Feb 2025 11:13) (95% - 100%)    Parameters below as of 06 Feb 2025 11:13  Patient On (Oxygen Delivery Method): room air        PHYSICAL EXAM:  T(C): 36.5 (02-06-25 @ 11:13), Max: 36.7 (02-06-25 @ 09:10)  HR: 70 (02-06-25 @ 11:13) (65 - 74)  BP: 123/75 (02-06-25 @ 11:13) (108/63 - 123/68)  RR: 18 (02-06-25 @ 11:13) (15 - 18)  SpO2: 97% (02-06-25 @ 11:13) (95% - 100%)    CONSTITUTIONAL: Well groomed, no apparent distress  EYES: PERRLA and symmetric, EOMI, No conjunctival or scleral injection, non-icteric  ENMT: Oral mucosa with moist membranes. Normal dentition; no pharyngeal injection or exudates             NECK: Supple, symmetric and without tracheal deviation   RESP: No respiratory distress, no use of accessory muscles; CTA b/l, no WRR  CV: RRR, +S1S2, no MRG; no JVD; no peripheral edema  GI: Soft, NT, ND, no rebound, no guarding; no palpable masses; no hepatosplenomegaly; no hernia palpated  LYMPH: No cervical LAD or tenderness; no axillary LAD or tenderness; no inguinal LAD or tenderness  MSK: Normal gait; No digital clubbing or cyanosis; examination of the (head/neck/spine/ribs/pelvis, RUE, LUE, RLE, LLE) without misalignment,            Normal ROM without pain, no spinal tenderness, normal muscle strength/tone  SKIN: No rashes or ulcers noted; no subcutaneous nodules or induration palpable  NEURO: CN II-XII intact; normal reflexes in upper and lower extremities, sensation intact in upper and lower extremities b/l to light touch   PSYCH: Appropriate insight/judgment; A+O x 3, mood and affect appropriate, recent/remote memory intact      ECG:    I&O's Detail      LABS:                        13.5   8.17  )-----------( 192      ( 06 Feb 2025 08:08 )             40.9     02-06    135  |  96  |  29[H]  ----------------------------<  125[H]  4.2   |  26  |  1.19    Ca    9.2      06 Feb 2025 08:08            Urinalysis Basic - ( 06 Feb 2025 08:08 )    Color: x / Appearance: x / SG: x / pH: x  Gluc: 125 mg/dL / Ketone: x  / Bili: x / Urobili: x   Blood: x / Protein: x / Nitrite: x   Leuk Esterase: x / RBC: x / WBC x   Sq Epi: x / Non Sq Epi: x / Bacteria: x      I&O's Summary    BNP  RADIOLOGY & ADDITIONAL STUDIES:

## 2025-02-07 ENCOUNTER — TRANSCRIPTION ENCOUNTER (OUTPATIENT)
Age: 85
End: 2025-02-07

## 2025-02-07 LAB
ALBUMIN SERPL ELPH-MCNC: 3.4 G/DL — SIGNIFICANT CHANGE UP (ref 3.3–5)
ALBUMIN SERPL ELPH-MCNC: 3.4 G/DL — SIGNIFICANT CHANGE UP (ref 3.3–5)
ALP SERPL-CCNC: 126 U/L — HIGH (ref 40–120)
ALP SERPL-CCNC: 137 U/L — HIGH (ref 40–120)
ALT FLD-CCNC: 26 U/L — SIGNIFICANT CHANGE UP (ref 10–45)
ALT FLD-CCNC: 27 U/L — SIGNIFICANT CHANGE UP (ref 10–45)
ANION GAP SERPL CALC-SCNC: 13 MMOL/L — SIGNIFICANT CHANGE UP (ref 5–17)
ANION GAP SERPL CALC-SCNC: 14 MMOL/L — SIGNIFICANT CHANGE UP (ref 5–17)
ANION GAP SERPL CALC-SCNC: 15 MMOL/L — SIGNIFICANT CHANGE UP (ref 5–17)
APTT BLD: 103.6 SEC — HIGH (ref 24.5–35.6)
APTT BLD: 134.9 SEC — CRITICAL HIGH (ref 24.5–35.6)
APTT BLD: 197.9 SEC — CRITICAL HIGH (ref 24.5–35.6)
APTT BLD: 96.9 SEC — HIGH (ref 24.5–35.6)
AST SERPL-CCNC: 26 U/L — SIGNIFICANT CHANGE UP (ref 10–40)
AST SERPL-CCNC: 29 U/L — SIGNIFICANT CHANGE UP (ref 10–40)
BILIRUB SERPL-MCNC: 0.5 MG/DL — SIGNIFICANT CHANGE UP (ref 0.2–1.2)
BILIRUB SERPL-MCNC: 0.6 MG/DL — SIGNIFICANT CHANGE UP (ref 0.2–1.2)
BUN SERPL-MCNC: 26 MG/DL — HIGH (ref 7–23)
BUN SERPL-MCNC: 29 MG/DL — HIGH (ref 7–23)
BUN SERPL-MCNC: 31 MG/DL — HIGH (ref 7–23)
CALCIUM SERPL-MCNC: 8.9 MG/DL — SIGNIFICANT CHANGE UP (ref 8.4–10.5)
CALCIUM SERPL-MCNC: 9 MG/DL — SIGNIFICANT CHANGE UP (ref 8.4–10.5)
CALCIUM SERPL-MCNC: 9 MG/DL — SIGNIFICANT CHANGE UP (ref 8.4–10.5)
CHLORIDE SERPL-SCNC: 97 MMOL/L — SIGNIFICANT CHANGE UP (ref 96–108)
CHLORIDE SERPL-SCNC: 98 MMOL/L — SIGNIFICANT CHANGE UP (ref 96–108)
CHLORIDE SERPL-SCNC: 98 MMOL/L — SIGNIFICANT CHANGE UP (ref 96–108)
CO2 SERPL-SCNC: 25 MMOL/L — SIGNIFICANT CHANGE UP (ref 22–31)
CO2 SERPL-SCNC: 27 MMOL/L — SIGNIFICANT CHANGE UP (ref 22–31)
CO2 SERPL-SCNC: 27 MMOL/L — SIGNIFICANT CHANGE UP (ref 22–31)
CREAT SERPL-MCNC: 1.32 MG/DL — HIGH (ref 0.5–1.3)
CREAT SERPL-MCNC: 1.4 MG/DL — HIGH (ref 0.5–1.3)
CREAT SERPL-MCNC: 1.63 MG/DL — HIGH (ref 0.5–1.3)
EGFR: 31 ML/MIN/1.73M2 — LOW
EGFR: 37 ML/MIN/1.73M2 — LOW
EGFR: 40 ML/MIN/1.73M2 — LOW
GLUCOSE BLDC GLUCOMTR-MCNC: 133 MG/DL — HIGH (ref 70–99)
GLUCOSE BLDC GLUCOMTR-MCNC: 144 MG/DL — HIGH (ref 70–99)
GLUCOSE BLDC GLUCOMTR-MCNC: 147 MG/DL — HIGH (ref 70–99)
GLUCOSE BLDC GLUCOMTR-MCNC: 175 MG/DL — HIGH (ref 70–99)
GLUCOSE SERPL-MCNC: 127 MG/DL — HIGH (ref 70–99)
GLUCOSE SERPL-MCNC: 139 MG/DL — HIGH (ref 70–99)
GLUCOSE SERPL-MCNC: 152 MG/DL — HIGH (ref 70–99)
HCT VFR BLD CALC: 38.7 % — SIGNIFICANT CHANGE UP (ref 34.5–45)
HCT VFR BLD CALC: 39 % — SIGNIFICANT CHANGE UP (ref 34.5–45)
HGB BLD-MCNC: 12.8 G/DL — SIGNIFICANT CHANGE UP (ref 11.5–15.5)
HGB BLD-MCNC: 12.9 G/DL — SIGNIFICANT CHANGE UP (ref 11.5–15.5)
INR BLD: 1.2 RATIO — HIGH (ref 0.85–1.16)
MAGNESIUM SERPL-MCNC: 2.1 MG/DL — SIGNIFICANT CHANGE UP (ref 1.6–2.6)
MAGNESIUM SERPL-MCNC: 2.1 MG/DL — SIGNIFICANT CHANGE UP (ref 1.6–2.6)
MAGNESIUM SERPL-MCNC: 2.2 MG/DL — SIGNIFICANT CHANGE UP (ref 1.6–2.6)
MCHC RBC-ENTMCNC: 33.1 G/DL — SIGNIFICANT CHANGE UP (ref 32–36)
MCHC RBC-ENTMCNC: 33.1 G/DL — SIGNIFICANT CHANGE UP (ref 32–36)
MCHC RBC-ENTMCNC: 33.4 PG — SIGNIFICANT CHANGE UP (ref 27–34)
MCHC RBC-ENTMCNC: 33.7 PG — SIGNIFICANT CHANGE UP (ref 27–34)
MCV RBC AUTO: 101 FL — HIGH (ref 80–100)
MCV RBC AUTO: 101.8 FL — HIGH (ref 80–100)
NRBC # BLD: 0 /100 WBCS — SIGNIFICANT CHANGE UP (ref 0–0)
NRBC # BLD: 0 /100 WBCS — SIGNIFICANT CHANGE UP (ref 0–0)
NRBC BLD-RTO: 0 /100 WBCS — SIGNIFICANT CHANGE UP (ref 0–0)
NRBC BLD-RTO: 0 /100 WBCS — SIGNIFICANT CHANGE UP (ref 0–0)
NT-PROBNP SERPL-SCNC: HIGH PG/ML (ref 0–300)
PHOSPHATE SERPL-MCNC: 3 MG/DL — SIGNIFICANT CHANGE UP (ref 2.5–4.5)
PHOSPHATE SERPL-MCNC: 3.1 MG/DL — SIGNIFICANT CHANGE UP (ref 2.5–4.5)
PHOSPHATE SERPL-MCNC: 3.3 MG/DL — SIGNIFICANT CHANGE UP (ref 2.5–4.5)
PLATELET # BLD AUTO: 175 K/UL — SIGNIFICANT CHANGE UP (ref 150–400)
PLATELET # BLD AUTO: 184 K/UL — SIGNIFICANT CHANGE UP (ref 150–400)
POTASSIUM SERPL-MCNC: 3.7 MMOL/L — SIGNIFICANT CHANGE UP (ref 3.5–5.3)
POTASSIUM SERPL-MCNC: 3.8 MMOL/L — SIGNIFICANT CHANGE UP (ref 3.5–5.3)
POTASSIUM SERPL-MCNC: 4 MMOL/L — SIGNIFICANT CHANGE UP (ref 3.5–5.3)
POTASSIUM SERPL-SCNC: 3.7 MMOL/L — SIGNIFICANT CHANGE UP (ref 3.5–5.3)
POTASSIUM SERPL-SCNC: 3.8 MMOL/L — SIGNIFICANT CHANGE UP (ref 3.5–5.3)
POTASSIUM SERPL-SCNC: 4 MMOL/L — SIGNIFICANT CHANGE UP (ref 3.5–5.3)
PROT SERPL-MCNC: 6.1 G/DL — SIGNIFICANT CHANGE UP (ref 6–8.3)
PROT SERPL-MCNC: 6.2 G/DL — SIGNIFICANT CHANGE UP (ref 6–8.3)
PROTHROM AB SERPL-ACNC: 13.8 SEC — HIGH (ref 9.9–13.4)
RBC # BLD: 3.8 M/UL — SIGNIFICANT CHANGE UP (ref 3.8–5.2)
RBC # BLD: 3.86 M/UL — SIGNIFICANT CHANGE UP (ref 3.8–5.2)
RBC # FLD: 13.2 % — SIGNIFICANT CHANGE UP (ref 10.3–14.5)
RBC # FLD: 13.4 % — SIGNIFICANT CHANGE UP (ref 10.3–14.5)
SODIUM SERPL-SCNC: 138 MMOL/L — SIGNIFICANT CHANGE UP (ref 135–145)
T4 FREE SERPL-MCNC: 1.5 NG/DL — SIGNIFICANT CHANGE UP (ref 0.9–1.7)
T4 FREE+ TSH PNL SERPL: 5.39 UIU/ML — HIGH (ref 0.27–4.2)
WBC # BLD: 6.98 K/UL — SIGNIFICANT CHANGE UP (ref 3.8–10.5)
WBC # BLD: 7.06 K/UL — SIGNIFICANT CHANGE UP (ref 3.8–10.5)
WBC # FLD AUTO: 6.98 K/UL — SIGNIFICANT CHANGE UP (ref 3.8–10.5)
WBC # FLD AUTO: 7.06 K/UL — SIGNIFICANT CHANGE UP (ref 3.8–10.5)

## 2025-02-07 PROCEDURE — 73630 X-RAY EXAM OF FOOT: CPT | Mod: 26,RT

## 2025-02-07 PROCEDURE — 99233 SBSQ HOSP IP/OBS HIGH 50: CPT

## 2025-02-07 RX ORDER — ONDANSETRON 4 MG/1
4 TABLET, ORALLY DISINTEGRATING ORAL ONCE
Refills: 0 | Status: COMPLETED | OUTPATIENT
Start: 2025-02-07 | End: 2025-02-07

## 2025-02-07 RX ORDER — POTASSIUM CHLORIDE 750 MG/1
10 TABLET, EXTENDED RELEASE ORAL
Refills: 0 | Status: COMPLETED | OUTPATIENT
Start: 2025-02-07 | End: 2025-02-07

## 2025-02-07 RX ADMIN — LEVOTHYROXINE SODIUM 50 MICROGRAM(S): 25 TABLET ORAL at 05:20

## 2025-02-07 RX ADMIN — ASPIRIN 81 MILLIGRAM(S): 81 TABLET, COATED ORAL at 11:25

## 2025-02-07 RX ADMIN — Medication 0 UNIT(S)/HR: at 08:30

## 2025-02-07 RX ADMIN — Medication 25 MILLIGRAM(S): at 05:19

## 2025-02-07 RX ADMIN — ROSUVASTATIN CALCIUM 5 MILLIGRAM(S): 10 TABLET, FILM COATED ORAL at 21:28

## 2025-02-07 RX ADMIN — Medication 900 UNIT(S)/HR: at 09:47

## 2025-02-07 RX ADMIN — Medication 800 UNIT(S)/HR: at 19:33

## 2025-02-07 RX ADMIN — Medication 1100 UNIT(S)/HR: at 01:07

## 2025-02-07 RX ADMIN — BUMETANIDE 1 MILLIGRAM(S): 2 TABLET ORAL at 05:20

## 2025-02-07 RX ADMIN — POTASSIUM CHLORIDE 100 MILLIEQUIVALENT(S): 750 TABLET, EXTENDED RELEASE ORAL at 14:56

## 2025-02-07 RX ADMIN — Medication 50 MILLIGRAM(S): at 05:19

## 2025-02-07 RX ADMIN — ONDANSETRON 4 MILLIGRAM(S): 4 TABLET, ORALLY DISINTEGRATING ORAL at 17:42

## 2025-02-07 RX ADMIN — Medication 1: at 12:41

## 2025-02-07 RX ADMIN — AMIODARONE HYDROCHLORIDE 100 MILLIGRAM(S): 50 INJECTION, SOLUTION INTRAVENOUS at 05:19

## 2025-02-07 NOTE — CONSULT NOTE ADULT - PROBLEM SELECTOR RECOMMENDATION 9
Patient with known history of Aortic Stenosis, will repeat TTE. She is known to have severe PAD, and based on her Cardiac CT, she will need to be a TAVR via Carotid access. We are looking at possibly midweek to get the TAVR done. Will review her Cardiac Cath, which Dr. Moscoso recommended medical management. Discussed this with the patient and she is agreeable.

## 2025-02-07 NOTE — DISCHARGE NOTE PROVIDER - NSFOLLOWUPCLINICS_GEN_ALL_ED_FT
[facility] - HF  Cardiology  [address] [facility], [address] [place] [address]   Phone:   Fax:   Established Patient     [facility] - HF  Cardiology  4 [facility], [address] [place] [address]   Phone:   Fax:   Established Patient    [facility]  Podiatry  [address] [facility] - [address]rd [place] [address]  Phone: [phone]  Fax:   Follow Up Time: 1 week

## 2025-02-07 NOTE — PHYSICAL THERAPY INITIAL EVALUATION ADULT - PERTINENT HX OF CURRENT PROBLEM, REHAB EVAL
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 Serna, Afib on Eliquis last dose 2/4/25 PM dose, hypothyroid, HTN,  HLD, non healing wound RLE who was admitted to  1/23/25 for cellulitis and heart failure and completed course of Doxycycline. She is seen by wound care Dr. Calvillo. TTE 1/31/25 EF 24%. LA mildly dilated, mild MR, mild TR, mild KY, mod AR, severe AS peak gradient 63 mean gradient 40, EFRAIN estimated 0.67cm2.  No implanted monitoring devices.  Pt. presents for further evaluation and cardiac cath.

## 2025-02-07 NOTE — PROGRESS NOTE ADULT - SUBJECTIVE AND OBJECTIVE BOX
INCOMPLETE NOTE    Prakash Adilene | PGY3| Pager: [place] [phone]) -- [facility] ([phone])  Interval Events:    REVIEW OF SYSTEMS:  CONSTITUTIONAL: No weakness, fevers or chills  EYES/ENT: No visual changes;  No vertigo or throat pain   NECK: No pain or stiffness  RESPIRATORY: No cough, wheezing, hemoptysis; No shortness of breath  CARDIOVASCULAR: No chest pain or palpitations  GASTROINTESTINAL: No abdominal or epigastric pain. No nausea, vomiting, or hematemesis; No diarrhea or constipation. No melena or hematochezia.  GENITOURINARY: No dysuria, frequency or hematuria  NEUROLOGICAL: No numbness or weakness  SKIN: No itching, burning, rashes, or lesions   All other review of systems is negative unless indicated above.    OBJECTIVE:  ICU Vital Signs Last 24 Hrs  T(C): 36.5 (07 Feb 2025 11:29), Max: 37.4 (06 Feb 2025 21:20)  T(F): 97.7 (07 Feb 2025 11:29), Max: 99.3 (06 Feb 2025 21:20)  HR: 67 (07 Feb 2025 11:29) (67 - 72)  BP: 92/57 (07 Feb 2025 11:29) (92/57 - 136/72)  BP(mean): --  ABP: --  ABP(mean): --  RR: 18 (07 Feb 2025 11:29) (18 - 18)  SpO2: 97% (07 Feb 2025 11:29) (94% - 98%)    O2 Parameters below as of 07 Feb 2025 11:29  Patient On (Oxygen Delivery Method): room air              02-06 @ 07:01 - 02-07 @ 07:00  --------------------------------------------------------  IN: 250 mL / OUT: 1800 mL / NET: -1550 mL    02-07 @ 07:01 - 02-07 @ 13:36  --------------------------------------------------------  IN: 240 mL / OUT: 0 mL / NET: 240 mL      CAPILLARY BLOOD GLUCOSE      POCT Blood Glucose.: 175 mg/dL (07 Feb 2025 12:07)      PHYSICAL EXAM:  General: WN/WD NAD  Neurology: A&Ox3, nonfocal, KHAN x 4  Eyes: PERRLA/ EOMI, Gross vision intact  ENT/Neck: Neck supple, trachea midline, No JVD, Gross hearing intact  Respiratory: CTA B/L, No wheezing, rales, rhonchi  CV: RRR, +S1/S2, -S3/S4, no murmurs, rubs or gallops  Abdominal: Soft, NT, ND +BS, No HSM  MSK: 5/5 strength UE/LE bilaterally  Extremities: No edema, 2+ peripheral pulses  Skin: No Rashes, Hematoma, Ecchymosis  Incisions:   Tubes:    HOSPITAL MEDICATIONS:  MEDICATIONS  (STANDING):  aMIOdarone    Tablet 100 milliGRAM(s) Oral daily  aspirin enteric coated 81 milliGRAM(s) Oral daily  buMETAnide Injectable 1 milliGRAM(s) IV Push daily  dextrose 5%. 1000 milliLiter(s) (50 mL/Hr) IV Continuous <Continuous>  dextrose 5%. 1000 milliLiter(s) (100 mL/Hr) IV Continuous <Continuous>  dextrose 50% Injectable 25 Gram(s) IV Push once  dextrose 50% Injectable 12.5 Gram(s) IV Push once  dextrose 50% Injectable 25 Gram(s) IV Push once  glucagon  Injectable 1 milliGRAM(s) IntraMuscular once  heparin  Infusion.  Unit(s)/Hr (11 mL/Hr) IV Continuous <Continuous>  insulin lispro (ADMELOG) corrective regimen sliding scale   SubCutaneous three times a day before meals  insulin lispro (ADMELOG) corrective regimen sliding scale   SubCutaneous at bedtime  levothyroxine 50 MICROGram(s) Oral daily  losartan 25 milliGRAM(s) Oral daily  metoprolol succinate ER 50 milliGRAM(s) Oral daily  potassium chloride  10 mEq/100 mL IVPB 10 milliEquivalent(s) IV Intermittent every 1 hour  rosuvastatin 5 milliGRAM(s) Oral at bedtime    MEDICATIONS  (PRN):  dextrose Oral Gel 15 Gram(s) Oral once PRN Blood Glucose LESS THAN 70 milliGRAM(s)/deciliter  heparin   Injectable 4500 Unit(s) IV Push every 6 hours PRN For aPTT less than 40  heparin   Injectable 2000 Unit(s) IV Push every 6 hours PRN For aPTT between 40 - 57      LABS:                        12.9   7.06  )-----------( 184      ( 07 Feb 2025 07:01 )             39.0     Hgb Trend: 12.9<--, 12.8<--, 13.5<--  02-07    138  |  97  |  26[H]  ----------------------------<  127[H]  3.7   |  27  |  1.32[H]    Ca    9.0      07 Feb 2025 07:09  Phos  3.1     02-07  Mg     2.2     02-07    TPro  6.1  /  Alb  3.4  /  TBili  0.6  /  DBili  x   /  AST  29  /  ALT  27  /  AlkPhos  126[H]  02-07    Creatinine Trend: 1.32<--, 1.40<--, 1.19<--, 1.33<--, 1.42<--, 1.21<--  PT/INR - ( 07 Feb 2025 07:01 )   PT: 13.8 sec;   INR: 1.20 ratio         PTT - ( 07 Feb 2025 07:01 )  PTT:197.9 sec  Urinalysis Basic - ( 07 Feb 2025 07:09 )    Color: x / Appearance: x / SG: x / pH: x  Gluc: 127 mg/dL / Ketone: x  / Bili: x / Urobili: x   Blood: x / Protein: x / Nitrite: x   Leuk Esterase: x / RBC: x / WBC x   Sq Epi: x / Non Sq Epi: x / Bacteria: x            MICROBIOLOGY:

## 2025-02-07 NOTE — PROGRESS NOTE ADULT - PROBLEM SELECTOR PLAN 2
EF of 24% on 1/16/25   CARdiac cath with report of elevated pressure   HF team recommended Bumex 1 mg IVP daily as  Lasix makes her nauseous will monitor the Electrolytes twice a day   will cont with Toprol / Losartan and Crestor  Monitor intake and oupt and cont with tele monitor

## 2025-02-07 NOTE — CONSULT NOTE ADULT - SUBJECTIVE AND OBJECTIVE BOX
Wound Care CONSULT NOTE    HPI:  This is an  83 yo female with PMH CAD ( RCA and LCx, severe distal LAD disease 2015), PAD-> s/p failed bypass of left left lower extremity s/p  subsequent AKA, carotid disease, DM2, HFrEF, severe AS (evaluated for TAVR but not done due to PAD complications), Afib on Eliquis last dose 2/4/25 PM dose, hypothyroid, HTN,  HLD, non healing wound RLE who was admitted to [facility] 1/23/25 for cellulitis and heart failure and completed course of Doxycycline. She is seen by wound care Dr. Calvillo. Pt presents for further evaluation and cardiac cath done 2/6. - evaluating for TAVR on this admission due to severe AS.    wound care consulted to evaluate sacrum, and right heel wound  patient transfer with prosthesis  continent to stool and urine  has follow up arranged with [place] vascular service 2/12  has pain on left heel but able to stand on it   tolerating oral diet but complain of nausea since hospitalization, no emesis  afebrile    All questions asked and answered to pt's and family's expressed understanding and satisfaction.    Current Diet: Diet, Regular:   Consistent Carbohydrate No Snacks (CSTCHO) (02-06-25 @ 10:09)      PAST MEDICAL & SURGICAL HISTORY:  HTN (hypertension)  HLD (hyperlipidemia)  Anxiety  CAD (coronary artery disease)  PAD (peripheral artery disease)  Hypothyroidism  AF (atrial fibrillation)  Carotid artery stenosis  AS (aortic stenosis)  Chronic HFrEF (heart failure with reduced ejection fraction)  Status post closed fracture of right femu    H/O arterial bypass of lower limb and left AKA     REVIEW OF SYSTEMS:    General/ Breast/ Skin/Vasc/ Neuro/ MSK: see HPI  All other systems negative    MEDICATIONS  (STANDING):  aMIOdarone    Tablet 100 milliGRAM(s) Oral daily  aspirin enteric coated 81 milliGRAM(s) Oral daily  buMETAnide Injectable 1 milliGRAM(s) IV Push daily  dextrose 5%. 1000 milliLiter(s) (50 mL/Hr) IV Continuous <Continuous>  dextrose 5%. 1000 milliLiter(s) (100 mL/Hr) IV Continuous <Continuous>  dextrose 50% Injectable 25 Gram(s) IV Push once  dextrose 50% Injectable 12.5 Gram(s) IV Push once  dextrose 50% Injectable 25 Gram(s) IV Push once  glucagon  Injectable 1 milliGRAM(s) IntraMuscular once  heparin  Infusion.  Unit(s)/Hr (11 mL/Hr) IV Continuous <Continuous>  insulin lispro (ADMELOG) corrective regimen sliding scale   SubCutaneous three times a day before meals  insulin lispro (ADMELOG) corrective regimen sliding scale   SubCutaneous at bedtime  levothyroxine 50 MICROGram(s) Oral daily  losartan 25 milliGRAM(s) Oral daily  metoprolol succinate ER 50 milliGRAM(s) Oral daily  potassium chloride    Tablet ER 20 milliEquivalent(s) Oral daily  rosuvastatin 5 milliGRAM(s) Oral at bedtime    MEDICATIONS  (PRN):  dextrose Oral Gel 15 Gram(s) Oral once PRN Blood Glucose LESS THAN 70 milliGRAM(s)/deciliter  heparin   Injectable 4500 Unit(s) IV Push every 6 hours PRN For aPTT less than 40  heparin   Injectable 2000 Unit(s) IV Push every 6 hours PRN For aPTT between 40 - 57    Allergies  No Known Drug Allergies  latex       SOCIAL HISTORY:  , lives alone. no smoking  FAMILY HISTORY:  Family history of myocardial infarction (Father, Sibling)     PHYSICAL EXAM:  Vital Signs Last 24 Hrs  T(C): 36.5 (07 Feb 2025 11:29), Max: 37.4 (06 Feb 2025 21:20)  T(F): 97.7 (07 Feb 2025 11:29), Max: 99.3 (06 Feb 2025 21:20)  HR: 67 (07 Feb 2025 11:29) (67 - 72)  BP: 92/57 (07 Feb 2025 11:29) (92/57 - 136/72)  BP(mean): --  RR: 18 (07 Feb 2025 11:29) (18 - 18)  SpO2: 97% (07 Feb 2025 11:29) (94% - 98%): room air    Gen: NAD A&Ox3/ Alert       HEENT:  NC/AT, PERRL, EOMI, sclera clear, mucosa moist, throat clear, trachea midline, neck supple, t  Respiratory: nonlabored w/ equal chest rise  Cor: Regular rate  Gastrointestinal: soft NT/ND    Neurology:  follow commands.    Psych: calm/ appropriate   Musculoskeletal:  left AKA stump, transfer on RLE   sacrum: 6x5 cm evolving deep tissue injury 90% intact skin, 10% open centrally   Vascular: warm, + sensation unable to appreciate DP pulse  right heel: TTP,         - deep tissue injury 6x7cm, with sinus track        - no pus, no drainage, no eschar, no fluctuance        - no crepitus    right shin      -shallow ulcers      - anterior x2 (1x2cm, 0.5x0.5cm)     - medial  ulcer 2 x2 cm - with mild fibrinous tissue, no drainage, no surrounding erythema      LABS/ CULTURES/ RADIOLOGY:                        12.9   7.06  )-----------( 184      ( 07 Feb 2025 07:01 )             39.0       138  |  97  |  26  ----------------------------<  127      [02-07-25 @ 07:09]  3.7   |  27  |  1.32        Ca     9.0     [02-07-25 @ 07:09]      Mg     2.2     [02-07-25 @ 07:09]      Phos  3.1     [02-07-25 @ 07:09]    TPro  6.1  /  Alb  3.4  /  TBili  0.6  /  DBili  x   /  AST  29  /  ALT  27  /  AlkPhos  126  [02-07-25 @ 00:34]    PT/INR: PT 13.8 , INR 1.20       [02-07-25 @ 07:01]  PTT: 197.9      [02-07-25 @ 07:01]    A1C with Estimated Average Glucose Result: 6.9 % (01-26-25 @ 08:15)

## 2025-02-07 NOTE — CONSULT NOTE ADULT - ASSESSMENT
This is an  83 yo female with PMH CAD ( RCA and LCx, severe distal LAD disease 2015), PAD-> s/p failed bypass of left left lower extremity s/p  subsequent AKA, carotid disease, DM2, HFrEF, severe AS (evaluated for TAVR but not done due to PAD complications), Afib on Eliquis last dose 2/4/25 PM dose, hypothyroid, HTN,  HLD, non healing wound RLE who was admitted to [facility] 1/23/25 for cellulitis and heart failure and completed course of Doxycycline.  Pt presents for further evaluation and cardiac cath done 2/6. - evaluating for TAVR on this admission due to severe AS.    Wound Consult requested to assist w/ management of sacrum and right heel    plan  sacral evolving deep tissue injury - apply TRIAD BID  and prn soiling  right heel - deep tissue injury    - recommend podiatry consult , off loading, elevate     - ABD padding and wrapped with cling  right anterior/medial lower leg ulcers   - recommend vascular consult while in house when appropriate -  - recommend hydrogel to ulcers   - dry dressing and wrap with cling    Continue turning and positioning w/ offloading assistive devices as per protocol  BLE elevation & Compression  Consider MONTY/PVR,    Moisturize intact skin w/ SWEEN cream BID  recommend Nutrition Consult for optimization-  Inadequate PO intake, & Increased nutritional needs            encourage high quality protein, allie/ prosource, MVI & Vit C to promote wound healing  Hyperglycemia - control with insulin and diet      Continue w/ attends under pads and purewick care as per protocol  Waffle Cushion to chair when oob to chair  Continue w/ low air loss pressure redistribution bed surface   Pt will need Group 2 mattress on hospital bed and ROHO cushion for wheel chair upon discharge home  Care as per medicine/cardiology    will follow w/ you/ remain available as requested    Upon discharge f/u as outpatient at Wound Center [address] [facility] [phone]    I spent  55 minutes face to face w/ this pt of which more than 50% of the time was spent counseling & coordinating care of this pt.      Seen with attng  and D/w primary team       thank you for this consult     Nights/ Weekends/ Holidays please call:  General Surgery Consult pager [phone]) for emergencies  Wound PT for multilayer leg wrapping or VAC issues (x [phone])      This is an  85 yo female with PMH CAD ( RCA and LCx, severe distal LAD disease 2015), PAD-> s/p failed bypass of left left lower extremity s/p  subsequent AKA, carotid disease, DM2, HFrEF, severe AS (evaluated for TAVR but not done due to PAD complications), Afib on Eliquis last dose 2/4/25 PM dose, hypothyroid, HTN,  HLD, non healing wound RLE who was admitted to [facility] 1/23/25 for cellulitis and heart failure and completed course of Doxycycline.  Pt presents for further evaluation and cardiac cath done 2/6. - evaluating for TAVR on this admission due to severe AS.    Wound Consult requested to assist w/ management of sacrum and right heel    plan  sacral evolving deep tissue injury - apply TRIAD BID  and prn soiling  right heel - deep tissue injury    - recommend podiatry consult , off loading, elevate     - ABD padding to heel and wrapped with cling  right anterior/medial lower leg ulcers   - recommend vascular consult while in house when appropriate -  - recommend hydrogel to ulcers   - dry dressing and wrap with cling    Continue turning and positioning w/ offloading assistive devices as per protocol  BLE elevation & Compression  Consider MONTY/PVR,    Moisturize intact skin w/ SWEEN cream BID  recommend Nutrition Consult for optimization-  Inadequate PO intake, & Increased nutritional needs            encourage high quality protein, allie/ prosource, MVI & Vit C to promote wound healing  Hyperglycemia - control with insulin and diet      Continue w/ attends under pads and purewick care as per protocol  Waffle Cushion to chair when oob to chair  Continue w/ low air loss pressure redistribution bed surface   Pt will need Group 2 mattress on hospital bed and ROHO cushion for wheel chair upon discharge home  Care as per medicine/cardiology    will follow w/ you/ remain available as requested    Upon discharge f/u as outpatient at Wound Center [address] [facility] [phone]    I spent  55 minutes face to face w/ this pt of which more than 50% of the time was spent counseling & coordinating care of this pt.      Seen with attng  and D/w primary team       thank you for this consult     Nights/ Weekends/ Holidays please call:  General Surgery Consult pager ([phone]) for emergencies  Wound PT for multilayer leg wrapping or VAC issues (x [phone])

## 2025-02-07 NOTE — DISCHARGE NOTE PROVIDER - NSDCFUADDAPPT_GEN_ALL_CORE_FT
Podiatry Discharge Instructions:  Follow up: Please follow up with Dr. Josue within 1 week of discharge from the hospital, please call [phone] for appointment and discuss that you recently were seen in the hospital.  Wound Care: Please apply Iodosorb followed by 4x4 gauze and gauze to right foot wounds daily  Weight bearing: Please weight bear as tolerated    Podiatry Discharge Instructions:  Follow up: Please follow up at [facility] clinic within 1 week of discharge from the hospital, please call [phone] for appointment and discuss that you recently were seen in the hospital.  Wound Care: Please apply xeroform followed by 4x4 gauze and leida to right leg wounds daily  Weight bearing: Please weight bear as tolerated    APPTS ARE READY TO BE MADE: [X] YES    Best Family or Patient Contact (if needed):    Additional Information about above appointments (if needed):    1: pcp  2: cardiology  3.Heart failure team  4: POD Other comments or requests:   Podiatry Discharge Instructions:  Follow up: Please follow up at [facility] clinic within 1 week of discharge from the hospital, please call [phone] for appointment and discuss that you recently were seen in the hospital.  Wound Care: Please apply xeroform followed by 4x4 gauze and leida to right leg wounds daily  Weight bearing: Please weight bear as tolerated    APPTS ARE READY TO BE MADE: [X] YES    Best Family or Patient Contact (if needed):    Additional Information about above appointments (if needed):    1: pcp  2: cardiology  3.Heart failure team  4: wound care center  4: POD Other comments or requests:   Podiatry Discharge Instructions:  Follow up: Please follow up at [facility] clinic within 1 week of discharge from the hospital, please call [phone] for appointment and discuss that you recently were seen in the hospital.  Wound Care: Please apply xeroform followed by 4x4 gauze and leida to right leg wounds daily  Weight bearing: Please weight bear as tolerated    APPTS ARE READY TO BE MADE: [X] YES    Best Family or Patient Contact (if needed):    Additional Information about above appointments (if needed):    1: pcp  2: cardiology  3.Heart failure team  4: wound care center  4: POD Other comments or requests:   Podiatry Discharge Instructions:  Follow up: Please follow up at [facility] clinic within 1 week of discharge from the hospital, please call [phone] for appointment and discuss that you recently were seen in the hospital.  Wound Care: Please apply xeroform followed by 4x4 gauze and leida to right leg wounds daily  Weight bearing: Please weight bear as tolerated     Internal Medicine:  Appointment was scheduled by our team on the patient's behalf through the provider's office.  Alis Pulido NP 02/19/2025 at 1:30 PM  [address] [place] [address] [phone]    Heart HOME - HF:  Prior to outreaching the patient, it was visible that the patient has secured a follow up appointment which was not scheduled by our team.  CARD MH HEART FAILURE 2/27/2025 2:00 PM  [address] [place] [address]  [phone]    Interventional Cardiology  Appointment was scheduled by our team on the patient's behalf through the provider's office.  Ladarius Weaver MD 3/4/2025 at 10:30 AM  [address] [place] [id number] [phone] (scheduled as per Dr Kareem MD team)    Adv Heart Fail Trnsplnt Cardio:  Prior to outreaching the patient, it was visible that the patient has secured a follow up appointment which was not scheduled by our team.  Dr CJ SHAH,JENNIFER [facility] 6/20/2025 11:40 AM   APPTS ARE READY TO BE MADE: [X] YES    Best Family or Patient Contact (if needed):    Additional Information about above appointments (if needed):    1: pcp  2: cardiology  3.Heart failure team  4: wound care center  4: POD Other comments or requests:   Podiatry Discharge Instructions:  Follow up: Please follow up at [facility] clinic within 1 week of discharge from the hospital, please call [phone] for appointment and discuss that you recently were seen in the hospital.  Wound Care: Please apply xeroform followed by 4x4 gauze and leida to right leg wounds daily  Weight bearing: Please weight bear as tolerated     Internal Medicine:  Appointment was scheduled by our team on the patient's behalf through the provider's office.  Alis Pulido NP 02/19/2025 at 1:30 PM  [address] [place], [place] [id number] [phone]    PODIATRY:  Prior to outreaching the patient, it was visible that the patient has secured a follow up appointment which was not scheduled by our team.  JESSICA MONTIEL,DIPIKA TUCKER 2/20/2025 4:45 PM    Heart HOME - HF:  Prior to outreaching the patient, it was visible that the patient has secured a follow up appointment which was not scheduled by our team.  CARD  HEART FAILURE 2/27/2025 2:00 PM  [address] [place] [address]  [phone]    Interventional Cardiology  Appointment was scheduled by our team on the patient's behalf through the provider's office.  Dipika Weaver MD 3/4/2025 at 10:30 AM  [address] [place] [id number] [phone] (scheduled as per Dr Kareem MD team)    Adv Heart Fail Trnsplnt Cardio:  Prior to outreaching the patient, it was visible that the patient has secured a follow up appointment which was not scheduled by our team.  Dr CJ SHAH,JENNIFER DICKEY 6/20/2025 11:40 AM

## 2025-02-07 NOTE — DISCHARGE NOTE PROVIDER - CARE PROVIDERS DIRECT ADDRESSES
[email or web address]

## 2025-02-07 NOTE — PHYSICAL THERAPY INITIAL EVALUATION ADULT - ADDITIONAL COMMENTS
pt lives alone in a  with 1 [address] and no steps inside. pt reports she is ind with L prosthetic and rw. owns a wc, rw, toilet with bars, shower bench. pt states she was fully ind.

## 2025-02-07 NOTE — PROGRESS NOTE ADULT - PROBLEM SELECTOR PLAN 2
TTE 1/16/25: Peak Aortic Velocity 3.9, Peak Gradient 63 with mean of 40, Aortic Valve area 0.67  First TTE with aortic stenosis noted in 04/23, but with a normal EF, then had reduced EF 09/23    Plan  TAVR while inpatient per structural cardiology  pending Vascular evaluation given suspected carotid disease

## 2025-02-07 NOTE — PROGRESS NOTE ADULT - ASSESSMENT
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  Home Meds  Amio 100 qd  Eliquis 2.5 BID (last dose 2/4/25 in the PM)  Telmisartan 20 qd  Crestor 5 qd  Toprol 50 qd  Synthroid 50 qd  Lasix 20 qd  ASA 81 qd    Meds  Amio, Losartan, crestor, toprol, synthroid, ASA    TTE (1/16/2025): LV Normal; Wall Mildly increased EF 24% G2DD, Normal RVSF TAPSE 1.9; LA Dlated RA Normal, Mild MR, Mild TR, Mild AR, Mod AR with severe AS, Mod L PLEF LVIDd 5.1cm; Peak Aortic Velocity 3.9, Peak Gradient 63 with mean of 40, AOrtic Valve area 0.67  TTE (9/26/23): EF 20-25% w/ Severe Aortic Stenosis  TTE (04/11/23): EF 57% W/ severe aortic stenosis  LHC (2/6/2025) LM prox 30%, LAD Mid 40% 1st Diag 70%, Prox Cx 100%, RCA ostial 100%,  RHC (2/6/2025): CVP: 11, PAP: 61/29/42, PCWP: 30; CO 3.66, CI 2.27

## 2025-02-07 NOTE — CONSULT NOTE ADULT - SUBJECTIVE AND OBJECTIVE BOX
Structural Heart Team    HPI:    This is an  85 yo female with PMH CAD ( RCA and LCx, severe distal LAD disease 2015), PAD-> s/p failed bypass of left left (course c/b groin abscess, infected graft with subsequent removal and AKA on the left), carotid disease, DM2, HFrEF (23% last echo), severe AS (evaluated for TAVR but not done due to PAD complications) seen by Dr. Serna, Afib on Eliquis last dose 2/4/25 PM dose, hypothyroid, HTN,  HLD, non healing wound RLE who was admitted to  1/23/25 for cellulitis and heart failure and completed course of Doxycycline. She is seen by wound care Dr. Calvillo. TTE 1/31/25 EF 24%. LA mildly dilated, mild MR, mild TR, mild KY, mod AR, severe AS peak gradient 63 mean gradient 40, EFRAIN estimated 0.67cm2.  No implanted monitoring devices.  Pt. presents for further evaluation and cardiac cath.       Ms. Kelley is resting in a chair c/o feeling nauseous "all the time". She has been evaluated by our team for her Aortic Stenosis, but her severe PAD and treatment for same has postponed her AS Therapy. 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 She reports feeling OK when she walks, though she will get fatigued and gets RLE Pedal edema. She denies any CP. She states she does get uncomfortable if she lays flat, but doesn't qualify it as SOB. She came to the hospital to undergo a cardiac catheterization and possible TAVR.        02-06 @ 07:01 - 02-07 @ 07:00  --------------------------------------------------------  IN: 250 mL / OUT: 1800 mL / NET: -1550 mL    02-07 @ 07:01 - 02-07 @ 13:17  --------------------------------------------------------  IN: 240 mL / OUT: 0 mL / NET: 240 mL        PAST MEDICAL & SURGICAL HISTORY:  HTN (hypertension)      HLD (hyperlipidemia)      Anxiety      CAD (coronary artery disease)      PAD (peripheral artery disease)      Hypothyroidism      AF (atrial fibrillation)      Carotid artery stenosis      AS (aortic stenosis)      Chronic HFrEF (heart failure with reduced ejection fraction)      Status post closed fracture of right femur      H/O arterial bypass of lower limb      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  Unit(s)/Hr IV Continuous <Continuous>  insulin lispro (ADMELOG) corrective regimen sliding scale   SubCutaneous three times a day before meals  insulin lispro (ADMELOG) corrective regimen sliding scale   SubCutaneous at bedtime  levothyroxine 50 MICROGram(s) Oral daily  losartan 25 milliGRAM(s) Oral daily  metoprolol succinate ER 50 milliGRAM(s) Oral daily  rosuvastatin 5 milliGRAM(s) Oral at bedtime      T(C): 36.5 (02-07-25 @ 11:29), Max: 37.4 (02-06-25 @ 21:20)  HR: 67 (02-07-25 @ 11:29) (67 - 72)  BP: 92/57 (02-07-25 @ 11:29) (92/57 - 136/72)  RR: 18 (02-07-25 @ 11:29) (18 - 18)  SpO2: 97% (02-07-25 @ 11:29) (94% - 98%)  Wt(kg): --      Review of Symptoms:  General: Alert, Follows commands  Respiratory:  + SOB, + HLODER, + Cough  Cardiac: Denies CP, Denies Palpitations  Gastrointestinal: Denies Pain, Denies N/V  Extremities: + Pedal Edema, No Joint pain  Vascular: Severe PAD, L BKA  Neurological: Negative  Endocrine: negative      Physical Exam:  Gen: A/Ox3, NAD  HEENT: No JVD, Neck Supple, Trachea midline, No masses  Pulmonary: diminished at the bases otherwise CTAB,  No accessory muscle use for respiration  Cardiac: S1S2, RRR, III/VI HUMA   No Gallops/Rubs  ECG: SR  Gastrointestinal: Soft, NT/ND, + Bowel Sounds  Extremities: Trace Edema,  No joint pain or swelling +PMSx3, L BKA  Vascular: 1+ pulses B/L, No Bruits  Neurological: Non Focal, = motor and sensory      Laboratory:                        12.9   7.06  )-----------( 184      ( 07 Feb 2025 07:01 )             39.0    02-07    138  |  97  |  26[H]  ----------------------------<  127[H]  3.7   |  27  |  1.32[H]    Ca    9.0      07 Feb 2025 07:09  Phos  3.1     02-07  Mg     2.2     02-07    TPro  6.1  /  Alb  3.4  /  TBili  0.6  /  DBili  x   /  AST  29  /  ALT  27  /  AlkPhos  126[H]  02-07   PT/INR - ( 07 Feb 2025 07:01 )   PT: 13.8 sec;   INR: 1.20 ratio         PTT - ( 07 Feb 2025 07:01 )  PTT:197.9 sec    Pro-BNP:        Transthoracic Echo:      Cardiac Cath:  < from: Cardiac Catheterization (02.06.25 @ 08:18) >  Diagnostic Conclusions:     1. RCA and Cx  with well developed colalterals. Moderate mid LAD  disease and moderate to severe diag disease.  2. Elevated filling pressures with marginal CI as detailed below.    Recommendations:     1. Medical management for CAD at this time   2. HF evaluation for optimization    3 Strucutral heart following for expedited TAVR evaluation   4. Findings relayed to patient and patient's cardiologist.      Presentation:   85 year old man with HFrEF, CAD with prior Cx, RCA , severe AS here  for C/RHC.    Procedure Narrative:   The risks and alternatives of the procedures and conscious sedation  were explained to the patient and informed consent was  obtained. The patient was brought to the cath lab and placed on the  exam table.  Access   Right femoral artery:   The puncture site was infiltrated with 1% Lidocaine. Vascular access  was obtained using modified seldinger technique.  Right brachial vein:   The puncture site was infiltrated with 1% Lidocaine. Vascular access  was obtained using modified seldinger technique.    Coronary Angiography   Left Coronary System:   A catheter was positioned into the vessel ostia under fluoroscopic  guidance. Angiograms were obtained in multiple views.  Right Coronary System:   A catheter was positioned into the vessel ostia under fluoroscopic  guidance. Angiograms were obtained in multiple views.    Patient: NANO KELLEY            MRN: [id number]  Study Date: 02/06/2025   08:18 AM      Page 1 of 4          Right Heart Cath   Measurements of pressures were obtained.      Diagnostic Findings:     Coronary Angiography   The coronary circulation is right dominant.      LM   Left main artery: There is a 30 % stenosis in the proximal third  portion of the segment.    LAD   Mid left anterior descending: There is a 40 % stenosis. First  diagonal: There is a 70 % stenosis.    CX   Proximal circumflex: There is a 100 % stenosis.      RCA   Ostial right coronary artery: There is a 100 % stenosis.        < end of copied text >

## 2025-02-07 NOTE — DISCHARGE NOTE PROVIDER - PROVIDER TOKENS
PROVIDER:[TOKEN:[[id number]],SCHEDULEDAPPT:[06/20/2025],SCHEDULEDAPPTTIME:[11:40 AM]] PROVIDER:[TOKEN:[[id number]],SCHEDULEDAPPT:[06/20/2025],SCHEDULEDAPPTTIME:[11:40 AM]],PROVIDER:[TOKEN:[[id number]],FOLLOWUP:[2 weeks]] PROVIDER:[TOKEN:[[id number]],SCHEDULEDAPPT:[06/20/2025],SCHEDULEDAPPTTIME:[11:40 AM]],PROVIDER:[TOKEN:[[id number]],FOLLOWUP:[2 weeks]],PROVIDER:[TOKEN:[[id number]],FOLLOWUP:[2 weeks],ESTABLISHEDPATIENT:[T]] PROVIDER:[TOKEN:[[id number]],SCHEDULEDAPPT:[06/20/2025],SCHEDULEDAPPTTIME:[11:40 AM]],PROVIDER:[TOKEN:[[id number]],FOLLOWUP:[2 weeks]],PROVIDER:[TOKEN:[[id number]],FOLLOWUP:[2 weeks],ESTABLISHEDPATIENT:[T]],FREE:[LAST:[[facility] care [facility]],PHONE:[phone]],FAX:[(   )    -],ADDRESS:[[address] [place] [address]],FOLLOWUP:[2 weeks]]

## 2025-02-07 NOTE — DISCHARGE NOTE PROVIDER - NSDCCPCAREPLAN_GEN_ALL_CORE_FT
PRINCIPAL DISCHARGE DIAGNOSIS  Diagnosis: Acute on chronic systolic congestive heart failure  Assessment and Plan of Treatment: Take meds as prescribed, Take weight daily, if increases by 3 pounds call your physician  return if worsens   follow up with pcp and cardiology  and heart failure team: Follow-up with Heart Failure Nurse Practitioner on 2/27 at 2 PM and Dr. Trejo on 6/20 at 11:40 AM.      SECONDARY DISCHARGE DIAGNOSES  Diagnosis: Severe aortic stenosis  Assessment and Plan of Treatment: Follow-up with Heart Failure Nurse Practitioner on 2/27 at 2 PM and Dr. Trejo on 6/20 at 11:40 AM.  Outpatient TAVR and vascular surgery evaluations.  Strict decubitus precautions and consistent use of Z-flows to promote wound healing.  Continue prescribed medications and monitor for any side effects.  Blood glucose monitoring.  Follow-up with wound care as directed.  Contact healthcare provider if any new or worsening symptoms develop, including chest pain, shortness of breath, or changes in the RLE wounds.    Diagnosis: CAD (coronary artery disease)  Assessment and Plan of Treatment: plan as above    Diagnosis: Afib  Assessment and Plan of Treatment: plan as above    Diagnosis: Chronic atrial fibrillation  Assessment and Plan of Treatment: plan as above    Diagnosis: PAD (peripheral artery disease)  Assessment and Plan of Treatment: Strict decubitus precautions and consistent use of Z-flows to promote wound healing. follow up with pcp and vascular    Diagnosis: DM2 (diabetes mellitus, type 2)  Assessment and Plan of Treatment: HA1C 6.9 meds as above, follow up with pcp

## 2025-02-07 NOTE — DISCHARGE NOTE PROVIDER - DISCHARGE DIET
DASH Diet/Consistent Carbohydrate Diabetic Diets Low Sodium Diet/Consistent Carbohydrate Diabetic Diets

## 2025-02-07 NOTE — DISCHARGE NOTE PROVIDER - NPI NUMBER (FOR SYSADMIN USE ONLY) :
[id number] [[id number] [[id number]],[[id number]],[[id number]] [[id number]],[[id number]],[[id number]],[UNKNOWN]

## 2025-02-07 NOTE — PROGRESS NOTE ADULT - PROBLEM SELECTOR PLAN 1
Peak Aortic Velocity 3.9, Peak Gradient 63 with mean of 40, AOrtic Valve area 0.67    GDMT:  Losartan 25 qd  Toprol 50 XL qd  No home SGLT2i  No Home Aldactone  Lasix 20 PO    RHC (2/6/2025): CVP: 11, PAP: 61/29/42, PCWP: 30; CO 3.66, CI 2.27    Recommendations;  - Patient with elevated filling pressures (patient reports Lasix makes her feel nauseous/dizzy)  - Ok to hold Bumex given severe intolerance  - Keep GDMT at current doses, will uptitrate once TAVR evaluation finished. Peak Aortic Velocity 3.9, Peak Gradient 63 with mean of 40, Aortic Valve area 0.67    GDMT:  Losartan 25 qd  Toprol 50 XL qd  No home SGLT2i  No Home Aldactone  Lasix 20 PO    RHC (2/6/2025): CVP: 11, PAP: 61/29/42, PCWP: 30; CO 3.66, CI 2.27    Recommendations;  - Patient with elevated filling pressures (patient reports Lasix makes her feel nauseous/dizzy)  - Ok to hold Bumex given severe intolerance  - Keep GDMT at current doses, will uptitrate once TAVR evaluation finished.

## 2025-02-07 NOTE — PROGRESS NOTE ADULT - PROBLEM SELECTOR PLAN 3
PAD s/p failed bypass of left leg (course c/b groin abscess, infected graft with subsequent removal and finally an AKA on the left)  Left stump is clean   might need Vascular surgery eval, will hold off for now   Rt leg chronic wound ( follows with oupt wound care clinic) --> wound consult is placed

## 2025-02-07 NOTE — CONSULT NOTE ADULT - ASSESSMENT
85F presents with right heel DTI  - Patient seen and evaluated  - Afebrile, WBC 7.06  - Right heel early DTI, no fluctuance, no malodor, no drainage, no acute signs of infection. L BKA  - No xrays needed at this time  - No culture 2/2 no open wounds  - Strict decubitus precaution with Z-flows to be worn at all times when in bed   - No acute podiatric surgical intervention at this time  - Patient is stable for discharge from podiatry standpoint  - Wound care and follow up information can be found in discharge provider note   - Discussed with attending   85F presents with right heel DTI  - Patient seen and evaluated  - Afebrile, WBC 7.06  - Right heel early DTI, no fluctuance, no malodor, no drainage, no acute signs of infection. L BKA  - No xrays needed at this time  - No culture 2/2 no open wounds  - Strict decubitus precaution with Z-flows to be worn at all times when in bed   - No acute podiatric surgical intervention at this time  - Patient is stable for discharge from podiatry standpoint  - Wound care and follow up information can be found in discharge provider note   - Reconsult as needed  - Discussed with attending

## 2025-02-07 NOTE — DISCHARGE NOTE PROVIDER - CARE PROVIDER_API CALL
Halie Trejo  Adv Heart Fail Trnsplnt Cardio  [address] [place] [address]  Phone: [phone]  Fax: [phone]  Scheduled Appointment: 06/20/2025 11:40 AM   Halie Trejo  Adv Heart Fail Trnsplnt Cardio  [address] [place] [address]  Phone: [phone]  Fax: [phone]  Scheduled Appointment: 06/20/2025 11:40 AM    Nghia Serna  Interventional Cardiology  [address] [place] [address]  Phone: [phone]  Fax: [phone]  Follow Up Time: 2 weeks   Halie Trejo  Adv Heart Fail Trnsplnt Cardio  [address] [place] [address]  Phone: [phone]  Fax: [phone]  Scheduled Appointment: 06/20/2025 11:40 AM    Nghia Serna  Interventional Cardiology  [address] [place] [address]  Phone: [phone]  Fax: [phone]  Follow Up Time: 2 weeks    Diana Mcneill  Internal Medicine  [address] [place] [address]  Phone: [phone]  Fax: [phone]  Established Patient  Follow Up Time: 2 weeks   Halie Trejo  Adv Heart Fail Trnsplnt Cardio  [address] [place] [address]  Phone: [phone]  Fax: [phone]  Scheduled Appointment: 06/20/2025 11:40 AM    Nghia Serna  Interventional Cardiology  [address] [place] [address]  Phone: [phone]  Fax: [phone]  Follow Up Time: 2 weeks    Diana Mcneill  Internal Medicine  [address] [place] [address]  Phone: [phone]  Fax: [phone]  Established Patient  Follow Up Time: 2 weeks    Wound care center,   [address]  Phone: [phone]  Fax: (   )    -  Follow Up Time: 2 weeks

## 2025-02-07 NOTE — PROGRESS NOTE ADULT - PROBLEM SELECTOR PLAN 3
Known Multivessel disease with plan for medical management  [facility] (2/6/2025) LM prox 30%, LAD Mid 40% 1st Diag 70%, Prox Cx 100%, RCA ostial 100%,    Plan:  No active chest pain  C/W ASA 81 qd.

## 2025-02-07 NOTE — CONSULT NOTE ADULT - SUBJECTIVE AND OBJECTIVE BOX
Patient is a 85y old  Female who presents with a chief complaint of further evaluation and cardiac cath (06 Feb 2025 14:02)      HPI:  This is an  83 yo female with PMH CAD ( RCA and LCx, severe distal LAD disease 2015), PAD-> s/p failed bypass of left left (course c/b groin abscess, infected graft with subsequent removal and AKA on the left), carotid disease, DM2, HFrEF (23% last echo), severe AS (evaluated for TAVR but not done due to PAD complications) seen by Dr. Serna, Afib on Eliquis last dose 2/4/25 PM dose, hypothyroid, HTN,  HLD, non healing wound RLE who was admitted to  1/23/25 for cellulitis and heart failure and completed course of Doxycycline. She is seen by wound care Dr. Calvillo. TTE 1/31/25 EF 24%. LA mildly dilated, mild MR, mild TR, mild VA, mod AR, severe AS peak gradient 63 mean gradient 40, EFRAIN estimated 0.67cm2.  No implanted monitoring devices.  Pt. presents for further evaluation and cardiac cath.      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 1000 milliLiter(s) (50 mL/Hr) IV Continuous <Continuous>  dextrose 5%. 1000 milliLiter(s) (100 mL/Hr) IV Continuous <Continuous>  dextrose 50% Injectable 25 Gram(s) IV Push once  dextrose 50% Injectable 12.5 Gram(s) IV Push once  dextrose 50% Injectable 25 Gram(s) IV Push once  glucagon  Injectable 1 milliGRAM(s) IntraMuscular once  heparin  Infusion.  Unit(s)/Hr (11 mL/Hr) IV Continuous <Continuous>  insulin lispro (ADMELOG) corrective regimen sliding scale   SubCutaneous three times a day before meals  insulin lispro (ADMELOG) corrective regimen sliding scale   SubCutaneous at bedtime  levothyroxine 50 MICROGram(s) Oral daily  losartan 25 milliGRAM(s) Oral daily  metoprolol succinate ER 50 milliGRAM(s) Oral daily  potassium chloride  10 mEq/100 mL IVPB 10 milliEquivalent(s) IV Intermittent every 1 hour  rosuvastatin 5 milliGRAM(s) Oral at bedtime    MEDICATIONS  (PRN):  dextrose Oral Gel 15 Gram(s) Oral once PRN Blood Glucose LESS THAN 70 milliGRAM(s)/deciliter  heparin   Injectable 4500 Unit(s) IV Push every 6 hours PRN For aPTT less than 40  heparin   Injectable 2000 Unit(s) IV Push every 6 hours PRN For aPTT between 40 - 57      Allergies    No Known Drug Allergies  latex (Unknown)    Intolerances        VITALS:    Vital Signs Last 24 Hrs  T(C): 36.5 (07 Feb 2025 11:29), Max: 37.4 (06 Feb 2025 21:20)  T(F): 97.7 (07 Feb 2025 11:29), Max: 99.3 (06 Feb 2025 21:20)  HR: 67 (07 Feb 2025 11:29) (67 - 72)  BP: 92/57 (07 Feb 2025 11:29) (92/57 - 136/72)  BP(mean): --  RR: 18 (07 Feb 2025 11:29) (18 - 18)  SpO2: 97% (07 Feb 2025 11:29) (94% - 98%)    Parameters below as of 07 Feb 2025 11:29  Patient On (Oxygen Delivery Method): room air        LABS:                          12.9   7.06  )-----------( 184      ( 07 Feb 2025 07:01 )             39.0       02-07    138  |  97  |  26[H]  ----------------------------<  127[H]  3.7   |  27  |  1.32[H]    Ca    9.0      07 Feb 2025 07:09  Phos  3.1     02-07  Mg     2.2     02-07    TPro  6.1  /  Alb  3.4  /  TBili  0.6  /  DBili  x   /  AST  29  /  ALT  27  /  AlkPhos  126[H]  02-07      CAPILLARY BLOOD GLUCOSE      POCT Blood Glucose.: 175 mg/dL (07 Feb 2025 12:07)  POCT Blood Glucose.: 144 mg/dL (07 Feb 2025 07:57)  POCT Blood Glucose.: 196 mg/dL (06 Feb 2025 21:35)  POCT Blood Glucose.: 127 mg/dL (06 Feb 2025 16:51)      PT/INR - ( 07 Feb 2025 07:01 )   PT: 13.8 sec;   INR: 1.20 ratio         PTT - ( 07 Feb 2025 07:01 )  PTT:197.9 sec    LOWER EXTREMITY PHYSICAL EXAM:  Vascular: DP/PT 0/4,R , CFT >3 seconds R, Temperature gradient warm to cool , R.   Neuro: Epicritic sensation intact to the level of digits, B/L.  Musculoskeletal/Ortho: s/p L BKA  Skin: Right heel early DTI, no fluctuance, no malodor, no drainage, no acute signs of infection. L BKA    RADIOLOGY & ADDITIONAL STUDIES:

## 2025-02-07 NOTE — CONSULT NOTE ADULT - PROBLEM SELECTOR RECOMMENDATION 2
Daily weight. She is being followed by the Heart Failure Service., and their recommendations are appreciated. Continue GDMT as per HF team.

## 2025-02-07 NOTE — PROGRESS NOTE ADULT - PROBLEM SELECTOR PLAN 1
Pt had Cardiac cath done on 2/6/25 with % obstruction  and Cx 100% obstruction with well developed colalterals. Moderate mid LAD ( 40% obstruction) and moderate to severe diagonal  disease ( 70%) ; Elevated filling pressures with marginal CI  Cardiologist recommended : Medical management for CAD, HF evaluation for optimization   Structural heart consulted for expedited TAVR evaluation - peak gradient 63 mean gradient 40, EFRAIN estimated 0.67cm2

## 2025-02-07 NOTE — PROGRESS NOTE ADULT - PROBLEM SELECTOR PLAN 4
Pt had Cardiac cath done on 2/6/25 with % obstruction  and Cx 100% obstruction with well developed colalterals. Moderate mid LAD ( 40% obstruction) and moderate to severe diagonal  disease ( 70%) ; Elevated filling pressures with marginal CI  Cardiologist recommended : Medical management for CAD, HF evaluation for optimization  , Structural heart following for expedited TAVR evaluation   Pt is on Crestor 5 mg  which will cont for now as per HF team and might optimize dose   Pt is ASA , Toprol and Telmisartan --> will cont current meds as per discussion with HF team

## 2025-02-07 NOTE — DISCHARGE NOTE PROVIDER - HOSPITAL COURSE
HPI:  This is an  85 yo female with PMH CAD ( RCA and LCx, severe distal LAD disease ), PAD-> s/p failed bypass of left left (course c/b groin abscess, infected graft with subsequent removal and AKA on the left), carotid disease, DM2, HFrEF (23% last echo), severe AS (evaluated for TAVR but not done due to PAD complications) seen by Dr. Serna, Afib on Eliquis last dose 25 PM dose, hypothyroid, HTN,  HLD, non healing wound RLE who was admitted to  25 for cellulitis and heart failure and completed course of Doxycycline. She is seen by wound care Dr. Calvillo. TTE 25 EF 24%. LA mildly dilated, mild MR, mild TR, mild FL, mod AR, severe AS peak gradient 63 mean gradient 40, EFRAIN estimated 0.67cm2.  No implanted monitoring devices.  Pt. presents for further evaluation and cardiac cath.      (2025 07:31)    Hospital Course:  The patient was admitted for TAVR evaluation, initially pending, but deferred to outpatient setting due to a recent infection. Heart failure service was consulted for reduced ejection fraction in the setting of severe AS. Patient is euvolemic. Transthoracic echocardiogram (TTE) on 25 showed severe AS (peak aortic velocity 3.9 m/s, peak gradient 63 mmHg, mean gradient 40 mmHg, aortic valve area 0.67 cm²). Previous TTEs showed initial AS in  with normal EF, and reduced EF in . Left heart catheterization (LHC) on 25 showed significant multivessel CAD. The patient presented with a right heel dry tissue injury (DTI) with fissuring and non-healing wounds on her anterior shin and posterior medial right leg. Wound care was consulted, and the wounds were deemed stable without acute signs of infection. MONTY from  demonstrated moderate ischemia. The patient was instructed on strict decubitus precautions and use of Z-flows, but was reportedly non-compliant. Vascular surgery consulted. Given the patient’s overall complex medical condition, optimization of wound healing is a significant concern and will require close outpatient management.    Important Medication Changes and Reason:  Active or Pending Issues Requiring Follow-up: PCP, Heart failure, cardiology, structural heart,     Daily   59 kg  Daily Weight in k.1 (2025 08:02)    Advanced Directives:   [ x] Full code  [ ] DNR  [ ] Hospice    Discharge Diagnoses:  Severe aortic stenosis (AS) - TAVR deferred  Heart failure with reduced ejection fraction (HFrEF)  Coronary artery disease (CAD) - multivessel  Peripheral artery disease (PAD) s/p failed left lower extremity bypass, left AKA  Carotid artery disease  Type 2 diabetes mellitus (T2DM)  Atrial fibrillation (AFib)  Hypothyroidism  Hypertension (HTN)  Hyperlipidemia (HLD)  Non-healing right lower extremity wounds  Right heel dry tissue injury (DTI)

## 2025-02-07 NOTE — DISCHARGE NOTE PROVIDER - NSDCFUSCHEDAPPT_GEN_ALL_CORE_FT
Ladarius Calvillo  [facility]  PODIATRY [address]  Scheduled Appointment: 02/10/2025    Diana Mcneill  [facility]  INTMED [address]  Scheduled Appointment: 02/10/2025    [facility]  VASCULAR [address]  Scheduled Appointment: 02/12/2025    [facility]  VASCULAR [address]  Scheduled Appointment: 02/12/2025    Diana Mcneill  [facility]  INTMED [address]  Scheduled Appointment: 04/08/2025     [facility] [address]  Scheduled Appointment: 02/27/2025    Diana Mcneill  [facility] [address] [facility]  Scheduled Appointment: 04/08/2025     Alis Pulido  [facility] Physician [facility]  INTMED [address]  Scheduled Appointment: 02/19/2025    [facility]  HEARTFAIL [address]  Scheduled Appointment: 02/27/2025    Ladarius Weaver  [facility]  CTSURG [address]  Scheduled Appointment: 03/04/2025    Diana Mcneill  [facility]  INTMED [address]  Scheduled Appointment: 04/08/2025     [facility]  CARENAV Patient [place]  Scheduled Appointment: 02/18/2025    Ladarius Calvillo  [facility]  PODIATRY [address]  Scheduled Appointment: 02/20/2025    Alis Pulido  [facility]  INTMED [address]  Scheduled Appointment: 02/26/2025    [facility]  HEARTFAIL [address]  Scheduled Appointment: 02/27/2025    Ladarius Weaver  [facility]  CTSURG [address]  Scheduled Appointment: 03/04/2025    iDana Mcneill  [facility]  INTMED [address]  Scheduled Appointment: 04/08/2025

## 2025-02-07 NOTE — PROGRESS NOTE ADULT - ASSESSMENT
84F w/ HFrEF (EF 23%) Severe AS, CAD,  PAD s/p failed bypass of left leg (course c/b groin abscess, infected graft with subsequent removal and finally an AKA on the left), severe Aortic Stenosis, carotid disease, DM2, Afib on Eliquis, Hypothyroidism, HTN,  HLD, non healing wound RLE who was admitted to [facility]( 1/23/25-1/27/25) for cellulitis and heart failure and was DC on Doxycycline which she completed.  Pt is now admitted to the hosp for further evaluation and cardiac cath which was done on 2/6/25 with  TAVR recommended.

## 2025-02-07 NOTE — PROGRESS NOTE ADULT - SUBJECTIVE AND OBJECTIVE BOX
SUBJECTIVE / OVERNIGHT EVENTS:  Today is hospital day 1d. There are no new issues or overnight events.   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 Serna, Afib on Eliquis last dose 2/4/25 PM dose, hypothyroid, HTN,  HLD, non healing wound RLE who was admitted to  1/23/25 for cellulitis and heart failure and completed course of Doxycycline. She is seen by wound care Dr. Calvillo. TTE 1/31/25 EF 24%. LA mildly dilated, mild MR, mild TR, mild NJ, mod AR, severe AS peak gradient 63 mean gradient 40, EFRAIN estimated 0.67cm2.  No implanted monitoring devices.  Pt. presents for further evaluation and cardiac cath.      (06 Feb 2025 07:31)    MEDICATIONS  (STANDING):  aMIOdarone    Tablet 100 milliGRAM(s) Oral daily  aspirin enteric coated 81 milliGRAM(s) Oral daily  buMETAnide Injectable 1 milliGRAM(s) IV Push daily  dextrose 5%. 1000 milliLiter(s) (50 mL/Hr) IV Continuous <Continuous>  dextrose 5%. 1000 milliLiter(s) (100 mL/Hr) IV Continuous <Continuous>  dextrose 50% Injectable 25 Gram(s) IV Push once  dextrose 50% Injectable 12.5 Gram(s) IV Push once  dextrose 50% Injectable 25 Gram(s) IV Push once  glucagon  Injectable 1 milliGRAM(s) IntraMuscular once  heparin  Infusion.  Unit(s)/Hr (11 mL/Hr) IV Continuous <Continuous>  insulin lispro (ADMELOG) corrective regimen sliding scale   SubCutaneous three times a day before meals  insulin lispro (ADMELOG) corrective regimen sliding scale   SubCutaneous at bedtime  levothyroxine 50 MICROGram(s) Oral daily  losartan 25 milliGRAM(s) Oral daily  metoprolol succinate ER 50 milliGRAM(s) Oral daily  potassium chloride  10 mEq/100 mL IVPB 10 milliEquivalent(s) IV Intermittent every 1 hour  rosuvastatin 5 milliGRAM(s) Oral at bedtime    MEDICATIONS  (PRN):  dextrose Oral Gel 15 Gram(s) Oral once PRN Blood Glucose LESS THAN 70 milliGRAM(s)/deciliter  heparin   Injectable 4500 Unit(s) IV Push every 6 hours PRN For aPTT less than 40  heparin   Injectable 2000 Unit(s) IV Push every 6 hours PRN For aPTT between 40 - 57    HOME MEDICATIONS:  amiodarone 200 mg oral tablet: 0.5 tab(s) orally once a day  aspirin 81 mg oral delayed release tablet: 1 tab(s) orally once a day  cholecalciferol 125 mcg (5000 intl units) oral tablet: 1 tab(s) orally once a day  Eliquis 2.5 mg oral tablet: 1 tab(s) orally 2 times a day  Furosemide 20 MG Oral Tablet:   levothyroxine 50 mcg (0.05 mg) oral tablet: 1 tab(s) orally once a day  metFORMIN 500 mg oral tablet: 1 tab(s) orally once a day  metoprolol succinate 50 mg oral tablet, extended release: 1 tab(s) orally once a day  potassium chloride 20 mEq oral tablet, extended release: 1 tab(s) orally once a day  rosuvastatin 5 mg oral tablet: 1 tab(s) orally once a day (at bedtime)  telmisartan 20 mg oral tablet: 1 tab(s) orally once a day    PHYSICAL EXAM  Vital Signs Last 24 Hrs  T(C): 36.5 (07 Feb 2025 11:29), Max: 37.4 (06 Feb 2025 21:20)  T(F): 97.7 (07 Feb 2025 11:29), Max: 99.3 (06 Feb 2025 21:20)  HR: 71 (07 Feb 2025 14:19) (67 - 72)  BP: 95/61 (07 Feb 2025 14:19) (92/57 - 136/72)  BP(mean): --  RR: 18 (07 Feb 2025 14:19) (18 - 18)  SpO2: 94% (07 Feb 2025 14:19) (94% - 98%)    Parameters below as of 07 Feb 2025 14:19  Patient On (Oxygen Delivery Method): room air        02-06-25 @ 07:01  -  02-07-25 @ 07:00  --------------------------------------------------------  IN: 250 mL / OUT: 1800 mL / NET: -1550 mL    02-07-25 @ 07:01  -  02-07-25 @ 14:28  --------------------------------------------------------  IN: 240 mL / OUT: 0 mL / NET: 240 mL      CONSTITUTIONAL: Well-groomed, in no apparent distress;  EYES: No conjunctival or scleral injection, non-icteric;  ENMT: No external nasal lesions; Normal outer ears;  NECK: Trachea midline;  RESPIRATORY: Normal respiratory effort;   CARDIOVASCULAR: Regular rate and rhythm;  GASTROINTESTINAL: Non-distended;   EXTREMITIES:  1+ lower extremity edema;  NEUROLOGY: Does respond to commands appropriately;  PSYCHIATRY: Mood and Affect appropriate    LABS:                        12.9   7.06  )-----------( 184      ( 07 Feb 2025 07:01 )             39.0     02-07    138  |  97  |  26[H]  ----------------------------<  127[H]  3.7   |  27  |  1.32[H]    Ca    9.0      07 Feb 2025 07:09  Phos  3.1     02-07  Mg     2.2     02-07    TPro  6.1  /  Alb  3.4  /  TBili  0.6  /  DBili  x   /  AST  29  /  ALT  27  /  AlkPhos  126[H]  02-07    PT/INR - ( 07 Feb 2025 07:01 )   PT: 13.8 sec;   INR: 1.20 ratio         PTT - ( 07 Feb 2025 07:01 )  PTT:197.9 sec      Urinalysis Basic - ( 07 Feb 2025 07:09 )    Color: x / Appearance: x / SG: x / pH: x  Gluc: 127 mg/dL / Ketone: x  / Bili: x / Urobili: x   Blood: x / Protein: x / Nitrite: x   Leuk Esterase: x / RBC: x / WBC x   Sq Epi: x / Non Sq Epi: x / Bacteria: x            RADIOLOGY & ADDITIONAL TESTS:  EKG  12 Lead ECG:   Ventricular Rate 89 BPM    Atrial Rate 89 BPM    P-R Interval 180 ms    QRS Duration 126 ms    Q-T Interval 414 ms    QTC Calculation(Bazett) 503 ms    P Axis 43 degrees    R Axis -18 degrees    T Axis 99 degrees    Diagnosis Line Normal sinus rhythm  Non-specific intra-ventricular conduction block  Nonspecific ST and T wave abnormality  Abnormal ECG (11-15-23 @ 16:23)  12 Lead ECG:   Ventricular Rate 91 BPM    Atrial Rate 91 BPM    P-R Interval 174 ms    QRS Duration 126 ms    Q-T Interval 422 ms    QTC Calculation(Bazett) 519 ms    P Axis 41 degrees    R Axis -17 degrees    T Axis 101 degrees    Diagnosis Line Normal sinus rhythm  Non-specific intra-ventricular conduction block  Cannot rule out Anterior infarct , age undetermined  Nonspecific ST and T wave abnormality  Abnormal ECG (11-15-23 @ 16:08)

## 2025-02-08 LAB
ALBUMIN SERPL ELPH-MCNC: 3.3 G/DL — SIGNIFICANT CHANGE UP (ref 3.3–5)
ALBUMIN SERPL ELPH-MCNC: 3.4 G/DL — SIGNIFICANT CHANGE UP (ref 3.3–5)
ALP SERPL-CCNC: 116 U/L — SIGNIFICANT CHANGE UP (ref 40–120)
ALP SERPL-CCNC: 130 U/L — HIGH (ref 40–120)
ALT FLD-CCNC: 22 U/L — SIGNIFICANT CHANGE UP (ref 10–45)
ALT FLD-CCNC: 23 U/L — SIGNIFICANT CHANGE UP (ref 10–45)
ANION GAP SERPL CALC-SCNC: 12 MMOL/L — SIGNIFICANT CHANGE UP (ref 5–17)
ANION GAP SERPL CALC-SCNC: 12 MMOL/L — SIGNIFICANT CHANGE UP (ref 5–17)
ANION GAP SERPL CALC-SCNC: 9 MMOL/L — SIGNIFICANT CHANGE UP (ref 5–17)
APTT BLD: 119.6 SEC — HIGH (ref 24.5–35.6)
APTT BLD: 66.2 SEC — HIGH (ref 24.5–35.6)
APTT BLD: 86.5 SEC — HIGH (ref 24.5–35.6)
AST SERPL-CCNC: 17 U/L — SIGNIFICANT CHANGE UP (ref 10–40)
AST SERPL-CCNC: 23 U/L — SIGNIFICANT CHANGE UP (ref 10–40)
BILIRUB SERPL-MCNC: 0.4 MG/DL — SIGNIFICANT CHANGE UP (ref 0.2–1.2)
BILIRUB SERPL-MCNC: 0.6 MG/DL — SIGNIFICANT CHANGE UP (ref 0.2–1.2)
BUN SERPL-MCNC: 26 MG/DL — HIGH (ref 7–23)
BUN SERPL-MCNC: 26 MG/DL — HIGH (ref 7–23)
BUN SERPL-MCNC: 34 MG/DL — HIGH (ref 7–23)
CALCIUM SERPL-MCNC: 8.6 MG/DL — SIGNIFICANT CHANGE UP (ref 8.4–10.5)
CALCIUM SERPL-MCNC: 8.8 MG/DL — SIGNIFICANT CHANGE UP (ref 8.4–10.5)
CALCIUM SERPL-MCNC: 8.9 MG/DL — SIGNIFICANT CHANGE UP (ref 8.4–10.5)
CHLORIDE SERPL-SCNC: 95 MMOL/L — LOW (ref 96–108)
CHLORIDE SERPL-SCNC: 96 MMOL/L — SIGNIFICANT CHANGE UP (ref 96–108)
CHLORIDE SERPL-SCNC: 96 MMOL/L — SIGNIFICANT CHANGE UP (ref 96–108)
CO2 SERPL-SCNC: 29 MMOL/L — SIGNIFICANT CHANGE UP (ref 22–31)
CO2 SERPL-SCNC: 29 MMOL/L — SIGNIFICANT CHANGE UP (ref 22–31)
CO2 SERPL-SCNC: 31 MMOL/L — SIGNIFICANT CHANGE UP (ref 22–31)
CREAT SERPL-MCNC: 1.4 MG/DL — HIGH (ref 0.5–1.3)
CREAT SERPL-MCNC: 1.42 MG/DL — HIGH (ref 0.5–1.3)
CREAT SERPL-MCNC: 1.79 MG/DL — HIGH (ref 0.5–1.3)
EGFR: 28 ML/MIN/1.73M2 — LOW
EGFR: 36 ML/MIN/1.73M2 — LOW
EGFR: 37 ML/MIN/1.73M2 — LOW
GLUCOSE BLDC GLUCOMTR-MCNC: 128 MG/DL — HIGH (ref 70–99)
GLUCOSE BLDC GLUCOMTR-MCNC: 130 MG/DL — HIGH (ref 70–99)
GLUCOSE BLDC GLUCOMTR-MCNC: 172 MG/DL — HIGH (ref 70–99)
GLUCOSE BLDC GLUCOMTR-MCNC: 201 MG/DL — HIGH (ref 70–99)
GLUCOSE SERPL-MCNC: 118 MG/DL — HIGH (ref 70–99)
GLUCOSE SERPL-MCNC: 119 MG/DL — HIGH (ref 70–99)
GLUCOSE SERPL-MCNC: 150 MG/DL — HIGH (ref 70–99)
HCT VFR BLD CALC: 39.3 % — SIGNIFICANT CHANGE UP (ref 34.5–45)
HGB BLD-MCNC: 12.8 G/DL — SIGNIFICANT CHANGE UP (ref 11.5–15.5)
MAGNESIUM SERPL-MCNC: 2 MG/DL — SIGNIFICANT CHANGE UP (ref 1.6–2.6)
MAGNESIUM SERPL-MCNC: 2.1 MG/DL — SIGNIFICANT CHANGE UP (ref 1.6–2.6)
MCHC RBC-ENTMCNC: 32.6 G/DL — SIGNIFICANT CHANGE UP (ref 32–36)
MCHC RBC-ENTMCNC: 32.8 PG — SIGNIFICANT CHANGE UP (ref 27–34)
MCV RBC AUTO: 100.8 FL — HIGH (ref 80–100)
NRBC # BLD: 0 /100 WBCS — SIGNIFICANT CHANGE UP (ref 0–0)
NRBC BLD-RTO: 0 /100 WBCS — SIGNIFICANT CHANGE UP (ref 0–0)
PHOSPHATE SERPL-MCNC: 2.9 MG/DL — SIGNIFICANT CHANGE UP (ref 2.5–4.5)
PHOSPHATE SERPL-MCNC: 3.6 MG/DL — SIGNIFICANT CHANGE UP (ref 2.5–4.5)
PLATELET # BLD AUTO: 168 K/UL — SIGNIFICANT CHANGE UP (ref 150–400)
POTASSIUM SERPL-MCNC: 3.6 MMOL/L — SIGNIFICANT CHANGE UP (ref 3.5–5.3)
POTASSIUM SERPL-MCNC: 3.6 MMOL/L — SIGNIFICANT CHANGE UP (ref 3.5–5.3)
POTASSIUM SERPL-MCNC: 4 MMOL/L — SIGNIFICANT CHANGE UP (ref 3.5–5.3)
POTASSIUM SERPL-SCNC: 3.6 MMOL/L — SIGNIFICANT CHANGE UP (ref 3.5–5.3)
POTASSIUM SERPL-SCNC: 3.6 MMOL/L — SIGNIFICANT CHANGE UP (ref 3.5–5.3)
POTASSIUM SERPL-SCNC: 4 MMOL/L — SIGNIFICANT CHANGE UP (ref 3.5–5.3)
PROT SERPL-MCNC: 5.7 G/DL — LOW (ref 6–8.3)
PROT SERPL-MCNC: 6.1 G/DL — SIGNIFICANT CHANGE UP (ref 6–8.3)
RBC # BLD: 3.9 M/UL — SIGNIFICANT CHANGE UP (ref 3.8–5.2)
RBC # FLD: 13.6 % — SIGNIFICANT CHANGE UP (ref 10.3–14.5)
SODIUM SERPL-SCNC: 136 MMOL/L — SIGNIFICANT CHANGE UP (ref 135–145)
SODIUM SERPL-SCNC: 136 MMOL/L — SIGNIFICANT CHANGE UP (ref 135–145)
SODIUM SERPL-SCNC: 137 MMOL/L — SIGNIFICANT CHANGE UP (ref 135–145)
WBC # BLD: 6.65 K/UL — SIGNIFICANT CHANGE UP (ref 3.8–10.5)
WBC # FLD AUTO: 6.65 K/UL — SIGNIFICANT CHANGE UP (ref 3.8–10.5)

## 2025-02-08 PROCEDURE — 99232 SBSQ HOSP IP/OBS MODERATE 35: CPT

## 2025-02-08 RX ORDER — ONDANSETRON 4 MG/1
4 TABLET, ORALLY DISINTEGRATING ORAL ONCE
Refills: 0 | Status: COMPLETED | OUTPATIENT
Start: 2025-02-08 | End: 2025-02-08

## 2025-02-08 RX ADMIN — ASPIRIN 81 MILLIGRAM(S): 81 TABLET, COATED ORAL at 11:59

## 2025-02-08 RX ADMIN — ROSUVASTATIN CALCIUM 5 MILLIGRAM(S): 10 TABLET, FILM COATED ORAL at 21:19

## 2025-02-08 RX ADMIN — Medication 700 UNIT(S)/HR: at 09:56

## 2025-02-08 RX ADMIN — Medication 50 MILLIGRAM(S): at 05:13

## 2025-02-08 RX ADMIN — ONDANSETRON 4 MILLIGRAM(S): 4 TABLET, ORALLY DISINTEGRATING ORAL at 17:57

## 2025-02-08 RX ADMIN — LEVOTHYROXINE SODIUM 50 MICROGRAM(S): 25 TABLET ORAL at 05:13

## 2025-02-08 RX ADMIN — Medication 700 UNIT(S)/HR: at 02:40

## 2025-02-08 RX ADMIN — BUMETANIDE 1 MILLIGRAM(S): 2 TABLET ORAL at 05:13

## 2025-02-08 RX ADMIN — AMIODARONE HYDROCHLORIDE 100 MILLIGRAM(S): 50 INJECTION, SOLUTION INTRAVENOUS at 05:13

## 2025-02-08 RX ADMIN — Medication 1: at 12:39

## 2025-02-08 RX ADMIN — Medication 700 UNIT(S)/HR: at 18:59

## 2025-02-08 RX ADMIN — Medication 25 MILLIGRAM(S): at 05:13

## 2025-02-08 NOTE — PROGRESS NOTE ADULT - ASSESSMENT
85F presents with right heel DTI  - Patient seen and evaluated  - Afebrile, no leukocytosis   - Right heel early DTI, no fluctuance, no malodor, no drainage, no acute signs of infection. L BKA  - Right foot xray pending   - Wound care orders placed for right lower extremity wounds   - No culture 2/2 no open wounds  - Strict decubitus precaution with Z-flows to be worn at all times when in bed   - No acute podiatric surgical intervention at this time  - Patient is stable for discharge from podiatry standpoint  - Wound care and follow up information can be found in discharge provider note   - Reconsult as needed  - Seen with attending   85F presents with right heel DTI with painful fissuring and RLE wounds to her anterior shin and posterior medial leg  - Patient seen and evaluated  - Afebrile, no leukocytosis   - Right heel early DTI, no fluctuance, no malodor, no drainage, no acute signs of infection. L AKA  - Right foot xray completed, report pending  - Wound care orders placed for right lower extremity wounds   - No culture 2/2 no acute signs of infection  - Strict decubitus precaution with Z-flows to be worn at all times when in bed (of note patient refusing to use today)  - No acute podiatric surgical intervention at this time  - Patient wounds are stable without infection  - ABIs from 01/25 demonstrating moderate ischemia, appreciate vascular recommendations  - no acute signs of critical ischemia, however patient is high risk for slow healing/nonhealing secondary to co-morbidities and should be optimized prior to discharge  - Wound care and follow up information can be found in discharge provider note   - Reconsult as needed  - Seen with attending

## 2025-02-08 NOTE — PROGRESS NOTE ADULT - PROBLEM SELECTOR PLAN 3
PAD s/p failed bypass of left leg (course c/b groin abscess, infected graft with subsequent removal and finally an AKA on the left)  Left stump is clean   might need Vascular surgery eval, will hold off for now   Rt leg chronic wound ( follows with oupt wound care clinic). Podiatry consulted. f/u recs

## 2025-02-08 NOTE — PROGRESS NOTE ADULT - PROBLEM SELECTOR PLAN 1
Pt had Cardiac cath done on 2/6/25 with % obstruction  and Cx 100% obstruction with well developed colalterals. Moderate mid LAD ( 40% obstruction) and moderate to severe diagonal  disease ( 70%) ; Elevated filling pressures with marginal CI  Severe AS - peak gradient 63 mean gradient 40, EFRAIN estimated 0.67cm2  Cardiologist recommended : Medical management for CAD, HF evaluation for optimization   Structural heart recs TAVR 2/12

## 2025-02-08 NOTE — PROGRESS NOTE ADULT - PROBLEM SELECTOR PLAN 2
EF of 24% on 1/16/25   CARdiac cath with report of elevated pressure   HF team recommended Bumex 1 mg IVP daily as  Lasix makes her nauseous will monitor the Electrolytes twice a day   cont with Toprol / Losartan and Crestor  Monitor intake and oupt and cont with tele monitor

## 2025-02-08 NOTE — PROGRESS NOTE ADULT - SUBJECTIVE AND OBJECTIVE BOX
SUBJECTIVE / OVERNIGHT EVENTS:  Today is hospital day 2d. There are no new issues or overnight events.   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 Serna, Afib on Eliquis last dose 2/4/25 PM dose, hypothyroid, HTN,  HLD, non healing wound RLE who was admitted to  1/23/25 for cellulitis and heart failure and completed course of Doxycycline. She is seen by wound care Dr. Calvillo. TTE 1/31/25 EF 24%. LA mildly dilated, mild MR, mild TR, mild NV, mod AR, severe AS peak gradient 63 mean gradient 40, EFRAIN estimated 0.67cm2.  No implanted monitoring devices.  Pt. presents for further evaluation and cardiac cath.      (06 Feb 2025 07:31)    MEDICATIONS  (STANDING):  aMIOdarone    Tablet 100 milliGRAM(s) Oral daily  aspirin enteric coated 81 milliGRAM(s) Oral daily  buMETAnide Injectable 1 milliGRAM(s) IV Push daily  dextrose 5%. 1000 milliLiter(s) (50 mL/Hr) IV Continuous <Continuous>  dextrose 5%. 1000 milliLiter(s) (100 mL/Hr) IV Continuous <Continuous>  dextrose 50% Injectable 25 Gram(s) IV Push once  dextrose 50% Injectable 12.5 Gram(s) IV Push once  dextrose 50% Injectable 25 Gram(s) IV Push once  glucagon  Injectable 1 milliGRAM(s) IntraMuscular once  heparin  Infusion.  Unit(s)/Hr (11 mL/Hr) IV Continuous <Continuous>  insulin lispro (ADMELOG) corrective regimen sliding scale   SubCutaneous three times a day before meals  insulin lispro (ADMELOG) corrective regimen sliding scale   SubCutaneous at bedtime  levothyroxine 50 MICROGram(s) Oral daily  metoprolol succinate ER 50 milliGRAM(s) Oral daily  rosuvastatin 5 milliGRAM(s) Oral at bedtime    MEDICATIONS  (PRN):  dextrose Oral Gel 15 Gram(s) Oral once PRN Blood Glucose LESS THAN 70 milliGRAM(s)/deciliter  heparin   Injectable 4500 Unit(s) IV Push every 6 hours PRN For aPTT less than 40  heparin   Injectable 2000 Unit(s) IV Push every 6 hours PRN For aPTT between 40 - 57    HOME MEDICATIONS:  amiodarone 200 mg oral tablet: 0.5 tab(s) orally once a day  aspirin 81 mg oral delayed release tablet: 1 tab(s) orally once a day  cholecalciferol 125 mcg (5000 intl units) oral tablet: 1 tab(s) orally once a day  Eliquis 2.5 mg oral tablet: 1 tab(s) orally 2 times a day  Furosemide 20 MG Oral Tablet:   levothyroxine 50 mcg (0.05 mg) oral tablet: 1 tab(s) orally once a day  metFORMIN 500 mg oral tablet: 1 tab(s) orally once a day  metoprolol succinate 50 mg oral tablet, extended release: 1 tab(s) orally once a day  potassium chloride 20 mEq oral tablet, extended release: 1 tab(s) orally once a day  rosuvastatin 5 mg oral tablet: 1 tab(s) orally once a day (at bedtime)  telmisartan 20 mg oral tablet: 1 tab(s) orally once a day    PHYSICAL EXAM  Vital Signs Last 24 Hrs  T(C): 36.8 (08 Feb 2025 04:15), Max: 36.8 (08 Feb 2025 04:15)  T(F): 98.2 (08 Feb 2025 04:15), Max: 98.2 (08 Feb 2025 04:15)  HR: 67 (08 Feb 2025 11:45) (67 - 80)  BP: 80/45 (08 Feb 2025 11:45) (80/45 - 104/64)  BP(mean): --  RR: 18 (08 Feb 2025 11:45) (18 - 18)  SpO2: 95% (08 Feb 2025 11:45) (93% - 95%)    Parameters below as of 08 Feb 2025 11:45  Patient On (Oxygen Delivery Method): room air        02-07-25 @ 07:01  -  02-08-25 @ 07:00  --------------------------------------------------------  IN: 720 mL / OUT: 800 mL / NET: -80 mL      CONSTITUTIONAL: Well-groomed, in no apparent distress;  EYES: No conjunctival or scleral injection, non-icteric;  ENMT: No external nasal lesions; Normal outer ears;  NECK: Trachea midline;  RESPIRATORY: Normal respiratory effort; Without wheeze/rhonchi/rales;  CARDIOVASCULAR: Regular rate and rhythm;  GASTROINTESTINAL: Non-distended;   EXTREMITIES:  No lower extremity edema;  NEUROLOGY: Does respond to commands appropriately;  PSYCHIATRY: Mood and Affect appropriate    LABS:                        12.8   6.65  )-----------( 168      ( 08 Feb 2025 09:22 )             39.3     02-08    137  |  96  |  26[H]  ----------------------------<  118[H]  3.6   |  29  |  1.40[H]    Ca    8.8      08 Feb 2025 09:22  Phos  2.9     02-08  Mg     2.1     02-08    TPro  6.1  /  Alb  3.4  /  TBili  0.6  /  DBili  x   /  AST  17  /  ALT  23  /  AlkPhos  116  02-08    PT/INR - ( 07 Feb 2025 07:01 )   PT: 13.8 sec;   INR: 1.20 ratio         PTT - ( 08 Feb 2025 09:22 )  PTT:86.5 sec      Urinalysis Basic - ( 08 Feb 2025 09:22 )    Color: x / Appearance: x / SG: x / pH: x  Gluc: 118 mg/dL / Ketone: x  / Bili: x / Urobili: x   Blood: x / Protein: x / Nitrite: x   Leuk Esterase: x / RBC: x / WBC x   Sq Epi: x / Non Sq Epi: x / Bacteria: x            RADIOLOGY & ADDITIONAL TESTS:  EKG  12 Lead ECG:   Ventricular Rate 89 BPM    Atrial Rate 89 BPM    P-R Interval 180 ms    QRS Duration 126 ms    Q-T Interval 414 ms    QTC Calculation(Bazett) 503 ms    P Axis 43 degrees    R Axis -18 degrees    T Axis 99 degrees    Diagnosis Line Normal sinus rhythm  Non-specific intra-ventricular conduction block  Nonspecific ST and T wave abnormality  Abnormal ECG (11-15-23 @ 16:23)  12 Lead ECG:   Ventricular Rate 91 BPM    Atrial Rate 91 BPM    P-R Interval 174 ms    QRS Duration 126 ms    Q-T Interval 422 ms    QTC Calculation(Bazett) 519 ms    P Axis 41 degrees    R Axis -17 degrees    T Axis 101 degrees    Diagnosis Line Normal sinus rhythm  Non-specific intra-ventricular conduction block  Cannot rule out Anterior infarct , age undetermined  Nonspecific ST and T wave abnormality  Abnormal ECG (11-15-23 @ 16:08)

## 2025-02-08 NOTE — PROGRESS NOTE ADULT - ASSESSMENT
84F w/ HFrEF (EF 23%) Severe AS, CAD,  PAD s/p failed bypass of left leg (course c/b groin abscess, infected graft with subsequent removal and finally an AKA on the left), severe Aortic Stenosis, carotid disease, DM2, Afib on Eliquis, Hypothyroidism, HTN,  HLD, non healing wound RLE who was admitted to [facility]( 1/23/25-1/27/25) for cellulitis and heart failure and was DC on Doxycycline which she completed.  Pt is now admitted to the hosp for further evaluation and cardiac cath which was done on 2/6/25 with  TAVR recommended.

## 2025-02-08 NOTE — PROGRESS NOTE ADULT - SUBJECTIVE AND OBJECTIVE BOX
Patient is a 85y old  Female who presents with a chief complaint of further evaluation and cardiac cath (06 Feb 2025 14:02)       INTERVAL HPI/OVERNIGHT EVENTS:  Patient seen and evaluated at bedside.  Pt is resting comfortable in NAD. Denies N/V/F/C.    Allergies    No Known Drug Allergies  latex (Unknown)    Intolerances        Vital Signs Last 24 Hrs  T(C): 36.8 (08 Feb 2025 04:15), Max: 36.8 (08 Feb 2025 04:15)  T(F): 98.2 (08 Feb 2025 04:15), Max: 98.2 (08 Feb 2025 04:15)  HR: 67 (08 Feb 2025 11:45) (67 - 80)  BP: 80/45 (08 Feb 2025 11:45) (80/45 - 104/64)  BP(mean): --  RR: 18 (08 Feb 2025 11:45) (18 - 18)  SpO2: 95% (08 Feb 2025 11:45) (93% - 95%)    Parameters below as of 08 Feb 2025 11:45  Patient On (Oxygen Delivery Method): room air        LABS:                        12.8   6.65  )-----------( 168      ( 08 Feb 2025 09:22 )             39.3     02-08    137  |  96  |  26[H]  ----------------------------<  118[H]  3.6   |  29  |  1.40[H]    Ca    8.8      08 Feb 2025 09:22  Phos  2.9     02-08  Mg     2.1     02-08    TPro  6.1  /  Alb  3.4  /  TBili  0.6  /  DBili  x   /  AST  17  /  ALT  23  /  AlkPhos  116  02-08    PT/INR - ( 07 Feb 2025 07:01 )   PT: 13.8 sec;   INR: 1.20 ratio         PTT - ( 08 Feb 2025 09:22 )  PTT:86.5 sec  Urinalysis Basic - ( 08 Feb 2025 09:22 )    Color: x / Appearance: x / SG: x / pH: x  Gluc: 118 mg/dL / Ketone: x  / Bili: x / Urobili: x   Blood: x / Protein: x / Nitrite: x   Leuk Esterase: x / RBC: x / WBC x   Sq Epi: x / Non Sq Epi: x / Bacteria: x      CAPILLARY BLOOD GLUCOSE      POCT Blood Glucose.: 172 mg/dL (08 Feb 2025 12:10)  POCT Blood Glucose.: 128 mg/dL (08 Feb 2025 08:14)  POCT Blood Glucose.: 147 mg/dL (07 Feb 2025 21:11)  POCT Blood Glucose.: 133 mg/dL (07 Feb 2025 16:34)      Lower Extremity Physical Exam:  Vascular: DP/PT 0/4,R , CFT >3 seconds R, Temperature gradient warm to cool , R.   Neuro: Epicritic sensation intact to the level of digits, B/L.  Musculoskeletal/Ortho: s/p L BKA  Skin: Right heel early DTI, no fluctuance, no malodor, no drainage, no acute signs of infection. L BKA    RADIOLOGY & ADDITIONAL TESTS:

## 2025-02-08 NOTE — PROGRESS NOTE ADULT - ATTENDING COMMENTS
Patient seen bedside  noted is painful fissure to right heel with surrounding early deep tissue injury without necrosis  discussed importance of offloading with patient who was not agreeable to use of Z-flex boots.  discussed worsening of her wounds, pressure sores and infection and limb loss. Patient states she will offload with a pillow instead  discussed this is not as protective and her wounds can continue to worsen. patient is still not amenable to z flex boots  please continue to encourage use of z flex boot, at all times in bed    discuss ulcerations to leg, patient states it occurred in the hospital after a transfer mishap into her wheel chair  no acute signs of infection.   recommend daily cleansing with vashe, application of bacitracin xeroform  to leg wounds an heel, covered by dry sterile dressing and padding to anterior ankle and secured with kerlix and tape daily.  due to her  co-morbidities she is at high risk for slow/nonhealing  she has nonpalpable pulses, and moderate ischemia on ABIs from 01/25  appreciate vascular recommendations.  If not a candidate for revascularization, patient would greatly benefit from topical oxygen system at home for during of wound recovery    Appreciate social work/care management assistance in arranging for home wound care, topical oxygen, physical therapy.
86 y/o F with unrevascularized CAD, severe AS, HFrEF (EF 24%), PAD s/p L AKA, DM2, AFib, admitted for TAVR evaluation. RHC with elevated filling pressures. Given worsening of EF since 2023, would favor proceeding with TAVR as soon as possible. She is mildly volume overloaded, had good response to Bumex though with severe side effects of nausea. OK to hold further diuretics at this time. After TAVR, will optimize GDMT including transitioning from ARB to ARNI and adding additional agents as tolerated.

## 2025-02-09 LAB
ANION GAP SERPL CALC-SCNC: 11 MMOL/L — SIGNIFICANT CHANGE UP (ref 5–17)
APTT BLD: 27.6 SEC — SIGNIFICANT CHANGE UP (ref 24.5–35.6)
APTT BLD: >200 SEC — CRITICAL HIGH (ref 24.5–35.6)
APTT BLD: >200 SEC — CRITICAL HIGH (ref 24.5–35.6)
BUN SERPL-MCNC: 31 MG/DL — HIGH (ref 7–23)
CALCIUM SERPL-MCNC: 8.8 MG/DL — SIGNIFICANT CHANGE UP (ref 8.4–10.5)
CHLORIDE SERPL-SCNC: 96 MMOL/L — SIGNIFICANT CHANGE UP (ref 96–108)
CO2 SERPL-SCNC: 29 MMOL/L — SIGNIFICANT CHANGE UP (ref 22–31)
CREAT SERPL-MCNC: 1.5 MG/DL — HIGH (ref 0.5–1.3)
EGFR: 34 ML/MIN/1.73M2 — LOW
GLUCOSE BLDC GLUCOMTR-MCNC: 123 MG/DL — HIGH (ref 70–99)
GLUCOSE BLDC GLUCOMTR-MCNC: 151 MG/DL — HIGH (ref 70–99)
GLUCOSE BLDC GLUCOMTR-MCNC: 153 MG/DL — HIGH (ref 70–99)
GLUCOSE BLDC GLUCOMTR-MCNC: 186 MG/DL — HIGH (ref 70–99)
GLUCOSE SERPL-MCNC: 114 MG/DL — HIGH (ref 70–99)
HCT VFR BLD CALC: 38.7 % — SIGNIFICANT CHANGE UP (ref 34.5–45)
HCT VFR BLD CALC: 42.3 % — SIGNIFICANT CHANGE UP (ref 34.5–45)
HGB BLD-MCNC: 12.7 G/DL — SIGNIFICANT CHANGE UP (ref 11.5–15.5)
HGB BLD-MCNC: 13.5 G/DL — SIGNIFICANT CHANGE UP (ref 11.5–15.5)
MAGNESIUM SERPL-MCNC: 2.1 MG/DL — SIGNIFICANT CHANGE UP (ref 1.6–2.6)
MCHC RBC-ENTMCNC: 31.9 G/DL — LOW (ref 32–36)
MCHC RBC-ENTMCNC: 32.8 G/DL — SIGNIFICANT CHANGE UP (ref 32–36)
MCHC RBC-ENTMCNC: 33.6 PG — SIGNIFICANT CHANGE UP (ref 27–34)
MCHC RBC-ENTMCNC: 33.9 PG — SIGNIFICANT CHANGE UP (ref 27–34)
MCV RBC AUTO: 103.2 FL — HIGH (ref 80–100)
MCV RBC AUTO: 105.2 FL — HIGH (ref 80–100)
NRBC # BLD: 0 /100 WBCS — SIGNIFICANT CHANGE UP (ref 0–0)
NRBC # BLD: 0 /100 WBCS — SIGNIFICANT CHANGE UP (ref 0–0)
NRBC BLD-RTO: 0 /100 WBCS — SIGNIFICANT CHANGE UP (ref 0–0)
NRBC BLD-RTO: 0 /100 WBCS — SIGNIFICANT CHANGE UP (ref 0–0)
PLATELET # BLD AUTO: 149 K/UL — LOW (ref 150–400)
PLATELET # BLD AUTO: 155 K/UL — SIGNIFICANT CHANGE UP (ref 150–400)
POTASSIUM SERPL-MCNC: 4.1 MMOL/L — SIGNIFICANT CHANGE UP (ref 3.5–5.3)
POTASSIUM SERPL-SCNC: 4.1 MMOL/L — SIGNIFICANT CHANGE UP (ref 3.5–5.3)
RBC # BLD: 3.75 M/UL — LOW (ref 3.8–5.2)
RBC # BLD: 4.02 M/UL — SIGNIFICANT CHANGE UP (ref 3.8–5.2)
RBC # FLD: 13.2 % — SIGNIFICANT CHANGE UP (ref 10.3–14.5)
RBC # FLD: 13.4 % — SIGNIFICANT CHANGE UP (ref 10.3–14.5)
SODIUM SERPL-SCNC: 136 MMOL/L — SIGNIFICANT CHANGE UP (ref 135–145)
WBC # BLD: 6.79 K/UL — SIGNIFICANT CHANGE UP (ref 3.8–10.5)
WBC # BLD: 9.21 K/UL — SIGNIFICANT CHANGE UP (ref 3.8–10.5)
WBC # FLD AUTO: 6.79 K/UL — SIGNIFICANT CHANGE UP (ref 3.8–10.5)
WBC # FLD AUTO: 9.21 K/UL — SIGNIFICANT CHANGE UP (ref 3.8–10.5)

## 2025-02-09 PROCEDURE — 99232 SBSQ HOSP IP/OBS MODERATE 35: CPT

## 2025-02-09 RX ORDER — HEPARIN SODIUM,PORCINE 10000/ML
4500 VIAL (ML) INJECTION EVERY 6 HOURS
Refills: 0 | Status: DISCONTINUED | OUTPATIENT
Start: 2025-02-09 | End: 2025-02-09

## 2025-02-09 RX ORDER — HEPARIN SODIUM,PORCINE 10000/ML
2000 VIAL (ML) INJECTION EVERY 6 HOURS
Refills: 0 | Status: DISCONTINUED | OUTPATIENT
Start: 2025-02-09 | End: 2025-02-09

## 2025-02-09 RX ORDER — HEPARIN SODIUM,PORCINE 10000/ML
4500 VIAL (ML) INJECTION ONCE
Refills: 0 | Status: COMPLETED | OUTPATIENT
Start: 2025-02-09 | End: 2025-02-09

## 2025-02-09 RX ORDER — HEPARIN SODIUM,PORCINE 10000/ML
VIAL (ML) INJECTION
Qty: 25000 | Refills: 0 | Status: DISCONTINUED | OUTPATIENT
Start: 2025-02-09 | End: 2025-02-11

## 2025-02-09 RX ADMIN — BUMETANIDE 1 MILLIGRAM(S): 2 TABLET ORAL at 06:07

## 2025-02-09 RX ADMIN — Medication 1: at 13:33

## 2025-02-09 RX ADMIN — LEVOTHYROXINE SODIUM 50 MICROGRAM(S): 25 TABLET ORAL at 06:07

## 2025-02-09 RX ADMIN — AMIODARONE HYDROCHLORIDE 100 MILLIGRAM(S): 50 INJECTION, SOLUTION INTRAVENOUS at 06:07

## 2025-02-09 RX ADMIN — Medication 1100 UNIT(S)/HR: at 08:40

## 2025-02-09 RX ADMIN — Medication 4500 UNIT(S): at 08:48

## 2025-02-09 RX ADMIN — Medication 900 UNIT(S)/HR: at 18:25

## 2025-02-09 RX ADMIN — Medication 0 UNIT(S)/HR: at 17:14

## 2025-02-09 RX ADMIN — ASPIRIN 81 MILLIGRAM(S): 81 TABLET, COATED ORAL at 11:24

## 2025-02-09 RX ADMIN — Medication 50 MILLIGRAM(S): at 06:07

## 2025-02-09 NOTE — PROGRESS NOTE ADULT - PROBLEM SELECTOR PLAN 1
Pt had Cardiac cath done on 2/6/25 with % obstruction  and Cx 100% obstruction with well developed colalterals. Moderate mid LAD ( 40% obstruction) and moderate to severe diagonal  disease ( 70%) ; Elevated filling pressures with marginal CI  Severe AS - peak gradient 63 mean gradient 40, EFRAIN estimated 0.67cm2  Cardiologist recommended : Medical management for CAD, HF evaluation for optimization   Structural heart recs TAVR 2/12 no

## 2025-02-09 NOTE — PROGRESS NOTE ADULT - ASSESSMENT
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  Pt is now admitted to the hosp for further evaluation and cardiac cath which was done on 2/6/25 with  TAVR recommended.

## 2025-02-09 NOTE — PROGRESS NOTE ADULT - SUBJECTIVE AND OBJECTIVE BOX
SUBJECTIVE / OVERNIGHT EVENTS:  Today is hospital day 3d. There are no new issues or overnight events.   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 Serna, Afib on Eliquis last dose 2/4/25 PM dose, hypothyroid, HTN,  HLD, non healing wound RLE who was admitted to  1/23/25 for cellulitis and heart failure and completed course of Doxycycline. She is seen by wound care Dr. Calvillo. TTE 1/31/25 EF 24%. LA mildly dilated, mild MR, mild TR, mild W[place], mod AR, severe AS peak gradient 63 mean gradient 40, EFRAIN estimated 0.67cm2.  No implanted monitoring devices.  Pt. presents for further evaluation and cardiac cath.      (06 Feb 2025 07:31)    MEDICATIONS  (STANDING):  aMIOdarone    Tablet 100 milliGRAM(s) Oral daily  aspirin enteric coated 81 milliGRAM(s) Oral daily  buMETAnide Injectable 1 milliGRAM(s) IV Push daily  dextrose 5%. 1000 milliLiter(s) (100 mL/Hr) IV Continuous <Continuous>  dextrose 5%. 1000 milliLiter(s) (50 mL/Hr) IV Continuous <Continuous>  dextrose 50% Injectable 25 Gram(s) IV Push once  dextrose 50% Injectable 12.5 Gram(s) IV Push once  dextrose 50% Injectable 25 Gram(s) IV Push once  glucagon  Injectable 1 milliGRAM(s) IntraMuscular once  heparin  Infusion.  Unit(s)/Hr (11 mL/Hr) IV Continuous <Continuous>  insulin lispro (ADMELOG) corrective regimen sliding scale   SubCutaneous three times a day before meals  insulin lispro (ADMELOG) corrective regimen sliding scale   SubCutaneous at bedtime  levothyroxine 50 MICROGram(s) Oral daily  metoprolol succinate ER 50 milliGRAM(s) Oral daily  rosuvastatin 5 milliGRAM(s) Oral at bedtime    MEDICATIONS  (PRN):  dextrose Oral Gel 15 Gram(s) Oral once PRN Blood Glucose LESS THAN 70 milliGRAM(s)/deciliter  heparin   Injectable 4500 Unit(s) IV Push every 6 hours PRN For aPTT less than 40  heparin   Injectable 2000 Unit(s) IV Push every 6 hours PRN For aPTT between 40 - 57    HOME MEDICATIONS:  amiodarone 200 mg oral tablet: 0.5 tab(s) orally once a day  aspirin 81 mg oral delayed release tablet: 1 tab(s) orally once a day  cholecalciferol 125 mcg (5000 intl units) oral tablet: 1 tab(s) orally once a day  Eliquis 2.5 mg oral tablet: 1 tab(s) orally 2 times a day  Furosemide 20 MG Oral Tablet:   levothyroxine 50 mcg (0.05 mg) oral tablet: 1 tab(s) orally once a day  metFORMIN 500 mg oral tablet: 1 tab(s) orally once a day  metoprolol succinate 50 mg oral tablet, extended release: 1 tab(s) orally once a day  potassium chloride 20 mEq oral tablet, extended release: 1 tab(s) orally once a day  rosuvastatin 5 mg oral tablet: 1 tab(s) orally once a day (at bedtime)  telmisartan 20 mg oral tablet: 1 tab(s) orally once a day    PHYSICAL EXAM  Vital Signs Last 24 Hrs  T(C): 36.7 (09 Feb 2025 11:09), Max: 36.7 (08 Feb 2025 20:38)  T(F): 98.1 (09 Feb 2025 11:09), Max: 98.1 (09 Feb 2025 11:09)  HR: 67 (09 Feb 2025 11:09) (67 - 71)  BP: 90/59 (09 Feb 2025 11:09) (90/59 - 107/65)  BP(mean): --  RR: 18 (09 Feb 2025 11:09) (18 - 18)  SpO2: 95% (09 Feb 2025 11:09) (92% - 95%)    Parameters below as of 09 Feb 2025 11:09  Patient On (Oxygen Delivery Method): room air        02-08-25 @ 07:01  -  02-09-25 @ 07:00  --------------------------------------------------------  IN: 0 mL / OUT: 1150 mL / NET: -1150 mL    02-09-25 @ 07:01  -  02-09-25 @ 13:53  --------------------------------------------------------  IN: 480 mL / OUT: 750 mL / NET: -270 mL      CONSTITUTIONAL: Well-groomed, in no apparent distress;  EYES: No conjunctival or scleral injection, non-icteric;  ENMT: No external nasal lesions; Normal outer ears;  NECK: Trachea midline;  RESPIRATORY: Normal respiratory effort; without wheeze/rhonchi/rales;  CARDIOVASCULAR: Regular rate and rhythm;  GASTROINTESTINAL: Non-distended;   EXTREMITIES:  No lower extremity edema;  NEUROLOGY: Does respond to commands appropriately;  PSYCHIATRY: Mood and Affect appropriate    LABS:                        12.7   6.79  )-----------( 155      ( 09 Feb 2025 06:45 )             38.7     02-09    136  |  96  |  31[H]  ----------------------------<  114[H]  4.1   |  29  |  1.50[H]    Ca    8.8      09 Feb 2025 06:44  Phos  3.6     02-08  Mg     2.1     02-09    TPro  5.7[L]  /  Alb  3.3  /  TBili  0.4  /  DBili  x   /  AST  23  /  ALT  22  /  AlkPhos  130[H]  02-08    PTT - ( 09 Feb 2025 06:45 )  PTT:27.6 sec      Urinalysis Basic - ( 09 Feb 2025 06:44 )    Color: x / Appearance: x / SG: x / pH: x  Gluc: 114 mg/dL / Ketone: x  / Bili: x / Urobili: x   Blood: x / Protein: x / Nitrite: x   Leuk Esterase: x / RBC: x / WBC x   Sq Epi: x / Non Sq Epi: x / Bacteria: x            RADIOLOGY & ADDITIONAL TESTS:  EKG  12 Lead ECG:   Ventricular Rate 89 BPM    Atrial Rate 89 BPM    P-R Interval 180 ms    QRS Duration 126 ms    Q-T Interval 414 ms    QTC Calculation(Bazett) 503 ms    P Axis 43 degrees    R Axis -18 degrees    T Axis 99 degrees    Diagnosis Line Normal sinus rhythm  Non-specific intra-ventricular conduction block  Nonspecific ST and T wave abnormality  Abnormal ECG (11-15-23 @ 16:23)  12 Lead ECG:   Ventricular Rate 91 BPM    Atrial Rate 91 BPM    P-R Interval 174 ms    QRS Duration 126 ms    Q-T Interval 422 ms    QTC Calculation(Bazett) 519 ms    P Axis 41 degrees    R Axis -17 degrees    T Axis 101 degrees    Diagnosis Line Normal sinus rhythm  Non-specific intra-ventricular conduction block  Cannot rule out Anterior infarct , age undetermined  Nonspecific ST and T wave abnormality  Abnormal ECG (11-15-23 @ 16:08)

## 2025-02-10 ENCOUNTER — APPOINTMENT (OUTPATIENT)
Dept: PODIATRY | Facility: CLINIC | Age: 85
End: 2025-02-10

## 2025-02-10 ENCOUNTER — APPOINTMENT (OUTPATIENT)
Dept: INTERNAL MEDICINE | Facility: CLINIC | Age: 85
End: 2025-02-10

## 2025-02-10 LAB
ANION GAP SERPL CALC-SCNC: 12 MMOL/L — SIGNIFICANT CHANGE UP (ref 5–17)
APTT BLD: 104 SEC — HIGH (ref 24.5–35.6)
APTT BLD: 77.8 SEC — HIGH (ref 24.5–35.6)
APTT BLD: 93.1 SEC — HIGH (ref 24.5–35.6)
BUN SERPL-MCNC: 28 MG/DL — HIGH (ref 7–23)
CALCIUM SERPL-MCNC: 8.9 MG/DL — SIGNIFICANT CHANGE UP (ref 8.4–10.5)
CHLORIDE SERPL-SCNC: 98 MMOL/L — SIGNIFICANT CHANGE UP (ref 96–108)
CO2 SERPL-SCNC: 28 MMOL/L — SIGNIFICANT CHANGE UP (ref 22–31)
CREAT SERPL-MCNC: 1.3 MG/DL — SIGNIFICANT CHANGE UP (ref 0.5–1.3)
EGFR: 41 ML/MIN/1.73M2 — LOW
GLUCOSE BLDC GLUCOMTR-MCNC: 114 MG/DL — HIGH (ref 70–99)
GLUCOSE BLDC GLUCOMTR-MCNC: 143 MG/DL — HIGH (ref 70–99)
GLUCOSE BLDC GLUCOMTR-MCNC: 150 MG/DL — HIGH (ref 70–99)
GLUCOSE BLDC GLUCOMTR-MCNC: 154 MG/DL — HIGH (ref 70–99)
GLUCOSE SERPL-MCNC: 113 MG/DL — HIGH (ref 70–99)
HCT VFR BLD CALC: 39.5 % — SIGNIFICANT CHANGE UP (ref 34.5–45)
HGB BLD-MCNC: 12.8 G/DL — SIGNIFICANT CHANGE UP (ref 11.5–15.5)
INR BLD: 1.14 RATIO — SIGNIFICANT CHANGE UP (ref 0.85–1.16)
MCHC RBC-ENTMCNC: 32.4 G/DL — SIGNIFICANT CHANGE UP (ref 32–36)
MCHC RBC-ENTMCNC: 33 PG — SIGNIFICANT CHANGE UP (ref 27–34)
MCV RBC AUTO: 101.8 FL — HIGH (ref 80–100)
NRBC # BLD: 0 /100 WBCS — SIGNIFICANT CHANGE UP (ref 0–0)
NRBC BLD-RTO: 0 /100 WBCS — SIGNIFICANT CHANGE UP (ref 0–0)
PLATELET # BLD AUTO: 151 K/UL — SIGNIFICANT CHANGE UP (ref 150–400)
POTASSIUM SERPL-MCNC: 4 MMOL/L — SIGNIFICANT CHANGE UP (ref 3.5–5.3)
POTASSIUM SERPL-SCNC: 4 MMOL/L — SIGNIFICANT CHANGE UP (ref 3.5–5.3)
PROTHROM AB SERPL-ACNC: 13 SEC — SIGNIFICANT CHANGE UP (ref 9.9–13.4)
RBC # BLD: 3.88 M/UL — SIGNIFICANT CHANGE UP (ref 3.8–5.2)
RBC # FLD: 13.5 % — SIGNIFICANT CHANGE UP (ref 10.3–14.5)
SODIUM SERPL-SCNC: 138 MMOL/L — SIGNIFICANT CHANGE UP (ref 135–145)
WBC # BLD: 6.99 K/UL — SIGNIFICANT CHANGE UP (ref 3.8–10.5)
WBC # FLD AUTO: 6.99 K/UL — SIGNIFICANT CHANGE UP (ref 3.8–10.5)

## 2025-02-10 PROCEDURE — 99232 SBSQ HOSP IP/OBS MODERATE 35: CPT

## 2025-02-10 PROCEDURE — 74174 CTA ABD&PLVS W/CONTRAST: CPT | Mod: 26

## 2025-02-10 PROCEDURE — 71275 CT ANGIOGRAPHY CHEST: CPT | Mod: 26

## 2025-02-10 PROCEDURE — 75574 CT ANGIO HRT W/3D IMAGE: CPT | Mod: 26

## 2025-02-10 RX ORDER — NYSTATIN 100000 U/G
1 POWDER TOPICAL
Refills: 0 | Status: DISCONTINUED | OUTPATIENT
Start: 2025-02-10 | End: 2025-02-13

## 2025-02-10 RX ORDER — ONDANSETRON 4 MG/1
4 TABLET, ORALLY DISINTEGRATING ORAL ONCE
Refills: 0 | Status: COMPLETED | OUTPATIENT
Start: 2025-02-10 | End: 2025-02-10

## 2025-02-10 RX ORDER — NYSTATIN 100000 U/G
1 POWDER TOPICAL
Refills: 0 | Status: DISCONTINUED | OUTPATIENT
Start: 2025-02-10 | End: 2025-02-10

## 2025-02-10 RX ADMIN — Medication 50 MILLIGRAM(S): at 11:35

## 2025-02-10 RX ADMIN — Medication 800 UNIT(S)/HR: at 07:46

## 2025-02-10 RX ADMIN — Medication 800 UNIT(S)/HR: at 17:02

## 2025-02-10 RX ADMIN — Medication 800 UNIT(S)/HR: at 01:29

## 2025-02-10 RX ADMIN — ASPIRIN 81 MILLIGRAM(S): 81 TABLET, COATED ORAL at 11:36

## 2025-02-10 RX ADMIN — BUMETANIDE 1 MILLIGRAM(S): 2 TABLET ORAL at 11:36

## 2025-02-10 RX ADMIN — LEVOTHYROXINE SODIUM 50 MICROGRAM(S): 25 TABLET ORAL at 06:11

## 2025-02-10 RX ADMIN — NYSTATIN 1 APPLICATION(S): 100000 POWDER TOPICAL at 17:02

## 2025-02-10 RX ADMIN — Medication 800 UNIT(S)/HR: at 19:48

## 2025-02-10 RX ADMIN — ONDANSETRON 4 MILLIGRAM(S): 4 TABLET, ORALLY DISINTEGRATING ORAL at 11:40

## 2025-02-10 RX ADMIN — AMIODARONE HYDROCHLORIDE 100 MILLIGRAM(S): 50 INJECTION, SOLUTION INTRAVENOUS at 11:35

## 2025-02-10 NOTE — PROGRESS NOTE ADULT - SUBJECTIVE AND OBJECTIVE BOX
[facility] Division of Hospital Medicine  Patti Sanches MD  Pager (M-F, 1X-8V): [phone]  Other Times:  [phone]    Patient is a 85y old  Female who presents with a chief complaint of further evaluation and cardiac cath (06 Feb 2025 14:02)      SUBJECTIVE / OVERNIGHT EVENTS:  c/o nausea when taking diuretics (lasix and bumex). otherwise denies any other symptoms.  afebrile     ADDITIONAL REVIEW OF SYSTEMS: otherwise neg    MEDICATIONS  (STANDING):  aMIOdarone    Tablet 100 milliGRAM(s) Oral daily  aspirin enteric coated 81 milliGRAM(s) Oral daily  buMETAnide Injectable 1 milliGRAM(s) IV Push daily  dextrose 5%. 1000 milliLiter(s) (50 mL/Hr) IV Continuous <Continuous>  dextrose 5%. 1000 milliLiter(s) (100 mL/Hr) IV Continuous <Continuous>  dextrose 50% Injectable 25 Gram(s) IV Push once  dextrose 50% Injectable 12.5 Gram(s) IV Push once  dextrose 50% Injectable 25 Gram(s) IV Push once  glucagon  Injectable 1 milliGRAM(s) IntraMuscular once  heparin  Infusion.  Unit(s)/Hr (11 mL/Hr) IV Continuous <Continuous>  insulin lispro (ADMELOG) corrective regimen sliding scale   SubCutaneous three times a day before meals  insulin lispro (ADMELOG) corrective regimen sliding scale   SubCutaneous at bedtime  levothyroxine 50 MICROGram(s) Oral daily  metoprolol succinate ER 50 milliGRAM(s) Oral daily  nystatin Powder 1 Application(s) Topical two times a day  rosuvastatin 5 milliGRAM(s) Oral at bedtime    MEDICATIONS  (PRN):  dextrose Oral Gel 15 Gram(s) Oral once PRN Blood Glucose LESS THAN 70 milliGRAM(s)/deciliter      CAPILLARY BLOOD GLUCOSE      POCT Blood Glucose.: 143 mg/dL (10 Feb 2025 11:47)  POCT Blood Glucose.: 114 mg/dL (10 Feb 2025 07:52)  POCT Blood Glucose.: 153 mg/dL (09 Feb 2025 21:39)  POCT Blood Glucose.: 151 mg/dL (09 Feb 2025 16:43)    I&O's Summary    09 Feb 2025 07:01  -  10 Feb 2025 07:00  --------------------------------------------------------  IN: 720 mL / OUT: 750 mL / NET: -30 mL        PHYSICAL EXAM:  Vital Signs Last 24 Hrs  T(C): 36.6 (10 Feb 2025 11:30), Max: 36.8 (09 Feb 2025 20:11)  T(F): 97.9 (10 Feb 2025 11:30), Max: 98.2 (09 Feb 2025 20:11)  HR: 69 (10 Feb 2025 11:30) (69 - 71)  BP: 108/66 (10 Feb 2025 11:30) (103/63 - 111/69)  BP(mean): --  RR: 18 (10 Feb 2025 11:30) (18 - 18)  SpO2: 95% (10 Feb 2025 11:30) (95% - 96%)    Parameters below as of 10 Feb 2025 11:30  Patient On (Oxygen Delivery Method): room air        CONSTITUTIONAL: NAD, well-groomed  EYES:  conjunctiva and sclera clear  ENMT: Moist oral mucosa  NECK: Supple, no palpable masses; no JVD  RESPIRATORY: Normal respiratory effort; lungs are clear to auscultation bilaterally  CARDIOVASCULAR: Regular rate and rhythm, systolic murmur; No lower extremity edema  ABDOMEN: Nontender to palpation, normoactive bowel sounds, no rebound/guarding  MUSCULOSKELETAL:  no clubbing or cyanosis of digits; no joint swelling or tenderness to palpation  PSYCH: A+O to person, place, and time; affect appropriate  SKIN: L AKA, right heel wound dressing in place. smooth RLE, redness   LABS:                        12.8   6.99  )-----------( 151      ( 10 Feb 2025 07:16 )             39.5     02-10    138  |  98  |  28[H]  ----------------------------<  113[H]  4.0   |  28  |  1.30    Ca    8.9      10 Feb 2025 07:12  Phos  3.6     02-08  Mg     2.1     02-09    TPro  5.7[L]  /  Alb  3.3  /  TBili  0.4  /  DBili  x   /  AST  23  /  ALT  22  /  AlkPhos  130[H]  02-08    PT/INR - ( 10 Feb 2025 07:15 )   PT: 13.0 sec;   INR: 1.14 ratio         PTT - ( 10 Feb 2025 07:15 )  PTT:93.1 sec      Urinalysis Basic - ( 10 Feb 2025 07:12 )    Color: x / Appearance: x / SG: x / pH: x  Gluc: 113 mg/dL / Ketone: x  / Bili: x / Urobili: x   Blood: x / Protein: x / Nitrite: x   Leuk Esterase: x / RBC: x / WBC x   Sq Epi: x / Non Sq Epi: x / Bacteria: x          RADIOLOGY & ADDITIONAL TESTS:  Results Reviewed:   Imaging Personally Reviewed:  Electrocardiogram Personally Reviewed:    COORDINATION OF CARE:  Care Discussed with Consultants/Other Providers [Y/N]:  Prior or Outpatient Records Reviewed [Y/N]:

## 2025-02-10 NOTE — PROGRESS NOTE ADULT - PROBLEM SELECTOR PLAN 3
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 Podiatry consulted. f/u recs

## 2025-02-10 NOTE — PROGRESS NOTE ADULT - PROBLEM SELECTOR PLAN 1
Peak Aortic Velocity 3.9, Peak Gradient 63 with mean of 40, Aortic Valve area 0.67    GDMT:  Losartan 25 qd  Toprol 50 XL qd  No home [id number]  No Home Aldactone  Lasix 20 PO    RHC (2/6/2025): CVP: 11, PAP: 61/29/42, PCWP: 30; CO 3.66, CI 2.27    Recommendations;  - Patient with elevated filling pressures (patient reports Lasix makes her feel nauseous/dizzy)  - on bumex 1mg QD  - Keep GDMT at current doses, will uptitrate once TAVR evaluation finished.

## 2025-02-10 NOTE — PROGRESS NOTE ADULT - PROBLEM SELECTOR PLAN 2
EF of 24% on 1/16/25   CARdiac cath with report of elevated pressure   HF team recommended Bumex 1 mg IVP daily- pt again reporting nausea with bumex as well as lasix. encouraged pt to cont meds given need for optimization for TAVR .Pt is agreeable.   cont with Toprol and Crestor  hold losartan given EYAL - improved.  Monitor intake and oupt and cont with tele monitor

## 2025-02-10 NOTE — PROGRESS NOTE ADULT - ASSESSMENT
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  Home Meds  Amio 100 qd  Eliquis 2.5 BID (last dose 2/4/25 in the PM)  Telmisartan 20 qd  Crestor 5 qd  Toprol 50 qd  Synthroid 50 qd  Lasix 20 qd  ASA 81 qd    Meds  Amio, Losartan, crestor, toprol, synthroid, ASA    TTE (1/16/2025): LV Normal; Wall Mildly increased EF 24% G2DD, Normal RVSF TAPSE 1.9; LA Dlated RA Normal, Mild MR, Mild TR, Mild UT, Mod AR with severe AS, Mod L PLEF LVIDd 5.1cm; Peak Aortic Velocity 3.9, Peak Gradient 63 with mean of 40, AOrtic Valve area 0.67  TTE (9/26/23): EF 20-25% w/ Severe Aortic Stenosis  TTE (04/11/23): EF 57% W/ severe aortic stenosis  LHC (2/6/2025) LM prox 30%, LAD Mid 40% 1st Diag 70%, Prox Cx 100%, RCA ostial 100%,  RHC (2/6/2025): CVP: 11, PAP: 61/29/42, PCWP: 30; CO 3.66, CI 2.27

## 2025-02-10 NOTE — PROGRESS NOTE ADULT - PROBLEM SELECTOR PLAN 3
Known Multivessel disease with plan for medical management  [facility] (2/6/2025) LM prox 30%, LAD Mid 40% 1st Diag 70%, Prox Cx 100%, RCA ostial 100%,    Plan:  No active chest pain  C/W ASA 81 qd, rosuva 5mg

## 2025-02-10 NOTE — PROGRESS NOTE ADULT - PROBLEM SELECTOR PLAN 4
Pt had Cardiac cath done on 2/6/25 with % obstruction  and Cx 100% obstruction with well developed colalterals. Moderate mid LAD ( 40% obstruction) and moderate to severe diagonal  disease ( 70%) ; Elevated filling pressures with marginal CI  Cardiologist recommended : Medical management for CAD, HF evaluation for optimization  , Structural heart following for expedited TAVR evaluation   Pt is on Crestor 5 mg  which will cont for now as per HF team and might optimize dose   Pt is ASA , Toprol and Telmisartan --> will cont current meds as per discussion with HF team  off ARB for EYAL

## 2025-02-10 NOTE — PROGRESS NOTE ADULT - PROBLEM SELECTOR PLAN 2
TTE 1/16/25: Peak Aortic Velocity 3.9, Peak Gradient 63 with mean of 40, Aortic Valve area 0.67  First TTE with aortic stenosis noted in 04/23, but with a normal EF, then had reduced EF 09/23    Plan  TAVR while inpatient per structural cardiology  per structural team; planning for tentative midweek TAVR, carotid approach  pending Vascular evaluation given suspected carotid disease

## 2025-02-10 NOTE — PROGRESS NOTE ADULT - SUBJECTIVE AND OBJECTIVE BOX
Patient seen and examined at bedside.    Overnight Events: none    REVIEW OF SYSTEMS:  CONSTITUTIONAL: No weakness, fevers or chills  EYES/ENT: No visual changes;  No dysphagia  NECK: No pain or stiffness  RESPIRATORY: No cough, wheezing, hemoptysis; No shortness of breath  CARDIOVASCULAR: No chest pain or palpitations; No lower extremity edema  GASTROINTESTINAL: No abdominal or epigastric pain. No nausea, vomiting, or hematemesis; No diarrhea or constipation. No melena or hematochezia.  BACK: No back pain  GENITOURINARY: No dysuria, frequency or hematuria  NEUROLOGICAL: No numbness or weakness  SKIN: No itching, burning, rashes, or lesions   All other review of systems is negative unless indicated above.            Current Meds:  aMIOdarone    Tablet 100 milliGRAM(s) Oral daily  aspirin enteric coated 81 milliGRAM(s) Oral daily  buMETAnide Injectable 1 milliGRAM(s) IV Push daily  dextrose 5%. 1000 milliLiter(s) IV Continuous <Continuous>  dextrose 5%. 1000 milliLiter(s) IV Continuous <Continuous>  dextrose 50% Injectable 25 Gram(s) IV Push once  dextrose 50% Injectable 12.5 Gram(s) IV Push once  dextrose 50% Injectable 25 Gram(s) IV Push once  dextrose Oral Gel 15 Gram(s) Oral once PRN  glucagon  Injectable 1 milliGRAM(s) IntraMuscular once  heparin  Infusion.  Unit(s)/Hr IV Continuous <Continuous>  insulin lispro (ADMELOG) corrective regimen sliding scale   SubCutaneous three times a day before meals  insulin lispro (ADMELOG) corrective regimen sliding scale   SubCutaneous at bedtime  levothyroxine 50 MICROGram(s) Oral daily  metoprolol succinate ER 50 milliGRAM(s) Oral daily  rosuvastatin 5 milliGRAM(s) Oral at bedtime      Vitals:  T(F): 97.7 (02-10), Max: 98.2 (02-09)  HR: 71 (02-10) (67 - 71)  BP: 110/67 (02-10) (90/59 - 111/69)  RR: 18 (02-10)  SpO2: 96% (02-10)  I&O's Summary    09 Feb 2025 07:01  -  10 Feb 2025 07:00  --------------------------------------------------------  IN: 720 mL / OUT: 750 mL / NET: -30 mL        Physical Exam:  General: WN/WD NAD  Neurology: A&Ox3, nonfocal, KHAN x 4  Eyes: PERRLA/ EOMI, Gross vision intact  ENT/Neck: Neck supple, trachea midline, No JVD, Gross hearing intact  Respiratory: CTA B/L, No wheezing, rales, rhonchi  CV: RRR, +S1/S2, -S3/S4, no murmurs, rubs or gallops  Abdominal: Soft, NT, ND +BS, No HSM  MSK: 5/5 strength UE/LE bilaterally  Extremities: No edema, 2+ peripheral pulses  Skin: No Rashes, Hematoma, Ecchymosis                          12.8   6.99  )-----------( 151      ( 10 Feb 2025 07:16 )             39.5     02-10    138  |  98  |  28[H]  ----------------------------<  113[H]  4.0   |  28  |  1.30    Ca    8.9      10 Feb 2025 07:12  Phos  3.6     02-08  Mg     2.1     02-09    TPro  5.7[L]  /  Alb  3.3  /  TBili  0.4  /  DBili  x   /  AST  23  /  ALT  22  /  AlkPhos  130[H]  02-08    PT/INR - ( 10 Feb 2025 07:15 )   PT: 13.0 sec;   INR: 1.14 ratio         PTT - ( 10 Feb 2025 07:15 )  PTT:93.1 sec

## 2025-02-10 NOTE — PROGRESS NOTE ADULT - ASSESSMENT
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  Pt is now admitted to the hosp for further evaluation and cardiac cath which was done on 2/6/25 with  TAVR recommended.

## 2025-02-10 NOTE — PROGRESS NOTE ADULT - PROBLEM SELECTOR PLAN 1
Pt had Cardiac cath done on 2/6/25 with % obstruction  and Cx 100% obstruction with well developed colalterals. Moderate mid LAD ( 40% obstruction) and moderate to severe diagonal  disease ( 70%) ; Elevated filling pressures with marginal CI  Severe AS - peak gradient 63 mean gradient 40, EFRAIN estimated 0.67cm2  Cardiologist recommended : Medical management for CAD, HF evaluation for optimization   Structural heart recs TAVR 2/12  pending CTA c/a/p/coronoaries   cont with diuresis - d/w pt re: compliance with meds for optimization

## 2025-02-11 LAB
ANION GAP SERPL CALC-SCNC: 9 MMOL/L — SIGNIFICANT CHANGE UP (ref 5–17)
APTT BLD: 73.5 SEC — HIGH (ref 24.5–35.6)
BUN SERPL-MCNC: 27 MG/DL — HIGH (ref 7–23)
CALCIUM SERPL-MCNC: 8.8 MG/DL — SIGNIFICANT CHANGE UP (ref 8.4–10.5)
CHLORIDE SERPL-SCNC: 96 MMOL/L — SIGNIFICANT CHANGE UP (ref 96–108)
CO2 SERPL-SCNC: 31 MMOL/L — SIGNIFICANT CHANGE UP (ref 22–31)
CREAT SERPL-MCNC: 1.27 MG/DL — SIGNIFICANT CHANGE UP (ref 0.5–1.3)
EGFR: 42 ML/MIN/1.73M2 — LOW
GLUCOSE BLDC GLUCOMTR-MCNC: 124 MG/DL — HIGH (ref 70–99)
GLUCOSE BLDC GLUCOMTR-MCNC: 132 MG/DL — HIGH (ref 70–99)
GLUCOSE BLDC GLUCOMTR-MCNC: 175 MG/DL — HIGH (ref 70–99)
GLUCOSE BLDC GLUCOMTR-MCNC: 181 MG/DL — HIGH (ref 70–99)
GLUCOSE SERPL-MCNC: 115 MG/DL — HIGH (ref 70–99)
HCT VFR BLD CALC: 38 % — SIGNIFICANT CHANGE UP (ref 34.5–45)
HGB BLD-MCNC: 12.4 G/DL — SIGNIFICANT CHANGE UP (ref 11.5–15.5)
INR BLD: 1.12 RATIO — SIGNIFICANT CHANGE UP (ref 0.85–1.16)
MCHC RBC-ENTMCNC: 32.6 G/DL — SIGNIFICANT CHANGE UP (ref 32–36)
MCHC RBC-ENTMCNC: 33.5 PG — SIGNIFICANT CHANGE UP (ref 27–34)
MCV RBC AUTO: 102.7 FL — HIGH (ref 80–100)
NRBC # BLD: 0 /100 WBCS — SIGNIFICANT CHANGE UP (ref 0–0)
NRBC BLD-RTO: 0 /100 WBCS — SIGNIFICANT CHANGE UP (ref 0–0)
PLATELET # BLD AUTO: 144 K/UL — LOW (ref 150–400)
POTASSIUM SERPL-MCNC: 4 MMOL/L — SIGNIFICANT CHANGE UP (ref 3.5–5.3)
POTASSIUM SERPL-SCNC: 4 MMOL/L — SIGNIFICANT CHANGE UP (ref 3.5–5.3)
PROTHROM AB SERPL-ACNC: 12.7 SEC — SIGNIFICANT CHANGE UP (ref 9.9–13.4)
RBC # BLD: 3.7 M/UL — LOW (ref 3.8–5.2)
RBC # FLD: 13.3 % — SIGNIFICANT CHANGE UP (ref 10.3–14.5)
SODIUM SERPL-SCNC: 136 MMOL/L — SIGNIFICANT CHANGE UP (ref 135–145)
WBC # BLD: 6.82 K/UL — SIGNIFICANT CHANGE UP (ref 3.8–10.5)
WBC # FLD AUTO: 6.82 K/UL — SIGNIFICANT CHANGE UP (ref 3.8–10.5)

## 2025-02-11 PROCEDURE — 99232 SBSQ HOSP IP/OBS MODERATE 35: CPT

## 2025-02-11 PROCEDURE — 93010 ELECTROCARDIOGRAM REPORT: CPT

## 2025-02-11 RX ORDER — ONDANSETRON 4 MG/1
4 TABLET, ORALLY DISINTEGRATING ORAL DAILY
Refills: 0 | Status: DISCONTINUED | OUTPATIENT
Start: 2025-02-11 | End: 2025-02-13

## 2025-02-11 RX ORDER — APIXABAN 5 MG/1
2.5 TABLET, FILM COATED ORAL
Refills: 0 | Status: DISCONTINUED | OUTPATIENT
Start: 2025-02-11 | End: 2025-02-13

## 2025-02-11 RX ORDER — BUMETANIDE 2 MG/1
1 TABLET ORAL DAILY
Refills: 0 | Status: DISCONTINUED | OUTPATIENT
Start: 2025-02-12 | End: 2025-02-13

## 2025-02-11 RX ORDER — LOSARTAN POTASSIUM 100 MG
25 TABLET ORAL DAILY
Refills: 0 | Status: DISCONTINUED | OUTPATIENT
Start: 2025-02-11 | End: 2025-02-13

## 2025-02-11 RX ADMIN — Medication 800 UNIT(S)/HR: at 07:05

## 2025-02-11 RX ADMIN — ASPIRIN 81 MILLIGRAM(S): 81 TABLET, COATED ORAL at 13:01

## 2025-02-11 RX ADMIN — AMIODARONE HYDROCHLORIDE 100 MILLIGRAM(S): 50 INJECTION, SOLUTION INTRAVENOUS at 13:00

## 2025-02-11 RX ADMIN — Medication 50 MILLIGRAM(S): at 13:01

## 2025-02-11 RX ADMIN — Medication 800 UNIT(S)/HR: at 08:36

## 2025-02-11 RX ADMIN — NYSTATIN 1 APPLICATION(S): 100000 POWDER TOPICAL at 05:56

## 2025-02-11 RX ADMIN — ONDANSETRON 4 MILLIGRAM(S): 4 TABLET, ORALLY DISINTEGRATING ORAL at 13:02

## 2025-02-11 RX ADMIN — BUMETANIDE 1 MILLIGRAM(S): 2 TABLET ORAL at 13:01

## 2025-02-11 RX ADMIN — NYSTATIN 1 APPLICATION(S): 100000 POWDER TOPICAL at 17:06

## 2025-02-11 RX ADMIN — LEVOTHYROXINE SODIUM 50 MICROGRAM(S): 25 TABLET ORAL at 05:54

## 2025-02-11 RX ADMIN — ROSUVASTATIN CALCIUM 5 MILLIGRAM(S): 10 TABLET, FILM COATED ORAL at 21:16

## 2025-02-11 RX ADMIN — Medication 1: at 13:01

## 2025-02-11 RX ADMIN — APIXABAN 2.5 MILLIGRAM(S): 5 TABLET, FILM COATED ORAL at 17:06

## 2025-02-11 NOTE — PROGRESS NOTE ADULT - ASSESSMENT
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 Patient intially pending TAVR eval, now deferred to outpatient give recent infection.    Home Meds  Amio 100 qd  Eliquis 2.5 BID  Telmisartan 20 qd  Crestor 5 qd  Toprol 50 qd  Synthroid 50 qd  Lasix 20 qd  ASA 81 qd    Meds  Amio, Losartan, crestor, toprol, synthroid, ASA    TTE (1/16/2025): LV Normal; Wall Mildly increased EF 24% G2DD, Normal RVSF TAPSE 1.9; LA Dlated RA Normal, Mild MR, Mild TR, Mild TX, Mod AR with severe AS, Mod L PLEF LVIDd 5.1cm; Peak Aortic Velocity 3.9, Peak Gradient 63 with mean of 40, AOrtic Valve area 0.67  TTE (9/26/23): EF 20-25% w/ Severe Aortic Stenosis  TTE (04/11/23): EF 57% W/ severe aortic stenosis  LHC (2/6/2025) LM prox 30%, LAD Mid 40% 1st Diag 70%, Prox Cx 100%, RCA ostial 100%,  RHC (2/6/2025): CVP: 11, PAP: 61/29/42, PCWP: 30; CO 3.66, CI 2.27

## 2025-02-11 NOTE — PROGRESS NOTE ADULT - PROBLEM SELECTOR PLAN 2
EF of 24% on 1/16/25   CARdiac cath with report of elevated pressure   HF team recommended Bumex 1 mg IVP daily- pt reporting nausea with bumex as well as lasix. encouraged pt to cont meds given need for optimization for TAVR .Pt is agreeable.   cont with Toprol and Crestor  hold losartan given EYAL - improved.  Monitor intake and oupt and cont with tele monitor

## 2025-02-11 NOTE — PROGRESS NOTE ADULT - SUBJECTIVE AND OBJECTIVE BOX
Patient seen and examined at bedside.    Overnight Events: NAEON    REVIEW OF SYSTEMS:  CONSTITUTIONAL: No weakness, fevers or chills  EYES/ENT: No visual changes;  No dysphagia  NECK: No pain or stiffness  RESPIRATORY: No cough, wheezing, hemoptysis; No shortness of breath  CARDIOVASCULAR: No chest pain or palpitations; No lower extremity edema  GASTROINTESTINAL: No abdominal or epigastric pain. No nausea, vomiting, or hematemesis; No diarrhea or constipation. No melena or hematochezia.  BACK: No back pain  GENITOURINARY: No dysuria, frequency or hematuria  NEUROLOGICAL: No numbness or weakness  SKIN: No itching, burning, rashes, or lesions   All other review of systems is negative unless indicated above.            Current Meds:  aMIOdarone    Tablet 100 milliGRAM(s) Oral daily  aspirin enteric coated 81 milliGRAM(s) Oral daily  buMETAnide Injectable 1 milliGRAM(s) IV Push daily  dextrose 5%. 1000 milliLiter(s) IV Continuous <Continuous>  dextrose 5%. 1000 milliLiter(s) IV Continuous <Continuous>  dextrose 50% Injectable 25 Gram(s) IV Push once  dextrose 50% Injectable 12.5 Gram(s) IV Push once  dextrose 50% Injectable 25 Gram(s) IV Push once  dextrose Oral Gel 15 Gram(s) Oral once PRN  glucagon  Injectable 1 milliGRAM(s) IntraMuscular once  heparin  Infusion.  Unit(s)/Hr IV Continuous <Continuous>  insulin lispro (ADMELOG) corrective regimen sliding scale   SubCutaneous three times a day before meals  insulin lispro (ADMELOG) corrective regimen sliding scale   SubCutaneous at bedtime  levothyroxine 50 MICROGram(s) Oral daily  metoprolol succinate ER 50 milliGRAM(s) Oral daily  nystatin Powder 1 Application(s) Topical two times a day  ondansetron Injectable 4 milliGRAM(s) IV Push daily PRN  rosuvastatin 5 milliGRAM(s) Oral at bedtime      Vitals:  T(F): 98 (02-11), Max: 98.5 (02-11)  HR: 87 (02-11) (70 - 87)  BP: 119/71 (02-11) (102/66 - 120/72)  RR: 18 (02-11)  SpO2: 95% (02-11)  I&O's Summary    10 Feb 2025 07:01  -  11 Feb 2025 07:00  --------------------------------------------------------  IN: 600 mL / OUT: 200 mL / NET: 400 mL    11 Feb 2025 07:01  -  11 Feb 2025 13:35  --------------------------------------------------------  IN: 240 mL / OUT: 0 mL / NET: 240 mL        Physical Exam:  GEN: NAD  HEENT: EOMI, clear sclera  PULM: CTA b/l, no wheeze  CV: RRR S1 S2, no murmur, no JVD  ABD: S, NT, ND  EXT: WWP, no edema  PSYCH: normal affect  SKIN: No rash                          12.4   6.82  )-----------( 144      ( 11 Feb 2025 05:52 )             38.0     02-11    136  |  96  |  27[H]  ----------------------------<  115[H]  4.0   |  31  |  1.27    Ca    8.8      11 Feb 2025 05:51      PT/INR - ( 11 Feb 2025 05:52 )   PT: 12.7 sec;   INR: 1.12 ratio         PTT - ( 11 Feb 2025 05:52 )  PTT:73.5 sec

## 2025-02-11 NOTE — CHART NOTE - NSCHARTNOTEFT_GEN_A_CORE
Ms. Kelley just completed a course of Abx for cellulitis and still has some healing wounds on the RLE. This will preclude us from moving forward this admission with TAVR. Dr Weaver would like her seen tomorrow by ID (either Dr Trevino or Dr Baeza) for their opinion with respect to her infectious risk and timing of TAVR. She will likely need a few more weeks to heal--TAVR endocarditis would be a fatal complication for her. We are awaiting a repeat TTE here as well. Dr Weaver will also discuss with Dr Restrepo regarding her carotid access, given concerns for significant KALYAN 80% stenosis on a prior CT. She may ultimately require a combined procedure, TAVR and CEA (we have done this once before), this is to be determined pending Dr Restrepo' input. Dr. Serna spoke to the patient and explained all of this to her, and that she will need to be medically optimized from a HF perspective, infection free, and will come back to see us in our TAVR clinic in the next couple of weeks to make sure she is infection free, healed, and ready to be scheduled for her TAVR.

## 2025-02-11 NOTE — PROGRESS NOTE ADULT - SUBJECTIVE AND OBJECTIVE BOX
[facility] Division of Hospital Medicine  Patti Sanches MD  Pager (M-F, 5C-2F): [phone]  Other Times:  [phone]    Patient is a 85y old  Female who presents with a chief complaint of TAVR (10 Feb 2025 12:52)      SUBJECTIVE / OVERNIGHT EVENTS:  feeling fine. no complaints. afebrile. no overnight issues     ADDITIONAL REVIEW OF SYSTEMS: otherwise neg    MEDICATIONS  (STANDING):  aMIOdarone    Tablet 100 milliGRAM(s) Oral daily  aspirin enteric coated 81 milliGRAM(s) Oral daily  buMETAnide Injectable 1 milliGRAM(s) IV Push daily  dextrose 5%. 1000 milliLiter(s) (50 mL/Hr) IV Continuous <Continuous>  dextrose 5%. 1000 milliLiter(s) (100 mL/Hr) IV Continuous <Continuous>  dextrose 50% Injectable 25 Gram(s) IV Push once  dextrose 50% Injectable 12.5 Gram(s) IV Push once  dextrose 50% Injectable 25 Gram(s) IV Push once  glucagon  Injectable 1 milliGRAM(s) IntraMuscular once  heparin  Infusion.  Unit(s)/Hr (11 mL/Hr) IV Continuous <Continuous>  insulin lispro (ADMELOG) corrective regimen sliding scale   SubCutaneous three times a day before meals  insulin lispro (ADMELOG) corrective regimen sliding scale   SubCutaneous at bedtime  levothyroxine 50 MICROGram(s) Oral daily  metoprolol succinate ER 50 milliGRAM(s) Oral daily  nystatin Powder 1 Application(s) Topical two times a day  rosuvastatin 5 milliGRAM(s) Oral at bedtime    MEDICATIONS  (PRN):  dextrose Oral Gel 15 Gram(s) Oral once PRN Blood Glucose LESS THAN 70 milliGRAM(s)/deciliter  ondansetron Injectable 4 milliGRAM(s) IV Push daily PRN Nausea and/or Vomiting      CAPILLARY BLOOD GLUCOSE      POCT Blood Glucose.: 175 mg/dL (11 Feb 2025 12:21)  POCT Blood Glucose.: 124 mg/dL (11 Feb 2025 08:11)  POCT Blood Glucose.: 154 mg/dL (10 Feb 2025 21:53)  POCT Blood Glucose.: 150 mg/dL (10 Feb 2025 16:04)    I&O's Summary    10 Feb 2025 07:01  -  11 Feb 2025 07:00  --------------------------------------------------------  IN: 600 mL / OUT: 200 mL / NET: 400 mL    11 Feb 2025 07:01  -  11 Feb 2025 12:57  --------------------------------------------------------  IN: 240 mL / OUT: 0 mL / NET: 240 mL        PHYSICAL EXAM:  Vital Signs Last 24 Hrs  T(C): 36.7 (11 Feb 2025 11:45), Max: 36.9 (10 Feb 2025 21:46)  T(F): 98 (11 Feb 2025 11:45), Max: 98.5 (11 Feb 2025 00:02)  HR: 78 (11 Feb 2025 11:45) (70 - 78)  BP: 102/66 (11 Feb 2025 11:45) (102/66 - 120/72)  BP(mean): --  RR: 18 (11 Feb 2025 11:45) (18 - 18)  SpO2: 97% (11 Feb 2025 11:45) (94% - 97%)    Parameters below as of 11 Feb 2025 11:45  Patient On (Oxygen Delivery Method): room air      CONSTITUTIONAL: NAD, well-groomed  EYES:  conjunctiva and sclera clear  ENMT: Moist oral mucosa  NECK: Supple, no palpable masses; no JVD  RESPIRATORY: Normal respiratory effort; lungs are clear to auscultation bilaterally  CARDIOVASCULAR: Regular rate and rhythm, systolic murmur; No lower extremity edema  ABDOMEN: Nontender to palpation, normoactive bowel sounds, no rebound/guarding  MUSCULOSKELETAL:  no clubbing or cyanosis of digits; no joint swelling or tenderness to palpation  PSYCH: A+O to person, place, and time; affect appropriate  SKIN: L AKA, right heel wound dressing in place. smooth RLE, redness   LABS:                        12.4   6.82  )-----------( 144      ( 11 Feb 2025 05:52 )             38.0     02-11    136  |  96  |  27[H]  ----------------------------<  115[H]  4.0   |  31  |  1.27    Ca    8.8      11 Feb 2025 05:51      PT/INR - ( 11 Feb 2025 05:52 )   PT: 12.7 sec;   INR: 1.12 ratio         PTT - ( 11 Feb 2025 05:52 )  PTT:73.5 sec      Urinalysis Basic - ( 11 Feb 2025 05:51 )    Color: x / Appearance: x / SG: x / pH: x  Gluc: 115 mg/dL / Ketone: x  / Bili: x / Urobili: x   Blood: x / Protein: x / Nitrite: x   Leuk Esterase: x / RBC: x / WBC x   Sq Epi: x / Non Sq Epi: x / Bacteria: x          RADIOLOGY & ADDITIONAL TESTS:  Results Reviewed:   Imaging Personally Reviewed:  Electrocardiogram Personally Reviewed:    COORDINATION OF CARE:  Care Discussed with Consultants/Other Providers [Y/N]:  Prior or Outpatient Records Reviewed [Y/N]:

## 2025-02-11 NOTE — PROGRESS NOTE ADULT - PROBLEM SELECTOR PLAN 1
Pt had Cardiac cath done on 2/6/25 with % obstruction  and Cx 100% obstruction with well developed colalterals. Moderate mid LAD ( 40% obstruction) and moderate to severe diagonal  disease ( 70%) ; Elevated filling pressures with marginal CI  Severe AS - peak gradient 63 mean gradient 40, EFRAIN estimated 0.67cm2  Cardiologist recommended : Medical management for CAD, HF evaluation for optimization   Structural heart recs TAVR 2/12 -- CTS/structural f/u   CTA c/a/p/coronoaries done   cont with diuresis - d/w pt re: compliance with meds for optimization

## 2025-02-11 NOTE — PROGRESS NOTE ADULT - PROBLEM SELECTOR PLAN 3
Known Multivessel disease with plan for medical management  [facility] (2/6/2025) LM prox 30%, LAD Mid 40% 1st Diag 70%, Prox Cx 100%, RCA ostial 100%,    Plan:  No active chest pain  C/W ASA 81 qd.

## 2025-02-11 NOTE — PROGRESS NOTE ADULT - ASSESSMENT
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  Pt is now admitted to the hosp for further evaluation and cardiac cath which was done on 2/6/25 with  TAVR recommended.

## 2025-02-11 NOTE — PROGRESS NOTE ADULT - PROBLEM SELECTOR PLAN 2
TTE 1/16/25: Peak Aortic Velocity 3.9, Peak Gradient 63 with mean of 40, Aortic Valve area 0.67  First TTE with aortic stenosis noted in 04/23, but with a normal EF, then had reduced EF 09/23    Plan  TAVR deferred to outpatient per structural  pending Vascular evaluation given suspected carotid disease

## 2025-02-11 NOTE — PROGRESS NOTE ADULT - PROBLEM SELECTOR PLAN 1
-patient appears euvolemic, will restart GDMT if TAVR deferred to outpatient  -restart losartan 25mg daily  -continue toprol 50mg daily  -start aldactone 25mg daily  -switch bumex to PO 1mg daily  -likely plan to start SGLT-2 tomorrow

## 2025-02-11 NOTE — PROGRESS NOTE ADULT - PROBLEM SELECTOR PLAN 3
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 Podiatry f/u

## 2025-02-12 ENCOUNTER — APPOINTMENT (OUTPATIENT)
Dept: VASCULAR SURGERY | Facility: CLINIC | Age: 85
End: 2025-02-12

## 2025-02-12 LAB
ANION GAP SERPL CALC-SCNC: 11 MMOL/L — SIGNIFICANT CHANGE UP (ref 5–17)
APTT BLD: 30.7 SEC — SIGNIFICANT CHANGE UP (ref 24.5–35.6)
BUN SERPL-MCNC: 26 MG/DL — HIGH (ref 7–23)
CALCIUM SERPL-MCNC: 9.3 MG/DL — SIGNIFICANT CHANGE UP (ref 8.4–10.5)
CHLORIDE SERPL-SCNC: 96 MMOL/L — SIGNIFICANT CHANGE UP (ref 96–108)
CO2 SERPL-SCNC: 30 MMOL/L — SIGNIFICANT CHANGE UP (ref 22–31)
CREAT SERPL-MCNC: 1.04 MG/DL — SIGNIFICANT CHANGE UP (ref 0.5–1.3)
EGFR: 53 ML/MIN/1.73M2 — LOW
GLUCOSE BLDC GLUCOMTR-MCNC: 130 MG/DL — HIGH (ref 70–99)
GLUCOSE BLDC GLUCOMTR-MCNC: 134 MG/DL — HIGH (ref 70–99)
GLUCOSE BLDC GLUCOMTR-MCNC: 166 MG/DL — HIGH (ref 70–99)
GLUCOSE BLDC GLUCOMTR-MCNC: 202 MG/DL — HIGH (ref 70–99)
GLUCOSE SERPL-MCNC: 124 MG/DL — HIGH (ref 70–99)
HCT VFR BLD CALC: 39.9 % — SIGNIFICANT CHANGE UP (ref 34.5–45)
HGB BLD-MCNC: 13.1 G/DL — SIGNIFICANT CHANGE UP (ref 11.5–15.5)
INR BLD: 1.16 RATIO — SIGNIFICANT CHANGE UP (ref 0.85–1.16)
MCHC RBC-ENTMCNC: 32.8 G/DL — SIGNIFICANT CHANGE UP (ref 32–36)
MCHC RBC-ENTMCNC: 33.6 PG — SIGNIFICANT CHANGE UP (ref 27–34)
MCV RBC AUTO: 102.3 FL — HIGH (ref 80–100)
NRBC BLD AUTO-RTO: 0 /100 WBCS — SIGNIFICANT CHANGE UP (ref 0–0)
PLATELET # BLD AUTO: 147 K/UL — LOW (ref 150–400)
POTASSIUM SERPL-MCNC: 4.1 MMOL/L — SIGNIFICANT CHANGE UP (ref 3.5–5.3)
POTASSIUM SERPL-SCNC: 4.1 MMOL/L — SIGNIFICANT CHANGE UP (ref 3.5–5.3)
PROTHROM AB SERPL-ACNC: 13.3 SEC — SIGNIFICANT CHANGE UP (ref 9.9–13.4)
RBC # BLD: 3.9 M/UL — SIGNIFICANT CHANGE UP (ref 3.8–5.2)
RBC # FLD: 13.6 % — SIGNIFICANT CHANGE UP (ref 10.3–14.5)
SODIUM SERPL-SCNC: 137 MMOL/L — SIGNIFICANT CHANGE UP (ref 135–145)
WBC # BLD: 7.45 K/UL — SIGNIFICANT CHANGE UP (ref 3.8–10.5)
WBC # FLD AUTO: 7.45 K/UL — SIGNIFICANT CHANGE UP (ref 3.8–10.5)

## 2025-02-12 PROCEDURE — 99232 SBSQ HOSP IP/OBS MODERATE 35: CPT

## 2025-02-12 PROCEDURE — G0545: CPT

## 2025-02-12 PROCEDURE — 99232 SBSQ HOSP IP/OBS MODERATE 35: CPT | Mod: FS

## 2025-02-12 PROCEDURE — 99223 1ST HOSP IP/OBS HIGH 75: CPT

## 2025-02-12 RX ORDER — POLYETHYLENE GLYCOL 3350 17 G/17G
17 POWDER, FOR SOLUTION ORAL DAILY
Refills: 0 | Status: DISCONTINUED | OUTPATIENT
Start: 2025-02-12 | End: 2025-02-13

## 2025-02-12 RX ORDER — SENNOSIDES 8.6 MG
2 TABLET ORAL ONCE
Refills: 0 | Status: COMPLETED | OUTPATIENT
Start: 2025-02-12 | End: 2025-02-12

## 2025-02-12 RX ORDER — DAPAGLIFLOZIN 5 MG/1
10 TABLET, FILM COATED ORAL EVERY 24 HOURS
Refills: 0 | Status: DISCONTINUED | OUTPATIENT
Start: 2025-02-12 | End: 2025-02-13

## 2025-02-12 RX ADMIN — APIXABAN 2.5 MILLIGRAM(S): 5 TABLET, FILM COATED ORAL at 05:46

## 2025-02-12 RX ADMIN — ROSUVASTATIN CALCIUM 5 MILLIGRAM(S): 10 TABLET, FILM COATED ORAL at 21:18

## 2025-02-12 RX ADMIN — LEVOTHYROXINE SODIUM 50 MICROGRAM(S): 25 TABLET ORAL at 05:46

## 2025-02-12 RX ADMIN — NYSTATIN 1 APPLICATION(S): 100000 POWDER TOPICAL at 10:40

## 2025-02-12 RX ADMIN — Medication 2 TABLET(S): at 21:18

## 2025-02-12 RX ADMIN — APIXABAN 2.5 MILLIGRAM(S): 5 TABLET, FILM COATED ORAL at 17:17

## 2025-02-12 RX ADMIN — Medication 25 MILLIGRAM(S): at 10:39

## 2025-02-12 RX ADMIN — AMIODARONE HYDROCHLORIDE 100 MILLIGRAM(S): 50 INJECTION, SOLUTION INTRAVENOUS at 10:40

## 2025-02-12 RX ADMIN — POLYETHYLENE GLYCOL 3350 17 GRAM(S): 17 POWDER, FOR SOLUTION ORAL at 21:19

## 2025-02-12 RX ADMIN — ONDANSETRON 4 MILLIGRAM(S): 4 TABLET, ORALLY DISINTEGRATING ORAL at 09:17

## 2025-02-12 RX ADMIN — Medication 50 MILLIGRAM(S): at 10:39

## 2025-02-12 RX ADMIN — DAPAGLIFLOZIN 10 MILLIGRAM(S): 5 TABLET, FILM COATED ORAL at 12:19

## 2025-02-12 RX ADMIN — NYSTATIN 1 APPLICATION(S): 100000 POWDER TOPICAL at 17:15

## 2025-02-12 RX ADMIN — BUMETANIDE 1 MILLIGRAM(S): 2 TABLET ORAL at 10:38

## 2025-02-12 RX ADMIN — ASPIRIN 81 MILLIGRAM(S): 81 TABLET, COATED ORAL at 10:39

## 2025-02-12 NOTE — PHARMACOTHERAPY INTERVENTION NOTE - COMMENTS
Heart Failure Medication Education      CHOOSE ONE: HFpEF or HFrEF (EF = 23% on 10/2023)   Guideline directed medical therapy as below (name/dose/frequency):     Diuretic: Bumetanide 1mg daily  BB: metoprolol succinate 50mg QD  ARNI/ACE-I/ARB: Telmisartan 20mg QD  MRA: Spironolactone 25mg QD    Medication interventions:   Recommended the patient to stop taking her home furosemide and switch to bumetanide. Recommended to take this medication in the morning.     Counseled patient/caregiver on above medication names (brand/generic), indication, and possible side effects and provided medication cards.     Counseled patient to observe and obtain daily weights and to notify doctor if >2-3 lbs/day or >5lbs/week weight gain, increased short of breath or using more pillows at nighttime. Answered all of the patient’s questions to the best of my ability. Patient exhibited understanding of heart failure medication regimen and management. Patient understood importance of compliance and to follow up with cardiologist outpatient after discharge.     Gerald Solano, PharmD  PGY-1 Pharmacy Resident  [facility]  Email: [email or web address] or available on [employer] Teams      -Was the patient offered Meds to Beds? Yes/No (opted in/out)   -Was medication coverage confirmed for any new medications? Yes/No   Heart Failure Medication Education      CHOOSE ONE:  HFrEF (EF = 23% on 10/2023)   Guideline directed medical therapy as below (name/dose/frequency):   Diuretic: Bumetanide 1mg daily (previously on lasix 20mg daily)  BB: metoprolol succinate 50mg QD  ARNI/ACE-I/ARB: Telmisartan 20mg QD (resume home med on DC)  MRA: Spironolactone 25mg QD (new med)  SGTL2i: Farxiga 10mg daily (new med started today)    Counseled patient on above medication names (brand/generic), indication, and possible side effects and provided medication cards.     Counseled patient to observe and obtain daily weights and to notify doctor if >2-3 lbs/day or >5lbs/week weight gain, increased short of breath or using more pillows at nighttime. Answered all of the patient’s questions to the best of my ability. Patient exhibited understanding of heart failure medication regimen and management. Patient understood importance of compliance and to follow up with cardiologist outpatient after discharge.    -Was the patient offered Meds to Beds? Yes  -Was medication coverage confirmed for any new medications? Yes - will need to price out Cori Solano PharmD  PGY-1 Pharmacy Resident  [facility]  Email: [email or web address] or available on [employer] Teams    Ava Culver PharmD, BCPS  Clinical Pharmacy Specialist  Teams (preferred) or [phone]    Heart Failure Medication Education      CHOOSE ONE:  HFrEF (EF = 23% on 10/2023)   Guideline directed medical therapy as below (name/dose/frequency):   Diuretic: Bumetanide 1mg daily (previously on lasix 20mg daily)  BB: metoprolol succinate 50mg QD  ARNI/ACE-I/ARB: Telmisartan 20mg QD (resume home med on DC)  MRA: Spironolactone 25mg QD (new med)  SGTL2i: Farxiga 10mg daily (new med started today)    Counseled patient on above medication names (brand/generic), indication, and possible side effects and provided medication cards.     Counseled patient to observe and obtain daily weights and to notify doctor if >2-3 lbs/day or >5lbs/week weight gain, increased short of breath or using more pillows at nighttime. Answered all of the patient’s questions to the best of my ability. Patient exhibited understanding of heart failure medication regimen and management. Patient understood importance of compliance and to follow up with cardiologist outpatient after discharge.    -Was the patient offered Meds to Beds? Yes  -Was medication coverage confirmed for any new medications? Yes - farxiga priced out at [facility] pharmacy, copay is $47/ month.     Samantha FloresD  PGY-1 Pharmacy Resident  [facility]  Email: [email or web address] or available on Microsoft Teams    Ava Culver PharmD, BCPS  Clinical Pharmacy Specialist  Teams (preferred) or [phone]

## 2025-02-12 NOTE — PROVIDER CONTACT NOTE (OTHER) - ACTION/TREATMENT ORDERED:
PA notified and aware- okay to allow refusal and hold insulin.
Provider notified and aware. Losartan was DC'd
provider aware, POC Ongoing.

## 2025-02-12 NOTE — DIETITIAN INITIAL EVALUATION ADULT - ENERGY INTAKE
Patient reports continues with good appetite/PO intake in-house, reports consumes most of her meals. Pt likely meeting >75% of estimated nutritional needs. Pt declined Ensure plus at this time secondary to preferences. Encourage use of daily menus. Honor dietary preferences as expressed as able. Pt made aware RD remains available and will follow up for any additional questions/concerns and per protocol.  Adequate (%)

## 2025-02-12 NOTE — PROVIDER CONTACT NOTE (OTHER) - REASON
Pt received with heparin gtt IV tubing disconnected from patient, hanging at bedside.
Blood glucose 202, pt refusing insulin
Hypotension

## 2025-02-12 NOTE — DIETITIAN INITIAL EVALUATION ADULT - ADD RECOMMEND
1. Recommend change from Carbohydrate Consistent Diet to Low Sodium   2. Monitor need for alternative oral supplement as needed, patient declined Ensure plus high protein at this time  3. Encourage PO intake via small frequent and nutrient dense meals   4. Consider multivitamin and ascorbic acid to promote wound healing pending no medical contraindications.   5. Continue to monitor PO intake, weight trend, electrolytes, blood glucose, labs, BMs in-house   6. Education provided as documented below

## 2025-02-12 NOTE — CHART NOTE - NSCHARTNOTEFT_GEN_A_CORE
RD CHF education chart note    Patient was visited by RD for CHF education. Heart failure education provided to the patient in detail. Discussed heart failure nutrition therapy, sodium and fluid intake, importance of diet adherence, daily weights monitoring with the patient. Reinforced importance of weight gain parameters and importance of contacting MD’s about weight changes. Provided handouts on heart failure nutrition therapy, reading heart healthy nutrition labels, heart healthy shopping tips and low sodium food list. Patient verbalized understanding demonstrated by teach back method. Pt made aware RD remains available and will follow up for any additional questions/concerns and per protocol.     Min Chaney, MARSHALLN, CDN / TEAMS

## 2025-02-12 NOTE — PROGRESS NOTE ADULT - ASSESSMENT
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 Patient intially pending TAVR eval, now deferred to outpatient give recent infection.    Home Meds  Amio 100 qd  Eliquis 2.5 BID  Telmisartan 20 qd  Crestor 5 qd  Toprol 50 qd  Synthroid 50 qd  Lasix 20 qd  ASA 81 qd    Meds  Amio, Losartan, crestor, toprol, synthroid, ASA    TTE (1/16/2025): LV Normal; Wall Mildly increased EF 24% G2DD, Normal RVSF TAPSE 1.9; LA Dlated RA Normal, Mild MR, Mild TR, Mild SC, Mod AR with severe AS, Mod L PLEF LVIDd 5.1cm; Peak Aortic Velocity 3.9, Peak Gradient 63 with mean of 40, AOrtic Valve area 0.67  TTE (9/26/23): EF 20-25% w/ Severe Aortic Stenosis  TTE (04/11/23): EF 57% W/ severe aortic stenosis  LHC (2/6/2025) LM prox 30%, LAD Mid 40% 1st Diag 70%, Prox Cx 100%, RCA ostial 100%,  RHC (2/6/2025): CVP: 11, PAP: 61/29/42, PCWP: 30; CO 3.66, CI 2.27

## 2025-02-12 NOTE — PROGRESS NOTE ADULT - ASSESSMENT
84F w/ HFrEF (EF 23%) Severe AS, CAD,  PAD s/p failed bypass of left leg (course c/b groin abscess, infected graft with subsequent removal and finally an AKA on the left), severe Aortic Stenosis, carotid disease, DM2, Afib on Eliquis, Hypothyroidism, HTN,  HLD, non healing wound RLE who was admitted to [facility]( 1/23/25-1/27/25) for cellulitis and heart failure and was DC on Doxycycline which she completed.  Pt is now admitted to the hosp for further evaluation and cardiac cath which was done on 2/6/25 with  TAVR recommended.

## 2025-02-12 NOTE — PROGRESS NOTE ADULT - PROBLEM SELECTOR PLAN 1
-patient appears euvolemic, will restart GDMT if TAVR deferred to outpatient  -continue losartan 25mg daily, toprol 50mg daily, aldactone 25mg daily  -continue bumex to PO 1mg daily  -start farxiga 10mg  -f/u with HF NP 2/27 at 2pm and Dr Trejo 6/20 at 11:40 AM

## 2025-02-12 NOTE — DIETITIAN INITIAL EVALUATION ADULT - REASON INDICATOR FOR ASSESSMENT
Patient seen for "Consult for Star education ",Source: Pt, Electronic Medical Record. Chart reviewed, events noted.

## 2025-02-12 NOTE — PROGRESS NOTE ADULT - CONVERSATION DETAILS
discussed with pt re: plan and care.   discussed re: advance directive including DNR/DNI.   pt would want to pursue all care including CPR and ventilation if needed .

## 2025-02-12 NOTE — DIETITIAN INITIAL EVALUATION ADULT - PERSON TAUGHT/METHOD
Discussed DASH diet education. Reviewed foods high in Na and cholesterol to avoid. Reviewed ways to decrease Na in your diet, discussed meal and snack options, tips for eating out, low cholesterol options, and shopping/label reading tips. Good comprehension noted. Pt made aware RD remains available. Heart Healthy Nutrition Therapy, Sodium Content of Foods, Sodium Free Flavoring Tips provided. Pt made aware RD remains available and will follow up for any additional questions/concerns and per protocol./verbal instruction/written material/patient instructed

## 2025-02-12 NOTE — PROGRESS NOTE ADULT - NS ATTEND AMEND GEN_ALL_CORE FT
86 y/o F with unrevascularized CAD, severe AS, HFrEF (EF 24%), PAD s/p L AKA, DM2, AFib, admitted for ongoing LLE infection and decompensated HF 2/2 AS. Given worsening EF since 2023, favor valve intervention as soon as she is deemed optimized from a HF and infection standpoint. Per structural team, to complete abx course and then re-evaluate for TAVR as outpatient. Medically optimized from HF standpoint for discharge.    Recommendations:  - GDMT: Toprol 50, losartan 25 (can resume home telmisartan at discharge) 20, Aldactone 25, start Farxiga 10  - Diuretics: Bumex 1 PO daily  - CAD: ASA 81, Crestor 10  - Devices: premature  - Advanced HF follow-up with Dr. Halie Trejo
84 y/o F with unrevascularized CAD, severe AS, HFrEF (EF 24%), PAD s/p L AKA, DM2, AFib, admitted for ongoing LLE infection and decompensated HF 2/2 AS. Given worsening EF since 2023, favor valve intervention as soon as she is deemed optimized from a HF and infection standpoint.    Recommendations:  - GDMT: Toprol 50, resume home telmisartan 20 (losartan ok inpt if non-formulary), start Aldactone 25, defer SGLT2i for today  - Diuretics: chagne to Bumex 1 PO daily  - CAD: ASA 81, resume home Crestor  - Devices: premature  - AV intervention planning per structrual heart team  - ID consult requested by structural

## 2025-02-12 NOTE — PATIENT PROFILE ADULT - FALL HARM RISK - HARM RISK INTERVENTIONS
Assistance with ambulation/Assistance OOB with selected safe patient handling equipment/Communicate Risk of Fall with Harm to all staff/Discuss with provider need for PT consult/Monitor for mental status changes/Monitor gait and stability/Provide patient with walking aids - walker, cane, crutches/Reinforce activity limits and safety measures with patient and family/Review medications for side effects contributing to fall risk/Sit up slowly, dangle for a short time, stand at bedside before walking/Tailored Fall Risk Interventions/Use of alarms - bed, chair and/or voice tab/Visual Cue: Yellow wristband and red socks/Bed in lowest position, wheels locked, appropriate side rails in place/Call bell, personal items and telephone in reach/Instruct patient to call for assistance before getting out of bed or chair/Non-slip footwear when patient is out of bed/[place] to call system/Physically safe environment - no spills, clutter or unnecessary equipment/Purposeful Proactive Rounding/Room/bathroom lighting operational, light cord in reach

## 2025-02-12 NOTE — CONSULT NOTE ADULT - CONSULT REQUESTED DATE/TIME
07-Feb-2025
12-Feb-2025 15:36
07-Feb-2025 14:04
06-Feb-2025 12:52
06-Feb-2025 11:38
07-Feb-2025 13:17

## 2025-02-12 NOTE — PROGRESS NOTE ADULT - PROBLEM SELECTOR PLAN 3
Known Multivessel disease with plan for medical management  [facility] (2/6/2025) LM prox 30%, LAD Mid 40% 1st Diag 70%, Prox Cx 100%, RCA ostial 100%,    Plan:  No active chest pain  C/W ASA 81 qd.

## 2025-02-12 NOTE — PROGRESS NOTE ADULT - SUBJECTIVE AND OBJECTIVE BOX
[facility] Division of Hospital Medicine  Patti Sanches MD  Pager (M-F, 6Q-9D): [phone]  Other Times:  [phone]    Patient is a 85y old  Female who presents with a chief complaint of TAVR (11 Feb 2025 12:56)      SUBJECTIVE / OVERNIGHT EVENTS:  feeling well. no new symptoms. no acute overnight issues     ADDITIONAL REVIEW OF SYSTEMS: otherwise neg    MEDICATIONS  (STANDING):  aMIOdarone    Tablet 100 milliGRAM(s) Oral daily  apixaban 2.5 milliGRAM(s) Oral two times a day  aspirin enteric coated 81 milliGRAM(s) Oral daily  buMETAnide 1 milliGRAM(s) Oral daily  dapagliflozin 10 milliGRAM(s) Oral every 24 hours  dextrose 5%. 1000 milliLiter(s) (100 mL/Hr) IV Continuous <Continuous>  dextrose 5%. 1000 milliLiter(s) (50 mL/Hr) IV Continuous <Continuous>  dextrose 50% Injectable 25 Gram(s) IV Push once  dextrose 50% Injectable 12.5 Gram(s) IV Push once  dextrose 50% Injectable 25 Gram(s) IV Push once  glucagon  Injectable 1 milliGRAM(s) IntraMuscular once  insulin lispro (ADMELOG) corrective regimen sliding scale   SubCutaneous three times a day before meals  insulin lispro (ADMELOG) corrective regimen sliding scale   SubCutaneous at bedtime  levothyroxine 50 MICROGram(s) Oral daily  losartan 25 milliGRAM(s) Oral daily  metoprolol succinate ER 50 milliGRAM(s) Oral daily  nystatin Powder 1 Application(s) Topical two times a day  rosuvastatin 5 milliGRAM(s) Oral at bedtime  spironolactone 25 milliGRAM(s) Oral daily    MEDICATIONS  (PRN):  dextrose Oral Gel 15 Gram(s) Oral once PRN Blood Glucose LESS THAN 70 milliGRAM(s)/deciliter  ondansetron Injectable 4 milliGRAM(s) IV Push daily PRN Nausea and/or Vomiting      CAPILLARY BLOOD GLUCOSE      POCT Blood Glucose.: 202 mg/dL (12 Feb 2025 12:07)  POCT Blood Glucose.: 130 mg/dL (12 Feb 2025 08:07)  POCT Blood Glucose.: 181 mg/dL (11 Feb 2025 21:46)  POCT Blood Glucose.: 132 mg/dL (11 Feb 2025 16:38)    I&O's Summary    11 Feb 2025 07:01  -  12 Feb 2025 07:00  --------------------------------------------------------  IN: 480 mL / OUT: 0 mL / NET: 480 mL        PHYSICAL EXAM:  Vital Signs Last 24 Hrs  T(C): 36.7 (12 Feb 2025 11:21), Max: 36.8 (11 Feb 2025 21:31)  T(F): 98.1 (12 Feb 2025 11:21), Max: 98.3 (11 Feb 2025 21:31)  HR: 77 (12 Feb 2025 11:21) (71 - 78)  BP: 108/62 (12 Feb 2025 11:21) (97/60 - 120/65)  BP(mean): --  RR: 18 (12 Feb 2025 11:21) (18 - 18)  SpO2: 95% (12 Feb 2025 11:21) (95% - 96%)    Parameters below as of 12 Feb 2025 11:21  Patient On (Oxygen Delivery Method): room air      CONSTITUTIONAL: NAD, well-groomed  EYES:  conjunctiva and sclera clear  ENMT: Moist oral mucosa  NECK: Supple, no palpable masses; no JVD  RESPIRATORY: Normal respiratory effort; lungs are clear to auscultation bilaterally  CARDIOVASCULAR: Regular rate and rhythm, systolic murmur; No lower extremity edema  ABDOMEN: Nontender to palpation, normoactive bowel sounds, no rebound/guarding  MUSCULOSKELETAL:  no clubbing or cyanosis of digits; no joint swelling or tenderness to palpation  PSYCH: A+O to person, place, and time; affect appropriate  SKIN: L AKA, right heel wound dressing in place. smooth RLE, redness   LABS:                        13.1   7.45  )-----------( 147      ( 12 Feb 2025 06:27 )             39.9     02-12    137  |  96  |  26[H]  ----------------------------<  124[H]  4.1   |  30  |  1.04    Ca    9.3      12 Feb 2025 06:28      PT/INR - ( 12 Feb 2025 06:28 )   PT: 13.3 sec;   INR: 1.16 ratio         PTT - ( 12 Feb 2025 06:28 )  PTT:30.7 sec      Urinalysis Basic - ( 12 Feb 2025 06:28 )    Color: x / Appearance: x / SG: x / pH: x  Gluc: 124 mg/dL / Ketone: x  / Bili: x / Urobili: x   Blood: x / Protein: x / Nitrite: x   Leuk Esterase: x / RBC: x / WBC x   Sq Epi: x / Non Sq Epi: x / Bacteria: x          RADIOLOGY & ADDITIONAL TESTS:  Results Reviewed:   Imaging Personally Reviewed:  Electrocardiogram Personally Reviewed:    COORDINATION OF CARE:  Care Discussed with Consultants/Other Providers [Y/N]:  Prior or Outpatient Records Reviewed [Y/N]:

## 2025-02-12 NOTE — PROGRESS NOTE ADULT - SUBJECTIVE AND OBJECTIVE BOX
Patient seen and examined at bedside.    Overnight Events: NAEON    REVIEW OF SYSTEMS:  CONSTITUTIONAL: No weakness, fevers or chills  EYES/ENT: No visual changes;  No dysphagia  NECK: No pain or stiffness  RESPIRATORY: No cough, wheezing, hemoptysis; No shortness of breath  CARDIOVASCULAR: No chest pain or palpitations; No lower extremity edema  GASTROINTESTINAL: No abdominal or epigastric pain. No nausea, vomiting, or hematemesis; No diarrhea or constipation. No melena or hematochezia.  BACK: No back pain  GENITOURINARY: No dysuria, frequency or hematuria  NEUROLOGICAL: No numbness or weakness  SKIN: No itching, burning, rashes, or lesions   All other review of systems is negative unless indicated above.            Current Meds:  aMIOdarone    Tablet 100 milliGRAM(s) Oral daily  apixaban 2.5 milliGRAM(s) Oral two times a day  aspirin enteric coated 81 milliGRAM(s) Oral daily  buMETAnide 1 milliGRAM(s) Oral daily  dapagliflozin 10 milliGRAM(s) Oral every 24 hours  dextrose 5%. 1000 milliLiter(s) IV Continuous <Continuous>  dextrose 5%. 1000 milliLiter(s) IV Continuous <Continuous>  dextrose 50% Injectable 25 Gram(s) IV Push once  dextrose 50% Injectable 12.5 Gram(s) IV Push once  dextrose 50% Injectable 25 Gram(s) IV Push once  dextrose Oral Gel 15 Gram(s) Oral once PRN  glucagon  Injectable 1 milliGRAM(s) IntraMuscular once  insulin lispro (ADMELOG) corrective regimen sliding scale   SubCutaneous three times a day before meals  insulin lispro (ADMELOG) corrective regimen sliding scale   SubCutaneous at bedtime  levothyroxine 50 MICROGram(s) Oral daily  losartan 25 milliGRAM(s) Oral daily  metoprolol succinate ER 50 milliGRAM(s) Oral daily  nystatin Powder 1 Application(s) Topical two times a day  ondansetron Injectable 4 milliGRAM(s) IV Push daily PRN  rosuvastatin 5 milliGRAM(s) Oral at bedtime  spironolactone 25 milliGRAM(s) Oral daily      Vitals:  T(F): 98.1 (02-12), Max: 98.3 (02-11)  HR: 77 (02-12) (71 - 87)  BP: 108/62 (02-12) (97/60 - 120/65)  RR: 18 (02-12)  SpO2: 95% (02-12)  I&O's Summary    11 Feb 2025 07:01  -  12 Feb 2025 07:00  --------------------------------------------------------  IN: 480 mL / OUT: 0 mL / NET: 480 mL        Physical Exam:  GEN: NAD  HEENT: EOMI, clear sclera  PULM: CTA b/l, no wheeze  CV: RRR S1 S2, no murmur, no JVD  ABD: S, NT, ND  EXT: WWP, no edema  PSYCH: normal affect  SKIN: No rash                          13.1   7.45  )-----------( 147      ( 12 Feb 2025 06:27 )             39.9     02-12    137  |  96  |  26[H]  ----------------------------<  124[H]  4.1   |  30  |  1.04    Ca    9.3      12 Feb 2025 06:28      PT/INR - ( 12 Feb 2025 06:28 )   PT: 13.3 sec;   INR: 1.16 ratio         PTT - ( 12 Feb 2025 06:28 )  PTT:30.7 sec

## 2025-02-12 NOTE — PROGRESS NOTE ADULT - PROBLEM SELECTOR PLAN 2
EF of 24% on 1/16/25   CARdiac cath with report of elevated pressure   cont with Toprol and Crestor  clinically overall euvolemic.   HF eval appreciated- resume back on ARB, aldactone. farxiga   changed IV bumex now to PO 1mg daily.   d/c hep gtt and restart back on eliquis now.

## 2025-02-12 NOTE — PROGRESS NOTE ADULT - NS ATTEST RISK PROBLEM GEN_ALL_CORE FT
preparation, patient care, and documentation for this visit, including the following: review of clinical lab tests; review of medical tests/procedures/services, obtaining and/or reviewing separately obtained history, counseling and educating the patient/family/caregiver, referring and communicating with other health care professionals (when not separately reported), documenting clinical information in the electronic health record, and care coordination (not separately reported).
preparation, patient care, and documentation for this visit, including the following: review of clinical lab tests; review of medical tests/procedures/services, obtaining and/or reviewing separately obtained history, counseling and educating the patient/family/caregiver, referring and communicating with other health care professionals (when not separately reported), documenting clinical information in the electronic health record, and care coordination (not separately reported).

## 2025-02-12 NOTE — DIETITIAN INITIAL EVALUATION ADULT - OTHER INFO
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

## 2025-02-12 NOTE — PROGRESS NOTE ADULT - PROBLEM SELECTOR PLAN 1
Pt had Cardiac cath done on 2/6/25 with % obstruction  and Cx 100% obstruction with well developed colalterals. Moderate mid LAD ( 40% obstruction) and moderate to severe diagonal  disease ( 70%) ; Elevated filling pressures with marginal CI  Severe AS - peak gradient 63 mean gradient 40, EFRAIN estimated 0.67cm2  Cardiologist recommended : Medical management for CAD, HF evaluation for optimization   CTA c/a/p/coronoaries done     d/w Dr. Serna on 2/11:  given recent infectious issues and persistent RLE wound being followed by podiatry plan to defer intervention at this time. Also pt with vasc access issues which will need to be nvaigated as well.   Per request ID eval called this am (Dr. Trevino) for medical optimization for eventual intervention.

## 2025-02-12 NOTE — PATIENT PROFILE ADULT - DO YOU EVER NEED HELP READING HOSPITAL MATERIALS?
no 47 yo male presents for PAST in preparation for laparoscopic gastric bypass on 9/20/2021 under general anesthesia by Dr. Horton ([facility]).

## 2025-02-12 NOTE — DIETITIAN INITIAL EVALUATION ADULT - ORAL INTAKE PTA/DIET HISTORY
Writer met patient at the bedside. Patient reports good appetite/PO intake PTA. Usually consumes 3 meals daily. Pt prepares meals daily at home. Follows a low sodium and low sugar dietary restrictions. Confirms no known food allergies. Takes a Vitamin D daily, denies liquid oral supplement uses. Denies chewing/swallowing difficulties. Reports one episode of emesis PTA. States having regular bowel movements daily at home. 
Cries

## 2025-02-12 NOTE — PROVIDER CONTACT NOTE (OTHER) - BACKGROUND
Pt adm with acute HF, currently undergoing TAVR workup.
Presents for evaluation of severe aortic stenosis and a TAVR evaluation HF consulted for reduced EF in the setting of severe AS.
Pt admitted for heart failure. PMH DM2, CAD, CHF, PAD, severe AS, HTN, HLD, hypothyroidism.

## 2025-02-12 NOTE — PROVIDER CONTACT NOTE (OTHER) - ASSESSMENT
Pt a&ox4. VSS. Pt blood glucose 202. No s/s of hyperglycemia. Based on ordered sliding scale, pt is due for 2 units insulin, but pt wants to refuse, feels that she did not eat much today and doesn't want insulin injection.
Pt received AAOx4. VSS on RA; denies CP, SOB, no acute events on my time. on RN assessment at change of [facility], Pt was found with heparin drip disconnected from IV line, hanging at bedside. Correlated with PTT result on AM labs.
No SOB, chest pain, or palpitations. No c/o of lightheadedness or dizziness. See flow sheet for vitals

## 2025-02-12 NOTE — DIETITIAN INITIAL EVALUATION ADULT - PERTINENT MEDS FT
HPI:  54 yo m with pmh of alcohol abuse and pud presents in ED c/o shakiness, bloody vomit and black "hard" stool the last few days. patient reports he has not drank in 2 days. No fever/chills, No photophobia/eye pain/changes in vision, No ear pain/sore throat/dysphagia, No chest pain/palpitations, no SOB/cough/wheeze/stridor, No abdominal pain, No D, no dysuria/frequency/discharge, No neck/back pain, no rash, no changes in neurological status/function. he was admitted two weeks ago for the same problem and had endoscopy that showed   ESOPHAGUS:  Mild narrowing in UES ( minimal tension necessary ), Superficial ulceration / esophagitis lower 1/2 of esophagus s/p biopsy    STOMACH: mild antral gastritis s/p biopsy    DUODENUM: WNL  and was discharged on a ppi and carfate but continued to drink and only stoppede two days ago (20 Jun 2019 12:26)      24 hr events: No acute events. Remains confused, unable to provide history    ## ROS:  [x ] unable to obtain    ## Vitals  ICU Vital Signs Last 24 Hrs  T(C): 36.8 (29 Jun 2019 07:05), Max: 37.5 (29 Jun 2019 04:26)  T(F): 98.2 (29 Jun 2019 07:05), Max: 99.5 (29 Jun 2019 04:26)  HR: 88 (29 Jun 2019 07:00) (79 - 99)  BP: 114/53 (29 Jun 2019 07:00) (103/70 - 141/91)  BP(mean): 60 (29 Jun 2019 07:00) (60 - 106)  ABP: --  ABP(mean): --  RR: 18 (29 Jun 2019 07:00) (9 - 30)  SpO2: 96% (29 Jun 2019 07:00) (92% - 98%)      ## Physical Exam:  Gen: Adult male sitting in bed, confused, occasionally follows commands  HEENT: NC/AT sclerae anicteric  Resp: No increased WOB, CTAB  CV: S1, S2  Abd: Soft, + BS  Ext: No edema  Neuro: Awake, confused, not following commands, moves all extremities    ## Vent Data      ## Labs:  Chem:  06-29    140  |  105  |  5<L>  ----------------------------<  96  3.3<L>   |  24  |  0.86    Ca    9.0      29 Jun 2019 04:39  Phos  3.1     06-29  Mg     2.6     06-29    TPro  8.0  /  Alb  3.2<L>  /  TBili  0.3  /  DBili  x   /  AST  13<L>  /  ALT  19  /  AlkPhos  42  06-29    LIVER FUNCTIONS - ( 29 Jun 2019 04:39 )  Alb: 3.2 g/dL / Pro: 8.0 gm/dL / ALK PHOS: 42 U/L / ALT: 19 U/L / AST: 13 U/L / GGT: x           CBC:                        13.6   3.95  )-----------( 279      ( 29 Jun 2019 04:39 )             42.4     Coags:          ## Cardiac        ## Blood Gas      #I/Os  I&O's Detail    28 Jun 2019 07:01  -  29 Jun 2019 07:00  --------------------------------------------------------  IN:    dextrose 5%.: 1200 mL    Oral Fluid: 360 mL    Solution: 200 mL  Total IN: 1760 mL    OUT:    Incontinent per Condom Catheter: 2000 mL    Voided: 2000 mL  Total OUT: 4000 mL    Total NET: -2240 mL          ## Imaging:    ## Medications:  MEDICATIONS  (STANDING):  BACItracin   Ointment 1 Application(s) Topical three times a day  chlorhexidine 4% Liquid 1 Application(s) Topical <User Schedule>  dextrose 5%. 1000 milliLiter(s) (50 mL/Hr) IV Continuous <Continuous>  heparin  Injectable 5000 Unit(s) SubCutaneous every 8 hours  pantoprazole  Injectable 40 milliGRAM(s) IV Push every 12 hours  PHENobarbital Injectable 195 milliGRAM(s) IV Push every 6 hours  potassium chloride    Tablet ER 40 milliEquivalent(s) Oral every 4 hours  risperiDONE   Tablet 1 milliGRAM(s) Oral two times a day  sucralfate suspension 1 Gram(s) Oral four times a day    MEDICATIONS  (PRN):  acetaminophen   Tablet .. 650 milliGRAM(s) Oral every 6 hours PRN Temp greater or equal to 38C (100.4F), Moderate Pain (4 - 6)  PHENobarbital Injectable 130 milliGRAM(s) IV Push every 15 minutes PRN for CIWA > 12  PHENobarbital Injectable 260 milliGRAM(s) IV Push every 15 minutes PRN ciwa > 20 MEDICATIONS  (STANDING):  aMIOdarone    Tablet 100 milliGRAM(s) Oral daily  apixaban 2.5 milliGRAM(s) Oral two times a day  aspirin enteric coated 81 milliGRAM(s) Oral daily  buMETAnide 1 milliGRAM(s) Oral daily  dapagliflozin 10 milliGRAM(s) Oral every 24 hours  dextrose 5%. 1000 milliLiter(s) (100 mL/Hr) IV Continuous <Continuous>  dextrose 5%. 1000 milliLiter(s) (50 mL/Hr) IV Continuous <Continuous>  dextrose 50% Injectable 25 Gram(s) IV Push once  dextrose 50% Injectable 12.5 Gram(s) IV Push once  dextrose 50% Injectable 25 Gram(s) IV Push once  glucagon  Injectable 1 milliGRAM(s) IntraMuscular once  insulin lispro (ADMELOG) corrective regimen sliding scale   SubCutaneous three times a day before meals  insulin lispro (ADMELOG) corrective regimen sliding scale   SubCutaneous at bedtime  levothyroxine 50 MICROGram(s) Oral daily  losartan 25 milliGRAM(s) Oral daily  metoprolol succinate ER 50 milliGRAM(s) Oral daily  nystatin Powder 1 Application(s) Topical two times a day  rosuvastatin 5 milliGRAM(s) Oral at bedtime  spironolactone 25 milliGRAM(s) Oral daily    MEDICATIONS  (PRN):  dextrose Oral Gel 15 Gram(s) Oral once PRN Blood Glucose LESS THAN 70 milliGRAM(s)/deciliter  ondansetron Injectable 4 milliGRAM(s) IV Push daily PRN Nausea and/or Vomiting

## 2025-02-12 NOTE — DIETITIAN INITIAL EVALUATION ADULT - PHYSCIAL ASSESSMENT
Nutrition focused physical exam not warranted at this time. No overt sign of muscle/fat depletion noted. Per patient, UBW has been stable around 130lbs,  no recent weight loss reported.    Per Otilia ESPINO weight history: unable to load at this time.    IBW:115lbs  IBW%: 113%

## 2025-02-12 NOTE — CONSULT NOTE ADULT - ASSESSMENT
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 She is seen by wound care Dr. Calvillo. TTE 1/31/25 EF 24%. LA mildly dilated, mild MR, mild TR, mild DE, mod AR, severe AS peak gradient 63 mean gradient 40, EFRAIN estimated 0.67cm2.  No implanted monitoring devices.  Pt. presents for further evaluation and cardiac cath.      Pt had Cardiac cath done on 2/6/25 with % obstruction  and Cx 100% obstruction with well developed colalterals. Moderate mid LAD ( 40% obstruction) and moderate to severe diagonal  disease ( 70%) ; Elevated filling pressures with marginal CI  Severe AS - peak gradient 63 mean gradient 40, EFRAIN estimated 0.67cm2  Cardiologist recommended : Medical management for CAD, HF evaluation for optimization   CTA c/a/p/coronoaries done       EF of 24% on 1/16/25 CARdiac cath with report of elevated pressure   continued  with Toprol and Crestor  clinically overall euvolemic. HF eval appreciated- resume back on ARB, aldactone. farxiga changed IV bumex now to PO 1mg daily.   d/c hep gtt and restart back on eliquis     Given recent infectious issues and persistent RLE wound being followed by podiatry plan to defer intervention at this time. Also pt with vasc access issues which will need to be navigated as well.     ID was asked to evaluate  Of note , recent cultures of RLE with MRSA     ID Asked to address TAVR with recent infections  - will need evaluation by wound care of RLE, is there concern for osteo? seems superficial but not healing, in large part because of poor blood supply. Podiatry suggested possible hyperbarric oxygen. Pt also with DTI of right heel and lesion on right lateral foot.  Pt is colonized with MRSA , also concerning  will reach out to podiatry to discuss further tomorrow   would also have dental evaluation prior to surgery  If these lesions are non healing that will certainly increase the risk of infectious complications from the TAVR    Ailyn Trevino M.D. ,   please reach via teams   If no answer, or after 5PM/ weekends,  then please call  [phone]    Assessment and plan discussed with Dr Sanches

## 2025-02-12 NOTE — CONSULT NOTE ADULT - SUBJECTIVE AND OBJECTIVE BOX
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 Serna, Candidaib on Eliquis last dose 2/4/25 PM dose, hypothyroid, HTN,  HLD, non healing wound RLE who was admitted to  1/23/25 for cellulitis and heart failure and completed course of Doxycycline. She is seen by wound care Dr. Calvillo. TTE 1/31/25 EF 24%. LA mildly dilated, mild MR, mild TR, mild DC, mod AR, severe AS peak gradient 63 mean gradient 40, EFRAIN estimated 0.67cm2.  No implanted monitoring devices.  Pt. presents for further evaluation and cardiac cath.      Pt had Cardiac cath done on 2/6/25 with % obstruction  and Cx 100% obstruction with well developed colalterals. Moderate mid LAD ( 40% obstruction) and moderate to severe diagonal  disease ( 70%) ; Elevated filling pressures with marginal CI  Severe AS - peak gradient 63 mean gradient 40, EFRAIN estimated 0.67cm2  Cardiologist recommended : Medical management for CAD, HF evaluation for optimization   CTA c/a/p/coronoaries done       EF of 24% on 1/16/25 CARdiac cath with report of elevated pressure   continued  with Toprol and Crestor  clinically overall euvolemic. HF eval appreciated- resume back on ARB, aldactone. farxiga changed IV bumex now to PO 1mg daily.   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 Fevers absent    Skin:No rash  	  Ophthalmologic:Denies any visual complaints,discharge redness or photophobia  	  ENMT:No nasal discharge,headache,sinus congestion or throat pain.No dental complaints    Respiratory and Thorax:No cough,sputum or chest pain.Denies shortness of breath  	  Cardiovascular:	No chest pain,palpitaions or dizziness    Gastrointestinal:	NO nausea,abdominal pain or diarrhea.    Genitourinary:	No dysuria,frequency. No flank pain    Musculoskeletal:	No joint swelling or pain.No weakness    Neurological:No confusion,diziness.No extremity weakness.No bladder or bowel incontinence	    Psychiatric:No delusions or hallucinations	    Hematology/Lymphatics:	No LN swelling.No gum bleeding     Endocrine:	No recent weight gain or loss.No abnormal heat/cold intolerance    Allergic/Immunologic:	No hives or rash     Allergies    No Known Drug Allergies  latex (Unknown)    Intolerances        Antimicrobials:       MEDICATIONS  (prior antimicrobials ):                 MEDICATIONS  (STANDING):  aMIOdarone    Tablet 100 milliGRAM(s) Oral daily  apixaban 2.5 milliGRAM(s) Oral two times a day  aspirin enteric coated 81 milliGRAM(s) Oral daily  buMETAnide 1 milliGRAM(s) Oral daily  dapagliflozin 10 milliGRAM(s) Oral every 24 hours  dextrose 5%. 1000 milliLiter(s) (100 mL/Hr) IV Continuous <Continuous>  dextrose 5%. 1000 milliLiter(s) (50 mL/Hr) IV Continuous <Continuous>  dextrose 50% Injectable 25 Gram(s) IV Push once  dextrose 50% Injectable 12.5 Gram(s) IV Push once  dextrose 50% Injectable 25 Gram(s) IV Push once  glucagon  Injectable 1 milliGRAM(s) IntraMuscular once  insulin lispro (ADMELOG) corrective regimen sliding scale   SubCutaneous three times a day before meals  insulin lispro (ADMELOG) corrective regimen sliding scale   SubCutaneous at bedtime  levothyroxine 50 MICROGram(s) Oral daily  losartan 25 milliGRAM(s) Oral daily  metoprolol succinate ER 50 milliGRAM(s) Oral daily  nystatin Powder 1 Application(s) Topical two times a day  rosuvastatin 5 milliGRAM(s) Oral at bedtime  spironolactone 25 milliGRAM(s) Oral daily    MEDICATIONS  (PRN):  dextrose Oral Gel 15 Gram(s) Oral once PRN Blood Glucose LESS THAN 70 milliGRAM(s)/deciliter  ondansetron Injectable 4 milliGRAM(s) IV Push daily PRN Nausea and/or Vomiting        Vital Signs Last 24 Hrs  T(C): 36.7 (12 Feb 2025 11:21), Max: 36.8 (11 Feb 2025 21:31)  T(F): 98.1 (12 Feb 2025 11:21), Max: 98.3 (11 Feb 2025 21:31)  HR: 77 (12 Feb 2025 11:21) (71 - 78)  BP: 108/62 (12 Feb 2025 11:21) (97/60 - 120/65)  BP(mean): --  RR: 18 (12 Feb 2025 11:21) (18 - 18)  SpO2: 95% (12 Feb 2025 11:21) (95% - 96%)    Parameters below as of 12 Feb 2025 11:21  Patient On (Oxygen Delivery Method): room air        PHYSICAL EXAM:Pleasant patient in no acute distress.      Constitutional:Comfortable.Awake and alert  No cachexia     Eyes:PERRL EOMI.NO discharge or conjunctival injection    ENMT:No sinus tenderness.No thrush.No pharyngeal exudate or erythema.Fair dental hygiene    Neck:Supple,No LN,no JVD      Respiratory:Good air entry bilaterally,CTA    Cardiovascular:S1 S2 wnl, No murmurs,rub or gallops    Gastrointestinal:Soft BS(+) no tenderness no masses ,No rebound or guarding    Genitourinary:No CVA tendereness     Rectal:    Extremities:No cyanosis,clubbing or edema.    Vascular:peripheral pulses felt    Neurological:AAO X 3,No grossly focal deficits    Skin:No rash     Lymph Nodes:No palpable LNs    Musculoskeletal:No joint swelling or LOM    Psychiatric:Affect normal.                                13.1   7.45  )-----------( 147      ( 12 Feb 2025 06:27 )             39.9         02-12    137  |  96  |  26[H]  ----------------------------<  124[H]  4.1   |  30  |  1.04    Ca    9.3      12 Feb 2025 06:28            Urinalysis Basic - ( 12 Feb 2025 06:28 )    Urinalysis + Microscopic Examination (09.23.23 @ 15:40)    pH Urine: 5.5   Urine Appearance: Cloudy   Color: Dark Yellow   Specific Gravity: 1.069   Protein, Urine: 30 mg/dL   Glucose Qualitative, Urine: Negative mg/dL   Ketone - Urine: Trace mg/dL   Blood, Urine: Negative   Bilirubin: Negative   Urobilinogen: 1.0 mg/dL   Leukocyte Esterase Concentration: Small   Nitrite: Negative   Review: Reviewed   White Blood Cell - Urine: 43 /HPF   Red Blood Cell - Urine: 3 /HPF   Bacteria: Negative /HPF   Cast: 1 /LPF   Epithelial Cells: 43 /HPF        Culture - Abscess with Gram Stain (01.22.25 @ 21:50)    -  Oxacillin: R >2   -  Penicillin: R >2   -  Rifampin: S <=1 Should not be used as monotherapy   -  Tetracycline: S <=4   -  Trimethoprim/Sulfamethoxazole: R >2/38   -  Vancomycin: S 1   Gram Stain:   No polymorphonuclear leukocytes per low power field  Rare Gram Positive Cocci in Clusters per oil power field   -  Clindamycin: S <=0.25   -  Daptomycin: S <=0.5   -  Erythromycin: R >4   -  Gentamicin: S <=4 Should not be used as monotherapy   -  Linezolid: S 2   Specimen Source: .Abscess   Culture Results:   Moderate Methicillin Resistant Staphylococcus aureus   Organism Identification: Methicillin resistant Staphylococcus aureus   Organism: Methicillin resistant Staphylococcus aureus   Method Type: THOMPSON     Patient is a 85y old  Female who presents with a chief complaint of Heart failure     (2025 14:41)      HPI:  This is an  85 yo female with PMH CAD ( RCA and LCx, severe distal LAD disease ), PAD-> s/p failed bypass of left left (course c/b groin abscess, infected graft with subsequent removal and 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 She is seen by wound care Dr. Calvillo. TTE 25 EF 24%. LA mildly dilated, mild MR, mild TR, mild WA, mod AR, severe AS peak gradient 63 mean gradient 40, EFRAIN estimated 0.67cm2.  No implanted monitoring devices.  Pt. presents for further evaluation and cardiac cath.      Pt had Cardiac cath done on 25 with % obstruction  and Cx 100% obstruction with well developed colalterals. Moderate mid LAD ( 40% obstruction) and moderate to severe diagonal  disease ( 70%) ; Elevated filling pressures with marginal CI  Severe AS - peak gradient 63 mean gradient 40, EFRAIN estimated 0.67cm2  Cardiologist recommended : Medical management for CAD, HF evaluation for optimization   CTA c/a/p/coronoaries done       EF of 24% on 25 CARdiac cath with report of elevated pressure   continued  with Toprol and Crestor  clinically overall euvolemic. HF eval appreciated- resume back on ARB, aldactone. farxiga changed IV bumex now to PO 1mg daily.   d/c hep gtt and restart back on eliquis     Given recent infectious issues and persistent RLE wound being followed by podiatry plan to defer intervention at this time. Also pt with vasc access issues which will need to be nvaigated as well.     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 Fevers absent    Skin: RLE lesion as above  	  Ophthalmologic:Denies any visual complaints,discharge redness or photophobia  	  ENMT:No nasal discharge,headache,sinus congestion or throat pain.No dental complaints    Respiratory and Thorax:No cough,sputum or chest pain.Denies shortness of breath  	  Cardiovascular:	No chest pain,palpitaions or dizziness    Gastrointestinal:	NO nausea,abdominal pain or diarrhea.    Genitourinary:	No dysuria,frequency. No flank pain    Musculoskeletal:	 s/p Left AKA    Neurological:No confusion,diziness.No extremity weakness.No bladder or bowel incontinence	    Psychiatric:No delusions or hallucinations	    Hematology/Lymphatics:	No LN swelling.No gum bleeding     Endocrine:	No recent weight gain or loss.No abnormal heat/cold intolerance    Allergic/Immunologic:	No hives or rash     Allergies    No Known Drug Allergies  latex (Unknown)    Intolerances        Antimicrobials:       MEDICATIONS  (prior antimicrobials ):                 MEDICATIONS  (STANDING):  aMIOdarone    Tablet 100 milliGRAM(s) Oral daily  apixaban 2.5 milliGRAM(s) Oral two times a day  aspirin enteric coated 81 milliGRAM(s) Oral daily  buMETAnide 1 milliGRAM(s) Oral daily  dapagliflozin 10 milliGRAM(s) Oral every 24 hours  dextrose 5%. 1000 milliLiter(s) (100 mL/Hr) IV Continuous <Continuous>  dextrose 5%. 1000 milliLiter(s) (50 mL/Hr) IV Continuous <Continuous>  dextrose 50% Injectable 25 Gram(s) IV Push once  dextrose 50% Injectable 12.5 Gram(s) IV Push once  dextrose 50% Injectable 25 Gram(s) IV Push once  glucagon  Injectable 1 milliGRAM(s) IntraMuscular once  insulin lispro (ADMELOG) corrective regimen sliding scale   SubCutaneous three times a day before meals  insulin lispro (ADMELOG) corrective regimen sliding scale   SubCutaneous at bedtime  levothyroxine 50 MICROGram(s) Oral daily  losartan 25 milliGRAM(s) Oral daily  metoprolol succinate ER 50 milliGRAM(s) Oral daily  nystatin Powder 1 Application(s) Topical two times a day  rosuvastatin 5 milliGRAM(s) Oral at bedtime  spironolactone 25 milliGRAM(s) Oral daily    MEDICATIONS  (PRN):  dextrose Oral Gel 15 Gram(s) Oral once PRN Blood Glucose LESS THAN 70 milliGRAM(s)/deciliter  ondansetron Injectable 4 milliGRAM(s) IV Push daily PRN Nausea and/or Vomiting        Vital Signs Last 24 Hrs  T(C): 36.7 (2025 11:21), Max: 36.8 (2025 21:31)  T(F): 98.1 (2025 11:21), Max: 98.3 (2025 21:31)  HR: 77 (2025 11:21) (71 - 78)  BP: 108/62 (2025 11:21) (97/60 - 120/65)  BP(mean): --  RR: 18 (2025 11:21) (18 - 18)  SpO2: 95% (2025 11:21) (95% - 96%)    Parameters below as of 2025 11:21  Patient On (Oxygen Delivery Method): room air        PHYSICAL EXAM:Pleasant patient in no acute distress.      Constitutional:Comfortable.Awake and alert  No cachexia     Eyes:PERRL EOMI.NO discharge or conjunctival injection    ENMT:No sinus tenderness.No thrush.No pharyngeal exudate or erythema.Fair dental hygiene    Neck:Supple,No LN,no JVD      Respiratory:Good air entry bilaterally,CTA    Cardiovascular:S1 S2 murmur    Gastrointestinal:Soft BS(+) no tenderness    s/p Left AKA    Right foot with anterior shin superficial ulcers and right heel DTI    Neurological:AAO X 3,No grossly focal deficits    Lymph Nodes:No palpable LNs    Musculoskeletal: s/p Left AKA  clean appearing    Psychiatric:Affect normal.                                13.1   7.45  )-----------( 147      ( 2025 06:27 )             39.9         02-12    137  |  96  |  26[H]  ----------------------------<  124[H]  4.1   |  30  |  1.04    Ca    9.3      2025 06:28            Urinalysis Basic - ( 2025 06:28 )    Urinalysis + Microscopic Examination (23 @ 15:40)    pH Urine: 5.5   Urine Appearance: Cloudy   Color: Dark Yellow   Specific Gravity: 1.069   Protein, Urine: 30 mg/dL   Glucose Qualitative, Urine: Negative mg/dL   Ketone - Urine: Trace mg/dL   Blood, Urine: Negative   Bilirubin: Negative   Urobilinogen: 1.0 mg/dL   Leukocyte Esterase Concentration: Small   Nitrite: Negative   Review: Reviewed   White Blood Cell - Urine: 43 /HPF   Red Blood Cell - Urine: 3 /HPF   Bacteria: Negative /HPF   Cast: 1 /LPF   Epithelial Cells: 43 /HPF        Culture - Abscess with Gram Stain (25 @ 21:50)    -  Oxacillin: R >2   -  Penicillin: R >2   -  Rifampin: S <=1 Should not be used as monotherapy   -  Tetracycline: S <=4   -  Trimethoprim/Sulfamethoxazole: R >2/38   -  Vancomycin: S 1   Gram Stain:   No polymorphonuclear leukocytes per low power field  Rare Gram Positive Cocci in Clusters per oil power field   -  Clindamycin: S <=0.25   -  Daptomycin: S <=0.5   -  Erythromycin: R >4   -  Gentamicin: S <=4 Should not be used as monotherapy   -  Linezolid: S 2   Specimen Source: .Abscess   Culture Results:   Moderate Methicillin Resistant Staphylococcus aureus   Organism Identification: Methicillin resistant Staphylococcus aureus   Organism: Methicillin resistant Staphylococcus aureus   Method Type: THOMPSON

## 2025-02-12 NOTE — PROGRESS NOTE ADULT - PROBLEM SELECTOR PLAN 4
Pt had Cardiac cath done on 2/6/25 with % obstruction  and Cx 100% obstruction with well developed colalterals. Moderate mid LAD ( 40% obstruction) and moderate to severe diagonal  disease ( 70%) ; Elevated filling pressures with marginal CI  Cardiologist recommended : Medical management for CAD, HF evaluation for optimization   TAVR eval and plans to be deferred to later time.   Pt is on Crestor 5 mg  which will cont for now as per HF team and might optimize dose   Pt is ASA , Toprol and Telmisartan --> will cont current meds as per discussion with HF team  resume back on ARB

## 2025-02-12 NOTE — PROVIDER CONTACT NOTE (OTHER) - SITUATION
Hypotension
Pt's blood glucose 202, due for 2 units insulin sliding scale, but pt is refusing.
Pt received with heparin gtt IV tubing disconnected from patient, hanging at bedside.

## 2025-02-12 NOTE — DIETITIAN INITIAL EVALUATION ADULT - OTHER CALCULATIONS
Fluid needs defer to MD team, calculation based on dosing weight with consideration for suspected deep tissue injury

## 2025-02-12 NOTE — DIETITIAN INITIAL EVALUATION ADULT - ETIOLOGY
related to lack of exposure to previous diet education/ incomplete exposure to diet education increased physiological demand for nutrients

## 2025-02-12 NOTE — DIETITIAN INITIAL EVALUATION ADULT - SIGNS/SYMPTOMS
multiple suspected deep tissue injuries pt asking for diet-related question and receptive to diet education

## 2025-02-13 ENCOUNTER — TRANSCRIPTION ENCOUNTER (OUTPATIENT)
Age: 85
End: 2025-02-13

## 2025-02-13 VITALS
TEMPERATURE: 98 F | DIASTOLIC BLOOD PRESSURE: 75 MMHG | HEART RATE: 76 BPM | SYSTOLIC BLOOD PRESSURE: 116 MMHG | RESPIRATION RATE: 18 BRPM | OXYGEN SATURATION: 94 %

## 2025-02-13 LAB
ALBUMIN SERPL ELPH-MCNC: 3.3 G/DL — SIGNIFICANT CHANGE UP (ref 3.3–5)
ALP SERPL-CCNC: 107 U/L — SIGNIFICANT CHANGE UP (ref 40–120)
ALT FLD-CCNC: 23 U/L — SIGNIFICANT CHANGE UP (ref 10–45)
ANION GAP SERPL CALC-SCNC: 13 MMOL/L — SIGNIFICANT CHANGE UP (ref 5–17)
AST SERPL-CCNC: 22 U/L — SIGNIFICANT CHANGE UP (ref 10–40)
BILIRUB SERPL-MCNC: 0.6 MG/DL — SIGNIFICANT CHANGE UP (ref 0.2–1.2)
BUN SERPL-MCNC: 27 MG/DL — HIGH (ref 7–23)
CALCIUM SERPL-MCNC: 9.2 MG/DL — SIGNIFICANT CHANGE UP (ref 8.4–10.5)
CHLORIDE SERPL-SCNC: 94 MMOL/L — LOW (ref 96–108)
CO2 SERPL-SCNC: 30 MMOL/L — SIGNIFICANT CHANGE UP (ref 22–31)
CREAT SERPL-MCNC: 1.27 MG/DL — SIGNIFICANT CHANGE UP (ref 0.5–1.3)
EGFR: 42 ML/MIN/1.73M2 — LOW
GLUCOSE BLDC GLUCOMTR-MCNC: 120 MG/DL — HIGH (ref 70–99)
GLUCOSE BLDC GLUCOMTR-MCNC: 138 MG/DL — HIGH (ref 70–99)
GLUCOSE BLDC GLUCOMTR-MCNC: 194 MG/DL — HIGH (ref 70–99)
GLUCOSE SERPL-MCNC: 115 MG/DL — HIGH (ref 70–99)
HCT VFR BLD CALC: 38.1 % — SIGNIFICANT CHANGE UP (ref 34.5–45)
HGB BLD-MCNC: 12.5 G/DL — SIGNIFICANT CHANGE UP (ref 11.5–15.5)
MAGNESIUM SERPL-MCNC: 2.1 MG/DL — SIGNIFICANT CHANGE UP (ref 1.6–2.6)
MCHC RBC-ENTMCNC: 32.8 G/DL — SIGNIFICANT CHANGE UP (ref 32–36)
MCHC RBC-ENTMCNC: 33.6 PG — SIGNIFICANT CHANGE UP (ref 27–34)
MCV RBC AUTO: 102.4 FL — HIGH (ref 80–100)
NRBC BLD AUTO-RTO: 0 /100 WBCS — SIGNIFICANT CHANGE UP (ref 0–0)
PHOSPHATE SERPL-MCNC: 3.6 MG/DL — SIGNIFICANT CHANGE UP (ref 2.5–4.5)
PLATELET # BLD AUTO: 142 K/UL — LOW (ref 150–400)
POTASSIUM SERPL-MCNC: 4.1 MMOL/L — SIGNIFICANT CHANGE UP (ref 3.5–5.3)
POTASSIUM SERPL-SCNC: 4.1 MMOL/L — SIGNIFICANT CHANGE UP (ref 3.5–5.3)
PROT SERPL-MCNC: 6.2 G/DL — SIGNIFICANT CHANGE UP (ref 6–8.3)
RBC # BLD: 3.72 M/UL — LOW (ref 3.8–5.2)
RBC # FLD: 13.6 % — SIGNIFICANT CHANGE UP (ref 10.3–14.5)
SODIUM SERPL-SCNC: 137 MMOL/L — SIGNIFICANT CHANGE UP (ref 135–145)
WBC # BLD: 9.56 K/UL — SIGNIFICANT CHANGE UP (ref 3.8–10.5)
WBC # FLD AUTO: 9.56 K/UL — SIGNIFICANT CHANGE UP (ref 3.8–10.5)

## 2025-02-13 PROCEDURE — 85027 COMPLETE CBC AUTOMATED: CPT

## 2025-02-13 PROCEDURE — C1894: CPT

## 2025-02-13 PROCEDURE — 36415 COLL VENOUS BLD VENIPUNCTURE: CPT

## 2025-02-13 PROCEDURE — 80048 BASIC METABOLIC PNL TOTAL CA: CPT

## 2025-02-13 PROCEDURE — 82962 GLUCOSE BLOOD TEST: CPT

## 2025-02-13 PROCEDURE — 83735 ASSAY OF MAGNESIUM: CPT

## 2025-02-13 PROCEDURE — 74174 CTA ABD&PLVS W/CONTRAST: CPT | Mod: MC

## 2025-02-13 PROCEDURE — 71275 CT ANGIOGRAPHY CHEST: CPT | Mod: MC

## 2025-02-13 PROCEDURE — 75574 CT ANGIO HRT W/3D IMAGE: CPT | Mod: MC

## 2025-02-13 PROCEDURE — 84439 ASSAY OF FREE THYROXINE: CPT

## 2025-02-13 PROCEDURE — 84443 ASSAY THYROID STIM HORMONE: CPT

## 2025-02-13 PROCEDURE — 97161 PT EVAL LOW COMPLEX 20 MIN: CPT

## 2025-02-13 PROCEDURE — 99239 HOSP IP/OBS DSCHRG MGMT >30: CPT

## 2025-02-13 PROCEDURE — 93456 R HRT CORONARY ARTERY ANGIO: CPT

## 2025-02-13 PROCEDURE — 97530 THERAPEUTIC ACTIVITIES: CPT

## 2025-02-13 PROCEDURE — 93923 UPR/LXTR ART STDY 3+ LVLS: CPT

## 2025-02-13 PROCEDURE — 82803 BLOOD GASES ANY COMBINATION: CPT

## 2025-02-13 PROCEDURE — C1769: CPT

## 2025-02-13 PROCEDURE — 85610 PROTHROMBIN TIME: CPT

## 2025-02-13 PROCEDURE — 73630 X-RAY EXAM OF FOOT: CPT

## 2025-02-13 PROCEDURE — 85730 THROMBOPLASTIN TIME PARTIAL: CPT

## 2025-02-13 PROCEDURE — 83880 ASSAY OF NATRIURETIC PEPTIDE: CPT

## 2025-02-13 PROCEDURE — 80053 COMPREHEN METABOLIC PANEL: CPT

## 2025-02-13 PROCEDURE — C1887: CPT

## 2025-02-13 PROCEDURE — 93005 ELECTROCARDIOGRAM TRACING: CPT

## 2025-02-13 PROCEDURE — 84100 ASSAY OF PHOSPHORUS: CPT

## 2025-02-13 RX ORDER — ONDANSETRON 4 MG/1
1 TABLET, ORALLY DISINTEGRATING ORAL
Qty: 28 | Refills: 0
Start: 2025-02-13 | End: 2025-02-26

## 2025-02-13 RX ORDER — BUMETANIDE 2 MG/1
1 TABLET ORAL
Qty: 30 | Refills: 0
Start: 2025-02-13 | End: 2025-03-14

## 2025-02-13 RX ORDER — PANTOPRAZOLE 20 MG/1
1 TABLET, DELAYED RELEASE ORAL
Qty: 30 | Refills: 0
Start: 2025-02-13 | End: 2025-03-14

## 2025-02-13 RX ORDER — DAPAGLIFLOZIN 5 MG/1
1 TABLET, FILM COATED ORAL
Qty: 30 | Refills: 0
Start: 2025-02-13 | End: 2025-03-14

## 2025-02-13 RX ADMIN — LEVOTHYROXINE SODIUM 50 MICROGRAM(S): 25 TABLET ORAL at 06:06

## 2025-02-13 RX ADMIN — DAPAGLIFLOZIN 10 MILLIGRAM(S): 5 TABLET, FILM COATED ORAL at 11:32

## 2025-02-13 RX ADMIN — AMIODARONE HYDROCHLORIDE 100 MILLIGRAM(S): 50 INJECTION, SOLUTION INTRAVENOUS at 08:52

## 2025-02-13 RX ADMIN — APIXABAN 2.5 MILLIGRAM(S): 5 TABLET, FILM COATED ORAL at 08:49

## 2025-02-13 RX ADMIN — Medication 50 MILLIGRAM(S): at 08:50

## 2025-02-13 RX ADMIN — ONDANSETRON 4 MILLIGRAM(S): 4 TABLET, ORALLY DISINTEGRATING ORAL at 06:01

## 2025-02-13 RX ADMIN — Medication 25 MILLIGRAM(S): at 09:48

## 2025-02-13 RX ADMIN — NYSTATIN 1 APPLICATION(S): 100000 POWDER TOPICAL at 06:06

## 2025-02-13 RX ADMIN — BUMETANIDE 1 MILLIGRAM(S): 2 TABLET ORAL at 08:50

## 2025-02-13 RX ADMIN — ASPIRIN 81 MILLIGRAM(S): 81 TABLET, COATED ORAL at 11:30

## 2025-02-13 NOTE — PROGRESS NOTE ADULT - PROBLEM SELECTOR PROBLEM 3
CAD (coronary artery disease)
PAD (peripheral artery disease)
PAD (peripheral artery disease)
CAD (coronary artery disease)
PAD (peripheral artery disease)
CAD (coronary artery disease)
PAD (peripheral artery disease)
CAD (coronary artery disease)
PAD (peripheral artery disease)
No

## 2025-02-13 NOTE — DISCHARGE NOTE NURSING/CASE MANAGEMENT/SOCIAL WORK - PATIENT PORTAL LINK FT
You can access the FollowMyHealth Patient Portal offered by [facility] by registering at the following website: [email or web address]. By joining [employer]’s FollowMyHealth portal, you will also be able to view your health information using other applications (apps) compatible with our system.

## 2025-02-13 NOTE — PROVIDER CONTACT NOTE (MEDICATION) - SITUATION
pt wants AM amiodarone, metoprolol, bumex, losartan, spironolactone and eliquis rescheduled to after breakfast

## 2025-02-13 NOTE — PROGRESS NOTE ADULT - PROVIDER SPECIALTY LIST ADULT
Heart Failure
Hospitalist
Internal Medicine
Podiatry
Internal Medicine
Heart Failure
Internal Medicine
Heart Failure
Hospitalist
Heart Failure
Hospitalist
Internal Medicine
Hospitalist

## 2025-02-13 NOTE — PROGRESS NOTE ADULT - PROBLEM SELECTOR PLAN 8
cont home Levothyroxine 50mcg daily
cont home Lovothyroxine 50mcg daily

## 2025-02-13 NOTE — DISCHARGE NOTE NURSING/CASE MANAGEMENT/SOCIAL WORK - NSDCVIVACCINE_GEN_ALL_CORE_FT
influenza, high-dose, quadrivalent; 08-Sep-2023 12:49; Amy Gaona); [employer]; [id number] (Exp. Date: 06-Sep-2024); IntraMuscular; Deltoid Left.; 0.7 milliLiter(s); VIS (VIS Published: 06-Aug-2021, VIS Presented: 08-Sep-2023);

## 2025-02-13 NOTE — PROGRESS NOTE ADULT - SUBJECTIVE AND OBJECTIVE BOX
[facility] Division of Hospital Medicine  Patti Sanches MD  Pager (M-F, 7L-2E): [phone]  Other Times:  [phone]    Patient is a 85y old  Female who presents with a chief complaint of cardiac cath (12 Feb 2025 15:36)      SUBJECTIVE / OVERNIGHT EVENTS:  feeling well. denies any complaints this am .no overnight issues     ADDITIONAL REVIEW OF SYSTEMS: otherwise neg    MEDICATIONS  (STANDING):  aMIOdarone    Tablet 100 milliGRAM(s) Oral daily  apixaban 2.5 milliGRAM(s) Oral two times a day  aspirin enteric coated 81 milliGRAM(s) Oral daily  buMETAnide 1 milliGRAM(s) Oral daily  dapagliflozin 10 milliGRAM(s) Oral every 24 hours  dextrose 5%. 1000 milliLiter(s) (100 mL/Hr) IV Continuous <Continuous>  dextrose 5%. 1000 milliLiter(s) (50 mL/Hr) IV Continuous <Continuous>  dextrose 50% Injectable 25 Gram(s) IV Push once  dextrose 50% Injectable 12.5 Gram(s) IV Push once  dextrose 50% Injectable 25 Gram(s) IV Push once  glucagon  Injectable 1 milliGRAM(s) IntraMuscular once  insulin lispro (ADMELOG) corrective regimen sliding scale   SubCutaneous three times a day before meals  insulin lispro (ADMELOG) corrective regimen sliding scale   SubCutaneous at bedtime  levothyroxine 50 MICROGram(s) Oral daily  losartan 25 milliGRAM(s) Oral daily  metoprolol succinate ER 50 milliGRAM(s) Oral daily  nystatin Powder 1 Application(s) Topical two times a day  polyethylene glycol 3350 17 Gram(s) Oral daily  rosuvastatin 5 milliGRAM(s) Oral at bedtime  spironolactone 25 milliGRAM(s) Oral daily    MEDICATIONS  (PRN):  dextrose Oral Gel 15 Gram(s) Oral once PRN Blood Glucose LESS THAN 70 milliGRAM(s)/deciliter  ondansetron Injectable 4 milliGRAM(s) IV Push daily PRN Nausea and/or Vomiting      CAPILLARY BLOOD GLUCOSE      POCT Blood Glucose.: 138 mg/dL (13 Feb 2025 12:49)  POCT Blood Glucose.: 194 mg/dL (13 Feb 2025 11:32)  POCT Blood Glucose.: 120 mg/dL (13 Feb 2025 07:59)  POCT Blood Glucose.: 166 mg/dL (12 Feb 2025 21:29)  POCT Blood Glucose.: 134 mg/dL (12 Feb 2025 16:38)    I&O's Summary    12 Feb 2025 07:01  -  13 Feb 2025 07:00  --------------------------------------------------------  IN: 640 mL / OUT: 0 mL / NET: 640 mL        PHYSICAL EXAM:  Vital Signs Last 24 Hrs  T(C): 37.1 (13 Feb 2025 11:19), Max: 37.1 (13 Feb 2025 11:19)  T(F): 98.7 (13 Feb 2025 11:19), Max: 98.7 (13 Feb 2025 11:19)  HR: 74 (13 Feb 2025 11:19) (74 - 81)  BP: 96/60 (13 Feb 2025 11:19) (96/60 - 120/69)  BP(mean): --  RR: 18 (13 Feb 2025 11:19) (17 - 18)  SpO2: 92% (13 Feb 2025 11:19) (91% - 94%)    Parameters below as of 13 Feb 2025 11:19  Patient On (Oxygen Delivery Method): room air      CONSTITUTIONAL: NAD, well-groomed  EYES:  conjunctiva and sclera clear  ENMT: Moist oral mucosa  NECK: Supple, no palpable masses; no JVD  RESPIRATORY: Normal respiratory effort; lungs are clear to auscultation bilaterally  CARDIOVASCULAR: Regular rate and rhythm, systolic murmur; No lower extremity edema  ABDOMEN: Nontender to palpation, normoactive bowel sounds, no rebound/guarding  MUSCULOSKELETAL:  no clubbing or cyanosis of digits; no joint swelling or tenderness to palpation  PSYCH: A+O to person, place, and time; affect appropriate  SKIN: L AKA, right heel/shin wound dressing in place. smooth RLE, redness     LABS:                        12.5   9.56  )-----------( 142      ( 13 Feb 2025 06:32 )             38.1     02-13    137  |  94[L]  |  27[H]  ----------------------------<  115[H]  4.1   |  30  |  1.27    Ca    9.2      13 Feb 2025 06:33  Phos  3.6     02-13  Mg     2.1     02-13    TPro  6.2  /  Alb  3.3  /  TBili  0.6  /  DBili  x   /  AST  22  /  ALT  23  /  AlkPhos  107  02-13    PT/INR - ( 12 Feb 2025 06:28 )   PT: 13.3 sec;   INR: 1.16 ratio         PTT - ( 12 Feb 2025 06:28 )  PTT:30.7 sec      Urinalysis Basic - ( 13 Feb 2025 06:33 )    Color: x / Appearance: x / SG: x / pH: x  Gluc: 115 mg/dL / Ketone: x  / Bili: x / Urobili: x   Blood: x / Protein: x / Nitrite: x   Leuk Esterase: x / RBC: x / WBC x   Sq Epi: x / Non Sq Epi: x / Bacteria: x          RADIOLOGY & ADDITIONAL TESTS:  Results Reviewed:   Imaging Personally Reviewed:  Electrocardiogram Personally Reviewed:    COORDINATION OF CARE:  Care Discussed with Consultants/Other Providers [Y/N]:  Prior or Outpatient Records Reviewed [Y/N]:

## 2025-02-13 NOTE — DISCHARGE NOTE NURSING/CASE MANAGEMENT/SOCIAL WORK - NSDCFUADDAPPT_GEN_ALL_CORE_FT
APPTS ARE READY TO BE MADE: [X] YES    Best Family or Patient Contact (if needed):    Additional Information about above appointments (if needed):    1: pcp  2: cardiology  3.Heart failure team  4: wound care center  4: POD Other comments or requests:   Podiatry Discharge Instructions:  Follow up: Please follow up at [facility] clinic within 1 week of discharge from the hospital, please call [phone] for appointment and discuss that you recently were seen in the hospital.  Wound Care: Please apply xeroform followed by 4x4 gauze and leida to right leg wounds daily  Weight bearing: Please weight bear as tolerated

## 2025-02-13 NOTE — PROGRESS NOTE ADULT - PROBLEM SELECTOR PROBLEM 2
Acute on chronic systolic congestive heart failure
Acute on chronic systolic congestive heart failure
Severe aortic stenosis
Acute on chronic systolic congestive heart failure
Severe aortic stenosis
Severe aortic stenosis
Acute on chronic systolic congestive heart failure
Acute on chronic systolic congestive heart failure
Severe aortic stenosis
Acute on chronic systolic congestive heart failure

## 2025-02-13 NOTE — PROGRESS NOTE ADULT - ASSESSMENT
84F w/ HFrEF (EF 23%) Severe AS, CAD,  PAD s/p failed bypass of left leg (course c/b groin abscess, infected graft with subsequent removal and finally an AKA on the left), severe Aortic Stenosis, carotid disease, DM2, Afib on Eliquis, Hypothyroidism, HTN,  HLD, non healing wound RLE who was admitted to [facility]( 1/23/25-1/27/25) for cellulitis and heart failure and was DC on Doxycycline which she completed.  Pt is now admitted to the hosp for further evaluation and cardiac cath which was done on 2/6/25 with  TAVR recommended.

## 2025-02-13 NOTE — PROGRESS NOTE ADULT - PROBLEM SELECTOR PLAN 7
Latest A1C was 6.9 on 1/15/25 and is on Metformin at home   Might consider changing to a different agent on DC in setting of CHF with reduced EF   Cont to monitor blood glucose

## 2025-02-13 NOTE — PROGRESS NOTE ADULT - PROBLEM SELECTOR PLAN 5
cont to monitor BP and cont with Toprol and Losartan   goal of BP <130/80
cont to monitor BP and cont with Toprol   ARB restarted  goal of BP <130/80
cont to monitor BP and cont with Toprol   ARB held/   goal of BP <130/80
cont to monitor BP and cont with Toprol   ARB held/   goal of BP <130/80
cont to monitor BP and cont with Toprol and Losartan   goal of BP <130/80
cont to monitor BP and cont with Toprol   ARB restarted  goal of BP <130/80
cont to monitor BP and cont with Toprol and Losartan   goal of BP <130/80
cont to monitor BP and cont with Toprol and Losartan   goal of BP <130/80

## 2025-02-13 NOTE — PROGRESS NOTE ADULT - TIME BILLING
- Ordering, reviewing, and/or interpreting labs, testing, and/or imaging  - Independently obtaining a review of systems and performing a physical exam  - Reviewing prior records and where necessary, outpatient records and/or consultant documentation  - Counselling and educating patient and/or family regarding interpretation of aforementioned items and plan of care
- Ordering, reviewing, and interpreting labs, testing, and imaging.  - Independently obtaining a review of systems and performing a physical exam  - Reviewing consultant documentation/recommendations in addition to discussing plan of care with consultants.  - Counselling and educating patient and family regarding interpretation of aforementioned items and plan of care.
- Ordering, reviewing, and/or interpreting labs, testing, and/or imaging  - Independently obtaining a review of systems and performing a physical exam  - Reviewing prior records and where necessary, outpatient records and/or consultant documentation  - Counselling and educating patient and/or family regarding interpretation of aforementioned items and plan of care
- Ordering, reviewing, and/or interpreting labs, testing, and/or imaging  - Independently obtaining a review of systems and performing a physical exam  - Reviewing prior records and where necessary, outpatient records and/or consultant documentation  - Counselling and educating patient and/or family regarding interpretation of aforementioned items and plan of care
- Ordering, reviewing, and interpreting labs, testing, and imaging.  - Independently obtaining a review of systems and performing a physical exam  - Reviewing consultant documentation/recommendations in addition to discussing plan of care with consultants.  - Counselling and educating patient and family regarding interpretation of aforementioned items and plan of care.
review records, discuss with specialist , evaluate pt, review and order labs

## 2025-02-13 NOTE — PROGRESS NOTE ADULT - PROBLEM SELECTOR PLAN 6
cont with tele monitor   Cont with Toprol   Patient is on Amiodarone , monitor LFT , Thyroid function team   Cont with Hep drip, Monitor PTT

## 2025-02-13 NOTE — PROVIDER CONTACT NOTE (MEDICATION) - REASON
pt wants AM amiodarone, metoprolol, bumex, losartan, spironolactone and eliquis rescheduled to after breakfast.

## 2025-02-13 NOTE — PROGRESS NOTE ADULT - PROBLEM SELECTOR PROBLEM 1
Heart failure
Severe aortic stenosis
Heart failure
Severe aortic stenosis
Severe aortic stenosis
Heart failure
Heart failure
Severe aortic stenosis

## 2025-02-13 NOTE — DISCHARGE NOTE NURSING/CASE MANAGEMENT/SOCIAL WORK - FINANCIAL ASSISTANCE
[facility] provides services at a reduced cost to those who are determined to be eligible through [facility]’s financial assistance program. Information regarding [facility]’s financial assistance program can be found by going to [email or web address] or by calling [phone].

## 2025-02-13 NOTE — PROGRESS NOTE ADULT - PROBLEM SELECTOR PROBLEM 4
Afib
Afib
CAD (coronary artery disease)
Afib
CAD (coronary artery disease)
Afib
CAD (coronary artery disease)

## 2025-02-13 NOTE — DISCHARGE NOTE NURSING/CASE MANAGEMENT/SOCIAL WORK - NSDCPEFALRISK_GEN_ALL_CORE
For information on Fall & Injury Prevention, visit: [email or web address] OR  [email or web address] OR  https://www.cdc.gov/steadi/patient.html

## 2025-02-13 NOTE — PROGRESS NOTE ADULT - PROBLEM SELECTOR PLAN 1
Pt had Cardiac cath done on 2/6/25 with % obstruction  and Cx 100% obstruction with well developed colalterals. Moderate mid LAD ( 40% obstruction) and moderate to severe diagonal  disease ( 70%) ; Elevated filling pressures with marginal CI  Severe AS - peak gradient 63 mean gradient 40, EFRAIN estimated 0.67cm2  Cardiologist recommended : Medical management for CAD, HF evaluation for optimization   CTA c/a/p/coronoaries done     d/w Dr. Serna on 2/11:  given recent infectious issues and persistent RLE wound being followed by podiatry plan to defer intervention at this time. Also pt with vasc access issues which will need to be navigated as well.   ID / Uriel brown appreciated .

## 2025-02-13 NOTE — PROVIDER CONTACT NOTE (MEDICATION) - ASSESSMENT
pt A&Ox4. VSS. No events on tele. No complaints of cp, dizziness or sob or other discomfort. IV site assessed and remains WDL. pt states: "I take these meds after breakfast."

## 2025-02-14 ENCOUNTER — TRANSCRIPTION ENCOUNTER (OUTPATIENT)
Age: 85
End: 2025-02-14

## 2025-02-17 ENCOUNTER — NON-APPOINTMENT (OUTPATIENT)
Age: 85
End: 2025-02-17

## 2025-02-18 ENCOUNTER — APPOINTMENT (OUTPATIENT)
Dept: CARE COORDINATION | Facility: HOME HEALTH | Age: 85
End: 2025-02-18
Payer: MEDICARE

## 2025-02-18 ENCOUNTER — TRANSCRIPTION ENCOUNTER (OUTPATIENT)
Age: 85
End: 2025-02-18

## 2025-02-18 DIAGNOSIS — I48.0 PAROXYSMAL ATRIAL FIBRILLATION: ICD-10-CM

## 2025-02-18 DIAGNOSIS — I87.2 VENOUS INSUFFICIENCY (CHRONIC) (PERIPHERAL): ICD-10-CM

## 2025-02-18 DIAGNOSIS — Z60.2 PROBLEMS RELATED TO LIVING ALONE: ICD-10-CM

## 2025-02-18 DIAGNOSIS — L97.811 VENOUS INSUFFICIENCY (CHRONIC) (PERIPHERAL): ICD-10-CM

## 2025-02-18 DIAGNOSIS — I50.20 UNSPECIFIED SYSTOLIC (CONGESTIVE) HEART FAILURE: ICD-10-CM

## 2025-02-18 PROCEDURE — 99495 TRANSJ CARE MGMT MOD F2F 14D: CPT

## 2025-02-18 SDOH — SOCIAL STABILITY - SOCIAL INSECURITY: PROBLEMS RELATED TO LIVING ALONE: Z60.2

## 2025-02-19 ENCOUNTER — TRANSCRIPTION ENCOUNTER (OUTPATIENT)
Age: 85
End: 2025-02-19

## 2025-02-19 VITALS
OXYGEN SATURATION: 99 % | SYSTOLIC BLOOD PRESSURE: 110 MMHG | DIASTOLIC BLOOD PRESSURE: 62 MMHG | RESPIRATION RATE: 15 BRPM | HEART RATE: 75 BPM

## 2025-02-19 PROBLEM — I50.20 HFREF (HEART FAILURE WITH REDUCED EJECTION FRACTION): Status: ACTIVE | Noted: 2025-02-19

## 2025-02-19 PROBLEM — Z60.2 LIVES ALONE WITH HELP AVAILABLE: Status: ACTIVE | Noted: 2025-02-19

## 2025-02-19 RX ORDER — ONDANSETRON 4 MG/1
4 TABLET, ORALLY DISINTEGRATING ORAL EVERY 8 HOURS
Qty: 15 | Refills: 0 | Status: ACTIVE | COMMUNITY

## 2025-02-19 RX ORDER — PANTOPRAZOLE 40 MG/1
40 TABLET, DELAYED RELEASE ORAL
Qty: 90 | Refills: 0 | Status: ACTIVE | COMMUNITY

## 2025-02-19 RX ORDER — ERGOCALCIFEROL (VITAMIN D2) 50 MCG
50 MCG CAPSULE ORAL
Refills: 0 | Status: ACTIVE | COMMUNITY

## 2025-02-19 RX ORDER — SPIRONOLACTONE 25 MG/1
25 TABLET ORAL DAILY
Qty: 30 | Refills: 0 | Status: ACTIVE | COMMUNITY

## 2025-02-19 RX ORDER — BUMETANIDE 1 MG/1
1 TABLET ORAL DAILY
Refills: 0 | Status: ACTIVE | COMMUNITY

## 2025-02-20 ENCOUNTER — APPOINTMENT (OUTPATIENT)
Age: 85
End: 2025-02-20
Payer: MEDICARE

## 2025-02-20 DIAGNOSIS — I77.1 STRICTURE OF ARTERY: ICD-10-CM

## 2025-02-20 DIAGNOSIS — M79.89 OTHER SPECIFIED SOFT TISSUE DISORDERS: ICD-10-CM

## 2025-02-20 DIAGNOSIS — L98.499 STRICTURE OF ARTERY: ICD-10-CM

## 2025-02-20 DIAGNOSIS — I73.9 PERIPHERAL VASCULAR DISEASE, UNSPECIFIED: ICD-10-CM

## 2025-02-20 PROCEDURE — 97597 DBRDMT OPN WND 1ST 20 CM/<: CPT | Mod: RT,PD

## 2025-02-20 PROCEDURE — 99213 OFFICE O/P EST LOW 20 MIN: CPT | Mod: 25

## 2025-02-21 ENCOUNTER — INPATIENT (INPATIENT)
Facility: HOSPITAL | Age: 85
LOS: 4 days | Discharge: NURSING, REHAB OR LONG-TERM CARE | DRG: 536 | End: 2025-02-26
Attending: SURGERY | Admitting: SURGERY
Payer: MEDICARE

## 2025-02-21 VITALS
HEIGHT: 63 IN | RESPIRATION RATE: 18 BRPM | HEART RATE: 85 BPM | DIASTOLIC BLOOD PRESSURE: 63 MMHG | WEIGHT: 130.07 LBS | TEMPERATURE: 99 F | SYSTOLIC BLOOD PRESSURE: 108 MMHG | OXYGEN SATURATION: 100 %

## 2025-02-21 DIAGNOSIS — T87.44 INFECTION OF AMPUTATION STUMP, LEFT LOWER EXTREMITY: Chronic | ICD-10-CM

## 2025-02-21 DIAGNOSIS — Z95.828 PRESENCE OF OTHER VASCULAR IMPLANTS AND GRAFTS: Chronic | ICD-10-CM

## 2025-02-21 DIAGNOSIS — Z87.81 PERSONAL HISTORY OF (HEALED) TRAUMATIC FRACTURE: Chronic | ICD-10-CM

## 2025-02-21 LAB
ALBUMIN SERPL ELPH-MCNC: 3.5 G/DL — SIGNIFICANT CHANGE UP (ref 3.3–5)
ALP SERPL-CCNC: 99 U/L — SIGNIFICANT CHANGE UP (ref 40–120)
ALT FLD-CCNC: 15 U/L — SIGNIFICANT CHANGE UP (ref 10–45)
ANION GAP SERPL CALC-SCNC: 17 MMOL/L — SIGNIFICANT CHANGE UP (ref 5–17)
APTT BLD: 27.4 SEC — SIGNIFICANT CHANGE UP (ref 24.5–35.6)
AST SERPL-CCNC: 14 U/L — SIGNIFICANT CHANGE UP (ref 10–40)
BASOPHILS # BLD AUTO: 0.08 K/UL — SIGNIFICANT CHANGE UP (ref 0–0.2)
BASOPHILS NFR BLD AUTO: 0.9 % — SIGNIFICANT CHANGE UP (ref 0–2)
BILIRUB SERPL-MCNC: 0.9 MG/DL — SIGNIFICANT CHANGE UP (ref 0.2–1.2)
BLD GP AB SCN SERPL QL: NEGATIVE — SIGNIFICANT CHANGE UP
BUN SERPL-MCNC: 27 MG/DL — HIGH (ref 7–23)
CALCIUM SERPL-MCNC: 9.4 MG/DL — SIGNIFICANT CHANGE UP (ref 8.4–10.5)
CHLORIDE SERPL-SCNC: 95 MMOL/L — LOW (ref 96–108)
CO2 SERPL-SCNC: 26 MMOL/L — SIGNIFICANT CHANGE UP (ref 22–31)
CREAT SERPL-MCNC: 1.16 MG/DL — SIGNIFICANT CHANGE UP (ref 0.5–1.3)
EGFR: 46 ML/MIN/1.73M2 — LOW
EOSINOPHIL # BLD AUTO: 0.07 K/UL — SIGNIFICANT CHANGE UP (ref 0–0.5)
EOSINOPHIL NFR BLD AUTO: 0.8 % — SIGNIFICANT CHANGE UP (ref 0–6)
GLUCOSE SERPL-MCNC: 145 MG/DL — HIGH (ref 70–99)
HCT VFR BLD CALC: 38.3 % — SIGNIFICANT CHANGE UP (ref 34.5–45)
HGB BLD-MCNC: 12.3 G/DL — SIGNIFICANT CHANGE UP (ref 11.5–15.5)
INR BLD: 1.2 RATIO — HIGH (ref 0.85–1.16)
LYMPHOCYTES # BLD AUTO: 0.54 K/UL — LOW (ref 1–3.3)
LYMPHOCYTES # BLD AUTO: 6.1 % — LOW (ref 13–44)
MANUAL SMEAR VERIFICATION: SIGNIFICANT CHANGE UP
MCHC RBC-ENTMCNC: 32.1 G/DL — SIGNIFICANT CHANGE UP (ref 32–36)
MCHC RBC-ENTMCNC: 32.6 PG — SIGNIFICANT CHANGE UP (ref 27–34)
MCV RBC AUTO: 101.6 FL — HIGH (ref 80–100)
MONOCYTES # BLD AUTO: 0.69 K/UL — SIGNIFICANT CHANGE UP (ref 0–0.9)
MONOCYTES NFR BLD AUTO: 7.8 % — SIGNIFICANT CHANGE UP (ref 2–14)
NEUTROPHILS # BLD AUTO: 7.42 K/UL — HIGH (ref 1.8–7.4)
NEUTROPHILS NFR BLD AUTO: 83.5 % — HIGH (ref 43–77)
PLAT MORPH BLD: NORMAL — SIGNIFICANT CHANGE UP
PLATELET # BLD AUTO: 177 K/UL — SIGNIFICANT CHANGE UP (ref 150–400)
POTASSIUM SERPL-MCNC: 4.3 MMOL/L — SIGNIFICANT CHANGE UP (ref 3.5–5.3)
POTASSIUM SERPL-SCNC: 4.3 MMOL/L — SIGNIFICANT CHANGE UP (ref 3.5–5.3)
PROT SERPL-MCNC: 6.7 G/DL — SIGNIFICANT CHANGE UP (ref 6–8.3)
PROTHROM AB SERPL-ACNC: 13.8 SEC — HIGH (ref 9.9–13.4)
RBC # BLD: 3.77 M/UL — LOW (ref 3.8–5.2)
RBC # FLD: 14.3 % — SIGNIFICANT CHANGE UP (ref 10.3–14.5)
RBC BLD AUTO: SIGNIFICANT CHANGE UP
RH IG SCN BLD-IMP: POSITIVE — SIGNIFICANT CHANGE UP
SODIUM SERPL-SCNC: 138 MMOL/L — SIGNIFICANT CHANGE UP (ref 135–145)
VARIANT LYMPHS # BLD: 0.9 % — SIGNIFICANT CHANGE UP (ref 0–6)
VARIANT LYMPHS NFR BLD MANUAL: 0.9 % — SIGNIFICANT CHANGE UP (ref 0–6)
WBC # BLD: 8.89 K/UL — SIGNIFICANT CHANGE UP (ref 3.8–10.5)
WBC # FLD AUTO: 8.89 K/UL — SIGNIFICANT CHANGE UP (ref 3.8–10.5)

## 2025-02-21 PROCEDURE — 71045 X-RAY EXAM CHEST 1 VIEW: CPT | Mod: 26

## 2025-02-21 PROCEDURE — 73502 X-RAY EXAM HIP UNI 2-3 VIEWS: CPT | Mod: 26,LT

## 2025-02-21 PROCEDURE — 99285 EMERGENCY DEPT VISIT HI MDM: CPT | Mod: GC

## 2025-02-21 PROCEDURE — 73552 X-RAY EXAM OF FEMUR 2/>: CPT | Mod: 26,LT

## 2025-02-21 RX ORDER — ACETAMINOPHEN 500 MG/5ML
1000 LIQUID (ML) ORAL ONCE
Refills: 0 | Status: COMPLETED | OUTPATIENT
Start: 2025-02-21 | End: 2025-02-21

## 2025-02-21 RX ADMIN — Medication 1000 MILLIGRAM(S): at 22:05

## 2025-02-21 RX ADMIN — Medication 1000 MILLIGRAM(S): at 22:30

## 2025-02-21 RX ADMIN — Medication 400 MILLIGRAM(S): at 21:50

## 2025-02-21 NOTE — ED PROVIDER NOTE - OBJECTIVE STATEMENT
85-year-old female past medical history of hypertension A-fib on Eliquis diabetes aortic stenosis status post AKA left side presents ED as transfer from [facility] for inferior left orbital wall fracture and left subcapital femoral neck fracture.  Patient presented for mechanical fall around 1 AM was in the bathroom hitting her face on the sink and left hip on ground.  Neighbor arrived around 150 helped her move to the couch called EMS and transferred to [facility].  Denies LOC, is on Eliquis AC, last dose yesterday morning.  Denies prodromal symptoms prior to fall.  At [facility] pan scan and found to have left inferior orbital wall fracture and a left subcapital femoral neck fracture, transferred here for further evaluation.

## 2025-02-21 NOTE — CONSULT NOTE ADULT - SUBJECTIVE AND OBJECTIVE BOX
[facility] DEPARTMENT OF OPHTHALMOLOGY - INITIAL ADULT CONSULT  ----------------------------------------------------------------------------------------------------  Kwaku Rea, PGY-2  Contact: [employer] Teams  ----------------------------------------------------------------------------------------------------    HPI:    Interval History: ***    PAST MEDICAL & SURGICAL HISTORY:  HTN (hypertension)      HLD (hyperlipidemia)      Anxiety      CAD (coronary artery disease)      PAD (peripheral artery disease)      Hypothyroidism      AF (atrial fibrillation)      Carotid artery stenosis      AS (aortic stenosis)      Chronic HFrEF (heart failure with reduced ejection fraction)      Status post closed fracture of right femur      H/O arterial bypass of lower limb      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        Past Ocular History: ***  Ophthalmic Medications: ***  FAMILY HISTORY:  Family history of myocardial infarction (Father, Sibling)      Social History: ***    MEDICATIONS  (STANDING):    MEDICATIONS  (PRN):    Allergies & Intolerances:   No Known Drug Allergies  latex (Unknown)    Review of Systems:  Constitutional: No fever, chills  Eyes: No blurry vision, flashes, floaters, FBS, erythema, discharge, double vision, OU  Neuro: No tremors  Cardiovascular: No chest pain, palpitations  Respiratory: No SOB, no cough  GI: No nausea, vomiting, abdominal pain  : No dysuria  Skin: no rash  Psych: no depression  Endocrine: no polyuria, polydipsia  Heme/lymph: no swelling    VITALS: T(C): 36.9 (02-21-25 @ 20:44)  T(F): 98.4 (02-21-25 @ 20:44), Max: 98.9 (02-21-25 @ 20:09)  HR: 88 (02-21-25 @ 20:44) (85 - 88)  BP: 115/72 (02-21-25 @ 20:44) (108/63 - 115/72)  RR:  (18 - 18)  SpO2:  (99% - 100%)  Wt(kg): --  General: AAO x 3, appropriate mood and affect    Ophthalmology Exam:  Visual acuity (sc): 20/20 OD and 20/20 OS  Pupils: PERRL OU, no RAPD  Ttono: *** OD, *** OS  Extraocular movements (EOMs): Full OU, no pain, no diplopia  Confrontational Visual Field (CVF): Full OD Full OS  Color Plates: 12/12 OD and 12/12 OS    Pen Light Exam (PLE)  External: Flat OU  Lids/Lashes/Lacrimal Ducts: Flat OU    Sclera/Conjunctiva: W+Q OU  Cornea: Cl OU  Anterior Chamber: D+F OU    Iris: Flat OU  Lens: Cl OU    Fundus Exam: dilated with 1% tropicamide and 2.5% phenylephrine  Approval obtained from primary team for dilation  Patient aware that pupils can remained dilated for at least 4-6 hours  Exam performed with 20D lens    Vitreous: clear OU, no vitreous cell seen  Disc, cup/disc: sharp and pink, 0.3 OU  Macula: flat OU  Vessels: normal caliber OU  Periphery: flat OU, no retinal tear, detachment, hole, OU. No commotio OU.    Labs/Imaging:  ***   [facility] DEPARTMENT OF OPHTHALMOLOGY - INITIAL ADULT CONSULT  ----------------------------------------------------------------------------------------------------  Kwaku Rea, PGY-2  Contact: [employer] Teams  ----------------------------------------------------------------------------------------------------    HPI:  85-year-old female past medical history of hypertension A-fib on Eliquis diabetes aortic stenosis status post AKA left side presents ED as transfer from [facility] for inferior left orbital wall fracture and left subcapital femoral neck fracture.  Patient presented for mechanical fall around 1 AM was in the bathroom hitting her face on the sink and left hip on ground.  Neighbor arrived around 150 helped her move to the couch called EMS and transferred to [facility].  Denies LOC, is on Eliquis AC, last dose yesterday morning.  Denies prodromal symptoms prior to fall.  At [facility] pan scan and found to have left inferior orbital wall fracture and a left subcapital femoral neck fracture, transferred here for further evaluation.    Interval History:   Pt is an 86 y/o F w/ a PMHx of HTN, Afib on Eliquis, T2DM, aortic stenosis, s/p left AKA, who is presenting as a transfer from [facility] for a left orbital floor fracture and left femoral neck fracture. Pt states that she had a mechanical fall around 1:00am on 2/21/25 and hit her face on her vanity. Denies LOC. At [facility], pt had a CT scan and the left orbital floor fracture and left femoral neck fracture were located. Pt currently reports mild discomfort around her left eye but otherwise denies blurry vision, double vision, eye pain, or pain with eye movements. States that she had cataract surgery in 2013 and has not seen ophthalmology since.       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 No blurry vision, flashes, floaters, FBS, erythema, discharge, double vision, OU  Neuro: No tremors  Cardiovascular: No chest pain, palpitations  Respiratory: No SOB, no cough  GI: No nausea, vomiting, abdominal pain  : No dysuria  Skin: no rash  Psych: no depression  Endocrine: no polyuria, polydipsia  Heme/lymph: no swelling    VITALS: T(C): 36.9 (02-21-25 @ 20:44)  T(F): 98.4 (02-21-25 @ 20:44), Max: 98.9 (02-21-25 @ 20:09)  HR: 88 (02-21-25 @ 20:44) (85 - 88)  BP: 115/72 (02-21-25 @ 20:44) (108/63 - 115/72)  RR:  (18 - 18)  SpO2:  (99% - 100%)  Wt(kg): --  General: AAO x 3, appropriate mood and affect    Ophthalmology Exam:  Visual acuity (cc): 20/25-2 OD and 20/30 OS  Pupils: PERRL OU, no RAPD  Ttono: 17 OD, 17 OS  Extraocular movements (EOMs): Full OU, no pain, no diplopia  Confrontational Visual Field (CVF): Full OD Full OS  Color Plates: 12/12 OD and 12/12 OS    Pen Light Exam (PLE)  External: Flat OD, mild left periorbital edema and ecchymosis  Lids/Lashes/Lacrimal Ducts: Flat OU    Sclera/Conjunctiva: W+Q OU  Cornea: Cl OU  Anterior Chamber: D+F OU    Iris: Flat OU  Lens: PCIOL OU    Fundus Exam: dilated with 1% tropicamide and 2.5% phenylephrine  Approval obtained from primary team for dilation  Patient aware that pupils can remained dilated for at least 4-6 hours  Exam performed with 20D lens    Vitreous: clear OU, no vitreous cell seen  Disc, cup/disc: sharp and pink, 0.3 OU  Macula: flat OU  Vessels: normal caliber OU  Periphery: flat OU, no retinal tear, detachment, hole, OU. No commotio OU.    Labs/Imaging:  CT scan from [facility] reportedly with left orbital floor fracture and left femoral fracture. Disc being uploaded by primary team.

## 2025-02-21 NOTE — CONSULT NOTE ADULT - ASSESSMENT
Assessment and Recommendations:  85y female with a PMHx of HTN, Afib on Eliquis, T2DM, aortic stenosis, s/p left AKA, consulted for left orbital floor fracture seen on CT scan from [facility], found to have no clinical evidence of entrapment.    # Left Orbital Floor Fracture  - Pt presenting after mechanical fall at 1:00am on 2/21/25 where she hit her head on her vanity in the bathroom. Denies blurry vision, double vision, pain with eye movements.  - VA 20/25 OD 20/30 OS; no APD; IOP wnl; EOMs full and intact; VF full OD full OS; Color plates full OD full OS   - Anterior exam w/ mild left periorbital edema and ecchymosis. No enophthalmos  - DFE w/ no acute findings  - CT scan from [facility] reportedly with left orbital floor fracture and left femoral fracture. Disc being uploaded by primary team.  - No clinical signs of entrapment  - Recommend OMFS consult  - Recommend antibiotic treatment as per primary team  - Recommend ice packs to eyelids for 20 minutes every 1-2 hours for first 24-48 hours  - Patient should avoid blowing her nose  - Recommend head of bed elevation to 30 degrees when at rest  - Patient counseled to report if extraocular movement pain or restriction, or diplopia develop   - Pt to follow up with outpatient ophthalmology at the address listed below in 1-2 weeks     Discussed with Dr. Azevedo, chief resident.      Outpatient Follow-up: Patient should follow-up with his/her ophthalmologist or with [facility] Department of Ophthalmology within 1 week of after discharge at:    [address]  [place] [address]  [phone]    Kwaku Rea MD, PGY-2  Contact: [employer] Teams

## 2025-02-21 NOTE — H&P ADULT - ASSESSMENT
84F PMHx CAD, PAD s/p LLE Bypass cb thrombosis s/p L AKA, DM, HFrEF (23%), severe AS, AF on Eliquis, hypothyroidism, HTN, HLD who presents to [facility] ED s/p Mx Fall.     Injuries include:   -L Orbital Wall Fx   -L Femoral Neck Fx     Plan:   -Admit to Trauma   -MM Pain Control   -NPO @ [facility]   -Hold IVF given HFrEF  -Appreciate Ortho recs, plan for Operative repair of L Femoral Fx in AM   -Appreciate Optho eval, recs pending   -Tert in AM   -SCD, hold Chem VTE ppx pending Optho recs     Discussed with Attending Surgeon Dr. Kenji Villegas MD PGY4   ACS, [id number]

## 2025-02-21 NOTE — H&P ADULT - NSHPLABSRESULTS_GEN_ALL_CORE
12.3   8.89  )-----------( 177      ( 21 Feb 2025 22:32 )             38.3    02-21    138  |  95[L]  |  27[H]  ----------------------------<  145[H]  4.3   |  26  |  1.16    Ca    9.4      21 Feb 2025 22:32    TPro  6.7  /  Alb  3.5  /  TBili  0.9  /  DBili  x   /  AST  14  /  ALT  15  /  AlkPhos  99  02-21    < from: Xray Femur 2 Views, Left (02.21.25 @ 22:32) >    IMPRESSION:  Acute mildly impacted left transcervical femoral neck fracture.  Slight anterior displacement of the distal fracture fragment crosstable   lateral view.  Status post mid shaft femoral above-the-knee amputation. No radiographic   evidence of osteomyelitis.        ******PRELIMINARY REPORT******      ******PRELIMINARY REPORT******     < end of copied text >

## 2025-02-21 NOTE — ED PROVIDER NOTE - CLINICAL SUMMARY MEDICAL DECISION MAKING FREE TEXT BOX
85-year-old female past medical history of hypertension A-fib on Eliquis diabetes aortic stenosis status post AKA left side presents ED as transfer from [facility] for inferior left orbital wall fracture and left subcapital femoral neck fracture.  Patient presented for mechanical fall around 1 AM was in the bathroom hitting her face on the sink and left hip on ground.  Neighbor arrived around 150 helped her move to the couch called EMS and transferred to [facility].  Denies LOC, is on Eliquis AC, last dose yesterday morning.  Denies prodromal symptoms prior to fall.  At [facility] pan scan and found to have left inferior orbital wall fracture and a left subcapital femoral neck fracture, transferred here for further evaluation.    VSS. Clinically stable. EKG wnl w no ST elevations or T wave inversions. PE, well appearing, no acute distress, AAOx3. NC L periorbital echymosis, PERRL, EOMI, normal conjunctiva, mucous membranes moist, LCTAB no w/r/c, no MRG, RRR, abd NDNT, no rebound tenderness or guarding, no CVA ttp, no focal neuro deficits, neurovascularly intact, L AKA, moderate l lateral femoral neck ttp, minimal ROM 2/2 pain. Inferior oribtal wall fx w no evidence of entraptment, L fem neck fx, will call ortho spine and trauma, will get rpt xrays, dispo likely admission

## 2025-02-21 NOTE — ED PROVIDER NOTE - PROGRESS NOTE DETAILS
Tal Bailon MD: My independent interpretation of the EKG shows:  Normal sinus rhythm with rate of 77 bpm, left axis deviation, no ST elevations or depressions.  QTc prolonged at 509 Sharlene Perales, PGY2    fs optho trauma ortho paged, will eval.

## 2025-02-21 NOTE — ED PROVIDER NOTE - PHYSICAL EXAMINATION
Gen: AAOx3, non-toxic  Head: NCAT  HEENT: PERRL, EOMI w no signs of entrapment, oral mucosa moist, normal conjunctiva. periorbital echymosis   Lung: CTAB, no respiratory distress, no wheezes/rhonchi/rales B/L,   CV: RRR, no murmurs, rubs or gallops  Abd: soft, NTND, no guarding, no CVA tenderness  MSK: L AKA, mild L lateral fem neck ttp, limited ROM 2/2 pain, full ROM of RLE,   Neuro: No focal sensory or motor deficits  Skin: L periorbital echymosis   Psych: normal affect.

## 2025-02-21 NOTE — ED PROVIDER NOTE - ATTENDING CONTRIBUTION TO CARE
I, Tal Bailon MD, Emergency Medicine Attending Physician, personally saw and examined the patient and I personally made/approve the management plan and take responsibility for the patient management.    MDM: 85-year-old female with history as seen in HPI above, who was transferred from [facility] for inferior left orbital wall fracture and left subcapital femoral neck fracture after mechanical fall.    On exam, patient is awake and alert and oriented x 3, eyes PERRLA, EOMI without diplopia or discomfort, but there is periorbital ecchymosis.  MSK exam with decreased range of motion of the left lower extremity.    Reviewed imaging from [facility], consulted with ophthalmology, orthopedics, trauma and OMFS.  Signed out at 2300 pending consultant recommendations and close reassessments for further treatment and admission.

## 2025-02-21 NOTE — H&P ADULT - NSHPPHYSICALEXAM_GEN_ALL_CORE
General: NAD  Back: non ttp  Neck: non ttp  Neuro: A/Ox4  HEENT: L periorbital ecchymosis, PERRLA   Respiratory: RA, no increased work of breathing  CV: RRR  Abdomen: Soft, Non distended, non tender to palpation without rebound tenderness/guarding/rigidity  Pelvis: stable AP Lateral  Extremities: WWP, LLE AKA, TTP L Hip, RLE w dressing in place over Foot, non saturated  Vascular: b/l radial pulses palpable   Skin: intact, w/o breakdown

## 2025-02-21 NOTE — ED ADULT NURSE NOTE - CHIEF COMPLAINT QUOTE
transferred [place] [facility] for L inferior orbital fracture, L femoral neck fracture s/p fall at 1am this morning. +Eliquis.

## 2025-02-21 NOTE — H&P ADULT - HISTORY OF PRESENT ILLNESS
84F PMHx CAD, PAD s/p LLE Bypass cb thrombosis s/p L AKA, DM, HFrEF (23%), severe AS, AF on Eliquis, hypothyroidism, HTN, HLD who presents to [facility] ED s/p Mx Fall. Pt reports she was in restroom when she slipped and fell on her bathroom tile. + HS, -LOC. Currently reports pain in L hip and L face. Denies headache, vision changes, neck pain, abdominal pain, CP, SOB, fevers, chills, nausea, vomiting, LE pain, back pain, dysuria, hematuria, melena, hematochezia.     HDS in ED. Afebrile.

## 2025-02-21 NOTE — ED ADULT TRIAGE NOTE - BP NONINVASIVE DIASTOLIC (MM HG)
Chronic problem well controlled on current medications. Does not monitor blood pressure at home. Denies any severe headache, dizziness, vision changes, chest pain, QUIÑONES, palpitations, or lower extremity edema     63

## 2025-02-21 NOTE — ED ADULT TRIAGE NOTE - CHIEF COMPLAINT QUOTE
transferred from [facility] [place] for L inferior orbital fracture, L femoral neck fracture s/p fall at 1am this morning. +Eliquis.

## 2025-02-21 NOTE — ED ADULT NURSE NOTE - OBJECTIVE STATEMENT
THe pt is a 85 Y F with a PMH of Pre DM and HTN and stents on ac. PT is AOx4 breathing evenly on room air and able to speak in full sentences. Pt came in via transfer from [facility] sp fall yesterday at home. pt is on ac denies loc pt has head strike, L wrist noted to have abrasion pt was given Zofran from em and I [facility] pt had ct scan L 18 G iv placed. PT has a  L orbital fx with bruising noted and L Femoral head fx. pt was transferred with VSS. pt was placed in a stretcher with comfort and safety measures provided. pt is with  at bedside. pt has POA who is not at bedside upon arrival to [facility] ER

## 2025-02-22 ENCOUNTER — TRANSCRIPTION ENCOUNTER (OUTPATIENT)
Age: 85
End: 2025-02-22

## 2025-02-22 ENCOUNTER — RESULT REVIEW (OUTPATIENT)
Age: 85
End: 2025-02-22

## 2025-02-22 DIAGNOSIS — I25.10 ATHEROSCLEROTIC HEART DISEASE OF NATIVE CORONARY ARTERY WITHOUT ANGINA PECTORIS: ICD-10-CM

## 2025-02-22 DIAGNOSIS — S72.002A FRACTURE OF UNSPECIFIED PART OF NECK OF LEFT FEMUR, INITIAL ENCOUNTER FOR CLOSED FRACTURE: ICD-10-CM

## 2025-02-22 DIAGNOSIS — S72.009A FRACTURE OF UNSPECIFIED PART OF NECK OF UNSPECIFIED FEMUR, INITIAL ENCOUNTER FOR CLOSED FRACTURE: ICD-10-CM

## 2025-02-22 DIAGNOSIS — I48.0 PAROXYSMAL ATRIAL FIBRILLATION: ICD-10-CM

## 2025-02-22 DIAGNOSIS — E03.9 HYPOTHYROIDISM, UNSPECIFIED: ICD-10-CM

## 2025-02-22 DIAGNOSIS — Z01.818 ENCOUNTER FOR OTHER PREPROCEDURAL EXAMINATION: ICD-10-CM

## 2025-02-22 DIAGNOSIS — S02.30XA FRACTURE OF ORBITAL FLOOR, UNSPECIFIED SIDE, INITIAL ENCOUNTER FOR CLOSED FRACTURE: ICD-10-CM

## 2025-02-22 DIAGNOSIS — E11.9 TYPE 2 DIABETES MELLITUS WITHOUT COMPLICATIONS: ICD-10-CM

## 2025-02-22 LAB
ANION GAP SERPL CALC-SCNC: 16 MMOL/L — SIGNIFICANT CHANGE UP (ref 5–17)
ANION GAP SERPL CALC-SCNC: 17 MMOL/L — SIGNIFICANT CHANGE UP (ref 5–17)
APTT BLD: 28.3 SEC — SIGNIFICANT CHANGE UP (ref 24.5–35.6)
BLD GP AB SCN SERPL QL: NEGATIVE — SIGNIFICANT CHANGE UP
BUN SERPL-MCNC: 30 MG/DL — HIGH (ref 7–23)
BUN SERPL-MCNC: 30 MG/DL — HIGH (ref 7–23)
CALCIUM SERPL-MCNC: 8.9 MG/DL — SIGNIFICANT CHANGE UP (ref 8.4–10.5)
CALCIUM SERPL-MCNC: 9 MG/DL — SIGNIFICANT CHANGE UP (ref 8.4–10.5)
CHLORIDE SERPL-SCNC: 94 MMOL/L — LOW (ref 96–108)
CHLORIDE SERPL-SCNC: 95 MMOL/L — LOW (ref 96–108)
CO2 SERPL-SCNC: 24 MMOL/L — SIGNIFICANT CHANGE UP (ref 22–31)
CO2 SERPL-SCNC: 25 MMOL/L — SIGNIFICANT CHANGE UP (ref 22–31)
CREAT SERPL-MCNC: 1.13 MG/DL — SIGNIFICANT CHANGE UP (ref 0.5–1.3)
CREAT SERPL-MCNC: 1.23 MG/DL — SIGNIFICANT CHANGE UP (ref 0.5–1.3)
EGFR: 43 ML/MIN/1.73M2 — LOW
EGFR: 48 ML/MIN/1.73M2 — LOW
GLUCOSE BLDC GLUCOMTR-MCNC: 108 MG/DL — HIGH (ref 70–99)
GLUCOSE BLDC GLUCOMTR-MCNC: 110 MG/DL — HIGH (ref 70–99)
GLUCOSE BLDC GLUCOMTR-MCNC: 112 MG/DL — HIGH (ref 70–99)
GLUCOSE BLDC GLUCOMTR-MCNC: 126 MG/DL — HIGH (ref 70–99)
GLUCOSE SERPL-MCNC: 107 MG/DL — HIGH (ref 70–99)
GLUCOSE SERPL-MCNC: 161 MG/DL — HIGH (ref 70–99)
HCT VFR BLD CALC: 36.6 % — SIGNIFICANT CHANGE UP (ref 34.5–45)
HCT VFR BLD CALC: 37.4 % — SIGNIFICANT CHANGE UP (ref 34.5–45)
HGB BLD-MCNC: 11.9 G/DL — SIGNIFICANT CHANGE UP (ref 11.5–15.5)
HGB BLD-MCNC: 12.2 G/DL — SIGNIFICANT CHANGE UP (ref 11.5–15.5)
INR BLD: 1.25 RATIO — HIGH (ref 0.85–1.16)
MAGNESIUM SERPL-MCNC: 2.2 MG/DL — SIGNIFICANT CHANGE UP (ref 1.6–2.6)
MCHC RBC-ENTMCNC: 32.5 G/DL — SIGNIFICANT CHANGE UP (ref 32–36)
MCHC RBC-ENTMCNC: 32.6 G/DL — SIGNIFICANT CHANGE UP (ref 32–36)
MCHC RBC-ENTMCNC: 32.9 PG — SIGNIFICANT CHANGE UP (ref 27–34)
MCHC RBC-ENTMCNC: 33.2 PG — SIGNIFICANT CHANGE UP (ref 27–34)
MCV RBC AUTO: 101.1 FL — HIGH (ref 80–100)
MCV RBC AUTO: 101.9 FL — HIGH (ref 80–100)
NRBC BLD AUTO-RTO: 0 /100 WBCS — SIGNIFICANT CHANGE UP (ref 0–0)
NRBC BLD AUTO-RTO: 0 /100 WBCS — SIGNIFICANT CHANGE UP (ref 0–0)
PHOSPHATE SERPL-MCNC: 3.8 MG/DL — SIGNIFICANT CHANGE UP (ref 2.5–4.5)
PLATELET # BLD AUTO: 158 K/UL — SIGNIFICANT CHANGE UP (ref 150–400)
PLATELET # BLD AUTO: 162 K/UL — SIGNIFICANT CHANGE UP (ref 150–400)
POTASSIUM SERPL-MCNC: 3.9 MMOL/L — SIGNIFICANT CHANGE UP (ref 3.5–5.3)
POTASSIUM SERPL-MCNC: 4 MMOL/L — SIGNIFICANT CHANGE UP (ref 3.5–5.3)
POTASSIUM SERPL-SCNC: 3.9 MMOL/L — SIGNIFICANT CHANGE UP (ref 3.5–5.3)
POTASSIUM SERPL-SCNC: 4 MMOL/L — SIGNIFICANT CHANGE UP (ref 3.5–5.3)
PROTHROM AB SERPL-ACNC: 14.4 SEC — HIGH (ref 9.9–13.4)
RBC # BLD: 3.62 M/UL — LOW (ref 3.8–5.2)
RBC # BLD: 3.67 M/UL — LOW (ref 3.8–5.2)
RBC # FLD: 14.3 % — SIGNIFICANT CHANGE UP (ref 10.3–14.5)
RBC # FLD: 14.4 % — SIGNIFICANT CHANGE UP (ref 10.3–14.5)
RH IG SCN BLD-IMP: POSITIVE — SIGNIFICANT CHANGE UP
SODIUM SERPL-SCNC: 135 MMOL/L — SIGNIFICANT CHANGE UP (ref 135–145)
SODIUM SERPL-SCNC: 136 MMOL/L — SIGNIFICANT CHANGE UP (ref 135–145)
WBC # BLD: 7.96 K/UL — SIGNIFICANT CHANGE UP (ref 3.8–10.5)
WBC # BLD: 9.06 K/UL — SIGNIFICANT CHANGE UP (ref 3.8–10.5)
WBC # FLD AUTO: 7.96 K/UL — SIGNIFICANT CHANGE UP (ref 3.8–10.5)
WBC # FLD AUTO: 9.06 K/UL — SIGNIFICANT CHANGE UP (ref 3.8–10.5)

## 2025-02-22 PROCEDURE — 99222 1ST HOSP IP/OBS MODERATE 55: CPT

## 2025-02-22 PROCEDURE — 93306 TTE W/DOPPLER COMPLETE: CPT | Mod: 26

## 2025-02-22 PROCEDURE — 99223 1ST HOSP IP/OBS HIGH 75: CPT

## 2025-02-22 DEVICE — GWIRE THRD TRC PT 2.8X300MM: Type: IMPLANTABLE DEVICE | Site: LEFT | Status: FUNCTIONAL

## 2025-02-22 DEVICE — IMPLANTABLE DEVICE: Type: IMPLANTABLE DEVICE | Site: LEFT | Status: FUNCTIONAL

## 2025-02-22 DEVICE — SCREW CANN 16MM THREAD 6.5X90MM: Type: IMPLANTABLE DEVICE | Site: LEFT | Status: FUNCTIONAL

## 2025-02-22 RX ORDER — DEXTROSE 50 % IN WATER 50 %
25 SYRINGE (ML) INTRAVENOUS ONCE
Refills: 0 | Status: DISCONTINUED | OUTPATIENT
Start: 2025-02-22 | End: 2025-02-26

## 2025-02-22 RX ORDER — OXYCODONE HYDROCHLORIDE 30 MG/1
2.5 TABLET ORAL EVERY 6 HOURS
Refills: 0 | Status: DISCONTINUED | OUTPATIENT
Start: 2025-02-22 | End: 2025-02-26

## 2025-02-22 RX ORDER — BUMETANIDE 1 MG/1
1 TABLET ORAL EVERY 24 HOURS
Refills: 0 | Status: DISCONTINUED | OUTPATIENT
Start: 2025-02-22 | End: 2025-02-26

## 2025-02-22 RX ORDER — SODIUM CHLORIDE 9 G/1000ML
1000 INJECTION, SOLUTION INTRAVENOUS
Refills: 0 | Status: DISCONTINUED | OUTPATIENT
Start: 2025-02-22 | End: 2025-02-22

## 2025-02-22 RX ORDER — HYDROMORPHONE/SOD CHLOR,ISO/PF 2 MG/10 ML
0.5 SYRINGE (ML) INJECTION
Refills: 0 | Status: DISCONTINUED | OUTPATIENT
Start: 2025-02-22 | End: 2025-02-22

## 2025-02-22 RX ORDER — HYDROMORPHONE/SOD CHLOR,ISO/PF 2 MG/10 ML
0.25 SYRINGE (ML) INJECTION
Refills: 0 | Status: DISCONTINUED | OUTPATIENT
Start: 2025-02-22 | End: 2025-02-22

## 2025-02-22 RX ORDER — INSULIN LISPRO 100 U/ML
INJECTION, SOLUTION INTRAVENOUS; SUBCUTANEOUS
Refills: 0 | Status: DISCONTINUED | OUTPATIENT
Start: 2025-02-22 | End: 2025-02-26

## 2025-02-22 RX ORDER — LOSARTAN POTASSIUM 100 MG/1
25 TABLET, FILM COATED ORAL EVERY 24 HOURS
Refills: 0 | Status: DISCONTINUED | OUTPATIENT
Start: 2025-02-22 | End: 2025-02-26

## 2025-02-22 RX ORDER — SPIRONOLACTONE 25 MG
25 TABLET ORAL EVERY 24 HOURS
Refills: 0 | Status: DISCONTINUED | OUTPATIENT
Start: 2025-02-21 | End: 2025-02-22

## 2025-02-22 RX ORDER — GLUCAGON 3 MG/1
1 POWDER NASAL ONCE
Refills: 0 | Status: DISCONTINUED | OUTPATIENT
Start: 2025-02-22 | End: 2025-02-26

## 2025-02-22 RX ORDER — SODIUM CHLORIDE 9 G/1000ML
1000 INJECTION, SOLUTION INTRAVENOUS
Refills: 0 | Status: DISCONTINUED | OUTPATIENT
Start: 2025-02-22 | End: 2025-02-26

## 2025-02-22 RX ORDER — DEXTROSE 50 % IN WATER 50 %
15 SYRINGE (ML) INTRAVENOUS ONCE
Refills: 0 | Status: DISCONTINUED | OUTPATIENT
Start: 2025-02-22 | End: 2025-02-26

## 2025-02-22 RX ORDER — LEVOTHYROXINE SODIUM 300 MCG
50 TABLET ORAL DAILY
Refills: 0 | Status: DISCONTINUED | OUTPATIENT
Start: 2025-02-21 | End: 2025-02-22

## 2025-02-22 RX ORDER — DEXTROSE 50 % IN WATER 50 %
12.5 SYRINGE (ML) INTRAVENOUS ONCE
Refills: 0 | Status: DISCONTINUED | OUTPATIENT
Start: 2025-02-22 | End: 2025-02-22

## 2025-02-22 RX ORDER — ROSUVASTATIN CALCIUM 20 MG/1
5 TABLET, FILM COATED ORAL AT BEDTIME
Refills: 0 | Status: DISCONTINUED | OUTPATIENT
Start: 2025-02-21 | End: 2025-02-22

## 2025-02-22 RX ORDER — AMIODARONE HYDROCHLORIDE 50 MG/ML
100 INJECTION, SOLUTION INTRAVENOUS EVERY 24 HOURS
Refills: 0 | Status: DISCONTINUED | OUTPATIENT
Start: 2025-02-21 | End: 2025-02-22

## 2025-02-22 RX ORDER — LOSARTAN POTASSIUM 100 MG/1
25 TABLET, FILM COATED ORAL EVERY 24 HOURS
Refills: 0 | Status: DISCONTINUED | OUTPATIENT
Start: 2025-02-21 | End: 2025-02-22

## 2025-02-22 RX ORDER — MELATONIN 5 MG
3 TABLET ORAL AT BEDTIME
Refills: 0 | Status: DISCONTINUED | OUTPATIENT
Start: 2025-02-22 | End: 2025-02-26

## 2025-02-22 RX ORDER — DEXTROSE 50 % IN WATER 50 %
25 SYRINGE (ML) INTRAVENOUS ONCE
Refills: 0 | Status: DISCONTINUED | OUTPATIENT
Start: 2025-02-22 | End: 2025-02-22

## 2025-02-22 RX ORDER — ACETAMINOPHEN 500 MG/5ML
975 LIQUID (ML) ORAL EVERY 8 HOURS
Refills: 0 | Status: DISCONTINUED | OUTPATIENT
Start: 2025-02-22 | End: 2025-02-26

## 2025-02-22 RX ORDER — TRAMADOL HYDROCHLORIDE 50 MG/1
50 TABLET, FILM COATED ORAL EVERY 6 HOURS
Refills: 0 | Status: DISCONTINUED | OUTPATIENT
Start: 2025-02-22 | End: 2025-02-26

## 2025-02-22 RX ORDER — GLUCAGON 3 MG/1
1 POWDER NASAL ONCE
Refills: 0 | Status: DISCONTINUED | OUTPATIENT
Start: 2025-02-22 | End: 2025-02-22

## 2025-02-22 RX ORDER — ENOXAPARIN SODIUM 100 MG/ML
40 INJECTION SUBCUTANEOUS EVERY 24 HOURS
Refills: 0 | Status: DISCONTINUED | OUTPATIENT
Start: 2025-02-23 | End: 2025-02-24

## 2025-02-22 RX ORDER — BUMETANIDE 1 MG/1
1 TABLET ORAL EVERY 24 HOURS
Refills: 0 | Status: DISCONTINUED | OUTPATIENT
Start: 2025-02-21 | End: 2025-02-22

## 2025-02-22 RX ORDER — DEXTROSE 50 % IN WATER 50 %
15 SYRINGE (ML) INTRAVENOUS ONCE
Refills: 0 | Status: DISCONTINUED | OUTPATIENT
Start: 2025-02-22 | End: 2025-02-22

## 2025-02-22 RX ORDER — POVIDONE-IODINE 7.5 %
1 SOLUTION, NON-ORAL TOPICAL ONCE
Refills: 0 | Status: DISCONTINUED | OUTPATIENT
Start: 2025-02-22 | End: 2025-02-22

## 2025-02-22 RX ORDER — MAGNESIUM HYDROXIDE 400 MG/5ML
30 SUSPENSION ORAL DAILY
Refills: 0 | Status: DISCONTINUED | OUTPATIENT
Start: 2025-02-22 | End: 2025-02-26

## 2025-02-22 RX ORDER — INSULIN LISPRO 100 U/ML
INJECTION, SOLUTION INTRAVENOUS; SUBCUTANEOUS AT BEDTIME
Refills: 0 | Status: DISCONTINUED | OUTPATIENT
Start: 2025-02-22 | End: 2025-02-26

## 2025-02-22 RX ORDER — ASPIRIN 325 MG
81 TABLET ORAL DAILY
Refills: 0 | Status: DISCONTINUED | OUTPATIENT
Start: 2025-02-22 | End: 2025-02-22

## 2025-02-22 RX ORDER — SENNA 187 MG
2 TABLET ORAL AT BEDTIME
Refills: 0 | Status: DISCONTINUED | OUTPATIENT
Start: 2025-02-22 | End: 2025-02-26

## 2025-02-22 RX ORDER — ACETAMINOPHEN 500 MG/5ML
1000 LIQUID (ML) ORAL EVERY 6 HOURS
Refills: 0 | Status: DISCONTINUED | OUTPATIENT
Start: 2025-02-22 | End: 2025-02-22

## 2025-02-22 RX ORDER — METOPROLOL SUCCINATE 50 MG/1
50 TABLET, EXTENDED RELEASE ORAL EVERY 24 HOURS
Refills: 0 | Status: DISCONTINUED | OUTPATIENT
Start: 2025-02-21 | End: 2025-02-22

## 2025-02-22 RX ORDER — ROSUVASTATIN CALCIUM 20 MG/1
5 TABLET, FILM COATED ORAL AT BEDTIME
Refills: 0 | Status: DISCONTINUED | OUTPATIENT
Start: 2025-02-22 | End: 2025-02-26

## 2025-02-22 RX ORDER — METOPROLOL SUCCINATE 50 MG/1
50 TABLET, EXTENDED RELEASE ORAL EVERY 24 HOURS
Refills: 0 | Status: DISCONTINUED | OUTPATIENT
Start: 2025-02-22 | End: 2025-02-26

## 2025-02-22 RX ORDER — ONDANSETRON HCL/PF 4 MG/2 ML
4 VIAL (ML) INJECTION ONCE
Refills: 0 | Status: DISCONTINUED | OUTPATIENT
Start: 2025-02-22 | End: 2025-02-22

## 2025-02-22 RX ORDER — INSULIN LISPRO 100 U/ML
INJECTION, SOLUTION INTRAVENOUS; SUBCUTANEOUS EVERY 6 HOURS
Refills: 0 | Status: DISCONTINUED | OUTPATIENT
Start: 2025-02-22 | End: 2025-02-22

## 2025-02-22 RX ORDER — TRAMADOL HYDROCHLORIDE 50 MG/1
50 TABLET, FILM COATED ORAL EVERY 6 HOURS
Refills: 0 | Status: DISCONTINUED | OUTPATIENT
Start: 2025-02-22 | End: 2025-02-22

## 2025-02-22 RX ORDER — SPIRONOLACTONE 25 MG
25 TABLET ORAL EVERY 24 HOURS
Refills: 0 | Status: DISCONTINUED | OUTPATIENT
Start: 2025-02-22 | End: 2025-02-24

## 2025-02-22 RX ORDER — TRAMADOL HYDROCHLORIDE 50 MG/1
25 TABLET, FILM COATED ORAL EVERY 6 HOURS
Refills: 0 | Status: DISCONTINUED | OUTPATIENT
Start: 2025-02-22 | End: 2025-02-22

## 2025-02-22 RX ORDER — LEVOTHYROXINE SODIUM 300 MCG
50 TABLET ORAL DAILY
Refills: 0 | Status: DISCONTINUED | OUTPATIENT
Start: 2025-02-22 | End: 2025-02-26

## 2025-02-22 RX ORDER — POLYETHYLENE GLYCOL 3350 17 G/17G
17 POWDER, FOR SOLUTION ORAL DAILY
Refills: 0 | Status: DISCONTINUED | OUTPATIENT
Start: 2025-02-22 | End: 2025-02-26

## 2025-02-22 RX ORDER — AMIODARONE HYDROCHLORIDE 50 MG/ML
100 INJECTION, SOLUTION INTRAVENOUS EVERY 24 HOURS
Refills: 0 | Status: DISCONTINUED | OUTPATIENT
Start: 2025-02-22 | End: 2025-02-26

## 2025-02-22 RX ORDER — ONDANSETRON HCL/PF 4 MG/2 ML
4 VIAL (ML) INJECTION EVERY 6 HOURS
Refills: 0 | Status: DISCONTINUED | OUTPATIENT
Start: 2025-02-22 | End: 2025-02-26

## 2025-02-22 RX ORDER — OXYCODONE HYDROCHLORIDE 30 MG/1
5 TABLET ORAL EVERY 6 HOURS
Refills: 0 | Status: DISCONTINUED | OUTPATIENT
Start: 2025-02-22 | End: 2025-02-26

## 2025-02-22 RX ORDER — ONDANSETRON HCL/PF 4 MG/2 ML
4 VIAL (ML) INJECTION EVERY 8 HOURS
Refills: 0 | Status: DISCONTINUED | OUTPATIENT
Start: 2025-02-21 | End: 2025-02-22

## 2025-02-22 RX ORDER — CEFAZOLIN SODIUM IN 0.9 % NACL 3 G/100 ML
2000 INTRAVENOUS SOLUTION, PIGGYBACK (ML) INTRAVENOUS EVERY 8 HOURS
Refills: 0 | Status: COMPLETED | OUTPATIENT
Start: 2025-02-22 | End: 2025-02-23

## 2025-02-22 RX ORDER — DEXTROSE 50 % IN WATER 50 %
12.5 SYRINGE (ML) INTRAVENOUS ONCE
Refills: 0 | Status: DISCONTINUED | OUTPATIENT
Start: 2025-02-22 | End: 2025-02-26

## 2025-02-22 RX ORDER — ASPIRIN 325 MG
81 TABLET ORAL DAILY
Refills: 0 | Status: DISCONTINUED | OUTPATIENT
Start: 2025-02-22 | End: 2025-02-26

## 2025-02-22 RX ADMIN — Medication 5000 UNIT(S): at 05:23

## 2025-02-22 RX ADMIN — Medication 0.5 MILLIGRAM(S): at 20:00

## 2025-02-22 RX ADMIN — SODIUM CHLORIDE 100 MILLILITER(S): 9 INJECTION, SOLUTION INTRAVENOUS at 03:00

## 2025-02-22 RX ADMIN — Medication 0.5 MILLIGRAM(S): at 19:27

## 2025-02-22 RX ADMIN — SODIUM CHLORIDE 100 MILLILITER(S): 9 INJECTION, SOLUTION INTRAVENOUS at 12:21

## 2025-02-22 RX ADMIN — Medication 400 MILLIGRAM(S): at 05:23

## 2025-02-22 RX ADMIN — Medication 400 MILLIGRAM(S): at 11:55

## 2025-02-22 RX ADMIN — ROSUVASTATIN CALCIUM 5 MILLIGRAM(S): 20 TABLET, FILM COATED ORAL at 21:50

## 2025-02-22 RX ADMIN — Medication 4 MILLIGRAM(S): at 11:56

## 2025-02-22 RX ADMIN — Medication 2 TABLET(S): at 21:49

## 2025-02-22 RX ADMIN — Medication 81 MILLIGRAM(S): at 11:51

## 2025-02-22 RX ADMIN — Medication 1000 MILLIGRAM(S): at 06:13

## 2025-02-22 RX ADMIN — Medication 50 MICROGRAM(S): at 05:23

## 2025-02-22 RX ADMIN — Medication 40 MILLIGRAM(S): at 05:23

## 2025-02-22 RX ADMIN — Medication 1000 MILLIGRAM(S): at 12:25

## 2025-02-22 NOTE — CONSULT NOTE ADULT - SUBJECTIVE AND OBJECTIVE BOX
HPI  85yFemale w/ Afib (eliquis), DM, AS, L AKA c/o L hip pain s/p mechanical fall earlier today after slipping on tile floor. Patient has residual limb on LLE, however, has been able to range her hip joint with tolerable pain since fall. +headstrike with orbital floor fracture. Denies numbness/tingling in the LLE. Denies any other trauma/injuries at this time. At baseline,home ambulator with prosthesis and walker.     ROS  Negative unless otherwise specified in HPI.    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          MEDICATIONS  Home Medications:  amiodarone 200 mg oral tablet: 0.5 tab(s) orally once a day (06 Feb 2025 07:49)  aspirin 81 mg oral delayed release tablet: 1 tab(s) orally once a day (06 Feb 2025 07:49)  cholecalciferol 125 mcg (5000 intl units) oral tablet: 1 tab(s) orally once a day (06 Feb 2025 07:49)  Eliquis 2.5 mg oral tablet: 1 tab(s) orally 2 times a day (06 Feb 2025 07:49)  levothyroxine 50 mcg (0.05 mg) oral tablet: 1 tab(s) orally once a day (06 Feb 2025 07:49)  metoprolol succinate 50 mg oral tablet, extended release: 1 tab(s) orally once a day (06 Feb 2025 07:49)  rosuvastatin 5 mg oral tablet: 1 tab(s) orally once a day (at bedtime) (06 Feb 2025 07:49)  telmisartan 20 mg oral tablet: 1 tab(s) orally once a day (06 Feb 2025 07:49)      ALLERGIES  No Known Drug Allergies  latex (Unknown)      FAMILY Hx  FAMILY HISTORY:  Family history of myocardial infarction (Father, Sibling)        SOCIAL Hx  Social History:      VITALS  Vital Signs Last 24 Hrs  T(C): 36.9 (21 Feb 2025 20:44), Max: 37.2 (21 Feb 2025 20:09)  T(F): 98.4 (21 Feb 2025 20:44), Max: 98.9 (21 Feb 2025 20:09)  HR: 88 (21 Feb 2025 20:44) (85 - 88)  BP: 115/72 (21 Feb 2025 20:44) (108/63 - 115/72)  BP(mean): --  RR: 18 (21 Feb 2025 20:44) (18 - 18)  SpO2: 99% (21 Feb 2025 20:44) (99% - 100%)    Parameters below as of 21 Feb 2025 20:44  Patient On (Oxygen Delivery Method): room air        PHYSICAL EXAM  Gen: Lying in bed, NAD  Resp: No increased WOB  LLE:  Skin intact, no edema or ecchymosis over L hip  +TTP over L hip, no TTP along remainder of extremity; compartments soft  Limited ROM at hip 2/2 pain  +Log roll test  +Pain with axial loading  Motor: TA/EHL/GS/FHL intact  Sensory: DP/SP/Tib/Malia/Saph SILT  +DP pulse, WWP    Secondary survey:  No TTP along spine or other extremities,, SILT and compartments soft throughout    LABS                        12.3   8.89  )-----------( 177      ( 21 Feb 2025 22:32 )             38.3     02-21    138  |  95[L]  |  27[H]  ----------------------------<  145[H]  4.3   |  26  |  1.16    Ca    9.4      21 Feb 2025 22:32    TPro  6.7  /  Alb  3.5  /  TBili  0.9  /  DBili  x   /  AST  14  /  ALT  15  /  AlkPhos  99  02-21    PT/INR - ( 21 Feb 2025 22:32 )   PT: 13.8 sec;   INR: 1.20 ratio         PTT - ( 21 Feb 2025 22:32 )  PTT:27.4 sec    IMAGING  XRs: L femoral neck fx (personal read)     HPI  85yFemale w/ Afib (eliquis), DM, AS, L AKA c/o L hip pain s/p mechanical fall earlier today after slipping on tile floor. Patient has residual limb on LLE, however, has been able to range her hip joint with tolerable pain since fall. +headstrike with orbital floor fracture. Denies numbness/tingling in the LLE. Denies any other trauma/injuries at this time. At baseline,home ambulator with prosthesis and walker.     ROS  Negative unless otherwise specified in HPI.    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          MEDICATIONS  Home Medications:  amiodarone 200 mg oral tablet: 0.5 tab(s) orally once a day (06 Feb 2025 07:49)  aspirin 81 mg oral delayed release tablet: 1 tab(s) orally once a day (06 Feb 2025 07:49)  cholecalciferol 125 mcg (5000 intl units) oral tablet: 1 tab(s) orally once a day (06 Feb 2025 07:49)  Eliquis 2.5 mg oral tablet: 1 tab(s) orally 2 times a day (06 Feb 2025 07:49)  levothyroxine 50 mcg (0.05 mg) oral tablet: 1 tab(s) orally once a day (06 Feb 2025 07:49)  metoprolol succinate 50 mg oral tablet, extended release: 1 tab(s) orally once a day (06 Feb 2025 07:49)  rosuvastatin 5 mg oral tablet: 1 tab(s) orally once a day (at bedtime) (06 Feb 2025 07:49)  telmisartan 20 mg oral tablet: 1 tab(s) orally once a day (06 Feb 2025 07:49)      ALLERGIES  No Known Drug Allergies  latex (Unknown)      FAMILY Hx  FAMILY HISTORY:  Family history of myocardial infarction (Father, Sibling)        SOCIAL Hx  Social History:      VITALS  Vital Signs Last 24 Hrs  T(C): 36.9 (21 Feb 2025 20:44), Max: 37.2 (21 Feb 2025 20:09)  T(F): 98.4 (21 Feb 2025 20:44), Max: 98.9 (21 Feb 2025 20:09)  HR: 88 (21 Feb 2025 20:44) (85 - 88)  BP: 115/72 (21 Feb 2025 20:44) (108/63 - 115/72)  BP(mean): --  RR: 18 (21 Feb 2025 20:44) (18 - 18)  SpO2: 99% (21 Feb 2025 20:44) (99% - 100%)    Parameters below as of 21 Feb 2025 20:44  Patient On (Oxygen Delivery Method): room air        PHYSICAL EXAM  Gen: Lying in bed, NAD  Resp: No increased WOB  LLE:  Skin intact, no edema or ecchymosis over L hip  +TTP over L hip, no TTP along remainder of extremity; compartments soft  Pt had prior L AKA    Secondary survey:  No TTP along spine or other extremities,, SILT and compartments soft throughout    LABS                        12.3   8.89  )-----------( 177      ( 21 Feb 2025 22:32 )             38.3     02-21    138  |  95[L]  |  27[H]  ----------------------------<  145[H]  4.3   |  26  |  1.16    Ca    9.4      21 Feb 2025 22:32    TPro  6.7  /  Alb  3.5  /  TBili  0.9  /  DBili  x   /  AST  14  /  ALT  15  /  AlkPhos  99  02-21    PT/INR - ( 21 Feb 2025 22:32 )   PT: 13.8 sec;   INR: 1.20 ratio         PTT - ( 21 Feb 2025 22:32 )  PTT:27.4 sec    IMAGING  XRs: L femoral neck fx (personal read)

## 2025-02-22 NOTE — CONSULT NOTE ADULT - ASSESSMENT
84 yo F with pmhx of CAD (w/ of LCx and RCA), HFrEF, severe AS (pending o/p TAVR), pA-fib on Eliquis, R internal carotid artery stenosis, HTN, HLD, hypothyroidism, DM, PAD s/p LLE Bypass cb thrombosis s/p L AKA who presented with L orbital floor fx, L femoral neck fx after mechanical fall. Medicine consulted for risk stratification

## 2025-02-22 NOTE — PATIENT PROFILE ADULT - NSPROMEDSBROUGHTTOHOSP_GEN_A_NUR
Constitutional:  (-) fever, (-) chills, (-) lethargy  Eyes:  (-) eye pain (-) visual changes  ENMT: (-) nasal discharge, (-) sore throat. (-) neck pain or stiffness  Cardiac: (-) chest pain (-) palpitations  Respiratory:  (-) cough (-) respiratory distress.   GI:  (+) nausea (-) vomiting (-) diarrhea (-) abdominal pain.  :  (-) dysuria (-) frequency (-) burning.  MS:  (-) back pain (-) joint pain.  Neuro:  (+) headache (-) numbness (-) tingling (-) focal weakness  Skin:  (-) rash  Except as documented in the HPI,  all other systems are negative
no

## 2025-02-22 NOTE — CONSULT NOTE ADULT - PROBLEM SELECTOR RECOMMENDATION 9
- Patient ambulatory at baseline with walker, L leg prosthesis, able to ambulate to the end of her driveway and back with no SOB, CP, does not have stairs at home, but climbs stairs w/PT, able to perform ADLs/IADLs independently  - Appreciate cardiology input, with extensive cardiac hx including CAD, HFrEF, AS pending TAVR, pA-fib on Eliquis, RCRI of 2 points with 10.1% risk of major adverse cardiac event  - Patient mod-elevated risk for intermediate risk procedure, optimized from a medical standpoint, no contraindication to proceeding with surgery, please c/w cardiac medications as advised by cardiology team, c/w levothyroxine and monitor FS with SS coverage - Patient ambulatory at baseline with walker, L leg prosthesis, able to ambulate to the end of her driveway and back with no SOB, CP, does not have stairs at home, but climbs stairs w/PT, able to perform ADLs/IADLs independently  - Appreciate cardiology input, with extensive cardiac hx including CAD, HFrEF, AS pending TAVR, pA-fib on Eliquis, RCRI of 2 points with 10.1% risk of major adverse cardiac event  - EKG with NSR, LVH  - No hx of pulmonary conditions, renal disease, not on insulin  - Patient mod-elevated risk for intermediate risk procedure, optimized from a medical standpoint, no contraindication to proceeding with surgery, please c/w cardiac medications as advised by cardiology team, c/w levothyroxine and monitor FS with SS coverage

## 2025-02-22 NOTE — CONSULT NOTE ADULT - SUBJECTIVE AND OBJECTIVE BOX
All Cardiology service information can be found 24/7 on [email or web address], password: [email or web address]    CARDIOLOGY CONSULT NOTE:     Patient seen and evaluated at bedside    HPI:  This is a 85 y/o F with PMH of CAD, HFrEF (23%), severe AS, AF on Eliquis, PAD s/p LLE Bypass cb thrombosis s/p L AKA, T2DM, hypothyroidism, HTN, and HLD who presents to [facility] ED s/p fall. Pt reports she was in restroom when she slipped and fell on her bathroom tile. + HS, -LOC. Currently reports pain in L hip and L face. Denies headache, vision changes, neck pain, abdominal pain, CP, SOB, fevers, chills, nausea, vomiting, LE pain, back pain, dysuria, hematuria, melena, hematochezia.     HDS in ED. Afebrile.      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 1000 milliGRAM(s) IV Intermittent every 6 hours  aMIOdarone    Tablet 100 milliGRAM(s) Oral every 24 hours  aspirin enteric coated 81 milliGRAM(s) Oral daily  buMETAnide 1 milliGRAM(s) Oral every 24 hours  chlorhexidine 2% Cloths 1 Application(s) Topical once  cholecalciferol 5000 Unit(s) Oral every 24 hours  dextrose 5%. 1000 milliLiter(s) IV Continuous <Continuous>  dextrose 5%. 1000 milliLiter(s) IV Continuous <Continuous>  dextrose 50% Injectable 25 Gram(s) IV Push once  dextrose 50% Injectable 12.5 Gram(s) IV Push once  dextrose 50% Injectable 25 Gram(s) IV Push once  dextrose Oral Gel 15 Gram(s) Oral once PRN  glucagon  Injectable 1 milliGRAM(s) IntraMuscular once  insulin lispro (ADMELOG) corrective regimen sliding scale   SubCutaneous every 6 hours  levothyroxine 50 MICROGram(s) Oral daily  losartan 25 milliGRAM(s) Oral every 24 hours  metoprolol succinate ER 50 milliGRAM(s) Oral every 24 hours  ondansetron    Tablet 4 milliGRAM(s) Oral every 8 hours PRN  pantoprazole    Tablet 40 milliGRAM(s) Oral before breakfast  povidone iodine 10% Nasal Swab 1 Application(s) Both Nostrils once  rosuvastatin 5 milliGRAM(s) Oral at bedtime  spironolactone 25 milliGRAM(s) Oral every 24 hours  traMADol 25 milliGRAM(s) Oral every 6 hours PRN  traMADol 50 milliGRAM(s) Oral every 6 hours PRN      FAMILY HISTORY:  Family history of myocardial infarction (Father, Sibling)        REVIEW OF SYSTEMS:  CONSTITUTIONAL: No weakness, fevers or chills  EYES/ENT: No visual changes;  No dysphagia  NECK: No pain or stiffness  RESPIRATORY: No cough, wheezing, hemoptysis; No shortness of breath  CARDIOVASCULAR: No chest pain or palpitations; No lower extremity edema  GASTROINTESTINAL: No abdominal or epigastric pain. No nausea, vomiting, or hematemesis; No diarrhea or constipation. No melena or hematochezia.  BACK: No back pain  GENITOURINARY: No dysuria, frequency or hematuria  NEUROLOGICAL: No numbness or weakness  SKIN: No itching, burning, rashes, or lesions   All other review of systems is negative unless indicated above.    Physical Exam:  T(F): 98.3 (02-22), Max: 98.9 (02-21)  HR: 74 (02-22) (74 - 88)  BP: 110/67 (02-22) (108/63 - 115/72)  RR: 18 (02-22)  SpO2: 94% (02-22)    GEN: NAD  HEENT: EOMI, clear sclera  PULM: CTA b/l, no wheeze  CV: RRR S1 S2, no murmur, no JVD  ABD: S, NT, ND  EXT: WWP, no edema  PSYCH: normal affect  SKIN: No rash    CXR: Personally reviewed    Labs: Personally reviewed                        11.9   7.96  )-----------( 158      ( 22 Feb 2025 02:58 )             36.6     02-22    135  |  94[L]  |  30[H]  ----------------------------<  161[H]  3.9   |  25  |  1.23    Ca    9.0      22 Feb 2025 02:58  Phos  3.8     02-22  Mg     2.2     02-22    TPro  6.7  /  Alb  3.5  /  TBili  0.9  /  DBili  x   /  AST  14  /  ALT  15  /  AlkPhos  99  02-21    PT/INR - ( 22 Feb 2025 02:58 )   PT: 14.4 sec;   INR: 1.25 ratio         PTT - ( 22 Feb 2025 02:58 )  PTT:28.3 sec                          A/P:     Vitals/Tele:   ECG:   Last TTE:   Ischemic Eval:   Imaging:   Labs:   Home Meds:       #  -     BRIGETTE Risk Score:__, __risk at 14 days of: all-cause mortality, new or recurrent MI, or severe recurrent ischemia requiring urgent revascularization.  KAREN Score: __, __ probability of death from admission to 6 months    **INCOMPLETE NOTE; PLEASE WAIT FOR SIGNATURE PRIOR TO FOLLOWING ANY RECS ABOVE.       Patient to be discussed with  **. Please see attending attestation for official recs.       Ector Marcos MD   Cardiology Fellow  All Cardiology service information can be found 24/7 on [email or web address], password: [email or web address]    CARDIOLOGY CONSULT NOTE:     Patient seen and evaluated at bedside    HPI:  This is a 84 y/o F with PHMh of CAD (with  of LCx and RCA), severe AS (pending outpt TAVR), pAF (on Eliquis 2.5mg BID), R internal carotid stenosis, HTN, HLD, hypothyroidism, anxiety, PAD s/p AKA who presented after mechanical fall on slippery tiles with no LOC or presyncope/dizziness but with orbital and femoral fracture. Cardiology consulted for cardiac risk stratification and optimization prior to hip surgery.     Patient says she has been compliant with all her home medications, does not miss any doses. Can lie flat without issues. With her AS, she saw Dr. Serna who recommended deferring intervention for now while due to infection in foot. She wears a prosthetic and uses a walker to get around. Can walk around the house and do her chores without issue. No chest pain, pressure, or SOB. No prior episodes of passing out or palpitations.     Vitals/Tele: Afeb, HR 70, /60, 94% RA  ECG: LVH  Last TTE: 10/30/23: LVEF 23%, global hypokinesis, grade 1 LV diastolic dysfunction.    Ischemic Eval: 02/05/25: Well developed collaterals from  RCA and LCx. Mild-mod LAD. 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 1000 milliGRAM(s) IV Intermittent every 6 hours  aMIOdarone    Tablet 100 milliGRAM(s) Oral every 24 hours  aspirin enteric coated 81 milliGRAM(s) Oral daily  buMETAnide 1 milliGRAM(s) Oral every 24 hours  chlorhexidine 2% Cloths 1 Application(s) Topical once  cholecalciferol 5000 Unit(s) Oral every 24 hours  dextrose 5%. 1000 milliLiter(s) IV Continuous <Continuous>  dextrose 5%. 1000 milliLiter(s) IV Continuous <Continuous>  dextrose 50% Injectable 25 Gram(s) IV Push once  dextrose 50% Injectable 12.5 Gram(s) IV Push once  dextrose 50% Injectable 25 Gram(s) IV Push once  dextrose Oral Gel 15 Gram(s) Oral once PRN  glucagon  Injectable 1 milliGRAM(s) IntraMuscular once  insulin lispro (ADMELOG) corrective regimen sliding scale   SubCutaneous every 6 hours  levothyroxine 50 MICROGram(s) Oral daily  losartan 25 milliGRAM(s) Oral every 24 hours  metoprolol succinate ER 50 milliGRAM(s) Oral every 24 hours  ondansetron    Tablet 4 milliGRAM(s) Oral every 8 hours PRN  pantoprazole    Tablet 40 milliGRAM(s) Oral before breakfast  povidone iodine 10% Nasal Swab 1 Application(s) Both Nostrils once  rosuvastatin 5 milliGRAM(s) Oral at bedtime  spironolactone 25 milliGRAM(s) Oral every 24 hours  traMADol 25 milliGRAM(s) Oral every 6 hours PRN  traMADol 50 milliGRAM(s) Oral every 6 hours PRN      FAMILY HISTORY:  Family history of myocardial infarction (Father, Sibling)        REVIEW OF SYSTEMS:  CONSTITUTIONAL: No weakness, fevers or chills  EYES/ENT: No visual changes;  No dysphagia  NECK: No pain or stiffness  RESPIRATORY: No cough, wheezing, hemoptysis; No shortness of breath  CARDIOVASCULAR: No chest pain or palpitations; No lower extremity edema  GASTROINTESTINAL: No abdominal or epigastric pain. No nausea, vomiting, or hematemesis; No diarrhea or constipation. No melena or hematochezia.  BACK: No back pain  GENITOURINARY: No dysuria, frequency or hematuria  NEUROLOGICAL: No numbness or weakness  SKIN: No itching, burning, rashes, or lesions   All other review of systems is negative unless indicated above.    Physical Exam:  T(F): 98.3 (02-22), Max: 98.9 (02-21)  HR: 74 (02-22) (74 - 88)  BP: 110/67 (02-22) (108/63 - 115/72)  RR: 18 (02-22)  SpO2: 94% (02-22)    GEN: NAD  HEENT: EOMI, clear sclera  PULM: CTA b/l, no wheeze  CV: RRR S1 S2, no murmur, no JVD  ABD: S, NT, ND  EXT: WWP, R AKA. L leg wrapped.   PSYCH: normal affect  SKIN: No rash    CXR: Personally reviewed    Labs: Personally reviewed                        11.9   7.96  )-----------( 158      ( 22 Feb 2025 02:58 )             36.6     02-22    135  |  94[L]  |  30[H]  ----------------------------<  161[H]  3.9   |  25  |  1.23    Ca    9.0      22 Feb 2025 02:58  Phos  3.8     02-22  Mg     2.2     02-22    TPro  6.7  /  Alb  3.5  /  TBili  0.9  /  DBili  x   /  AST  14  /  ALT  15  /  AlkPhos  99  02-21    PT/INR - ( 22 Feb 2025 02:58 )   PT: 14.4 sec;   INR: 1.25 ratio         PTT - ( 22 Feb 2025 02:58 )  PTT:28.3 sec

## 2025-02-22 NOTE — CONSULT NOTE ADULT - PROBLEM SELECTOR RECOMMENDATION 5
- last A1c on file 6.9 (1/2025)  - Home regimen: metformin 500 mg daily, dapagliflozin 10 mg daily  - BG appear controlled, 126 this AM  - C/w SSI coverage, BG goal 140-180 during hospitalization, BG <200 prior to surgery

## 2025-02-22 NOTE — CHART NOTE - NSCHARTNOTEFT_GEN_A_CORE
Postop Check s/p L Hip CRPP    Patient tolerated the procedure well. Patient seen and examined at bedside.  No acute complaints at this time. Pain well controlled. Denies chest pain, shortness of breath, nausea or vomiting.       Vital Signs Last 24 Hrs  T(C): 36.7 (02-22-25 @ 19:10), Max: 36.9 (02-22-25 @ 09:00)  T(F): 98 (02-22-25 @ 19:10), Max: 98.4 (02-22-25 @ 09:00)  HR: 72 (02-22-25 @ 22:00) (64 - 86)  BP: 114/56 (02-22-25 @ 22:00) (108/56 - 127/76)  BP(mean): 77 (02-22-25 @ 22:00) (68 - 89)  RR: 16 (02-22-25 @ 22:00) (13 - 18)  SpO2: 96% (02-22-25 @ 22:00) (92% - 100%)    Exam:  General: NAD, resting comfortably in bed  LLE:   Dressing in place C/D/I  Pt has prior AKA      A/P:  85y F s/p L hip CRPP POD #0    -PT/OT  -WBAT  -Pain Control  -DVT ppx per primary team, currently ordered Lovenox 40mg daily POD#1  -Continue perioperative abx x 24 hours  -FU AM Labs  -Rest, ice, and elevate the extremity as needed  -Incentive Spirometry  -Medical management appreciated  -Dispo pending PT eval

## 2025-02-22 NOTE — PROGRESS NOTE ADULT - SUBJECTIVE AND OBJECTIVE BOX
SURGERY DAILY PROGRESS NOTE     Patient is a 85y old  Female who presents with a chief complaint of       24 HOUR EVENTS:     OVERNIGHT EVENTS: Admitted overnight.       SUBJECTIVE:   Patient seen and examined at bedside with surgical team, patient without complaints.       OBJECTIVE     Vital Signs Last 24 Hrs  T(C): 36.8 (22 Feb 2025 02:10), Max: 37.2 (21 Feb 2025 20:09)  T(F): 98.3 (22 Feb 2025 02:10), Max: 98.9 (21 Feb 2025 20:09)  HR: 79 (22 Feb 2025 02:10) (79 - 88)  BP: 115/68 (22 Feb 2025 02:10) (108/63 - 115/72)  BP(mean): 68 (22 Feb 2025 01:15) (68 - 68)  RR: 18 (22 Feb 2025 02:10) (18 - 18)  SpO2: 94% (22 Feb 2025 02:10) (94% - 100%)    Parameters below as of 22 Feb 2025 02:10  Patient On (Oxygen Delivery Method): room air    I&O's Detail      Physical Exam  Constitutional: NAD  HEENT: L periorbital ecchymosis, PERRLA   Respiratory: Breathing comfortably on RA  Gastrointestinal: Soft, nontender, nondistended  Extremities: WWP, LLE AKA, TTP L hip, RLE w dressing in place over foot, non-saturated  Psychiatric: A&Ox3, appropriate affect          Medications  MEDICATIONS  (STANDING):  acetaminophen   IVPB .. 1000 milliGRAM(s) IV Intermittent every 6 hours  aMIOdarone    Tablet 100 milliGRAM(s) Oral every 24 hours  aspirin enteric coated 81 milliGRAM(s) Oral daily  buMETAnide 1 milliGRAM(s) Oral every 24 hours  chlorhexidine 2% Cloths 1 Application(s) Topical once  cholecalciferol 5000 Unit(s) Oral every 24 hours  dextrose 5%. 1000 milliLiter(s) (50 mL/Hr) IV Continuous <Continuous>  dextrose 5%. 1000 milliLiter(s) (100 mL/Hr) IV Continuous <Continuous>  dextrose 50% Injectable 25 Gram(s) IV Push once  dextrose 50% Injectable 12.5 Gram(s) IV Push once  dextrose 50% Injectable 25 Gram(s) IV Push once  glucagon  Injectable 1 milliGRAM(s) IntraMuscular once  insulin lispro (ADMELOG) corrective regimen sliding scale   SubCutaneous every 6 hours  lactated ringers. 1000 milliLiter(s) (100 mL/Hr) IV Continuous <Continuous>  levothyroxine 50 MICROGram(s) Oral daily  losartan 25 milliGRAM(s) Oral every 24 hours  metoprolol succinate ER 50 milliGRAM(s) Oral every 24 hours  pantoprazole    Tablet 40 milliGRAM(s) Oral before breakfast  povidone iodine 10% Nasal Swab 1 Application(s) Both Nostrils once  rosuvastatin 5 milliGRAM(s) Oral at bedtime  spironolactone 25 milliGRAM(s) Oral every 24 hours    MEDICATIONS  (PRN):  dextrose Oral Gel 15 Gram(s) Oral once PRN Blood Glucose LESS THAN 70 milliGRAM(s)/deciliter  ondansetron    Tablet 4 milliGRAM(s) Oral every 8 hours PRN for nausea  traMADol 25 milliGRAM(s) Oral every 6 hours PRN Moderate Pain (4 - 6)  traMADol 50 milliGRAM(s) Oral every 6 hours PRN Severe Pain (7 - 10)        LABS:                        11.9   7.96  )-----------( 158      ( 22 Feb 2025 02:58 )             36.6     02-21    138  |  95[L]  |  27[H]  ----------------------------<  145[H]  4.3   |  26  |  1.16    Ca    9.4      21 Feb 2025 22:32    TPro  6.7  /  Alb  3.5  /  TBili  0.9  /  DBili  x   /  AST  14  /  ALT  15  /  AlkPhos  99  02-21    PT/INR - ( 22 Feb 2025 02:58 )   PT: 14.4 sec;   INR: 1.25 ratio         PTT - ( 22 Feb 2025 02:58 )  PTT:28.3 sec  LIVER FUNCTIONS - ( 21 Feb 2025 22:32 )  Alb: 3.5 g/dL / Pro: 6.7 g/dL / ALK PHOS: 99 U/L / ALT: 15 U/L / AST: 14 U/L / GGT: x           Urinalysis Basic - ( 21 Feb 2025 22:32 )    Color: x / Appearance: x / SG: x / pH: x  Gluc: 145 mg/dL / Ketone: x  / Bili: x / Urobili: x   Blood: x / Protein: x / Nitrite: x   Leuk Esterase: x / RBC: x / WBC x   Sq Epi: x / Non Sq Epi: x / Bacteria: x      ABO Interpretation: O (02-21-25 @ 22:19)   SURGERY DAILY PROGRESS NOTE     Patient is a 85y old  Female who presents with a chief complaint of L. orbital wall fracture and L. femoral neck facture    Overnight: NAEO    SUBJECTIVE:   Patient seen and examined at bedside with surgical team. The patient is resting comfortably in bed, pain is well controlled. NPO. Voiding. Denies fever, chills, nausea, vomiting, diarrhea, chest pain, and SOB.       OBJECTIVE     Vital Signs Last 24 Hrs  T(C): 36.8 (22 Feb 2025 02:10), Max: 37.2 (21 Feb 2025 20:09)  T(F): 98.3 (22 Feb 2025 02:10), Max: 98.9 (21 Feb 2025 20:09)  HR: 79 (22 Feb 2025 02:10) (79 - 88)  BP: 115/68 (22 Feb 2025 02:10) (108/63 - 115/72)  BP(mean): 68 (22 Feb 2025 01:15) (68 - 68)  RR: 18 (22 Feb 2025 02:10) (18 - 18)  SpO2: 94% (22 Feb 2025 02:10) (94% - 100%)    Parameters below as of 22 Feb 2025 02:10  Patient On (Oxygen Delivery Method): room air    I&O's Detail      Physical Exam  Constitutional: NAD  HEENT: L periorbital ecchymosis, PERRLA   Respiratory: Breathing comfortably on RA  Gastrointestinal: Soft, nontender, nondistended  Extremities: WWP, LLE AKA, TTP L hip, RLE w dressing in place over foot, non-saturated  Psychiatric: A&Ox3, appropriate affect          Medications  MEDICATIONS  (STANDING):  acetaminophen   IVPB .. 1000 milliGRAM(s) IV Intermittent every 6 hours  aMIOdarone    Tablet 100 milliGRAM(s) Oral every 24 hours  aspirin enteric coated 81 milliGRAM(s) Oral daily  buMETAnide 1 milliGRAM(s) Oral every 24 hours  chlorhexidine 2% Cloths 1 Application(s) Topical once  cholecalciferol 5000 Unit(s) Oral every 24 hours  dextrose 5%. 1000 milliLiter(s) (50 mL/Hr) IV Continuous <Continuous>  dextrose 5%. 1000 milliLiter(s) (100 mL/Hr) IV Continuous <Continuous>  dextrose 50% Injectable 25 Gram(s) IV Push once  dextrose 50% Injectable 12.5 Gram(s) IV Push once  dextrose 50% Injectable 25 Gram(s) IV Push once  glucagon  Injectable 1 milliGRAM(s) IntraMuscular once  insulin lispro (ADMELOG) corrective regimen sliding scale   SubCutaneous every 6 hours  lactated ringers. 1000 milliLiter(s) (100 mL/Hr) IV Continuous <Continuous>  levothyroxine 50 MICROGram(s) Oral daily  losartan 25 milliGRAM(s) Oral every 24 hours  metoprolol succinate ER 50 milliGRAM(s) Oral every 24 hours  pantoprazole    Tablet 40 milliGRAM(s) Oral before breakfast  povidone iodine 10% Nasal Swab 1 Application(s) Both Nostrils once  rosuvastatin 5 milliGRAM(s) Oral at bedtime  spironolactone 25 milliGRAM(s) Oral every 24 hours    MEDICATIONS  (PRN):  dextrose Oral Gel 15 Gram(s) Oral once PRN Blood Glucose LESS THAN 70 milliGRAM(s)/deciliter  ondansetron    Tablet 4 milliGRAM(s) Oral every 8 hours PRN for nausea  traMADol 25 milliGRAM(s) Oral every 6 hours PRN Moderate Pain (4 - 6)  traMADol 50 milliGRAM(s) Oral every 6 hours PRN Severe Pain (7 - 10)        LABS:                        11.9   7.96  )-----------( 158      ( 22 Feb 2025 02:58 )             36.6     02-21    138  |  95[L]  |  27[H]  ----------------------------<  145[H]  4.3   |  26  |  1.16    Ca    9.4      21 Feb 2025 22:32    TPro  6.7  /  Alb  3.5  /  TBili  0.9  /  DBili  x   /  AST  14  /  ALT  15  /  AlkPhos  99  02-21    PT/INR - ( 22 Feb 2025 02:58 )   PT: 14.4 sec;   INR: 1.25 ratio         PTT - ( 22 Feb 2025 02:58 )  PTT:28.3 sec  LIVER FUNCTIONS - ( 21 Feb 2025 22:32 )  Alb: 3.5 g/dL / Pro: 6.7 g/dL / ALK PHOS: 99 U/L / ALT: 15 U/L / AST: 14 U/L / GGT: x           Urinalysis Basic - ( 21 Feb 2025 22:32 )    Color: x / Appearance: x / SG: x / pH: x  Gluc: 145 mg/dL / Ketone: x  / Bili: x / Urobili: x   Blood: x / Protein: x / Nitrite: x   Leuk Esterase: x / RBC: x / WBC x   Sq Epi: x / Non Sq Epi: x / Bacteria: x      ABO Interpretation: O (02-21-25 @ 22:19)

## 2025-02-22 NOTE — CONSULT NOTE ADULT - ASSESSMENT
ASSESSMENT & PLAN  85yFemale w/ L femoral neck fx.  -NWB LLE, bedrest  -OR on 2/22  -f/u preop: CBC, BMP, coags, T&S x2, CXR, EKG  -NPO past midnight, IVF  -hold chemical DVT ppx for OR; SCDs OK  -please document medical clearance ASAP for OR  -pain control  -ice/cold compress    For all questions related to patient care, please reach out to the on-call team via the pager.     Mariana Hooks, PGY 3  Orthopaedic Surgery  [facility] [phone]  [facility] [id number]  [facility] [phone]

## 2025-02-22 NOTE — CONSULT NOTE ADULT - SUBJECTIVE AND OBJECTIVE BOX
Ruth Tinajero MD  Division of Hospital Medicine  Available via MS teams  If no response or off hours, page [phone]    HPI:  84F PMHx CAD, PAD s/p LLE Bypass cb thrombosis s/p L AKA, DM, HFrEF (23%), severe AS, AF on Eliquis, hypothyroidism, HTN, HLD who presents to [facility] ED s/p Mx Fall. Pt reports she was in restroom when she slipped and fell on her bathroom tile. + HS, -LOC. Currently reports pain in L hip and L face. Denies headache, vision changes, neck pain, abdominal pain, CP, SOB, fevers, chills, nausea, vomiting, LE pain, back pain, dysuria, hematuria, melena, hematochezia.     HDS in ED. Afebrile.      (21 Feb 2025 23:46)      SUBJECTIVE:   Patient evaluated at bedside, confirms story as noted in H&P. States she was in bathroom, and had one sock on, slipped and fell hitting her head against the vanity and landing on her hip. States she had her phone on her and was able to call her neighbor who assisted her. Denies LOC, on Eliquis and ASA. Denies HA, vision changes, palpitations, SOB, CP, abd pain, N/V/D or any other acute concerns. Notes some slight pain around L hip but reports it is currently controlled.       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 No nausea, vomiting, or hematemesis; No diarrhea or constipation. No melena or hematochezia.  GENITOURINARY: No dysuria, frequency, hematuria, or incontinence  NEUROLOGICAL: No headaches, memory loss, loss of strength, numbness, or tremors  SKIN: No itching, burning, rashes, or lesions   MUSCULOSKELETAL: (+) L hip pain post fall.   PSYCHIATRIC: No depression, anxiety, mood swings, or difficulty sleeping  HEME/LYMPH: (+) bruising around L eye  ALLERY AND IMMUNOLOGIC: No hives or eczema    Allergies  No Known Drug Allergies  latex (Unknown)  Intolerances - none    Social History:   Lives alone, no family nearby but has neighbors who check in on her  No smoking, alcohol or recreational drug use  Ambulates with L prosthesis, as well as walker at baseline. Able to ambulate down the driveway and back, no stairs in house.      FAMILY HISTORY:  Family history of myocardial infarction (Father, Sibling)   Noncontributory    CAPILLARY BLOOD GLUCOSE  POCT Blood Glucose.: 126 mg/dL (22 Feb 2025 05:34)    Vital Signs Last 24 Hrs  T(C): 36.8 (22 Feb 2025 04:29), Max: 37.2 (21 Feb 2025 20:09)  T(F): 98.3 (22 Feb 2025 04:29), Max: 98.9 (21 Feb 2025 20:09)  HR: 74 (22 Feb 2025 04:29) (74 - 88)  BP: 110/67 (22 Feb 2025 04:29) (108/63 - 115/72)  BP(mean): 68 (22 Feb 2025 01:15) (68 - 68)  RR: 18 (22 Feb 2025 04:29) (18 - 18)  SpO2: 94% (22 Feb 2025 04:29) (94% - 100%)    Parameters below as of 22 Feb 2025 04:29  Patient On (Oxygen Delivery Method): room air    PHYSICAL EXAM:  GENERAL:  Well appearing, in NAD  HEAD:  L periorbital ecchymosis, EOMi   EYES: PERRLA, conjunctiva clear  CHEST/LUNG: CTA B/L. No w/r/r.  HEART: Reg rate. Normal S1, S2. Systolic murmur present.   ABDOMEN: SNTND. Bowel sounds present  EXTREMITIES:  2+ Peripheral Pulses, s/p LLE AKA, RLE with dressing in place over lower calf  PSYCH: AAOx3, appropriate affect  NEUROLOGY: grossly non-focal  SKIN: Skin tear to left wrist, no active bleeding, ecchymosis to L eye as noted above    LABS:                        11.9   7.96  )-----------( 158      ( 22 Feb 2025 02:58 )             36.6     02-22    135  |  94[L]  |  30[H]  ----------------------------<  161[H]  3.9   |  25  |  1.23    Ca    9.0      22 Feb 2025 02:58  Phos  3.8     02-22  Mg     2.2     02-22    TPro  6.7  /  Alb  3.5  /  TBili  0.9  /  DBili  x   /  AST  14  /  ALT  15  /  AlkPhos  99  02-21    PT/INR - ( 22 Feb 2025 02:58 )   PT: 14.4 sec;   INR: 1.25 ratio         PTT - ( 22 Feb 2025 02:58 )  PTT:28.3 sec      Urinalysis Basic - ( 22 Feb 2025 02:58 )    Color: x / Appearance: x / SG: x / pH: x  Gluc: 161 mg/dL / Ketone: x  / Bili: x / Urobili: x   Blood: x / Protein: x / Nitrite: x   Leuk Esterase: x / RBC: x / WBC x   Sq Epi: x / Non Sq Epi: x / Bacteria: x          MEDICATIONS  (STANDING):  acetaminophen   IVPB .. 1000 milliGRAM(s) IV Intermittent every 6 hours  aMIOdarone    Tablet 100 milliGRAM(s) Oral every 24 hours  aspirin enteric coated 81 milliGRAM(s) Oral daily  buMETAnide 1 milliGRAM(s) Oral every 24 hours  chlorhexidine 2% Cloths 1 Application(s) Topical once  cholecalciferol 5000 Unit(s) Oral every 24 hours  dextrose 5%. 1000 milliLiter(s) (50 mL/Hr) IV Continuous <Continuous>  dextrose 5%. 1000 milliLiter(s) (100 mL/Hr) IV Continuous <Continuous>  dextrose 50% Injectable 25 Gram(s) IV Push once  dextrose 50% Injectable 12.5 Gram(s) IV Push once  dextrose 50% Injectable 25 Gram(s) IV Push once  glucagon  Injectable 1 milliGRAM(s) IntraMuscular once  insulin lispro (ADMELOG) corrective regimen sliding scale   SubCutaneous every 6 hours  levothyroxine 50 MICROGram(s) Oral daily  losartan 25 milliGRAM(s) Oral every 24 hours  metoprolol succinate ER 50 milliGRAM(s) Oral every 24 hours  pantoprazole    Tablet 40 milliGRAM(s) Oral before breakfast  povidone iodine 10% Nasal Swab 1 Application(s) Both Nostrils once  rosuvastatin 5 milliGRAM(s) Oral at bedtime  spironolactone 25 milliGRAM(s) Oral every 24 hours    MEDICATIONS  (PRN):  dextrose Oral Gel 15 Gram(s) Oral once PRN Blood Glucose LESS THAN 70 milliGRAM(s)/deciliter  ondansetron    Tablet 4 milliGRAM(s) Oral every 8 hours PRN for nausea  traMADol 25 milliGRAM(s) Oral every 6 hours PRN Moderate Pain (4 - 6)  traMADol 50 milliGRAM(s) Oral every 6 hours PRN Severe Pain (7 - 10)      Home Medications:   amiodarone 200 mg oral tablet: 0.5 tab(s) orally once a day  aspirin 81 mg oral delayed release tablet: 1 tab(s) orally once a day   cholecalciferol 125 mcg (5000 intl units) oral tablet: 1 tab(s) orally once a day  Eliquis 2.5 mg oral tablet: 1 tab(s) orally 2 times a day  levothyroxine 50 mcg (0.05 mg) oral tablet: 1 tab(s) orally once a day   metoprolol succinate 50 mg oral tablet, extended release: 1 tab(s) orally once a day  rosuvastatin 5 mg oral tablet: 1 tab(s) orally once a day (at bedtime)   telmisartan 20 mg oral tablet: 1 tab(s) orally once a day  bumex 1 mg daily   aldactone 25 mg daily - pt unsure if she is taking this medication  dapagliflozin 10 mg daily   protonix 40 mg daily  zofran 4 mg PRN for nausea   metformin 500 mg daily

## 2025-02-22 NOTE — CHART NOTE - NSCHARTNOTEFT_GEN_A_CORE
Post Operative Note  Patient: NANO GARCIA 85y (1940) Female     Subjective: Patient seen and examined post operatively. The patient is resting in bed comfortably, no complaints of pain. Tolerating liquids. Has not yet voided. Satting well on 2L NC. Denies fever, chills, headache, nausea, vomiting, diarrhea, chest pain, and SOB.     Objective:  Vitals: T(F): 98.2 (02-23 @ 04:37), Max: 98.4 (02-22 @ 09:00)  HR: 77 (02-23 @ 04:37)  BP: 114/69 (02-23 @ 04:37) (104/66 - 127/76)  ABP: --  RR: 18 (02-23 @ 04:37)  SpO2: 96% (02-23 @ 04:37)    Physical Examination:  General: NAD, resting comfortably in bed.  Respiratory: Equal bilateral chest expansion, no increased WOB.  Cardiovascular: RRR/WWP.   Abdomen: soft, nontender, nondistended  Extremities: LLE w/ Aquacell dressing on lateral thigh, appropriately tender, no swelling or erythema    Assessment:    is a 85y year old woman w/ PMHx CAD, PAD s/p LLE Bypass cb thrombosis s/p L AKA, DM, HFrEF (23%), severe AS, AF on Eliquis, hypothyroidism, HTN, HLD who presents to [facility] ED s/p mechanical fall. The patient is now s/p Closed Reduction and Percutaneous Pinning of Left Hip.     Plan:  - DVT: aspirin enteric coated 81 milliGRAM(s) Oral daily, enoxaparin Injectable 40 milliGRAM(s) SubCutaneous every 24 hours  - Antimicrobials: ceFAZolin   IVPB 2000 milliGRAM(s) IV Intermittent every 8 hours  - Diet: Regular  - Pain control  - IS  - Weight bearing as tolerated  - PT Consult  - Tertiary and Incidental    ACS/Trauma Surgery  [id number] Post Operative Note  Patient: NANO GARCIA 85y (1940) Female     Subjective: Patient seen and examined post operatively. The patient is resting in bed comfortably, no complaints of pain. Tolerating liquids. Has not yet voided. Satting well on 2L NC. Denies fever, chills, headache, nausea, vomiting, diarrhea, chest pain, and SOB.     Objective:  Vitals: T(F): 98.2 (02-23 @ 04:37), Max: 98.4 (02-22 @ 09:00)  HR: 77 (02-23 @ 04:37)  BP: 114/69 (02-23 @ 04:37) (104/66 - 127/76)  ABP: --  RR: 18 (02-23 @ 04:37)  SpO2: 96% (02-23 @ 04:37)    Physical Examination:  General: NAD, resting comfortably in bed.  Respiratory: Equal bilateral chest expansion, no increased WOB.  Cardiovascular: RRR/WWP.   Abdomen: soft, nontender, nondistended  Extremities: LLE w/ Aquacell dressing on lateral thigh, appropriately tender, no swelling or erythema    Assessment:    is a 85y year old woman w/ PMHx CAD, PAD s/p LLE Bypass cb thrombosis s/p L AKA, DM, HFrEF (23%), severe AS, AF on Eliquis, hypothyroidism, HTN, HLD who presents to [facility] ED s/p mechanical fall. The patient is now s/p Closed Reduction and Percutaneous Pinning of Left Hip.     Plan:  - DVT: aspirin enteric coated 81 milliGRAM(s) Oral daily, enoxaparin Injectable 40 milliGRAM(s) SubCutaneous every 24 hours  - Antimicrobials: ceFAZolin   IVPB 2000 milliGRAM(s) IV Intermittent every 8 hours  - Diet: Regular  - Pain control  - IS  - Weight bearing as tolerated  - PT Consult  - Tertiary    ACS/Trauma Surgery  [id number]

## 2025-02-22 NOTE — PROGRESS NOTE ADULT - ASSESSMENT
84F PMHx CAD, PAD s/p LLE Bypass cb thrombosis s/p L AKA, DM, HFrEF (23%), severe AS, AF on Eliquis, hypothyroidism, HTN, HLD who presents to [facility] ED s/p mechanical fall.     Injuries include:   -L orbital Wall Fx   -L femoral Neck Fx     PLAN:  - OR today with ortho for L femoral neck fx repair       - Needs medical clearance       - NPO/IVF      - Pre-op labs       - Hold chemical VTE ppx       - Consented?   - [facility]   - F/u ophtho recs   - Tertiary   - VTE ppx: SCDs      ACS/Trauma Surgery  [id number], [phone]  84F PMHx CAD, PAD s/p LLE Bypass cb thrombosis s/p L AKA, DM, HFrEF (23%), severe AS, AF on Eliquis, hypothyroidism, HTN, HLD who presents to [facility] ED s/p mechanical fall.     Injuries include:   -L orbital Wall Fx   -L femoral Neck Fx     PLAN:  - OR today with ortho for L femoral neck fx repair       - Needs medical clearance       - NPO/IVF      - Pre-op labs       - [facility] chemical [facility] ppx   [facility]   - F/u ophtho recs   - Tertiary   - VTE ppx: SCDs    ACS/Trauma Surgery  [id number] 84F PMHx CAD, PAD s/p LLE Bypass cb thrombosis s/p L AKA, DM, HFrEF (23%), severe AS, AF on Eliquis, hypothyroidism, HTN, HLD who presents to [facility] ED s/p mechanical fall.     Injuries include:   -L orbital Wall Fx   -L femoral Neck Fx     PLAN:  - OR today with ortho for L femoral neck fx repair       - Needs medical clearance       - NPO/IVF      - Pre-op labs       - Hold chemical VTE ppx   - [facility]   - F/u ophtho recs   - Face consult  - Tertiary   - VTE ppx: SCDs    ACS/Trauma Surgery  [id number]

## 2025-02-22 NOTE — CONSULT NOTE ADULT - ATTENDING COMMENTS
Mrs. Kelley is a 85 years old pleasant female with a PMH of CAD (with  of LCx and RCA), severe AS (pending outpt TAVR), pAF (on Eliquis 2.5mg BID), R internal carotid stenosis, HTN, HLD, hypothyroidism, anxiety, PAD s/p AKA who presented admitted for orbital and femoral fracture after a mechanical fall. Cardiology consulted for cardiac risk stratification.     She has a recent C showing  of RCA and LCX with good collaterals and Mild-mod LAD. Mod-severe diag disease. I've personally reviewed her TTE today, remains with severely reduced LVEF and severe AS. ECG NSR with LVH (unchanged).     Clinically that patient well compensated from cardiac point of view. Vitals are stable. Does not look volume overload on examination. Lying comfortable in bed. States she is able to ambulate at home with her prosthesis and walker and occasional wheelchair. The patient denies any chest pain, SOB, palpitations, dizziness, leg edema, diaphoresis or syncope.     She can proceed as a elevated risk for high risk procedure. No further cardiac testing acutely needs to be done. Will continue same medications. Structure to follow either inpatient or outpatient after surgery for severe AS and TAVR evaluation (seems infection as cleared). Ok to hold AC and must be resumed once hemostasis is achieved and safe from surgery point of view. Should continue ASA. Carefully hemodynamic monitoring periprocedural Avoid volume loss, keep HB > 8 g/dl.    Please call back if any questions or concerns    Latrice Mar MD  Attending Cardiologist     Non-invasive Cardiology/Advanced Cardiac Imaging  [facility] Mrs. Kelley is a 85 years old pleasant female with a PMH of CAD (with  of LCx and RCA), severe AS (pending outpt TAVR), pAF (on Eliquis 2.5mg BID), R internal carotid stenosis, HTN, HLD, hypothyroidism, anxiety, PAD s/p AKA who presented admitted for orbital and femoral fracture after a mechanical fall. Cardiology consulted for cardiac risk stratification.     She has a recent C showing  of RCA and LCX with good collaterals and Mild-mod LAD. Mod-severe diag disease. I've personally reviewed her TTE today, remains with severely reduced LVEF and severe AS. ECG NSR with LVH (unchanged).     Clinically that patient well compensated from cardiac point of view. Vitals are stable. Does not look volume overload on examination. Lying comfortable in bed. States she is able to ambulate at home with her prosthesis and walker and occasional wheelchair. The patient denies any chest pain, SOB, palpitations, dizziness, leg edema, diaphoresis or syncope.     She can proceed as a elevated risk for high risk procedure. No further cardiac testing acutely needs to be done. Will continue same medications. Continue Bumex PO. May give IV diuretic PRN. Structure to follow either inpatient or outpatient after surgery for severe AS and TAVR evaluation (seems infection as cleared). Ok to hold AC and must be resumed once hemostasis is achieved and safe from surgery point of view. Should continue ASA. Carefully hemodynamic monitoring periprocedural, Avoid excessive blood loss, keep HB > 8 g/dl.    Please call back if any questions or concerns    Latrice Mar MD  Attending Cardiologist     Non-invasive Cardiology/Advanced Cardiac Imaging  [facility]

## 2025-02-22 NOTE — PRE PROCEDURE NOTE - PRE PROCEDURE EVALUATION
Dx:  L femoral neck fracture    Sx: L femoral neck CRPP vs [facility]    Surgeons:  Dr. Francis    Med Cleared: Y    H&P: Y    ALL: Latex    Vital Signs (24 Hrs):  T(C): 36.8 (02-22-25 @ 04:29), Max: 37.2 (02-21-25 @ 20:09)  HR: 74 (02-22-25 @ 04:29) (74 - 88)  BP: 110/67 (02-22-25 @ 04:29) (108/63 - 115/72)  RR: 18 (02-22-25 @ 04:29) (18 - 18)  SpO2: 94% (02-22-25 @ 04:29) (94% - 100%)  Wt(kg): --    LABS:                          11.9   7.96  )-----------( 158      ( 22 Feb 2025 02:58 )             36.6     02-22    135  |  94[L]  |  30[H]  ----------------------------<  161[H]  3.9   |  25  |  1.23    Ca    9.0      22 Feb 2025 02:58  Phos  3.8     02-22  Mg     2.2     02-22    TPro  6.7  /  Alb  3.5  /  TBili  0.9  /  DBili  x   /  AST  14  /  ALT  15  /  AlkPhos  99  02-21    LIVER FUNCTIONS - ( 21 Feb 2025 22:32 )  Alb: 3.5 g/dL / Pro: 6.7 g/dL / ALK PHOS: 99 U/L / ALT: 15 U/L / AST: 14 U/L / GGT: x           PT/INR - ( 22 Feb 2025 02:58 )   PT: 14.4 sec;   INR: 1.25 ratio         PTT - ( 22 Feb 2025 02:58 )  PTT:28.3 sec    Type and Screen: Y      EKG: Y    CXR: no Pacemaker      85F yo F w/ L femoral neck fracture. To OR today for L femoral neck CRPP vs [facility] w/ Dr. Francis.    - Pain Control  - NWB   - hold all chemical PPx at midnight  - NPO except for meds  - IVF while NPO

## 2025-02-22 NOTE — CONSULT NOTE ADULT - PROBLEM SELECTOR RECOMMENDATION 3
- Appreciate plastics evaluation, no operative intervention recommended  - HOB elevated, sinus precautions

## 2025-02-22 NOTE — BRIEF OPERATIVE NOTE - NSICDXBRIEFPROCEDURE_GEN_ALL_CORE_FT
PROCEDURES:  Closed reduction and percutaneous pinning of left hip 22-Feb-2025 18:44:25  Lakhwinder Mehta

## 2025-02-22 NOTE — CONSULT NOTE ADULT - PROBLEM SELECTOR RECOMMENDATION 2
- Plan for OR today for L femoral neck fx repair w/orthopedic surgery  - DVT ppx and WB recommendations as per primary surgical team

## 2025-02-22 NOTE — CONSULT NOTE ADULT - CONSULT REASON
Cardiac Risk Stratification
Left Orbital Floor Fracture
Orbital floor Fx
L ANGELITO Fx
medical preoperative risk stratification/clearance

## 2025-02-22 NOTE — PRE-OP CHECKLIST - LOOSE TEETH
PHYSICAL THERAPY EVALUATION  Type of Visit: Evaluation    See electronic medical record for Abuse and Falls Screening details.    Subjective       Presenting condition or subjective complaint: sprained left ankle  Date of onset: 10/04/23    Relevant medical history:     Dates & types of surgery:    About a month ago she sprained. Jumped and landed on ankle sideways. Playing ultimate frisbee on uneven field. Rolled it laterally. Super swollen for first week, for first 2 weeks she was good about resting/icing/elevating. Swelling went down, went to doctor. The MD advised her wear a boot, which she did for about a week then started using a wrap. Brace is being worn now instead of a boot, which she is wearing about 80%. Just returned from trip where she was walking quite a bit. Doesn't feel confident yet to run on it, is able to do yoga.     Prior diagnostic imaging/testing results: X-ray     Prior therapy history for the same diagnosis, illness or injury:        Prior Level of Function  Transfers:   Ambulation:   ADL:   IADL:     Living Environment  Social support: Alone   Type of home: House; 1 level   Stairs to enter the home: Yes 3 Is there a railing: Yes   Ramp: No   Stairs inside the home: Yes 10 Is there a railing: Yes   Help at home: None  Equipment owned:       Employment: Yes instructional design  Hobbies/Interests: yoga, frisbee, being outside, walking, hiking, snowboarding    Patient goals for therapy: confidently run, balance, jump, cut    Pain assessment:      Objective   FOOT/ANKLE EVALUATION  PAIN: Pain Level at Rest: 2/10  Pain Level with Use: 5/10  Pain Location: ankle  Pain Quality: Aching  Pain Frequency: intermittent  Pain is Worst: nighttime  Pain is Exacerbated By: long hilly walks, certain yoga positions/inge pose,   Pain is Relieved By: NSAIDs and    Pain Progression: Improved  INTEGUMENTARY (edema, incisions):   POSTURE:   GAIT:   Weightbearing Status:   Assistive Device(s):   Gait Deviations:    BALANCE/PROPRIOCEPTION:   WEIGHT BEARING ALIGNMENT:   NON-WEIGHTBEARING ALIGNMENT:    ROM:   (Degrees) Left AROM Left PROM  Right AROM Right PROM   Ankle Dorsiflexion 90 deg  95 deg    Ankle Plantarflexion Min-mod limitation compared to right  WNL    Ankle Inversion 21 deg  30 deg    Ankle Eversion 10 deg  20 deg    Great Toe Flexion       Great Toe Extension       Pain:   End feel:     STRENGTH:   Pain: - none + mild ++ moderate +++ severe  Strength Scale: 0-5/5 Left Right   Ankle Dorsiflexion 5* 5   Ankle Plantarflexion 5 5   Ankle Inversion 4+ 5   Ankle Eversion 4-* 5   Great Toe Flexion     Great Toe Extension     Anterior Tibialis     Posterior Tibialis     Peroneals     Extensor Digitorum     Gastroc/Soleus       FLEXIBILITY:   SPECIAL TESTS:  Anterior drawer (+) on right for minimal increase in laxity compared to contralateral side, though did not recreate any pain.  FUNCTIONAL TESTS:   PALPATION:  Palpated lateral area of ankle anterior and posterior to the lateral malleoli. Some TTP to ATFL area, no tenderness posterior to the malleoli  JOINT MOBILITY:  R ankle joint mobility WNL. Left anterior drawer more lax compared to right side minimally. Calcaneal mobility equal bilaterally. Some discomfort with subtalar eversion>inversion limiting joint mobility on the left.    Assessment & Plan   CLINICAL IMPRESSIONS  Medical Diagnosis: Sprain of anterior talofibular ligament of left ankle, Sprain of posterior talofibular ligament of left ankle, Sprain of tibiofibular ligament of left ankle, Peroneal tendinitis of left lower extremity    Treatment Diagnosis: Left ankle sprain/pain   Impression/Assessment: Patient is a 35 year old female with left ankle complaints.  The following significant findings have been identified: Pain, Decreased ROM/flexibility, Decreased strength, Impaired gait, Impaired muscle performance, and Decreased activity tolerance. These impairments interfere with their ability to perform  recreational activities as compared to previous level of function.     Clinical Decision Making (Complexity):  Clinical Presentation: Stable/Uncomplicated  Clinical Presentation Rationale: based on medical and personal factors listed in PT evaluation  Clinical Decision Making (Complexity): Low complexity    PLAN OF CARE  Treatment Interventions:  Modalities: Cryotherapy, E-stim, Hot Pack  Interventions: Manual Therapy, Neuromuscular Re-education, Therapeutic Activity, Therapeutic Exercise    Long Term Goals     PT Goal 1  Goal Identifier: LTG 1      Frequency of Treatment: 1x per week  Duration of Treatment: 6 weeks    Recommended Referrals to Other Professionals:   Education Assessment:   Learner/Method: Patient;No Barriers to Learning    Risks and benefits of evaluation/treatment have been explained.   Patient/Family/caregiver agrees with Plan of Care.     Evaluation Time:     PT Eval, Low Complexity Minutes (10034): 15       Signing Clinician: Alejandro Schultz PT           no

## 2025-02-22 NOTE — CONSULT NOTE ADULT - ASSESSMENT
This is a 84 y/o F with PHMh of CAD (with  of LCx and RCA), severe AS (pending outpt TAVR), pAF (on Eliquis 2.5mg BID), R internal carotid stenosis, HTN, HLD, hypothyroidism, anxiety, PAD s/p AKA who presented after mechanical fall on slippery tiles with no LOC or presyncope/dizziness but with orbital and femoral fracture. Cardiology consulted for cardiac risk stratification and optimization prior to hip surgery.     Patient says she has been compliant with all her home medications, does not miss any doses. Can lie flat without issues. With her AS, she saw Dr. Serna who recommended deferring intervention for now while due to infection in foot. She wears a prosthetic and uses a walker to get around. Can walk around the house and do her chores without issue. No chest pain, pressure, or SOB. No prior episodes of passing out or palpitations.     Vitals/Tele: Afeb, HR 70, /60, 94% RA  ECG: LVH  Last TTE: 10/30/23: LVEF 23%, global hypokinesis, grade 1 LV diastolic dysfunction.    Ischemic Eval: 02/05/25: Well developed collaterals from  RCA and LCx. Mild-mod LAD. Mod-severe diag disease  Labs: Hgb 11.9, K 3.9, BUN 30, Cr 1.23, LFTs wnl    #Cardiac Pre-op Risk Stratification:  [] Obtain TTE in AM  [] Maintain euvolemia; would avoid significant diuresis or significant volume overload   [] Cont metop succ 50mg daily, rosuva 5, levoth 50, ASA 81 (if ok with surgery team), Amiodarone 100mg daily, Eliquis 2.5mg BID (if ok with surgery team), bumex 1mg PO daily  [] Medical mgmt of CAD, no intervention planned   [] Medical mgmt of severe AS, no intervention planned   - RCRI: 2 points, +Hx of ischemic heart disease and Hx of heart failure: 2.4% risk of cardiac death, nonfatal MI, and nonfatal cardiac arrest. 3.6% rate of MI, pulm edema, VF, primary cardiac arrest, and CHB    Patient is otherwise optimized for urgent surgery.     Patient to be discussed with Dr. Ramirez. Please see attending attestation for official recs.       Ector Marcos MD   Cardiology Fellow  This is a 86 y/o F with PHMh of CAD (with  of LCx and RCA), severe AS (pending outpt TAVR), pAF (on Eliquis 2.5mg BID), R internal carotid stenosis, HTN, HLD, hypothyroidism, anxiety, PAD s/p AKA who presented after mechanical fall on slippery tiles with no LOC or presyncope/dizziness but with orbital and femoral fracture. Cardiology consulted for cardiac risk stratification and optimization prior to hip surgery.     Patient says she has been compliant with all her home medications, does not miss any doses. Can lie flat without issues. With her AS, she saw Dr. Serna who recommended deferring intervention for now while due to infection in foot. She wears a prosthetic and uses a walker to get around. Can walk around the house and do her chores without issue. No chest pain, pressure, or SOB. No prior episodes of passing out or palpitations.     Vitals/Tele: Afeb, HR 70, /60, 94% RA  ECG: LVH  Last TTE: 10/30/23: LVEF 23%, global hypokinesis, grade 1 LV diastolic dysfunction.    Ischemic Eval: 02/05/25: Well developed collaterals from  RCA and LCx. Mild-mod LAD. Mod-severe diag disease  Labs: Hgb 11.9, K 3.9, BUN 30, Cr 1.23, LFTs wnl    #Cardiac Pre-op Risk Stratification:  [] Obtain TTE in AM, but does not need to delay surgery  [] Maintain euvolemia; would avoid significant diuresis or significant volume overload   [] Cont metop succ 50mg daily, rosuva 5, levoth 50, ASA 81 (if ok with surgery team), Amiodarone 100mg daily, Eliquis 2.5mg BID (if ok with surgery team), bumex 1mg PO daily  [] Medical mgmt of CAD, no intervention planned   [] Medical mgmt of severe AS, no intervention planned   - RCRI: 2 points, +Hx of ischemic heart disease and Hx of heart failure: 2.4% risk of cardiac death, nonfatal MI, and nonfatal cardiac arrest. 3.6% rate of MI, pulm edema, VF, primary cardiac arrest, and CHB    Patient is otherwise optimized for urgent surgery. High risk due to aortic stenosis and careful fluid management.     Patient to be discussed with Dr. Ramirez. Please see attending attestation for official recs.       Ector Marcos MD   Cardiology Fellow

## 2025-02-22 NOTE — PRE-OP CHECKLIST - INTERNAL PROSTHESES
LLE bypass patient unsure about possible stent implant. right hip hardware, bilateral cataracts/yes(specify)

## 2025-02-22 NOTE — PRE-ANESTHESIA EVALUATION ADULT - NSRADCARDRESULTSFT_GEN_ALL_CORE
< from: TTE W or WO Ultrasound Enhancing Agent (02.22.25 @ 08:18) >       CONCLUSIONS:      1. Left ventricular cavity is moderately dilated. Left ventricular systolic function is severely decreased with an ejection fraction of 20 % by Miguel's method of disks. Global left ventricular hypokinesis.  2. Enlarged right ventricular cavity size and mild to moderately reduced right ventricular systolic function.   3. Estimated pulmonary artery systolic pressure is 56 mmHg.   4. There is severe calcific aortic stenosis with low-flow, low-gradient physiology.   5. Malcoaptation of the tricuspid valve leaflets due to tethering. Moderate tricuspid regurgitation.   6. No pericardial effusion seen.   7. Bilateral pleural effusion noted.   8. Compared to the transthoracic echocardiogram performed on 1/16/2025, there have been no significant interval changes.    < end of copied text >

## 2025-02-22 NOTE — CONSULT NOTE ADULT - PROBLEM SELECTOR RECOMMENDATION 4
- CTA of RCA and LCx  - TTE 2/22 with EF 20%, global LV hypokinesis, reduced RV systolic function, moderate TR  - S/p ischemic eval 2/5/2025 with well developed collaterals from  RCA and LCx, with mild-mod LAD and mod-severe diagonal disease  - Pending repeat TTE as per cardiology recs, c/w metop succinate 50 mg daily, rosuvastatin 5 mg daily, ASA 81 mg daily, Eliquis for pA-fib and bumex 1 mg PO daily

## 2025-02-22 NOTE — CONSULT NOTE ADULT - SUBJECTIVE AND OBJECTIVE BOX
85 F w/ PMH CAD (with  of LCx and RCA), severe AS (pending outpt TAVR), pAF (on Eliquis 2.5mg BID), R internal carotid stenosis, HTN, HLD, hypothyroidism, anxiety, PAD s/p AKA presenting after mechanical fall and found to have L femoral head and L orbital floor fracture. Facial fracture service consulted for management of L orbital floor fracture. Pt denies LOC or  syncope at time of fall, no intracranial hemorrhage noted on CT. Pt takes aspirin in addition to eliquis at home. Pt denies vision changes, headache, or difficulty looking in all 4 directions. Reports edema and ecchymosis appear improved since fall. In addition pt denies runny nose, ear discharge, or excessive tears.     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 1000 milliGRAM(s) IV Intermittent every 6 hours  aMIOdarone    Tablet 100 milliGRAM(s) Oral every 24 hours  aspirin enteric coated 81 milliGRAM(s) Oral daily  buMETAnide 1 milliGRAM(s) Oral every 24 hours  chlorhexidine 2% Cloths 1 Application(s) Topical once  cholecalciferol 5000 Unit(s) Oral every 24 hours  dextrose 5%. 1000 milliLiter(s) (50 mL/Hr) IV Continuous <Continuous>  dextrose 5%. 1000 milliLiter(s) (100 mL/Hr) IV Continuous <Continuous>  dextrose 50% Injectable 25 Gram(s) IV Push once  dextrose 50% Injectable 12.5 Gram(s) IV Push once  dextrose 50% Injectable 25 Gram(s) IV Push once  glucagon  Injectable 1 milliGRAM(s) IntraMuscular once  insulin lispro (ADMELOG) corrective regimen sliding scale   SubCutaneous every 6 hours  levothyroxine 50 MICROGram(s) Oral daily  losartan 25 milliGRAM(s) Oral every 24 hours  metoprolol succinate ER 50 milliGRAM(s) Oral every 24 hours  pantoprazole    Tablet 40 milliGRAM(s) Oral before breakfast  povidone iodine 10% Nasal Swab 1 Application(s) Both Nostrils once  rosuvastatin 5 milliGRAM(s) Oral at bedtime  spironolactone 25 milliGRAM(s) Oral every 24 hours    MEDICATIONS  (PRN):  dextrose Oral Gel 15 Gram(s) Oral once PRN Blood Glucose LESS THAN 70 milliGRAM(s)/deciliter  ondansetron    Tablet 4 milliGRAM(s) Oral every 8 hours PRN for nausea  traMADol 25 milliGRAM(s) Oral every 6 hours PRN Moderate Pain (4 - 6)  traMADol 50 milliGRAM(s) Oral every 6 hours PRN Severe Pain (7 - 10)    Antiplatelet therapy:                           Last dose/amt:    Allergies: No Known Drug Allergies  latex (Unknown)        Relevant Family History  FAMILY HISTORY:  Family history of myocardial infarction (Father, Sibling)        Review of Systems  GENERAL:  Negative.                                   NEURO:  Per HPI.                                                                              EYES: Per HPI                                                                    ENMT:  Per HPI.                                  CV:  Denies chest pain or palpitations                                                                                          RESPIRATORY:  Denies SOB or wheezing.                                                                GI:  Denies abdominal pain, nausea, or diarrhea                                                                                                                                              MUSKULOSKELETAL:  Per HPI.                                                                   PHYSICAL EXAM  Vital Signs Last 24 Hrs  T(C): 36.8 (22 Feb 2025 04:29), Max: 37.2 (21 Feb 2025 20:09)  T(F): 98.3 (22 Feb 2025 04:29), Max: 98.9 (21 Feb 2025 20:09)  HR: 74 (22 Feb 2025 04:29) (74 - 88)  BP: 110/67 (22 Feb 2025 04:29) (108/63 - 115/72)  BP(mean): 68 (22 Feb 2025 01:15) (68 - 68)  RR: 18 (22 Feb 2025 04:29) (18 - 18)  SpO2: 94% (22 Feb 2025 04:29) (94% - 100%)    Parameters below as of 22 Feb 2025 04:29  Patient On (Oxygen Delivery Method): room air        General: Well nourished, well developed, no acute distress.                                                                          Eyes: Left periorbital ecchymosis, mld edema most prominent in superior lateral orbit. Eyes track mediolateral and inferior to superior equally bilaterally. Accommodates appropriately. No significant tenderness to palpation of orbit, forehead, or maxilla.  ENT: Normal exam of nasal/oral mucosa with absence of cyanosis. No appreciable otorrhea  Neck: Normal exam of jugular veins, trachea & thyroid.       LABS:                        11.9   7.96  )-----------( 158      ( 22 Feb 2025 02:58 )             36.6     02-22    135  |  94[L]  |  30[H]  ----------------------------<  161[H]  3.9   |  25  |  1.23    Ca    9.0      22 Feb 2025 02:58  Phos  3.8     02-22  Mg     2.2     02-22    TPro  6.7  /  Alb  3.5  /  TBili  0.9  /  DBili  x   /  AST  14  /  ALT  15  /  AlkPhos  99  02-21    PT/INR - ( 22 Feb 2025 02:58 )   PT: 14.4 sec;   INR: 1.25 ratio         PTT - ( 22 Feb 2025 02:58 )  PTT:28.3 sec  Urinalysis Basic - ( 22 Feb 2025 02:58 )    Color: x / Appearance: x / SG: x / pH: x  Gluc: 161 mg/dL / Ketone: x  / Bili: x / Urobili: x   Blood: x / Protein: x / Nitrite: x   Leuk Esterase: x / RBC: x / WBC x   Sq Epi: x / Non Sq Epi: x / Bacteria: x                   85 F w/ PMH CAD (with  of LCx and RCA), severe AS (pending outpt TAVR), pAF (on Eliquis 2.5mg BID), R internal carotid stenosis, HTN, HLD, hypothyroidism, anxiety, PAD s/p AKA presenting after mechanical fall and found to have L femoral head and L orbital floor fracture. Facial fracture service consulted for management of L orbital floor fracture. Pt denies LOC or  syncope at time of fall, no intracranial hemorrhage noted on CT. Pt takes aspirin in addition to eliquis at home. Pt denies vision changes, headache, or difficulty looking in all 4 directions. Reports edema and ecchymosis appear improved since fall. In addition pt denies runny nose, ear discharge, or excessive tears.     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 1000 milliGRAM(s) IV Intermittent every 6 hours  aMIOdarone    Tablet 100 milliGRAM(s) Oral every 24 hours  aspirin enteric coated 81 milliGRAM(s) Oral daily  buMETAnide 1 milliGRAM(s) Oral every 24 hours  chlorhexidine 2% Cloths 1 Application(s) Topical once  cholecalciferol 5000 Unit(s) Oral every 24 hours  dextrose 5%. 1000 milliLiter(s) (50 mL/Hr) IV Continuous <Continuous>  dextrose 5%. 1000 milliLiter(s) (100 mL/Hr) IV Continuous <Continuous>  dextrose 50% Injectable 25 Gram(s) IV Push once  dextrose 50% Injectable 12.5 Gram(s) IV Push once  dextrose 50% Injectable 25 Gram(s) IV Push once  glucagon  Injectable 1 milliGRAM(s) IntraMuscular once  insulin lispro (ADMELOG) corrective regimen sliding scale   SubCutaneous every 6 hours  levothyroxine 50 MICROGram(s) Oral daily  losartan 25 milliGRAM(s) Oral every 24 hours  metoprolol succinate ER 50 milliGRAM(s) Oral every 24 hours  pantoprazole    Tablet 40 milliGRAM(s) Oral before breakfast  povidone iodine 10% Nasal Swab 1 Application(s) Both Nostrils once  rosuvastatin 5 milliGRAM(s) Oral at bedtime  spironolactone 25 milliGRAM(s) Oral every 24 hours    MEDICATIONS  (PRN):  dextrose Oral Gel 15 Gram(s) Oral once PRN Blood Glucose LESS THAN 70 milliGRAM(s)/deciliter  ondansetron    Tablet 4 milliGRAM(s) Oral every 8 hours PRN for nausea  traMADol 25 milliGRAM(s) Oral every 6 hours PRN Moderate Pain (4 - 6)  traMADol 50 milliGRAM(s) Oral every 6 hours PRN Severe Pain (7 - 10)    Antiplatelet therapy:                           Last dose/amt:    Allergies: No Known Drug Allergies  latex (Unknown)        Relevant Family History  FAMILY HISTORY:  Family history of myocardial infarction (Father, Sibling)        Review of Systems  GENERAL:  Negative.                                   NEURO:  Per HPI.                                                                              HEENT: Patient with periorbital ecchymosis, EOMI, no vision changes, no clinical signs of entrapment. No tenderness to palpation.                              CV:  Denies chest pain or palpitations                                                                                          RESPIRATORY:  Denies SOB or wheezing.                                                                GI:  Denies abdominal pain, nausea, or diarrhea                                                                                                                                              MUSKULOSKELETAL:  Per HPI.                                                                   PHYSICAL EXAM  Vital Signs Last 24 Hrs  T(C): 36.8 (22 Feb 2025 04:29), Max: 37.2 (21 Feb 2025 20:09)  T(F): 98.3 (22 Feb 2025 04:29), Max: 98.9 (21 Feb 2025 20:09)  HR: 74 (22 Feb 2025 04:29) (74 - 88)  BP: 110/67 (22 Feb 2025 04:29) (108/63 - 115/72)  BP(mean): 68 (22 Feb 2025 01:15) (68 - 68)  RR: 18 (22 Feb 2025 04:29) (18 - 18)  SpO2: 94% (22 Feb 2025 04:29) (94% - 100%)    Parameters below as of 22 Feb 2025 04:29  Patient On (Oxygen Delivery Method): room air        General: Well nourished, well developed, no acute distress.                                                                          Eyes: Left periorbital ecchymosis, mld edema most prominent in superior lateral orbit. Eyes track mediolateral and inferior to superior equally bilaterally. Accommodates appropriately. No significant tenderness to palpation of orbit, forehead, or maxilla.  ENT: Normal exam of nasal/oral mucosa with absence of cyanosis. No appreciable otorrhea  Neck: Normal exam of jugular veins, trachea & thyroid.       LABS:                        11.9   7.96  )-----------( 158      ( 22 Feb 2025 02:58 )             36.6     02-22    135  |  94[L]  |  30[H]  ----------------------------<  161[H]  3.9   |  25  |  1.23    Ca    9.0      22 Feb 2025 02:58  Phos  3.8     02-22  Mg     2.2     02-22    TPro  6.7  /  Alb  3.5  /  TBili  0.9  /  DBili  x   /  AST  14  /  ALT  15  /  AlkPhos  99  02-21    PT/INR - ( 22 Feb 2025 02:58 )   PT: 14.4 sec;   INR: 1.25 ratio         PTT - ( 22 Feb 2025 02:58 )  PTT:28.3 sec  Urinalysis Basic - ( 22 Feb 2025 02:58 )    Color: x / Appearance: x / SG: x / pH: x  Gluc: 161 mg/dL / Ketone: x  / Bili: x / Urobili: x   Blood: x / Protein: x / Nitrite: x   Leuk Esterase: x / RBC: x / WBC x   Sq Epi: x / Non Sq Epi: x / Bacteria: x

## 2025-02-22 NOTE — PRE-ANESTHESIA EVALUATION ADULT - NSANTHPMHFT_GEN_ALL_CORE
PMHx reviewed. EF20-25%, AS (severe AS, echo reviewed); Functionally denies shortness of breath on exertion when walking with prosthesis or using wheelchair.

## 2025-02-22 NOTE — CONSULT NOTE ADULT - ASSESSMENT
85y female with a PMHx of HTN, Afib on Eliquis, T2DM, aortic stenosis, s/p left AKA, consulted for left orbital floor fracture seen on CT scan from [facility], found to have no clinical evidence of entrapment. Improving  periorbital ecchymosis and edema. Pt planned for L femoral neck fracture repair today.    Plan  -No operative intervention necessary  -Head of bed elevation   -Sinus precautions  -If any vision changes, recommend opthalmology evaluation  -Pt can follow up w/ Dr. Head outpatient 1-2 weeks after discharge    Plastic Surgery   [phone] 85y female with a PMHx of HTN, Afib on Eliquis, T2DM, aortic stenosis, s/p left AKA, consulted for left orbital floor fracture seen on CT scan from [facility], found to have no clinical evidence of entrapment. Improving  periorbital ecchymosis and edema. Pt planned for L femoral neck fracture repair today.    Plan  -No operative intervention indicated -- small orbital floor fx w/o signs of entrapment.   -Head of bed elevation   -Sinus precautions  -If any vision changes, recommend opthalmology re-evaluation  -Pt can follow up w/ Dr. Head outpatient 1-2 weeks after discharge    Plastic Surgery   [phone]

## 2025-02-22 NOTE — PROGRESS NOTE ADULT - TIME BILLING
I have personally seen and examined the patient.  I fully participated in the care of this patient.  I have made amendments to the documentation where necessary, and agree with the history, physical exam and plan as documented.  reviewed chart  reviewed laboratory data  reviewed plan of care with resident house staff  looks well resting comfortably on my exam  from trauma perspective focus is on ORIF femor   as noted will need medical clearance and medical follow up

## 2025-02-23 LAB
ANION GAP SERPL CALC-SCNC: 17 MMOL/L — SIGNIFICANT CHANGE UP (ref 5–17)
BUN SERPL-MCNC: 30 MG/DL — HIGH (ref 7–23)
CALCIUM SERPL-MCNC: 8.9 MG/DL — SIGNIFICANT CHANGE UP (ref 8.4–10.5)
CHLORIDE SERPL-SCNC: 96 MMOL/L — SIGNIFICANT CHANGE UP (ref 96–108)
CO2 SERPL-SCNC: 22 MMOL/L — SIGNIFICANT CHANGE UP (ref 22–31)
CREAT SERPL-MCNC: 1.01 MG/DL — SIGNIFICANT CHANGE UP (ref 0.5–1.3)
EGFR: 55 ML/MIN/1.73M2 — LOW
GLUCOSE BLDC GLUCOMTR-MCNC: 136 MG/DL — HIGH (ref 70–99)
GLUCOSE BLDC GLUCOMTR-MCNC: 153 MG/DL — HIGH (ref 70–99)
GLUCOSE BLDC GLUCOMTR-MCNC: 157 MG/DL — HIGH (ref 70–99)
GLUCOSE BLDC GLUCOMTR-MCNC: 99 MG/DL — SIGNIFICANT CHANGE UP (ref 70–99)
GLUCOSE SERPL-MCNC: 108 MG/DL — HIGH (ref 70–99)
HCT VFR BLD CALC: 37.6 % — SIGNIFICANT CHANGE UP (ref 34.5–45)
HGB BLD-MCNC: 12.2 G/DL — SIGNIFICANT CHANGE UP (ref 11.5–15.5)
MCHC RBC-ENTMCNC: 32.4 G/DL — SIGNIFICANT CHANGE UP (ref 32–36)
MCHC RBC-ENTMCNC: 33.3 PG — SIGNIFICANT CHANGE UP (ref 27–34)
MCV RBC AUTO: 102.7 FL — HIGH (ref 80–100)
NRBC BLD AUTO-RTO: 0 /100 WBCS — SIGNIFICANT CHANGE UP (ref 0–0)
PLATELET # BLD AUTO: 170 K/UL — SIGNIFICANT CHANGE UP (ref 150–400)
POTASSIUM SERPL-MCNC: 4.1 MMOL/L — SIGNIFICANT CHANGE UP (ref 3.5–5.3)
POTASSIUM SERPL-SCNC: 4.1 MMOL/L — SIGNIFICANT CHANGE UP (ref 3.5–5.3)
RBC # BLD: 3.66 M/UL — LOW (ref 3.8–5.2)
RBC # FLD: 14.3 % — SIGNIFICANT CHANGE UP (ref 10.3–14.5)
SODIUM SERPL-SCNC: 135 MMOL/L — SIGNIFICANT CHANGE UP (ref 135–145)
WBC # BLD: 7.79 K/UL — SIGNIFICANT CHANGE UP (ref 3.8–10.5)
WBC # FLD AUTO: 7.79 K/UL — SIGNIFICANT CHANGE UP (ref 3.8–10.5)

## 2025-02-23 PROCEDURE — 99231 SBSQ HOSP IP/OBS SF/LOW 25: CPT | Mod: GC

## 2025-02-23 PROCEDURE — 99231 SBSQ HOSP IP/OBS SF/LOW 25: CPT

## 2025-02-23 RX ADMIN — Medication 975 MILLIGRAM(S): at 20:04

## 2025-02-23 RX ADMIN — Medication 40 MILLIGRAM(S): at 07:16

## 2025-02-23 RX ADMIN — Medication 100 MILLIGRAM(S): at 01:29

## 2025-02-23 RX ADMIN — Medication 4 MILLIGRAM(S): at 11:32

## 2025-02-23 RX ADMIN — Medication 100 MILLIGRAM(S): at 08:31

## 2025-02-23 RX ADMIN — Medication 50 MICROGRAM(S): at 06:07

## 2025-02-23 RX ADMIN — Medication 975 MILLIGRAM(S): at 21:00

## 2025-02-23 RX ADMIN — ENOXAPARIN SODIUM 40 MILLIGRAM(S): 100 INJECTION SUBCUTANEOUS at 19:12

## 2025-02-23 RX ADMIN — INSULIN LISPRO 1: 100 INJECTION, SOLUTION INTRAVENOUS; SUBCUTANEOUS at 18:26

## 2025-02-23 RX ADMIN — Medication 81 MILLIGRAM(S): at 11:29

## 2025-02-23 NOTE — DIETITIAN INITIAL EVALUATION ADULT - PERTINENT MEDS FT
MEDICATIONS  (STANDING):  acetaminophen     Tablet .. 975 milliGRAM(s) Oral every 8 hours  aMIOdarone    Tablet 100 milliGRAM(s) Oral every 24 hours  aspirin enteric coated 81 milliGRAM(s) Oral daily  buMETAnide 1 milliGRAM(s) Oral every 24 hours  cholecalciferol 5000 Unit(s) Oral every 24 hours  dextrose 5%. 1000 milliLiter(s) (100 mL/Hr) IV Continuous <Continuous>  dextrose 5%. 1000 milliLiter(s) (50 mL/Hr) IV Continuous <Continuous>  dextrose 50% Injectable 25 Gram(s) IV Push once  dextrose 50% Injectable 12.5 Gram(s) IV Push once  dextrose 50% Injectable 25 Gram(s) IV Push once  enoxaparin Injectable 40 milliGRAM(s) SubCutaneous every 24 hours  glucagon  Injectable 1 milliGRAM(s) IntraMuscular once  insulin lispro (ADMELOG) corrective regimen sliding scale   SubCutaneous three times a day before meals  insulin lispro (ADMELOG) corrective regimen sliding scale   SubCutaneous at bedtime  levothyroxine 50 MICROGram(s) Oral daily  losartan 25 milliGRAM(s) Oral every 24 hours  metoprolol succinate ER 50 milliGRAM(s) Oral every 24 hours  pantoprazole    Tablet 40 milliGRAM(s) Oral before breakfast  polyethylene glycol 3350 17 Gram(s) Oral daily  rosuvastatin 5 milliGRAM(s) Oral at bedtime  senna 2 Tablet(s) Oral at bedtime  spironolactone 25 milliGRAM(s) Oral every 24 hours    MEDICATIONS  (PRN):  dextrose Oral Gel 15 Gram(s) Oral once PRN Blood Glucose LESS THAN 70 milliGRAM(s)/deciliter  magnesium hydroxide Suspension 30 milliLiter(s) Oral daily PRN Constipation  melatonin 3 milliGRAM(s) Oral at bedtime PRN Insomnia  ondansetron Injectable 4 milliGRAM(s) IV Push every 6 hours PRN Nausea and/or Vomiting  oxyCODONE    IR 2.5 milliGRAM(s) Oral every 6 hours PRN Moderate Pain (4 - 6)  oxyCODONE    IR 5 milliGRAM(s) Oral every 6 hours PRN Severe Pain (7 - 10)  traMADol 50 milliGRAM(s) Oral every 6 hours PRN Mild Pain (1 - 3)

## 2025-02-23 NOTE — DIETITIAN INITIAL EVALUATION ADULT - PERTINENT LABORATORY DATA
02-23    135  |  96  |  30[H]  ----------------------------<  108[H]  4.1   |  22  |  1.01    Ca    8.9      23 Feb 2025 06:09  Phos  3.8     02-22  Mg     2.2     02-22    TPro  6.7  /  Alb  3.5  /  TBili  0.9  /  DBili  x   /  AST  14  /  ALT  15  /  AlkPhos  99  02-21  POCT Blood Glucose.: 136 mg/dL (02-23-25 @ 12:00)  A1C with Estimated Average Glucose Result: 6.9 % (01-26-25 @ 08:15)

## 2025-02-23 NOTE — PHYSICAL THERAPY INITIAL EVALUATION ADULT - PERTINENT HX OF CURRENT PROBLEM, REHAB EVAL
84F PMHx CAD, PAD s/p LLE Bypass cb thrombosis s/p L AKA, DM, HFrEF (23%), severe AS, AF on Eliquis, hypothyroidism, HTN, HLD who presents to [facility] ED s/p Mx Fall. Pt reports she was in restroom when she slipped and fell on her bathroom tile. + HS, -LOC. Currently reports pain in L hip and L face. Denies headache, vision changes, neck pain, abdominal pain, CP, SOB, fevers, chills, nausea, vomiting, LE pain, back pain, dysuria, hematuria, melena, hematochezia. Found to have  L orbital floor fx, L femoral neck fx. 84F PMHx CAD, PAD s/p LLE Bypass cb thrombosis s/p L AKA, DM, HFrEF (23%), severe AS, AF on Eliquis, hypothyroidism, HTN, HLD who presents to [facility] ED s/p Mx Fall. Pt reports she was in restroom when she slipped and fell on her bathroom tile. + HS, -LOC. Currently reports pain in L hip and L face. Denies headache, vision changes, neck pain, abdominal pain, CP, SOB, fevers, chills, nausea, vomiting, LE pain, back pain, dysuria, hematuria, melena, hematochezia. Found to have  L orbital floor fx, L femoral neck fx. Now s/p L hip CRRP on 2/22/25.  XR Hip/Femur L: Acute mildly impacted left transcervical femoral neck fracture. Slight anterior displacement of the distal fracture fragment crosstable lateral view. Status post mid shaft femoral above-the-knee amputation. No radiographic evidence of osteomyelitis. 84F PMHx CAD, PAD s/p LLE Bypass cb thrombosis s/p L AKA, DM, HFrEF (23%), severe AS, AF on Eliquis, hypothyroidism, HTN, HLD who presents to [facility] ED s/p Mx Fall. Pt reports she was in restroom when she slipped and fell on her bathroom tile. + HS, -LOC. Currently reports pain in L hip and L face. Denies headache, vision changes, neck pain, abdominal pain, CP, SOB, fevers, chills, nausea, vomiting, LE pain, back pain, dysuria, hematuria, melena, hematochezia. Found to have  L orbital floor fx, L femoral neck fx. Now s/p L hip CRPP on 2/22/25.  XR Hip/Femur L: Acute mildly impacted left transcervical femoral neck fracture. Slight anterior displacement of the distal fracture fragment crosstable lateral view. Status post mid shaft femoral above-the-knee amputation. No radiographic evidence of osteomyelitis.

## 2025-02-23 NOTE — OCCUPATIONAL THERAPY INITIAL EVALUATION ADULT - ADDITIONAL COMMENTS
Pt reports she has a HHA 3-4 days/wk. Pt with history of L AKA and reports she has a prosthetic leg for her LLE.  Pt does not have the prosthetic leg with her at the time of the OT assessment.

## 2025-02-23 NOTE — PROGRESS NOTE ADULT - ATTENDING COMMENTS
Mrs. Kelley is a 85 years old pleasant female with a PMH of CAD (with  of LCx and RCA), severe AS (pending outpt TAVR), pAF (on Eliquis 2.5mg BID), R internal carotid stenosis, HTN, HLD, hypothyroidism, anxiety, PAD s/p AKA who presented admitted for orbital and femoral fracture after a mechanical fall. Cardiology consulted for cardiac risk stratification.     She has a recent LHC showing  of RCA and LCX with good collaterals and Mild-mod LAD. Mod-severe diag disease. I've personally reviewed her TTE remains with severely reduced LVEF and severe AS. ECG NSR with LVH (unchanged).     She is s/p right hip closed reduction percutaneous pinning POD#1. Uneventful. She is comfortable sitting in chair. No complains  The patient denies any chest pain, SOB. Labs are stable.     Continue CHF medications.   Continue Bumex PO. Continue May give IV diuretic PRN.   Aspirin and high intensity statin  Tele monitoring  Resume Eliquis once safe from surgical point of view.  Should follow up with Cardiology and Structure team as outpatient TAVR evaluation.    Will sign off  Please call back if any questions or concerns    Latrice Mar MD  Attending Cardiologist     Non-invasive Cardiology/Advanced Cardiac Imaging  [facility].

## 2025-02-23 NOTE — PHYSICAL THERAPY INITIAL EVALUATION ADULT - NSPTDISCHREC_GEN_A_CORE
If pt d/c home would require home PT, assist with all mobility/ADLs, & DME: 3:1 commode 2* pt confined to single room without bathroom/Sub-acute Rehab

## 2025-02-23 NOTE — PROGRESS NOTE ADULT - SUBJECTIVE AND OBJECTIVE BOX
Patient seen and examined at bedside.    Overnight Events: s/p OR     REVIEW OF SYSTEMS:  CONSTITUTIONAL: No weakness, fevers or chills  EYES/ENT: No visual changes;  No dysphagia  NECK: No pain or stiffness  RESPIRATORY: No cough, wheezing, hemoptysis; No shortness of breath  CARDIOVASCULAR: No chest pain or palpitations; No lower extremity edema  GASTROINTESTINAL: No abdominal or epigastric pain. No nausea, vomiting, or hematemesis; No diarrhea or constipation. No melena or hematochezia.  BACK: No back pain  GENITOURINARY: No dysuria, frequency or hematuria  NEUROLOGICAL: No numbness or weakness  SKIN: No itching, burning, rashes, or lesions   All other review of systems is negative unless indicated above.            Current Meds:  acetaminophen     Tablet .. 975 milliGRAM(s) Oral every 8 hours  aMIOdarone    Tablet 100 milliGRAM(s) Oral every 24 hours  aspirin enteric coated 81 milliGRAM(s) Oral daily  buMETAnide 1 milliGRAM(s) Oral every 24 hours  ceFAZolin   IVPB 2000 milliGRAM(s) IV Intermittent every 8 hours  cholecalciferol 5000 Unit(s) Oral every 24 hours  dextrose 5%. 1000 milliLiter(s) IV Continuous <Continuous>  dextrose 5%. 1000 milliLiter(s) IV Continuous <Continuous>  dextrose 50% Injectable 25 Gram(s) IV Push once  dextrose 50% Injectable 12.5 Gram(s) IV Push once  dextrose 50% Injectable 25 Gram(s) IV Push once  dextrose Oral Gel 15 Gram(s) Oral once PRN  enoxaparin Injectable 40 milliGRAM(s) SubCutaneous every 24 hours  glucagon  Injectable 1 milliGRAM(s) IntraMuscular once  insulin lispro (ADMELOG) corrective regimen sliding scale   SubCutaneous three times a day before meals  insulin lispro (ADMELOG) corrective regimen sliding scale   SubCutaneous at bedtime  levothyroxine 50 MICROGram(s) Oral daily  losartan 25 milliGRAM(s) Oral every 24 hours  magnesium hydroxide Suspension 30 milliLiter(s) Oral daily PRN  melatonin 3 milliGRAM(s) Oral at bedtime PRN  metoprolol succinate ER 50 milliGRAM(s) Oral every 24 hours  ondansetron Injectable 4 milliGRAM(s) IV Push every 6 hours PRN  oxyCODONE    IR 2.5 milliGRAM(s) Oral every 6 hours PRN  oxyCODONE    IR 5 milliGRAM(s) Oral every 6 hours PRN  pantoprazole    Tablet 40 milliGRAM(s) Oral before breakfast  polyethylene glycol 3350 17 Gram(s) Oral daily  rosuvastatin 5 milliGRAM(s) Oral at bedtime  senna 2 Tablet(s) Oral at bedtime  spironolactone 25 milliGRAM(s) Oral every 24 hours  traMADol 50 milliGRAM(s) Oral every 6 hours PRN      Vitals:  T(F): 98.1 (02-23), Max: 98.4 (02-22)  HR: 82 (02-23) (64 - 85)  BP: 115/74 (02-23) (104/66 - 127/76)  RR: 18 (02-23)  SpO2: 95% (02-23)  I&O's Summary    22 Feb 2025 07:01  -  23 Feb 2025 07:00  --------------------------------------------------------  IN: 290 mL / OUT: 1050 mL / NET: -760 mL        Physical Exam:  HEENT: EOMI, clear sclera  PULM: CTA b/l, no wheeze  CV: RRR S1 S2, no murmur, no JVD  ABD: S, NT, ND  EXT: WWP, R AKA. L leg wrapped.   PSYCH: normal affect  SKIN: No rash                          12.2   7.79  )-----------( 170      ( 23 Feb 2025 06:12 )             37.6     02-23    135  |  96  |  30[H]  ----------------------------<  108[H]  4.1   |  22  |  1.01    Ca    8.9      23 Feb 2025 06:09  Phos  3.8     02-22  Mg     2.2     02-22    TPro  6.7  /  Alb  3.5  /  TBili  0.9  /  DBili  x   /  AST  14  /  ALT  15  /  AlkPhos  99  02-21    PT/INR - ( 22 Feb 2025 02:58 )   PT: 14.4 sec;   INR: 1.25 ratio         PTT - ( 22 Feb 2025 02:58 )  PTT:28.3 sec

## 2025-02-23 NOTE — PROGRESS NOTE ADULT - SUBJECTIVE AND OBJECTIVE BOX
ATTENDING ATTESTATION  I have seen and examined this patient on rounds thismorning with the surgery team. I have reviewed all new labs, imaging and reports. I have participated in formulating the plan for the day, and have read and agree with the history, ROS, exam, assessment and plan as stated above.     POD 1 from left femoral neck CRPP.  Pain well controlled.   OK to start dvt ppx today.   PT for dispo. No further labs.     Total time spent in the care of this patient today (excluding critical care, teaching & procedures): 25 min                 Over 50% of the total time was spent in discussion and coordination of care with consulting services, dietary and rehab services.     Chio Lord M.D., M.S.  Division of Acute Care Surgery   ACS/ [facility] Progress Note     SUBJECTIVE  The patient was seen and examined. No acute events overnight. Pain controlled, afebrile w/ stable vitals. The patient endorses nausea, not vomiting,      OBJECTIVE  ___________________________________________________  VITAL SIGNS / I&O's   Vital Signs Last 24 Hrs  T(C): 36.8 (23 Feb 2025 16:32), Max: 36.8 (23 Feb 2025 04:37)  T(F): 98.2 (23 Feb 2025 16:32), Max: 98.2 (23 Feb 2025 04:37)  HR: 88 (23 Feb 2025 16:32) (64 - 88)  BP: 105/68 (23 Feb 2025 16:32) (100/58 - 124/79)  BP(mean): 77 (22 Feb 2025 22:00) (77 - 89)  RR: 18 (23 Feb 2025 16:32) (15 - 18)  SpO2: 93% (23 Feb 2025 16:32) (93% - 99%)    Parameters below as of 23 Feb 2025 16:32  Patient On (Oxygen Delivery Method): room air          22 Feb 2025 07:01  -  23 Feb 2025 07:00  --------------------------------------------------------  IN:    IV PiggyBack: 50 mL    Oral Fluid: 240 mL  Total IN: 290 mL    OUT:    Voided (mL): 1050 mL  Total OUT: 1050 mL    Total NET: -760 mL      23 Feb 2025 07:01  -  23 Feb 2025 20:06  --------------------------------------------------------  IN:    Oral Fluid: 600 mL  Total IN: 600 mL    OUT:    Voided (mL): 600 mL  Total OUT: 600 mL    Total NET: 0 mL        ___________________________________________________  PHYSICAL EXAM    -- CONSTITUTIONAL: NAD, lying in bed  -- NEURO: Awake, alert  -- HEENT: NC/AT, mucous membranes moist  -- NECK: Soft, no asymmetry  -- PULM: Non-labored respirations, equal chest rise bilaterally  -- ABDOMEN: Nondistended  -- EXTREMITIES: LLE WWP, com[artnts soft, Aquacel dressing c/d/i,   -- PSYCH: Affect normal, A&Ox3    ___________________________________________________  LABS                        12.2   7.79  )-----------( 170      ( 23 Feb 2025 06:12 )             37.6     23 Feb 2025 06:09    135    |  96     |  30     ----------------------------<  108    4.1     |  22     |  1.01     Ca    8.9        23 Feb 2025 06:09  Phos  3.8       22 Feb 2025 02:58  Mg     2.2       22 Feb 2025 02:58    TPro  6.7    /  Alb  3.5    /  TBili  0.9    /  DBili  x      /  AST  14     /  ALT  15     /  AlkPhos  99     21 Feb 2025 22:32    PT/INR - ( 22 Feb 2025 02:58 )   PT: 14.4 sec;   INR: 1.25 ratio         PTT - ( 22 Feb 2025 02:58 )  PTT:28.3 sec  CAPILLARY BLOOD GLUCOSE      POCT Blood Glucose.: 157 mg/dL (23 Feb 2025 18:25)  POCT Blood Glucose.: 153 mg/dL (23 Feb 2025 17:17)  POCT Blood Glucose.: 136 mg/dL (23 Feb 2025 12:00)  POCT Blood Glucose.: 99 mg/dL (23 Feb 2025 08:15)  POCT Blood Glucose.: 112 mg/dL (22 Feb 2025 22:00)        Urinalysis Basic - ( 23 Feb 2025 06:09 )    Color: x / Appearance: x / SG: x / pH: x  Gluc: 108 mg/dL / Ketone: x  / Bili: x / Urobili: x   Blood: x / Protein: x / Nitrite: x   Leuk Esterase: x / RBC: x / WBC x   Sq Epi: x / Non Sq Epi: x / Bacteria: x      ___________________________________________________  MICRO  Recent Cultures:    ___________________________________________________  MEDICATIONS  MEDICATIONS  (STANDING):  acetaminophen     Tablet .. 975 milliGRAM(s) Oral every 8 hours  aMIOdarone    Tablet 100 milliGRAM(s) Oral every 24 hours  aspirin enteric coated 81 milliGRAM(s) Oral daily  buMETAnide 1 milliGRAM(s) Oral every 24 hours  cholecalciferol 5000 Unit(s) Oral every 24 hours  dextrose 5%. 1000 milliLiter(s) (100 mL/Hr) IV Continuous <Continuous>  dextrose 5%. 1000 milliLiter(s) (50 mL/Hr) IV Continuous <Continuous>  dextrose 50% Injectable 25 Gram(s) IV Push once  dextrose 50% Injectable 12.5 Gram(s) IV Push once  dextrose 50% Injectable 25 Gram(s) IV Push once  enoxaparin Injectable 40 milliGRAM(s) SubCutaneous every 24 hours  glucagon  Injectable 1 milliGRAM(s) IntraMuscular once  insulin lispro (ADMELOG) corrective regimen sliding scale   SubCutaneous three times a day before meals  insulin lispro (ADMELOG) corrective regimen sliding scale   SubCutaneous at bedtime  levothyroxine 50 MICROGram(s) Oral daily  losartan 25 milliGRAM(s) Oral every 24 hours  metoprolol succinate ER 50 milliGRAM(s) Oral every 24 hours  pantoprazole    Tablet 40 milliGRAM(s) Oral before breakfast  polyethylene glycol 3350 17 Gram(s) Oral daily  rosuvastatin 5 milliGRAM(s) Oral at bedtime  senna 2 Tablet(s) Oral at bedtime  spironolactone 25 milliGRAM(s) Oral every 24 hours    MEDICATIONS  (PRN):  dextrose Oral Gel 15 Gram(s) Oral once PRN Blood Glucose LESS THAN 70 milliGRAM(s)/deciliter  magnesium hydroxide Suspension 30 milliLiter(s) Oral daily PRN Constipation  melatonin 3 milliGRAM(s) Oral at bedtime PRN Insomnia  ondansetron Injectable 4 milliGRAM(s) IV Push every 6 hours PRN Nausea and/or Vomiting  oxyCODONE    IR 2.5 milliGRAM(s) Oral every 6 hours PRN Moderate Pain (4 - 6)  oxyCODONE    IR 5 milliGRAM(s) Oral every 6 hours PRN Severe Pain (7 - 10)  traMADol 50 milliGRAM(s) Oral every 6 hours PRN Mild Pain (1 - 3)      ___________________________________________________    Assessment/Plan   NANO GARCIA is a 85y Female who required a tertiary survey due mechanical fall.     PLAN:   - DVT: aspirin enteric coated 81 milliGRAM(s) Oral daily, enoxaparin Injectable 40 milliGRAM(s) SubCutaneous every 24 hours  - Diet: Regular  - Pain control  - IS  - Weight bearing as tolerated  - PT Consult  - Diabetes education  - ACOSTA  - dvt px: Lovenox asa    ACS/Trauma Surgery  [id number].            ATTENDING ATTESTATION  I have seen and examined this patient on rounds thismorning with the surgery team. I have reviewed all new labs, imaging and reports. I have participated in formulating the plan for the day, and have read and agree with the history, ROS, exam, assessment and plan as stated above.     POD 1 from left femoral neck CRPP.  Pain well controlled.   OK to start dvt ppx today.   PT for dispo. No further labs.     Total time spent in the care of this patient today (excluding critical care, teaching & procedures): 25 min                 Over 50% of the total time was spent in discussion and coordination of care with consulting services, dietary and rehab services.     Chio Lord M.D., M.S.  [facility] of Acute Care Surgery

## 2025-02-23 NOTE — OCCUPATIONAL THERAPY INITIAL EVALUATION ADULT - PERTINENT HX OF CURRENT PROBLEM, REHAB EVAL
84F PMHx CAD, PAD s/p LLE Bypass cb thrombosis s/p L AKA, DM, HFrEF (23%), severe AS, AF on Eliquis, hypothyroidism, HTN, HLD who presents to [facility] ED s/p Mx Fall. Pt reports she was in restroom when she slipped and fell on her bathroom tile. + HS, -LOC. Currently reports pain in L hip and L face. Denies headache, vision changes, neck pain, abdominal pain, CP, SOB, fevers, chills, nausea, vomiting, LE pain, back pain, dysuria, hematuria, melena, hematochezia.  Pt found to have L femoral neck fx, orbital floor fx  Patient now s/p right hip closed reduction percutaneous pinning POD#1.

## 2025-02-23 NOTE — PHYSICAL THERAPY INITIAL EVALUATION ADULT - ADDITIONAL COMMENTS
Patient lives alone in private home. States she was independent with mobility, used rolling walker with prosthesis or w/c for ambulation. Required assistance with ADLs. Patient owns rw, cane, w/c, shower chair, raised toilet seat. Per chart pt had HHA however recently fired. Hx multiple falls at home.

## 2025-02-23 NOTE — PROGRESS NOTE ADULT - SUBJECTIVE AND OBJECTIVE BOX
Patient is a 85y old  Female who presents with a chief complaint of mechanical fall, L femoral neck fx, orbital floor fx  Patient s/p right hip closed reduction percutaneous pinning POD#1  Patient comfortable  No complaints    T(C): 36.7 (02-23-25 @ 07:35), Max: 36.9 (02-22-25 @ 09:00)  HR: 82 (02-23-25 @ 07:35) (64 - 85)  BP: 115/74 (02-23-25 @ 07:35) (104/66 - 127/76)  RR: 18 (02-23-25 @ 07:35) (13 - 18)  SpO2: 95% (02-23-25 @ 07:35) (92% - 99%)    PHYSICAL EXAM:  NAD, Alert  [ Left] Hip: Dressing C/D/I; sensation grossly intact to light touch; (+) DF/PF; (+) Distal Pulses; No Calf tenderness B/L, PAS     LABS:                        12.2   7.79  )-----------( 170      ( 23 Feb 2025 06:12 )             37.6   02-23  135  |  96  |  30[H]  ----------------------------<  108[H]  4.1   |  22  |  1.01    Ca    8.9      23 Feb 2025 06:09  Phos  3.8     02-22  Mg     2.2     02-22  TPro  6.7  /  Alb  3.5  /  TBili  0.9  /  DBili  x   /  AST  14  /  ALT  15  /  AlkPhos  99  02-21  PT/INR - ( 22 Feb 2025 02:58 )   PT: 14.4 sec;   INR: 1.25 ratio    PTT - ( 22 Feb 2025 02:58 )  PTT:28.3 sec

## 2025-02-23 NOTE — PROGRESS NOTE ADULT - ASSESSMENT
84 y/o FM s/p CRPP left hip POD#1, PT, OT, WBAT  Mely Hunter PA-C  Orthopaedic Surgery  Team pager [phone]  [facility] [phone]  [phone]

## 2025-02-23 NOTE — PROGRESS NOTE ADULT - ASSESSMENT
84 y/o F with PHMh of CAD (with  of LCx and RCA), severe AS (pending outpt TAVR), pAF (on Eliquis 2.5mg BID), R internal carotid stenosis, HTN, HLD, hypothyroidism, anxiety, PAD s/p AKA who presented after mechanical fall on slippery tiles with no LOC or presyncope/dizziness but with orbital and femoral fracture. With her AS, she saw Dr. Serna who recommended deferring intervention for now while due to infection in foot. Now POD 1 for closed reduction and pinning of left hip.     Recommendations:  -patient euvolemic and doing well post-op  -TTE w/ EF 20% and severe low-flow low-gradient AS; unchanged from prior  -continue ASA, rosuva 5  -continue home meds: toprol 50, amio 100, losart 25, bumex 1  -resume farxiga on discharge  -resume eliquis as soon as able from surgical perspective; CHadsvasc 6   -likely defer structural evaluation for AS after recovery from surgery

## 2025-02-23 NOTE — CHART NOTE - NSCHARTNOTEFT_GEN_A_CORE
TERTIARY TRAUMA SURVEY  ------------------------------------------------------------------------------------    Date of TTS: 2/22/25  Time: 10:17 pm  Admit Date:  2/21/25     HPI:  84F PMHx CAD, PAD s/p LLE Bypass cb thrombosis s/p L AKA, DM, HFrEF (23%), severe AS, AF on Eliquis, hypothyroidism, HTN, HLD who presents to [facility] ED s/p Mx Fall. Pt reports she was in restroom when she slipped and fell on her bathroom tile. + HS, -LOC. Currently reports pain in L hip and L face. Denies headache, vision changes, neck pain, abdominal pain, CP, SOB, fevers, chills, nausea, vomiting, LE pain, back pain, dysuria, hematuria, melena, hematochezia.     HDS in ED. Afebrile.      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 975 milliGRAM(s) Oral every 8 hours  aMIOdarone    Tablet 100 milliGRAM(s) Oral every 24 hours  aspirin enteric coated 81 milliGRAM(s) Oral daily  buMETAnide 1 milliGRAM(s) Oral every 24 hours  ceFAZolin   IVPB 2000 milliGRAM(s) IV Intermittent every 8 hours  cholecalciferol 5000 Unit(s) Oral every 24 hours  dextrose 5%. 1000 milliLiter(s) IV Continuous <Continuous>  dextrose 5%. 1000 milliLiter(s) IV Continuous <Continuous>  dextrose 50% Injectable 25 Gram(s) IV Push once  dextrose 50% Injectable 12.5 Gram(s) IV Push once  dextrose 50% Injectable 25 Gram(s) IV Push once  dextrose Oral Gel 15 Gram(s) Oral once PRN  enoxaparin Injectable 40 milliGRAM(s) SubCutaneous every 24 hours  glucagon  Injectable 1 milliGRAM(s) IntraMuscular once  insulin lispro (ADMELOG) corrective regimen sliding scale   SubCutaneous three times a day before meals  insulin lispro (ADMELOG) corrective regimen sliding scale   SubCutaneous at bedtime  levothyroxine 50 MICROGram(s) Oral daily  losartan 25 milliGRAM(s) Oral every 24 hours  magnesium hydroxide Suspension 30 milliLiter(s) Oral daily PRN  melatonin 3 milliGRAM(s) Oral at bedtime PRN  metoprolol succinate ER 50 milliGRAM(s) Oral every 24 hours  ondansetron Injectable 4 milliGRAM(s) IV Push every 6 hours PRN  oxyCODONE    IR 2.5 milliGRAM(s) Oral every 6 hours PRN  oxyCODONE    IR 5 milliGRAM(s) Oral every 6 hours PRN  pantoprazole    Tablet 40 milliGRAM(s) Oral before breakfast  polyethylene glycol 3350 17 Gram(s) Oral daily  rosuvastatin 5 milliGRAM(s) Oral at bedtime  senna 2 Tablet(s) Oral at bedtime  spironolactone 25 milliGRAM(s) Oral every 24 hours  traMADol 50 milliGRAM(s) Oral every 6 hours PRN    -----------------------------------------------------------------------------------    VITAL SIGNS:  T(C): 36.8 (02-23-25 @ 04:37), Max: 36.9 (02-22-25 @ 09:00)  HR: 77 (02-23-25 @ 04:37) (64 - 85)  BP: 114/69 (02-23-25 @ 04:37)  RR: 18 (02-23-25 @ 04:37) (13 - 18)  SpO2: 96% (02-23-25 @ 04:37) (92% - 99%)    02-22-25 @ 07:01  -  02-23-25 @ 05:03  --------------------------------------------------------  IN: 120 mL / OUT: 1050 mL / NET: -930 mL      Weight (kg): 59 (02-22-25 @ 17:05)    PHYSICAL EXAM:    General: NAD  HEENT: NC/AT; ecchymosis around left orbit, Normal inspection of nose; Moist mucous membranes, no oral lesions  Neck: Soft, supple, full ROM. No cervical or paraspinal tenderness.   Cardio: RRR.   Chest: Good effort, CTAB. No chest wall tenderness.  GI/Abd: Soft, NT/ND.  Vascular: Extremities warm; B/L UE and LE pulses 2+  Skin: No rashes; Normal color  Musculoskeletal: All 4 extremities moving spontaneously, no limitations. Full ROM of shoulders, elbows, wrists, fingers,, left knee and ankle. Tenderness to palpation of left thigh, right shoulder and right humerus  Neuro: Strength 5/5 in B/L UE/LE. Sensation to light touch intact in B/L UE/LE.                CRANIAL NERVES: I - olfactory intact. II - normal visual acuity testing with Snellen. III/IV/VI - EOM's intact, painless. V - Normal sensation throughout 3 branches. VII - Normal and symmetric eyebrow raise; cheek puff symmetric; normal and symmetric smile; Normal strength with eye closing b/l. VIII - Hearing intact to whisper. IX/X - Normal palate rise, + gag reflex. XI - normal shoulder shrug, neck flexion & lateral rotation. XII - Normal and symmetric tongue protrusion.      LABS:      MICROBIOLOGY:      ------------------------------------------------------------------------------------------  RADIOLOGICAL FINDINGS REVIEW:   CXR: Lower half left hemithorax remains densely opacified which could be due to combination of pleural effusion and underlying parenchymal   consolidation including possibility of infection/pneumonia in appropriate clinical context.  Sharp right CP angle. Small thin linear opacity peripheral right midlung region again noted. Clear remaining visualized lungs. No pneumothorax.  Stable prominent appearing cardiac and mediastinal silhouettes.  Generalized osteopenia and advanced spinal degenerative changes with slight curvature again noted.    Pelvis Films: Acute mildly impacted left transcervical femoral neck fracture. Slight anterior displacement of the distal fracture fragment crosstable   lateral view. Status post mid shaft femoral above-the-knee amputation. No radiographic evidence of osteomyelitis.    Left Femur films: Acute mildly impacted left transcervical femoral neck fracture. Slight anterior displacement of the distal fracture fragment crosstable lateral view. Status post mid shaft femoral above-the-knee amputation. No radiographic evidence of osteomyelitis.    Right Foot Films: NO EVIDENCE OF DISPLACED FRACTURE OR DISLOCATION    Right Tib/Fib Films: Soft tissue wound at the anterior leg distally. No other acute radiographic findings,      List Injuries Identified to Date:    Pre-operative clearance    Fracture of femoral neck, left, closed    Fracture of orbital floor    CAD (coronary artery disease)    DM (diabetes mellitus)    Paroxysmal atrial fibrillation    Hypothyroidism        List Operative and Interventional Radiological Procedures: s/p Closed Reduction and Percutaneous Pinning of Left Hip    Consults (Date):  [] Neurosurgery   [X] Orthopedic Surgery  [] Spine Surgery  [X] Plastic Surgery  [] ENT  [] Cardiology  [X] Medicine  [] Social Work    INTERPRETATION/ASSESSMENT:   NANO GARCIA is a 85y Female who required a tertiary survey due mechanical fall.     PLAN:   - DVT: aspirin enteric coated 81 milliGRAM(s) Oral daily, enoxaparin Injectable 40 milliGRAM(s) SubCutaneous every 24 hours  - Diet: Regular  - Pain control  - IS  - Weight bearing as tolerated  - PT Consult    ACS/Trauma Surgery  [id number] TERTIARY TRAUMA SURVEY  ------------------------------------------------------------------------------------    Date of TTS: 2/22/25  Time: 10:17 pm  Admit Date:  2/21/25     HPI:  84F PMHx CAD, PAD s/p LLE Bypass cb thrombosis s/p L AKA, DM, HFrEF (23%), severe AS, AF on Eliquis, hypothyroidism, HTN, HLD who presents to [facility] ED s/p Mx Fall. Pt reports she was in restroom when she slipped and fell on her bathroom tile. + HS, -LOC. Currently reports pain in L hip and L face. Denies headache, vision changes, neck pain, abdominal pain, CP, SOB, fevers, chills, nausea, vomiting, LE pain, back pain, dysuria, hematuria, melena, hematochezia.     HDS in ED. Afebrile.      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 975 milliGRAM(s) Oral every 8 hours  aMIOdarone    Tablet 100 milliGRAM(s) Oral every 24 hours  aspirin enteric coated 81 milliGRAM(s) Oral daily  buMETAnide 1 milliGRAM(s) Oral every 24 hours  ceFAZolin   IVPB 2000 milliGRAM(s) IV Intermittent every 8 hours  cholecalciferol 5000 Unit(s) Oral every 24 hours  dextrose 5%. 1000 milliLiter(s) IV Continuous <Continuous>  dextrose 5%. 1000 milliLiter(s) IV Continuous <Continuous>  dextrose 50% Injectable 25 Gram(s) IV Push once  dextrose 50% Injectable 12.5 Gram(s) IV Push once  dextrose 50% Injectable 25 Gram(s) IV Push once  dextrose Oral Gel 15 Gram(s) Oral once PRN  enoxaparin Injectable 40 milliGRAM(s) SubCutaneous every 24 hours  glucagon  Injectable 1 milliGRAM(s) IntraMuscular once  insulin lispro (ADMELOG) corrective regimen sliding scale   SubCutaneous three times a day before meals  insulin lispro (ADMELOG) corrective regimen sliding scale   SubCutaneous at bedtime  levothyroxine 50 MICROGram(s) Oral daily  losartan 25 milliGRAM(s) Oral every 24 hours  magnesium hydroxide Suspension 30 milliLiter(s) Oral daily PRN  melatonin 3 milliGRAM(s) Oral at bedtime PRN  metoprolol succinate ER 50 milliGRAM(s) Oral every 24 hours  ondansetron Injectable 4 milliGRAM(s) IV Push every 6 hours PRN  oxyCODONE    IR 2.5 milliGRAM(s) Oral every 6 hours PRN  oxyCODONE    IR 5 milliGRAM(s) Oral every 6 hours PRN  pantoprazole    Tablet 40 milliGRAM(s) Oral before breakfast  polyethylene glycol 3350 17 Gram(s) Oral daily  rosuvastatin 5 milliGRAM(s) Oral at bedtime  senna 2 Tablet(s) Oral at bedtime  spironolactone 25 milliGRAM(s) Oral every 24 hours  traMADol 50 milliGRAM(s) Oral every 6 hours PRN    -----------------------------------------------------------------------------------    VITAL SIGNS:  T(C): 36.8 (02-23-25 @ 04:37), Max: 36.9 (02-22-25 @ 09:00)  HR: 77 (02-23-25 @ 04:37) (64 - 85)  BP: 114/69 (02-23-25 @ 04:37)  RR: 18 (02-23-25 @ 04:37) (13 - 18)  SpO2: 96% (02-23-25 @ 04:37) (92% - 99%)    02-22-25 @ 07:01  -  02-23-25 @ 05:03  --------------------------------------------------------  IN: 120 mL / OUT: 1050 mL / NET: -930 mL      Weight (kg): 59 (02-22-25 @ 17:05)    PHYSICAL EXAM:    General: NAD  HEENT: NC/AT; ecchymosis around left orbit, Normal inspection of nose; Moist mucous membranes, no oral lesions  Neck: Soft, supple, full ROM. No cervical or paraspinal tenderness.   Cardio: RRR.   Chest: Good effort, CTAB. No chest wall tenderness.  GI/Abd: Soft, NT/ND.  Vascular: Extremities warm; B/L UE and LE pulses 2+  Skin: No rashes; Normal color  Musculoskeletal: All 4 extremities moving spontaneously, no limitations. Full ROM of shoulders, elbows, wrists, fingers,, left knee and ankle. Tenderness to palpation of left thigh, right shoulder and right humerus  Neuro: Strength 5/5 in B/L UE/LE. Sensation to light touch intact in B/L UE/LE.                CRANIAL NERVES: I - olfactory intact. II - normal visual acuity testing with Snellen. III/IV/VI - EOM's intact, painless. V - Normal sensation throughout 3 branches. VII - Normal and symmetric eyebrow raise; cheek puff symmetric; normal and symmetric smile; Normal strength with eye closing b/l. VIII - Hearing intact to whisper. IX/X - Normal palate rise, + gag reflex. XI - normal shoulder shrug, neck flexion & lateral rotation. XII - Normal and symmetric tongue protrusion.      LABS:      MICROBIOLOGY:      ------------------------------------------------------------------------------------------  RADIOLOGICAL FINDINGS REVIEW:   CXR: Lower half left hemithorax remains densely opacified which could be due to combination of pleural effusion and underlying parenchymal   consolidation including possibility of infection/pneumonia in appropriate clinical context.  Sharp right CP angle. Small thin linear opacity peripheral right midlung region again noted. Clear remaining visualized lungs. No pneumothorax.  Stable prominent appearing cardiac and mediastinal silhouettes.  Generalized osteopenia and advanced spinal degenerative changes with slight curvature again noted.    Pelvis Films: Acute mildly impacted left transcervical femoral neck fracture. Slight anterior displacement of the distal fracture fragment crosstable   lateral view. Status post mid shaft femoral above-the-knee amputation. No radiographic evidence of osteomyelitis.    Left Femur films: Acute mildly impacted left transcervical femoral neck fracture. Slight anterior displacement of the distal fracture fragment crosstable lateral view. Status post mid shaft femoral above-the-knee amputation. No radiographic evidence of osteomyelitis.    Right Foot Films: NO EVIDENCE OF DISPLACED FRACTURE OR DISLOCATION    Right Tib/Fib Films: Soft tissue wound at the anterior leg distally. No other acute radiographic findings,      List Injuries Identified to Date:    Pre-operative clearance    Fracture of femoral neck, left, closed    Fracture of orbital floor    CAD (coronary artery disease)    DM (diabetes mellitus)    Paroxysmal atrial fibrillation    Hypothyroidism        List Operative and Interventional Radiological Procedures: s/p Closed Reduction and Percutaneous Pinning of Left Hip    Consults (Date):  [] Neurosurgery   [X] Orthopedic Surgery  [] Spine Surgery  [X] Plastic Surgery  [] ENT  [] Cardiology  [X] Medicine  [] Social Work    INTERPRETATION/ASSESSMENT:   NANO GARCIA is a 85y Female who required a tertiary survey due mechanical fall.     PLAN:   - DVT: aspirin enteric coated 81 milliGRAM(s) Oral daily, enoxaparin Injectable 40 milliGRAM(s) SubCutaneous every 24 hours  - Diet: Regular  - Pain control  - IS  - Weight bearing as tolerated  - f/u right shoulder and humerus X-ray  - PT Consult    ACS/Trauma Surgery  [id number]

## 2025-02-23 NOTE — DIETITIAN INITIAL EVALUATION ADULT - ADD RECOMMEND
multivitamin and Vit C daily  provider discuss elevated A1c with pt-pt reports she is not aware of diabetes and this is new for her-no one has discussed this with her as of yet

## 2025-02-24 LAB
GLUCOSE BLDC GLUCOMTR-MCNC: 113 MG/DL — HIGH (ref 70–99)
GLUCOSE BLDC GLUCOMTR-MCNC: 144 MG/DL — HIGH (ref 70–99)
GLUCOSE BLDC GLUCOMTR-MCNC: 158 MG/DL — HIGH (ref 70–99)
GLUCOSE BLDC GLUCOMTR-MCNC: 195 MG/DL — HIGH (ref 70–99)
MAGNESIUM SERPL-MCNC: 2.2 MG/DL — SIGNIFICANT CHANGE UP (ref 1.6–2.6)
PHOSPHATE SERPL-MCNC: 3.1 MG/DL — SIGNIFICANT CHANGE UP (ref 2.5–4.5)

## 2025-02-24 PROCEDURE — 73030 X-RAY EXAM OF SHOULDER: CPT | Mod: 26,RT

## 2025-02-24 PROCEDURE — 99222 1ST HOSP IP/OBS MODERATE 55: CPT

## 2025-02-24 PROCEDURE — 99232 SBSQ HOSP IP/OBS MODERATE 35: CPT

## 2025-02-24 PROCEDURE — 73060 X-RAY EXAM OF HUMERUS: CPT | Mod: 26,RT

## 2025-02-24 RX ORDER — APIXABAN 2.5 MG/1
2.5 TABLET, FILM COATED ORAL EVERY 12 HOURS
Refills: 0 | Status: DISCONTINUED | OUTPATIENT
Start: 2025-02-24 | End: 2025-02-26

## 2025-02-24 RX ADMIN — INSULIN LISPRO 1: 100 INJECTION, SOLUTION INTRAVENOUS; SUBCUTANEOUS at 12:29

## 2025-02-24 RX ADMIN — Medication 975 MILLIGRAM(S): at 11:24

## 2025-02-24 RX ADMIN — Medication 975 MILLIGRAM(S): at 12:24

## 2025-02-24 RX ADMIN — Medication 50 MICROGRAM(S): at 05:41

## 2025-02-24 RX ADMIN — APIXABAN 2.5 MILLIGRAM(S): 2.5 TABLET, FILM COATED ORAL at 18:52

## 2025-02-24 RX ADMIN — Medication 975 MILLIGRAM(S): at 05:41

## 2025-02-24 RX ADMIN — Medication 40 MILLIGRAM(S): at 05:42

## 2025-02-24 RX ADMIN — METOPROLOL SUCCINATE 50 MILLIGRAM(S): 50 TABLET, EXTENDED RELEASE ORAL at 06:29

## 2025-02-24 RX ADMIN — AMIODARONE HYDROCHLORIDE 100 MILLIGRAM(S): 50 INJECTION, SOLUTION INTRAVENOUS at 05:44

## 2025-02-24 RX ADMIN — ROSUVASTATIN CALCIUM 5 MILLIGRAM(S): 20 TABLET, FILM COATED ORAL at 21:43

## 2025-02-24 RX ADMIN — Medication 5000 UNIT(S): at 05:42

## 2025-02-24 RX ADMIN — POLYETHYLENE GLYCOL 3350 17 GRAM(S): 17 POWDER, FOR SOLUTION ORAL at 11:24

## 2025-02-24 RX ADMIN — INSULIN LISPRO 1: 100 INJECTION, SOLUTION INTRAVENOUS; SUBCUTANEOUS at 18:15

## 2025-02-24 RX ADMIN — Medication 975 MILLIGRAM(S): at 06:30

## 2025-02-24 RX ADMIN — Medication 2 TABLET(S): at 21:43

## 2025-02-24 RX ADMIN — LOSARTAN POTASSIUM 25 MILLIGRAM(S): 100 TABLET, FILM COATED ORAL at 06:29

## 2025-02-24 RX ADMIN — Medication 81 MILLIGRAM(S): at 11:24

## 2025-02-24 NOTE — BH CONSULTATION LIAISON ASSESSMENT NOTE - NSBHCHARTREVIEWLAB_PSY_A_CORE FT
12.2   7.79  )-----------( 170      ( 23 Feb 2025 06:12 )             37.6     02-23    135  |  96  |  30[H]  ----------------------------<  108[H]  4.1   |  22  |  1.01    Ca    8.9      23 Feb 2025 06:09  Phos  3.1     02-23  Mg     2.2     02-23

## 2025-02-24 NOTE — BH CONSULTATION LIAISON ASSESSMENT NOTE - SUMMARY
85y Female , domiciled, has 24 hr home aide, no pphx, PMHx CAD, PAD s/p LLE Bypass cb thrombosis s/p L AKA, DM, HFrEF (23%), severe AS, AF on Eliquis, hypothyroidism, HTN, HLD who presents to [facility] ED s/p mechanical fall. - 2/22- S/P L Hip CRRP, psych consulted for capacity to refuse rehab.   pt states she doesn't want to to go to rehab, has been at rehab few years ago, was not happy with the experience. Pt states she is willing to have PT at home. Pt denies depression, anxiety, psychosis, joe. pt reports fair sleep, appetite. pt denies si/hi. pt able to state risks/benefits of treatment rehab. pt still does not want to go to rehab. as per PT note, home Pt is considered a reasonable alternative

## 2025-02-24 NOTE — BH CONSULTATION LIAISON ASSESSMENT NOTE - CURRENT MEDICATION
MEDICATIONS  (STANDING):  acetaminophen     Tablet .. 975 milliGRAM(s) Oral every 8 hours  aMIOdarone    Tablet 100 milliGRAM(s) Oral every 24 hours  aspirin enteric coated 81 milliGRAM(s) Oral daily  buMETAnide 1 milliGRAM(s) Oral every 24 hours  cholecalciferol 5000 Unit(s) Oral every 24 hours  dextrose 5%. 1000 milliLiter(s) (100 mL/Hr) IV Continuous <Continuous>  dextrose 5%. 1000 milliLiter(s) (50 mL/Hr) IV Continuous <Continuous>  dextrose 50% Injectable 25 Gram(s) IV Push once  dextrose 50% Injectable 12.5 Gram(s) IV Push once  dextrose 50% Injectable 25 Gram(s) IV Push once  enoxaparin Injectable 40 milliGRAM(s) SubCutaneous every 24 hours  glucagon  Injectable 1 milliGRAM(s) IntraMuscular once  insulin lispro (ADMELOG) corrective regimen sliding scale   SubCutaneous three times a day before meals  insulin lispro (ADMELOG) corrective regimen sliding scale   SubCutaneous at bedtime  levothyroxine 50 MICROGram(s) Oral daily  losartan 25 milliGRAM(s) Oral every 24 hours  metoprolol succinate ER 50 milliGRAM(s) Oral every 24 hours  pantoprazole    Tablet 40 milliGRAM(s) Oral before breakfast  polyethylene glycol 3350 17 Gram(s) Oral daily  rosuvastatin 5 milliGRAM(s) Oral at bedtime  senna 2 Tablet(s) Oral at bedtime    MEDICATIONS  (PRN):  dextrose Oral Gel 15 Gram(s) Oral once PRN Blood Glucose LESS THAN 70 milliGRAM(s)/deciliter  magnesium hydroxide Suspension 30 milliLiter(s) Oral daily PRN Constipation  melatonin 3 milliGRAM(s) Oral at bedtime PRN Insomnia  ondansetron Injectable 4 milliGRAM(s) IV Push every 6 hours PRN Nausea and/or Vomiting  oxyCODONE    IR 2.5 milliGRAM(s) Oral every 6 hours PRN Moderate Pain (4 - 6)  oxyCODONE    IR 5 milliGRAM(s) Oral every 6 hours PRN Severe Pain (7 - 10)  traMADol 50 milliGRAM(s) Oral every 6 hours PRN Mild Pain (1 - 3)

## 2025-02-24 NOTE — PROGRESS NOTE ADULT - ASSESSMENT
A/P: 84 y/o female s/p left hip CRPP, POD # 2    - Pain control/analgesia  - DVT ppx: per primary team  - PT/OT   - WBAT LLE with prosthesis  - OOB  - Incentive spirometer  - GI ppx  - Bowel regimen  - Dispo: PT recommended [facility]  - Continue care per primary team  - Notify ortho for questions    Donna Tripp PA-C  Orthopedic Surgery Team  Team Pager #[phone]

## 2025-02-24 NOTE — BH CONSULTATION LIAISON ASSESSMENT NOTE - DESCRIPTION
domiciled alone, has a friend and HCP, Roosevelt Bell, previously employed as , reports supportive work family in [place]

## 2025-02-24 NOTE — PROGRESS NOTE ADULT - ASSESSMENT
84 yo F with pmhx of CAD (w/ of LCx and RCA), HFrEF, severe AS (pending o/p TAVR), pA-fib on Eliquis, R internal carotid artery stenosis, HTN, HLD, hypothyroidism, DM, PAD s/p LLE Bypass cb thrombosis s/p L AKA who presented with L orbital floor fx, L femoral neck fx after mechanical fall.

## 2025-02-24 NOTE — PROGRESS NOTE ADULT - SUBJECTIVE AND OBJECTIVE BOX
Patient is a 85y old  Female who presents with a chief complaint of Fracture of unspecified part of neck of unspecified femur, initial encounter for closed fracture     (23 Feb 2025 13:47)      SUBJECTIVE / OVERNIGHT EVENTS: Pt seen and examined. No acute events overnight. States that pain is well-controlled. Denies cp, sob.    MEDICATIONS  (STANDING):  acetaminophen     Tablet .. 975 milliGRAM(s) Oral every 8 hours  aMIOdarone    Tablet 100 milliGRAM(s) Oral every 24 hours  apixaban 2.5 milliGRAM(s) Oral every 12 hours  aspirin enteric coated 81 milliGRAM(s) Oral daily  buMETAnide 1 milliGRAM(s) Oral every 24 hours  cholecalciferol 5000 Unit(s) Oral every 24 hours  dextrose 5%. 1000 milliLiter(s) (100 mL/Hr) IV Continuous <Continuous>  dextrose 5%. 1000 milliLiter(s) (50 mL/Hr) IV Continuous <Continuous>  dextrose 50% Injectable 25 Gram(s) IV Push once  dextrose 50% Injectable 12.5 Gram(s) IV Push once  dextrose 50% Injectable 25 Gram(s) IV Push once  glucagon  Injectable 1 milliGRAM(s) IntraMuscular once  insulin lispro (ADMELOG) corrective regimen sliding scale   SubCutaneous three times a day before meals  insulin lispro (ADMELOG) corrective regimen sliding scale   SubCutaneous at bedtime  levothyroxine 50 MICROGram(s) Oral daily  losartan 25 milliGRAM(s) Oral every 24 hours  metoprolol succinate ER 50 milliGRAM(s) Oral every 24 hours  pantoprazole    Tablet 40 milliGRAM(s) Oral before breakfast  polyethylene glycol 3350 17 Gram(s) Oral daily  rosuvastatin 5 milliGRAM(s) Oral at bedtime  senna 2 Tablet(s) Oral at bedtime    MEDICATIONS  (PRN):  dextrose Oral Gel 15 Gram(s) Oral once PRN Blood Glucose LESS THAN 70 milliGRAM(s)/deciliter  magnesium hydroxide Suspension 30 milliLiter(s) Oral daily PRN Constipation  melatonin 3 milliGRAM(s) Oral at bedtime PRN Insomnia  ondansetron Injectable 4 milliGRAM(s) IV Push every 6 hours PRN Nausea and/or Vomiting  oxyCODONE    IR 2.5 milliGRAM(s) Oral every 6 hours PRN Moderate Pain (4 - 6)  oxyCODONE    IR 5 milliGRAM(s) Oral every 6 hours PRN Severe Pain (7 - 10)  traMADol 50 milliGRAM(s) Oral every 6 hours PRN Mild Pain (1 - 3)      Vital Signs Last 24 Hrs  T(C): 36.7 (24 Feb 2025 16:35), Max: 36.9 (23 Feb 2025 23:49)  T(F): 98 (24 Feb 2025 16:35), Max: 98.4 (23 Feb 2025 23:49)  HR: 66 (24 Feb 2025 16:35) (66 - 88)  BP: 105/64 (24 Feb 2025 16:35) (100/61 - 111/72)  BP(mean): --  RR: 18 (24 Feb 2025 16:35) (18 - 18)  SpO2: 93% (24 Feb 2025 16:35) (93% - 96%)    Parameters below as of 24 Feb 2025 16:35  Patient On (Oxygen Delivery Method): room air      CAPILLARY BLOOD GLUCOSE      POCT Blood Glucose.: 195 mg/dL (24 Feb 2025 17:21)  POCT Blood Glucose.: 158 mg/dL (24 Feb 2025 12:23)  POCT Blood Glucose.: 113 mg/dL (24 Feb 2025 08:26)    I&O's Summary    23 Feb 2025 07:01  -  24 Feb 2025 07:00  --------------------------------------------------------  IN: 600 mL / OUT: 1100 mL / NET: -500 mL    24 Feb 2025 07:01  -  24 Feb 2025 18:43  --------------------------------------------------------  IN: 480 mL / OUT: 300 mL / NET: 180 mL        PHYSICAL EXAM:  GENERAL: NAD, well-groomed  HEAD:  Atraumatic, Normocephalic  EYES:  conjunctiva and sclera clear  NECK: Supple, No JVD  CHEST/LUNG: Clear to auscultation bilaterally; No wheeze  HEART: Regular rate and rhythm; No murmurs, rubs, or gallops  ABDOMEN: Soft, Nontender, Nondistended; Bowel sounds present  EXTREMITIES:  left hip dressing CDI, right knee BKA  PSYCH: AAOx3  NEUROLOGY: non-focal  SKIN: No rashes or lesions    LABS:                        12.2   7.79  )-----------( 170      ( 23 Feb 2025 06:12 )             37.6     02-23    135  |  96  |  30[H]  ----------------------------<  108[H]  4.1   |  22  |  1.01    Ca    8.9      23 Feb 2025 06:09  Phos  3.1     02-23  Mg     2.2     02-23            Urinalysis Basic - ( 23 Feb 2025 06:09 )    Color: x / Appearance: x / SG: x / pH: x  Gluc: 108 mg/dL / Ketone: x  / Bili: x / Urobili: x   Blood: x / Protein: x / Nitrite: x   Leuk Esterase: x / RBC: x / WBC x   Sq Epi: x / Non Sq Epi: x / Bacteria: x         Patient is a 85y old  Female who presents with a chief complaint of Fracture of unspecified part of neck of unspecified femur, initial encounter for closed fracture     (23 Feb 2025 13:47)      SUBJECTIVE / OVERNIGHT EVENTS: Pt seen and examined. No acute events overnight. States that pain is well-controlled. Denies cp, sob.    MEDICATIONS  (STANDING):  acetaminophen     Tablet .. 975 milliGRAM(s) Oral every 8 hours  aMIOdarone    Tablet 100 milliGRAM(s) Oral every 24 hours  apixaban 2.5 milliGRAM(s) Oral every 12 hours  aspirin enteric coated 81 milliGRAM(s) Oral daily  buMETAnide 1 milliGRAM(s) Oral every 24 hours  cholecalciferol 5000 Unit(s) Oral every 24 hours  dextrose 5%. 1000 milliLiter(s) (100 mL/Hr) IV Continuous <Continuous>  dextrose 5%. 1000 milliLiter(s) (50 mL/Hr) IV Continuous <Continuous>  dextrose 50% Injectable 25 Gram(s) IV Push once  dextrose 50% Injectable 12.5 Gram(s) IV Push once  dextrose 50% Injectable 25 Gram(s) IV Push once  glucagon  Injectable 1 milliGRAM(s) IntraMuscular once  insulin lispro (ADMELOG) corrective regimen sliding scale   SubCutaneous three times a day before meals  insulin lispro (ADMELOG) corrective regimen sliding scale   SubCutaneous at bedtime  levothyroxine 50 MICROGram(s) Oral daily  losartan 25 milliGRAM(s) Oral every 24 hours  metoprolol succinate ER 50 milliGRAM(s) Oral every 24 hours  pantoprazole    Tablet 40 milliGRAM(s) Oral before breakfast  polyethylene glycol 3350 17 Gram(s) Oral daily  rosuvastatin 5 milliGRAM(s) Oral at bedtime  senna 2 Tablet(s) Oral at bedtime    MEDICATIONS  (PRN):  dextrose Oral Gel 15 Gram(s) Oral once PRN Blood Glucose LESS THAN 70 milliGRAM(s)/deciliter  magnesium hydroxide Suspension 30 milliLiter(s) Oral daily PRN Constipation  melatonin 3 milliGRAM(s) Oral at bedtime PRN Insomnia  ondansetron Injectable 4 milliGRAM(s) IV Push every 6 hours PRN Nausea and/or Vomiting  oxyCODONE    IR 2.5 milliGRAM(s) Oral every 6 hours PRN Moderate Pain (4 - 6)  oxyCODONE    IR 5 milliGRAM(s) Oral every 6 hours PRN Severe Pain (7 - 10)  traMADol 50 milliGRAM(s) Oral every 6 hours PRN Mild Pain (1 - 3)      Vital Signs Last 24 Hrs  T(C): 36.7 (24 Feb 2025 16:35), Max: 36.9 (23 Feb 2025 23:49)  T(F): 98 (24 Feb 2025 16:35), Max: 98.4 (23 Feb 2025 23:49)  HR: 66 (24 Feb 2025 16:35) (66 - 88)  BP: 105/64 (24 Feb 2025 16:35) (100/61 - 111/72)  BP(mean): --  RR: 18 (24 Feb 2025 16:35) (18 - 18)  SpO2: 93% (24 Feb 2025 16:35) (93% - 96%)    Parameters below as of 24 Feb 2025 16:35  Patient On (Oxygen Delivery Method): room air      CAPILLARY BLOOD GLUCOSE      POCT Blood Glucose.: 195 mg/dL (24 Feb 2025 17:21)  POCT Blood Glucose.: 158 mg/dL (24 Feb 2025 12:23)  POCT Blood Glucose.: 113 mg/dL (24 Feb 2025 08:26)    I&O's Summary    23 Feb 2025 07:01  -  24 Feb 2025 07:00  --------------------------------------------------------  IN: 600 mL / OUT: 1100 mL / NET: -500 mL    24 Feb 2025 07:01  -  24 Feb 2025 18:43  --------------------------------------------------------  IN: 480 mL / OUT: 300 mL / NET: 180 mL        PHYSICAL EXAM:  GENERAL: NAD, well-groomed  HEAD:  Atraumatic, Normocephalic  EYES:  conjunctiva and sclera clear  NECK: Supple, No JVD  CHEST/LUNG: Clear to auscultation bilaterally; No wheeze  HEART: Regular rate and rhythm; No murmurs, rubs, or gallops  ABDOMEN: Soft, Nontender, Nondistended; Bowel sounds present  EXTREMITIES:  left hip dressing CDI, right knee AKA  PSYCH: AAOx3  NEUROLOGY: non-focal  SKIN: No rashes or lesions    LABS:                        12.2   7.79  )-----------( 170      ( 23 Feb 2025 06:12 )             37.6     02-23    135  |  96  |  30[H]  ----------------------------<  108[H]  4.1   |  22  |  1.01    Ca    8.9      23 Feb 2025 06:09  Phos  3.1     02-23  Mg     2.2     02-23            Urinalysis Basic - ( 23 Feb 2025 06:09 )    Color: x / Appearance: x / SG: x / pH: x  Gluc: 108 mg/dL / Ketone: x  / Bili: x / Urobili: x   Blood: x / Protein: x / Nitrite: x   Leuk Esterase: x / RBC: x / WBC x   Sq Epi: x / Non Sq Epi: x / Bacteria: x

## 2025-02-24 NOTE — ADVANCED PRACTICE NURSE CONSULT - ASSESSMENT
Arrived on 7 TOWER, patient was found lying in a low air loss pressure redistribution support surface style bed.  Ms. Kelley was able to turn with some assistance. Once turned, able to view her skin. Patient with a Prima Fit external catheter for urinary diversion.    Skin examination reveals a stage II pressure injury located on the sacrum / B/L buttocks.  The wound measures approximately 0.8 cm x 0.5 cm x 0.1 cm. The wound bed is shallow, pink and appears to be partial thickness. Surrounding skin: intact, hyperpigmented. Small drainage, no foul odor, no purulent drainage. Bony prominence is palpable. Applied Cavilon No-sting barrier wipe to form a gentle, breathable, waterproof, transparent coating on the skin. Covered with Allevyn Foam border dressing.     Skin examination reveals; Right Heel - Open wound measuring 1.0 cm x 0.5 cm x 0.1 cm. The wound appears to be from a fissure on patient's posterior heel. Surrounding skin non-blanchable erythema. The wound is not draining. Bony heel prominence is palpable. Applied Cavilon No-sting barrier wipe to form a gentle, breathable, waterproof, transparent coating on the skin. Covered with Allevyn Foam border dressing.    Right Medial lower leg wound - wound of unknown etiology. Patient states that the wound has been "seen by my Doctor last week". Patient believes the wound is 2/2 trauma. 100% white / tan slough covers the wound bed. The drainage is scant serous. Measuring approximately 1.7 cm x 1.1 cm x 0.1 cm. Cleansed with normal saline 0.9%, pat dry, and applied Adaptic Touch semi-permeable dressing to wound bed. Covered adaptic touch with dry sterile dressing.     Patient, and RN were educated on the importance of turning and positioning every 2 hours. The use of waffle cushion when out of bed to chair, and to shift her weight every 2 hours while in chair. The importance of keeping her skin clean and dry and to offload feet/heels, and optimal nutrition.        When the consultation was completed, the patient was left in a right sided position, with side rails up, call bell within reach, and bed in lowest position. Discussed plan of care with Colleen WANG).       Arrived on 7 TOWER, patient was found lying in a low air loss pressure redistribution support surface style bed.  Ms. Kelley was able to turn with some assistance. Once turned, able to view her skin. Patient with a Prima Fit external catheter for urinary diversion.    Skin examination reveals a stage II pressure injury located on the sacrum / B/L buttocks.  The wound measures approximately 0.8 cm x 0.5 cm x 0.1 cm. The wound bed is shallow, pink and appears to be partial thickness. Surrounding skin: intact, non-blanchable. Small drainage, no foul odor, no purulent drainage. Bony prominence is palpable. Applied Cavilon No-sting barrier wipe to form a gentle, breathable, waterproof, transparent coating on the skin. Covered with Allevyn Foam border dressing.     Skin examination reveals; Right Heel - Open wound measuring 1.0 cm x 0.5 cm x 0.1 cm. The wound appears to be from a fissure on patient's posterior heel. Surrounding skin non-blanchable erythema. The wound is not draining. Bony heel prominence is palpable. Applied Cavilon No-sting barrier wipe to form a gentle, breathable, waterproof, transparent coating on the skin. Covered with Allevyn Foam border dressing.    Right Medial lower leg wound - wound of unknown etiology. Patient states that the wound has been "seen by my Doctor last week". Patient believes the wound is 2/2 trauma. 100% white / tan slough covers the wound bed. The drainage is scant serous. Measuring approximately 1.7 cm x 1.1 cm x 0.1 cm. Cleansed with normal saline 0.9%, pat dry, and applied Adaptic Touch semi-permeable dressing to wound bed. Covered adaptic touch with dry sterile dressing.     Patient, and RN were educated on the importance of turning and positioning every 2 hours. The use of waffle cushion when out of bed to chair, and to shift her weight every 2 hours while in chair. The importance of keeping her skin clean and dry and to offload feet/heels, and optimal nutrition.        When the consultation was completed, the patient was left in a right sided position, with side rails up, call bell within reach, and bed in lowest position. Discussed plan of care with Colleen WANG).

## 2025-02-24 NOTE — BH CONSULTATION LIAISON ASSESSMENT NOTE - NSBHCHARTREVIEWVS_PSY_A_CORE FT
Vital Signs Last 24 Hrs  T(C): 36.5 (24 Feb 2025 12:06), Max: 36.9 (23 Feb 2025 23:49)  T(F): 97.7 (24 Feb 2025 12:06), Max: 98.4 (23 Feb 2025 23:49)  HR: 76 (24 Feb 2025 12:06) (76 - 88)  BP: 100/61 (24 Feb 2025 12:06) (100/61 - 111/72)  BP(mean): --  RR: 18 (24 Feb 2025 12:06) (18 - 18)  SpO2: 93% (24 Feb 2025 12:06) (93% - 96%)    Parameters below as of 24 Feb 2025 12:06  Patient On (Oxygen Delivery Method): room air

## 2025-02-24 NOTE — BH CONSULTATION LIAISON ASSESSMENT NOTE - RISK ASSESSMENT
22
Acute risk factors include: single, acute psychosocial stressors, poor reactivity to stressors, difficulty expressing emotions    Chronic risk factors for suicide include: complex medical conditions    Protective factors include: denies SI/I/P, no hx of SA, no hx of NSSIB, no prior psychiatric admissions, no active substance use, no active psychosis, strong social supports    At this time, pt is NOT an acute risk of harm to self or others and does not meet criteria for involuntary admission.

## 2025-02-24 NOTE — ADVANCED PRACTICE NURSE CONSULT - REASON FOR CONSULT
Wound consult requested to assess skin status - Sacrum - suspected stage II pressure injury, present on admission.   Right Heel - Suspected deep tissue injury, present on admission.    HPI: 84F PMHx CAD, PAD s/p LLE Bypass cb thrombosis s/p L AKA, DM, HFrEF (23%), severe AS, AF on Eliquis, hypothyroidism, HTN, HLD who presents to [facility] ED s/p Mx Fall. Pt reports she was in restroom when she slipped and fell on her bathroom tile. + HS, -LOC. Currently reports pain in L hip and L face. Denies headache, vision changes, neck pain, abdominal pain, CP, SOB, fevers, chills, nausea, vomiting, LE pain, back pain, dysuria, hematuria, melena, hematochezia.

## 2025-02-24 NOTE — PROGRESS NOTE ADULT - SUBJECTIVE AND OBJECTIVE BOX
SURGERY DAILY PROGRESS NOTE:     Overnight Events:  Soft BP overnight    SUBJECTIVE: Patient seen and evaluated on AM rounds. Pt is resting comfortably in bed with no complaints.    OBJECTIVE:  Vital Signs Last 24 Hrs  T(C): 36.6 (24 Feb 2025 08:03), Max: 36.9 (23 Feb 2025 23:49)  T(F): 97.9 (24 Feb 2025 08:03), Max: 98.4 (23 Feb 2025 23:49)  HR: 78 (24 Feb 2025 08:03) (78 - 88)  BP: 111/72 (24 Feb 2025 08:03) (100/58 - 115/68)  BP(mean): --  RR: 18 (24 Feb 2025 08:03) (18 - 18)  SpO2: 96% (24 Feb 2025 08:03) (93% - 96%)    Parameters below as of 24 Feb 2025 08:03  Patient On (Oxygen Delivery Method): room air      I&O's Detail    23 Feb 2025 07:01  -  24 Feb 2025 07:00  --------------------------------------------------------  IN:    Oral Fluid: 600 mL  Total IN: 600 mL    OUT:    Voided (mL): 1100 mL  Total OUT: 1100 mL    Total NET: -500 mL        Daily     Daily     LABS:                        12.2   7.79  )-----------( 170      ( 23 Feb 2025 06:12 )             37.6     02-23    135  |  96  |  30[H]  ----------------------------<  108[H]  4.1   |  22  |  1.01    Ca    8.9      23 Feb 2025 06:09  Phos  3.1     02-23  Mg     2.2     02-23        Urinalysis Basic - ( 23 Feb 2025 06:09 )    Color: x / Appearance: x / SG: x / pH: x  Gluc: 108 mg/dL / Ketone: x  / Bili: x / Urobili: x   Blood: x / Protein: x / Nitrite: x   Leuk Esterase: x / RBC: x / WBC x   Sq Epi: x / Non Sq Epi: x / Bacteria: x      PHYSICAL EXAM  CONSTITUTIONAL: NAD, lying in bed  NEURO: Awake, alert  PULM: Non-labored respirations, equal chest rise bilaterally  ABDOMEN: Nondistended  EXTREMITIES: LLE WWP, com[artnts soft, Aquacel dressing c/d/i,   PSYCH: Affect normal, A&Ox3

## 2025-02-24 NOTE — PROGRESS NOTE ADULT - ASSESSMENT
NANO GARCIA is a 85y Female PMHx CAD, PAD s/p LLE Bypass cb thrombosis s/p L AKA, DM, HFrEF (23%), severe AS, AF on Eliquis, hypothyroidism, HTN, HLD who presents to [facility] ED s/p mechanical fall. - 2/22- S/P L Hip CRRP    PLAN:   - DVT: aspirin enteric coated 81 milliGRAM(s) Oral daily, enoxaparin Injectable 40 milliGRAM(s) SubCutaneous every 24 hours  - AC plan- follow up with cards/ortho  - Diet: Regular  - Pain control  - IS  - Weight bearing as tolerated  - Diabetes education  - dvt px: Lovenox asa  - PT- [facility]    ACS/Trauma Surgery  [id number].

## 2025-02-24 NOTE — ADVANCED PRACTICE NURSE CONSULT - RECOMMEDATIONS
Impression      urinary incontinence    Sacrum/coccyx Stage II  Right heel - Non blanchable , open wound at fissure.               Recommendations             1. Bilateral sacrum/buttock              Stage II    Topical therapy- sacral/bilateral buttocks- Cleansed with normal saline 0.9%, pat dry, and applied Adaptic Touch semi-permeable dressing, cover with dry protective dressing. Frequency: Daily & PRN Soiling. Monitor for changes.          2. Right heel              Elevate heels; apply Complete Cair air fluidized boots; ensure that the soles of the feet are not resting on the foot board of the bed.    Topical Therapy - Apply Cavilon No-sting barrier wipe to form a gentle, breathable, waterproof, transparent coating on the skin. Covered with Allevyn Foam border dressing.    3 Incontinent management - incontinent cleanser, pads, hima care BID            4. Maintain on an alternating air with low air loss surface             5. Turn & reposition every 2 hr; Use positioning pillow to turn and reposition, soft pillow between bony prominences; continue measures to decrease friction/shear/pressure.            6. Nutrition optimization.            7. Place waffle cushion when out of bed to chair.

## 2025-02-24 NOTE — PROGRESS NOTE ADULT - PROBLEM SELECTOR PLAN 3
CTA of RCA and LCx  - TTE 2/22 with EF 20%, global LV hypokinesis, reduced RV systolic function, moderate TR  - S/p ischemic eval 2/5/2025 with well developed collaterals from  RCA and LCx, with mild-mod LAD and mod-severe diagonal disease  - Pending repeat TTE as per cardiology recs,   - c/w metop succinate 50 mg daily, rosuvastatin 5 mg daily, ASA 81 mg daily, Eliquis for pAfib and bumex 1 mg PO daily. CTA of RCA and LCx  - TTE 2/22 with EF 20%, global LV hypokinesis, reduced RV systolic function, moderate TR  - S/p ischemic eval 2/5/2025 with well developed collaterals from  RCA and LCx, with mild-mod LAD and mod-severe diagonal disease  - c/w metop succinate 50 mg daily, rosuvastatin 5 mg daily, ASA 81 mg daily, Eliquis for pAfib and bumex 1 mg PO daily.

## 2025-02-25 ENCOUNTER — TRANSCRIPTION ENCOUNTER (OUTPATIENT)
Age: 85
End: 2025-02-25

## 2025-02-25 DIAGNOSIS — I50.22 CHRONIC SYSTOLIC (CONGESTIVE) HEART FAILURE: ICD-10-CM

## 2025-02-25 LAB
ANION GAP SERPL CALC-SCNC: 14 MMOL/L — SIGNIFICANT CHANGE UP (ref 5–17)
BUN SERPL-MCNC: 31 MG/DL — HIGH (ref 7–23)
CALCIUM SERPL-MCNC: 8.5 MG/DL — SIGNIFICANT CHANGE UP (ref 8.4–10.5)
CHLORIDE SERPL-SCNC: 96 MMOL/L — SIGNIFICANT CHANGE UP (ref 96–108)
CO2 SERPL-SCNC: 24 MMOL/L — SIGNIFICANT CHANGE UP (ref 22–31)
CREAT SERPL-MCNC: 1.07 MG/DL — SIGNIFICANT CHANGE UP (ref 0.5–1.3)
EGFR: 51 ML/MIN/1.73M2 — LOW
GLUCOSE BLDC GLUCOMTR-MCNC: 116 MG/DL — HIGH (ref 70–99)
GLUCOSE BLDC GLUCOMTR-MCNC: 141 MG/DL — HIGH (ref 70–99)
GLUCOSE BLDC GLUCOMTR-MCNC: 155 MG/DL — HIGH (ref 70–99)
GLUCOSE BLDC GLUCOMTR-MCNC: 210 MG/DL — HIGH (ref 70–99)
GLUCOSE SERPL-MCNC: 143 MG/DL — HIGH (ref 70–99)
HCT VFR BLD CALC: 37.1 % — SIGNIFICANT CHANGE UP (ref 34.5–45)
HGB BLD-MCNC: 12 G/DL — SIGNIFICANT CHANGE UP (ref 11.5–15.5)
MAGNESIUM SERPL-MCNC: 2.3 MG/DL — SIGNIFICANT CHANGE UP (ref 1.6–2.6)
MCHC RBC-ENTMCNC: 32.3 G/DL — SIGNIFICANT CHANGE UP (ref 32–36)
MCHC RBC-ENTMCNC: 32.7 PG — SIGNIFICANT CHANGE UP (ref 27–34)
MCV RBC AUTO: 101.1 FL — HIGH (ref 80–100)
NRBC BLD AUTO-RTO: 0 /100 WBCS — SIGNIFICANT CHANGE UP (ref 0–0)
PHOSPHATE SERPL-MCNC: 2.3 MG/DL — LOW (ref 2.5–4.5)
PLATELET # BLD AUTO: 197 K/UL — SIGNIFICANT CHANGE UP (ref 150–400)
POTASSIUM SERPL-MCNC: 4.6 MMOL/L — SIGNIFICANT CHANGE UP (ref 3.5–5.3)
POTASSIUM SERPL-SCNC: 4.6 MMOL/L — SIGNIFICANT CHANGE UP (ref 3.5–5.3)
RBC # BLD: 3.67 M/UL — LOW (ref 3.8–5.2)
RBC # FLD: 14.6 % — HIGH (ref 10.3–14.5)
SODIUM SERPL-SCNC: 134 MMOL/L — LOW (ref 135–145)
WBC # BLD: 8.45 K/UL — SIGNIFICANT CHANGE UP (ref 3.8–10.5)
WBC # FLD AUTO: 8.45 K/UL — SIGNIFICANT CHANGE UP (ref 3.8–10.5)

## 2025-02-25 PROCEDURE — 99232 SBSQ HOSP IP/OBS MODERATE 35: CPT

## 2025-02-25 RX ORDER — SODIUM PHOSPHATE,DIBASIC DIHYD
15 POWDER (GRAM) MISCELLANEOUS ONCE
Refills: 0 | Status: COMPLETED | OUTPATIENT
Start: 2025-02-25 | End: 2025-02-25

## 2025-02-25 RX ORDER — SENNA 187 MG
2 TABLET ORAL
Qty: 0 | Refills: 0 | DISCHARGE
Start: 2025-02-25

## 2025-02-25 RX ORDER — POLYETHYLENE GLYCOL 3350 17 G/17G
17 POWDER, FOR SOLUTION ORAL
Qty: 0 | Refills: 0 | DISCHARGE
Start: 2025-02-25

## 2025-02-25 RX ORDER — SOD PHOS DI, MONO/K PHOS MONO 250 MG
2 TABLET ORAL ONCE
Refills: 0 | Status: COMPLETED | OUTPATIENT
Start: 2025-02-25 | End: 2025-02-25

## 2025-02-25 RX ORDER — ACETAMINOPHEN 500 MG/5ML
3 LIQUID (ML) ORAL
Qty: 0 | Refills: 0 | DISCHARGE
Start: 2025-02-25

## 2025-02-25 RX ADMIN — Medication 50 MICROGRAM(S): at 05:11

## 2025-02-25 RX ADMIN — Medication 81 MILLIGRAM(S): at 11:03

## 2025-02-25 RX ADMIN — Medication 63.75 MILLIMOLE(S): at 10:08

## 2025-02-25 RX ADMIN — Medication 2 PACKET(S): at 10:08

## 2025-02-25 RX ADMIN — APIXABAN 2.5 MILLIGRAM(S): 2.5 TABLET, FILM COATED ORAL at 17:37

## 2025-02-25 RX ADMIN — BUMETANIDE 1 MILLIGRAM(S): 1 TABLET ORAL at 11:03

## 2025-02-25 RX ADMIN — Medication 975 MILLIGRAM(S): at 05:10

## 2025-02-25 RX ADMIN — APIXABAN 2.5 MILLIGRAM(S): 2.5 TABLET, FILM COATED ORAL at 05:10

## 2025-02-25 RX ADMIN — LOSARTAN POTASSIUM 25 MILLIGRAM(S): 100 TABLET, FILM COATED ORAL at 05:44

## 2025-02-25 RX ADMIN — Medication 5000 UNIT(S): at 05:11

## 2025-02-25 RX ADMIN — INSULIN LISPRO 2: 100 INJECTION, SOLUTION INTRAVENOUS; SUBCUTANEOUS at 11:09

## 2025-02-25 RX ADMIN — Medication 2 TABLET(S): at 08:42

## 2025-02-25 RX ADMIN — Medication 40 MILLIGRAM(S): at 05:10

## 2025-02-25 RX ADMIN — INSULIN LISPRO 1: 100 INJECTION, SOLUTION INTRAVENOUS; SUBCUTANEOUS at 17:37

## 2025-02-25 RX ADMIN — AMIODARONE HYDROCHLORIDE 100 MILLIGRAM(S): 50 INJECTION, SOLUTION INTRAVENOUS at 05:10

## 2025-02-25 RX ADMIN — Medication 4 MILLIGRAM(S): at 11:02

## 2025-02-25 RX ADMIN — METOPROLOL SUCCINATE 50 MILLIGRAM(S): 50 TABLET, EXTENDED RELEASE ORAL at 05:44

## 2025-02-25 RX ADMIN — ROSUVASTATIN CALCIUM 5 MILLIGRAM(S): 20 TABLET, FILM COATED ORAL at 22:06

## 2025-02-25 NOTE — PROGRESS NOTE ADULT - SUBJECTIVE AND OBJECTIVE BOX
Pt seen/examined. Doing well. Pain controlled. No acute overnight complaints or events.    T(C): 36.7 (02-25-25 @ 04:47), Max: 36.7 (02-24-25 @ 16:35)  HR: 86 (02-25-25 @ 04:47) (66 - 86)  BP: 122/79 (02-25-25 @ 04:47) (100/61 - 122/79)  RR: 18 (02-25-25 @ 04:47) (18 - 18)  SpO2: 96% (02-25-25 @ 04:47) (93% - 96%)  Wt(kg): --  - Gen: NAD    Exam:  Gen: No acute distress  LLE: *Patient with hx of L AKA*  Aquacel clean, dry, and intact  SILT in the distal extremity  RLE: Calf soft and nontender    A/P: 84 y/o female s/p left hip CRPP, POD #3    - Pain control/analgesia  - DVT ppx: per primary team  - PT/OT   - WBAT LLE with prosthesis  - OOB  - Incentive spirometer  - GI ppx  - Bowel regimen  - Dispo: PT recommended [facility]  - Continue care per primary team  - Notify ortho for questions

## 2025-02-25 NOTE — DISCHARGE NOTE PROVIDER - NSDCCPTREATMENT_GEN_ALL_CORE_FT
PRINCIPAL PROCEDURE  Procedure: Closed reduction and percutaneous pinning of left hip  Findings and Treatment:

## 2025-02-25 NOTE — DISCHARGE NOTE PROVIDER - PROVIDER TOKENS
PROVIDER:[TOKEN:[[id number]],FOLLOWUP:[2 weeks]],PROVIDER:[TOKEN:[[id number]],FOLLOWUP:[2 weeks]] PROVIDER:[TOKEN:[[id number]],FOLLOWUP:[2 weeks]],PROVIDER:[TOKEN:[[id number]],FOLLOWUP:[2 weeks]],PROVIDER:[TOKEN:[[id number]],FOLLOWUP:[Routine]] PROVIDER:[TOKEN:[[id number]],FOLLOWUP:[1 week]],PROVIDER:[TOKEN:[[id number]],FOLLOWUP:[2 weeks]],PROVIDER:[TOKEN:[[id number]:[Routine]]

## 2025-02-25 NOTE — PROGRESS NOTE ADULT - SUBJECTIVE AND OBJECTIVE BOX
ACS/ Trauma Surgery Progress Note     SUBJECTIVE  The patient was seen and examined. No acute events overnight. Pain controlled, afebrile w/ stable vitals. The patient was determined to have capacity. She would like to be discharged home.     OBJECTIVE  ___________________________________________________  VITAL SIGNS / I&O's   Vital Signs Last 24 Hrs  T(C): 36.8 (25 Feb 2025 08:20), Max: 36.8 (25 Feb 2025 08:20)  T(F): 98.2 (25 Feb 2025 08:20), Max: 98.2 (25 Feb 2025 08:20)  HR: 80 (25 Feb 2025 08:20) (66 - 86)  BP: 112/75 (25 Feb 2025 08:20) (100/61 - 122/79)  BP(mean): --  RR: 18 (25 Feb 2025 08:20) (18 - 18)  SpO2: 93% (25 Feb 2025 08:20) (93% - 96%)    Parameters below as of 25 Feb 2025 08:20  Patient On (Oxygen Delivery Method): room air          24 Feb 2025 07:01  -  25 Feb 2025 07:00  --------------------------------------------------------  IN:    Oral Fluid: 1140 mL  Total IN: 1140 mL    OUT:    Voided (mL): 1000 mL  Total OUT: 1000 mL    Total NET: 140 mL      25 Feb 2025 07:01  -  25 Feb 2025 09:33  --------------------------------------------------------  IN:    Oral Fluid: 240 mL  Total IN: 240 mL    OUT:  Total OUT: 0 mL    Total NET: 240 mL        ___________________________________________________  PHYSICAL EXAM    CONSTITUTIONAL: NAD, lying in bed  NEURO: Awake, alert  PULM: Non-labored respirations, equal chest rise bilaterally  ABDOMEN: Nondistended  EXTREMITIES: LLE WWP, compartment soft, Aquacel dressing c/d/i,   PSYCH: Affect normal, A&Ox3    ___________________________________________________  LABS                        12.0   8.45  )-----------( 197      ( 25 Feb 2025 06:15 )             37.1     25 Feb 2025 06:15    134    |  96     |  31     ----------------------------<  143    4.6     |  24     |  1.07     Ca    8.5        25 Feb 2025 06:15  Phos  2.3       25 Feb 2025 06:15  Mg     2.3       25 Feb 2025 06:15        CAPILLARY BLOOD GLUCOSE      POCT Blood Glucose.: 141 mg/dL (25 Feb 2025 08:40)  POCT Blood Glucose.: 144 mg/dL (24 Feb 2025 21:29)  POCT Blood Glucose.: 195 mg/dL (24 Feb 2025 17:21)  POCT Blood Glucose.: 158 mg/dL (24 Feb 2025 12:23)        Urinalysis Basic - ( 25 Feb 2025 06:15 )    Color: x / Appearance: x / SG: x / pH: x  Gluc: 143 mg/dL / Ketone: x  / Bili: x / Urobili: x   Blood: x / Protein: x / Nitrite: x   Leuk Esterase: x / RBC: x / WBC x   Sq Epi: x / Non Sq Epi: x / Bacteria: x      ___________________________________________________  MICRO  Recent Cultures:    ___________________________________________________  MEDICATIONS  MEDICATIONS  (STANDING):  acetaminophen     Tablet .. 975 milliGRAM(s) Oral every 8 hours  aMIOdarone    Tablet 100 milliGRAM(s) Oral every 24 hours  apixaban 2.5 milliGRAM(s) Oral every 12 hours  aspirin enteric coated 81 milliGRAM(s) Oral daily  buMETAnide 1 milliGRAM(s) Oral every 24 hours  cholecalciferol 5000 Unit(s) Oral every 24 hours  dextrose 5%. 1000 milliLiter(s) (100 mL/Hr) IV Continuous <Continuous>  dextrose 5%. 1000 milliLiter(s) (50 mL/Hr) IV Continuous <Continuous>  dextrose 50% Injectable 25 Gram(s) IV Push once  dextrose 50% Injectable 12.5 Gram(s) IV Push once  dextrose 50% Injectable 25 Gram(s) IV Push once  glucagon  Injectable 1 milliGRAM(s) IntraMuscular once  insulin lispro (ADMELOG) corrective regimen sliding scale   SubCutaneous three times a day before meals  insulin lispro (ADMELOG) corrective regimen sliding scale   SubCutaneous at bedtime  levothyroxine 50 MICROGram(s) Oral daily  losartan 25 milliGRAM(s) Oral every 24 hours  metoprolol succinate ER 50 milliGRAM(s) Oral every 24 hours  pantoprazole    Tablet 40 milliGRAM(s) Oral before breakfast  polyethylene glycol 3350 17 Gram(s) Oral daily  potassium phosphate / sodium phosphate Powder (PHOS-NaK) 2 Packet(s) Oral once  rosuvastatin 5 milliGRAM(s) Oral at bedtime  senna 2 Tablet(s) Oral at bedtime  sodium phosphate 15 milliMole(s)/250 mL IVPB 15 milliMole(s) IV Intermittent once    MEDICATIONS  (PRN):  dextrose Oral Gel 15 Gram(s) Oral once PRN Blood Glucose LESS THAN 70 milliGRAM(s)/deciliter  magnesium hydroxide Suspension 30 milliLiter(s) Oral daily PRN Constipation  melatonin 3 milliGRAM(s) Oral at bedtime PRN Insomnia  ondansetron Injectable 4 milliGRAM(s) IV Push every 6 hours PRN Nausea and/or Vomiting  oxyCODONE    IR 2.5 milliGRAM(s) Oral every 6 hours PRN Moderate Pain (4 - 6)  oxyCODONE    IR 5 milliGRAM(s) Oral every 6 hours PRN Severe Pain (7 - 10)  traMADol 50 milliGRAM(s) Oral every 6 hours PRN Mild Pain (1 - 3)      ___________________________________________________    Assessment/Plan   NANO GARCIA is a 85y Female PMHx CAD, PAD s/p LLE Bypass cb thrombosis s/p L AKA, DM, HFrEF (23%), severe AS, AF on Eliquis, hypothyroidism, HTN, HLD who presents to [facility] ED s/p mechanical fall. - 2/22- S/P L Hip CRRP    PLAN:     - Diet: Regular  - Pain control  - IS  - Weight bearing as tolerated  - Diabetes education  - dvt px: Eliquis   - Dispo: Home PT; has aid at home     ACS/Trauma Surgery  [id number].

## 2025-02-25 NOTE — DISCHARGE NOTE PROVIDER - NSDCFUADDAPPT_GEN_ALL_CORE_FT
Wound care:  Topical therapy- sacral/bilateral buttocks- Cleansed with normal saline 0.9%, pat dry, and applied Adaptic Touch semi-permeable dressing, cover with dry protective dressing. Frequency: Daily & PRN Soiling. Monitor for changes.    Right heel Elevate heels; apply Complete Cair air fluidized boots; ensure that the soles of the feet are not resting on the foot board of the bed.    Topical Therapy - Apply Cavilon No-sting barrier wipe to form a gentle, breathable, waterproof, transparent coating on the skin. Covered with Allevyn Foam border dressing. Wound care:  Topical therapy- sacral/bilateral buttocks- Cleansed with normal saline 0.9%, pat dry, and applied Adaptic Touch semi-permeable dressing, cover with dry protective dressing. Frequency: Daily & PRN Soiling. Monitor for changes.    Right heel Elevate heels; apply Complete Cair air fluidized boots; ensure that the soles of the feet are not resting on the foot board of the bed.    Topical Therapy - Apply Cavilon No-sting barrier wipe to form a gentle, breathable, waterproof, transparent coating on the skin. Covered with Allevyn Foam border dressing.    No appointment with trauma (acute care surgery) is required.  If you have any questions, please call office. Wound care:  Topical therapy- sacral/bilateral buttocks- Cleansed with normal saline 0.9%, pat dry, and applied Adaptic Touch semi-permeable dressing, cover with dry protective dressing. Frequency: Daily & PRN Soiling. Monitor for changes.    Right heel Elevate heels; apply Complete Cair air fluidized boots; ensure that the soles of the feet are not resting on the foot board of the bed.    Topical Therapy - Apply Cavilon No-sting barrier wipe to form a gentle, breathable, waterproof, transparent coating on the skin. Covered with Allevyn Foam border dressing.    No appointment with trauma (acute care surgery) is required.  If you have any questions, please call office Dr. Phillip, Dr. Valentino, Dr. Luis, Dr. Lord, Dr. Rouse, Dr. Shore,  Dr. Champion, Dr. Suazo, Dr. Herrera or Dr. Qiu

## 2025-02-25 NOTE — DISCHARGE NOTE PROVIDER - NPI NUMBER (FOR SYSADMIN USE ONLY) :
[[id number]],[[id number]] [[id number]],[[id number]],[[id number]] [[id number]],[[id number]],[[id number]]

## 2025-02-25 NOTE — H&P CARDIOLOGY - WEIGHT IN LBS
Detail Level: Detailed Quality 226: Preventive Care And Screening: Tobacco Use: Screening And Cessation Intervention: Patient screened for tobacco use and is an ex/non-smoker 130

## 2025-02-25 NOTE — DISCHARGE NOTE PROVIDER - CARE PROVIDERS DIRECT ADDRESSES
,[email or web address]

## 2025-02-25 NOTE — DISCHARGE NOTE PROVIDER - NSDCMRMEDTOKEN_GEN_ALL_CORE_FT
amiodarone 200 mg oral tablet: 0.5 tab(s) orally once a day  aspirin 81 mg oral delayed release tablet: 1 tab(s) orally once a day  bumetanide 1 mg oral tablet: 1 tab(s) orally once a day  cholecalciferol 125 mcg (5000 intl units) oral tablet: 1 tab(s) orally once a day  dapagliflozin 10 mg oral tablet: 1 tab(s) orally every 24 hours  Eliquis 2.5 mg oral tablet: 1 tab(s) orally 2 times a day  levothyroxine 50 mcg (0.05 mg) oral tablet: 1 tab(s) orally once a day  metFORMIN 500 mg oral tablet: 1 tab(s) orally once a day  metoprolol succinate 50 mg oral tablet, extended release: 1 tab(s) orally once a day  ondansetron 4 mg oral tablet: 1 tab(s) orally 2 times a day as needed for  nausea  Protonix 40 mg oral delayed release tablet: 1 tab(s) orally once a day  rosuvastatin 5 mg oral tablet: 1 tab(s) orally once a day (at bedtime)  spironolactone 25 mg oral tablet: 1 tab(s) orally once a day  telmisartan 20 mg oral tablet: 1 tab(s) orally once a day   acetaminophen 325 mg oral tablet: 3 tab(s) orally every 6 hours as needed for  mild pain  amiodarone 200 mg oral tablet: 0.5 tab(s) orally once a day  aspirin 81 mg oral delayed release tablet: 1 tab(s) orally once a day  bumetanide 1 mg oral tablet: 1 tab(s) orally once a day  cholecalciferol 125 mcg (5000 intl units) oral tablet: 1 tab(s) orally once a day  dapagliflozin 10 mg oral tablet: 1 tab(s) orally every 24 hours  Eliquis 2.5 mg oral tablet: 1 tab(s) orally 2 times a day  levothyroxine 50 mcg (0.05 mg) oral tablet: 1 tab(s) orally once a day  metFORMIN 500 mg oral tablet: 1 tab(s) orally once a day  metoprolol succinate 50 mg oral tablet, extended release: 1 tab(s) orally once a day  polyethylene glycol 3350 oral powder for reconstitution: 17 gram(s) orally once a day  Protonix 40 mg oral delayed release tablet: 1 tab(s) orally once a day  rosuvastatin 5 mg oral tablet: 1 tab(s) orally once a day (at bedtime)  senna leaf extract oral tablet: 2 tab(s) orally once a day (at bedtime)  spironolactone 25 mg oral tablet: 1 tab(s) orally once a day  telmisartan 20 mg oral tablet: 1 tab(s) orally once a day

## 2025-02-25 NOTE — DISCHARGE NOTE PROVIDER - NSDCCPCAREPLAN_GEN_ALL_CORE_FT
PRINCIPAL DISCHARGE DIAGNOSIS  Diagnosis: Femoral neck fracture  Assessment and Plan of Treatment: 1.  Activity as tolerated  2.  Diabetic diet  3.  Follow-up with Dr. Francis within 1-2 weeks.  Please call office for appointment.  Please follow up with your primary care physician.  Please schedule an appointment with your primary care provider within two weeks.      SECONDARY DISCHARGE DIAGNOSES  Diagnosis: Fracture of orbital floor  Assessment and Plan of Treatment: Follow-up with Dr. Head within 1-2 weeks.  Please call office for appointment.     PRINCIPAL DISCHARGE DIAGNOSIS  Diagnosis: Femoral neck fracture  Assessment and Plan of Treatment: 1.  Activity as tolerated  2.  Diabetic diet  3.  Follow-up with Dr. Francis within 1-2 weeks.  Please call office for appointment.  Please follow up with your primary care physician.  Please schedule an appointment with your primary care provider within two weeks.      SECONDARY DISCHARGE DIAGNOSES  Diagnosis: Fracture of orbital floor  Assessment and Plan of Treatment: -Head of bed elevation   -Sinus precautions:  No nose blowing, no using straws, sneeze with your mouth open, no submerging in water for 2-4 weeks.   -If any vision changes, recommend opthalmology re-evaluation  -Follow up with Dr. Head outpatient 1-2 weeks after discharge.  Please call office for appointment.       PRINCIPAL DISCHARGE DIAGNOSIS  Diagnosis: Femoral neck fracture  Assessment and Plan of Treatment: 1.  Weight bearing/activity as tolerated  2.  Diabetic diet  3.  Follow-up with Dr. Francis within 1-2 weeks.  Please call office for appointment.  Please follow up with your primary care physician.  Please schedule an appointment with your primary care provider within two weeks.      SECONDARY DISCHARGE DIAGNOSES  Diagnosis: Fracture of orbital floor  Assessment and Plan of Treatment: -Head of bed elevation   -Sinus precautions:  No nose blowing, no using straws, sneeze with your mouth open, no submerging in water for 2-4 weeks.   -If any vision changes, recommend opthalmology re-evaluation  -Follow up with Dr. Head outpatient 1-2 weeks after discharge.  Please call office for appointment.       PRINCIPAL DISCHARGE DIAGNOSIS  Diagnosis: Femoral neck fracture  Assessment and Plan of Treatment: 1.  Weight bearing/activity as tolerated  2.  Diabetic diet  3.  Follow-up with Dr. Francis within 1-2 weeks.  Please call office for appointment.  Please follow up with your primary care physician.  Please schedule an appointment with your primary care provider within two weeks.  Please follow up with heart failure cardiologist as scheduled on 2/27      SECONDARY DISCHARGE DIAGNOSES  Diagnosis: Fracture of orbital floor  Assessment and Plan of Treatment: -Head of bed elevation   -Sinus precautions:  No nose blowing, no using straws, sneeze with your mouth open, no submerging in water for 2-4 weeks.   -If any vision changes, recommend opthalmology re-evaluation  -Follow up with Dr. Head outpatient 1-2 weeks after discharge.  Please call office for appointment.

## 2025-02-25 NOTE — DISCHARGE NOTE PROVIDER - NSDCFUSCHEDAPPT_GEN_ALL_CORE_FT
Alis Pulido  [facility] Physician [facility]  INTMED [address]  Scheduled Appointment: 02/26/2025    [facility]  HEARTFAIL [address]  Scheduled Appointment: 02/27/2025    Ladarius Weaver  [facility]  CTSURG [address]  Scheduled Appointment: 03/04/2025    Ladarius Calvillo  [facility]  PODIATRY [address]  Scheduled Appointment: 03/12/2025    Diana Mcneill  [facility]  INTMED [address]  Scheduled Appointment: 04/08/2025

## 2025-02-25 NOTE — PROGRESS NOTE ADULT - PROBLEM SELECTOR PLAN 3
CTA of RCA and LCx  - TTE 2/22 with EF 20%, global LV hypokinesis, reduced RV systolic function, moderate TR  - S/p ischemic eval 2/5/2025 with well developed collaterals from  RCA and LCx, with mild-mod LAD and mod-severe diagonal disease   - c/w metop succinate 50 mg daily, rosuvastatin 5 mg daily, ASA 81 mg daily

## 2025-02-25 NOTE — PROGRESS NOTE ADULT - SUBJECTIVE AND OBJECTIVE BOX
Patient is a 85y old  Female who presents with a chief complaint of Fracture of unspecified part of neck of unspecified femur, initial encounter for closed fracture     (23 Feb 2025 13:47)      SUBJECTIVE / OVERNIGHT EVENTS:  Pt seen. No acute events overnight. Limited ROS on account of somnolence. Pt appears comfortable.    MEDICATIONS  (STANDING):  acetaminophen     Tablet .. 975 milliGRAM(s) Oral every 8 hours  aMIOdarone    Tablet 100 milliGRAM(s) Oral every 24 hours  apixaban 2.5 milliGRAM(s) Oral every 12 hours  aspirin enteric coated 81 milliGRAM(s) Oral daily  buMETAnide 1 milliGRAM(s) Oral every 24 hours  cholecalciferol 5000 Unit(s) Oral every 24 hours  dextrose 5%. 1000 milliLiter(s) (100 mL/Hr) IV Continuous <Continuous>  dextrose 5%. 1000 milliLiter(s) (50 mL/Hr) IV Continuous <Continuous>  dextrose 50% Injectable 25 Gram(s) IV Push once  dextrose 50% Injectable 12.5 Gram(s) IV Push once  dextrose 50% Injectable 25 Gram(s) IV Push once  glucagon  Injectable 1 milliGRAM(s) IntraMuscular once  insulin lispro (ADMELOG) corrective regimen sliding scale   SubCutaneous three times a day before meals  insulin lispro (ADMELOG) corrective regimen sliding scale   SubCutaneous at bedtime  levothyroxine 50 MICROGram(s) Oral daily  losartan 25 milliGRAM(s) Oral every 24 hours  metoprolol succinate ER 50 milliGRAM(s) Oral every 24 hours  pantoprazole    Tablet 40 milliGRAM(s) Oral before breakfast  polyethylene glycol 3350 17 Gram(s) Oral daily  rosuvastatin 5 milliGRAM(s) Oral at bedtime  senna 2 Tablet(s) Oral at bedtime    MEDICATIONS  (PRN):  dextrose Oral Gel 15 Gram(s) Oral once PRN Blood Glucose LESS THAN 70 milliGRAM(s)/deciliter  magnesium hydroxide Suspension 30 milliLiter(s) Oral daily PRN Constipation  melatonin 3 milliGRAM(s) Oral at bedtime PRN Insomnia  ondansetron Injectable 4 milliGRAM(s) IV Push every 6 hours PRN Nausea and/or Vomiting  oxyCODONE    IR 2.5 milliGRAM(s) Oral every 6 hours PRN Moderate Pain (4 - 6)  oxyCODONE    IR 5 milliGRAM(s) Oral every 6 hours PRN Severe Pain (7 - 10)  traMADol 50 milliGRAM(s) Oral every 6 hours PRN Mild Pain (1 - 3)      Vital Signs Last 24 Hrs  T(C): 36.4 (25 Feb 2025 12:05), Max: 36.8 (25 Feb 2025 08:20)  T(F): 97.6 (25 Feb 2025 12:05), Max: 98.2 (25 Feb 2025 08:20)  HR: 77 (25 Feb 2025 12:05) (66 - 86)  BP: 102/69 (25 Feb 2025 12:05) (102/69 - 122/79)  BP(mean): --  RR: 18 (25 Feb 2025 12:05) (18 - 18)  SpO2: 98% (25 Feb 2025 12:05) (93% - 98%)    Parameters below as of 25 Feb 2025 12:05  Patient On (Oxygen Delivery Method): room air      CAPILLARY BLOOD GLUCOSE      POCT Blood Glucose.: 210 mg/dL (25 Feb 2025 11:08)  POCT Blood Glucose.: 141 mg/dL (25 Feb 2025 08:40)  POCT Blood Glucose.: 144 mg/dL (24 Feb 2025 21:29)  POCT Blood Glucose.: 195 mg/dL (24 Feb 2025 17:21)    I&O's Summary    24 Feb 2025 07:01  -  25 Feb 2025 07:00  --------------------------------------------------------  IN: 1140 mL / OUT: 1000 mL / NET: 140 mL    25 Feb 2025 07:01  -  25 Feb 2025 14:00  --------------------------------------------------------  IN: 240 mL / OUT: 0 mL / NET: 240 mL        PHYSICAL EXAM:  GENERAL: NAD, well-groomed  HEAD:  Atraumatic, Normocephalic  EYES:  conjunctiva and sclera clear  NECK: Supple, No JVD  CHEST/LUNG: Clear to auscultation bilaterally; No wheeze  HEART: Regular rate and rhythm; No murmurs, rubs, or gallops  ABDOMEN: Soft, Nontender, Nondistended; Bowel sounds present  EXTREMITIES:  left hip dressing CDI, right knee AKA  PSYCH: AAOx3  NEUROLOGY: non-focal  SKIN: No rashes or lesions      LABS:                        12.0   8.45  )-----------( 197      ( 25 Feb 2025 06:15 )             37.1     02-25    134[L]  |  96  |  31[H]  ----------------------------<  143[H]  4.6   |  24  |  1.07    Ca    8.5      25 Feb 2025 06:15  Phos  2.3     02-25  Mg     2.3     02-25            Urinalysis Basic - ( 25 Feb 2025 06:15 )    Color: x / Appearance: x / SG: x / pH: x  Gluc: 143 mg/dL / Ketone: x  / Bili: x / Urobili: x   Blood: x / Protein: x / Nitrite: x   Leuk Esterase: x / RBC: x / WBC x   Sq Epi: x / Non Sq Epi: x / Bacteria: x

## 2025-02-26 ENCOUNTER — TRANSCRIPTION ENCOUNTER (OUTPATIENT)
Age: 85
End: 2025-02-26

## 2025-02-26 ENCOUNTER — APPOINTMENT (OUTPATIENT)
Dept: INTERNAL MEDICINE | Facility: CLINIC | Age: 85
End: 2025-02-26

## 2025-02-26 VITALS
DIASTOLIC BLOOD PRESSURE: 61 MMHG | RESPIRATION RATE: 17 BRPM | TEMPERATURE: 98 F | HEART RATE: 81 BPM | SYSTOLIC BLOOD PRESSURE: 99 MMHG | OXYGEN SATURATION: 91 %

## 2025-02-26 LAB
GLUCOSE BLDC GLUCOMTR-MCNC: 111 MG/DL — HIGH (ref 70–99)
GLUCOSE BLDC GLUCOMTR-MCNC: 138 MG/DL — HIGH (ref 70–99)

## 2025-02-26 PROCEDURE — C1713: CPT

## 2025-02-26 PROCEDURE — 86900 BLOOD TYPING SEROLOGIC ABO: CPT

## 2025-02-26 PROCEDURE — 86901 BLOOD TYPING SEROLOGIC RH(D): CPT

## 2025-02-26 PROCEDURE — 85610 PROTHROMBIN TIME: CPT

## 2025-02-26 PROCEDURE — 86850 RBC ANTIBODY SCREEN: CPT

## 2025-02-26 PROCEDURE — C9399: CPT

## 2025-02-26 PROCEDURE — 85730 THROMBOPLASTIN TIME PARTIAL: CPT

## 2025-02-26 PROCEDURE — 85025 COMPLETE CBC W/AUTO DIFF WBC: CPT

## 2025-02-26 PROCEDURE — 71045 X-RAY EXAM CHEST 1 VIEW: CPT

## 2025-02-26 PROCEDURE — 82962 GLUCOSE BLOOD TEST: CPT

## 2025-02-26 PROCEDURE — 73030 X-RAY EXAM OF SHOULDER: CPT

## 2025-02-26 PROCEDURE — 97161 PT EVAL LOW COMPLEX 20 MIN: CPT

## 2025-02-26 PROCEDURE — 96374 THER/PROPH/DIAG INJ IV PUSH: CPT

## 2025-02-26 PROCEDURE — 85027 COMPLETE CBC AUTOMATED: CPT

## 2025-02-26 PROCEDURE — 97166 OT EVAL MOD COMPLEX 45 MIN: CPT

## 2025-02-26 PROCEDURE — 36415 COLL VENOUS BLD VENIPUNCTURE: CPT

## 2025-02-26 PROCEDURE — 73060 X-RAY EXAM OF HUMERUS: CPT

## 2025-02-26 PROCEDURE — 97110 THERAPEUTIC EXERCISES: CPT

## 2025-02-26 PROCEDURE — 93306 TTE W/DOPPLER COMPLETE: CPT

## 2025-02-26 PROCEDURE — 80048 BASIC METABOLIC PNL TOTAL CA: CPT

## 2025-02-26 PROCEDURE — 83735 ASSAY OF MAGNESIUM: CPT

## 2025-02-26 PROCEDURE — 99232 SBSQ HOSP IP/OBS MODERATE 35: CPT

## 2025-02-26 PROCEDURE — 76000 FLUOROSCOPY <1 HR PHYS/QHP: CPT

## 2025-02-26 PROCEDURE — 84100 ASSAY OF PHOSPHORUS: CPT

## 2025-02-26 PROCEDURE — 73552 X-RAY EXAM OF FEMUR 2/>: CPT

## 2025-02-26 PROCEDURE — 97530 THERAPEUTIC ACTIVITIES: CPT

## 2025-02-26 PROCEDURE — 80053 COMPREHEN METABOLIC PANEL: CPT

## 2025-02-26 PROCEDURE — 73502 X-RAY EXAM HIP UNI 2-3 VIEWS: CPT

## 2025-02-26 PROCEDURE — 99285 EMERGENCY DEPT VISIT HI MDM: CPT | Mod: 25

## 2025-02-26 RX ORDER — POLYETHYLENE GLYCOL 3350 17 G/17G
17 POWDER, FOR SOLUTION ORAL
Refills: 0 | Status: DISCONTINUED | OUTPATIENT
Start: 2025-02-26 | End: 2025-02-26

## 2025-02-26 RX ORDER — SPIRONOLACTONE 25 MG
25 TABLET ORAL DAILY
Refills: 0 | Status: DISCONTINUED | OUTPATIENT
Start: 2025-02-26 | End: 2025-02-26

## 2025-02-26 RX ORDER — DAPAGLIFLOZIN 5 MG/1
10 TABLET, FILM COATED ORAL DAILY
Refills: 0 | Status: DISCONTINUED | OUTPATIENT
Start: 2025-02-26 | End: 2025-02-26

## 2025-02-26 RX ADMIN — Medication 50 MICROGRAM(S): at 05:59

## 2025-02-26 RX ADMIN — DAPAGLIFLOZIN 10 MILLIGRAM(S): 5 TABLET, FILM COATED ORAL at 13:07

## 2025-02-26 RX ADMIN — APIXABAN 2.5 MILLIGRAM(S): 2.5 TABLET, FILM COATED ORAL at 06:51

## 2025-02-26 RX ADMIN — AMIODARONE HYDROCHLORIDE 100 MILLIGRAM(S): 50 INJECTION, SOLUTION INTRAVENOUS at 06:51

## 2025-02-26 RX ADMIN — Medication 81 MILLIGRAM(S): at 13:07

## 2025-02-26 NOTE — PROGRESS NOTE ADULT - SUBJECTIVE AND OBJECTIVE BOX
SURGERY DAILY PROGRESS NOTE:     Overnight Events:  No acute events overnight.    SUBJECTIVE: Patient seen and evaluated on AM rounds. Pt is resting comfortably in bed with no complaints. Passing gas. Agreeable to [facility].    OBJECTIVE:  Vital Signs Last 24 Hrs  T(C): 37.1 (26 Feb 2025 08:07), Max: 37.1 (26 Feb 2025 08:07)  T(F): 98.8 (26 Feb 2025 08:07), Max: 98.8 (26 Feb 2025 08:07)  HR: 82 (26 Feb 2025 11:02) (74 - 85)  BP: 106/68 (26 Feb 2025 11:02) (80/52 - 121/78)  BP(mean): --  RR: 18 (26 Feb 2025 10:30) (18 - 18)  SpO2: 94% (26 Feb 2025 08:07) (93% - 99%)    Parameters below as of 26 Feb 2025 08:07  Patient On (Oxygen Delivery Method): room air      I&O's Detail    25 Feb 2025 07:01  -  26 Feb 2025 07:00  --------------------------------------------------------  IN:    Oral Fluid: 480 mL  Total IN: 480 mL    OUT:    Voided (mL): 1400 mL  Total OUT: 1400 mL    Total NET: -920 mL        Daily     Daily     LABS:                        12.0   8.45  )-----------( 197      ( 25 Feb 2025 06:15 )             37.1     02-25    134[L]  |  96  |  31[H]  ----------------------------<  143[H]  4.6   |  24  |  1.07    Ca    8.5      25 Feb 2025 06:15  Phos  2.3     02-25  Mg     2.3     02-25        Urinalysis Basic - ( 25 Feb 2025 06:15 )    Color: x / Appearance: x / SG: x / pH: x  Gluc: 143 mg/dL / Ketone: x  / Bili: x / Urobili: x   Blood: x / Protein: x / Nitrite: x   Leuk Esterase: x / RBC: x / WBC x   Sq Epi: x / Non Sq Epi: x / Bacteria: x      PHYSICAL EXAM  CONSTITUTIONAL: NAD, lying in bed  NEURO: Awake, alert  PULM: Non-labored respirations, equal chest rise bilaterally  ABDOMEN: Nondistended  EXTREMITIES: LLE WWP, compartment soft  PSYCH: Affect normal, A&Ox3- has capacity per psych eval

## 2025-02-26 NOTE — PROGRESS NOTE ADULT - PROBLEM SELECTOR PROBLEM 1
Fracture of femoral neck, left, closed

## 2025-02-26 NOTE — PROGRESS NOTE ADULT - SUBJECTIVE AND OBJECTIVE BOX
Pt seen/examined. Doing well. Pain controlled. Pt endorses constipation since Saturday, otherwise no acute overnight complaints or events.    Vital Signs (24 Hrs):  T(C): 36.6 (02-26-25 @ 04:45), Max: 36.8 (02-25-25 @ 08:20)  HR: 80 (02-26-25 @ 04:45) (74 - 83)  BP: 108/69 (02-26-25 @ 04:45) (102/62 - 114/71)  RR: 18 (02-26-25 @ 04:45) (18 - 18)  SpO2: 95% (02-26-25 @ 04:45) (93% - 99%)  Wt(kg): --    LABS:                          12.0   8.45  )-----------( 197      ( 25 Feb 2025 06:15 )             37.1     02-25    134[L]  |  96  |  31[H]  ----------------------------<  143[H]  4.6   |  24  |  1.07    Ca    8.5      25 Feb 2025 06:15  Phos  2.3     02-25  Mg     2.3     02-25      Exam:  Gen: No acute distress  LLE: *Patient with hx of L AKA*  Aquacel clean, dry, and intact  SILT in the distal extremity  RLE: Calf soft and nontender    A/P: 86 y/o female s/p left hip CRPP, POD #4    - Pain control/analgesia  - DVT ppx: per primary team  - PT/OT   - WBAT LLE with prosthesis  - OOB  - Incentive spirometer  - GI ppx  - Bowel regimen > increased Miralax from QD to BID  - Dispo: PT recommended [facility]  - Continue care per primary team  - Notify ortho for questions

## 2025-02-26 NOTE — PROVIDER CONTACT NOTE (OTHER) - ACTION/TREATMENT ORDERED:
Emir Etienne MD notified. No further orders @ this time.
RN held cozaar and metoprolol per hold parameters. Prov to bedside at 1100, BP slightly improved. Per resident ok to hold bumex, pt is enc to increase oral intake.
PA stated to give the lovenox at 1500

## 2025-02-26 NOTE — PROGRESS NOTE ADULT - PROBLEM SELECTOR PLAN 4
- TTE 2/22 with EF 20%, global LV hypokinesis, reduced RV systolic function, moderate TR  - S/p ischemic eval 2/5/2025 with well developed collaterals from  RCA and LCx, with mild-mod LAD and mod-severe diagonal disease   - c/w metop succinate 50 mg daily, rosuvastatin 5 mg daily, ASA 81 mg daily and bumex 1 mg PO daily.  - resume Spironolactone and Farxiga for GDMT
last A1c on file 6.9 (1/2025)  - Home regimen: metformin 500 mg daily, dapagliflozin 10 mg daily  - BG well-controlled controlled  - C/w SSI coverage, BG goal 140-180 during hospitalization
- TTE 2/22 with EF 20%, global LV hypokinesis, reduced RV systolic function, moderate TR  - S/p ischemic eval 2/5/2025 with well developed collaterals from  RCA and LCx, with mild-mod LAD and mod-severe diagonal disease   - c/w metop succinate 50 mg daily, rosuvastatin 5 mg daily, ASA 81 mg daily and bumex 1 mg PO daily.  - resume Spironolactone and Farxiga for GDMT

## 2025-02-26 NOTE — PROGRESS NOTE ADULT - SUBJECTIVE AND OBJECTIVE BOX
Patient is a 85y old  Female who presents with a chief complaint of Fracture of unspecified part of neck of unspecified femur, initial encounter for closed fracture     (23 Feb 2025 13:47)      SUBJECTIVE / OVERNIGHT EVENTS: Pt seen. No acute events overnight. Limited ROS on account of somnolence. Pt appears comfortable.      MEDICATIONS  (STANDING):  acetaminophen     Tablet .. 975 milliGRAM(s) Oral every 8 hours  aMIOdarone    Tablet 100 milliGRAM(s) Oral every 24 hours  apixaban 2.5 milliGRAM(s) Oral every 12 hours  aspirin enteric coated 81 milliGRAM(s) Oral daily  buMETAnide 1 milliGRAM(s) Oral every 24 hours  cholecalciferol 5000 Unit(s) Oral every 24 hours  dapagliflozin 10 milliGRAM(s) Oral daily  dextrose 5%. 1000 milliLiter(s) (50 mL/Hr) IV Continuous <Continuous>  dextrose 5%. 1000 milliLiter(s) (100 mL/Hr) IV Continuous <Continuous>  dextrose 50% Injectable 25 Gram(s) IV Push once  dextrose 50% Injectable 12.5 Gram(s) IV Push once  dextrose 50% Injectable 25 Gram(s) IV Push once  glucagon  Injectable 1 milliGRAM(s) IntraMuscular once  insulin lispro (ADMELOG) corrective regimen sliding scale   SubCutaneous three times a day before meals  insulin lispro (ADMELOG) corrective regimen sliding scale   SubCutaneous at bedtime  levothyroxine 50 MICROGram(s) Oral daily  losartan 25 milliGRAM(s) Oral every 24 hours  metoprolol succinate ER 50 milliGRAM(s) Oral every 24 hours  pantoprazole    Tablet 40 milliGRAM(s) Oral before breakfast  polyethylene glycol 3350 17 Gram(s) Oral two times a day  rosuvastatin 5 milliGRAM(s) Oral at bedtime  senna 2 Tablet(s) Oral at bedtime  spironolactone 25 milliGRAM(s) Oral daily    MEDICATIONS  (PRN):  dextrose Oral Gel 15 Gram(s) Oral once PRN Blood Glucose LESS THAN 70 milliGRAM(s)/deciliter  magnesium hydroxide Suspension 30 milliLiter(s) Oral daily PRN Constipation  melatonin 3 milliGRAM(s) Oral at bedtime PRN Insomnia  ondansetron Injectable 4 milliGRAM(s) IV Push every 6 hours PRN Nausea and/or Vomiting  oxyCODONE    IR 2.5 milliGRAM(s) Oral every 6 hours PRN Moderate Pain (4 - 6)  oxyCODONE    IR 5 milliGRAM(s) Oral every 6 hours PRN Severe Pain (7 - 10)  traMADol 50 milliGRAM(s) Oral every 6 hours PRN Mild Pain (1 - 3)      Vital Signs Last 24 Hrs  T(C): 37.1 (26 Feb 2025 08:07), Max: 37.1 (26 Feb 2025 08:07)  T(F): 98.8 (26 Feb 2025 08:07), Max: 98.8 (26 Feb 2025 08:07)  HR: 82 (26 Feb 2025 11:02) (74 - 85)  BP: 106/68 (26 Feb 2025 11:02) (80/52 - 121/78)  BP(mean): --  RR: 18 (26 Feb 2025 10:30) (18 - 18)  SpO2: 94% (26 Feb 2025 08:07) (93% - 99%)    Parameters below as of 26 Feb 2025 08:07  Patient On (Oxygen Delivery Method): room air      CAPILLARY BLOOD GLUCOSE      POCT Blood Glucose.: 111 mg/dL (26 Feb 2025 08:23)  POCT Blood Glucose.: 116 mg/dL (25 Feb 2025 22:04)  POCT Blood Glucose.: 155 mg/dL (25 Feb 2025 17:06)    I&O's Summary    25 Feb 2025 07:01  -  26 Feb 2025 07:00  --------------------------------------------------------  IN: 480 mL / OUT: 1400 mL / NET: -920 mL        PHYSICAL EXAM:  GENERAL: NAD, well-groomed  HEAD:  Atraumatic, Normocephalic  EYES:  conjunctiva and sclera clear  NECK: Supple, No JVD  CHEST/LUNG: Clear to auscultation bilaterally; No wheeze  HEART: Regular rate and rhythm; No murmurs, rubs, or gallops  ABDOMEN: Soft, Nontender, Nondistended; Bowel sounds present  EXTREMITIES:  left hip dressing CDI, right knee AKA  PSYCH: AAOx3  NEUROLOGY: non-focal  SKIN: No rashes or lesions      LABS:                        12.0   8.45  )-----------( 197      ( 25 Feb 2025 06:15 )             37.1     02-25    134[L]  |  96  |  31[H]  ----------------------------<  143[H]  4.6   |  24  |  1.07    Ca    8.5      25 Feb 2025 06:15  Phos  2.3     02-25  Mg     2.3     02-25            Urinalysis Basic - ( 25 Feb 2025 06:15 )    Color: x / Appearance: x / SG: x / pH: x  Gluc: 143 mg/dL / Ketone: x  / Bili: x / Urobili: x   Blood: x / Protein: x / Nitrite: x   Leuk Esterase: x / RBC: x / WBC x   Sq Epi: x / Non Sq Epi: x / Bacteria: x          Care Discussed with Consultants/Other Providers: Cardiology

## 2025-02-26 NOTE — DISCHARGE NOTE NURSING/CASE MANAGEMENT/SOCIAL WORK - FINANCIAL ASSISTANCE
[facility] provides services at a reduced cost to those who are determined to be eligible through [facility]’s financial assistance program. Information regarding [facility]’s financial assistance program can be found by going to [email or web address] or by calling [phone].

## 2025-02-26 NOTE — PROGRESS NOTE ADULT - PROBLEM SELECTOR PLAN 1
- s/p Closed reduction and percutaneous pinning of left hip 2/22  - DVT ppx and WB recommendations as per primary team.

## 2025-02-26 NOTE — PROVIDER CONTACT NOTE (OTHER) - ASSESSMENT
Pt A&Ox4, VSS , no c/o chest pain, SOB, dizziness or headache. Pt refusing bedtime fingerstick, and PM meds including insulin sliding scale, Crestor, senna.
on reassessment, BP readings   R arm 80/50, hr 79  L arm 101/62 hr 80
pt has a lovenox order but it is not timed. pt is POD#1 now

## 2025-02-26 NOTE — PROGRESS NOTE ADULT - ASSESSMENT
NANO GARCIA is a 85y Female PMHx CAD, PAD s/p LLE Bypass cb thrombosis s/p L AKA, DM, HFrEF (23%), severe AS, AF on Eliquis, hypothyroidism, HTN, HLD who presents to [facility] ED s/p mechanical fall. - 2/22- S/P L Hip CRRP    PLAN:   - psych eval- HAS DECISION MAKING CAPACITY  - Diet: Regular  - Pain control  - IS  - Weight bearing as tolerated  - Diabetes education  - dvt px: Eliquis   - Dispo: [facility]- patient is agreeable- Dc today    ACS/Trauma Surgery  [id number].

## 2025-02-26 NOTE — DISCHARGE NOTE NURSING/CASE MANAGEMENT/SOCIAL WORK - NSDCPEFALRISK_GEN_ALL_CORE
For information on Fall & Injury Prevention, visit: [email or web address] OR  [email or web address] OR  https://www.cdc.gov/steadi/patient.html

## 2025-02-26 NOTE — PROGRESS NOTE ADULT - PROBLEM SELECTOR PLAN 6
CHADVasc score 6  c/w AURELIO Hunt.
CHADVasc score 6  c/w AURELIO Hunt.
C/w levothyroxine 50 mcg qam.

## 2025-02-26 NOTE — PROGRESS NOTE ADULT - NSPROGADDITIONALINFOA_GEN_ALL_CORE
time spent reviewing prior charts, meds, discussing plan = 45 minutes    d/w Trauma team ; d/c planning to [facility] today
time spent reviewing prior charts, meds, discussing plan with patient= 45 minutes    d/w Trauma team
time spent reviewing prior charts, meds, discussing plan = 45 minutes    d/w Trauma team

## 2025-02-26 NOTE — DISCHARGE NOTE NURSING/CASE MANAGEMENT/SOCIAL WORK - NSDCVIVACCINE_GEN_ALL_CORE_FT
influenza, high-dose, quadrivalent; 08-Sep-2023 12:49; Amy Gaona); [employer]; [id number] (Exp. Date: 06-Sep-2024); IntraMuscular; Deltoid Left.; 0.7 milliLiter(s); VIS (VIS Published: 06-Aug-2021, VIS Presented: 08-Sep-2023);

## 2025-02-26 NOTE — PROGRESS NOTE ADULT - PROBLEM SELECTOR PLAN 5
last A1c on file 6.9 (1/2025)  - Home regimen: metformin 500 mg daily, dapagliflozin 10 mg daily  - BG well-controlled controlled  - C/w SSI coverage, BG goal 140-180 during hospitalization
CHADVasc score 6  c/w AURELIO Hunt.
last A1c on file 6.9 (1/2025)  - Home regimen: metformin 500 mg daily, dapagliflozin 10 mg daily  - BG well-controlled controlled  - C/w SSI coverage, BG goal 140-180 during hospitalization

## 2025-02-26 NOTE — PROGRESS NOTE ADULT - PROBLEM SELECTOR PLAN 2
Appreciate plastics evaluation, no operative intervention recommended  - HOB elevated, sinus precautions.
no
Appreciate plastics evaluation, no operative intervention recommended  - HOB elevated, sinus precautions.
Appreciate plastics evaluation, no operative intervention recommended  - HOB elevated, sinus precautions.

## 2025-02-26 NOTE — DISCHARGE NOTE NURSING/CASE MANAGEMENT/SOCIAL WORK - NSDCFUADDAPPT_GEN_ALL_CORE_FT
Wound care:  Topical therapy- sacral/bilateral buttocks- Cleansed with normal saline 0.9%, pat dry, and applied Adaptic Touch semi-permeable dressing, cover with dry protective dressing. Frequency: Daily & PRN Soiling. Monitor for changes.    Right heel Elevate heels; apply Complete Cair air fluidized boots; ensure that the soles of the feet are not resting on the foot board of the bed.    Topical Therapy - Apply Cavilon No-sting barrier wipe to form a gentle, breathable, waterproof, transparent coating on the skin. Covered with Allevyn Foam border dressing.    No appointment with trauma (acute care surgery) is required.  If you have any questions, please call office Dr. Phillip, Dr. Valentino, Dr. Luis, Dr. Lord, Dr. Rouse, Dr. Shore,  Dr. Champion, Dr. Suazo, Dr. Herrera or Dr. Qiu

## 2025-02-26 NOTE — DISCHARGE NOTE NURSING/CASE MANAGEMENT/SOCIAL WORK - PATIENT PORTAL LINK FT
You can access the FollowMyHealth Patient Portal offered by [facility] by registering at the following website: [email or web address]. By joining [email or web address] FollowMyHealth portal, you will also be able to view your health information using other applications (apps) compatible with our system.

## 2025-02-26 NOTE — PROGRESS NOTE ADULT - PROVIDER SPECIALTY LIST ADULT
Hospitalist
Orthopedics
Orthopedics
Surgery
Trauma Surgery
Cardiology
Orthopedics
Orthopedics
Trauma Surgery
Hospitalist
Hospitalist

## 2025-02-27 ENCOUNTER — APPOINTMENT (OUTPATIENT)
Dept: HEART FAILURE | Facility: CLINIC | Age: 85
End: 2025-02-27

## 2025-03-04 ENCOUNTER — APPOINTMENT (OUTPATIENT)
Dept: CARDIOTHORACIC SURGERY | Facility: CLINIC | Age: 85
End: 2025-03-04

## 2025-03-12 ENCOUNTER — APPOINTMENT (OUTPATIENT)
Dept: PODIATRY | Facility: CLINIC | Age: 85
End: 2025-03-12

## 2025-03-14 NOTE — H&P ADULT - NSICDXFAMILYHX_GEN_ALL_CORE_FT
Detail Level: Generalized Detail Level: Zone FAMILY HISTORY:  Father  Still living? No  Family history of myocardial infarction, Age at diagnosis: 41-50    Sibling  Still living? No  Family history of myocardial infarction, Age at diagnosis: 41-50

## 2025-03-25 ENCOUNTER — APPOINTMENT (OUTPATIENT)
Dept: ORTHOPEDIC SURGERY | Facility: CLINIC | Age: 85
End: 2025-03-25

## 2025-06-27 NOTE — DISCHARGE NOTE NURSING/CASE MANAGEMENT/SOCIAL WORK - NSDCPEELIQUIS_GEN_ALL_CORE
A1c is elevated.  Keep existing appointment. Apixaban/Eliquis - Compliance/Apixaban/Eliquis - Dietary Advice/Apixaban/Eliquis - Follow up monitoring/Apixaban/Eliquis - Potential for adverse drug reactions and interactions

## (undated) DEVICE — LABELS BLANK W PEN

## (undated) DEVICE — DRSG TEGADERM 8 X 12"

## (undated) DEVICE — DRAPE 3/4 SHEET 52X76"

## (undated) DEVICE — SUT PROLENE 5-0 30" RB-2

## (undated) DEVICE — VENODYNE/SCD SLEEVE CALF LARGE

## (undated) DEVICE — DRSG STOCKINETTE IMPERVIOUS XL

## (undated) DEVICE — DRAPE MAGNETIC INSTRUMENT MEDIUM

## (undated) DEVICE — SUT SOFSILK 3-0 30" TIES

## (undated) DEVICE — GLV 8 PROTEXIS (WHITE)

## (undated) DEVICE — SOL IRR POUR NS 0.9% 500ML

## (undated) DEVICE — PACKING GAUZE PLAIN 2"

## (undated) DEVICE — HANDPIECE INTERPULSE W/ COAXIAL FAN SPRAY TIP

## (undated) DEVICE — POSITIONER FOAM HEADREST (PINK)

## (undated) DEVICE — DRSG CURITY GAUZE SPONGE 4 X 4" 12-PLY

## (undated) DEVICE — DRAPE IOBAN 23" X 23"

## (undated) DEVICE — SUT PROLENE 7-0 4-24" BV-1

## (undated) DEVICE — PACK LIJ BASIC ORTHO

## (undated) DEVICE — GLV 7.5 PROTEXIS (CREAM) MICRO

## (undated) DEVICE — SUT VICRYL 2-0 27" CT-1 UNDYED

## (undated) DEVICE — DRSG ACE BANDAGE 4"

## (undated) DEVICE — DRSG ACE BANDAGE 6"

## (undated) DEVICE — GLV 7 PROTEXIS (WHITE)

## (undated) DEVICE — TAPE SILK 3"

## (undated) DEVICE — CLAMP BULLDOG MAXI 45 DEGREE (ORANGE) DISP

## (undated) DEVICE — DRSG XEROFORM 5 X 9"

## (undated) DEVICE — SUT VICRYL 3-0 27" SH UNDYED

## (undated) DEVICE — DRILL BIT SYNTHES ORTHO CANN QC 5X300MM

## (undated) DEVICE — STRYKER INTERPULSE HANDPIECE W IRR SUCTION TUBE

## (undated) DEVICE — GOWN TRIMAX XXL

## (undated) DEVICE — SAW BLADE MICROAIRE OSCILATING 25.4MM X 90MM X 1.27MM

## (undated) DEVICE — SUT SILK 2-0 18" TIES

## (undated) DEVICE — CLAMP BULLDOG MINI STRAIGHT (GREEN) DISP

## (undated) DEVICE — DRSG KLING 4"

## (undated) DEVICE — MARKING PEN W RULER

## (undated) DEVICE — DRSG COMBINE 5X9"

## (undated) DEVICE — BLADE 32 X 64

## (undated) DEVICE — SUT SILK 4-0 17-18"

## (undated) DEVICE — PACK FEM/POP

## (undated) DEVICE — STAPLER SKIN MULTI DIRECTION W35

## (undated) DEVICE — DRAPE TOWEL BLUE 17" X 24"

## (undated) DEVICE — SUT SOFSILK 4-0 30" TIES

## (undated) DEVICE — STAPLER SKIN VISI-STAT 35 WIDE

## (undated) DEVICE — SYR TB 1CC 25G X 5/8 (BLUE)

## (undated) DEVICE — BULLDOG SPRING CLIP 6MM SOFT/SOFT

## (undated) DEVICE — SUT PROLENE 6-0 30" C-1

## (undated) DEVICE — ELCTR HEX BLADE

## (undated) DEVICE — DRSG DERMABOND 0.7ML

## (undated) DEVICE — SYR ASEPTO

## (undated) DEVICE — SYR LUER LOK 10CC

## (undated) DEVICE — SUT SILK 3-0 18" TIES

## (undated) DEVICE — LAP PAD 18 X 18"

## (undated) DEVICE — VENODYNE/SCD SLEEVE CALF MEDIUM

## (undated) DEVICE — PACK PERIPHERAL VASC

## (undated) DEVICE — DRAPE ISOLATION BAG 20X20"

## (undated) DEVICE — WARMING BLANKET FULL ADULT

## (undated) DEVICE — DRAPE MAYO STAND 30"

## (undated) DEVICE — DRSG TEGADERM 4X4.75"

## (undated) DEVICE — DRSG VAC GRANUFOAM MEDIUM (SILVER)

## (undated) DEVICE — KIT POST MORTEM PEDS

## (undated) DEVICE — DRSG STERISTRIPS 0.5 X 4"

## (undated) DEVICE — SAW BLADE MICROAIRE SAGITTAL 9.4MMX25.4MMX0.6MM

## (undated) DEVICE — POSITIONER FOAM EGG CRATE ULNAR 2PCS (PINK)

## (undated) DEVICE — DRSG STOCKINETTE IMPERVIOUS MED

## (undated) DEVICE — BUR STRYKER OVAL SOLID CARBIDE MED 4MM

## (undated) DEVICE — DRSG XEROFORM 1 X 8"

## (undated) DEVICE — SOL IRR POUR H2O 250ML

## (undated) DEVICE — PACK MINOR WITH LAP

## (undated) DEVICE — DRSG KLING 6"

## (undated) DEVICE — FOLEY TRAY 16FR 5CC LTX UMETER CLOSED

## (undated) DEVICE — WARMING BLANKET FULL UNDERBODY

## (undated) DEVICE — DRSG KERLIX ROLL 4.5"

## (undated) DEVICE — SUT VICRYL 2-0 27" SH UNDYED

## (undated) DEVICE — POSITIONER STRAP ARMBOARD VELCRO TS-30

## (undated) DEVICE — ELCTR GROUNDING PAD ADULT COVIDIEN

## (undated) DEVICE — DRSG WEBRIL 6"

## (undated) DEVICE — BAG DECANTER DISP

## (undated) DEVICE — GLV 8.5 PROTEXIS (WHITE)

## (undated) DEVICE — ELCTR BOVIE PENCIL BLADE 10FT

## (undated) DEVICE — PREP BETADINE SPONGE STICKS

## (undated) DEVICE — SAW BLADE MICROAIRE SAGITTAL 5.8X25.4X0.6 MM

## (undated) DEVICE — Device

## (undated) DEVICE — TOURNIQUET SET SURE-SNARE 22FR (2 TUBES, 2 UMBILICAL TAPES, 2 PLASTIC SNARES) 5"

## (undated) DEVICE — WARMING BLANKET UPPER ADULT

## (undated) DEVICE — SUT BOOT STANDARD (YELLOW) 5 PAIR

## (undated) DEVICE — DRSG OPSITE 13.75 X 4"

## (undated) DEVICE — SUT PLAIN GUT 4-0 18" P-12

## (undated) DEVICE — DRSG TEGADERM 6"X8"

## (undated) DEVICE — PACKING GAUZE PLAIN 0.5"

## (undated) DEVICE — PREP BETADINE KIT

## (undated) DEVICE — SUT POLYSORB 0 30" GS-21 UNDYED

## (undated) DEVICE — PACKING GAUZE IODOFORM 2"

## (undated) DEVICE — ELCTR BVI ACCU-TEMP CAUTERY SHAFT FINE TIP 1/2"

## (undated) DEVICE — SUT PROLENE 6-0 4-30" C-1

## (undated) DEVICE — DRSG KERLIX MED 6"

## (undated) DEVICE — SOL INJ NS 0.9% 500ML 1-PORT

## (undated) DEVICE — DRAPE LAPAROTOMY W VELCRO CORD TABS

## (undated) DEVICE — CLAMP BULLDOG MIDI 45 DEGREE (GREEN) DISP

## (undated) DEVICE — MEDICATION LABELS W MARKER

## (undated) DEVICE — SUT SOFSILK 2-0 30" TIES

## (undated) DEVICE — GLV 6.5 PROTEXIS (WHITE)

## (undated) DEVICE — NDL HYPO REGULAR BEVEL 25G X 1.5" (BLUE)

## (undated) DEVICE — NDL SAFETY BUTTERFLY 21G X 3/4

## (undated) DEVICE — SUT SOFSILK 0 18" TIES

## (undated) DEVICE — SUCTION YANKAUER NO CONTROL VENT

## (undated) DEVICE — SUT MONOCRYL 3-0 18" PS-2 UNDYED

## (undated) DEVICE — PACK EXTREMITY

## (undated) DEVICE — DRAPE SPLIT SHEET 77" X 108"

## (undated) DEVICE — SOL IRR BAG NS 0.9% 3000ML

## (undated) DEVICE — GOWN XL

## (undated) DEVICE — ELCTR BOVIE TIP BLADE INSULATED 2.75" EDGE

## (undated) DEVICE — CLAMP BULLDOG MIDI 45 DEGREE (YELLOW) DISP

## (undated) DEVICE — DRAPE INSTRUMENT POUCH 6.75" X 11"

## (undated) DEVICE — STOPCOCK 4-WAY W SWIVEL MALE LUER LOCK NON VENTED RED CAP

## (undated) DEVICE — SPECIMEN CONTAINER 100ML

## (undated) DEVICE — BASIN SET DOUBLE

## (undated) DEVICE — DRSG COBAN 4"

## (undated) DEVICE — SUT PROLENE 6-0 4-30" BV-1

## (undated) DEVICE — DRSG AQUACEL 3.5 X 6"

## (undated) DEVICE — TUNNELER SHEATH GREEN LARGE

## (undated) DEVICE — BLADE SCALPEL SAFETYLOCK #15

## (undated) DEVICE — WARMING BLANKET LOWER ADULT

## (undated) DEVICE — DRAPE 3/4 SHEET W REINFORCEMENT 56X77"

## (undated) DEVICE — SUT PROLENE 7-0 24" BV-1

## (undated) DEVICE — DRSG TAPE UMBILICAL COTTON 2" X 30 X 1/8"

## (undated) DEVICE — GLV 7.5 PROTEXIS (WHITE)

## (undated) DEVICE — GLV 8 PROTEXIS (BLUE)

## (undated) DEVICE — PACK HIP PINNING

## (undated) DEVICE — GEL AQUSNC PACKET 20GR

## (undated) DEVICE — DRAIN JACKSON PRATT 10MM FLAT FULL NO TROCAR

## (undated) DEVICE — SUT MONOCRYL 4-0 27" PS-2 UNDYED

## (undated) DEVICE — PREP CHLORAPREP HI-LITE ORANGE 26ML

## (undated) DEVICE — DRAPE LIGHT HANDLE COVER (BLUE)

## (undated) DEVICE — DRAIN RESERVOIR FOR JACKSON PRATT 100CC CARDINAL

## (undated) DEVICE — SAW BLADE STRYKER SAGITTAL DUAL CUT 25X89X90

## (undated) DEVICE — POSITIONER PATIENT SAFETY STRAP 3X60"

## (undated) DEVICE — VISITEC 4X4

## (undated) DEVICE — SUT POLYSORB 2-0 30" GS-21 UNDYED

## (undated) DEVICE — LAP PAD 4 X 18"

## (undated) DEVICE — DRSG TELFA 2 X 3

## (undated) DEVICE — DRAPE 1/2 SHEET 40X57"

## (undated) DEVICE — INFLATION DEVICE BASIXCOMPAK